# Patient Record
Sex: MALE | Race: WHITE | Employment: FULL TIME | URBAN - METROPOLITAN AREA
[De-identification: names, ages, dates, MRNs, and addresses within clinical notes are randomized per-mention and may not be internally consistent; named-entity substitution may affect disease eponyms.]

---

## 2019-10-26 ENCOUNTER — HOSPITAL ENCOUNTER (EMERGENCY)
Age: 39
Discharge: HOME | End: 2019-10-26

## 2019-10-26 ENCOUNTER — APPOINTMENT (OUTPATIENT)
Dept: GENERAL RADIOLOGY | Age: 39
End: 2019-10-26

## 2019-10-26 VITALS
SYSTOLIC BLOOD PRESSURE: 162 MMHG | WEIGHT: 200 LBS | DIASTOLIC BLOOD PRESSURE: 92 MMHG | OXYGEN SATURATION: 99 % | RESPIRATION RATE: 18 BRPM | HEART RATE: 99 BPM | TEMPERATURE: 98 F

## 2019-10-26 DIAGNOSIS — M54.32 SCIATICA OF LEFT SIDE: Primary | ICD-10-CM

## 2019-10-26 PROCEDURE — 96374 THER/PROPH/DIAG INJ IV PUSH: CPT

## 2019-10-26 PROCEDURE — 72100 X-RAY EXAM L-S SPINE 2/3 VWS: CPT

## 2019-10-26 PROCEDURE — 6370000000 HC RX 637 (ALT 250 FOR IP): Performed by: PHYSICIAN ASSISTANT

## 2019-10-26 PROCEDURE — 99283 EMERGENCY DEPT VISIT LOW MDM: CPT

## 2019-10-26 PROCEDURE — 96372 THER/PROPH/DIAG INJ SC/IM: CPT

## 2019-10-26 PROCEDURE — 6360000002 HC RX W HCPCS: Performed by: PHYSICIAN ASSISTANT

## 2019-10-26 RX ORDER — IBUPROFEN 800 MG/1
800 TABLET ORAL EVERY 8 HOURS PRN
Qty: 21 TABLET | Refills: 0 | Status: ON HOLD | OUTPATIENT
Start: 2019-10-26 | End: 2020-04-27 | Stop reason: HOSPADM

## 2019-10-26 RX ORDER — ORPHENADRINE CITRATE 100 MG/1
100 TABLET, EXTENDED RELEASE ORAL 2 TIMES DAILY
Qty: 20 TABLET | Refills: 0 | Status: SHIPPED | OUTPATIENT
Start: 2019-10-26 | End: 2019-11-05

## 2019-10-26 RX ORDER — OXYCODONE HYDROCHLORIDE AND ACETAMINOPHEN 5; 325 MG/1; MG/1
1 TABLET ORAL ONCE
Status: COMPLETED | OUTPATIENT
Start: 2019-10-26 | End: 2019-10-26

## 2019-10-26 RX ORDER — DEXAMETHASONE SODIUM PHOSPHATE 10 MG/ML
10 INJECTION INTRAMUSCULAR; INTRAVENOUS ONCE
Status: COMPLETED | OUTPATIENT
Start: 2019-10-26 | End: 2019-10-26

## 2019-10-26 RX ORDER — HYDROCODONE BITARTRATE AND ACETAMINOPHEN 5; 325 MG/1; MG/1
1 TABLET ORAL EVERY 6 HOURS PRN
Qty: 12 TABLET | Refills: 0 | Status: SHIPPED | OUTPATIENT
Start: 2019-10-26 | End: 2019-10-29

## 2019-10-26 RX ORDER — PREDNISONE 10 MG/1
40 TABLET ORAL DAILY
Qty: 20 TABLET | Refills: 0 | Status: SHIPPED | OUTPATIENT
Start: 2019-10-26 | End: 2019-10-31

## 2019-10-26 RX ORDER — KETOROLAC TROMETHAMINE 30 MG/ML
30 INJECTION, SOLUTION INTRAMUSCULAR; INTRAVENOUS ONCE
Status: COMPLETED | OUTPATIENT
Start: 2019-10-26 | End: 2019-10-26

## 2019-10-26 RX ADMIN — DEXAMETHASONE SODIUM PHOSPHATE 10 MG: 10 INJECTION INTRAMUSCULAR; INTRAVENOUS at 18:16

## 2019-10-26 RX ADMIN — OXYCODONE HYDROCHLORIDE AND ACETAMINOPHEN 1 TABLET: 5; 325 TABLET ORAL at 18:16

## 2019-10-26 RX ADMIN — KETOROLAC TROMETHAMINE 30 MG: 30 INJECTION, SOLUTION INTRAMUSCULAR at 18:16

## 2019-10-26 ASSESSMENT — PAIN DESCRIPTION - PROGRESSION: CLINICAL_PROGRESSION: GRADUALLY WORSENING

## 2019-10-26 ASSESSMENT — PAIN SCALES - GENERAL
PAINLEVEL_OUTOF10: 9
PAINLEVEL_OUTOF10: 9

## 2019-10-26 ASSESSMENT — PAIN DESCRIPTION - FREQUENCY: FREQUENCY: CONTINUOUS

## 2020-04-21 ENCOUNTER — HOSPITAL ENCOUNTER (INPATIENT)
Age: 40
LOS: 6 days | Discharge: HOME HEALTH | DRG: 854 | End: 2020-04-27
Attending: EMERGENCY MEDICINE | Admitting: INTERNAL MEDICINE
Payer: COMMERCIAL

## 2020-04-21 ENCOUNTER — APPOINTMENT (OUTPATIENT)
Dept: GENERAL RADIOLOGY | Age: 40
DRG: 854 | End: 2020-04-21
Payer: COMMERCIAL

## 2020-04-21 ENCOUNTER — APPOINTMENT (OUTPATIENT)
Dept: ULTRASOUND IMAGING | Age: 40
DRG: 854 | End: 2020-04-21
Payer: COMMERCIAL

## 2020-04-21 ENCOUNTER — APPOINTMENT (OUTPATIENT)
Dept: CT IMAGING | Age: 40
DRG: 854 | End: 2020-04-21
Payer: COMMERCIAL

## 2020-04-21 PROBLEM — L03.113 RIGHT ARM CELLULITIS: Status: ACTIVE | Noted: 2020-04-21

## 2020-04-21 LAB
ALBUMIN SERPL-MCNC: 3.6 G/DL (ref 3.5–5.2)
ALP BLD-CCNC: 112 U/L (ref 40–129)
ALT SERPL-CCNC: 72 U/L (ref 0–40)
ANION GAP SERPL CALCULATED.3IONS-SCNC: 14 MMOL/L (ref 7–16)
APTT: 30.3 SEC (ref 24.5–35.1)
AST SERPL-CCNC: 43 U/L (ref 0–39)
BASOPHILS ABSOLUTE: 0.06 E9/L (ref 0–0.2)
BASOPHILS RELATIVE PERCENT: 0.4 % (ref 0–2)
BILIRUB SERPL-MCNC: 0.6 MG/DL (ref 0–1.2)
BUN BLDV-MCNC: 12 MG/DL (ref 6–20)
CALCIUM SERPL-MCNC: 10 MG/DL (ref 8.6–10.2)
CHLORIDE BLD-SCNC: 96 MMOL/L (ref 98–107)
CO2: 22 MMOL/L (ref 22–29)
CREAT SERPL-MCNC: 0.6 MG/DL (ref 0.7–1.2)
EKG ATRIAL RATE: 105 BPM
EKG P AXIS: 56 DEGREES
EKG P-R INTERVAL: 116 MS
EKG Q-T INTERVAL: 334 MS
EKG QRS DURATION: 96 MS
EKG QTC CALCULATION (BAZETT): 441 MS
EKG R AXIS: 75 DEGREES
EKG T AXIS: 47 DEGREES
EKG VENTRICULAR RATE: 105 BPM
EOSINOPHILS ABSOLUTE: 0.04 E9/L (ref 0.05–0.5)
EOSINOPHILS RELATIVE PERCENT: 0.2 % (ref 0–6)
GFR AFRICAN AMERICAN: >60
GFR NON-AFRICAN AMERICAN: >60 ML/MIN/1.73
GLUCOSE BLD-MCNC: 108 MG/DL (ref 74–99)
HCT VFR BLD CALC: 45 % (ref 37–54)
HEMOGLOBIN: 14.6 G/DL (ref 12.5–16.5)
IMMATURE GRANULOCYTES #: 0.09 E9/L
IMMATURE GRANULOCYTES %: 0.5 % (ref 0–5)
INR BLD: 1.2
LACTIC ACID, SEPSIS: 1.7 MMOL/L (ref 0.5–1.9)
LIPASE: 6 U/L (ref 13–60)
LYMPHOCYTES ABSOLUTE: 1.17 E9/L (ref 1.5–4)
LYMPHOCYTES RELATIVE PERCENT: 7.1 % (ref 20–42)
MCH RBC QN AUTO: 25.9 PG (ref 26–35)
MCHC RBC AUTO-ENTMCNC: 32.4 % (ref 32–34.5)
MCV RBC AUTO: 79.9 FL (ref 80–99.9)
MONOCYTES ABSOLUTE: 0.87 E9/L (ref 0.1–0.95)
MONOCYTES RELATIVE PERCENT: 5.3 % (ref 2–12)
NEUTROPHILS ABSOLUTE: 14.16 E9/L (ref 1.8–7.3)
NEUTROPHILS RELATIVE PERCENT: 86.5 % (ref 43–80)
PDW BLD-RTO: 14.4 FL (ref 11.5–15)
PLATELET # BLD: 347 E9/L (ref 130–450)
PMV BLD AUTO: 9 FL (ref 7–12)
POTASSIUM REFLEX MAGNESIUM: 4.6 MMOL/L (ref 3.5–5)
PRO-BNP: 103 PG/ML (ref 0–125)
PROTHROMBIN TIME: 13.8 SEC (ref 9.3–12.4)
RBC # BLD: 5.63 E12/L (ref 3.8–5.8)
SEDIMENTATION RATE, ERYTHROCYTE: 35 MM/HR (ref 0–15)
SODIUM BLD-SCNC: 132 MMOL/L (ref 132–146)
TOTAL PROTEIN: 7.7 G/DL (ref 6.4–8.3)
TROPONIN: <0.01 NG/ML (ref 0–0.03)
WBC # BLD: 16.4 E9/L (ref 4.5–11.5)

## 2020-04-21 PROCEDURE — 85610 PROTHROMBIN TIME: CPT

## 2020-04-21 PROCEDURE — 85730 THROMBOPLASTIN TIME PARTIAL: CPT

## 2020-04-21 PROCEDURE — 84484 ASSAY OF TROPONIN QUANT: CPT

## 2020-04-21 PROCEDURE — 2580000003 HC RX 258: Performed by: EMERGENCY MEDICINE

## 2020-04-21 PROCEDURE — 2580000003 HC RX 258: Performed by: INTERNAL MEDICINE

## 2020-04-21 PROCEDURE — 6360000002 HC RX W HCPCS: Performed by: EMERGENCY MEDICINE

## 2020-04-21 PROCEDURE — 83690 ASSAY OF LIPASE: CPT

## 2020-04-21 PROCEDURE — 87186 SC STD MICRODIL/AGAR DIL: CPT

## 2020-04-21 PROCEDURE — 1200000000 HC SEMI PRIVATE

## 2020-04-21 PROCEDURE — 99285 EMERGENCY DEPT VISIT HI MDM: CPT

## 2020-04-21 PROCEDURE — 36415 COLL VENOUS BLD VENIPUNCTURE: CPT

## 2020-04-21 PROCEDURE — 96366 THER/PROPH/DIAG IV INF ADDON: CPT

## 2020-04-21 PROCEDURE — 99223 1ST HOSP IP/OBS HIGH 75: CPT | Performed by: INTERNAL MEDICINE

## 2020-04-21 PROCEDURE — 85651 RBC SED RATE NONAUTOMATED: CPT

## 2020-04-21 PROCEDURE — 93971 EXTREMITY STUDY: CPT

## 2020-04-21 PROCEDURE — 73070 X-RAY EXAM OF ELBOW: CPT

## 2020-04-21 PROCEDURE — 80053 COMPREHEN METABOLIC PANEL: CPT

## 2020-04-21 PROCEDURE — 93005 ELECTROCARDIOGRAM TRACING: CPT | Performed by: EMERGENCY MEDICINE

## 2020-04-21 PROCEDURE — 96375 TX/PRO/DX INJ NEW DRUG ADDON: CPT

## 2020-04-21 PROCEDURE — 96365 THER/PROPH/DIAG IV INF INIT: CPT

## 2020-04-21 PROCEDURE — 90471 IMMUNIZATION ADMIN: CPT | Performed by: EMERGENCY MEDICINE

## 2020-04-21 PROCEDURE — 87040 BLOOD CULTURE FOR BACTERIA: CPT

## 2020-04-21 PROCEDURE — 83605 ASSAY OF LACTIC ACID: CPT

## 2020-04-21 PROCEDURE — 85025 COMPLETE CBC W/AUTO DIFF WBC: CPT

## 2020-04-21 PROCEDURE — 6360000004 HC RX CONTRAST MEDICATION: Performed by: RADIOLOGY

## 2020-04-21 PROCEDURE — 90715 TDAP VACCINE 7 YRS/> IM: CPT | Performed by: EMERGENCY MEDICINE

## 2020-04-21 PROCEDURE — 87150 DNA/RNA AMPLIFIED PROBE: CPT

## 2020-04-21 PROCEDURE — 73206 CT ANGIO UPR EXTRM W/O&W/DYE: CPT

## 2020-04-21 PROCEDURE — 83880 ASSAY OF NATRIURETIC PEPTIDE: CPT

## 2020-04-21 RX ORDER — SODIUM CHLORIDE 0.9 % (FLUSH) 0.9 %
10 SYRINGE (ML) INJECTION EVERY 12 HOURS SCHEDULED
Status: DISCONTINUED | OUTPATIENT
Start: 2020-04-21 | End: 2020-04-27 | Stop reason: HOSPADM

## 2020-04-21 RX ORDER — ACETAMINOPHEN 650 MG/1
650 SUPPOSITORY RECTAL EVERY 6 HOURS PRN
Status: DISCONTINUED | OUTPATIENT
Start: 2020-04-21 | End: 2020-04-27 | Stop reason: HOSPADM

## 2020-04-21 RX ORDER — SODIUM CHLORIDE 0.9 % (FLUSH) 0.9 %
10 SYRINGE (ML) INJECTION EVERY 12 HOURS SCHEDULED
Status: CANCELLED | OUTPATIENT
Start: 2020-04-21

## 2020-04-21 RX ORDER — SODIUM CHLORIDE 9 MG/ML
INJECTION, SOLUTION INTRAVENOUS CONTINUOUS
Status: DISCONTINUED | OUTPATIENT
Start: 2020-04-21 | End: 2020-04-27 | Stop reason: HOSPADM

## 2020-04-21 RX ORDER — SODIUM CHLORIDE 0.9 % (FLUSH) 0.9 %
10 SYRINGE (ML) INJECTION PRN
Status: CANCELLED | OUTPATIENT
Start: 2020-04-21

## 2020-04-21 RX ORDER — ONDANSETRON 2 MG/ML
4 INJECTION INTRAMUSCULAR; INTRAVENOUS EVERY 6 HOURS PRN
Status: DISCONTINUED | OUTPATIENT
Start: 2020-04-21 | End: 2020-04-27 | Stop reason: HOSPADM

## 2020-04-21 RX ORDER — SODIUM CHLORIDE 0.9 % (FLUSH) 0.9 %
10 SYRINGE (ML) INJECTION PRN
Status: DISCONTINUED | OUTPATIENT
Start: 2020-04-21 | End: 2020-04-27 | Stop reason: HOSPADM

## 2020-04-21 RX ORDER — 0.9 % SODIUM CHLORIDE 0.9 %
1000 INTRAVENOUS SOLUTION INTRAVENOUS ONCE
Status: COMPLETED | OUTPATIENT
Start: 2020-04-21 | End: 2020-04-21

## 2020-04-21 RX ORDER — POLYETHYLENE GLYCOL 3350 17 G/17G
17 POWDER, FOR SOLUTION ORAL DAILY PRN
Status: DISCONTINUED | OUTPATIENT
Start: 2020-04-21 | End: 2020-04-27 | Stop reason: HOSPADM

## 2020-04-21 RX ORDER — SODIUM CHLORIDE 9 MG/ML
INJECTION, SOLUTION INTRAVENOUS EVERY 12 HOURS
Status: DISCONTINUED | OUTPATIENT
Start: 2020-04-22 | End: 2020-04-22 | Stop reason: ALTCHOICE

## 2020-04-21 RX ORDER — 0.9 % SODIUM CHLORIDE 0.9 %
20 INTRAVENOUS SOLUTION INTRAVENOUS ONCE
Status: COMPLETED | OUTPATIENT
Start: 2020-04-21 | End: 2020-04-22

## 2020-04-21 RX ORDER — ACETAMINOPHEN 325 MG/1
650 TABLET ORAL EVERY 6 HOURS PRN
Status: DISCONTINUED | OUTPATIENT
Start: 2020-04-21 | End: 2020-04-27 | Stop reason: HOSPADM

## 2020-04-21 RX ADMIN — IOPAMIDOL 75 ML: 755 INJECTION, SOLUTION INTRAVENOUS at 19:54

## 2020-04-21 RX ADMIN — WATER 1 G: 1 INJECTION INTRAMUSCULAR; INTRAVENOUS; SUBCUTANEOUS at 18:10

## 2020-04-21 RX ADMIN — SODIUM CHLORIDE 1814 ML: 9 INJECTION, SOLUTION INTRAVENOUS at 22:43

## 2020-04-21 RX ADMIN — SODIUM CHLORIDE 1000 ML: 9 INJECTION, SOLUTION INTRAVENOUS at 18:10

## 2020-04-21 RX ADMIN — VANCOMYCIN HYDROCHLORIDE 2000 MG: 10 INJECTION, POWDER, LYOPHILIZED, FOR SOLUTION INTRAVENOUS at 18:11

## 2020-04-21 RX ADMIN — TETANUS TOXOID, REDUCED DIPHTHERIA TOXOID AND ACELLULAR PERTUSSIS VACCINE, ADSORBED 0.5 ML: 5; 2.5; 8; 8; 2.5 SUSPENSION INTRAMUSCULAR at 18:10

## 2020-04-21 ASSESSMENT — PAIN DESCRIPTION - LOCATION
LOCATION: ARM
LOCATION: ARM

## 2020-04-21 ASSESSMENT — PAIN DESCRIPTION - ORIENTATION: ORIENTATION: RIGHT

## 2020-04-21 ASSESSMENT — PAIN DESCRIPTION - DESCRIPTORS
DESCRIPTORS: ACHING;CONSTANT;DISCOMFORT
DESCRIPTORS: CONSTANT

## 2020-04-21 ASSESSMENT — PAIN DESCRIPTION - FREQUENCY
FREQUENCY: CONTINUOUS
FREQUENCY: CONTINUOUS

## 2020-04-21 ASSESSMENT — PAIN DESCRIPTION - PAIN TYPE
TYPE: ACUTE PAIN
TYPE: ACUTE PAIN

## 2020-04-21 ASSESSMENT — PAIN SCALES - GENERAL
PAINLEVEL_OUTOF10: 9
PAINLEVEL_OUTOF10: 7
PAINLEVEL_OUTOF10: 7

## 2020-04-22 LAB
ACINETOBACTER BAUMANNII BY PCR: NOT DETECTED
ALBUMIN SERPL-MCNC: 3.2 G/DL (ref 3.5–5.2)
ALP BLD-CCNC: 106 U/L (ref 40–129)
ALT SERPL-CCNC: 40 U/L (ref 0–40)
ANION GAP SERPL CALCULATED.3IONS-SCNC: 10 MMOL/L (ref 7–16)
AST SERPL-CCNC: 19 U/L (ref 0–39)
BASOPHILS ABSOLUTE: 0.05 E9/L (ref 0–0.2)
BASOPHILS RELATIVE PERCENT: 0.3 % (ref 0–2)
BILIRUB SERPL-MCNC: 0.3 MG/DL (ref 0–1.2)
BOTTLE TYPE: ABNORMAL
BUN BLDV-MCNC: 9 MG/DL (ref 6–20)
CALCIUM SERPL-MCNC: 9 MG/DL (ref 8.6–10.2)
CANDIDA ALBICANS BY PCR: NOT DETECTED
CANDIDA GLABRATA BY PCR: NOT DETECTED
CANDIDA KRUSEI BY PCR: NOT DETECTED
CANDIDA PARAPSILOSIS BY PCR: NOT DETECTED
CANDIDA TROPICALIS BY PCR: NOT DETECTED
CHLORIDE BLD-SCNC: 99 MMOL/L (ref 98–107)
CO2: 26 MMOL/L (ref 22–29)
CREAT SERPL-MCNC: 0.6 MG/DL (ref 0.7–1.2)
ENTEROBACTER CLOACAE COMPLEX BY PCR: NOT DETECTED
ENTEROBACTERALES BY PCR: NOT DETECTED
ENTEROCOCCUS BY PCR: NOT DETECTED
EOSINOPHILS ABSOLUTE: 0.17 E9/L (ref 0.05–0.5)
EOSINOPHILS RELATIVE PERCENT: 1.1 % (ref 0–6)
ESCHERICHIA COLI BY PCR: NOT DETECTED
GFR AFRICAN AMERICAN: >60
GFR NON-AFRICAN AMERICAN: >60 ML/MIN/1.73
GLUCOSE BLD-MCNC: 105 MG/DL (ref 74–99)
HAEMOPHILUS INFLUENZAE BY PCR: NOT DETECTED
HCT VFR BLD CALC: 36.2 % (ref 37–54)
HEMOGLOBIN: 11.9 G/DL (ref 12.5–16.5)
IMMATURE GRANULOCYTES #: 0.08 E9/L
IMMATURE GRANULOCYTES %: 0.5 % (ref 0–5)
KLEBSIELLA OXYTOCA BY PCR: NOT DETECTED
KLEBSIELLA PNEUMONIAE GROUP BY PCR: NOT DETECTED
LACTIC ACID, SEPSIS: 1.2 MMOL/L (ref 0.5–1.9)
LISTERIA MONOCYTOGENES BY PCR: NOT DETECTED
LYMPHOCYTES ABSOLUTE: 1.78 E9/L (ref 1.5–4)
LYMPHOCYTES RELATIVE PERCENT: 12 % (ref 20–42)
MCH RBC QN AUTO: 26.2 PG (ref 26–35)
MCHC RBC AUTO-ENTMCNC: 32.9 % (ref 32–34.5)
MCV RBC AUTO: 79.6 FL (ref 80–99.9)
METHICILLIN RESISTANCE MECA/C  BY PCR: NOT DETECTED
MONOCYTES ABSOLUTE: 1.41 E9/L (ref 0.1–0.95)
MONOCYTES RELATIVE PERCENT: 9.5 % (ref 2–12)
NEISSERIA MENINGITIDIS BY PCR: NOT DETECTED
NEUTROPHILS ABSOLUTE: 11.31 E9/L (ref 1.8–7.3)
NEUTROPHILS RELATIVE PERCENT: 76.6 % (ref 43–80)
ORDER NUMBER: ABNORMAL
PDW BLD-RTO: 14.7 FL (ref 11.5–15)
PLATELET # BLD: 311 E9/L (ref 130–450)
PMV BLD AUTO: 8.8 FL (ref 7–12)
POTASSIUM SERPL-SCNC: 4.5 MMOL/L (ref 3.5–5)
PROTEUS BY PCR: NOT DETECTED
PSEUDOMONAS AERUGINOSA BY PCR: NOT DETECTED
RBC # BLD: 4.55 E12/L (ref 3.8–5.8)
SERRATIA MARCESCENS BY PCR: NOT DETECTED
SODIUM BLD-SCNC: 135 MMOL/L (ref 132–146)
SOURCE OF BLOOD CULTURE: ABNORMAL
STAPHYLOCOCCUS AUREUS BY PCR: DETECTED
STAPHYLOCOCCUS SPECIES BY PCR: DETECTED
STREPTOCOCCUS AGALACTIAE BY PCR: NOT DETECTED
STREPTOCOCCUS PNEUMONIAE BY PCR: NOT DETECTED
STREPTOCOCCUS PYOGENES  BY PCR: NOT DETECTED
STREPTOCOCCUS SPECIES BY PCR: NOT DETECTED
TOTAL PROTEIN: 6.7 G/DL (ref 6.4–8.3)
VANCOMYCIN RANDOM: 6.2 MCG/ML (ref 5–40)
WBC # BLD: 14.8 E9/L (ref 4.5–11.5)

## 2020-04-22 PROCEDURE — 2580000003 HC RX 258: Performed by: INTERNAL MEDICINE

## 2020-04-22 PROCEDURE — 99253 IP/OBS CNSLTJ NEW/EST LOW 45: CPT | Performed by: INTERNAL MEDICINE

## 2020-04-22 PROCEDURE — 87040 BLOOD CULTURE FOR BACTERIA: CPT

## 2020-04-22 PROCEDURE — 6360000002 HC RX W HCPCS: Performed by: INTERNAL MEDICINE

## 2020-04-22 PROCEDURE — 86703 HIV-1/HIV-2 1 RESULT ANTBDY: CPT

## 2020-04-22 PROCEDURE — 80074 ACUTE HEPATITIS PANEL: CPT

## 2020-04-22 PROCEDURE — 80202 ASSAY OF VANCOMYCIN: CPT

## 2020-04-22 PROCEDURE — 99233 SBSQ HOSP IP/OBS HIGH 50: CPT | Performed by: INTERNAL MEDICINE

## 2020-04-22 PROCEDURE — 87081 CULTURE SCREEN ONLY: CPT

## 2020-04-22 PROCEDURE — 36415 COLL VENOUS BLD VENIPUNCTURE: CPT

## 2020-04-22 PROCEDURE — 85025 COMPLETE CBC W/AUTO DIFF WBC: CPT

## 2020-04-22 PROCEDURE — 1200000000 HC SEMI PRIVATE

## 2020-04-22 PROCEDURE — 6370000000 HC RX 637 (ALT 250 FOR IP): Performed by: INTERNAL MEDICINE

## 2020-04-22 PROCEDURE — 99254 IP/OBS CNSLTJ NEW/EST MOD 60: CPT | Performed by: SURGERY

## 2020-04-22 PROCEDURE — 80053 COMPREHEN METABOLIC PANEL: CPT

## 2020-04-22 PROCEDURE — 83605 ASSAY OF LACTIC ACID: CPT

## 2020-04-22 PROCEDURE — 6370000000 HC RX 637 (ALT 250 FOR IP): Performed by: SPECIALIST

## 2020-04-22 PROCEDURE — 6360000002 HC RX W HCPCS: Performed by: SPECIALIST

## 2020-04-22 RX ORDER — LINEZOLID 600 MG/1
600 TABLET, FILM COATED ORAL EVERY 12 HOURS SCHEDULED
Status: DISCONTINUED | OUTPATIENT
Start: 2020-04-22 | End: 2020-04-23

## 2020-04-22 RX ORDER — CEFAZOLIN SODIUM 2 G/50ML
2 SOLUTION INTRAVENOUS EVERY 8 HOURS
Status: DISCONTINUED | OUTPATIENT
Start: 2020-04-22 | End: 2020-04-27 | Stop reason: HOSPADM

## 2020-04-22 RX ADMIN — VANCOMYCIN HYDROCHLORIDE 1250 MG: 10 INJECTION, POWDER, LYOPHILIZED, FOR SOLUTION INTRAVENOUS at 06:48

## 2020-04-22 RX ADMIN — SODIUM CHLORIDE: 9 INJECTION, SOLUTION INTRAVENOUS at 04:39

## 2020-04-22 RX ADMIN — Medication 10 ML: at 22:03

## 2020-04-22 RX ADMIN — CEFEPIME HYDROCHLORIDE 2 G: 2 INJECTION, POWDER, FOR SOLUTION INTRAVENOUS at 13:35

## 2020-04-22 RX ADMIN — ENOXAPARIN SODIUM 40 MG: 40 INJECTION SUBCUTANEOUS at 09:11

## 2020-04-22 RX ADMIN — SODIUM CHLORIDE: 9 INJECTION, SOLUTION INTRAVENOUS at 01:00

## 2020-04-22 RX ADMIN — CEFEPIME HYDROCHLORIDE 2 G: 2 INJECTION, POWDER, FOR SOLUTION INTRAVENOUS at 01:55

## 2020-04-22 RX ADMIN — CEFAZOLIN SODIUM 2 G: 2 SOLUTION INTRAVENOUS at 23:26

## 2020-04-22 RX ADMIN — LINEZOLID 600 MG: 600 TABLET, FILM COATED ORAL at 20:36

## 2020-04-22 RX ADMIN — SODIUM CHLORIDE: 9 INJECTION, SOLUTION INTRAVENOUS at 13:05

## 2020-04-22 RX ADMIN — ACETAMINOPHEN 650 MG: 325 TABLET, FILM COATED ORAL at 21:45

## 2020-04-22 RX ADMIN — CEFAZOLIN SODIUM 2 G: 2 SOLUTION INTRAVENOUS at 17:29

## 2020-04-22 RX ADMIN — ACETAMINOPHEN 650 MG: 325 TABLET, FILM COATED ORAL at 09:11

## 2020-04-22 RX ADMIN — SODIUM CHLORIDE: 9 INJECTION, SOLUTION INTRAVENOUS at 23:09

## 2020-04-22 RX ADMIN — ACETAMINOPHEN 650 MG: 325 TABLET, FILM COATED ORAL at 15:16

## 2020-04-22 RX ADMIN — LINEZOLID 600 MG: 600 TABLET, FILM COATED ORAL at 09:11

## 2020-04-22 ASSESSMENT — PAIN DESCRIPTION - LOCATION
LOCATION: ARM
LOCATION: ARM

## 2020-04-22 ASSESSMENT — PAIN SCALES - GENERAL
PAINLEVEL_OUTOF10: 9
PAINLEVEL_OUTOF10: 7
PAINLEVEL_OUTOF10: 7
PAINLEVEL_OUTOF10: 2
PAINLEVEL_OUTOF10: 0
PAINLEVEL_OUTOF10: 7
PAINLEVEL_OUTOF10: 7

## 2020-04-22 ASSESSMENT — PAIN DESCRIPTION - PAIN TYPE
TYPE: ACUTE PAIN
TYPE: ACUTE PAIN

## 2020-04-22 ASSESSMENT — PAIN DESCRIPTION - ORIENTATION
ORIENTATION: RIGHT
ORIENTATION: RIGHT

## 2020-04-22 ASSESSMENT — PAIN DESCRIPTION - ONSET: ONSET: ON-GOING

## 2020-04-22 ASSESSMENT — PAIN DESCRIPTION - PROGRESSION: CLINICAL_PROGRESSION: GRADUALLY WORSENING

## 2020-04-22 ASSESSMENT — PAIN - FUNCTIONAL ASSESSMENT: PAIN_FUNCTIONAL_ASSESSMENT: PREVENTS OR INTERFERES SOME ACTIVE ACTIVITIES AND ADLS

## 2020-04-22 ASSESSMENT — PAIN DESCRIPTION - FREQUENCY: FREQUENCY: CONTINUOUS

## 2020-04-22 ASSESSMENT — PAIN DESCRIPTION - DESCRIPTORS: DESCRIPTORS: ACHING;DISCOMFORT

## 2020-04-22 NOTE — PLAN OF CARE
Problem: Pain:  Goal: Control of acute pain  Description: Control of acute pain  4/22/2020 1310 by Pito Guidry RN  Outcome: Met This Shift     Problem: Safety:  Goal: Free from accidental physical injury  Description: Free from accidental physical injury  4/22/2020 1310 by Pito Guidry RN  Outcome: Met This Shift     Problem: Skin Integrity:  Goal: Skin integrity will stabilize  Description: Skin integrity will stabilize  4/22/2020 1310 by Pito Guidry RN  Outcome: Met This Shift     Problem: Falls - Risk of:  Goal: Will remain free from falls  Description: Will remain free from falls  Outcome: Met This Shift

## 2020-04-22 NOTE — ED NOTES
RN attempted several times along with additional RN's to get vascular access for patient with no success. RN notified provider.       Immanuel Kumar RN  04/21/20 2053

## 2020-04-22 NOTE — CONSULTS
infusion, , Intravenous, Continuous, Georgiana Fitzgerald MD, Last Rate: 100 mL/hr at 04/22/20 1305    Allergies as of 04/21/2020    (No Known Allergies)       Social History     Socioeconomic History    Marital status: Single     Spouse name: Not on file    Number of children: Not on file    Years of education: Not on file    Highest education level: Not on file   Occupational History    Not on file   Social Needs    Financial resource strain: Not on file    Food insecurity     Worry: Not on file     Inability: Not on file    Transportation needs     Medical: Not on file     Non-medical: Not on file   Tobacco Use    Smoking status: Current Every Day Smoker     Packs/day: 1.00     Types: Cigarettes    Smokeless tobacco: Never Used   Substance and Sexual Activity    Alcohol use: Not Currently    Drug use: Never    Sexual activity: Not on file   Lifestyle    Physical activity     Days per week: Not on file     Minutes per session: Not on file    Stress: Not on file   Relationships    Social connections     Talks on phone: Not on file     Gets together: Not on file     Attends Buddhist service: Not on file     Active member of club or organization: Not on file     Attends meetings of clubs or organizations: Not on file     Relationship status: Not on file    Intimate partner violence     Fear of current or ex partner: Not on file     Emotionally abused: Not on file     Physically abused: Not on file     Forced sexual activity: Not on file   Other Topics Concern    Not on file   Social History Narrative    Not on file       History reviewed. No pertinent family history.     REVIEW OF SYSTEMS:     CONSTITUTIONAL:  negative for  fevers, chills, sweats and fatigue  EYES:  negative for  double vision, blurred vision and blind spots  HEENT:  negative for  tinnitus, earaches, nasal congestion and epistaxis  RESPIRATORY:  negative for  dry cough, cough with sputum, dyspnea, wheezing and swelling      BP (!) 163/93   Pulse 99   Temp 99.1 °F (37.3 °C) (Oral)   Resp 18   Ht 6' 4\" (1.93 m)   Wt 200 lb (90.7 kg)   SpO2 97%   BMI 24.34 kg/m²     DATA:   I personally reviewed the admission EKG with the following interpretation: Sinus tachycardia  ECHO: Not performed to date  Stress Test: Not performed to date  Angiography: Not performed to date  Cardiology Labs:   BMP:    Lab Results   Component Value Date     04/22/2020    K 4.5 04/22/2020    K 4.6 04/21/2020    CL 99 04/22/2020    CO2 26 04/22/2020    BUN 9 04/22/2020     CMP:    Lab Results   Component Value Date     04/22/2020    K 4.5 04/22/2020    K 4.6 04/21/2020    CL 99 04/22/2020    CO2 26 04/22/2020    BUN 9 04/22/2020    PROT 6.7 04/22/2020     CBC:    Lab Results   Component Value Date    WBC 14.8 04/22/2020    RBC 4.55 04/22/2020    HGB 11.9 04/22/2020    HCT 36.2 04/22/2020    MCV 79.6 04/22/2020    RDW 14.7 04/22/2020     04/22/2020     PT/INR:  No results found for: PTINR  PT/INR Warfarin:  No components found for: PTPATWAR, PTINRWAR  PTT:    Lab Results   Component Value Date    APTT 30.3 04/21/2020     PTT Heparin:  No components found for: APTTHEP  Magnesium:  No results found for: MG  TSH:  No results found for: TSH  TROPONIN:  No components found for: TROP  BNP:  No results found for: BNP  FASTING LIPID PANEL:  No results found for: CHOL, HDL, TRIG  CTA UPPER EXTREMITY RIGHT W CONTRAST   Final Result   Narrowing of the right brachial artery at the level of the distal humerus in   the area of soft tissue swelling. Radial and ulnar arteries appear   reconstituted but have diminished flow. Soft tissue swelling along the antecubital fossa. No focal drainable fluid   collection. Retained radiopaque foreign body. US DUP UPPER EXTREMITY RIGHT VENOUS   Final Result   No evidence of DVT in the right upper extremity.          XR ELBOW RIGHT (2 VIEWS)   Final Result   Retained radiopaque foreign body likely a needle. I have personally reviewed the laboratory, cardiac diagnostic and radiographic testing as outlined above:      IMPRESSION:  1. Staph aureus bacteremia: Needs JAVED to rule out endocarditis, procedure, alternatives, risks, benefits, discussed with him, he would like to think about it before he proceeds, will follow closely. 2.  Right arm cellulitis  3. IV drug use    RECOMMENDATIONS:   1. procedure, alternatives, benefits, risks including but not limited to sore throat, dental injury, pharyngeal injury, esophageal tear/rupture, side effects or allergy to medications, patient would like to think about it   2. Will continue current treatment  3. Further cardiac recommendations will be forthcoming pending his clinical course and diagnostic test findings    I have reviewed my findings and recommendations with patient    Thank you for the consult  Electronically signed by Theodore Ghosh MD on 4/22/2020 at 5:34 PM  NOTE: This report was transcribed using voice recognition software.  Every effort was made to ensure accuracy; however, inadvertent computerized transcription errors may be present

## 2020-04-23 ENCOUNTER — APPOINTMENT (OUTPATIENT)
Dept: GENERAL RADIOLOGY | Age: 40
DRG: 854 | End: 2020-04-23
Payer: COMMERCIAL

## 2020-04-23 ENCOUNTER — ANESTHESIA (OUTPATIENT)
Dept: OPERATING ROOM | Age: 40
DRG: 854 | End: 2020-04-23
Payer: COMMERCIAL

## 2020-04-23 ENCOUNTER — APPOINTMENT (OUTPATIENT)
Dept: CT IMAGING | Age: 40
DRG: 854 | End: 2020-04-23
Payer: COMMERCIAL

## 2020-04-23 ENCOUNTER — ANESTHESIA EVENT (OUTPATIENT)
Dept: OPERATING ROOM | Age: 40
DRG: 854 | End: 2020-04-23
Payer: COMMERCIAL

## 2020-04-23 VITALS
RESPIRATION RATE: 19 BRPM | OXYGEN SATURATION: 100 % | SYSTOLIC BLOOD PRESSURE: 139 MMHG | DIASTOLIC BLOOD PRESSURE: 81 MMHG

## 2020-04-23 LAB
ALBUMIN SERPL-MCNC: 2.8 G/DL (ref 3.5–5.2)
ALP BLD-CCNC: 98 U/L (ref 40–129)
ALT SERPL-CCNC: 33 U/L (ref 0–40)
ANION GAP SERPL CALCULATED.3IONS-SCNC: 16 MMOL/L (ref 7–16)
ANTISTREPTOLYSIN-O: <20 IU/ML (ref 0–200)
AST SERPL-CCNC: 19 U/L (ref 0–39)
BASOPHILS ABSOLUTE: 0.04 E9/L (ref 0–0.2)
BASOPHILS RELATIVE PERCENT: 0.3 % (ref 0–2)
BILIRUB SERPL-MCNC: 0.3 MG/DL (ref 0–1.2)
BUN BLDV-MCNC: 6 MG/DL (ref 6–20)
CALCIUM SERPL-MCNC: 9.1 MG/DL (ref 8.6–10.2)
CHLORIDE BLD-SCNC: 98 MMOL/L (ref 98–107)
CO2: 23 MMOL/L (ref 22–29)
CREAT SERPL-MCNC: 0.6 MG/DL (ref 0.7–1.2)
EOSINOPHILS ABSOLUTE: 0.16 E9/L (ref 0.05–0.5)
EOSINOPHILS RELATIVE PERCENT: 1.3 % (ref 0–6)
GFR AFRICAN AMERICAN: >60
GFR NON-AFRICAN AMERICAN: >60 ML/MIN/1.73
GLUCOSE BLD-MCNC: 107 MG/DL (ref 74–99)
HAV IGM SER IA-ACNC: ABNORMAL
HCT VFR BLD CALC: 36 % (ref 37–54)
HEMOGLOBIN: 11.9 G/DL (ref 12.5–16.5)
HEPATITIS B CORE IGM ANTIBODY: ABNORMAL
HEPATITIS B SURFACE ANTIGEN INTERPRETATION: ABNORMAL
HEPATITIS C ANTIBODY INTERPRETATION: REACTIVE
HIV-1 AND HIV-2 ANTIBODIES: NORMAL
IMMATURE GRANULOCYTES #: 0.05 E9/L
IMMATURE GRANULOCYTES %: 0.4 % (ref 0–5)
LYMPHOCYTES ABSOLUTE: 1.55 E9/L (ref 1.5–4)
LYMPHOCYTES RELATIVE PERCENT: 12.5 % (ref 20–42)
MCH RBC QN AUTO: 26.2 PG (ref 26–35)
MCHC RBC AUTO-ENTMCNC: 33.1 % (ref 32–34.5)
MCV RBC AUTO: 79.3 FL (ref 80–99.9)
MONOCYTES ABSOLUTE: 0.88 E9/L (ref 0.1–0.95)
MONOCYTES RELATIVE PERCENT: 7.1 % (ref 2–12)
MRSA CULTURE ONLY: NORMAL
NEUTROPHILS ABSOLUTE: 9.69 E9/L (ref 1.8–7.3)
NEUTROPHILS RELATIVE PERCENT: 78.4 % (ref 43–80)
PDW BLD-RTO: 14.4 FL (ref 11.5–15)
PLATELET # BLD: 302 E9/L (ref 130–450)
PMV BLD AUTO: 8.9 FL (ref 7–12)
POTASSIUM SERPL-SCNC: 4.1 MMOL/L (ref 3.5–5)
RBC # BLD: 4.54 E12/L (ref 3.8–5.8)
SODIUM BLD-SCNC: 137 MMOL/L (ref 132–146)
TOTAL PROTEIN: 6.7 G/DL (ref 6.4–8.3)
WBC # BLD: 12.4 E9/L (ref 4.5–11.5)

## 2020-04-23 PROCEDURE — 0K990ZZ DRAINAGE OF RIGHT LOWER ARM AND WRIST MUSCLE, OPEN APPROACH: ICD-10-PCS | Performed by: SURGERY

## 2020-04-23 PROCEDURE — 87070 CULTURE OTHR SPECIMN AEROBIC: CPT

## 2020-04-23 PROCEDURE — 99232 SBSQ HOSP IP/OBS MODERATE 35: CPT | Performed by: SURGERY

## 2020-04-23 PROCEDURE — 76937 US GUIDE VASCULAR ACCESS: CPT

## 2020-04-23 PROCEDURE — 86060 ANTISTREPTOLYSIN O TITER: CPT

## 2020-04-23 PROCEDURE — 2580000003 HC RX 258: Performed by: NURSE ANESTHETIST, CERTIFIED REGISTERED

## 2020-04-23 PROCEDURE — 6360000002 HC RX W HCPCS: Performed by: NURSE ANESTHETIST, CERTIFIED REGISTERED

## 2020-04-23 PROCEDURE — 87077 CULTURE AEROBIC IDENTIFY: CPT

## 2020-04-23 PROCEDURE — 2580000003 HC RX 258: Performed by: INTERNAL MEDICINE

## 2020-04-23 PROCEDURE — 7100000000 HC PACU RECOVERY - FIRST 15 MIN: Performed by: SURGERY

## 2020-04-23 PROCEDURE — 85025 COMPLETE CBC W/AUTO DIFF WBC: CPT

## 2020-04-23 PROCEDURE — 80074 ACUTE HEPATITIS PANEL: CPT

## 2020-04-23 PROCEDURE — 80053 COMPREHEN METABOLIC PANEL: CPT

## 2020-04-23 PROCEDURE — 3600000003 HC SURGERY LEVEL 3 BASE: Performed by: SURGERY

## 2020-04-23 PROCEDURE — 76000 FLUOROSCOPY <1 HR PHYS/QHP: CPT | Performed by: SURGERY

## 2020-04-23 PROCEDURE — 1200000000 HC SEMI PRIVATE

## 2020-04-23 PROCEDURE — 05QY0ZZ REPAIR UPPER VEIN, OPEN APPROACH: ICD-10-PCS | Performed by: SURGERY

## 2020-04-23 PROCEDURE — 99233 SBSQ HOSP IP/OBS HIGH 50: CPT | Performed by: INTERNAL MEDICINE

## 2020-04-23 PROCEDURE — 36415 COLL VENOUS BLD VENIPUNCTURE: CPT

## 2020-04-23 PROCEDURE — 2580000003 HC RX 258: Performed by: SURGERY

## 2020-04-23 PROCEDURE — 6360000002 HC RX W HCPCS: Performed by: SURGERY

## 2020-04-23 PROCEDURE — 2709999900 HC NON-CHARGEABLE SUPPLY: Performed by: SURGERY

## 2020-04-23 PROCEDURE — 70490 CT SOFT TISSUE NECK W/O DYE: CPT

## 2020-04-23 PROCEDURE — 6370000000 HC RX 637 (ALT 250 FOR IP): Performed by: SPECIALIST

## 2020-04-23 PROCEDURE — 25028 I&D F/ARM&/WRST DP ABSC/HMTM: CPT | Performed by: SURGERY

## 2020-04-23 PROCEDURE — 3209999900 FLUORO FOR SURGICAL PROCEDURES

## 2020-04-23 PROCEDURE — 87205 SMEAR GRAM STAIN: CPT

## 2020-04-23 PROCEDURE — 7100000001 HC PACU RECOVERY - ADDTL 15 MIN: Performed by: SURGERY

## 2020-04-23 PROCEDURE — 87075 CULTR BACTERIA EXCEPT BLOOD: CPT

## 2020-04-23 PROCEDURE — 3600000013 HC SURGERY LEVEL 3 ADDTL 15MIN: Performed by: SURGERY

## 2020-04-23 PROCEDURE — 87186 SC STD MICRODIL/AGAR DIL: CPT

## 2020-04-23 PROCEDURE — 6370000000 HC RX 637 (ALT 250 FOR IP): Performed by: SURGERY

## 2020-04-23 PROCEDURE — 2500000003 HC RX 250 WO HCPCS: Performed by: NURSE ANESTHETIST, CERTIFIED REGISTERED

## 2020-04-23 PROCEDURE — 3700000000 HC ANESTHESIA ATTENDED CARE: Performed by: SURGERY

## 2020-04-23 PROCEDURE — 6360000002 HC RX W HCPCS: Performed by: SPECIALIST

## 2020-04-23 PROCEDURE — 6370000000 HC RX 637 (ALT 250 FOR IP): Performed by: INTERNAL MEDICINE

## 2020-04-23 PROCEDURE — 3700000001 HC ADD 15 MINUTES (ANESTHESIA): Performed by: SURGERY

## 2020-04-23 RX ORDER — SODIUM CHLORIDE 0.9 % (FLUSH) 0.9 %
10 SYRINGE (ML) INJECTION PRN
Status: DISCONTINUED | OUTPATIENT
Start: 2020-04-23 | End: 2020-04-27 | Stop reason: HOSPADM

## 2020-04-23 RX ORDER — SODIUM CHLORIDE, SODIUM LACTATE, POTASSIUM CHLORIDE, CALCIUM CHLORIDE 600; 310; 30; 20 MG/100ML; MG/100ML; MG/100ML; MG/100ML
INJECTION, SOLUTION INTRAVENOUS CONTINUOUS PRN
Status: DISCONTINUED | OUTPATIENT
Start: 2020-04-23 | End: 2020-04-23 | Stop reason: SDUPTHER

## 2020-04-23 RX ORDER — SODIUM CHLORIDE 0.9 % (FLUSH) 0.9 %
10 SYRINGE (ML) INJECTION EVERY 12 HOURS SCHEDULED
Status: DISCONTINUED | OUTPATIENT
Start: 2020-04-23 | End: 2020-04-27 | Stop reason: HOSPADM

## 2020-04-23 RX ORDER — PROPOFOL 10 MG/ML
INJECTION, EMULSION INTRAVENOUS CONTINUOUS PRN
Status: DISCONTINUED | OUTPATIENT
Start: 2020-04-23 | End: 2020-04-23 | Stop reason: SDUPTHER

## 2020-04-23 RX ORDER — MIDAZOLAM HYDROCHLORIDE 1 MG/ML
INJECTION INTRAMUSCULAR; INTRAVENOUS PRN
Status: DISCONTINUED | OUTPATIENT
Start: 2020-04-23 | End: 2020-04-23 | Stop reason: SDUPTHER

## 2020-04-23 RX ORDER — SODIUM CHLORIDE 9 MG/ML
INJECTION, SOLUTION INTRAVENOUS CONTINUOUS
Status: DISCONTINUED | OUTPATIENT
Start: 2020-04-23 | End: 2020-04-27 | Stop reason: HOSPADM

## 2020-04-23 RX ORDER — FENTANYL CITRATE 50 UG/ML
INJECTION, SOLUTION INTRAMUSCULAR; INTRAVENOUS PRN
Status: DISCONTINUED | OUTPATIENT
Start: 2020-04-23 | End: 2020-04-23 | Stop reason: SDUPTHER

## 2020-04-23 RX ORDER — KETAMINE HYDROCHLORIDE 10 MG/ML
INJECTION, SOLUTION INTRAMUSCULAR; INTRAVENOUS PRN
Status: DISCONTINUED | OUTPATIENT
Start: 2020-04-23 | End: 2020-04-23 | Stop reason: SDUPTHER

## 2020-04-23 RX ADMIN — CEFAZOLIN SODIUM 2 G: 2 SOLUTION INTRAVENOUS at 23:46

## 2020-04-23 RX ADMIN — ACETAMINOPHEN 650 MG: 325 TABLET, FILM COATED ORAL at 09:49

## 2020-04-23 RX ADMIN — SODIUM CHLORIDE, POTASSIUM CHLORIDE, SODIUM LACTATE AND CALCIUM CHLORIDE: 600; 310; 30; 20 INJECTION, SOLUTION INTRAVENOUS at 13:50

## 2020-04-23 RX ADMIN — ACETAMINOPHEN 650 MG: 325 TABLET, FILM COATED ORAL at 22:57

## 2020-04-23 RX ADMIN — Medication 10 ML: at 20:28

## 2020-04-23 RX ADMIN — ACETAMINOPHEN 650 MG: 325 TABLET, FILM COATED ORAL at 03:49

## 2020-04-23 RX ADMIN — MIDAZOLAM 2 MG: 1 INJECTION INTRAMUSCULAR; INTRAVENOUS at 13:54

## 2020-04-23 RX ADMIN — ACETAMINOPHEN 650 MG: 325 TABLET, FILM COATED ORAL at 16:50

## 2020-04-23 RX ADMIN — FENTANYL CITRATE 100 MCG: 50 INJECTION, SOLUTION INTRAMUSCULAR; INTRAVENOUS at 13:51

## 2020-04-23 RX ADMIN — CEFAZOLIN SODIUM 2 G: 2 SOLUTION INTRAVENOUS at 16:31

## 2020-04-23 RX ADMIN — FENTANYL CITRATE 50 MCG: 50 INJECTION, SOLUTION INTRAMUSCULAR; INTRAVENOUS at 14:41

## 2020-04-23 RX ADMIN — SODIUM CHLORIDE: 9 INJECTION, SOLUTION INTRAVENOUS at 09:17

## 2020-04-23 RX ADMIN — SODIUM CHLORIDE: 9 INJECTION, SOLUTION INTRAVENOUS at 23:47

## 2020-04-23 RX ADMIN — LINEZOLID 600 MG: 600 TABLET, FILM COATED ORAL at 08:35

## 2020-04-23 RX ADMIN — SODIUM CHLORIDE, POTASSIUM CHLORIDE, SODIUM LACTATE AND CALCIUM CHLORIDE: 600; 310; 30; 20 INJECTION, SOLUTION INTRAVENOUS at 14:55

## 2020-04-23 RX ADMIN — FENTANYL CITRATE 50 MCG: 50 INJECTION, SOLUTION INTRAMUSCULAR; INTRAVENOUS at 14:25

## 2020-04-23 RX ADMIN — MIDAZOLAM 2 MG: 1 INJECTION INTRAMUSCULAR; INTRAVENOUS at 13:55

## 2020-04-23 RX ADMIN — CEFAZOLIN SODIUM 2 G: 2 SOLUTION INTRAVENOUS at 07:48

## 2020-04-23 RX ADMIN — SODIUM CHLORIDE: 9 INJECTION, SOLUTION INTRAVENOUS at 16:31

## 2020-04-23 RX ADMIN — KETAMINE HYDROCHLORIDE 30 MG: 10 INJECTION, SOLUTION INTRAMUSCULAR; INTRAVENOUS at 13:51

## 2020-04-23 RX ADMIN — PROPOFOL 200 MCG/KG/MIN: 10 INJECTION, EMULSION INTRAVENOUS at 13:51

## 2020-04-23 RX ADMIN — MIDAZOLAM 2 MG: 1 INJECTION INTRAMUSCULAR; INTRAVENOUS at 13:51

## 2020-04-23 ASSESSMENT — PULMONARY FUNCTION TESTS
PIF_VALUE: 0
PIF_VALUE: 1
PIF_VALUE: 0
PIF_VALUE: 1
PIF_VALUE: 0
PIF_VALUE: 1
PIF_VALUE: 0
PIF_VALUE: 1
PIF_VALUE: 0

## 2020-04-23 ASSESSMENT — PAIN SCALES - GENERAL
PAINLEVEL_OUTOF10: 2
PAINLEVEL_OUTOF10: 7
PAINLEVEL_OUTOF10: 5
PAINLEVEL_OUTOF10: 0
PAINLEVEL_OUTOF10: 8
PAINLEVEL_OUTOF10: 0
PAINLEVEL_OUTOF10: 3
PAINLEVEL_OUTOF10: 0
PAINLEVEL_OUTOF10: 0

## 2020-04-23 ASSESSMENT — PAIN DESCRIPTION - LOCATION: LOCATION: ARM

## 2020-04-23 ASSESSMENT — LIFESTYLE VARIABLES: SMOKING_STATUS: 1

## 2020-04-23 ASSESSMENT — PAIN DESCRIPTION - PAIN TYPE: TYPE: SURGICAL PAIN

## 2020-04-23 ASSESSMENT — PAIN DESCRIPTION - PROGRESSION: CLINICAL_PROGRESSION: GRADUALLY WORSENING

## 2020-04-23 ASSESSMENT — PAIN DESCRIPTION - ORIENTATION: ORIENTATION: RIGHT

## 2020-04-23 ASSESSMENT — PAIN DESCRIPTION - DESCRIPTORS: DESCRIPTORS: ACHING

## 2020-04-23 NOTE — BRIEF OP NOTE
Brief Postoperative Note      Patient: Sherif Scott  YOB: 1980  MRN: [id number]    Date of Procedure: 4/23/2020    Pre-Op Diagnosis: foreign body needle, abscess RUE    Post-Op Diagnosis: Abscess RUE       Procedure(s):  RIGHT UPPER EXTREMITY INCISION AND DRAINAGE FAILURE TO LOCATE and REMOVE FOREIGN BODY WITH C-ARM    Surgeon(s):  Glenny Estrada MD    Assistant:  Surgical Assistant: Guera Tejada Assistant: Britt Keenan    Anesthesia: Monitor Anesthesia Care    Estimated Blood Loss (mL): 918VO    Complications: None    Specimens:   ID Type Source Tests Collected by Time Destination   1 : CULTURE RIGHT ARM Swab Arm CULTURE, ANAEROBIC, GRAM STAIN, CULTURE, SURGICAL Glenny Estrada MD 4/23/2020 1419        Implants:  * No implants in log *      Drains: * No LDAs found *    Findings: see op note  [id number]  Electronically signed by Glenny Estrada MD on 4/23/2020 at 2:59 PM

## 2020-04-23 NOTE — PROGRESS NOTES
General Surgery Progress Note  Mio Acosta MD, MS    Patient's Name/Date of Birth: Danyelle Seth / 1980    Date: April 23, 2020     Surgeon: Merle Mullen MD    Chief Complaint: RUE abscess    Patient Active Problem List   Diagnosis    Right arm cellulitis       Subjective: swelling seems worse, pain unchanged    Objective:  BP (!) 140/92   Pulse 88   Temp 98.6 °F (37 °C) (Oral)   Resp 16   Ht 6' 4\" (1.93 m)   Wt 200 lb (90.7 kg)   SpO2 99%   BMI 24.34 kg/m²   Labs:  Recent Labs     04/21/20  1709 04/22/20  1426 04/23/20  0631   WBC 16.4* 14.8* 12.4*   HGB 14.6 11.9* 11.9*   HCT 45.0 36.2* 36.0*     Lab Results   Component Value Date    CREATININE 0.6 (L) 04/23/2020    BUN 6 04/23/2020     04/23/2020    K 4.1 04/23/2020    CL 98 04/23/2020    CO2 23 04/23/2020     Recent Labs     04/21/20  1709   LIPASE 6*         General appearance:  NAD  Head: NCAT, PERRLA, EOMI, red conjunctiva  Neck: supple, no masses  Lungs: Equal chest rise bilateral  Heart: Reg rate  Abdomen: soft, nondistended, nontender  Skin; no lesions  Gu: no cva tenderness  Extremities: RUE swellign worse, some induration medial and AC      Assessment/Plan:  Danyelle Seth is a 45 y.o. male with RUE abscess, foreign body from needle fracture secondary to IVDU    OR for I&D today, remvoal foreign body    Physician Signature: Electronically signed by Dr. Merle Mullen  [phone] (p)  4/23/2020  10:44 AM

## 2020-04-23 NOTE — ANESTHESIA PRE PROCEDURE
Department of Anesthesiology  Preprocedure Note       Name:  Fredi Yepez   Age:  45 y.o.  :  1980                                          MRN:  [id number]         Date:  2020      Surgeon: Annie Ornelas):  Ty Neumann MD    Procedure: RIGHT UPPER EXTREMITY INCISION AND DRAINAGE REMOVAL FOREIGN BODY WITH C-ARM (Right )    Medications prior to admission:   Prior to Admission medications    Medication Sig Start Date End Date Taking?  Authorizing Provider   ibuprofen (IBU) 800 MG tablet Take 1 tablet by mouth every 8 hours as needed for Pain 10/26/19 11/2/19  [place]       Current medications:    Current Facility-Administered Medications   Medication Dose Route Frequency Provider Last Rate Last Dose    linezolid (ZYVOX) tablet 600 mg  600 mg Oral 2 times per day Letty Ozuna MD   600 mg at 20 0835    ceFAZolin (ANCEF) 2 g in dextrose 3 % 50 mL IVPB (duplex)  2 g Intravenous Q8H Letty Ozuna MD   Stopped at 20 2848    sodium chloride flush 0.9 % injection 10 mL  10 mL Intravenous 2 times per day Danilo Erickson MD   10 mL at 20 2203    sodium chloride flush 0.9 % injection 10 mL  10 mL Intravenous PRN Danilo Ericksno MD        acetaminophen (TYLENOL) tablet 650 mg  650 mg Oral Q6H PRN Danilo Erickson MD   650 mg at 20 9723    Or    acetaminophen (TYLENOL) suppository 650 mg  650 mg Rectal Q6H PRN Danilo Erickson MD        polyethylene glycol (GLYCOLAX) packet 17 g  17 g Oral Daily PRN Danilo Erickson MD        enoxaparin (LOVENOX) injection 40 mg  40 mg Subcutaneous Daily Danilo Erickson MD   40 mg at 20 0911    ondansetron (ZOFRAN) injection 4 mg  4 mg Intravenous Q6H PRN Danilo Erickson MD        0.9 % sodium chloride infusion   Intravenous Continuous Danilo Erickson  mL/hr at 20 9434         Allergies:  No Known Allergies    Problem List:    Patient Active Problem List   Diagnosis Code    Right arm cellulitis L03.113       Past Medical History:  History reviewed. No pertinent past medical history. Past Surgical History:  History reviewed. No pertinent surgical history. Social History:    Social History     Tobacco Use    Smoking status: Current Every Day Smoker     Packs/day: 1.00     Types: Cigarettes    Smokeless tobacco: Never Used   Substance Use Topics    Alcohol use: Not Currently                                Ready to quit: Not Answered  Counseling given: Not Answered      Vital Signs (Current):   Vitals:    04/22/20 0745 04/22/20 1730 04/22/20 2315 04/23/20 0745   BP: (!) 163/93 (!) 170/101 (!) 160/91 (!) 140/92   Pulse: 99 92 90 88   Resp: 18 18 18 16   Temp: 37.3 °C (99.1 °F) 36.9 °C (98.4 °F)  37 °C (98.6 °F)   TempSrc: Oral   Oral   SpO2: 97% 100% 99% 99%   Weight:       Height:                                                  BP Readings from Last 3 Encounters:   04/23/20 (!) 140/92   10/26/19 (!) 162/92       NPO Status:                                                                                 BMI:   Wt Readings from Last 3 Encounters:   04/21/20 200 lb (90.7 kg)   10/26/19 200 lb (90.7 kg)     Body mass index is 24.34 kg/m². CBC:   Lab Results   Component Value Date    WBC 12.4 04/23/2020    RBC 4.54 04/23/2020    HGB 11.9 04/23/2020    HCT 36.0 04/23/2020    MCV 79.3 04/23/2020    RDW 14.4 04/23/2020     04/23/2020       CMP:   Lab Results   Component Value Date     04/23/2020    K 4.1 04/23/2020    K 4.6 04/21/2020    CL 98 04/23/2020    CO2 23 04/23/2020    BUN 6 04/23/2020    CREATININE 0.6 04/23/2020    GFRAA >60 04/23/2020    LABGLOM >60 04/23/2020    GLUCOSE 107 04/23/2020    PROT 6.7 04/23/2020    CALCIUM 9.1 04/23/2020    BILITOT 0.3 04/23/2020    ALKPHOS 98 04/23/2020    AST 19 04/23/2020    ALT 33 04/23/2020       POC Tests: No results for input(s): POCGLU, POCNA, POCK, POCCL, POCBUN, POCHEMO, POCHCT in the last 72 hours.     Coags:   Lab Results   Component Value Date    PROTIME 13.8

## 2020-04-24 ENCOUNTER — ANESTHESIA (OUTPATIENT)
Dept: ENDOSCOPY | Age: 40
DRG: 854 | End: 2020-04-24
Payer: COMMERCIAL

## 2020-04-24 ENCOUNTER — ANESTHESIA EVENT (OUTPATIENT)
Dept: ENDOSCOPY | Age: 40
DRG: 854 | End: 2020-04-24
Payer: COMMERCIAL

## 2020-04-24 ENCOUNTER — APPOINTMENT (OUTPATIENT)
Dept: INTERVENTIONAL RADIOLOGY/VASCULAR | Age: 40
DRG: 854 | End: 2020-04-24
Payer: COMMERCIAL

## 2020-04-24 VITALS
RESPIRATION RATE: 48 BRPM | DIASTOLIC BLOOD PRESSURE: 54 MMHG | SYSTOLIC BLOOD PRESSURE: 92 MMHG | OXYGEN SATURATION: 99 %

## 2020-04-24 LAB
ALBUMIN SERPL-MCNC: 2.7 G/DL (ref 3.5–5.2)
ALP BLD-CCNC: 90 U/L (ref 40–129)
ALT SERPL-CCNC: 34 U/L (ref 0–40)
ANION GAP SERPL CALCULATED.3IONS-SCNC: 18 MMOL/L (ref 7–16)
AST SERPL-CCNC: 25 U/L (ref 0–39)
BASOPHILS ABSOLUTE: 0.04 E9/L (ref 0–0.2)
BASOPHILS RELATIVE PERCENT: 0.4 % (ref 0–2)
BILIRUB SERPL-MCNC: 0.2 MG/DL (ref 0–1.2)
BUN BLDV-MCNC: 8 MG/DL (ref 6–20)
CALCIUM SERPL-MCNC: 8.7 MG/DL (ref 8.6–10.2)
CHLORIDE BLD-SCNC: 102 MMOL/L (ref 98–107)
CO2: 19 MMOL/L (ref 22–29)
CREAT SERPL-MCNC: 0.6 MG/DL (ref 0.7–1.2)
EOSINOPHILS ABSOLUTE: 0.25 E9/L (ref 0.05–0.5)
EOSINOPHILS RELATIVE PERCENT: 2.4 % (ref 0–6)
GFR AFRICAN AMERICAN: >60
GFR NON-AFRICAN AMERICAN: >60 ML/MIN/1.73
GLUCOSE BLD-MCNC: 99 MG/DL (ref 74–99)
GRAM STAIN ORDERABLE: NORMAL
HAV IGM SER IA-ACNC: ABNORMAL
HCT VFR BLD CALC: 33.1 % (ref 37–54)
HEMOGLOBIN: 11.2 G/DL (ref 12.5–16.5)
HEPATITIS B CORE IGM ANTIBODY: ABNORMAL
HEPATITIS B SURFACE ANTIGEN INTERPRETATION: ABNORMAL
HEPATITIS C ANTIBODY INTERPRETATION: REACTIVE
IMMATURE GRANULOCYTES #: 0.17 E9/L
IMMATURE GRANULOCYTES %: 1.7 % (ref 0–5)
LYMPHOCYTES ABSOLUTE: 2.37 E9/L (ref 1.5–4)
LYMPHOCYTES RELATIVE PERCENT: 23.2 % (ref 20–42)
MCH RBC QN AUTO: 26.4 PG (ref 26–35)
MCHC RBC AUTO-ENTMCNC: 33.8 % (ref 32–34.5)
MCV RBC AUTO: 77.9 FL (ref 80–99.9)
MONOCYTES ABSOLUTE: 1.03 E9/L (ref 0.1–0.95)
MONOCYTES RELATIVE PERCENT: 10.1 % (ref 2–12)
NEUTROPHILS ABSOLUTE: 6.35 E9/L (ref 1.8–7.3)
NEUTROPHILS RELATIVE PERCENT: 62.2 % (ref 43–80)
ORGANISM: ABNORMAL
PDW BLD-RTO: 14.2 FL (ref 11.5–15)
PLATELET # BLD: 286 E9/L (ref 130–450)
PMV BLD AUTO: 9.2 FL (ref 7–12)
POTASSIUM SERPL-SCNC: 4.9 MMOL/L (ref 3.5–5)
RBC # BLD: 4.25 E12/L (ref 3.8–5.8)
SODIUM BLD-SCNC: 139 MMOL/L (ref 132–146)
TOTAL PROTEIN: 5.8 G/DL (ref 6.4–8.3)
WBC # BLD: 10.2 E9/L (ref 4.5–11.5)

## 2020-04-24 PROCEDURE — 93312 ECHO TRANSESOPHAGEAL: CPT | Performed by: INTERNAL MEDICINE

## 2020-04-24 PROCEDURE — 6370000000 HC RX 637 (ALT 250 FOR IP): Performed by: SURGERY

## 2020-04-24 PROCEDURE — 93312 ECHO TRANSESOPHAGEAL: CPT

## 2020-04-24 PROCEDURE — 99233 SBSQ HOSP IP/OBS HIGH 50: CPT | Performed by: INTERNAL MEDICINE

## 2020-04-24 PROCEDURE — 2580000003 HC RX 258: Performed by: SURGERY

## 2020-04-24 PROCEDURE — 93325 DOPPLER ECHO COLOR FLOW MAPG: CPT

## 2020-04-24 PROCEDURE — 2500000003 HC RX 250 WO HCPCS: Performed by: RADIOLOGY

## 2020-04-24 PROCEDURE — 80053 COMPREHEN METABOLIC PANEL: CPT

## 2020-04-24 PROCEDURE — 7100000011 HC PHASE II RECOVERY - ADDTL 15 MIN: Performed by: INTERNAL MEDICINE

## 2020-04-24 PROCEDURE — 3700000000 HC ANESTHESIA ATTENDED CARE: Performed by: INTERNAL MEDICINE

## 2020-04-24 PROCEDURE — 36415 COLL VENOUS BLD VENIPUNCTURE: CPT

## 2020-04-24 PROCEDURE — B5181ZA FLUOROSCOPY OF SUPERIOR VENA CAVA USING LOW OSMOLAR CONTRAST, GUIDANCE: ICD-10-PCS | Performed by: RADIOLOGY

## 2020-04-24 PROCEDURE — 6360000002 HC RX W HCPCS: Performed by: NURSE ANESTHETIST, CERTIFIED REGISTERED

## 2020-04-24 PROCEDURE — 2580000003 HC RX 258: Performed by: NURSE ANESTHETIST, CERTIFIED REGISTERED

## 2020-04-24 PROCEDURE — 99024 POSTOP FOLLOW-UP VISIT: CPT | Performed by: SURGERY

## 2020-04-24 PROCEDURE — 6360000002 HC RX W HCPCS: Performed by: SURGERY

## 2020-04-24 PROCEDURE — 02HV33Z INSERTION OF INFUSION DEVICE INTO SUPERIOR VENA CAVA, PERCUTANEOUS APPROACH: ICD-10-PCS | Performed by: RADIOLOGY

## 2020-04-24 PROCEDURE — B24BZZ4 ULTRASONOGRAPHY OF HEART WITH AORTA, TRANSESOPHAGEAL: ICD-10-PCS | Performed by: INTERNAL MEDICINE

## 2020-04-24 PROCEDURE — 7100000010 HC PHASE II RECOVERY - FIRST 15 MIN: Performed by: INTERNAL MEDICINE

## 2020-04-24 PROCEDURE — 1200000000 HC SEMI PRIVATE

## 2020-04-24 PROCEDURE — 6370000000 HC RX 637 (ALT 250 FOR IP): Performed by: SPECIALIST

## 2020-04-24 PROCEDURE — 2709999900 HC NON-CHARGEABLE SUPPLY: Performed by: INTERNAL MEDICINE

## 2020-04-24 PROCEDURE — 36573 INSJ PICC RS&I 5 YR+: CPT

## 2020-04-24 PROCEDURE — C1751 CATH, INF, PER/CENT/MIDLINE: HCPCS

## 2020-04-24 PROCEDURE — 3700000001 HC ADD 15 MINUTES (ANESTHESIA): Performed by: INTERNAL MEDICINE

## 2020-04-24 PROCEDURE — 85025 COMPLETE CBC W/AUTO DIFF WBC: CPT

## 2020-04-24 RX ORDER — LIDOCAINE HYDROCHLORIDE 10 MG/ML
10 INJECTION, SOLUTION INFILTRATION; PERINEURAL ONCE
Status: COMPLETED | OUTPATIENT
Start: 2020-04-24 | End: 2020-04-24

## 2020-04-24 RX ORDER — SODIUM CHLORIDE 9 MG/ML
INJECTION, SOLUTION INTRAVENOUS CONTINUOUS PRN
Status: DISCONTINUED | OUTPATIENT
Start: 2020-04-24 | End: 2020-04-24 | Stop reason: SDUPTHER

## 2020-04-24 RX ORDER — LINEZOLID 600 MG/1
600 TABLET, FILM COATED ORAL EVERY 12 HOURS SCHEDULED
Status: DISCONTINUED | OUTPATIENT
Start: 2020-04-24 | End: 2020-04-25

## 2020-04-24 RX ORDER — CIPROFLOXACIN 500 MG/1
500 TABLET, FILM COATED ORAL EVERY 12 HOURS SCHEDULED
Status: DISCONTINUED | OUTPATIENT
Start: 2020-04-24 | End: 2020-04-27 | Stop reason: HOSPADM

## 2020-04-24 RX ORDER — PROPOFOL 10 MG/ML
INJECTION, EMULSION INTRAVENOUS CONTINUOUS PRN
Status: DISCONTINUED | OUTPATIENT
Start: 2020-04-24 | End: 2020-04-24 | Stop reason: SDUPTHER

## 2020-04-24 RX ADMIN — CIPROFLOXACIN HYDROCHLORIDE 500 MG: 500 TABLET, FILM COATED ORAL at 20:04

## 2020-04-24 RX ADMIN — LINEZOLID 600 MG: 600 TABLET, FILM COATED ORAL at 20:04

## 2020-04-24 RX ADMIN — CEFAZOLIN SODIUM 2 G: 2 SOLUTION INTRAVENOUS at 16:54

## 2020-04-24 RX ADMIN — LIDOCAINE HYDROCHLORIDE 10 ML: 10 INJECTION, SOLUTION INFILTRATION; PERINEURAL at 12:43

## 2020-04-24 RX ADMIN — SODIUM CHLORIDE: 9 INJECTION, SOLUTION INTRAVENOUS at 14:10

## 2020-04-24 RX ADMIN — PROPOFOL 180 MCG/KG/MIN: 10 INJECTION, EMULSION INTRAVENOUS at 14:20

## 2020-04-24 RX ADMIN — SODIUM CHLORIDE: 9 INJECTION, SOLUTION INTRAVENOUS at 05:52

## 2020-04-24 RX ADMIN — CEFAZOLIN SODIUM 2 G: 2 SOLUTION INTRAVENOUS at 23:43

## 2020-04-24 RX ADMIN — ACETAMINOPHEN 650 MG: 325 TABLET, FILM COATED ORAL at 20:04

## 2020-04-24 RX ADMIN — SODIUM CHLORIDE: 9 INJECTION, SOLUTION INTRAVENOUS at 16:45

## 2020-04-24 RX ADMIN — ACETAMINOPHEN 650 MG: 325 TABLET, FILM COATED ORAL at 05:49

## 2020-04-24 ASSESSMENT — PAIN SCALES - GENERAL
PAINLEVEL_OUTOF10: 0
PAINLEVEL_OUTOF10: 0
PAINLEVEL_OUTOF10: 6
PAINLEVEL_OUTOF10: 5
PAINLEVEL_OUTOF10: 6
PAINLEVEL_OUTOF10: 5
PAINLEVEL_OUTOF10: 5

## 2020-04-24 ASSESSMENT — PAIN DESCRIPTION - PAIN TYPE: TYPE: SURGICAL PAIN

## 2020-04-24 ASSESSMENT — LIFESTYLE VARIABLES: SMOKING_STATUS: 1

## 2020-04-24 NOTE — OP NOTE
evidence of abscess. Over the 24 hour  period, he had worsening signs of swelling and infection in the right  upper extremity. Secondary to this, he was consented and taken the  operating room for incision and drainage and exploration and attempted  removal of foreign body. He was taken to the operating room, placed  spine, administered monitored anesthesia care. Once he was adequately  sedated, he was prepped and draped in normal sterile fashion. Time-out  was performed to confirm surgical site and the patient's name. Initially anesthetized the skin with 0.25% Marcaine and 1% lidocaine  with epinephrine. Found an area distal to the antecubital fossa on the  forearm that was fluctuant. Made an incision here and dissected down  through until I identified purulent fluid. I also expressed a large  amount of hematoma clot. I extended the incision proximally up to the  antecubital fossa. Fluoroscopic guidance was used to identify the  foreign body in the subcutaneous tissue. I extended my incision up to  this point and no obvious identifiable foreign body was appreciated. A  blunt finger was inserted into the abscess pocket and loculations were  broken up medially upon, breaking up the loculations I expressed a  moderate amount of blood and I could appreciate venous bleeding coming  from the wound. I extended the incision up to the antecubital fossa  dissecting down through subcutaneous tissue until I could expose the  deep space below the fascia of the brachioradialis and biceps. I identified the brachial artery across the [facility] fossa. It  was patent and had good pulse, distally it coursed deep to the area that  was oozing. I was able to put a finger on the area that was bleeding  until I was able to identify vascular clamps. It appeared that there  was a necrotic vein that had been lacerated and it may have just  unroofed the clot that was overlying from the infection.   However, I  found the distal end of the vein. Secondary to ongoing blood loss, I  elected to put a tourniquet up. Tourniquet was placed in a sterile  field and it was insufflated completely occluding blood flow distally to  it. A 7-minute time of tourniquet was undertaken until I could identify  the area what was the distal end of the vein. I used a 4-0 Prolene  suture in order to tie off to separate areas of this vein. I then let  the cuff down after 7 minutes and no other evidence of bleeding was  appreciated. At this point, I performed Doppler of the distal  extremity. I could not appreciate a radial pulse but there was  extensive swelling around this area. I did have a good strong ulnar  pulse and a good strong palmar arch pulse that was 2+. Multiple  attempts to obtain a radial pulse were attempted. I then reinspected  the surgical field. I did not appreciate any evidence of laceration  artery. I could trace the brachial artery to its bifurcation and distal  to the area that I had tied off. I did not appreciate any of the  sutures involving the radial artery. I irrigated out the wound until  the fluid returned clear. I then packed it with 1/2-inch iodoform  gauze. Some superficial skin bleeders were cauterized prior to packing. I did inspect again with fluoroscopy trying to identify the small area  of retained foreign needle. I could not appreciate it secondary to the  inflamed nature of the field. I elected to terminate the procedure. It  did not appear that this area of the field was actively infected. I do  not believe that this foreign body was a source of infection. I believe  I found it unroofed the area of infection and drained it adequately. Therefore, I terminated the procedure at this time. The wound was  packed and sterile dressing was applied. The patient was then awoken  and transferred to postoperative care unit in stable condition.   All  instrument counts, lap counts, and needle counts were correct

## 2020-04-24 NOTE — ANESTHESIA PRE PROCEDURE
ondansetron (ZOFRAN) injection 4 mg  4 mg Intravenous Q6H PRN Holden Hogan MD        0.9 % sodium chloride infusion   Intravenous Continuous Holden Hogan  mL/hr at 04/24/20 6595         Allergies:  No Known Allergies    Problem List:    Patient Active Problem List   Diagnosis Code    Right arm cellulitis L03. 80    IV drug abuse ([facility] [address] F19.10    Superficial foreign body of right upper arm S40.851A       Past Medical History:  History reviewed. No pertinent past medical history. Past Surgical History:        Procedure Laterality Date    INCISION AND DRAINAGE Right 4/23/2020    RIGHT UPPER EXTREMITY INCISION AND DRAINAGE REMOVAL FOREIGN BODY WITH C-ARM performed by Holden Hogan MD at [address] History:    Social History     Tobacco Use    Smoking status: Current Every Day Smoker     Packs/day: 1.00     Types: Cigarettes    Smokeless tobacco: Never Used   Substance Use Topics    Alcohol use: Not Currently                                Ready to quit: Not Answered  Counseling given: Not Answered      Vital Signs (Current):   Vitals:    04/23/20 2115 04/23/20 2245 04/24/20 0545 04/24/20 0810   BP: 133/72   133/87   Pulse: 80   79   Resp: 16   18   Temp: 100.1 °F (37.8 °C) 100.5 °F (38.1 °C) 98.6 °F (37 °C) 98.5 °F (36.9 °C)   TempSrc: Oral Oral Oral Oral   SpO2: 99%   99%   Weight:       Height:                                                  BP Readings from Last 3 Encounters:   04/24/20 133/87   04/23/20 139/81   10/26/19 (!) 162/92       NPO Status:                                                                                 BMI:   Wt Readings from Last 3 Encounters:   04/21/20 200 lb (90.7 kg)   10/26/19 200 lb (90.7 kg)     Body mass index is 24.34 kg/m².     CBC:   Lab Results   Component Value Date    WBC 10.2 04/24/2020    RBC 4.25 04/24/2020    HGB 11.2 04/24/2020    HCT 33.1 04/24/2020    MCV 77.9 04/24/2020    RDW 14.2 04/24/2020     04/24/2020       CMP:

## 2020-04-24 NOTE — PROGRESS NOTES
14.8* 12.4* 10.2   RBC 4.55 4.54 4.25   HGB 11.9* 11.9* 11.2*   HCT 36.2* 36.0* 33.1*   MCV 79.6* 79.3* 77.9*   MCH 26.2 26.2 26.4   MCHC 32.9 33.1 33.8   RDW 14.7 14.4 14.2    302 286   MPV 8.8 8.9 9.2     Radiology:   CT SOFT TISSUE NECK WO CONTRAST   Final Result   Elongation of the stylohyoid ligaments bilaterally, right greater than left   and right larger than left. This is a potentially symptomatic variant      No acute abnormality otherwise         Fluoro For Surgical Procedures   Final Result   Intraprocedural fluoroscopic spot images as above. See separate procedure   report for more information. CTA UPPER EXTREMITY RIGHT W CONTRAST   Final Result   Narrowing of the right brachial artery at the level of the distal humerus in   the area of soft tissue swelling. Radial and ulnar arteries appear   reconstituted but have diminished flow. Soft tissue swelling along the antecubital fossa. No focal drainable fluid   collection. Retained radiopaque foreign body. US DUP UPPER EXTREMITY RIGHT VENOUS   Final Result   No evidence of DVT in the right upper extremity. XR ELBOW RIGHT (2 VIEWS)   Final Result   Retained radiopaque foreign body likely a needle. Assessment:  Active Problems:    Right arm cellulitis    IV drug abuse (HCC)    Superficial foreign body of right upper arm  Resolved Problems:    * No resolved hospital problems.  *    Plan:    Right arm cellulitis 2/2 IVDA complicated with staph bacteremia              C/w IV hydration              Infect disease consulted; abx per ID              Monitor for signs of compartment syndrome              s/p extraction of needle tip   Surgery consulted- for foreign body extraction and monitor for risk of compartment syndrome   JAVED today    Mild elevated LFTs 2/2 hep c              Mildly elevated LFTs              History of alcohol abuse- has been sober for years              No history of liver disease     O/p

## 2020-04-24 NOTE — PROGRESS NOTES
04/24/2020     No results found for: CRPHS  Lab Results   Component Value Date    ALT 34 04/24/2020    AST 25 04/24/2020    ALKPHOS 90 04/24/2020    BILITOT 0.2 04/24/2020     Lab Results   Component Value Date     04/24/2020    K 4.9 04/24/2020    K 4.6 04/21/2020     04/24/2020    CO2 19 04/24/2020    BUN 8 04/24/2020    CREATININE 0.6 04/24/2020    CREATININE 0.6 04/23/2020    CREATININE 0.6 04/22/2020    GFRAA >60 04/24/2020    LABGLOM >60 04/24/2020    GLUCOSE 99 04/24/2020    PROT 5.8 04/24/2020    LABALBU 2.7 04/24/2020    CALCIUM 8.7 04/24/2020    BILITOT 0.2 04/24/2020    ALKPHOS 90 04/24/2020    AST 25 04/24/2020    ALT 34 04/24/2020     No results found for: CRP  Lab Results   Component Value Date    SEDRATE 35 (H) 04/21/2020       Microbiology:        Lab Results   Component Value Date    BLOODCULT2 24 Hours- no growth 04/22/2020    ORG Staphylococcus aureus 04/21/2020     Recent Labs     04/21/20  1709   ORG Staphylococcus aureus*        ASSESSMENT/PLAN:    MSSA SEPSIS SECONDARY TO IVDU RUE INJECTION WITH CELLULITIS POS ABSCESS AND AD H/O RETAINED FOREIGN BODY   -4/23   PROCEDURES PERFORMED:  Incision and drainage of deep space right upper  extremity abscess with attempted removal and failure to remove  superficial foreign body of the right upper extremity under fluoroscopic  Guidance. cx Rare Gram positive cocci in clusters   Rare Gram negative rods   R/O IE  HAS HEP C AB +      restart LINEZOLID  Gn coverage with cipro   JAVED   Appreciate ENT   EVAL   F/u blood cx NGTD    linezolid (ZYVOX) tablet 600 mg, 2 times per day  ciprofloxacin (CIPRO) tablet 500 mg, 2 times per day  ceFAZolin (ANCEF) 2 g in dextrose 3 % 50 mL IVPB (duplex), Q8H     rx plan depends on JAVED  Discussed risks and benefits of rx  · Monitor labs    Imaging and labs were reviewed per medical records. The patient was educated about the diagnosis, prognosis, indications, risks and benefits of treatment.      An opportunity to ask questions was given to the patient/FAMILY.       Electronically signed by Margot Wesley MD on 4/24/2020 at 9:35 AM

## 2020-04-25 LAB
ALBUMIN SERPL-MCNC: 2.9 G/DL (ref 3.5–5.2)
ALP BLD-CCNC: 83 U/L (ref 40–129)
ALT SERPL-CCNC: 44 U/L (ref 0–40)
ANAEROBIC CULTURE: NORMAL
ANION GAP SERPL CALCULATED.3IONS-SCNC: 10 MMOL/L (ref 7–16)
AST SERPL-CCNC: 36 U/L (ref 0–39)
BASOPHILS ABSOLUTE: 0.03 E9/L (ref 0–0.2)
BASOPHILS RELATIVE PERCENT: 0.5 % (ref 0–2)
BILIRUB SERPL-MCNC: <0.2 MG/DL (ref 0–1.2)
BUN BLDV-MCNC: 7 MG/DL (ref 6–20)
CALCIUM SERPL-MCNC: 8.9 MG/DL (ref 8.6–10.2)
CHLORIDE BLD-SCNC: 107 MMOL/L (ref 98–107)
CO2: 26 MMOL/L (ref 22–29)
CREAT SERPL-MCNC: 0.6 MG/DL (ref 0.7–1.2)
CULTURE SURGICAL: ABNORMAL
CULTURE SURGICAL: ABNORMAL
EOSINOPHILS ABSOLUTE: 0.28 E9/L (ref 0.05–0.5)
EOSINOPHILS RELATIVE PERCENT: 4.4 % (ref 0–6)
GFR AFRICAN AMERICAN: >60
GFR NON-AFRICAN AMERICAN: >60 ML/MIN/1.73
GLUCOSE BLD-MCNC: 107 MG/DL (ref 74–99)
HCT VFR BLD CALC: 29.8 % (ref 37–54)
HEMOGLOBIN: 9.5 G/DL (ref 12.5–16.5)
IMMATURE GRANULOCYTES #: 0.03 E9/L
IMMATURE GRANULOCYTES %: 0.5 % (ref 0–5)
LYMPHOCYTES ABSOLUTE: 1.92 E9/L (ref 1.5–4)
LYMPHOCYTES RELATIVE PERCENT: 30.2 % (ref 20–42)
MCH RBC QN AUTO: 25.6 PG (ref 26–35)
MCHC RBC AUTO-ENTMCNC: 31.9 % (ref 32–34.5)
MCV RBC AUTO: 80.3 FL (ref 80–99.9)
MONOCYTES ABSOLUTE: 0.54 E9/L (ref 0.1–0.95)
MONOCYTES RELATIVE PERCENT: 8.5 % (ref 2–12)
NEUTROPHILS ABSOLUTE: 3.56 E9/L (ref 1.8–7.3)
NEUTROPHILS RELATIVE PERCENT: 55.9 % (ref 43–80)
ORGANISM: ABNORMAL
ORGANISM: ABNORMAL
PDW BLD-RTO: 14.4 FL (ref 11.5–15)
PLATELET # BLD: 332 E9/L (ref 130–450)
PMV BLD AUTO: 8.6 FL (ref 7–12)
POTASSIUM SERPL-SCNC: 4.1 MMOL/L (ref 3.5–5)
RBC # BLD: 3.71 E12/L (ref 3.8–5.8)
SODIUM BLD-SCNC: 143 MMOL/L (ref 132–146)
TOTAL PROTEIN: 6 G/DL (ref 6.4–8.3)
WBC # BLD: 6.4 E9/L (ref 4.5–11.5)

## 2020-04-25 PROCEDURE — 6360000002 HC RX W HCPCS: Performed by: SURGERY

## 2020-04-25 PROCEDURE — 85025 COMPLETE CBC W/AUTO DIFF WBC: CPT

## 2020-04-25 PROCEDURE — 6370000000 HC RX 637 (ALT 250 FOR IP): Performed by: SPECIALIST

## 2020-04-25 PROCEDURE — 2580000003 HC RX 258: Performed by: INTERNAL MEDICINE

## 2020-04-25 PROCEDURE — 2580000003 HC RX 258: Performed by: SURGERY

## 2020-04-25 PROCEDURE — 36415 COLL VENOUS BLD VENIPUNCTURE: CPT

## 2020-04-25 PROCEDURE — 36592 COLLECT BLOOD FROM PICC: CPT

## 2020-04-25 PROCEDURE — 99024 POSTOP FOLLOW-UP VISIT: CPT | Performed by: SURGERY

## 2020-04-25 PROCEDURE — 6370000000 HC RX 637 (ALT 250 FOR IP): Performed by: SURGERY

## 2020-04-25 PROCEDURE — 80053 COMPREHEN METABOLIC PANEL: CPT

## 2020-04-25 PROCEDURE — 99233 SBSQ HOSP IP/OBS HIGH 50: CPT | Performed by: INTERNAL MEDICINE

## 2020-04-25 PROCEDURE — 1200000000 HC SEMI PRIVATE

## 2020-04-25 PROCEDURE — 6370000000 HC RX 637 (ALT 250 FOR IP): Performed by: INTERNAL MEDICINE

## 2020-04-25 RX ORDER — NICOTINE 21 MG/24HR
1 PATCH, TRANSDERMAL 24 HOURS TRANSDERMAL DAILY
Status: DISCONTINUED | OUTPATIENT
Start: 2020-04-25 | End: 2020-04-27 | Stop reason: HOSPADM

## 2020-04-25 RX ORDER — CIPROFLOXACIN 500 MG/1
500 TABLET, FILM COATED ORAL EVERY 12 HOURS SCHEDULED
Qty: 42 TABLET | Refills: 0 | Status: SHIPPED | OUTPATIENT
Start: 2020-04-25 | End: 2020-05-16

## 2020-04-25 RX ORDER — LINEZOLID 600 MG/1
600 TABLET, FILM COATED ORAL EVERY 12 HOURS SCHEDULED
Qty: 56 TABLET | Refills: 0 | Status: SHIPPED | OUTPATIENT
Start: 2020-04-25 | End: 2020-04-25 | Stop reason: HOSPADM

## 2020-04-25 RX ADMIN — SODIUM CHLORIDE: 9 INJECTION, SOLUTION INTRAVENOUS at 04:29

## 2020-04-25 RX ADMIN — ACETAMINOPHEN 650 MG: 325 TABLET, FILM COATED ORAL at 14:15

## 2020-04-25 RX ADMIN — LINEZOLID 600 MG: 600 TABLET, FILM COATED ORAL at 08:06

## 2020-04-25 RX ADMIN — SODIUM CHLORIDE, PRESERVATIVE FREE 10 ML: 5 INJECTION INTRAVENOUS at 20:23

## 2020-04-25 RX ADMIN — CEFAZOLIN SODIUM 2 G: 2 SOLUTION INTRAVENOUS at 15:47

## 2020-04-25 RX ADMIN — Medication 10 ML: at 20:25

## 2020-04-25 RX ADMIN — ENOXAPARIN SODIUM 40 MG: 40 INJECTION SUBCUTANEOUS at 08:06

## 2020-04-25 RX ADMIN — CIPROFLOXACIN HYDROCHLORIDE 500 MG: 500 TABLET, FILM COATED ORAL at 08:06

## 2020-04-25 RX ADMIN — ACETAMINOPHEN 650 MG: 325 TABLET, FILM COATED ORAL at 20:22

## 2020-04-25 RX ADMIN — SODIUM CHLORIDE: 9 INJECTION, SOLUTION INTRAVENOUS at 17:45

## 2020-04-25 RX ADMIN — ACETAMINOPHEN 650 MG: 325 TABLET, FILM COATED ORAL at 08:13

## 2020-04-25 RX ADMIN — CIPROFLOXACIN HYDROCHLORIDE 500 MG: 500 TABLET, FILM COATED ORAL at 20:22

## 2020-04-25 RX ADMIN — CEFAZOLIN SODIUM 2 G: 2 SOLUTION INTRAVENOUS at 08:06

## 2020-04-25 RX ADMIN — ACETAMINOPHEN 650 MG: 325 TABLET, FILM COATED ORAL at 02:11

## 2020-04-25 ASSESSMENT — PAIN DESCRIPTION - LOCATION
LOCATION: ARM
LOCATION: ARM

## 2020-04-25 ASSESSMENT — PAIN SCALES - GENERAL
PAINLEVEL_OUTOF10: 9
PAINLEVEL_OUTOF10: 4
PAINLEVEL_OUTOF10: 3
PAINLEVEL_OUTOF10: 4
PAINLEVEL_OUTOF10: 8
PAINLEVEL_OUTOF10: 4
PAINLEVEL_OUTOF10: 7
PAINLEVEL_OUTOF10: 5
PAINLEVEL_OUTOF10: 6

## 2020-04-25 ASSESSMENT — PAIN DESCRIPTION - ORIENTATION: ORIENTATION: RIGHT

## 2020-04-25 ASSESSMENT — PAIN DESCRIPTION - PAIN TYPE
TYPE: SURGICAL PAIN
TYPE: SURGICAL PAIN

## 2020-04-25 NOTE — PROGRESS NOTES
General Surgery Progress Note  Mp Sanchez MD, MS    Patient's Name/Date of Birth: Wendy Perez / 1980    Date: April 25, 2020     Surgeon: Jhon Castro MD    Chief Complaint: RUE absces    Patient Active Problem List   Diagnosis    Right arm cellulitis    IV drug abuse ([facility] [address]    Superficial foreign body of right upper arm    Cellulitis of right arm    Bacteremia       Subjective: improved, anxious for discharge    Objective:  BP (!) 153/91   Pulse 74   Temp 98.2 °F (36.8 °C) (Oral)   Resp 18   Ht 6' 4\" (1.93 m)   Wt 200 lb (90.7 kg)   SpO2 98%   BMI 24.34 kg/m²   Labs:  Recent Labs     04/23/20  0631 04/24/20  0617 04/25/20  0530   WBC 12.4* 10.2 6.4   HGB 11.9* 11.2* 9.5*   HCT 36.0* 33.1* 29.8*     Lab Results   Component Value Date    CREATININE 0.6 (L) 04/25/2020    BUN 7 04/25/2020     04/25/2020    K 4.1 04/25/2020     04/25/2020    CO2 26 04/25/2020     No results for input(s): LIPASE, AMYLASE in the last 72 hours.       General appearance:  NAD  Head: NCAT, PERRLA, EOMI, red conjunctiva  Neck: supple, no masses  Lungs: Equal chest rise bilateral  Heart: Reg rate  Abdomen: soft, nondistended, nontender  Skin; RUE swelling, wound dressed, swelling decreasing  Gu: no cva tenderness  Extremities: extremities normal, atraumatic, no cyanosis or edema      Assessment/Plan:  eWndy Perez is a 45 y.o. male POD 2 I&D deep space abscess right upper arm    Daily moist to dry packing  Home care needs established  Follow up in wound care  DC when ok with others    Physician Signature: Electronically signed by Dr. Jhon Castro  [phone] (p)  4/25/2020  11:57 AM

## 2020-04-25 NOTE — PROGRESS NOTES
[address] [place] [address] [facility]  Progress Note    NAME:Wagner Ryan  1980  DATE:20    FACE TO FACE ENCOUNTER FOR : rue abscess  Bacteremia     SUBJECTIVE:  Chief Complaint   Patient presents with    Arm Pain     rt arm swollen for 1 wk after injecting suboxin   afebrile  S/p surgery   Doing ok   rue picc  No FEVERS, CHILLS, nausea, vomiting, diarrhea. Patient is tolerating medications. No reported adverse drug reactions. Review of systems:  As stated above in the chief complaint, otherwise negative. Medications:  Scheduled Meds:   linezolid  600 mg Oral 2 times per day    ciprofloxacin  500 mg Oral 2 times per day    sodium chloride flush  10 mL Intravenous 2 times per day    ceFAZolin  2 g Intravenous Q8H    sodium chloride flush  10 mL Intravenous 2 times per day    enoxaparin  40 mg Subcutaneous Daily     Continuous Infusions:   sodium chloride Stopped (20 5252)    sodium chloride 100 mL/hr at 20 3709     PRN Meds:sodium chloride flush, sodium chloride flush, acetaminophen **OR** acetaminophen, polyethylene glycol, ondansetron    OBJECTIVE:  BP (!) 153/91   Pulse 74   Temp 98.2 °F (36.8 °C) (Oral)   Resp 18   Ht 6' 4\" (1.93 m)   Wt 200 lb (90.7 kg)   SpO2 98%   BMI 24.34 kg/m²   Temp  Av.4 °F (36.9 °C)  Min: 98.2 °F (36.8 °C)  Max: 98.6 °F (37 °C)  Constitutional:  The patient is awake, alert, and oriented. Skin:    Warm and dry. HEENT:     AT/NC  Neck:    Supple to movements. Chest:   No use of accessory muscles to breathe. Symmetrical expansion. Clear   Cardiovascular:  S1 and S2 are rhythmic and regular. Abdomen:   Positive bowel sounds to auscultation. Benign to palpation. Extremities:   No clubbing, no cyanosis,  RUE edema.  aced  CNS    AAxO   Lines: picc rue     Radiology:  Laboratory and Tests Review:  Lab Results   Component Value Date    WBC 6.4 2020    WBC 10.2 2020    WBC 12.4 (H) 2020    HGB 9.5 (L) 04/25/2020    HCT 29.8 (L) 04/25/2020    MCV 80.3 04/25/2020     04/25/2020     No results found for: CRPHS  Lab Results   Component Value Date    ALT 44 (H) 04/25/2020    AST 36 04/25/2020    ALKPHOS 83 04/25/2020    BILITOT <0.2 04/25/2020     Lab Results   Component Value Date     04/25/2020    K 4.1 04/25/2020    K 4.6 04/21/2020     04/25/2020    CO2 26 04/25/2020    BUN 7 04/25/2020    CREATININE 0.6 04/25/2020    CREATININE 0.6 04/24/2020    CREATININE 0.6 04/23/2020    GFRAA >60 04/25/2020    LABGLOM >60 04/25/2020    GLUCOSE 107 04/25/2020    PROT 6.0 04/25/2020    LABALBU 2.9 04/25/2020    CALCIUM 8.9 04/25/2020    BILITOT <0.2 04/25/2020    ALKPHOS 83 04/25/2020    AST 36 04/25/2020    ALT 44 04/25/2020     No results found for: CRP  Lab Results   Component Value Date    SEDRATE 35 (H) 04/21/2020       Microbiology:        Lab Results   Component Value Date    BLOODCULT2 24 Hours- no growth 04/22/2020    ORG Staphylococcus aureus 04/23/2020    ORG Klebsiella pneumoniae ssp pneumoniae 04/23/2020    ORG Staphylococcus aureus 04/21/2020     Recent Labs     04/23/20  1419   ORG Staphylococcus aureus*  Klebsiella pneumoniae ssp pneumoniae*        ASSESSMENT/PLAN:    MSSA SEPSIS SECONDARY TO IVDU RUE INJECTION WITH CELLULITIS POS ABSCESS AND AD H/O RETAINED FOREIGN BODY   -4/23   PROCEDURES PERFORMED:  Incision and drainage of deep space right upper  extremity abscess with attempted removal and failure to remove  superficial foreign body of the right upper extremity under fluoroscopic  Guidance. -mssa/klebsiella     JAVED NEG   HAS HEP C AB +       ancef/cipro for 4 weeks     JAVED NEG         Pt med rec  F/u 2 weeks       Discussed risks and benefits of rx with pt   He will not use picc    · Monitor labs    Imaging and labs were reviewed per medical records. The patient was educated about the diagnosis, prognosis, indications, risks and benefits of treatment.      An opportunity to

## 2020-04-25 NOTE — PROGRESS NOTES
*    Plan:    Right arm cellulitis abscess  2/2 IVDA complicated with                          S/p extraction of needle tip, I&D, per surgeon evaluation during the OR, his wound was very extensive and involved necrotic veins at the base with deep fascial involvement and adjacent to the brachial artery, will need close monitor              Surgical tissue cx grew MSSA and Klebsiella ESRBL (-)              On IV Ancef, p.o. Cipro and Zyvox    MSSA bacteremia, due to IV drug abuse             JAVED was done yesterday, showing no vegetations, no signs of infectious endocarditis              Antibiotics per ID    Mild elevated LFTs 2/2 hep c              Mildly elevated LFTs              History of alcohol abuse- has been sober for years              No history of liver disease     O/p f/u                   IV drug abuse              Patient injects Suboxone that he gets off the street              He has been doing this for years              HIV screen was negative     Code Status: Full  DVT prophylaxis: Lovenox    Position, plan is to go home with home health care for wound care. Will follow ID for antibiotics plan, ideally patient cannot be discharged with any type of IV line due to IV drug abuse. NOTE: This report was transcribed using voice recognition software. Every effort was made to ensure accuracy; however, inadvertent computerized transcription errors may be present.       Electronically signed by Mariana Bass MD on 4/25/2020 at 7:53 AM

## 2020-04-26 LAB
ALBUMIN SERPL-MCNC: 3.1 G/DL (ref 3.5–5.2)
ALP BLD-CCNC: 79 U/L (ref 40–129)
ALT SERPL-CCNC: 43 U/L (ref 0–40)
ANION GAP SERPL CALCULATED.3IONS-SCNC: 12 MMOL/L (ref 7–16)
AST SERPL-CCNC: 28 U/L (ref 0–39)
BASOPHILS ABSOLUTE: 0.07 E9/L (ref 0–0.2)
BASOPHILS RELATIVE PERCENT: 1 % (ref 0–2)
BILIRUB SERPL-MCNC: <0.2 MG/DL (ref 0–1.2)
BUN BLDV-MCNC: 7 MG/DL (ref 6–20)
CALCIUM SERPL-MCNC: 8.5 MG/DL (ref 8.6–10.2)
CHLORIDE BLD-SCNC: 105 MMOL/L (ref 98–107)
CO2: 25 MMOL/L (ref 22–29)
CREAT SERPL-MCNC: 0.6 MG/DL (ref 0.7–1.2)
EOSINOPHILS ABSOLUTE: 0.39 E9/L (ref 0.05–0.5)
EOSINOPHILS RELATIVE PERCENT: 5.8 % (ref 0–6)
GFR AFRICAN AMERICAN: >60
GFR NON-AFRICAN AMERICAN: >60 ML/MIN/1.73
GLUCOSE BLD-MCNC: 105 MG/DL (ref 74–99)
HCT VFR BLD CALC: 28.4 % (ref 37–54)
HEMOGLOBIN: 9 G/DL (ref 12.5–16.5)
IMMATURE GRANULOCYTES #: 0.03 E9/L
IMMATURE GRANULOCYTES %: 0.4 % (ref 0–5)
LYMPHOCYTES ABSOLUTE: 2.51 E9/L (ref 1.5–4)
LYMPHOCYTES RELATIVE PERCENT: 37.2 % (ref 20–42)
MCH RBC QN AUTO: 25.6 PG (ref 26–35)
MCHC RBC AUTO-ENTMCNC: 31.7 % (ref 32–34.5)
MCV RBC AUTO: 80.7 FL (ref 80–99.9)
MONOCYTES ABSOLUTE: 0.46 E9/L (ref 0.1–0.95)
MONOCYTES RELATIVE PERCENT: 6.8 % (ref 2–12)
NEUTROPHILS ABSOLUTE: 3.28 E9/L (ref 1.8–7.3)
NEUTROPHILS RELATIVE PERCENT: 48.8 % (ref 43–80)
PDW BLD-RTO: 14.6 FL (ref 11.5–15)
PLATELET # BLD: 309 E9/L (ref 130–450)
PMV BLD AUTO: 8.6 FL (ref 7–12)
POTASSIUM SERPL-SCNC: 3.5 MMOL/L (ref 3.5–5)
RBC # BLD: 3.52 E12/L (ref 3.8–5.8)
SODIUM BLD-SCNC: 142 MMOL/L (ref 132–146)
TOTAL PROTEIN: 6 G/DL (ref 6.4–8.3)
WBC # BLD: 6.7 E9/L (ref 4.5–11.5)

## 2020-04-26 PROCEDURE — 80053 COMPREHEN METABOLIC PANEL: CPT

## 2020-04-26 PROCEDURE — 99232 SBSQ HOSP IP/OBS MODERATE 35: CPT | Performed by: INTERNAL MEDICINE

## 2020-04-26 PROCEDURE — 1200000000 HC SEMI PRIVATE

## 2020-04-26 PROCEDURE — 2580000003 HC RX 258: Performed by: SURGERY

## 2020-04-26 PROCEDURE — 6370000000 HC RX 637 (ALT 250 FOR IP): Performed by: SPECIALIST

## 2020-04-26 PROCEDURE — 6370000000 HC RX 637 (ALT 250 FOR IP): Performed by: SURGERY

## 2020-04-26 PROCEDURE — 6360000002 HC RX W HCPCS: Performed by: SURGERY

## 2020-04-26 PROCEDURE — 36415 COLL VENOUS BLD VENIPUNCTURE: CPT

## 2020-04-26 PROCEDURE — 85025 COMPLETE CBC W/AUTO DIFF WBC: CPT

## 2020-04-26 PROCEDURE — 36592 COLLECT BLOOD FROM PICC: CPT

## 2020-04-26 PROCEDURE — 6370000000 HC RX 637 (ALT 250 FOR IP): Performed by: INTERNAL MEDICINE

## 2020-04-26 RX ADMIN — SODIUM CHLORIDE: 9 INJECTION, SOLUTION INTRAVENOUS at 14:33

## 2020-04-26 RX ADMIN — ACETAMINOPHEN 650 MG: 325 TABLET, FILM COATED ORAL at 08:33

## 2020-04-26 RX ADMIN — CIPROFLOXACIN HYDROCHLORIDE 500 MG: 500 TABLET, FILM COATED ORAL at 07:51

## 2020-04-26 RX ADMIN — ACETAMINOPHEN 650 MG: 325 TABLET, FILM COATED ORAL at 02:30

## 2020-04-26 RX ADMIN — ENOXAPARIN SODIUM 40 MG: 40 INJECTION SUBCUTANEOUS at 07:51

## 2020-04-26 RX ADMIN — CIPROFLOXACIN HYDROCHLORIDE 500 MG: 500 TABLET, FILM COATED ORAL at 21:01

## 2020-04-26 RX ADMIN — ACETAMINOPHEN 650 MG: 325 TABLET, FILM COATED ORAL at 14:33

## 2020-04-26 RX ADMIN — CEFAZOLIN SODIUM 2 G: 2 SOLUTION INTRAVENOUS at 00:26

## 2020-04-26 RX ADMIN — CEFAZOLIN SODIUM 2 G: 2 SOLUTION INTRAVENOUS at 16:20

## 2020-04-26 RX ADMIN — ACETAMINOPHEN 650 MG: 325 TABLET, FILM COATED ORAL at 21:00

## 2020-04-26 RX ADMIN — CEFAZOLIN SODIUM 2 G: 2 SOLUTION INTRAVENOUS at 07:50

## 2020-04-26 ASSESSMENT — PAIN DESCRIPTION - DESCRIPTORS
DESCRIPTORS: ACHING;DISCOMFORT;CONSTANT
DESCRIPTORS: ACHING;CONSTANT;DISCOMFORT

## 2020-04-26 ASSESSMENT — PAIN SCALES - GENERAL
PAINLEVEL_OUTOF10: 4
PAINLEVEL_OUTOF10: 0
PAINLEVEL_OUTOF10: 7
PAINLEVEL_OUTOF10: 7
PAINLEVEL_OUTOF10: 6
PAINLEVEL_OUTOF10: 8
PAINLEVEL_OUTOF10: 7
PAINLEVEL_OUTOF10: 9
PAINLEVEL_OUTOF10: 0
PAINLEVEL_OUTOF10: 6
PAINLEVEL_OUTOF10: 7

## 2020-04-26 ASSESSMENT — PAIN DESCRIPTION - ORIENTATION
ORIENTATION: RIGHT;UPPER
ORIENTATION: RIGHT;UPPER

## 2020-04-26 ASSESSMENT — PAIN DESCRIPTION - LOCATION
LOCATION: ARM
LOCATION: ARM

## 2020-04-26 ASSESSMENT — PAIN DESCRIPTION - FREQUENCY
FREQUENCY: CONTINUOUS
FREQUENCY: CONTINUOUS

## 2020-04-26 ASSESSMENT — PAIN DESCRIPTION - ONSET: ONSET: ON-GOING

## 2020-04-26 ASSESSMENT — PAIN DESCRIPTION - PAIN TYPE
TYPE: SURGICAL PAIN;ACUTE PAIN
TYPE: ACUTE PAIN;SURGICAL PAIN

## 2020-04-26 NOTE — PROGRESS NOTES
[address] [place], [place] [address]ist   Progress Note    Admitting Date and Time: 4/21/2020  4:38 PM  Admit Dx: Right arm cellulitis [I03.838]    Subjective:    Patient seen and examined at the bedside. He has no complains. He reported no pain over right arm, no tingling or numbness. No fever chills, no cough chest pain, no leg edema, no abdominal pain     nicotine  1 patch Transdermal Daily    ciprofloxacin  500 mg Oral 2 times per day    sodium chloride flush  10 mL Intravenous 2 times per day    ceFAZolin  2 g Intravenous Q8H    sodium chloride flush  10 mL Intravenous 2 times per day    enoxaparin  40 mg Subcutaneous Daily     sodium chloride flush, 10 mL, PRN  sodium chloride flush, 10 mL, PRN  acetaminophen, 650 mg, Q6H PRN    Or  acetaminophen, 650 mg, Q6H PRN  polyethylene glycol, 17 g, Daily PRN  ondansetron, 4 mg, Q6H PRN         Objective:    BP (!) 150/93   Pulse 74   Temp 97.7 °F (36.5 °C) (Oral)   Resp 18   Ht 6' 4\" (1.93 m)   Wt 200 lb (90.7 kg)   SpO2 98%   BMI 24.34 kg/m²   General Appearance: alert and oriented to person, place and time, well developed and well- nourished, in no acute distress  Skin: warm and dry, no rash or erythema  Head: normocephalic and atraumatic  Eyes: pupils equal, round, and reactive to light, extraocular eye movements intact, conjunctivae normal  Neck: supple and non-tender without mass  Pulmonary/Chest: clear to auscultation bilaterally- no wheezes   Cardiovascular: normal rate, regular rhythm, normal S1 and S2, no murmurs   Abdomen: soft, non-tender, non-distended, normal bowel sounds, no masses or organomegaly  MSK/Extremities: dressing in place on right arm. Mildly swollen diffusely compared to left side, decreased radial pulse over right side, sensation intact.    Neurologic: Speech normal; moves limbs spontaneously and follows commands      Recent Labs     04/24/20  0617 04/25/20  0530 04/26/20  0540    143 142   K 4.9 4.1 3.5    107 105   CO2 19* 26 25   BUN 8 7 7   CREATININE 0.6* 0.6* 0.6*   GLUCOSE 99 107* 105*   CALCIUM 8.7 8.9 8.5*       Recent Labs     04/24/20  0617 04/25/20  0530 04/26/20  0540   WBC 10.2 6.4 6.7   RBC 4.25 3.71* 3.52*   HGB 11.2* 9.5* 9.0*   HCT 33.1* 29.8* 28.4*   MCV 77.9* 80.3 80.7   MCH 26.4 25.6* 25.6*   MCHC 33.8 31.9* 31.7*   RDW 14.2 14.4 14.6    332 309   MPV 9.2 8.6 8.6     Radiology:   IR FLUORO GUIDED CVA DEVICE PLMT/REPLACE/REMOVAL   Final Result   Successful ultrasound and fluoroscopy guided PICC placement         IR ULTRASOUND GUIDANCE VASCULAR ACCESS   Final Result   Successful ultrasound and fluoroscopy guided PICC placement         CT SOFT TISSUE NECK WO CONTRAST   Final Result   Elongation of the stylohyoid ligaments bilaterally, right greater than left   and right larger than left. This is a potentially symptomatic variant      No acute abnormality otherwise         Fluoro For Surgical Procedures   Final Result   Intraprocedural fluoroscopic spot images as above. See separate procedure   report for more information. CTA UPPER EXTREMITY RIGHT W CONTRAST   Final Result   Narrowing of the right brachial artery at the level of the distal humerus in   the area of soft tissue swelling. Radial and ulnar arteries appear   reconstituted but have diminished flow. Soft tissue swelling along the antecubital fossa. No focal drainable fluid   collection. Retained radiopaque foreign body. US DUP UPPER EXTREMITY RIGHT VENOUS   Final Result   No evidence of DVT in the right upper extremity. XR ELBOW RIGHT (2 VIEWS)   Final Result   Retained radiopaque foreign body likely a needle. Assessment:  Active Problems:    Right arm cellulitis    IV drug abuse (HCC)    Superficial foreign body of right upper arm    Cellulitis of right arm    Bacteremia  Resolved Problems:    * No resolved hospital problems.  *    Plan:    Right arm cellulitis abscess  2/2 IVDA complicated

## 2020-04-26 NOTE — PLAN OF CARE
Problem: Safety:  Goal: Free from accidental physical injury  Description: Free from accidental physical injury  Outcome: Met This Shift     Problem: Falls - Risk of:  Goal: Will remain free from falls  Description: Will remain free from falls  Outcome: Met This Shift     Problem: Daily Care:  Goal: Daily care needs are met  Description: Daily care needs are met  Outcome: Met This Shift

## 2020-04-27 VITALS
OXYGEN SATURATION: 100 % | DIASTOLIC BLOOD PRESSURE: 90 MMHG | HEIGHT: 76 IN | TEMPERATURE: 98.2 F | HEART RATE: 71 BPM | BODY MASS INDEX: 24.36 KG/M2 | WEIGHT: 200 LBS | SYSTOLIC BLOOD PRESSURE: 152 MMHG | RESPIRATION RATE: 18 BRPM

## 2020-04-27 LAB
ALBUMIN SERPL-MCNC: 3.3 G/DL (ref 3.5–5.2)
ALP BLD-CCNC: 80 U/L (ref 40–129)
ALT SERPL-CCNC: 61 U/L (ref 0–40)
ANION GAP SERPL CALCULATED.3IONS-SCNC: 11 MMOL/L (ref 7–16)
AST SERPL-CCNC: 43 U/L (ref 0–39)
BASOPHILS ABSOLUTE: 0.07 E9/L (ref 0–0.2)
BASOPHILS RELATIVE PERCENT: 0.9 % (ref 0–2)
BILIRUB SERPL-MCNC: <0.2 MG/DL (ref 0–1.2)
BUN BLDV-MCNC: 8 MG/DL (ref 6–20)
CALCIUM SERPL-MCNC: 8.9 MG/DL (ref 8.6–10.2)
CHLORIDE BLD-SCNC: 110 MMOL/L (ref 98–107)
CO2: 25 MMOL/L (ref 22–29)
CREAT SERPL-MCNC: 0.6 MG/DL (ref 0.7–1.2)
CULTURE, BLOOD 2: NORMAL
EOSINOPHILS ABSOLUTE: 0.41 E9/L (ref 0.05–0.5)
EOSINOPHILS RELATIVE PERCENT: 5.2 % (ref 0–6)
GFR AFRICAN AMERICAN: >60
GFR NON-AFRICAN AMERICAN: >60 ML/MIN/1.73
GLUCOSE BLD-MCNC: 100 MG/DL (ref 74–99)
HCT VFR BLD CALC: 29.8 % (ref 37–54)
HEMOGLOBIN: 9.5 G/DL (ref 12.5–16.5)
IMMATURE GRANULOCYTES #: 0.05 E9/L
IMMATURE GRANULOCYTES %: 0.6 % (ref 0–5)
LYMPHOCYTES ABSOLUTE: 2.42 E9/L (ref 1.5–4)
LYMPHOCYTES RELATIVE PERCENT: 30.6 % (ref 20–42)
MCH RBC QN AUTO: 25.6 PG (ref 26–35)
MCHC RBC AUTO-ENTMCNC: 31.9 % (ref 32–34.5)
MCV RBC AUTO: 80.3 FL (ref 80–99.9)
MONOCYTES ABSOLUTE: 0.57 E9/L (ref 0.1–0.95)
MONOCYTES RELATIVE PERCENT: 7.2 % (ref 2–12)
NEUTROPHILS ABSOLUTE: 4.39 E9/L (ref 1.8–7.3)
NEUTROPHILS RELATIVE PERCENT: 55.5 % (ref 43–80)
PDW BLD-RTO: 14.5 FL (ref 11.5–15)
PLATELET # BLD: 342 E9/L (ref 130–450)
PMV BLD AUTO: 8.4 FL (ref 7–12)
POTASSIUM SERPL-SCNC: 4 MMOL/L (ref 3.5–5)
RBC # BLD: 3.71 E12/L (ref 3.8–5.8)
SODIUM BLD-SCNC: 146 MMOL/L (ref 132–146)
TOTAL PROTEIN: 6.3 G/DL (ref 6.4–8.3)
WBC # BLD: 7.9 E9/L (ref 4.5–11.5)

## 2020-04-27 PROCEDURE — 36592 COLLECT BLOOD FROM PICC: CPT

## 2020-04-27 PROCEDURE — 87070 CULTURE OTHR SPECIMN AEROBIC: CPT

## 2020-04-27 PROCEDURE — 80053 COMPREHEN METABOLIC PANEL: CPT

## 2020-04-27 PROCEDURE — 6360000002 HC RX W HCPCS: Performed by: SURGERY

## 2020-04-27 PROCEDURE — 85025 COMPLETE CBC W/AUTO DIFF WBC: CPT

## 2020-04-27 PROCEDURE — 99239 HOSP IP/OBS DSCHRG MGMT >30: CPT | Performed by: INTERNAL MEDICINE

## 2020-04-27 PROCEDURE — 87186 SC STD MICRODIL/AGAR DIL: CPT

## 2020-04-27 PROCEDURE — 2580000003 HC RX 258: Performed by: SURGERY

## 2020-04-27 PROCEDURE — 6370000000 HC RX 637 (ALT 250 FOR IP): Performed by: SPECIALIST

## 2020-04-27 PROCEDURE — 6370000000 HC RX 637 (ALT 250 FOR IP): Performed by: SURGERY

## 2020-04-27 PROCEDURE — 36415 COLL VENOUS BLD VENIPUNCTURE: CPT

## 2020-04-27 PROCEDURE — 6370000000 HC RX 637 (ALT 250 FOR IP): Performed by: INTERNAL MEDICINE

## 2020-04-27 RX ADMIN — CEFAZOLIN SODIUM 2 G: 2 SOLUTION INTRAVENOUS at 08:06

## 2020-04-27 RX ADMIN — ACETAMINOPHEN 650 MG: 325 TABLET, FILM COATED ORAL at 03:09

## 2020-04-27 RX ADMIN — ENOXAPARIN SODIUM 40 MG: 40 INJECTION SUBCUTANEOUS at 08:06

## 2020-04-27 RX ADMIN — CEFAZOLIN SODIUM 2 G: 2 SOLUTION INTRAVENOUS at 00:18

## 2020-04-27 RX ADMIN — ACETAMINOPHEN 650 MG: 325 TABLET, FILM COATED ORAL at 09:54

## 2020-04-27 RX ADMIN — CIPROFLOXACIN HYDROCHLORIDE 500 MG: 500 TABLET, FILM COATED ORAL at 08:06

## 2020-04-27 RX ADMIN — SODIUM CHLORIDE: 9 INJECTION, SOLUTION INTRAVENOUS at 02:12

## 2020-04-27 RX ADMIN — CEFAZOLIN SODIUM 2 G: 2 SOLUTION INTRAVENOUS at 15:46

## 2020-04-27 ASSESSMENT — PAIN SCALES - GENERAL
PAINLEVEL_OUTOF10: 6
PAINLEVEL_OUTOF10: 7
PAINLEVEL_OUTOF10: 7
PAINLEVEL_OUTOF10: 5
PAINLEVEL_OUTOF10: 5

## 2020-04-27 ASSESSMENT — PAIN DESCRIPTION - PAIN TYPE: TYPE: SURGICAL PAIN

## 2020-04-27 ASSESSMENT — PAIN DESCRIPTION - LOCATION: LOCATION: ARM

## 2020-04-27 NOTE — PROGRESS NOTES
Results   Component Value Date    WBC 7.9 04/27/2020    WBC 6.7 04/26/2020    WBC 6.4 04/25/2020    HGB 9.5 (L) 04/27/2020    HCT 29.8 (L) 04/27/2020    MCV 80.3 04/27/2020     04/27/2020     No results found for: CRPHS  Lab Results   Component Value Date    ALT 61 (H) 04/27/2020    AST 43 (H) 04/27/2020    ALKPHOS 80 04/27/2020    BILITOT <0.2 04/27/2020     Lab Results   Component Value Date     04/27/2020    K 4.0 04/27/2020    K 4.6 04/21/2020     04/27/2020    CO2 25 04/27/2020    BUN 8 04/27/2020    CREATININE 0.6 04/27/2020    CREATININE 0.6 04/26/2020    CREATININE 0.6 04/25/2020    GFRAA >60 04/27/2020    LABGLOM >60 04/27/2020    GLUCOSE 100 04/27/2020    PROT 6.3 04/27/2020    LABALBU 3.3 04/27/2020    CALCIUM 8.9 04/27/2020    BILITOT <0.2 04/27/2020    ALKPHOS 80 04/27/2020    AST 43 04/27/2020    ALT 61 04/27/2020     No results found for: CRP  Lab Results   Component Value Date    SEDRATE 35 (H) 04/21/2020       Microbiology:     Lab Results   Component Value Date    BLOODCULT2 24 Hours- no growth 04/22/2020    ORG Staphylococcus aureus 04/23/2020    ORG Klebsiella pneumoniae ssp pneumoniae 04/23/2020    ORG Staphylococcus aureus 04/21/2020       ASSESSMENT:  MSSA SEPSIS SECONDARY TO IVDU RUE INJECTION WITH CELLULITIS POS ABSCESS AND AD H/O RETAINED FOREIGN BODY   -4/23   PROCEDURES PERFORMED:  Incision and drainage of deep space right upper  extremity abscess with attempted removal and failure to remove superficial foreign body of the right upper extremity under fluoroscopic Guidance.    -mssa/klebsiella   JAVED NEG   HAS HEP C AB +      PLAN:  · Continue ancef/cipro for 4 weeks  · JAVED NEG   · Wound culture done of right arm  · Pt med rec  · F/u 2 weeks  · Discussed risks of PICC- patient signed paper- stating he assumes responsibility and will not use line for anything else but treatment of infection with antibiotics   · Can D/C  · Discussed risks and benefits of rx with pt · Monitor labs  · Continue wound care- for home care    Imaging and labs were reviewed per medical records. The patient was educated about the diagnosis, prognosis, indications, risks and benefits of treatment. An opportunity to ask questions was given to the patient/FAMILY. Electronically signed by CESAR Astorga on 4/27/2020 at 10:28 AM   As above    I have performed and participated in the history, exam, assessment and plan on [address] today  with NP.    RUE DRESSING WAS CHANGE HAS PURULENCE ON DRESSING  CHECK CX  CONT WOUND CARE  HAS AREA OF INDURATION NO ERYTHEM   PINK TISSUE  CAN D/C     Pertinent notes, labs and imaging were reviewed. POC was discussed with patient including medications and side effects    Pt had the opportunity to ask questions. All questions were answered.     Electronically signed by Nilda Bruce MD on 4/27/2020 at 12:19 PM    Phone [phone]  Fax [phone]

## 2020-04-27 NOTE — DISCHARGE SUMMARY
[facility] Physician Discharge Summary     Patient given address and phone number to follow-up with the following physicians/clinics:  #1 Dr. Pricila Durbin  #2 wound care clinic  #3 Dr. Mio Ramos      Activity level: Activities of daily living    Diet: DIET GENERAL;    Labs: None    Condition at discharge: Stable and improved    Dispo: Home        Patient ID:  Wendy Perez  [id number]  81 y.o.  1980    Admit date: 4/21/2020    Discharge date and time:  4/27/2020  12:45 PM    Admission Diagnoses: Active Problems:    Right arm cellulitis    IV drug abuse ([facility] [address]    Superficial foreign body of right upper arm    Cellulitis of right arm    Bacteremia  Resolved Problems:    * No resolved hospital problems. *      Discharge Diagnoses: Active Problems:    Right arm cellulitis    IV drug abuse ([facility] [address]    Superficial foreign body of right upper arm    Cellulitis of right arm    Bacteremia  Resolved Problems:    * No resolved hospital problems. *      Consults:  IP CONSULT TO GENERAL SURGERY  IP CONSULT TO INFECTIOUS DISEASES  IP CONSULT TO PHARMACY  IP CONSULT TO CARDIOLOGY  IP CONSULT TO OTOLARYNGOLOGY  IP CONSULT TO SOCIAL WORK  IP CONSULT TO SOCIAL WORK    Procedures: Flexible byrc-qhskgzvy-hygppzmofals which showed a bony protuberance in the lateral hypopharyngeal wall by Dr. Malissa Lambert  Right upper extremity incision and drainage with possible foreign body medial.  There is failure to locate and remove the foreign body. Hospital Course:   45 y.o. male with a history of IV drug abuse presents with right arm swelling. Patient started noticing 5 days prior to admission  swelling and pain in his right arm around the antecubital area. He denied fever or chills. However, he mentioned vomiting today. He denies numbness or tingling sensation in the fingers on the right side. However, he is unable to extend his arm because of pain and swelling.   Patient injects Suboxone that ertapenem Sensitive <=^0.12 mcg/mL    gentamicin Sensitive <=^1 mcg/mL    levofloxacin Sensitive <=^0.12 mcg/mL    piperacillin-tazobactam Sensitive <=^4 mcg/mL    trimethoprim-sulfamethoxazole Sensitive <=^20 mcg/mL     Condensed View       Imaging:  Xr Elbow Right (2 Views)    Result Date: 4/21/2020  EXAMINATION: TWO XRAY VIEWS OF THE RIGHT ELBOW 4/21/2020 7:02 pm COMPARISON: None. HISTORY: ORDERING SYSTEM PROVIDED HISTORY: Injected suboxone into right AC, now swollen and hot, concern for retained foreign body or osteo TECHNOLOGIST PROVIDED HISTORY: Reason for exam:->Injected suboxone into right AC, now swollen and hot, concern for retained foreign body or osteo FINDINGS: Linear radiopaque foreign body is seen along the antecubital fossa. Surrounding soft tissue swelling. No acute fracture. No elbow joint effusion. Retained radiopaque foreign body likely a needle. Us Dup Upper Extremity Right Venous    Result Date: 4/21/2020  EXAMINATION: DUPLEX VENOUS ULTRASOUND OF THE RIGHT UPPER EXTREMITY, 4/21/2020 7:02 pm TECHNIQUE: Duplex ultrasound and Doppler images were obtained of the right upper extremity. COMPARISON: None. HISTORY: ORDERING SYSTEM PROVIDED HISTORY: Pain and swelling of right lower arm and elbow, injected suboxone, concern for DVT TECHNOLOGIST PROVIDED HISTORY: Reason for exam:->Pain and swelling of right lower arm and elbow, injected suboxone, concern for DVT FINDINGS: The visualized veins of the right upper extremity are patent and free of echogenic thrombus. The veins are normally compressible and have normal phasic flow. Nonspecific soft tissue swelling subcutaneous edema at the level of the antecubital fossa. Lymph node measuring 15 mm along the right medial arm near the biceps is likely reactive. No cortical thickening. No evidence of DVT in the right upper extremity.      Cta Upper Extremity Right W Contrast    Result Date: 4/21/2020  EXAMINATION: CTA OF THE RIGHT UPPER EXTREMITY 4/21/2020 7:20 pm TECHNIQUE: CTA of the right upper extremity was performed after the administration of intravenous contrast. Multiplanar reformatted images are provided for review. MIP images are provided for review. Dose modulation, iterative reconstruction, and/or weight based adjustment of the mA/kV was utilized to reduce the radiation dose to as low as reasonably achievable. COMPARISON: Radiographs 04/21/2020 HISTORY: ORDERING SYSTEM PROVIDED HISTORY: Infection in the right AC, diminished radial pulse, concern for arterial occlusion TECHNOLOGIST PROVIDED HISTORY: Reason for exam:->Infection in the right AC, diminished radial pulse, concern for arterial occlusion FINDINGS: No acute fracture. Linear radiopaque foreign body along the distal aspect of the arm. Diffuse subcutaneous edema and soft tissue swelling involving the right upper extremity. No focal drainable fluid collection. Prominent right axillary lymph nodes are nonspecific but suspected to be reactive. Right axillary artery is patent without stenosis. Evaluation of the arteries at the level of the distal humerus and beyond is limited due to bolus timing and motion. Right proximal-mid brachial artery is patent. At the level of the distal humerus the brachial artery becomes indistinct, seen in the area of soft tissue swelling. No focal intraluminal filling defect. No focal external lesion. Radial and ulnar arteries appear reconstituted at the level of the proximal forearm with decreased flow into the wrist.     Narrowing of the right brachial artery at the level of the distal humerus in the area of soft tissue swelling. Radial and ulnar arteries appear reconstituted but have diminished flow. Soft tissue swelling along the antecubital fossa. No focal drainable fluid collection. Retained radiopaque foreign body.          Patient Instructions:   Current Discharge Medication List      START taking these medications    Details   ciprofloxacin (CIPRO) 500 MG tablet Take 1 tablet by mouth every 12 hours for 21 days  Qty: 42 tablet, Refills: 0      ceFAZolin (ANCEF) infusion Infuse 2,000 mg intravenously every 8 hours for 28 days Compound per protocol  Qty: 168 g, Refills: 0         STOP taking these medications       ibuprofen (IBU) 800 MG tablet Comments:   Reason for Stopping:                 Note that more than 30 minutes was spent in preparing discharge papers, discussing discharge with patient, medication review, etc.    NOTE: This report was transcribed using voice recognition software.  Every effort was made to ensure accuracy; however, inadvertent computerized transcription errors may be present.     Signed:  Electronically signed by Willis Dang DO on 4/27/2020 at 12:45 PM

## 2020-04-27 NOTE — CARE COORDINATION
Ss note:4/27/2020.11:38 AM Received call from uche Conteh with [facility], she has obtained cost of home iv and discussed this with the pt. Pt is agreeable to cost. Sw has provided a financial assistance form for pt to fill out and return to [facility] after discharge. Pt to receive 4 pm dose. Dr. Creola Dancer notified of above.  ADRIEN Dior

## 2020-04-27 NOTE — DISCHARGE INSTR - COC
kg/m²     Last documented pain score (0-10 scale): Pain Level: 7  Last Weight:   Wt Readings from Last 1 Encounters:   20 200 lb (90.7 kg)     Mental Status:  {IP PT MENTAL STATUS:}    IV Access:  { PENELOPE IV ACCESS:176446435}    Nursing Mobility/ADLs:  Walking   {CHP DME GAKK:538633147}  Transfer  {CHP DME EEB}  Bathing  {CHP DME HYQT:968684634}  Dressing  {CHP DME TWTC:258376469}  Toileting  {CHP DME ZRTO:904654344}  Feeding  {P DME WPMO:834480182}  Med Admin  {P DME XBCV:891944403}  Med Delivery   { PENELOPE MED Delivery:915113316}    Wound Care Documentation and Therapy:        Elimination:  Continence:   · Bowel: {YES / HI:37748}  · Bladder: {YES / XS:24728}  Urinary Catheter: {Urinary Catheter:642478072}   Colostomy/Ileostomy/Ileal Conduit: {YES / HC:71600}       Date of Last BM: ***    Intake/Output Summary (Last 24 hours) at 2020 1027  Last data filed at 2020 0434  Gross per 24 hour   Intake 3170 ml   Output 2030 ml   Net 1140 ml     I/O last 3 completed shifts: In: 3222 [P.O.:1260;  I.V.:2220; IV Piggyback:50]  Out: 2030 [Urine:2030]    Safety Concerns:     [address] Safety Concerns:619268588}    Impairments/Disabilities:      [address] Impairments/Disabilities:291899658}    Nutrition Therapy:  Current Nutrition Therapy:   [address] Diet List:514545464}    Routes of Feeding: {[facility] DME Other Feedings:403741073}  Liquids: {Slp liquid thickness:79738}  Daily Fluid Restriction: {CHP DME Yes amt example:579933255}  Last Modified Barium Swallow with Video (Video Swallowing Test): {Done Not Done HQVR:468904409}    Treatments at the Time of Hospital Discharge:   Respiratory Treatments: ***  Oxygen Therapy:  {Therapy; copd oxygen:93475}  Ventilator:    { CC Vent SZLH:832715213}    Rehab Therapies: {THERAPEUTIC INTERVENTION:4388617077}  Weight Bearing Status/Restrictions: [address] G[address] Weight Bearin}  Other Medical Equipment (for information only, NOT a DME order): {EQUIPMENT:194065194}  Other Treatments: ***    Patient's personal belongings (please select all that are sent with patient):  {CHP DME Belongings:264771885}    RN SIGNATURE:  {Esignature:673207351}    CASE MANAGEMENT/SOCIAL WORK SECTION    Inpatient Status Date: ***    Readmission Risk Assessment Score:  Readmission Risk              Risk of Unplanned Readmission:        13           Discharging to Facility/ Agency   · Name:   · Address:  · Phone:  · Fax:    Dialysis Facility (if applicable)   · Name:  · Address:  · Dialysis Schedule:  · Phone:  · Fax:    / signature: {Esignature:126492811}    PHYSICIAN SECTION    Prognosis: {Prognosis:7473537727}    Condition at Discharge: [address] [place] [address] Patient Condition:009543535}    Rehab Potential (if transferring to Rehab): {Prognosis:1768856697}    Recommended Labs or Other Treatments After Discharge: ***    Physician Certification: I certify the above information and transfer of Paul Sanon  is necessary for the continuing treatment of the diagnosis listed and that he requires {Admit to Appropriate Level of Care:66409} for {GREATER/LESS:996379362} 30 days.      Update Admission H&P: {CHP DME Changes in YJCME:933039470}    PHYSICIAN SIGNATURE:  {Esignature:598733054}

## 2020-04-27 NOTE — HOME CARE
SANGEETA SORTO  ORDERED THERAPY AT TIME OF QUOTE: CEFAZOLIN 2GM IV Q8H  COVERAGE: 100% AFTER $5000 DEDUCTIBLE  TOTAL PT RESPONSIBILITY: $176.04 PER DAY UNTIL DEDUCTIBLE MET, THEN $0.00      SPOKE WITH PHARMACY AND PATIENT HAS ONLY PAID $133.52  BUT HOSPITAL CHARGES HAVEN'T BEEN CALCULATED IN THAT YET.  SPOKE WITH PATIENT AND UPDATED WITH THE SITUATION, HE IS IN AGREEMENT WITH PRICING AT THIS TIME AND HE WILL FILL OUT FINANCIAL ASSISTANCE FORM AND [facility] UPDATED  [address] [facility] [address] AND UPDATED THAT PATIENT IS IN AGREEMENT     Mahnaz Cohen, [address] [facility]

## 2020-04-29 LAB
ORGANISM: ABNORMAL
WOUND/ABSCESS: ABNORMAL

## 2020-05-01 ENCOUNTER — HOSPITAL ENCOUNTER (OUTPATIENT)
Age: 40
Discharge: HOME | End: 2020-05-03
Payer: COMMERCIAL

## 2020-05-01 LAB
ALBUMIN SERPL-MCNC: 3.7 G/DL (ref 3.5–5.2)
ALP BLD-CCNC: 98 U/L (ref 40–129)
ALT SERPL-CCNC: 44 U/L (ref 0–40)
ANION GAP SERPL CALCULATED.3IONS-SCNC: 13 MMOL/L (ref 7–16)
AST SERPL-CCNC: 22 U/L (ref 0–39)
BASOPHILS ABSOLUTE: 0.1 E9/L (ref 0–0.2)
BASOPHILS RELATIVE PERCENT: 1 % (ref 0–2)
BILIRUB SERPL-MCNC: <0.2 MG/DL (ref 0–1.2)
BUN BLDV-MCNC: 13 MG/DL (ref 6–20)
C-REACTIVE PROTEIN: 2.1 MG/DL (ref 0–0.4)
CALCIUM SERPL-MCNC: 9.8 MG/DL (ref 8.6–10.2)
CHLORIDE BLD-SCNC: 101 MMOL/L (ref 98–107)
CO2: 29 MMOL/L (ref 22–29)
CREAT SERPL-MCNC: 0.7 MG/DL (ref 0.7–1.2)
EOSINOPHILS ABSOLUTE: 0.41 E9/L (ref 0.05–0.5)
EOSINOPHILS RELATIVE PERCENT: 4.2 % (ref 0–6)
GFR AFRICAN AMERICAN: >60
GFR NON-AFRICAN AMERICAN: >60 ML/MIN/1.73
GLUCOSE BLD-MCNC: 102 MG/DL (ref 74–99)
HCT VFR BLD CALC: 35.4 % (ref 37–54)
HEMOGLOBIN: 11.1 G/DL (ref 12.5–16.5)
IMMATURE GRANULOCYTES #: 0.06 E9/L
IMMATURE GRANULOCYTES %: 0.6 % (ref 0–5)
LYMPHOCYTES ABSOLUTE: 3.03 E9/L (ref 1.5–4)
LYMPHOCYTES RELATIVE PERCENT: 31.3 % (ref 20–42)
MCH RBC QN AUTO: 25.8 PG (ref 26–35)
MCHC RBC AUTO-ENTMCNC: 31.4 % (ref 32–34.5)
MCV RBC AUTO: 82.3 FL (ref 80–99.9)
MONOCYTES ABSOLUTE: 0.69 E9/L (ref 0.1–0.95)
MONOCYTES RELATIVE PERCENT: 7.1 % (ref 2–12)
NEUTROPHILS ABSOLUTE: 5.39 E9/L (ref 1.8–7.3)
NEUTROPHILS RELATIVE PERCENT: 55.8 % (ref 43–80)
PDW BLD-RTO: 15.8 FL (ref 11.5–15)
PLATELET # BLD: 553 E9/L (ref 130–450)
PMV BLD AUTO: 8.3 FL (ref 7–12)
POTASSIUM SERPL-SCNC: 4.9 MMOL/L (ref 3.5–5)
RBC # BLD: 4.3 E12/L (ref 3.8–5.8)
SEDIMENTATION RATE, ERYTHROCYTE: 50 MM/HR (ref 0–15)
SODIUM BLD-SCNC: 143 MMOL/L (ref 132–146)
TOTAL PROTEIN: 7.2 G/DL (ref 6.4–8.3)
WBC # BLD: 9.7 E9/L (ref 4.5–11.5)

## 2020-05-01 PROCEDURE — 86140 C-REACTIVE PROTEIN: CPT

## 2020-05-01 PROCEDURE — 85025 COMPLETE CBC W/AUTO DIFF WBC: CPT

## 2020-05-01 PROCEDURE — 85651 RBC SED RATE NONAUTOMATED: CPT

## 2020-05-01 PROCEDURE — 36415 COLL VENOUS BLD VENIPUNCTURE: CPT

## 2020-05-01 PROCEDURE — 80053 COMPREHEN METABOLIC PANEL: CPT

## 2020-05-05 ENCOUNTER — HOSPITAL ENCOUNTER (OUTPATIENT)
Age: 40
Discharge: HOME | End: 2020-05-07
Payer: COMMERCIAL

## 2020-05-05 LAB
ALBUMIN SERPL-MCNC: 3.8 G/DL (ref 3.5–5.2)
ALP BLD-CCNC: 97 U/L (ref 40–129)
ALT SERPL-CCNC: 23 U/L (ref 0–40)
ANION GAP SERPL CALCULATED.3IONS-SCNC: 15 MMOL/L (ref 7–16)
ANISOCYTOSIS: ABNORMAL
AST SERPL-CCNC: 16 U/L (ref 0–39)
BASOPHILS ABSOLUTE: 0 E9/L (ref 0–0.2)
BASOPHILS RELATIVE PERCENT: 1.4 % (ref 0–2)
BILIRUB SERPL-MCNC: <0.2 MG/DL (ref 0–1.2)
BUN BLDV-MCNC: 14 MG/DL (ref 6–20)
BURR CELLS: ABNORMAL
C-REACTIVE PROTEIN: 1.7 MG/DL (ref 0–0.4)
CALCIUM SERPL-MCNC: 9.7 MG/DL (ref 8.6–10.2)
CHLORIDE BLD-SCNC: 104 MMOL/L (ref 98–107)
CO2: 26 MMOL/L (ref 22–29)
CREAT SERPL-MCNC: 0.7 MG/DL (ref 0.7–1.2)
EOSINOPHILS ABSOLUTE: 0.44 E9/L (ref 0.05–0.5)
EOSINOPHILS RELATIVE PERCENT: 6.1 % (ref 0–6)
GFR AFRICAN AMERICAN: >60
GFR NON-AFRICAN AMERICAN: >60 ML/MIN/1.73
GLUCOSE BLD-MCNC: 97 MG/DL (ref 74–99)
HCT VFR BLD CALC: 34.6 % (ref 37–54)
HEMOGLOBIN: 10.8 G/DL (ref 12.5–16.5)
LYMPHOCYTES ABSOLUTE: 2.16 E9/L (ref 1.5–4)
LYMPHOCYTES RELATIVE PERCENT: 29.6 % (ref 20–42)
MCH RBC QN AUTO: 25.8 PG (ref 26–35)
MCHC RBC AUTO-ENTMCNC: 31.2 % (ref 32–34.5)
MCV RBC AUTO: 82.8 FL (ref 80–99.9)
MONOCYTES ABSOLUTE: 0.43 E9/L (ref 0.1–0.95)
MONOCYTES RELATIVE PERCENT: 6.1 % (ref 2–12)
MYELOCYTE PERCENT: 0.9 % (ref 0–0)
NEUTROPHILS ABSOLUTE: 4.18 E9/L (ref 1.8–7.3)
NEUTROPHILS RELATIVE PERCENT: 57.4 % (ref 43–80)
NUCLEATED RED BLOOD CELLS: 0.9 /100 WBC
OVALOCYTES: ABNORMAL
PDW BLD-RTO: 15.9 FL (ref 11.5–15)
PLATELET # BLD: 461 E9/L (ref 130–450)
PMV BLD AUTO: 8.6 FL (ref 7–12)
POIKILOCYTES: ABNORMAL
POLYCHROMASIA: ABNORMAL
POTASSIUM SERPL-SCNC: 4.7 MMOL/L (ref 3.5–5)
RBC # BLD: 4.18 E12/L (ref 3.8–5.8)
SEDIMENTATION RATE, ERYTHROCYTE: 49 MM/HR (ref 0–15)
SODIUM BLD-SCNC: 145 MMOL/L (ref 132–146)
TOTAL PROTEIN: 7.2 G/DL (ref 6.4–8.3)
WBC # BLD: 7.2 E9/L (ref 4.5–11.5)

## 2020-05-05 PROCEDURE — 85651 RBC SED RATE NONAUTOMATED: CPT

## 2020-05-05 PROCEDURE — 86140 C-REACTIVE PROTEIN: CPT

## 2020-05-05 PROCEDURE — 85025 COMPLETE CBC W/AUTO DIFF WBC: CPT

## 2020-05-05 PROCEDURE — 80053 COMPREHEN METABOLIC PANEL: CPT

## 2020-05-05 PROCEDURE — 36415 COLL VENOUS BLD VENIPUNCTURE: CPT

## 2020-05-08 ENCOUNTER — HOSPITAL ENCOUNTER (OUTPATIENT)
Age: 40
Discharge: HOME | End: 2020-05-10
Payer: COMMERCIAL

## 2020-05-08 ENCOUNTER — HOSPITAL ENCOUNTER (OUTPATIENT)
Dept: WOUND CARE | Age: 40
Discharge: HOME | End: 2020-05-08
Payer: COMMERCIAL

## 2020-05-08 VITALS
WEIGHT: 200 LBS | SYSTOLIC BLOOD PRESSURE: 144 MMHG | TEMPERATURE: 97 F | RESPIRATION RATE: 18 BRPM | HEIGHT: 76 IN | BODY MASS INDEX: 24.36 KG/M2 | DIASTOLIC BLOOD PRESSURE: 86 MMHG | HEART RATE: 64 BPM

## 2020-05-08 PROBLEM — L98.492 SKIN ULCER OF UPPER ARM WITH FAT LAYER EXPOSED (HCC): Status: ACTIVE | Noted: 2020-05-08

## 2020-05-08 LAB
ALBUMIN SERPL-MCNC: 4 G/DL (ref 3.5–5.2)
ALP BLD-CCNC: 99 U/L (ref 40–129)
ALT SERPL-CCNC: 15 U/L (ref 0–40)
ANION GAP SERPL CALCULATED.3IONS-SCNC: 17 MMOL/L (ref 7–16)
AST SERPL-CCNC: 15 U/L (ref 0–39)
BASOPHILS ABSOLUTE: 0.07 E9/L (ref 0–0.2)
BASOPHILS RELATIVE PERCENT: 1.2 % (ref 0–2)
BILIRUB SERPL-MCNC: <0.2 MG/DL (ref 0–1.2)
BUN BLDV-MCNC: 10 MG/DL (ref 6–20)
CALCIUM SERPL-MCNC: 9.3 MG/DL (ref 8.6–10.2)
CHLORIDE BLD-SCNC: 95 MMOL/L (ref 98–107)
CO2: 24 MMOL/L (ref 22–29)
CREAT SERPL-MCNC: 0.8 MG/DL (ref 0.7–1.2)
EOSINOPHILS ABSOLUTE: 0.2 E9/L (ref 0.05–0.5)
EOSINOPHILS RELATIVE PERCENT: 3.3 % (ref 0–6)
GFR AFRICAN AMERICAN: >60
GFR NON-AFRICAN AMERICAN: >60 ML/MIN/1.73
GLUCOSE BLD-MCNC: 127 MG/DL (ref 74–99)
HCT VFR BLD CALC: 34.8 % (ref 37–54)
HEMOGLOBIN: 11.1 G/DL (ref 12.5–16.5)
IMMATURE GRANULOCYTES #: 0.02 E9/L
IMMATURE GRANULOCYTES %: 0.3 % (ref 0–5)
LYMPHOCYTES ABSOLUTE: 1.75 E9/L (ref 1.5–4)
LYMPHOCYTES RELATIVE PERCENT: 29 % (ref 20–42)
MCH RBC QN AUTO: 25.8 PG (ref 26–35)
MCHC RBC AUTO-ENTMCNC: 31.9 % (ref 32–34.5)
MCV RBC AUTO: 80.9 FL (ref 80–99.9)
MONOCYTES ABSOLUTE: 0.42 E9/L (ref 0.1–0.95)
MONOCYTES RELATIVE PERCENT: 7 % (ref 2–12)
NEUTROPHILS ABSOLUTE: 3.57 E9/L (ref 1.8–7.3)
NEUTROPHILS RELATIVE PERCENT: 59.2 % (ref 43–80)
PDW BLD-RTO: 15.4 FL (ref 11.5–15)
PLATELET # BLD: 335 E9/L (ref 130–450)
PMV BLD AUTO: 8.8 FL (ref 7–12)
POTASSIUM SERPL-SCNC: 4.3 MMOL/L (ref 3.5–5)
RBC # BLD: 4.3 E12/L (ref 3.8–5.8)
SODIUM BLD-SCNC: 136 MMOL/L (ref 132–146)
TOTAL PROTEIN: 7.5 G/DL (ref 6.4–8.3)
WBC # BLD: 6 E9/L (ref 4.5–11.5)

## 2020-05-08 PROCEDURE — 99213 OFFICE O/P EST LOW 20 MIN: CPT

## 2020-05-08 PROCEDURE — 11042 DBRDMT SUBQ TIS 1ST 20SQCM/<: CPT

## 2020-05-08 PROCEDURE — 99203 OFFICE O/P NEW LOW 30 MIN: CPT | Performed by: FAMILY MEDICINE

## 2020-05-08 PROCEDURE — 11042 DBRDMT SUBQ TIS 1ST 20SQCM/<: CPT | Performed by: FAMILY MEDICINE

## 2020-05-08 PROCEDURE — 85025 COMPLETE CBC W/AUTO DIFF WBC: CPT

## 2020-05-08 PROCEDURE — 36415 COLL VENOUS BLD VENIPUNCTURE: CPT

## 2020-05-08 PROCEDURE — 80053 COMPREHEN METABOLIC PANEL: CPT

## 2020-05-08 PROCEDURE — 11045 DBRDMT SUBQ TISS EACH ADDL: CPT | Performed by: FAMILY MEDICINE

## 2020-05-08 RX ORDER — LIDOCAINE HYDROCHLORIDE 40 MG/ML
SOLUTION TOPICAL ONCE
Status: DISCONTINUED | OUTPATIENT
Start: 2020-05-08 | End: 2020-05-09 | Stop reason: HOSPADM

## 2020-05-08 NOTE — PROGRESS NOTES
Incision 04/23/20 Brachial Anterior;Distal;Right (Active)   Wound Assessment Other (Comment) 4/27/2020  8:10 AM   Virgen-wound Assessment Pink 4/27/2020 10:41 AM   Closure Other (Comment) 4/27/2020  8:10 AM   Drainage Amount Small 4/27/2020 10:41 AM   Drainage Description Serosanguinous 4/27/2020 10:41 AM   Odor None 4/27/2020 10:41 AM   Dressing/Treatment Packing;Moist to dry;ABD; Ace wrap 4/27/2020 10:41 AM   Dressing Changed Changed/New 4/27/2020 10:41 AM   Dressing Status Clean;Dry; Intact 4/27/2020 10:41 AM   Dressing Change Due 04/28/20 4/27/2020 10:41 AM   Number of days: 14       Procedure: Excisional Debridement: Wound # 1. At 20.4 cm sq. The patient was placed in the sitting position. Lidocaine  gauze was applied  at beginning of wound evaluation. An  Excisional Debridement was performed. Using a curette ,  the wound was debrided sharply of all fibrotic, necrotic, and hyperkeratotic tissue, including a layer of surrounding healthy tissue to stimulate epithelization. The wound was excised through the level of the subcutaneous Wound Percentage debrided is 75%   Wound was irrigated with normal saline solution. Bleeding was with a small amount of bleeding, and controlled with pressure . Patient tolerated procedure well and was given proper instruction. Active Problems:    IV drug abuse ([address]    Superficial foreign body of right upper arm  Resolved Problems:    * No resolved hospital problems. *       Diagnosis:           Ulcer Right antecubital fossa                   Plan:  1. H&P, General medical evaluation for the purpose of Comprehensive wound management for maximum healing and minimal morbidity. 2. Excisional Debridement. Saline, FU one week  3. We had a lengthy discussion about the diagnosis and aspects  to consider.          Alma Pereira, DO       5/8/2020    10:05 AM

## 2020-05-12 ENCOUNTER — HOSPITAL ENCOUNTER (OUTPATIENT)
Age: 40
Discharge: HOME | End: 2020-05-14
Payer: COMMERCIAL

## 2020-05-12 LAB
ALBUMIN SERPL-MCNC: 4.1 G/DL (ref 3.5–5.2)
ALP BLD-CCNC: 105 U/L (ref 40–129)
ALT SERPL-CCNC: 16 U/L (ref 0–40)
ANION GAP SERPL CALCULATED.3IONS-SCNC: 17 MMOL/L (ref 7–16)
ANISOCYTOSIS: ABNORMAL
AST SERPL-CCNC: 15 U/L (ref 0–39)
BASOPHILS ABSOLUTE: 0.05 E9/L (ref 0–0.2)
BASOPHILS RELATIVE PERCENT: 0.9 % (ref 0–2)
BILIRUB SERPL-MCNC: <0.2 MG/DL (ref 0–1.2)
BUN BLDV-MCNC: 13 MG/DL (ref 6–20)
C-REACTIVE PROTEIN: 5.1 MG/DL (ref 0–0.4)
CALCIUM SERPL-MCNC: 9.5 MG/DL (ref 8.6–10.2)
CHLORIDE BLD-SCNC: 95 MMOL/L (ref 98–107)
CO2: 25 MMOL/L (ref 22–29)
CREAT SERPL-MCNC: 0.9 MG/DL (ref 0.7–1.2)
EOSINOPHILS ABSOLUTE: 0.2 E9/L (ref 0.05–0.5)
EOSINOPHILS RELATIVE PERCENT: 3.5 % (ref 0–6)
GFR AFRICAN AMERICAN: >60
GFR NON-AFRICAN AMERICAN: >60 ML/MIN/1.73
GLUCOSE BLD-MCNC: 129 MG/DL (ref 74–99)
HCT VFR BLD CALC: 36.5 % (ref 37–54)
HEMOGLOBIN: 11.5 G/DL (ref 12.5–16.5)
LYMPHOCYTES ABSOLUTE: 1.71 E9/L (ref 1.5–4)
LYMPHOCYTES RELATIVE PERCENT: 30.4 % (ref 20–42)
MCH RBC QN AUTO: 25.8 PG (ref 26–35)
MCHC RBC AUTO-ENTMCNC: 31.5 % (ref 32–34.5)
MCV RBC AUTO: 81.8 FL (ref 80–99.9)
MONOCYTES ABSOLUTE: 0.28 E9/L (ref 0.1–0.95)
MONOCYTES RELATIVE PERCENT: 5.2 % (ref 2–12)
NEUTROPHILS ABSOLUTE: 3.42 E9/L (ref 1.8–7.3)
NEUTROPHILS RELATIVE PERCENT: 60 % (ref 43–80)
OVALOCYTES: ABNORMAL
PDW BLD-RTO: 14.9 FL (ref 11.5–15)
PLATELET # BLD: 245 E9/L (ref 130–450)
PMV BLD AUTO: 9.6 FL (ref 7–12)
POIKILOCYTES: ABNORMAL
POLYCHROMASIA: ABNORMAL
POTASSIUM SERPL-SCNC: 4.5 MMOL/L (ref 3.5–5)
RBC # BLD: 4.46 E12/L (ref 3.8–5.8)
SEDIMENTATION RATE, ERYTHROCYTE: 54 MM/HR (ref 0–15)
SODIUM BLD-SCNC: 137 MMOL/L (ref 132–146)
TOTAL PROTEIN: 7.7 G/DL (ref 6.4–8.3)
WBC # BLD: 5.7 E9/L (ref 4.5–11.5)

## 2020-05-12 PROCEDURE — 36415 COLL VENOUS BLD VENIPUNCTURE: CPT

## 2020-05-12 PROCEDURE — 86140 C-REACTIVE PROTEIN: CPT

## 2020-05-12 PROCEDURE — 85025 COMPLETE CBC W/AUTO DIFF WBC: CPT

## 2020-05-12 PROCEDURE — 80053 COMPREHEN METABOLIC PANEL: CPT

## 2020-05-12 PROCEDURE — 85651 RBC SED RATE NONAUTOMATED: CPT

## 2020-05-15 ENCOUNTER — HOSPITAL ENCOUNTER (OUTPATIENT)
Age: 40
Discharge: HOME | End: 2020-05-17
Payer: COMMERCIAL

## 2020-05-15 ENCOUNTER — HOSPITAL ENCOUNTER (OUTPATIENT)
Dept: WOUND CARE | Age: 40
Discharge: HOME | End: 2020-05-15
Payer: COMMERCIAL

## 2020-05-15 VITALS
DIASTOLIC BLOOD PRESSURE: 72 MMHG | TEMPERATURE: 98.8 F | HEART RATE: 72 BPM | RESPIRATION RATE: 18 BRPM | SYSTOLIC BLOOD PRESSURE: 122 MMHG | WEIGHT: 200 LBS | BODY MASS INDEX: 24.36 KG/M2 | HEIGHT: 76 IN

## 2020-05-15 LAB
ALBUMIN SERPL-MCNC: 3.9 G/DL (ref 3.5–5.2)
ALP BLD-CCNC: 105 U/L (ref 40–129)
ALT SERPL-CCNC: 17 U/L (ref 0–40)
ANION GAP SERPL CALCULATED.3IONS-SCNC: 14 MMOL/L (ref 7–16)
AST SERPL-CCNC: 18 U/L (ref 0–39)
BASOPHILS ABSOLUTE: 0.14 E9/L (ref 0–0.2)
BASOPHILS RELATIVE PERCENT: 2.6 % (ref 0–2)
BILIRUB SERPL-MCNC: <0.2 MG/DL (ref 0–1.2)
BUN BLDV-MCNC: 10 MG/DL (ref 6–20)
CALCIUM SERPL-MCNC: 9.5 MG/DL (ref 8.6–10.2)
CHLORIDE BLD-SCNC: 101 MMOL/L (ref 98–107)
CO2: 26 MMOL/L (ref 22–29)
CREAT SERPL-MCNC: 0.8 MG/DL (ref 0.7–1.2)
EOSINOPHILS ABSOLUTE: 0.09 E9/L (ref 0.05–0.5)
EOSINOPHILS RELATIVE PERCENT: 1.7 % (ref 0–6)
GFR AFRICAN AMERICAN: >60
GFR NON-AFRICAN AMERICAN: >60 ML/MIN/1.73
GLUCOSE BLD-MCNC: 122 MG/DL (ref 74–99)
HCT VFR BLD CALC: 35 % (ref 37–54)
HEMOGLOBIN: 11.2 G/DL (ref 12.5–16.5)
HYPOCHROMIA: ABNORMAL
LYMPHOCYTES ABSOLUTE: 1.73 E9/L (ref 1.5–4)
LYMPHOCYTES RELATIVE PERCENT: 32.2 % (ref 20–42)
MCH RBC QN AUTO: 25.6 PG (ref 26–35)
MCHC RBC AUTO-ENTMCNC: 32 % (ref 32–34.5)
MCV RBC AUTO: 79.9 FL (ref 80–99.9)
MONOCYTES ABSOLUTE: 0.54 E9/L (ref 0.1–0.95)
MONOCYTES RELATIVE PERCENT: 9.6 % (ref 2–12)
NEUTROPHILS ABSOLUTE: 2.92 E9/L (ref 1.8–7.3)
NEUTROPHILS RELATIVE PERCENT: 53.9 % (ref 43–80)
OVALOCYTES: ABNORMAL
PDW BLD-RTO: 14.7 FL (ref 11.5–15)
PLATELET # BLD: 256 E9/L (ref 130–450)
PMV BLD AUTO: 9.1 FL (ref 7–12)
POIKILOCYTES: ABNORMAL
POLYCHROMASIA: ABNORMAL
POTASSIUM SERPL-SCNC: 4.6 MMOL/L (ref 3.5–5)
RBC # BLD: 4.38 E12/L (ref 3.8–5.8)
SODIUM BLD-SCNC: 141 MMOL/L (ref 132–146)
TOTAL PROTEIN: 7.3 G/DL (ref 6.4–8.3)
WBC # BLD: 5.4 E9/L (ref 4.5–11.5)

## 2020-05-15 PROCEDURE — 85025 COMPLETE CBC W/AUTO DIFF WBC: CPT

## 2020-05-15 PROCEDURE — 11042 DBRDMT SUBQ TIS 1ST 20SQCM/<: CPT

## 2020-05-15 PROCEDURE — 11042 DBRDMT SUBQ TIS 1ST 20SQCM/<: CPT | Performed by: FAMILY MEDICINE

## 2020-05-15 PROCEDURE — 36415 COLL VENOUS BLD VENIPUNCTURE: CPT

## 2020-05-15 PROCEDURE — 80053 COMPREHEN METABOLIC PANEL: CPT

## 2020-05-15 RX ORDER — LIDOCAINE HYDROCHLORIDE 40 MG/ML
SOLUTION TOPICAL ONCE
Status: DISCONTINUED | OUTPATIENT
Start: 2020-05-15 | End: 2020-05-16 | Stop reason: HOSPADM

## 2020-05-15 NOTE — PROGRESS NOTES
thickness 5/8/2020  9:24 AM   Number of days: 7            Assessment:     Please refer to nursing measurements and assessment regarding wound size pre and post debridement. Wound check - Care provided includes removal of existing dressing and visual inspection    Procedure: Debridement Note: Wound # 1. At 11.21 cm sq. The patient was placed in the supine position. Lidocaine  gauze was applied  at beginning of wound evaluation. An  Excisional Debridement was performed. Using a curette ,  the wound was debrided sharply of all fibrotic, necrotic, and hyperkeratotic tissue, including a layer of surrounding healthy tissue to stimulate epithelization. The wound was excised through the level of the subcutaneous Wound Percentage debrided is 100%. Please refer to Nurses notes for pre and post debridement dimensions. Wound was irrigated with normal saline solution. Bleeding was with a moderate amount of bleeding, and controlled with pressure. Patient tolerated procedure well and was given proper instruction. Diagnosis: dehisced surgical wound                    Active Problems:    Skin ulcer of upper arm with fat layer exposed ([address]  Resolved Problems:    * No resolved hospital problems. *          Plan:      Treatment & Plan: 1.Excisional Debridement                              2. Alginate                              3. Discussed appropriate home care of this wound. 4. Patient instructions were given. 5. Follow Up in 1 week(s).                                    Myla Norton DO   5/15/20     10:40 AM

## 2020-05-22 ENCOUNTER — HOSPITAL ENCOUNTER (OUTPATIENT)
Dept: WOUND CARE | Age: 40
Discharge: HOME | End: 2020-05-22
Payer: COMMERCIAL

## 2020-10-07 ENCOUNTER — ANESTHESIA (OUTPATIENT)
Dept: OPERATING ROOM | Age: 40
End: 2020-10-07
Payer: COMMERCIAL

## 2020-10-07 ENCOUNTER — HOSPITAL ENCOUNTER (INPATIENT)
Age: 40
LOS: 9 days | Discharge: HOME HEALTH | DRG: 854 | End: 2020-10-16
Attending: SURGERY | Admitting: SURGERY
Payer: COMMERCIAL

## 2020-10-07 ENCOUNTER — APPOINTMENT (OUTPATIENT)
Dept: GENERAL RADIOLOGY | Age: 40
End: 2020-10-07
Payer: COMMERCIAL

## 2020-10-07 ENCOUNTER — ANESTHESIA EVENT (OUTPATIENT)
Dept: OPERATING ROOM | Age: 40
DRG: 854 | End: 2020-10-07
Payer: COMMERCIAL

## 2020-10-07 ENCOUNTER — HOSPITAL ENCOUNTER (OUTPATIENT)
Age: 40
Setting detail: OBSERVATION
Discharge: ANOTHER HOSPITAL | End: 2020-10-07
Attending: SURGERY | Admitting: SURGERY
Payer: COMMERCIAL

## 2020-10-07 ENCOUNTER — ANESTHESIA EVENT (OUTPATIENT)
Dept: OPERATING ROOM | Age: 40
End: 2020-10-07
Payer: COMMERCIAL

## 2020-10-07 ENCOUNTER — ANESTHESIA (OUTPATIENT)
Dept: OPERATING ROOM | Age: 40
DRG: 854 | End: 2020-10-07
Payer: COMMERCIAL

## 2020-10-07 ENCOUNTER — APPOINTMENT (OUTPATIENT)
Dept: GENERAL RADIOLOGY | Age: 40
DRG: 854 | End: 2020-10-07
Attending: SURGERY
Payer: COMMERCIAL

## 2020-10-07 VITALS
OXYGEN SATURATION: 100 % | WEIGHT: 230 LBS | SYSTOLIC BLOOD PRESSURE: 134 MMHG | DIASTOLIC BLOOD PRESSURE: 55 MMHG | BODY MASS INDEX: 28.01 KG/M2 | HEART RATE: 120 BPM | HEIGHT: 76 IN | TEMPERATURE: 102.6 F | RESPIRATION RATE: 15 BRPM

## 2020-10-07 VITALS
RESPIRATION RATE: 12 BRPM | OXYGEN SATURATION: 100 % | DIASTOLIC BLOOD PRESSURE: 44 MMHG | SYSTOLIC BLOOD PRESSURE: 102 MMHG

## 2020-10-07 VITALS
SYSTOLIC BLOOD PRESSURE: 82 MMHG | RESPIRATION RATE: 17 BRPM | TEMPERATURE: 98.4 F | DIASTOLIC BLOOD PRESSURE: 60 MMHG | OXYGEN SATURATION: 97 %

## 2020-10-07 PROBLEM — L02.91 ABSCESS: Status: ACTIVE | Noted: 2020-10-07

## 2020-10-07 PROBLEM — I72.9 PSEUDOANEURYSM (HCC): Status: ACTIVE | Noted: 2020-10-07

## 2020-10-07 PROBLEM — I72.1 PSEUDOANEURYSM OF BRACHIAL ARTERY (HCC): Status: ACTIVE | Noted: 2020-10-07

## 2020-10-07 LAB
ABO/RH: NORMAL
ALBUMIN SERPL-MCNC: 3.4 G/DL (ref 3.5–5.2)
ALBUMIN SERPL-MCNC: 3.7 G/DL (ref 3.5–5.2)
ALP BLD-CCNC: 61 U/L (ref 40–129)
ALP BLD-CCNC: 78 U/L (ref 40–129)
ALT SERPL-CCNC: 12 U/L (ref 0–40)
ALT SERPL-CCNC: 9 U/L (ref 0–40)
ANGLE (CLOT STRENGTH): 73.7 DEGREE (ref 59–74)
ANION GAP SERPL CALCULATED.3IONS-SCNC: 11 MMOL/L (ref 7–16)
ANION GAP SERPL CALCULATED.3IONS-SCNC: 15 MMOL/L (ref 7–16)
ANTIBODY SCREEN: NORMAL
AST SERPL-CCNC: 10 U/L (ref 0–39)
AST SERPL-CCNC: 8 U/L (ref 0–39)
BASOPHILS ABSOLUTE: 0.02 E9/L (ref 0–0.2)
BASOPHILS ABSOLUTE: 0.04 E9/L (ref 0–0.2)
BASOPHILS ABSOLUTE: 0.04 E9/L (ref 0–0.2)
BASOPHILS RELATIVE PERCENT: 0.2 % (ref 0–2)
BASOPHILS RELATIVE PERCENT: 0.3 % (ref 0–2)
BASOPHILS RELATIVE PERCENT: 0.4 % (ref 0–2)
BILIRUB SERPL-MCNC: 0.3 MG/DL (ref 0–1.2)
BILIRUB SERPL-MCNC: 0.4 MG/DL (ref 0–1.2)
BLOOD BANK DISPENSE STATUS: NORMAL
BLOOD BANK PRODUCT CODE: NORMAL
BPU ID: NORMAL
BUN BLDV-MCNC: 8 MG/DL (ref 6–20)
BUN BLDV-MCNC: 9 MG/DL (ref 6–20)
C-REACTIVE PROTEIN: 17.6 MG/DL (ref 0–0.4)
CALCIUM IONIZED: 1.21 MMOL/L (ref 1.15–1.33)
CALCIUM SERPL-MCNC: 8.7 MG/DL (ref 8.6–10.2)
CALCIUM SERPL-MCNC: 9.7 MG/DL (ref 8.6–10.2)
CHLORIDE BLD-SCNC: 97 MMOL/L (ref 98–107)
CHLORIDE BLD-SCNC: 98 MMOL/L (ref 98–107)
CO2: 24 MMOL/L (ref 22–29)
CO2: 25 MMOL/L (ref 22–29)
CREAT SERPL-MCNC: 0.8 MG/DL (ref 0.7–1.2)
CREAT SERPL-MCNC: 0.8 MG/DL (ref 0.7–1.2)
DESCRIPTION BLOOD BANK: NORMAL
EOSINOPHILS ABSOLUTE: 0 E9/L (ref 0.05–0.5)
EOSINOPHILS ABSOLUTE: 0.01 E9/L (ref 0.05–0.5)
EOSINOPHILS ABSOLUTE: 0.05 E9/L (ref 0.05–0.5)
EOSINOPHILS RELATIVE PERCENT: 0 % (ref 0–6)
EOSINOPHILS RELATIVE PERCENT: 0.1 % (ref 0–6)
EOSINOPHILS RELATIVE PERCENT: 0.5 % (ref 0–6)
EPL-TEG: 1.4 % (ref 0–15)
G-TEG: 14.5 K D/SC (ref 4.5–11)
GFR AFRICAN AMERICAN: >60
GFR AFRICAN AMERICAN: >60
GFR NON-AFRICAN AMERICAN: >60 ML/MIN/1.73
GFR NON-AFRICAN AMERICAN: >60 ML/MIN/1.73
GLUCOSE BLD-MCNC: 106 MG/DL (ref 74–99)
GLUCOSE BLD-MCNC: 99 MG/DL (ref 74–99)
HCT VFR BLD CALC: 32.9 % (ref 37–54)
HCT VFR BLD CALC: 34.1 % (ref 37–54)
HCT VFR BLD CALC: 40.6 % (ref 37–54)
HEMOGLOBIN: 10.4 G/DL (ref 12.5–16.5)
HEMOGLOBIN: 11.2 G/DL (ref 12.5–16.5)
HEMOGLOBIN: 13.5 G/DL (ref 12.5–16.5)
IMMATURE GRANULOCYTES #: 0.04 E9/L
IMMATURE GRANULOCYTES #: 0.07 E9/L
IMMATURE GRANULOCYTES #: 0.07 E9/L
IMMATURE GRANULOCYTES %: 0.4 % (ref 0–5)
IMMATURE GRANULOCYTES %: 0.5 % (ref 0–5)
IMMATURE GRANULOCYTES %: 0.6 % (ref 0–5)
K (CLOTTING TIME): 1.2 MIN (ref 1–3)
LACTIC ACID: 0.7 MMOL/L (ref 0.5–2.2)
LACTIC ACID: 0.8 MMOL/L (ref 0.5–2.2)
LY30 (FIBRINOLYSIS): 1.4 % (ref 0–8)
LYMPHOCYTES ABSOLUTE: 0.79 E9/L (ref 1.5–4)
LYMPHOCYTES ABSOLUTE: 1.1 E9/L (ref 1.5–4)
LYMPHOCYTES ABSOLUTE: 1.23 E9/L (ref 1.5–4)
LYMPHOCYTES RELATIVE PERCENT: 11.2 % (ref 20–42)
LYMPHOCYTES RELATIVE PERCENT: 7 % (ref 20–42)
LYMPHOCYTES RELATIVE PERCENT: 8.1 % (ref 20–42)
MA (MAX AMPLITUDE): 74.3 MM (ref 50–70)
MAGNESIUM: 1.7 MG/DL (ref 1.6–2.6)
MCH RBC QN AUTO: 25.2 PG (ref 26–35)
MCH RBC QN AUTO: 25.4 PG (ref 26–35)
MCH RBC QN AUTO: 25.6 PG (ref 26–35)
MCHC RBC AUTO-ENTMCNC: 31.6 % (ref 32–34.5)
MCHC RBC AUTO-ENTMCNC: 32.8 % (ref 32–34.5)
MCHC RBC AUTO-ENTMCNC: 33.3 % (ref 32–34.5)
MCV RBC AUTO: 76.9 FL (ref 80–99.9)
MCV RBC AUTO: 77.3 FL (ref 80–99.9)
MCV RBC AUTO: 79.9 FL (ref 80–99.9)
MONOCYTES ABSOLUTE: 0.46 E9/L (ref 0.1–0.95)
MONOCYTES ABSOLUTE: 0.78 E9/L (ref 0.1–0.95)
MONOCYTES ABSOLUTE: 0.91 E9/L (ref 0.1–0.95)
MONOCYTES RELATIVE PERCENT: 4.1 % (ref 2–12)
MONOCYTES RELATIVE PERCENT: 6.7 % (ref 2–12)
MONOCYTES RELATIVE PERCENT: 7.1 % (ref 2–12)
NEUTROPHILS ABSOLUTE: 11.41 E9/L (ref 1.8–7.3)
NEUTROPHILS ABSOLUTE: 8.87 E9/L (ref 1.8–7.3)
NEUTROPHILS ABSOLUTE: 9.98 E9/L (ref 1.8–7.3)
NEUTROPHILS RELATIVE PERCENT: 80.4 % (ref 43–80)
NEUTROPHILS RELATIVE PERCENT: 84.3 % (ref 43–80)
NEUTROPHILS RELATIVE PERCENT: 88.1 % (ref 43–80)
PDW BLD-RTO: 14.9 FL (ref 11.5–15)
PDW BLD-RTO: 15 FL (ref 11.5–15)
PDW BLD-RTO: 15 FL (ref 11.5–15)
PHOSPHORUS: 3.7 MG/DL (ref 2.5–4.5)
PLATELET # BLD: 260 E9/L (ref 130–450)
PLATELET # BLD: 296 E9/L (ref 130–450)
PLATELET # BLD: 307 E9/L (ref 130–450)
PMV BLD AUTO: 8.3 FL (ref 7–12)
PMV BLD AUTO: 8.6 FL (ref 7–12)
PMV BLD AUTO: 8.8 FL (ref 7–12)
POTASSIUM SERPL-SCNC: 4.1 MMOL/L (ref 3.5–5)
POTASSIUM SERPL-SCNC: 4.8 MMOL/L (ref 3.5–5)
R (REACTION TIME): 7.5 MIN (ref 5–10)
RBC # BLD: 4.12 E12/L (ref 3.8–5.8)
RBC # BLD: 4.41 E12/L (ref 3.8–5.8)
RBC # BLD: 5.28 E12/L (ref 3.8–5.8)
SEDIMENTATION RATE, ERYTHROCYTE: 48 MM/HR (ref 0–15)
SODIUM BLD-SCNC: 133 MMOL/L (ref 132–146)
SODIUM BLD-SCNC: 137 MMOL/L (ref 132–146)
TOTAL PROTEIN: 6.4 G/DL (ref 6.4–8.3)
TOTAL PROTEIN: 7.9 G/DL (ref 6.4–8.3)
WBC # BLD: 11 E9/L (ref 4.5–11.5)
WBC # BLD: 11.3 E9/L (ref 4.5–11.5)
WBC # BLD: 13.5 E9/L (ref 4.5–11.5)

## 2020-10-07 PROCEDURE — 87205 SMEAR GRAM STAIN: CPT

## 2020-10-07 PROCEDURE — G0378 HOSPITAL OBSERVATION PER HR: HCPCS

## 2020-10-07 PROCEDURE — 94002 VENT MGMT INPAT INIT DAY: CPT

## 2020-10-07 PROCEDURE — 2580000003 HC RX 258

## 2020-10-07 PROCEDURE — 87147 CULTURE TYPE IMMUNOLOGIC: CPT

## 2020-10-07 PROCEDURE — 2580000003 HC RX 258: Performed by: STUDENT IN AN ORGANIZED HEALTH CARE EDUCATION/TRAINING PROGRAM

## 2020-10-07 PROCEDURE — 87102 FUNGUS ISOLATION CULTURE: CPT

## 2020-10-07 PROCEDURE — 3700000000 HC ANESTHESIA ATTENDED CARE: Performed by: SURGERY

## 2020-10-07 PROCEDURE — 83605 ASSAY OF LACTIC ACID: CPT

## 2020-10-07 PROCEDURE — 87077 CULTURE AEROBIC IDENTIFY: CPT

## 2020-10-07 PROCEDURE — 87206 SMEAR FLUORESCENT/ACID STAI: CPT

## 2020-10-07 PROCEDURE — 87186 SC STD MICRODIL/AGAR DIL: CPT

## 2020-10-07 PROCEDURE — 36556 INSERT NON-TUNNEL CV CATH: CPT | Performed by: SURGERY

## 2020-10-07 PROCEDURE — 37618 LIGATION MAJOR ARTERY XTR: CPT | Performed by: SURGERY

## 2020-10-07 PROCEDURE — 2580000003 HC RX 258: Performed by: NURSE ANESTHETIST, CERTIFIED REGISTERED

## 2020-10-07 PROCEDURE — 85384 FIBRINOGEN ACTIVITY: CPT

## 2020-10-07 PROCEDURE — 2000000000 HC ICU R&B

## 2020-10-07 PROCEDURE — 36415 COLL VENOUS BLD VENIPUNCTURE: CPT

## 2020-10-07 PROCEDURE — 86900 BLOOD TYPING SEROLOGIC ABO: CPT

## 2020-10-07 PROCEDURE — 86923 COMPATIBILITY TEST ELECTRIC: CPT

## 2020-10-07 PROCEDURE — 85576 BLOOD PLATELET AGGREGATION: CPT

## 2020-10-07 PROCEDURE — 6370000000 HC RX 637 (ALT 250 FOR IP): Performed by: SURGERY

## 2020-10-07 PROCEDURE — 3700000001 HC ADD 15 MINUTES (ANESTHESIA): Performed by: SURGERY

## 2020-10-07 PROCEDURE — 3600000003 HC SURGERY LEVEL 3 BASE: Performed by: SURGERY

## 2020-10-07 PROCEDURE — 86140 C-REACTIVE PROTEIN: CPT

## 2020-10-07 PROCEDURE — 6360000002 HC RX W HCPCS: Performed by: ANESTHESIOLOGY

## 2020-10-07 PROCEDURE — 2500000003 HC RX 250 WO HCPCS: Performed by: NURSE ANESTHETIST, CERTIFIED REGISTERED

## 2020-10-07 PROCEDURE — 6360000002 HC RX W HCPCS: Performed by: NURSE ANESTHETIST, CERTIFIED REGISTERED

## 2020-10-07 PROCEDURE — 3600000012 HC SURGERY LEVEL 2 ADDTL 15MIN: Performed by: SURGERY

## 2020-10-07 PROCEDURE — 3600000013 HC SURGERY LEVEL 3 ADDTL 15MIN: Performed by: SURGERY

## 2020-10-07 PROCEDURE — 85651 RBC SED RATE NONAUTOMATED: CPT

## 2020-10-07 PROCEDURE — 3600000002 HC SURGERY LEVEL 2 BASE: Performed by: SURGERY

## 2020-10-07 PROCEDURE — 99283 EMERGENCY DEPT VISIT LOW MDM: CPT

## 2020-10-07 PROCEDURE — 87075 CULTR BACTERIA EXCEPT BLOOD: CPT

## 2020-10-07 PROCEDURE — 86901 BLOOD TYPING SEROLOGIC RH(D): CPT

## 2020-10-07 PROCEDURE — 99291 CRITICAL CARE FIRST HOUR: CPT | Performed by: SURGERY

## 2020-10-07 PROCEDURE — 2709999900 HC NON-CHARGEABLE SUPPLY: Performed by: SURGERY

## 2020-10-07 PROCEDURE — 86850 RBC ANTIBODY SCREEN: CPT

## 2020-10-07 PROCEDURE — 87116 MYCOBACTERIA CULTURE: CPT

## 2020-10-07 PROCEDURE — 2500000003 HC RX 250 WO HCPCS: Performed by: SURGERY

## 2020-10-07 PROCEDURE — 96365 THER/PROPH/DIAG IV INF INIT: CPT

## 2020-10-07 PROCEDURE — 35013 REPAIR ARTERY RUPTURE ARM: CPT | Performed by: SURGERY

## 2020-10-07 PROCEDURE — 7100000001 HC PACU RECOVERY - ADDTL 15 MIN: Performed by: SURGERY

## 2020-10-07 PROCEDURE — 80053 COMPREHEN METABOLIC PANEL: CPT

## 2020-10-07 PROCEDURE — 87070 CULTURE OTHR SPECIMN AEROBIC: CPT

## 2020-10-07 PROCEDURE — 0KBB0ZZ EXCISION OF LEFT LOWER ARM AND WRIST MUSCLE, OPEN APPROACH: ICD-10-PCS | Performed by: STUDENT IN AN ORGANIZED HEALTH CARE EDUCATION/TRAINING PROGRAM

## 2020-10-07 PROCEDURE — 2580000003 HC RX 258: Performed by: PHYSICIAN ASSISTANT

## 2020-10-07 PROCEDURE — 10061 I&D ABSCESS COMP/MULTIPLE: CPT | Performed by: SURGERY

## 2020-10-07 PROCEDURE — 6360000002 HC RX W HCPCS: Performed by: SURGERY

## 2020-10-07 PROCEDURE — 99254 IP/OBS CNSLTJ NEW/EST MOD 60: CPT | Performed by: SURGERY

## 2020-10-07 PROCEDURE — 2580000003 HC RX 258: Performed by: SURGERY

## 2020-10-07 PROCEDURE — 87176 TISSUE HOMOGENIZATION CULTR: CPT

## 2020-10-07 PROCEDURE — 83735 ASSAY OF MAGNESIUM: CPT

## 2020-10-07 PROCEDURE — 73090 X-RAY EXAM OF FOREARM: CPT

## 2020-10-07 PROCEDURE — 7100000000 HC PACU RECOVERY - FIRST 15 MIN: Performed by: SURGERY

## 2020-10-07 PROCEDURE — 73070 X-RAY EXAM OF ELBOW: CPT

## 2020-10-07 PROCEDURE — 6360000002 HC RX W HCPCS: Performed by: PHYSICIAN ASSISTANT

## 2020-10-07 PROCEDURE — 87040 BLOOD CULTURE FOR BACTERIA: CPT

## 2020-10-07 PROCEDURE — 88304 TISSUE EXAM BY PATHOLOGIST: CPT

## 2020-10-07 PROCEDURE — 87015 SPECIMEN INFECT AGNT CONCNTJ: CPT

## 2020-10-07 PROCEDURE — 82330 ASSAY OF CALCIUM: CPT

## 2020-10-07 PROCEDURE — 6360000002 HC RX W HCPCS

## 2020-10-07 PROCEDURE — 87081 CULTURE SCREEN ONLY: CPT

## 2020-10-07 PROCEDURE — 85025 COMPLETE CBC W/AUTO DIFF WBC: CPT

## 2020-10-07 PROCEDURE — 85347 COAGULATION TIME ACTIVATED: CPT

## 2020-10-07 PROCEDURE — 84100 ASSAY OF PHOSPHORUS: CPT

## 2020-10-07 PROCEDURE — 96375 TX/PRO/DX INJ NEW DRUG ADDON: CPT

## 2020-10-07 RX ORDER — ONDANSETRON 4 MG/1
4 TABLET, ORALLY DISINTEGRATING ORAL EVERY 8 HOURS PRN
Status: DISCONTINUED | OUTPATIENT
Start: 2020-10-07 | End: 2020-10-16 | Stop reason: HOSPADM

## 2020-10-07 RX ORDER — SODIUM CHLORIDE 0.9 % (FLUSH) 0.9 %
10 SYRINGE (ML) INJECTION EVERY 12 HOURS SCHEDULED
Status: DISCONTINUED | OUTPATIENT
Start: 2020-10-07 | End: 2020-10-16 | Stop reason: HOSPADM

## 2020-10-07 RX ORDER — ONDANSETRON 2 MG/ML
4 INJECTION INTRAMUSCULAR; INTRAVENOUS EVERY 6 HOURS PRN
Status: DISCONTINUED | OUTPATIENT
Start: 2020-10-07 | End: 2020-10-16 | Stop reason: HOSPADM

## 2020-10-07 RX ORDER — FENTANYL CITRATE 50 UG/ML
100 INJECTION, SOLUTION INTRAMUSCULAR; INTRAVENOUS ONCE
Status: COMPLETED | OUTPATIENT
Start: 2020-10-07 | End: 2020-10-07

## 2020-10-07 RX ORDER — PROPOFOL 10 MG/ML
75 INJECTION, EMULSION INTRAVENOUS
Status: DISCONTINUED | OUTPATIENT
Start: 2020-10-07 | End: 2020-10-07 | Stop reason: HOSPADM

## 2020-10-07 RX ORDER — ROCURONIUM BROMIDE 10 MG/ML
INJECTION, SOLUTION INTRAVENOUS PRN
Status: DISCONTINUED | OUTPATIENT
Start: 2020-10-07 | End: 2020-10-07 | Stop reason: SDUPTHER

## 2020-10-07 RX ORDER — FENTANYL CITRATE 50 UG/ML
INJECTION, SOLUTION INTRAMUSCULAR; INTRAVENOUS
Status: COMPLETED
Start: 2020-10-07 | End: 2020-10-07

## 2020-10-07 RX ORDER — ONDANSETRON 2 MG/ML
INJECTION INTRAMUSCULAR; INTRAVENOUS PRN
Status: DISCONTINUED | OUTPATIENT
Start: 2020-10-07 | End: 2020-10-07 | Stop reason: SDUPTHER

## 2020-10-07 RX ORDER — SODIUM CHLORIDE 0.9 % (FLUSH) 0.9 %
10 SYRINGE (ML) INJECTION PRN
Status: DISCONTINUED | OUTPATIENT
Start: 2020-10-07 | End: 2020-10-07 | Stop reason: HOSPADM

## 2020-10-07 RX ORDER — PROTAMINE SULFATE 10 MG/ML
INJECTION, SOLUTION INTRAVENOUS PRN
Status: DISCONTINUED | OUTPATIENT
Start: 2020-10-07 | End: 2020-10-07 | Stop reason: SDUPTHER

## 2020-10-07 RX ORDER — MORPHINE SULFATE 4 MG/ML
4 INJECTION, SOLUTION INTRAMUSCULAR; INTRAVENOUS
Status: DISCONTINUED | OUTPATIENT
Start: 2020-10-07 | End: 2020-10-07 | Stop reason: HOSPADM

## 2020-10-07 RX ORDER — DEXAMETHASONE SODIUM PHOSPHATE 10 MG/ML
INJECTION, SOLUTION INTRAMUSCULAR; INTRAVENOUS PRN
Status: DISCONTINUED | OUTPATIENT
Start: 2020-10-07 | End: 2020-10-07 | Stop reason: SDUPTHER

## 2020-10-07 RX ORDER — SODIUM CHLORIDE 0.9 % (FLUSH) 0.9 %
10 SYRINGE (ML) INJECTION PRN
Status: DISCONTINUED | OUTPATIENT
Start: 2020-10-07 | End: 2020-10-16 | Stop reason: HOSPADM

## 2020-10-07 RX ORDER — ACETAMINOPHEN 325 MG/1
650 TABLET ORAL EVERY 6 HOURS
Status: DISCONTINUED | OUTPATIENT
Start: 2020-10-07 | End: 2020-10-07 | Stop reason: HOSPADM

## 2020-10-07 RX ORDER — ACETAMINOPHEN 650 MG/1
650 SUPPOSITORY RECTAL EVERY 4 HOURS PRN
Status: DISCONTINUED | OUTPATIENT
Start: 2020-10-07 | End: 2020-10-07 | Stop reason: HOSPADM

## 2020-10-07 RX ORDER — PHENYLEPHRINE HYDROCHLORIDE 10 MG/ML
INJECTION INTRAVENOUS PRN
Status: DISCONTINUED | OUTPATIENT
Start: 2020-10-07 | End: 2020-10-07 | Stop reason: SDUPTHER

## 2020-10-07 RX ORDER — SODIUM CHLORIDE, SODIUM LACTATE, POTASSIUM CHLORIDE, CALCIUM CHLORIDE 600; 310; 30; 20 MG/100ML; MG/100ML; MG/100ML; MG/100ML
INJECTION, SOLUTION INTRAVENOUS CONTINUOUS
Status: DISCONTINUED | OUTPATIENT
Start: 2020-10-07 | End: 2020-10-08

## 2020-10-07 RX ORDER — PROPOFOL 10 MG/ML
INJECTION, EMULSION INTRAVENOUS
Status: COMPLETED
Start: 2020-10-07 | End: 2020-10-07

## 2020-10-07 RX ORDER — FENTANYL CITRATE 50 UG/ML
INJECTION, SOLUTION INTRAMUSCULAR; INTRAVENOUS PRN
Status: DISCONTINUED | OUTPATIENT
Start: 2020-10-07 | End: 2020-10-07 | Stop reason: SDUPTHER

## 2020-10-07 RX ORDER — SODIUM CHLORIDE 0.9 % (FLUSH) 0.9 %
10 SYRINGE (ML) INJECTION EVERY 12 HOURS SCHEDULED
Status: DISCONTINUED | OUTPATIENT
Start: 2020-10-07 | End: 2020-10-07 | Stop reason: HOSPADM

## 2020-10-07 RX ORDER — MIDAZOLAM HYDROCHLORIDE 1 MG/ML
INJECTION INTRAMUSCULAR; INTRAVENOUS PRN
Status: DISCONTINUED | OUTPATIENT
Start: 2020-10-07 | End: 2020-10-07 | Stop reason: SDUPTHER

## 2020-10-07 RX ORDER — HEPARIN SODIUM 1000 [USP'U]/ML
INJECTION, SOLUTION INTRAVENOUS; SUBCUTANEOUS PRN
Status: DISCONTINUED | OUTPATIENT
Start: 2020-10-07 | End: 2020-10-07 | Stop reason: SDUPTHER

## 2020-10-07 RX ORDER — PROPOFOL 10 MG/ML
INJECTION, EMULSION INTRAVENOUS PRN
Status: DISCONTINUED | OUTPATIENT
Start: 2020-10-07 | End: 2020-10-07 | Stop reason: SDUPTHER

## 2020-10-07 RX ORDER — ONDANSETRON 4 MG/1
4 TABLET, ORALLY DISINTEGRATING ORAL EVERY 8 HOURS PRN
Status: DISCONTINUED | OUTPATIENT
Start: 2020-10-07 | End: 2020-10-07 | Stop reason: HOSPADM

## 2020-10-07 RX ORDER — OXYCODONE HYDROCHLORIDE 5 MG/1
10 TABLET ORAL EVERY 4 HOURS PRN
Status: DISCONTINUED | OUTPATIENT
Start: 2020-10-07 | End: 2020-10-07 | Stop reason: HOSPADM

## 2020-10-07 RX ORDER — VANCOMYCIN HYDROCHLORIDE 1 G/20ML
INJECTION, POWDER, LYOPHILIZED, FOR SOLUTION INTRAVENOUS PRN
Status: DISCONTINUED | OUTPATIENT
Start: 2020-10-07 | End: 2020-10-07 | Stop reason: SDUPTHER

## 2020-10-07 RX ORDER — SODIUM CHLORIDE 0.9 % (FLUSH) 0.9 %
SYRINGE (ML) INJECTION
Status: DISCONTINUED
Start: 2020-10-07 | End: 2020-10-07 | Stop reason: HOSPADM

## 2020-10-07 RX ORDER — VECURONIUM BROMIDE 1 MG/ML
10 INJECTION, POWDER, LYOPHILIZED, FOR SOLUTION INTRAVENOUS ONCE
Status: COMPLETED | OUTPATIENT
Start: 2020-10-07 | End: 2020-10-07

## 2020-10-07 RX ORDER — SODIUM CHLORIDE 9 MG/ML
INJECTION, SOLUTION INTRAVENOUS EVERY 12 HOURS
Status: DISCONTINUED | OUTPATIENT
Start: 2020-10-07 | End: 2020-10-07 | Stop reason: HOSPADM

## 2020-10-07 RX ORDER — PROPOFOL 10 MG/ML
INJECTION, EMULSION INTRAVENOUS CONTINUOUS PRN
Status: DISCONTINUED | OUTPATIENT
Start: 2020-10-07 | End: 2020-10-07 | Stop reason: SDUPTHER

## 2020-10-07 RX ORDER — SODIUM CHLORIDE 9 MG/ML
INJECTION, SOLUTION INTRAVENOUS CONTINUOUS
Status: DISCONTINUED | OUTPATIENT
Start: 2020-10-07 | End: 2020-10-07 | Stop reason: HOSPADM

## 2020-10-07 RX ORDER — KETOROLAC TROMETHAMINE 30 MG/ML
30 INJECTION, SOLUTION INTRAMUSCULAR; INTRAVENOUS ONCE
Status: COMPLETED | OUTPATIENT
Start: 2020-10-07 | End: 2020-10-07

## 2020-10-07 RX ORDER — SODIUM CHLORIDE 9 MG/ML
INJECTION, SOLUTION INTRAVENOUS CONTINUOUS PRN
Status: DISCONTINUED | OUTPATIENT
Start: 2020-10-07 | End: 2020-10-07 | Stop reason: SDUPTHER

## 2020-10-07 RX ORDER — PROPOFOL 10 MG/ML
10 INJECTION, EMULSION INTRAVENOUS
Status: DISCONTINUED | OUTPATIENT
Start: 2020-10-07 | End: 2020-10-08

## 2020-10-07 RX ORDER — SUCCINYLCHOLINE/SOD CL,ISO/PF 100 MG/5ML
SYRINGE (ML) INTRAVENOUS PRN
Status: DISCONTINUED | OUTPATIENT
Start: 2020-10-07 | End: 2020-10-07 | Stop reason: SDUPTHER

## 2020-10-07 RX ORDER — OXYCODONE HYDROCHLORIDE 5 MG/1
5 TABLET ORAL EVERY 4 HOURS PRN
Status: DISCONTINUED | OUTPATIENT
Start: 2020-10-07 | End: 2020-10-07 | Stop reason: HOSPADM

## 2020-10-07 RX ORDER — ONDANSETRON 2 MG/ML
4 INJECTION INTRAMUSCULAR; INTRAVENOUS EVERY 6 HOURS PRN
Status: DISCONTINUED | OUTPATIENT
Start: 2020-10-07 | End: 2020-10-07 | Stop reason: HOSPADM

## 2020-10-07 RX ORDER — MORPHINE SULFATE 2 MG/ML
2 INJECTION, SOLUTION INTRAMUSCULAR; INTRAVENOUS
Status: DISCONTINUED | OUTPATIENT
Start: 2020-10-07 | End: 2020-10-07 | Stop reason: HOSPADM

## 2020-10-07 RX ORDER — HYDROMORPHONE HCL 110MG/55ML
PATIENT CONTROLLED ANALGESIA SYRINGE INTRAVENOUS PRN
Status: DISCONTINUED | OUTPATIENT
Start: 2020-10-07 | End: 2020-10-07 | Stop reason: SDUPTHER

## 2020-10-07 RX ORDER — PROPOFOL 10 MG/ML
INJECTION, EMULSION INTRAVENOUS
Status: DISCONTINUED
Start: 2020-10-07 | End: 2020-10-07 | Stop reason: HOSPADM

## 2020-10-07 RX ORDER — 0.9 % SODIUM CHLORIDE 0.9 %
1000 INTRAVENOUS SOLUTION INTRAVENOUS ONCE
Status: COMPLETED | OUTPATIENT
Start: 2020-10-07 | End: 2020-10-07

## 2020-10-07 RX ORDER — PROPOFOL 10 MG/ML
75 INJECTION, EMULSION INTRAVENOUS
Status: DISCONTINUED | OUTPATIENT
Start: 2020-10-07 | End: 2020-10-07

## 2020-10-07 RX ORDER — FAMOTIDINE 20 MG/1
20 TABLET, FILM COATED ORAL 2 TIMES DAILY
Status: DISCONTINUED | OUTPATIENT
Start: 2020-10-07 | End: 2020-10-16 | Stop reason: HOSPADM

## 2020-10-07 RX ORDER — FENTANYL CITRATE 50 UG/ML
100 INJECTION, SOLUTION INTRAMUSCULAR; INTRAVENOUS ONCE
Status: DISCONTINUED | OUTPATIENT
Start: 2020-10-07 | End: 2020-10-07 | Stop reason: HOSPADM

## 2020-10-07 RX ADMIN — FENTANYL CITRATE 100 MCG: 50 INJECTION, SOLUTION INTRAMUSCULAR; INTRAVENOUS at 17:20

## 2020-10-07 RX ADMIN — FENTANYL CITRATE 50 MCG: 50 INJECTION, SOLUTION INTRAMUSCULAR; INTRAVENOUS at 21:47

## 2020-10-07 RX ADMIN — DEXAMETHASONE SODIUM PHOSPHATE 10 MG: 10 INJECTION, SOLUTION INTRAMUSCULAR; INTRAVENOUS at 20:07

## 2020-10-07 RX ADMIN — VECURONIUM BROMIDE 10 MG: 1 INJECTION, POWDER, LYOPHILIZED, FOR SOLUTION INTRAVENOUS at 17:34

## 2020-10-07 RX ADMIN — PROPOFOL 100 MG: 10 INJECTION, EMULSION INTRAVENOUS at 19:12

## 2020-10-07 RX ADMIN — FENTANYL CITRATE 50 MCG: 50 INJECTION, SOLUTION INTRAMUSCULAR; INTRAVENOUS at 21:51

## 2020-10-07 RX ADMIN — PROPOFOL 50 MG: 10 INJECTION, EMULSION INTRAVENOUS at 20:45

## 2020-10-07 RX ADMIN — PHENYLEPHRINE HYDROCHLORIDE 100 MCG: 10 INJECTION INTRAVENOUS at 21:10

## 2020-10-07 RX ADMIN — PHENYLEPHRINE HYDROCHLORIDE 50 MCG: 10 INJECTION INTRAVENOUS at 21:06

## 2020-10-07 RX ADMIN — VANCOMYCIN HYDROCHLORIDE 2000 MG: 1 INJECTION, POWDER, LYOPHILIZED, FOR SOLUTION INTRAVENOUS at 19:44

## 2020-10-07 RX ADMIN — PHENYLEPHRINE HYDROCHLORIDE 50 MCG: 10 INJECTION INTRAVENOUS at 20:48

## 2020-10-07 RX ADMIN — ACETAMINOPHEN 650 MG: 650 SUPPOSITORY RECTAL at 18:07

## 2020-10-07 RX ADMIN — ROCURONIUM BROMIDE 10 MG: 10 INJECTION, SOLUTION INTRAVENOUS at 19:54

## 2020-10-07 RX ADMIN — PHENYLEPHRINE HYDROCHLORIDE 100 MCG: 10 INJECTION INTRAVENOUS at 19:28

## 2020-10-07 RX ADMIN — SODIUM CHLORIDE, POTASSIUM CHLORIDE, SODIUM LACTATE AND CALCIUM CHLORIDE: 600; 310; 30; 20 INJECTION, SOLUTION INTRAVENOUS at 23:30

## 2020-10-07 RX ADMIN — HEPARIN SODIUM 3000 UNITS: 1000 INJECTION INTRAVENOUS; SUBCUTANEOUS at 21:09

## 2020-10-07 RX ADMIN — FENTANYL CITRATE 100 MCG: 50 INJECTION, SOLUTION INTRAMUSCULAR; INTRAVENOUS at 17:49

## 2020-10-07 RX ADMIN — Medication 160 MG: at 16:06

## 2020-10-07 RX ADMIN — MIDAZOLAM 2 MG: 1 INJECTION INTRAMUSCULAR; INTRAVENOUS at 19:11

## 2020-10-07 RX ADMIN — PHENYLEPHRINE HYDROCHLORIDE 50 MCG: 10 INJECTION INTRAVENOUS at 20:38

## 2020-10-07 RX ADMIN — SODIUM CHLORIDE: 9 INJECTION, SOLUTION INTRAVENOUS at 19:11

## 2020-10-07 RX ADMIN — PHENYLEPHRINE HYDROCHLORIDE 100 MCG: 10 INJECTION INTRAVENOUS at 19:26

## 2020-10-07 RX ADMIN — PHENYLEPHRINE HYDROCHLORIDE 50 MCG: 10 INJECTION INTRAVENOUS at 20:55

## 2020-10-07 RX ADMIN — FENTANYL CITRATE 50 MCG: 50 INJECTION, SOLUTION INTRAMUSCULAR; INTRAVENOUS at 19:11

## 2020-10-07 RX ADMIN — PROPOFOL 50 MCG/KG/MIN: 10 INJECTION, EMULSION INTRAVENOUS at 23:29

## 2020-10-07 RX ADMIN — FENTANYL CITRATE 50 MCG: 50 INJECTION, SOLUTION INTRAMUSCULAR; INTRAVENOUS at 20:13

## 2020-10-07 RX ADMIN — SODIUM CHLORIDE: 9 INJECTION, SOLUTION INTRAVENOUS at 21:59

## 2020-10-07 RX ADMIN — PROPOFOL 150 MG: 10 INJECTION, EMULSION INTRAVENOUS at 15:44

## 2020-10-07 RX ADMIN — ROCURONIUM BROMIDE 40 MG: 10 INJECTION, SOLUTION INTRAVENOUS at 19:12

## 2020-10-07 RX ADMIN — ROCURONIUM BROMIDE 20 MG: 10 INJECTION, SOLUTION INTRAVENOUS at 20:45

## 2020-10-07 RX ADMIN — ONDANSETRON HYDROCHLORIDE 4 MG: 2 INJECTION, SOLUTION INTRAMUSCULAR; INTRAVENOUS at 21:56

## 2020-10-07 RX ADMIN — FENTANYL CITRATE 50 MCG: 50 INJECTION, SOLUTION INTRAMUSCULAR; INTRAVENOUS at 15:47

## 2020-10-07 RX ADMIN — FENTANYL CITRATE 50 MCG: 50 INJECTION, SOLUTION INTRAMUSCULAR; INTRAVENOUS at 15:44

## 2020-10-07 RX ADMIN — HYDROMORPHONE HYDROCHLORIDE 0.5 MG: 2 INJECTION, SOLUTION INTRAMUSCULAR; INTRAVENOUS; SUBCUTANEOUS at 22:02

## 2020-10-07 RX ADMIN — FENTANYL CITRATE 50 MCG: 50 INJECTION, SOLUTION INTRAMUSCULAR; INTRAVENOUS at 20:45

## 2020-10-07 RX ADMIN — PHENYLEPHRINE HYDROCHLORIDE 20 MCG/MIN: 10 INJECTION INTRAVENOUS at 21:38

## 2020-10-07 RX ADMIN — SODIUM CHLORIDE: 9 INJECTION, SOLUTION INTRAVENOUS at 21:14

## 2020-10-07 RX ADMIN — VANCOMYCIN HYDROCHLORIDE 2000 MG: 10 INJECTION, POWDER, LYOPHILIZED, FOR SOLUTION INTRAVENOUS at 12:38

## 2020-10-07 RX ADMIN — FAMOTIDINE 20 MG: 20 TABLET ORAL at 23:45

## 2020-10-07 RX ADMIN — PROPOFOL 100 MCG/KG/MIN: 10 INJECTION, EMULSION INTRAVENOUS at 15:45

## 2020-10-07 RX ADMIN — HEPARIN SODIUM 7000 UNITS: 1000 INJECTION INTRAVENOUS; SUBCUTANEOUS at 20:38

## 2020-10-07 RX ADMIN — PHENYLEPHRINE HYDROCHLORIDE 50 MCG: 10 INJECTION INTRAVENOUS at 21:00

## 2020-10-07 RX ADMIN — HYDROMORPHONE HYDROCHLORIDE 0.5 MG: 2 INJECTION, SOLUTION INTRAMUSCULAR; INTRAVENOUS; SUBCUTANEOUS at 21:54

## 2020-10-07 RX ADMIN — HEPARIN SODIUM 3000 UNITS: 1000 INJECTION INTRAVENOUS; SUBCUTANEOUS at 20:46

## 2020-10-07 RX ADMIN — MIDAZOLAM 2 MG: 1 INJECTION INTRAMUSCULAR; INTRAVENOUS at 15:48

## 2020-10-07 RX ADMIN — PHENYLEPHRINE HYDROCHLORIDE 100 MCG: 10 INJECTION INTRAVENOUS at 19:24

## 2020-10-07 RX ADMIN — SODIUM CHLORIDE: 9 INJECTION, SOLUTION INTRAVENOUS at 16:45

## 2020-10-07 RX ADMIN — Medication 10 ML: at 23:31

## 2020-10-07 RX ADMIN — ROCURONIUM BROMIDE 50 MG: 10 INJECTION, SOLUTION INTRAVENOUS at 16:06

## 2020-10-07 RX ADMIN — MIDAZOLAM 2 MG: 1 INJECTION INTRAMUSCULAR; INTRAVENOUS at 15:38

## 2020-10-07 RX ADMIN — PROPOFOL 75 MCG/KG/MIN: 10 INJECTION, EMULSION INTRAVENOUS at 18:15

## 2020-10-07 RX ADMIN — SODIUM CHLORIDE 1000 ML: 9 INJECTION, SOLUTION INTRAVENOUS at 11:40

## 2020-10-07 RX ADMIN — KETOROLAC TROMETHAMINE 30 MG: 30 INJECTION, SOLUTION INTRAMUSCULAR; INTRAVENOUS at 12:43

## 2020-10-07 RX ADMIN — FENTANYL CITRATE 50 MCG: 50 INJECTION, SOLUTION INTRAMUSCULAR; INTRAVENOUS at 19:44

## 2020-10-07 RX ADMIN — PROPOFOL INJECTABLE EMULSION 75 MCG/KG/MIN: 10 INJECTION, EMULSION INTRAVENOUS at 16:42

## 2020-10-07 RX ADMIN — FENTANYL CITRATE 50 MCG: 50 INJECTION, SOLUTION INTRAMUSCULAR; INTRAVENOUS at 20:15

## 2020-10-07 RX ADMIN — SODIUM CHLORIDE: 9 INJECTION, SOLUTION INTRAVENOUS at 15:38

## 2020-10-07 RX ADMIN — HYDROMORPHONE HYDROCHLORIDE 0.5 MG: 2 INJECTION, SOLUTION INTRAMUSCULAR; INTRAVENOUS; SUBCUTANEOUS at 22:07

## 2020-10-07 RX ADMIN — FENTANYL CITRATE 50 MCG: 50 INJECTION, SOLUTION INTRAMUSCULAR; INTRAVENOUS at 15:52

## 2020-10-07 RX ADMIN — PROTAMINE SULFATE 30 MG: 10 INJECTION, SOLUTION INTRAVENOUS at 21:52

## 2020-10-07 RX ADMIN — HYDROMORPHONE HYDROCHLORIDE 0.5 MG: 2 INJECTION, SOLUTION INTRAMUSCULAR; INTRAVENOUS; SUBCUTANEOUS at 21:57

## 2020-10-07 RX ADMIN — FENTANYL CITRATE 50 MCG: 50 INJECTION, SOLUTION INTRAMUSCULAR; INTRAVENOUS at 16:20

## 2020-10-07 RX ADMIN — FENTANYL CITRATE 50 MCG: 50 INJECTION, SOLUTION INTRAMUSCULAR; INTRAVENOUS at 16:09

## 2020-10-07 RX ADMIN — FENTANYL CITRATE 50 MCG: 50 INJECTION, SOLUTION INTRAMUSCULAR; INTRAVENOUS at 15:50

## 2020-10-07 RX ADMIN — PIPERACILLIN SODIUM AND TAZOBACTAM SODIUM 3.38 G: 3; .375 INJECTION, POWDER, LYOPHILIZED, FOR SOLUTION INTRAVENOUS at 12:00

## 2020-10-07 ASSESSMENT — PULMONARY FUNCTION TESTS
PIF_VALUE: 23
PIF_VALUE: 12
PIF_VALUE: 22
PIF_VALUE: 5
PIF_VALUE: 24
PIF_VALUE: 16
PIF_VALUE: 23
PIF_VALUE: 11
PIF_VALUE: 19
PIF_VALUE: 17
PIF_VALUE: 22
PIF_VALUE: 21
PIF_VALUE: 16
PIF_VALUE: 23
PIF_VALUE: 22
PIF_VALUE: 20
PIF_VALUE: 21
PIF_VALUE: 12
PIF_VALUE: 3
PIF_VALUE: 22
PIF_VALUE: 23
PIF_VALUE: 22
PIF_VALUE: 23
PIF_VALUE: 1
PIF_VALUE: 23
PIF_VALUE: 20
PIF_VALUE: 23
PIF_VALUE: 22
PIF_VALUE: 21
PIF_VALUE: 23
PIF_VALUE: 20
PIF_VALUE: 12
PIF_VALUE: 24
PIF_VALUE: 15
PIF_VALUE: 4
PIF_VALUE: 22
PIF_VALUE: 23
PIF_VALUE: 17
PIF_VALUE: 23
PIF_VALUE: 22
PIF_VALUE: 23
PIF_VALUE: 21
PIF_VALUE: 23
PIF_VALUE: 5
PIF_VALUE: 5
PIF_VALUE: 23
PIF_VALUE: 4
PIF_VALUE: 22
PIF_VALUE: 23
PIF_VALUE: 19
PIF_VALUE: 21
PIF_VALUE: 22
PIF_VALUE: 22
PIF_VALUE: 23
PIF_VALUE: 20
PIF_VALUE: 22
PIF_VALUE: 1
PIF_VALUE: 12
PIF_VALUE: 12
PIF_VALUE: 22
PIF_VALUE: 22
PIF_VALUE: 4
PIF_VALUE: 22
PIF_VALUE: 22
PIF_VALUE: 23
PIF_VALUE: 22
PIF_VALUE: 15
PIF_VALUE: 12
PIF_VALUE: 16
PIF_VALUE: 1
PIF_VALUE: 23
PIF_VALUE: 12
PIF_VALUE: 17
PIF_VALUE: 15
PIF_VALUE: 6
PIF_VALUE: 24
PIF_VALUE: 11
PIF_VALUE: 15
PIF_VALUE: 23
PIF_VALUE: 23
PIF_VALUE: 22
PIF_VALUE: 23
PIF_VALUE: 4
PIF_VALUE: 21
PIF_VALUE: 2
PIF_VALUE: 15
PIF_VALUE: 17
PIF_VALUE: 22
PIF_VALUE: 23
PIF_VALUE: 23
PIF_VALUE: 21
PIF_VALUE: 12
PIF_VALUE: 22
PIF_VALUE: 12
PIF_VALUE: 21
PIF_VALUE: 22
PIF_VALUE: 1
PIF_VALUE: 22
PIF_VALUE: 23
PIF_VALUE: 22
PIF_VALUE: 23
PIF_VALUE: 21
PIF_VALUE: 1
PIF_VALUE: 5
PIF_VALUE: 23
PIF_VALUE: 20
PIF_VALUE: 12
PIF_VALUE: 23
PIF_VALUE: 23
PIF_VALUE: 12
PIF_VALUE: 22
PIF_VALUE: 23
PIF_VALUE: 26
PIF_VALUE: 23
PIF_VALUE: 23
PIF_VALUE: 1
PIF_VALUE: 23
PIF_VALUE: 24
PIF_VALUE: 19
PIF_VALUE: 15
PIF_VALUE: 2
PIF_VALUE: 4
PIF_VALUE: 21
PIF_VALUE: 21
PIF_VALUE: 17
PIF_VALUE: 23
PIF_VALUE: 1
PIF_VALUE: 16
PIF_VALUE: 23
PIF_VALUE: 22
PIF_VALUE: 22
PIF_VALUE: 15
PIF_VALUE: 1
PIF_VALUE: 21
PIF_VALUE: 15
PIF_VALUE: 23
PIF_VALUE: 23
PIF_VALUE: 12
PIF_VALUE: 23
PIF_VALUE: 26
PIF_VALUE: 15
PIF_VALUE: 22
PIF_VALUE: 15
PIF_VALUE: 12
PIF_VALUE: 12
PIF_VALUE: 22
PIF_VALUE: 1
PIF_VALUE: 22
PIF_VALUE: 17
PIF_VALUE: 2
PIF_VALUE: 21
PIF_VALUE: 12
PIF_VALUE: 3
PIF_VALUE: 15
PIF_VALUE: 15
PIF_VALUE: 23
PIF_VALUE: 22
PIF_VALUE: 22
PIF_VALUE: 23
PIF_VALUE: 2
PIF_VALUE: 22
PIF_VALUE: 20
PIF_VALUE: 12
PIF_VALUE: 16
PIF_VALUE: 14
PIF_VALUE: 1
PIF_VALUE: 15
PIF_VALUE: 15
PIF_VALUE: 22
PIF_VALUE: 22
PIF_VALUE: 15
PIF_VALUE: 23
PIF_VALUE: 24
PIF_VALUE: 15
PIF_VALUE: 4
PIF_VALUE: 12
PIF_VALUE: 23
PIF_VALUE: 21
PIF_VALUE: 23
PIF_VALUE: 4
PIF_VALUE: 21
PIF_VALUE: 1
PIF_VALUE: 22
PIF_VALUE: 17
PIF_VALUE: 22
PIF_VALUE: 23
PIF_VALUE: 22
PIF_VALUE: 21
PIF_VALUE: 12
PIF_VALUE: 22
PIF_VALUE: 1
PIF_VALUE: 15
PIF_VALUE: 15
PIF_VALUE: 12
PIF_VALUE: 12
PIF_VALUE: 23
PIF_VALUE: 1
PIF_VALUE: 22
PIF_VALUE: 22
PIF_VALUE: 23
PIF_VALUE: 23
PIF_VALUE: 3
PIF_VALUE: 23
PIF_VALUE: 15
PIF_VALUE: 22
PIF_VALUE: 1
PIF_VALUE: 23
PIF_VALUE: 22
PIF_VALUE: 22
PIF_VALUE: 12
PIF_VALUE: 22
PIF_VALUE: 23
PIF_VALUE: 22
PIF_VALUE: 20
PIF_VALUE: 22
PIF_VALUE: 16
PIF_VALUE: 15
PIF_VALUE: 12
PIF_VALUE: 4
PIF_VALUE: 23
PIF_VALUE: 22
PIF_VALUE: 2
PIF_VALUE: 17
PIF_VALUE: 21
PIF_VALUE: 16
PIF_VALUE: 22
PIF_VALUE: 16
PIF_VALUE: 21
PIF_VALUE: 23
PIF_VALUE: 23
PIF_VALUE: 1
PIF_VALUE: 22
PIF_VALUE: 22
PIF_VALUE: 12

## 2020-10-07 ASSESSMENT — PAIN - FUNCTIONAL ASSESSMENT: PAIN_FUNCTIONAL_ASSESSMENT: ACTIVITIES ARE NOT PREVENTED

## 2020-10-07 ASSESSMENT — PAIN SCALES - GENERAL
PAINLEVEL_OUTOF10: 8
PAINLEVEL_OUTOF10: 0
PAINLEVEL_OUTOF10: 8
PAINLEVEL_OUTOF10: 8
PAINLEVEL_OUTOF10: 0
PAINLEVEL_OUTOF10: 0

## 2020-10-07 ASSESSMENT — LIFESTYLE VARIABLES
SMOKING_STATUS: 1
SMOKING_STATUS: 1

## 2020-10-07 ASSESSMENT — PAIN DESCRIPTION - ORIENTATION
ORIENTATION: LEFT
ORIENTATION: LEFT

## 2020-10-07 ASSESSMENT — PAIN DESCRIPTION - PROGRESSION: CLINICAL_PROGRESSION: NOT CHANGED

## 2020-10-07 ASSESSMENT — PAIN DESCRIPTION - LOCATION
LOCATION: ARM
LOCATION: ARM

## 2020-10-07 ASSESSMENT — PAIN DESCRIPTION - ONSET: ONSET: ON-GOING

## 2020-10-07 ASSESSMENT — PAIN DESCRIPTION - DESCRIPTORS: DESCRIPTORS: ACHING;DISCOMFORT;CONSTANT

## 2020-10-07 ASSESSMENT — PAIN DESCRIPTION - PAIN TYPE
TYPE: ACUTE PAIN
TYPE: ACUTE PAIN

## 2020-10-07 ASSESSMENT — PAIN DESCRIPTION - FREQUENCY: FREQUENCY: CONTINUOUS

## 2020-10-07 NOTE — PROGRESS NOTES
Intraoperative findings as discussed in operative note. I scrubbed out and attempted to call vascular surgery (Dr Laura Mendoza) which has privileges at [facility] and cell phone and was unable to get a hold of them. I did leave a voicemail with my cell phone. Due to the urgent nature of the of the patient's vascular injury I elected to call for transfer to a tertiary center. Discussed with Abebe Carballo vascular surgery over the phone discussed with transfer line. Patient has been accepted awaiting mobile intensive for transport. He will remain intubated and recovery unit until transfer.         Frida Diego MD, MS  Minimally Invasive and Bariatric Surgery  [phone] (p)  10/7/2020  4:39 PM

## 2020-10-07 NOTE — CONSULTS
[facility]  Consult Note    [address] [facility] [address]  [place], [address]  Phone [phone]   [phone]      Admit Date: 10/7/2020 10:33 AM  Pt Name: Amy Bishop  MRN: [id number]  : 1980  Reason for Consult:    Chief Complaint   Patient presents with    Abscess     LT ANTECUBITAL/ SWELLING LT ARM X 30 DAYS     Requesting Physician:  Judith Rawls MD  PCP: No primary care provider on file. History Obtained From:  patient  ID consulted for Abscess [L02.91]  on hospital day 0  CHIEF COMPLAINT       Chief Complaint   Patient presents with    Abscess     LT ANTECUBITAL/ SWELLING LT ARM X 30 DAYS     HISTORYOF PRESENT ILLNESS      Amy Bishop is a 45 y.o. male who presents with significant past medical history of  has no past medical history on file. who presents with   Chief Complaint   Patient presents with    Abscess     LT ANTECUBITAL/ SWELLING LT ARM X 30 DAYS     ED TRIAGEVITALS  BP: 103/80, Temp: 97.9 °F (36.6 °C), Pulse: 88, Resp: 16, SpO2: 98 %  HPI  Patient admitted for left forearm abscess- he is an IVDU- and is known to ID - last seen in May 2020- he was to follow-up in  with follow-up blood cultures and never did. Reports he used suboxone diluted in tap water. He is for I&D of left arm abscess. He has history of MSSA and klebsiella from wound- also MSSA bacteremia   Blood cultures pending -  He is on vancomycin   Sed rate- 48    Historically:  MSSA SEPSIS SECONDARY TO IVDU RUE INJECTION WITH CELLULITIS POS ABSCESS AND AD H/O RETAINED FOREIGN BODY   -   PROCEDURES PERFORMED:  Incision and drainage of deep space right upper  extremity abscess with attempted removal and failure to remove superficial foreign body of the right upper extremity under fluoroscopic Guidance.    -mssa/klebsiella   REVIEW OF SYSTEMS    (2-9 systems for level 4, 10 or more for level 5)     REVIEW OF SYSTEMS:    CONSTITUTIONAL:   No fever, chills, weight loss  ALLERGIES:    No urticaria, hay fever,    EYES:     No blurry vision, loss of vision,eye pain  ENT:      No hearing loss, sore throat  CARDIOVASCULAR:  No chest pain or palpitations  RESPIRATORY:   No cough, sob  ENDOCRINE:    No increase thirst, urination   HEME-LYMPH:   No easy bruising or bleeding  GI:     No nausea, vomiting or diarrhea  :     No urinary complaints  NEURO:    No seizures, stroke, HA  MUSCULOSKELETAL:  No muscle aches or pain, no jointpain  SKIN:     No rash or itch  PSYCH:    No depression or anxiety    No medications prior to admission.'  CURRENT MEDICATIONS     Current Facility-Administered Medications:     vancomycin 2 g in dextrose 5% 500 ml IVPB, 2,000 mg, Intravenous, Once, Laine Chester PA-C, Last Rate: 250 mL/hr at 10/07/20 1238, 2,000 mg at 10/07/20 1238    0.9 % sodium chloride infusion, , Intravenous, Continuous, Alexander Guadalupe DO    sodium chloride flush 0.9 % injection 10 mL, 10 mL, Intravenous, 2 times per day, Alexander Guadalupe, DO    sodium chloride flush 0.9 % injection 10 mL, 10 mL, Intravenous, PRN, Alexander Guadalupe, DO    ondansetron (ZOFRAN-ODT) disintegrating tablet 4 mg, 4 mg, Oral, Q8H PRN **OR** ondansetron (ZOFRAN) injection 4 mg, 4 mg, Intravenous, Q6H PRN, Alexander Guadalupe DO    acetaminophen (TYLENOL) tablet 650 mg, 650 mg, Oral, Q6H, Alexander Guadalupe DO    oxyCODONE (ROXICODONE) immediate release tablet 5 mg, 5 mg, Oral, Q4H PRN **OR** oxyCODONE (ROXICODONE) immediate release tablet 10 mg, 10 mg, Oral, Q4H PRN, Alexander Guadalupe DO    morphine (PF) injection 2 mg, 2 mg, Intravenous, Q2H PRN **OR** morphine injection 4 mg, 4 mg, Intravenous, Q2H PRN, Alexander Guadalupe, DO  ALLERGIES     Patient has no known allergies. Immunization History   Administered Date(s) Administered    Tdap (Boostrix, Adacel) 04/21/2020     PAST MEDICAL HISTORY   History reviewed. No pertinent past medical history.   SURGICAL HISTORY       Past Surgical History:   Procedure Laterality Date    INCISION AND DRAINAGE Right 4/23/2020    RIGHT UPPER EXTREMITY INCISION AND DRAINAGE REMOVAL FOREIGN BODY WITH C-ARM performed by Mark Camargo MD at [address] [facility] TRANSESOPHAGEAL ECHOCARDIOGRAM N/A 4/24/2020    TRANSESOPHAGEAL ECHOCARDIOGRAM performed by Belkis Locke MD at [address]     History reviewed. No pertinent family history.    No known family history     SOCIAL HISTORY       Social History     Socioeconomic History    Marital status: Single     Spouse name: None    Number of children: None    Years of education: None    Highest education level: None   Occupational History    None   Social Needs    Financial resource strain: None    Food insecurity     Worry: None     Inability: None    Transportation needs     Medical: None     Non-medical: None   Tobacco Use    Smoking status: Current Every Day Smoker     Packs/day: 1.00     Types: Cigarettes    Smokeless tobacco: Never Used   Substance and Sexual Activity    Alcohol use: Not Currently    Drug use: Yes     Types: IV     Comment: Suboxone/IV    Sexual activity: None   Lifestyle    Physical activity     Days per week: None     Minutes per session: None    Stress: None   Relationships    Social connections     Talks on phone: None     Gets together: None     Attends Orthodoxy service: None     Active member of club or organization: None     Attends meetings of clubs or organizations: None     Relationship status: None    Intimate partner violence     Fear of current or ex partner: None     Emotionally abused: None     Physically abused: None     Forced sexual activity: None   Other Topics Concern    None   Social History Narrative    None     · Lives with brother and mother  · Works in [employer]    (up to 7 for level 4, 8 or more forlevel 5)     ED Triage Vitals   BP Temp Temp src Pulse Resp SpO2 Height Weight   10/07/20 1031 10/07/20 GLUCOSE 99   PROT 7.9   LABALBU 3.7   CALCIUM 9.7   BILITOT 0.4   ALKPHOS 78   AST 10   ALT 12     Lab Results   Component Value Date    CRP 5.1 (H) 05/12/2020    CRP 1.7 (H) 05/05/2020    CRP 2.1 (H) 05/01/2020     Lab Results   Component Value Date    SEDRATE 54 (H) 05/12/2020    SEDRATE 49 (H) 05/05/2020    SEDRATE 50 (H) 05/01/2020     COVID-19/LUPE-COV2 LABS  Recent Labs     10/07/20  1129   AST 10   ALT 12     Lab Results   Component Value Date    HEPAIGM Non-Reactive 04/23/2020    HEPBIGM Non-Reactive 04/23/2020    HCVABI REACTIVE (A) 04/23/2020     Hep C Ab Interp   Date Value Ref Range Status   04/23/2020 REACTIVE (A) NON REACT Final     @RESUFASTHCVABI)@    MICROBIOLOGY:     Cultures :   Lab Results   Component Value Date    ORG Staphylococcus aureus 04/27/2020    ORG Staphylococcus aureus 04/23/2020    ORG Klebsiella pneumoniae ssp pneumoniae 04/23/2020     Lab Results   Component Value Date    BLOODCULT2 5 Days- no growth 04/22/2020    ORG Staphylococcus aureus 04/27/2020    ORG Staphylococcus aureus 04/23/2020    ORG Klebsiella pneumoniae ssp pneumoniae 04/23/2020       WOUND/ABSCESS   Date Value Ref Range Status   04/27/2020 Light growth  Final     Culture Surgical   Date Value Ref Range Status   04/23/2020 Moderate growth  Final   04/23/2020 Light growth  Final     No results found for: LABURIN  MRSA Culture Only   Date Value Ref Range Status   04/22/2020 Methicillin resistant Staph aureus not isolated  Final     Patient is a 45 y.o. male who presented with   Chief Complaint   Patient presents with    Abscess     LT ANTECUBITAL/ SWELLING LT ARM X 30 DAYS      FINAL IMPRESSION      1. Abscess of left elbow    2.  IV drug abuse ([address]    · left arm abscess/ cellulitis  · IVDU  · History of MSSA bacteremia  · Hep C AB reactive     Plan:     · Continue vancomycin   · Pharmacy dosing vancomycin  · Check blood cultures  · Sed rate- 48   · Surgery following- for I&D today- check cultures  · Monitor

## 2020-10-07 NOTE — PROCEDURES
Central Venous Catheter Insertion Procedure Note    Procedure: Insertion of Central Venous Catheter    Indications:  vascular access, hypovolemia, surgery with anticipated large fluid shifts    Procedure Details   Informed consent was obtained for the procedure, including sedation. Risks of lung perforation, hemorrhage, arrhythmia, and adverse drug reaction were discussed. Under sterile conditions the skin above the on the right femoral vein was prepped with betadine and covered with a sterile drape. Local anesthesia was applied to the skin and subcutaneous tissues. Ultrasound was used to identify the vein under direct visualization and an 18-gauge needle was then inserted into the vein. A guide wire was then passed easily through the catheter. There were no arrhythmias. The catheter was then withdrawn. A 7.5 [place] triple-lumen was then inserted into the vessel over the guide wire. The catheter was sutured into place. Findings: There were no changes to vital signs. Catheter was flushed with 20 cc NS. Patient did tolerate procedure well.     Recommendations:  Ok to use catheter    Kylee Boudreaux MD, MS  Minimally Invasive and Bariatric Surgery  [phone] (p)  10/7/2020  5:52 PM

## 2020-10-07 NOTE — PROGRESS NOTES
Pharmacy Consultation Note  (Antibiotic Dosing and Monitoring)    Initial consult date: 10/7/20  Consulting physician: Dr. Joe Dang  Drug(s): Vancomycin   Indication: Left elbow abscess secondary to IVDU    Ht Readings from Last 1 Encounters:   10/07/20 6' 4\" (1.93 m)     Wt Readings from Last 1 Encounters:   10/07/20 230 lb (104.3 kg)         Age/  Gender IBW DW  Allergy Information   45 y.o.     male 86.8 kg 104.3 kg  Patient has no known allergies. Date  WBC BUN/CR UOP Drug/Dose Time   Given Level(s)   (Time) Comments   10/7  (#1) 11.0 9/0.8 -- Vancomycin 2,000 mg IV x 1  1238       (#2)            (#3)            (#4)            (#5)            (#6)            (#7)            Estimated Creatinine Clearance: 164 mL/min (based on SCr of 0.8 mg/dL). UOP over the past 24 hours:       Intake/Output Summary (Last 24 hours) at 10/7/2020 1435  Last data filed at 10/7/2020 1323  Gross per 24 hour   Intake 1050 ml   Output --   Net 1050 ml       Temp max: Temp (24hrs), Av.6 °F (36.4 °C), Min:97.2 °F (36.2 °C), Max:97.9 °F (36.6 °C)      Antibiotic Regimen:  Antibiotic Dose Date Initiated   Pip/tazo 3.375 g x 1 10/7     Cultures:  available culture and sensitivity results were reviewed in EPIC  Cultures sent and are pending.   Culture Date Result             Assessment:  · Consulted by Dr. Qian Blunt to dose/monitor vancomycin  · Goal trough level:  15-20 mcg/mL  · Pt is a 45 yom admitted for left elbow abscess secondary to IVDU  · Serum creatinine today: 0.8; CrCl >120 mL/min; baseline Scr ~ 0.8  · Patient received Vancomycin 2,000 mg IV x 1 in ED    Plan:  · Start Vancomycin 2,000 mg IV Q12H tonight  · Trough at steady state  · Follow renal function  · Pharmacist will follow and monitor/adjust dosing as necessary      Thank you for the consult,    Kelsea Herbert, PharmD, BCPS 10/7/2020 2:37 PM   [phone]

## 2020-10-07 NOTE — ED PROVIDER NOTES
ED Attending  CC: No                                                                                                                                      Department of Emergency Medicine   ED  Provider Note  Admit Date/RoomTime: 10/7/2020 10:33 AM  ED Room: 16/16        HPI:  10/7/20,   Time: 10:57 AM EDT         Cornelio Kaminski is a 45 y.o. male presenting to the ED for abscess left elbow, beginning 2-3 days ago. The complaint has been persistent, moderate in severity, and worsened by movement of left elbow/UE. The patient is an IV drug user who presents to the emergency room with a painful abscess in his left antecubital space. He states that it just started over the past several days. The pain is significant and aggravated with any movement. The patient states that he feels fine otherwise. Denies any fever, chills or vomiting. Apparently last month he was worked up for possible endocarditis. He states that the testing was all negative. He has not had any antibiotics or treatment for this current infection. He states that it really has just gotten much more severe over the past 24 hours. ROS:     Constitutional: Negative for fever and chills  HENT: Negative for ear pain, sore throat and sinus pressure  Eyes: Negative for pain, discharge and redness  Respiratory:  Negative for shortness of breath, cough and wheezing  Cardiovascular: Negative for CP, edema or palpitations  Gastrointestinal: Negative for nausea, vomiting, diarrhea and abdominal distention  Genitourinary: Negative for dysuria and frequency  Musculoskeletal:  See HPI  Skin: See HPI  Neurological: Negative for weakness and headaches  Hematological: Negative for adenopathy    All other systems reviewed and are negative      -------------------------------- PAST HISTORY ----------------------------------  Past Medical History:  has no past medical history on file.     Past Surgical History:  has a past surgical history that includes incision and drainage (Right, 4/23/2020) and transesophageal echocardiogram (N/A, 4/24/2020). Social History:  reports that he has been smoking cigarettes. He has been smoking about 1.00 pack per day. He has never used smokeless tobacco. He reports previous alcohol use. He reports current drug use. Drug: IV. Family History: family history is not on file. The patients home medications have been reviewed. Allergies: Patient has no known allergies.     --------------------------------- RESULTS ------------------------------------------  All laboratory and radiology results have been personally reviewed by myself   LABS:  Results for orders placed or performed during the hospital encounter of 10/07/20   CBC Auto Differential   Result Value Ref Range    WBC 11.0 4.5 - 11.5 E9/L    RBC 5.28 3.80 - 5.80 E12/L    Hemoglobin 13.5 12.5 - 16.5 g/dL    Hematocrit 40.6 37.0 - 54.0 %    MCV 76.9 (L) 80.0 - 99.9 fL    MCH 25.6 (L) 26.0 - 35.0 pg    MCHC 33.3 32.0 - 34.5 %    RDW 15.0 11.5 - 15.0 fL    Platelets 396 625 - 631 E9/L    MPV 8.3 7.0 - 12.0 fL    Neutrophils % 80.4 (H) 43.0 - 80.0 %    Immature Granulocytes % 0.4 0.0 - 5.0 %    Lymphocytes % 11.2 (L) 20.0 - 42.0 %    Monocytes % 7.1 2.0 - 12.0 %    Eosinophils % 0.5 0.0 - 6.0 %    Basophils % 0.4 0.0 - 2.0 %    Neutrophils Absolute 8.87 (H) 1.80 - 7.30 E9/L    Immature Granulocytes # 0.04 E9/L    Lymphocytes Absolute 1.23 (L) 1.50 - 4.00 E9/L    Monocytes Absolute 0.78 0.10 - 0.95 E9/L    Eosinophils Absolute 0.05 0.05 - 0.50 E9/L    Basophils Absolute 0.04 0.00 - 0.20 E9/L   Comprehensive Metabolic Panel   Result Value Ref Range    Sodium 137 132 - 146 mmol/L    Potassium 4.8 3.5 - 5.0 mmol/L    Chloride 98 98 - 107 mmol/L    CO2 24 22 - 29 mmol/L    Anion Gap 15 7 - 16 mmol/L    Glucose 99 74 - 99 mg/dL    BUN 9 6 - 20 mg/dL    CREATININE 0.8 0.7 - 1.2 mg/dL    GFR Non-African American >60 >=60 mL/min/1.73    GFR African American >60     Calcium 9.7 8.6 - ml IVPB (2,000 mg Intravenous New Bag 10/7/20 1238)   0.9 % sodium chloride bolus (1,000 mLs Intravenous New Bag 10/7/20 1140)   piperacillin-tazobactam (ZOSYN) 3.375 g in dextrose 5 % 50 mL IVPB extended infusion (mini-bag) (0 g Intravenous Stopped 10/7/20 1237)   ketorolac (TORADOL) injection 30 mg (30 mg Intravenous Given 10/7/20 1243)         Medical Decision Making:    Pt has large abscess left antecubital space. His labs are pretty normal here though the patient was tachycardic. Will discuss with Dr. Iveth Cunningham. IV Zosyn and vancomycin given here in the ED after cultures sent. Dr. Iveth Cunningham aware and agreeable to plan. Needs I & D. Counseling: The emergency provider has spoken with the patient and discussed todays results, in addition to providing specific details for the plan of care and counseling regarding the diagnosis and prognosis. Questions are answered at this time and they are agreeable with the plan.      ------------------------ IMPRESSION AND DISPOSITION -------------------------------    IMPRESSION  1. Abscess of left elbow    2.  IV drug abuse ([facility] [address]        DISPOSITION  Disposition: Admit to med/surg floor  Patient condition is stable                    Andrés Akers PA-C  10/07/20 1238       Andrés Akers PA-C  10/07/20 6010

## 2020-10-07 NOTE — CONSULTS
Surgical Intensive Care Unit   CONSULT NOTE    Patient's name:  Taty Hamilton  Age/Gender: 45 y.o. male  Date of Admission: 10/7/2020  7:06 PM  Length of Stay: 0    Reason for ICU: Infected brachial pseudoaneurysm    HPI: Palak Jaffe is a 76-year-old male who presented to [facility] with a left forearm abscess that been progressively worsening over the last few days. He has a history of IV drug use. He was taken to the OR for I&D over the antecubital fossa. Immediately upon entering the cavity hematoma was evacuated and shortly thereafter there was noted to be pulsatile arterial bleeding coming from the wound bed. There was noted to be greater than 50% blowout of the vessel. Proximal and distal control were obtained with Vesseloops and the patient was transferred to [facility] for vascular surgery consultation. Patient arrived to surgical intensive care unit around 7 PM.  And was soon thereafter taken to the OR with Dr. Coral Ozuna for exploration. Problem List:   Patient Active Problem List   Diagnosis    Right arm cellulitis    IV drug abuse ([address]    Superficial foreign body of right upper arm    Cellulitis of right arm    Bacteremia    Skin ulcer of upper arm with fat layer exposed ([address]    Abscess    Pseudoaneurysm of brachial artery (HCC)    Poor venous access    Pseudoaneurysm ([address]         Vent Settings: Additional Respiratory  Assessments  Pulse: 119  Resp: 21  SpO2: 100 %  ABG: No results for input(s): PH, PCO2, PO2, HCO3, BE, O2SAT in the last 72 hours. I/O:  No intake/output data recorded. No intake/output data recorded.              Lines:   Right femoral triple-lumen catheter        Physical Exam:   BP (!) 121/103   Pulse 119   Temp 102.1 °F (38.9 °C) (Axillary)   Resp 21   SpO2 100%     Average, Min, and Max for last 24 hours Vitals:  Temp:  Temp  Av.7 °F (37.6 °C)  Min: 97.2 °F (36.2 °C)  Max: 102.6 °F (39.2 °C)  RR: Resp  Av.3  Min: 3 Max: 47  HR: Pulse  Av.6  Min: 88  Max: 031  BP:  Systolic (98EPP), LSB:005 , Min:64 , GYH:179   ; Diastolic (25DRZ), VPO:01, Min:39, Max:112    SpO2: SpO2  Av.6 %  Min: 90 %  Max: 100 %            CONSTITUTIONAL: no acute distress, lying in hospital bed, intubated, sedated  NEUROLOGIC: PERRL, follows commands  CARDIOVASCULAR: Regular rate, rhythm  PULMONARY: Fairly clear to auscultation bilaterally  ABDOMEN: soft, nontender, nondistended. Renal: [place] catheter in place-yellow urine  MUSCULOSKELETAL: moves all extremities purposefully, biphasic radial pulses bilaterally, 2+ DP and PT bilaterally    SKIN/EXTREMITIES: Left upper extremity wrapped with Ioban dressing without strikethrough      ASSESSMENT / PLAN:   · Neuro: Intubated, sedated  · Will reevaluate for possible extubation pending operation  · History of IV drug abuse  · CV: Left brachial mycotic aneurysm  · Vascular surgery following  · In OR for revision  · Pulm:  Intubated, sedated  · Wean sedation possible extubation postoperatively  · GI: N.p.o.  ·   · Renal: Creatinine within normal limits  · No acute issues  · ID: Hep C antibody reactive,  · Will reconsult here  · Continue vancomycin  ·   · Endocrine: Leukos near normal range  · No acute issues  · MSK: Left brachial pseudoaneurysm  · OR with vascular PT OT pending what is found in OR  · Heme: Hemoglobin stable  No acute issues    Pain/Analgesia: Fentanyl/propofol  Bowel regimen: Senna/Colace  Diet: No diet orders on file  Seizure proph: Not indicated  Glucose protocol: Not indicated  Mouth/eye care: Pending extubation  L[place]ola: Continue for critical care monitoring  Ancillary consults: Infectious disease, critical care, vascular  Family Update: When able  CODE Status: Prior    Dispo: SICU    Discussed with Dr. Armin Tolentino    Electronically signed by Janeen Witt MD 10/7/2020  7:29 PM

## 2020-10-07 NOTE — ANESTHESIA PRE PROCEDURE
Department of Anesthesiology  Preprocedure Note       Name:  Chilo Brooks   Age:  45 y.o.  :  1980                                          MRN:  [id number]         Date:  10/7/2020      Surgeon: Giovanna Shepard):  Max Luz MD    Procedure: Procedure(s):  BRACHIAL ARTERY EXPLORATION REPAIR, POSSIBLE BYPASS    Medications prior to admission:   Prior to Admission medications    Not on File       Current medications:    No current facility-administered medications for this visit. No current outpatient medications on file.      Facility-Administered Medications Ordered in Other Visits   Medication Dose Route Frequency Provider Last Rate Last Dose    0.9 % sodium chloride infusion   Intravenous Continuous Marylou Daunt, DO        sodium chloride flush 0.9 % injection 10 mL  10 mL Intravenous 2 times per day Marylou Daunt, DO        sodium chloride flush 0.9 % injection 10 mL  10 mL Intravenous PRN Marylou Daunt, DO        ondansetron (ZOFRAN-ODT) disintegrating tablet 4 mg  4 mg Oral Q8H PRN Marylou Daunt, DO        Or    ondansetron (ZOFRAN) injection 4 mg  4 mg Intravenous Q6H PRN Marylou Daunt, DO        acetaminophen (TYLENOL) tablet 650 mg  650 mg Oral Q6H Marylou Daunt, DO        oxyCODONE (ROXICODONE) immediate release tablet 5 mg  5 mg Oral Q4H PRN Marylou Daunt, DO        Or    oxyCODONE (ROXICODONE) immediate release tablet 10 mg  10 mg Oral Q4H PRN Marylou Daunt, DO        morphine (PF) injection 2 mg  2 mg Intravenous Q2H PRN Marylou Daunt, DO        Or    morphine injection 4 mg  4 mg Intravenous Q2H PRN Marylou Daunt, DO        [START ON 10/8/2020] vancomycin 2 g in dextrose 5% 500 ml IVPB  2,000 mg Intravenous Q12H Marylou Daunt, DO        cefepime (MAXIPIME) 2 g IVPB extended (mini-bag)  2 g Intravenous Q12H Meeta Mcfadden MD        And    0.9 % sodium chloride infusion   Intravenous Q12H Meeta Mcfadden MD       [address] [place] [address] bupivacaine-EPINEPHrine (MARCAINE-w/EPINEPHRINE) 0.25% -1:200000 10 mL, lidocaine-EPINEPHrine 10 mL    PRN Owen Hoffman MD   3 mL at 10/07/20 1552    sodium chloride flush 0.9 % injection             propofol injection 8,000 mcg  75 mcg/kg Intravenous Titrated Les Estevez MD   1,000 mg at 10/07/20 1640    fentaNYL (SUBLIMAZE) injection 100 mcg  100 mcg Intravenous Once Carole Cruz, DO        acetaminophen (TYLENOL) suppository 650 mg  650 mg Rectal Q4H PRN Owen Hoffman MD   650 mg at 10/07/20 1807       Allergies:  No Known Allergies    Problem List:    Patient Active Problem List   Diagnosis Code    Right arm cellulitis L03. 113    IV drug abuse ([facility] F19.10    Superficial foreign body of right upper arm S40.851A    Cellulitis of right arm L03. 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 Past Surgical History:        Procedure Laterality Date    INCISION AND DRAINAGE Right 4/23/2020    RIGHT UPPER EXTREMITY INCISION AND DRAINAGE REMOVAL FOREIGN BODY WITH C-ARM performed by Owen Hoffman MD at [address] [place] [address] TRANSESOPHAGEAL ECHOCARDIOGRAM N/A 4/24/2020    TRANSESOPHAGEAL ECHOCARDIOGRAM performed by Whitney Mckeon MD at [address] History:    Social History     Tobacco Use    Smoking status: Current Every Day Smoker     Packs/day: 1.00     Types: Cigarettes    Smokeless tobacco: Never Used   Substance Use Topics    Alcohol use: Not Currently                                Ready to quit: Not Answered  Counseling given: Not Answered      Vital Signs (Current): There were no vitals filed for this visit.                                            BP Readings from Last 3 Encounters:   10/07/20 (!) 139/46   10/07/20 (!) 102/44   05/15/20 122/72       NPO Status: Negative Neuro/Psych ROS              GI/Hepatic/Renal: Neg GI/Hepatic/Renal ROS            Endo/Other:        (-) hypothyroidism                ROS comment: History of substance abuse  Maintenance dose of suboxone Abdominal:         (-) obese     Vascular:           ROS comment: Left Brachial Artery Injury. Anesthesia Plan      general     ASA 2 - emergent       Induction: intravenous. MIPS: Postoperative opioids intended and Prophylactic antiemetics administered. Anesthetic plan and risks discussed with patient. Use of blood products discussed with patient whom consented to blood products. Plan discussed with CRNA.                   Maurilio Rodrigez MD   10/7/2020

## 2020-10-07 NOTE — PROGRESS NOTES
[facility] intensive care here to  patient.  Report given patient transferred with same ventilator settings and propofol drip

## 2020-10-07 NOTE — DISCHARGE SUMMARY
Physician Discharge Summary     Patient ID:  Krystina Hernandez  [id number]  37 y.o.  1980    Admit date: 10/7/2020    Discharge date and time: 10/7/2020    Admitting Physician: Marita Henson MD     Admission Diagnoses:   Patient Active Problem List   Diagnosis    Right arm cellulitis    IV drug abuse ([address]    Superficial foreign body of right upper arm    Cellulitis of right arm    Bacteremia    Skin ulcer of upper arm with fat layer exposed ([address]    Abscess    Pseudoaneurysm of brachial artery ([address]    Poor venous access       Discharge Diagnoses:   Patient Active Problem List   Diagnosis    Right arm cellulitis    IV drug abuse ([address]    Superficial foreign body of right upper arm    Cellulitis of right arm    Bacteremia    Skin ulcer of upper arm with fat layer exposed ([address]    Abscess    Pseudoaneurysm of brachial artery ([address]    Poor venous access       Admission Condition: fair    Discharged Condition: stable    Indication for Admission: LUE abscess    Hospital Course: Krystina Hernandez is a 45 y.o. male admitted for LUE abscess. Intraoperative findings of necrotic brachial artery with pseudoaneurysm. Operative control was obtained, vascular surgery was not available for introperative consultation. The patient remained intubated and was transferred via mobile intensive to [facility] for definitive management. Central venous catheter was placed in the right femoral vein prior to discharge.      Consults: medicine, ID, Orthopedics, Vascular surgery    Significant Diagnostic Studies: Xray    Treatments: IV hydration, antibiotics: , analgesia:  and surgery: Incision and drainage left upper extremity, brachial artery exposure with control of brachial artery hemorrhage and pseudoaneurysm, application of left upper extremity tourniquet       In process/preliminary results:  Outstanding Order Results     Date and Time Order Name Status Description    10/7/2020 1436 Culture, Blood 2 In process     10/7/2020 1129 Culture, Blood 1 In process           Patient Instructions: There are no discharge medications for this patient.       Discharge Exam:  General appearance: Intubated, sedated  Head: NCAT, PERRLA, EOMI, red conjunctiva  Neck: supple, no masses  Lungs: Equal chest rise bilateral  Heart: Tachycardia  Abdomen: soft, nondistended, nontender  Extremities:LUE dressed, open incision, retained surgical instruments with torniquet applied    Disposition: [facility]        Signed:  Layne Rizvi  10/7/2020  5:57 PM

## 2020-10-07 NOTE — ANESTHESIA PROCEDURE NOTES
Arterial Line:    An arterial line was placed using ultrasound guidance, in the OR for the following indication(s): continuous blood pressure monitoring and blood sampling needed. A 20 gauge (size), 1 and 3/4 inch (length), Arrow (type) catheter was placed, Seldinger technique not used, into the right radial artery, secured by Tegaderm and tape. Anesthesia type: General    Events:  patient tolerated procedure well with no complications and EBL < 5mL. 10/7/2020 7:31 PM10/7/2020 7:35 PM  Anesthesiologist: Silva Torres MD  Resident/CRNA: Kanu Fernandez APRN - CRNA  Other anesthesia staff: Connie Johnson RN  Performed:  Other anesthesia staff

## 2020-10-07 NOTE — PROGRESS NOTES
Called and updated mother Forrest Rodríguez regarding patient's condition. Discussed grave nature of the arterial necrosis and pseudoaneurysm. They discussed patient's clinical condition currently intubated and plans for transfer to tertiary center for arterial salvage. She understands and will wait on to be contacted once arrives at [facility] completes surgical course.      Angelene Brunner, MD, MS  Minimally Invasive and Bariatric Surgery  [phone] (p)  10/7/2020  5:11 PM

## 2020-10-07 NOTE — ANESTHESIA PRE PROCEDURE
Department of Anesthesiology  Preprocedure Note       Name:  Nesha Schmidt   Age:  45 y.o.  :  1980                                          MRN:  [id number]         Date:  10/7/2020      Surgeon: Francis Swanson):  Gerard Mcarthur MD    Procedure: Procedure(s):  LEFT ELBOW INCISION AND DRAINAGE    Medications prior to admission:   Prior to Admission medications    Not on File       Current medications:    Current Facility-Administered Medications   Medication Dose Route Frequency Provider Last Rate Last Dose    vancomycin 2 g in dextrose 5% 500 ml IVPB  2,000 mg Intravenous Once Camila Lucia PA-C 250 mL/hr at 10/07/20 1238 2,000 mg at 10/07/20 1238    0.9 % sodium chloride infusion   Intravenous Continuous Jamie Yap, DO        sodium chloride flush 0.9 % injection 10 mL  10 mL Intravenous 2 times per day Jamie Yap, DO        sodium chloride flush 0.9 % injection 10 mL  10 mL Intravenous PRN Jamie Yap, DO        ondansetron (ZOFRAN-ODT) disintegrating tablet 4 mg  4 mg Oral Q8H PRN Jamie Yap, DO        Or    ondansetron (ZOFRAN) injection 4 mg  4 mg Intravenous Q6H PRN Jamie Yap, DO        acetaminophen (TYLENOL) tablet 650 mg  650 mg Oral Q6H Jamie Yap, DO        oxyCODONE (ROXICODONE) immediate release tablet 5 mg  5 mg Oral Q4H PRN Jamie Yap, DO        Or    oxyCODONE (ROXICODONE) immediate release tablet 10 mg  10 mg Oral Q4H PRN Jamie Yap, DO        morphine (PF) injection 2 mg  2 mg Intravenous Q2H PRN Jamie Yap, DO        Or    morphine injection 4 mg  4 mg Intravenous Q2H PRN Jamie Yap, DO           Allergies:  No Known Allergies    Problem List:    Patient Active Problem List   Diagnosis Code    Right arm cellulitis L03. 113    IV drug abuse ([facility] F19.10    Superficial foreign body of right upper arm S40.851A    Cellulitis of right arm L03. 113    Bacteremia R78.81    Skin ulcer of upper arm with fat layer exposed ([facility] L98.492    Abscess L02.91       Past Medical History:  History reviewed. No pertinent past medical history. Past Surgical History:        Procedure Laterality Date    INCISION AND DRAINAGE Right 4/23/2020    RIGHT UPPER EXTREMITY INCISION AND DRAINAGE REMOVAL FOREIGN BODY WITH C-ARM performed by Caden Fuentes MD at [address] TRANSESOPHAGEAL ECHOCARDIOGRAM N/A 4/24/2020    TRANSESOPHAGEAL ECHOCARDIOGRAM performed by Jomar Leong MD at [facility] History:    Social History     Tobacco Use    Smoking status: Current Every Day Smoker     Packs/day: 1.00     Types: Cigarettes    Smokeless tobacco: Never Used   Substance Use Topics    Alcohol use: Not Currently                                Ready to quit: Not Answered  Counseling given: Not Answered      Vital Signs (Current):   Vitals:    10/07/20 1014 10/07/20 1015 10/07/20 1031 10/07/20 1239   BP:   133/82 103/80   Pulse:   99 88   Resp:   18 16   Temp:  97.2 °F (36.2 °C)  97.9 °F (36.6 °C)   SpO2:   100% 98%   Weight: 230 lb (104.3 kg)      Height: 6' 4\" (1.93 m)                                                 BP Readings from Last 3 Encounters:   10/07/20 103/80   05/15/20 122/72   05/08/20 (!) 144/86       NPO Status:                                                                                 BMI:   Wt Readings from Last 3 Encounters:   10/07/20 230 lb (104.3 kg)   05/15/20 200 lb (90.7 kg)   05/15/20 200 lb (90.7 kg)     Body mass index is 28 kg/m².     CBC:   Lab Results   Component Value Date    WBC 11.0 10/07/2020    RBC 5.28 10/07/2020    HGB 13.5 10/07/2020    HCT 40.6 10/07/2020    MCV 76.9 10/07/2020    RDW 15.0 10/07/2020     10/07/2020       CMP:   Lab Results   Component Value Date     10/07/2020    K 4.8 10/07/2020    K 4.6 04/21/2020    CL 98 10/07/2020    CO2 24 10/07/2020    BUN 9 10/07/2020    CREATININE 0.8 10/07/2020    GFRAA >60 10/07/2020    LABGLOM >60 10/07/2020    GLUCOSE 99 10/07/2020    PROT 7.9 10/07/2020    CALCIUM 9.7 10/07/2020    BILITOT 0.4 10/07/2020    ALKPHOS 78 10/07/2020    AST 10 10/07/2020    ALT 12 10/07/2020       POC Tests: No results for input(s): POCGLU, POCNA, POCK, POCCL, POCBUN, POCHEMO, POCHCT in the last 72 hours. Coags:   Lab Results   Component Value Date    PROTIME 13.8 04/21/2020    INR 1.2 04/21/2020    APTT 30.3 04/21/2020       HCG (If Applicable): No results found for: PREGTESTUR, PREGSERUM, HCG, HCGQUANT     ABGs: No results found for: PHART, PO2ART, NVU4RKW, QXG8RJY, BEART, A5EXPWJR     Type & Screen (If Applicable):  No results found for: LABABO, LABRH    Drug/Infectious Status (If Applicable):  No results found for: HIV, HEPCAB    COVID-19 Screening (If Applicable): No results found for: COVID19      Anesthesia Evaluation  Patient summary reviewed no history of anesthetic complications:   Airway: Mallampati: II  TM distance: >3 FB   Neck ROM: full  Mouth opening: > = 3 FB Dental: normal exam         Pulmonary: breath sounds clear to auscultation  (+) current smoker                           Cardiovascular:Negative CV ROS          ECG reviewed  Rhythm: regular  Rate: normal  Echocardiogram reviewed                  Neuro/Psych:   Negative Neuro/Psych ROS              GI/Hepatic/Renal: Neg GI/Hepatic/Renal ROS            Endo/Other:        (-) hypothyroidism                ROS comment: History of substance abuse  Maintenance dose of suboxone Abdominal:         (-) obese     Vascular: negative vascular ROS. Anesthesia Plan      MAC     ASA 2       Induction: intravenous. MIPS: Prophylactic antiemetics administered. Anesthetic plan and risks discussed with patient.                       Deanna Cottrell MD   10/7/2020

## 2020-10-07 NOTE — H&P
GENERAL SURGERY  H&P NOTE  10/7/2020        HPI  Stephanie Correa is a 45 y.o. male who presents for evaluation of left forearm abscess. He states that it has been present and worsening over the last several days. He has a history of IVDU. He has never had this before that required surgical intervention on the left side. He had a similar process which required operative drainage in April of this year. History reviewed. No pertinent past medical history. Past Surgical History:   Procedure Laterality Date    INCISION AND DRAINAGE Right 4/23/2020    RIGHT UPPER EXTREMITY INCISION AND DRAINAGE REMOVAL FOREIGN BODY WITH C-ARM performed by Isidro Jennings MD at [address] [place] [address] TRANSESOPHAGEAL ECHOCARDIOGRAM N/A 4/24/2020    TRANSESOPHAGEAL ECHOCARDIOGRAM performed by Neto Silva MD at [place] [address]       Medications Prior to Admission:    Prior to Admission medications    Not on File       No Known Allergies    History reviewed. No pertinent family history. Social History     Tobacco Use    Smoking status: Current Every Day Smoker     Packs/day: 1.00     Types: Cigarettes    Smokeless tobacco: Never Used   Substance Use Topics    Alcohol use: Not Currently    Drug use: Yes     Types: IV     Comment: Suboxone/IV         Review of Systems   General ROS: negative  Hematological and Lymphatic ROS: negative  Respiratory ROS: no cough, shortness of breath, or wheezing  Cardiovascular ROS: no chest pain or dyspnea on exertion  Gastrointestinal ROS: no abdominal pain, change in bowel habits, or black or bloody stools  Genito-Urinary ROS: no dysuria, trouble voiding, or hematuria  Musculoskeletal ROS: left arm pain      PHYSICAL EXAM:    Vitals:    10/07/20 1239   BP: 103/80   Pulse: 88   Resp: 16   Temp: 97.9 °F (36.6 °C)   SpO2: 98%       General Appearance:  awake, alert, oriented, in no acute distress  Skin:  See extrem  Head/face:  NCAT  Eyes:  No gross abnormalities. Lungs:  Normal expansion.   Clear to auscultation. No rales, rhonchi, or wheezing. Heart:  Heart sounds are normal.  Regular rate and rhythm without murmur, gallop or rub. Abdomen:  Soft, non-tender, normal bowel sounds. No bruits, organomegaly or masses. Extremities:                   LABS:    CBC  Recent Labs     10/07/20  1129   WBC 11.0   HGB 13.5   HCT 40.6        BMP  Recent Labs     10/07/20  1129      K 4.8   CL 98   CO2 24   BUN 9   CREATININE 0.8   CALCIUM 9.7     Liver Function  Recent Labs     10/07/20  1129   BILITOT 0.4   AST 10   ALT 12   ALKPHOS 78   PROT 7.9   LABALBU 3.7       RADIOLOGY    Xr Elbow Left (2 Views)    Result Date: 10/7/2020  EXAMINATION: TWO XRAY VIEWS OF THE LEFT ELBOW 10/7/2020 11:49 am COMPARISON: None. HISTORY: ORDERING SYSTEM PROVIDED HISTORY: Pain TECHNOLOGIST PROVIDED HISTORY: Reason for exam:->Pain FINDINGS: There is marked volar and medial elbow soft tissue swelling with 4 thin linear radiopaque foreign bodies within the soft tissues measuring up to 8 mm in length. There is no elbow joint effusion. There is no acute fracture or acute osseous abnormality identified. Marked medial and volar elbow soft tissue swelling with 4 thin linear radiopaque foreign bodies.          ASSESSMENT:  45 y.o. male with left elbow abscess secondary to IVDU    PLAN:  Admit  NPO  IVF  IV abx  PRN pain medications  To OR for I&D  D/w Dr. Conor Beck    Electronically signed by Lucy Dunbar DO on 10/7/20 at 12:49 PM EDT    General Surgery Consult Note  Mesha Kaminski MD, MS    Patient's Name/Date of Birth: Stephanie Correa / 1980    Date: October 7, 2020     Surgeon: Conor Beck MD    Requesting Physician:     Chief Complaint: left arm swelling    Patient Active Problem List   Diagnosis    Right arm cellulitis    [facility] abuse ([facility]    Superficial foreign body of right upper arm    Cellulitis of right arm    Bacteremia    Skin ulcer of upper arm with fat layer exposed ([address]    Abscess       Subjective: As above. I saw and examined the patient and discussed the above HPI with the resident and agree with documented positive and negative findings. Objective:  /80   Pulse 88   Temp 97.9 °F (36.6 °C)   Resp 16   Ht 6' 4\" (1.93 m)   Wt 230 lb (104.3 kg)   SpO2 98%   BMI 28.00 kg/m²   Labs:  Reviewed by me and relevant abnormalities noted       A complete 10 system review was performed and are otherwise negative unless mentioned in the above HPI. Specific negatives are listed below but may not include all those reviewed.     General ROS: negative obtundation, AMS  ENT ROS: negative rhinorrhea, epistaxis  Allergy and Immunology ROS: negative itchy/watery eyes or nasal congestion  Hematological and Lymphatic ROS: negative spontaneous bleeding or bruising  Endocrine ROS: negative  lethargy, mood swings, palpitations or polydipsia/polyuria  Respiratory ROS: negative sputum changes, stridor, tachypnea or wheezing  Cardiovascular ROS: negative for - loss of consciousness, murmur or orthopnea  Gastrointestinal ROS: negative for - hematochezia or hematemesis  Genito-Urinary ROS: negative for -  genital discharge or hematuria  Musculoskeletal ROS: negative for - focal weakness, gangrene  Psych/Neuro ROS: negative for - visual or auditory hallucinations, suicidal ideation    Physical exam:   /80   Pulse 88   Temp 97.9 °F (36.6 °C)   Resp 16   Ht 6' 4\" (1.93 m)   Wt 230 lb (104.3 kg)   SpO2 98%   BMI 28.00 kg/m²   General appearance:  NAD, appears stated age  Head: NCAT, PERRLA, EOMI, red conjunctiva  Neck: supple, no masses, trachea midline  Lungs: Equal chest rise bilateral, no retractions, no wheezing  Heart: Reg rate  Abdomen: soft, nontender  Skin; warm and dry, no cyanosis  Gu: no cva tenderness  Extremities: Left forearm abscess with swelling above and below the elbow cellulitis consistent with possible joint involvement  Psych: No tremor, visual hallucinations        Radiology: I reviewed relevant abdominal imaging from this admission and that available in the EMR including x-ray: Findings consistent with foreign body in the left elbow which patient states are chronic and been there for many years    Assessment/Plan:  Avtar Navarrete is a 45 y.o. male left arm abscess cellulitis foreign body, IV drug abuse medical noncompliance  Patient Active Problem List   Diagnosis    Right arm cellulitis    IV drug abuse ([address]    Superficial foreign body of right upper arm    Cellulitis of right arm    Bacteremia    Skin ulcer of upper arm with fat layer exposed ([address]    Abscess       Admit IV antibiotics  Orthopedic consult  infectious disease consult   hospitalist consult  OR for incision and drainage    I have personally participated in a face-to-face history and physical exam on the date of service. Reviewed chart, vitals, labs and radiologic studies. I also participated in medical decision making with the resident/NP on the date of service and I agree with all of the pertinent clinical information, assessment and treatment plan. I have reviewed and edited the note above based on my findings during my history, exam, and decision making.        Physician Signature: Electronically signed by Dr. Fountain Beams  [phone] (p)  10/7/2020  2:30 PM

## 2020-10-07 NOTE — PROGRESS NOTES
Patient intubated. Tourniquet in place. Wound packed and dressing in place. Patient being transferred to [facility].

## 2020-10-08 ENCOUNTER — ANESTHESIA EVENT (OUTPATIENT)
Dept: OPERATING ROOM | Age: 40
DRG: 854 | End: 2020-10-08
Payer: COMMERCIAL

## 2020-10-08 LAB
AADO2: 140.1 MMHG
ALBUMIN SERPL-MCNC: 3 G/DL (ref 3.5–5.2)
ALP BLD-CCNC: 59 U/L (ref 40–129)
ALT SERPL-CCNC: 8 U/L (ref 0–40)
ANION GAP SERPL CALCULATED.3IONS-SCNC: 11 MMOL/L (ref 7–16)
ANION GAP SERPL CALCULATED.3IONS-SCNC: 9 MMOL/L (ref 7–16)
AST SERPL-CCNC: 8 U/L (ref 0–39)
B.E.: -5.1 MMOL/L (ref -3–3)
BASOPHILS ABSOLUTE: 0.01 E9/L (ref 0–0.2)
BASOPHILS RELATIVE PERCENT: 0.1 % (ref 0–2)
BILIRUB SERPL-MCNC: 0.5 MG/DL (ref 0–1.2)
BUN BLDV-MCNC: 8 MG/DL (ref 6–20)
BUN BLDV-MCNC: 9 MG/DL (ref 6–20)
CALCIUM IONIZED: 1.22 MMOL/L (ref 1.15–1.33)
CALCIUM IONIZED: 1.27 MMOL/L (ref 1.15–1.33)
CALCIUM SERPL-MCNC: 8 MG/DL (ref 8.6–10.2)
CALCIUM SERPL-MCNC: 8.6 MG/DL (ref 8.6–10.2)
CHLORIDE BLD-SCNC: 104 MMOL/L (ref 98–107)
CHLORIDE BLD-SCNC: 105 MMOL/L (ref 98–107)
CO2: 21 MMOL/L (ref 22–29)
CO2: 23 MMOL/L (ref 22–29)
COHB: 0.3 % (ref 0–1.5)
CREAT SERPL-MCNC: 0.6 MG/DL (ref 0.7–1.2)
CREAT SERPL-MCNC: 0.6 MG/DL (ref 0.7–1.2)
CRITICAL: ABNORMAL
DATE ANALYZED: ABNORMAL
DATE OF COLLECTION: ABNORMAL
EOSINOPHILS ABSOLUTE: 0 E9/L (ref 0.05–0.5)
EOSINOPHILS RELATIVE PERCENT: 0 % (ref 0–6)
FIO2: 50 %
GFR AFRICAN AMERICAN: >60
GFR AFRICAN AMERICAN: >60
GFR NON-AFRICAN AMERICAN: >60 ML/MIN/1.73
GFR NON-AFRICAN AMERICAN: >60 ML/MIN/1.73
GLUCOSE BLD-MCNC: 123 MG/DL (ref 74–99)
GLUCOSE BLD-MCNC: 126 MG/DL (ref 74–99)
GRAM STAIN ORDERABLE: NORMAL
HCO3: 19.6 MMOL/L (ref 22–26)
HCT VFR BLD CALC: 31.4 % (ref 37–54)
HEMOGLOBIN: 10.2 G/DL (ref 12.5–16.5)
HHB: 1.6 % (ref 0–5)
IMMATURE GRANULOCYTES #: 0.06 E9/L
IMMATURE GRANULOCYTES %: 0.6 % (ref 0–5)
LAB: ABNORMAL
LACTIC ACID: 0.6 MMOL/L (ref 0.5–2.2)
LYMPHOCYTES ABSOLUTE: 0.86 E9/L (ref 1.5–4)
LYMPHOCYTES RELATIVE PERCENT: 8.2 % (ref 20–42)
Lab: ABNORMAL
MAGNESIUM: 1.6 MG/DL (ref 1.6–2.6)
MAGNESIUM: 1.9 MG/DL (ref 1.6–2.6)
MCH RBC QN AUTO: 25.4 PG (ref 26–35)
MCHC RBC AUTO-ENTMCNC: 32.5 % (ref 32–34.5)
MCV RBC AUTO: 78.1 FL (ref 80–99.9)
METHB: 0.3 % (ref 0–1.5)
MODE: AC
MONOCYTES ABSOLUTE: 0.55 E9/L (ref 0.1–0.95)
MONOCYTES RELATIVE PERCENT: 5.3 % (ref 2–12)
NEUTROPHILS ABSOLUTE: 8.95 E9/L (ref 1.8–7.3)
NEUTROPHILS RELATIVE PERCENT: 85.8 % (ref 43–80)
O2 SATURATION: 99.1 % (ref 92–98.5)
O2HB: 97.8 % (ref 94–97)
OPERATOR ID: 1893
PATIENT TEMP: 37 C
PCO2: 35.2 MMHG (ref 35–45)
PDW BLD-RTO: 15 FL (ref 11.5–15)
PEEP/CPAP: 8 CMH2O
PFO2: 3.29 MMHG/%
PH BLOOD GAS: 7.36 (ref 7.35–7.45)
PHOSPHORUS: 2.5 MG/DL (ref 2.5–4.5)
PHOSPHORUS: 3.1 MG/DL (ref 2.5–4.5)
PLATELET # BLD: 259 E9/L (ref 130–450)
PMV BLD AUTO: 8.8 FL (ref 7–12)
PO2: 164.3 MMHG (ref 75–100)
POC ACT LR: 138 SECONDS
POC ACT LR: 148 SECONDS
POC ACT LR: 193 SECONDS
POC ACT LR: 243 SECONDS
POC ACT LR: 256 SECONDS
POTASSIUM SERPL-SCNC: 4.1 MMOL/L (ref 3.5–5)
POTASSIUM SERPL-SCNC: 4.2 MMOL/L (ref 3.5–5)
RBC # BLD: 4.02 E12/L (ref 3.8–5.8)
RI(T): 0.85
RR MECHANICAL: 16 B/MIN
SODIUM BLD-SCNC: 136 MMOL/L (ref 132–146)
SODIUM BLD-SCNC: 137 MMOL/L (ref 132–146)
SOURCE, BLOOD GAS: ABNORMAL
THB: 11.1 G/DL (ref 11.5–16.5)
TIME ANALYZED: 22
TOTAL PROTEIN: 5.9 G/DL (ref 6.4–8.3)
VANCOMYCIN TROUGH: 38.5 MCG/ML (ref 5–16)
VT MECHANICAL: 500 ML
WBC # BLD: 10.4 E9/L (ref 4.5–11.5)

## 2020-10-08 PROCEDURE — 6370000000 HC RX 637 (ALT 250 FOR IP): Performed by: STUDENT IN AN ORGANIZED HEALTH CARE EDUCATION/TRAINING PROGRAM

## 2020-10-08 PROCEDURE — 2000000000 HC ICU R&B

## 2020-10-08 PROCEDURE — 0BP1XDZ REMOVAL OF INTRALUMINAL DEVICE FROM TRACHEA, EXTERNAL APPROACH: ICD-10-PCS | Performed by: STUDENT IN AN ORGANIZED HEALTH CARE EDUCATION/TRAINING PROGRAM

## 2020-10-08 PROCEDURE — 6360000002 HC RX W HCPCS: Performed by: EMERGENCY MEDICINE

## 2020-10-08 PROCEDURE — 0BH18EZ INSERTION OF ENDOTRACHEAL AIRWAY INTO TRACHEA, VIA NATURAL OR ARTIFICIAL OPENING ENDOSCOPIC: ICD-10-PCS | Performed by: SURGERY

## 2020-10-08 PROCEDURE — 2700000000 HC OXYGEN THERAPY PER DAY

## 2020-10-08 PROCEDURE — 80202 ASSAY OF VANCOMYCIN: CPT

## 2020-10-08 PROCEDURE — 94003 VENT MGMT INPAT SUBQ DAY: CPT

## 2020-10-08 PROCEDURE — 82330 ASSAY OF CALCIUM: CPT

## 2020-10-08 PROCEDURE — 2500000003 HC RX 250 WO HCPCS: Performed by: SURGERY

## 2020-10-08 PROCEDURE — 2500000003 HC RX 250 WO HCPCS

## 2020-10-08 PROCEDURE — 2580000003 HC RX 258: Performed by: SURGERY

## 2020-10-08 PROCEDURE — 2580000003 HC RX 258: Performed by: EMERGENCY MEDICINE

## 2020-10-08 PROCEDURE — 87522 HEPATITIS C REVRS TRNSCRPJ: CPT

## 2020-10-08 PROCEDURE — 82805 BLOOD GASES W/O2 SATURATION: CPT

## 2020-10-08 PROCEDURE — 5A1935Z RESPIRATORY VENTILATION, LESS THAN 24 CONSECUTIVE HOURS: ICD-10-PCS | Performed by: SURGERY

## 2020-10-08 PROCEDURE — 84100 ASSAY OF PHOSPHORUS: CPT

## 2020-10-08 PROCEDURE — 86703 HIV-1/HIV-2 1 RESULT ANTBDY: CPT

## 2020-10-08 PROCEDURE — 6370000000 HC RX 637 (ALT 250 FOR IP): Performed by: SURGERY

## 2020-10-08 PROCEDURE — 6360000002 HC RX W HCPCS: Performed by: SURGERY

## 2020-10-08 PROCEDURE — 80048 BASIC METABOLIC PNL TOTAL CA: CPT

## 2020-10-08 PROCEDURE — 83735 ASSAY OF MAGNESIUM: CPT

## 2020-10-08 PROCEDURE — 99291 CRITICAL CARE FIRST HOUR: CPT | Performed by: SURGERY

## 2020-10-08 PROCEDURE — 7100000001 HC PACU RECOVERY - ADDTL 15 MIN

## 2020-10-08 PROCEDURE — 85025 COMPLETE CBC W/AUTO DIFF WBC: CPT

## 2020-10-08 PROCEDURE — 7100000000 HC PACU RECOVERY - FIRST 15 MIN

## 2020-10-08 PROCEDURE — 36415 COLL VENOUS BLD VENIPUNCTURE: CPT

## 2020-10-08 RX ORDER — ACETAMINOPHEN 325 MG/1
650 TABLET ORAL EVERY 8 HOURS SCHEDULED
Status: DISCONTINUED | OUTPATIENT
Start: 2020-10-08 | End: 2020-10-16 | Stop reason: HOSPADM

## 2020-10-08 RX ORDER — CHLORHEXIDINE GLUCONATE 0.12 MG/ML
15 RINSE ORAL 2 TIMES DAILY
Status: DISCONTINUED | OUTPATIENT
Start: 2020-10-08 | End: 2020-10-08

## 2020-10-08 RX ORDER — OXYCODONE HYDROCHLORIDE 10 MG/1
10 TABLET ORAL EVERY 4 HOURS PRN
Status: DISCONTINUED | OUTPATIENT
Start: 2020-10-08 | End: 2020-10-16 | Stop reason: HOSPADM

## 2020-10-08 RX ORDER — OXYCODONE HYDROCHLORIDE 5 MG/1
5 TABLET ORAL EVERY 4 HOURS PRN
Status: DISCONTINUED | OUTPATIENT
Start: 2020-10-08 | End: 2020-10-16 | Stop reason: HOSPADM

## 2020-10-08 RX ORDER — POLYETHYLENE GLYCOL 3350 17 G/17G
17 POWDER, FOR SOLUTION ORAL DAILY
Status: DISCONTINUED | OUTPATIENT
Start: 2020-10-08 | End: 2020-10-10

## 2020-10-08 RX ORDER — SENNA AND DOCUSATE SODIUM 50; 8.6 MG/1; MG/1
2 TABLET, FILM COATED ORAL DAILY
Status: DISCONTINUED | OUTPATIENT
Start: 2020-10-08 | End: 2020-10-10

## 2020-10-08 RX ADMIN — ACETAMINOPHEN 650 MG: 325 TABLET, FILM COATED ORAL at 21:05

## 2020-10-08 RX ADMIN — ACETAMINOPHEN 650 MG: 325 TABLET, FILM COATED ORAL at 06:22

## 2020-10-08 RX ADMIN — PIPERACILLIN AND TAZOBACTAM 3.38 G: 3; .375 INJECTION, POWDER, FOR SOLUTION INTRAVENOUS at 23:43

## 2020-10-08 RX ADMIN — Medication 175 MCG/HR: at 00:27

## 2020-10-08 RX ADMIN — VANCOMYCIN HYDROCHLORIDE 2000 MG: 10 INJECTION, POWDER, LYOPHILIZED, FOR SOLUTION INTRAVENOUS at 09:11

## 2020-10-08 RX ADMIN — OXYCODONE HYDROCHLORIDE 10 MG: 10 TABLET ORAL at 23:44

## 2020-10-08 RX ADMIN — OXYCODONE HYDROCHLORIDE 10 MG: 10 TABLET ORAL at 19:02

## 2020-10-08 RX ADMIN — Medication 10 ML: at 21:05

## 2020-10-08 RX ADMIN — FAMOTIDINE 20 MG: 20 TABLET ORAL at 09:03

## 2020-10-08 RX ADMIN — Medication 10 ML: at 09:08

## 2020-10-08 RX ADMIN — SODIUM CHLORIDE, POTASSIUM CHLORIDE, SODIUM LACTATE AND CALCIUM CHLORIDE: 600; 310; 30; 20 INJECTION, SOLUTION INTRAVENOUS at 06:42

## 2020-10-08 RX ADMIN — Medication 175 MCG/HR: at 05:28

## 2020-10-08 RX ADMIN — PIPERACILLIN AND TAZOBACTAM 3.38 G: 3; .375 INJECTION, POWDER, FOR SOLUTION INTRAVENOUS at 00:20

## 2020-10-08 RX ADMIN — PIPERACILLIN AND TAZOBACTAM 3.38 G: 3; .375 INJECTION, POWDER, FOR SOLUTION INTRAVENOUS at 16:52

## 2020-10-08 RX ADMIN — ACETAMINOPHEN 650 MG: 325 TABLET, FILM COATED ORAL at 14:22

## 2020-10-08 RX ADMIN — FAMOTIDINE 20 MG: 20 TABLET ORAL at 20:59

## 2020-10-08 RX ADMIN — OXYCODONE HYDROCHLORIDE 10 MG: 10 TABLET ORAL at 09:23

## 2020-10-08 RX ADMIN — DOCUSATE SODIUM 50 MG AND SENNOSIDES 8.6 MG 2 TABLET: 8.6; 5 TABLET, FILM COATED ORAL at 09:03

## 2020-10-08 RX ADMIN — SODIUM CHLORIDE: 9 INJECTION, SOLUTION INTRAVENOUS at 04:13

## 2020-10-08 RX ADMIN — SODIUM CHLORIDE: 9 INJECTION, SOLUTION INTRAVENOUS at 13:14

## 2020-10-08 RX ADMIN — SODIUM CHLORIDE: 9 INJECTION, SOLUTION INTRAVENOUS at 21:06

## 2020-10-08 RX ADMIN — OXYCODONE HYDROCHLORIDE 10 MG: 10 TABLET ORAL at 13:44

## 2020-10-08 RX ADMIN — POLYETHYLENE GLYCOL 3350 17 G: 17 POWDER, FOR SOLUTION ORAL at 09:21

## 2020-10-08 RX ADMIN — PROPOFOL 45 MCG/KG/MIN: 10 INJECTION, EMULSION INTRAVENOUS at 04:05

## 2020-10-08 RX ADMIN — PIPERACILLIN AND TAZOBACTAM 3.38 G: 3; .375 INJECTION, POWDER, FOR SOLUTION INTRAVENOUS at 08:23

## 2020-10-08 RX ADMIN — ENOXAPARIN SODIUM 40 MG: 40 INJECTION SUBCUTANEOUS at 09:03

## 2020-10-08 ASSESSMENT — PAIN DESCRIPTION - ORIENTATION
ORIENTATION: LEFT
ORIENTATION: LEFT

## 2020-10-08 ASSESSMENT — PAIN DESCRIPTION - PROGRESSION
CLINICAL_PROGRESSION: NOT CHANGED

## 2020-10-08 ASSESSMENT — PAIN DESCRIPTION - PAIN TYPE
TYPE: ACUTE PAIN
TYPE: ACUTE PAIN;SURGICAL PAIN

## 2020-10-08 ASSESSMENT — PULMONARY FUNCTION TESTS
PIF_VALUE: 18
PIF_VALUE: 16
PIF_VALUE: 21
PIF_VALUE: 22
PIF_VALUE: 21
PIF_VALUE: 23
PIF_VALUE: 19
PIF_VALUE: 22

## 2020-10-08 ASSESSMENT — PAIN DESCRIPTION - LOCATION
LOCATION: ARM
LOCATION: ARM

## 2020-10-08 ASSESSMENT — PAIN SCALES - GENERAL
PAINLEVEL_OUTOF10: 7
PAINLEVEL_OUTOF10: 0
PAINLEVEL_OUTOF10: 7
PAINLEVEL_OUTOF10: 6
PAINLEVEL_OUTOF10: 8

## 2020-10-08 NOTE — OP NOTE
[address]                  [address] [place],  [facility]                                OPERATIVE REPORT    PATIENT NAME: Delio Apple                  :        1980  MED REC NO:   [id number]                            ROOM:       [facility]  ACCOUNT NO:   [de-identified]                           ADMIT DATE: 10/07/2020  PROVIDER:     Immanuel Ortiz DO    DATE OF PROCEDURE:  10/07/2020    PREOPERATIVE DIAGNOSIS:  Infected ruptured mycotic brachial artery  aneurysm. POSTOPERATIVE DIAGNOSIS:  Infected ruptured mycotic brachial artery  aneurysm. PROCEDURE:  Left arm brachial artery exploration, excision of mycotic  aneurysm with reverse basilic vein jump graft. SURGEON:  Peggy Lai MD    ASSISTANT:  Immanuel Ortiz DO, PGY-4    ANESTHESIA:  General.    ESTIMATED BLOOD LOSS:  Less than 100 mL. COMPLICATIONS:  None. HISTORY:  The patient is a 25-year-old male [facility] erythema and fluctuance over his  left antecubital fossa. He was taken to the operating room by General  Surgery over there and had incision and drainage. There was some  concern that it involved the joint. Orthopedics was consulted  intraoperatively and they came to examine at that point. They dislodged  the arterial plug, which caused significant amount of bleeding. Bleeding was controlled with vessel loops up at [facility]. Vascular  was consulted at that time and transferred down to [facility]. The  patient arrived in SICU, intubated and sedated. He would wake up and  follow commands, but he emergently was taken to the operating room with  Vascular Surgery for control of the bleeding. DESCRIPTION OF PROCEDURE:  The patient was brought to the operating room  and placed supine on the operating table. SCDs were placed on the  patient's lower extremities and were functioning. Preoperative  antibiotics, 2 g of vancomycin was given prior to incision. General  anesthesia was obtained as per the anesthesia record. The left upper  extremity and the right thigh were prepped with Betadine, draped in the  usual sterile fashion prior to incision. Time-out was called and the  surgical checklist was reviewed and agreed upon by all present. Initially, the previous dressing was taken down prior to prepping the  patient. There were hemostats and vessel loops around the brachial  artery, which was controlling the bleeding and the dressing was taken  down. There was some oozing. Pressure was held with sterile gloves and  the arm was prepped and draped in sterile fashion. Initially, a  hemostat was placed over the proximal area of the brachial artery to  control the bleeding. Next, an incision was made more proximally along  the upper arm and the medial aspect, through the skin down, carried down  through the subcutaneous tissue to identify healthy brachial artery. Upon dissection, the basilic vein was encountered. It was noticed to be  ligated from the previous incision that was performed at [address] [place] [address]. Branches of the basilic vein were isolated and ligated with silk ties. The vein was healthy and looked viable for grafting. Dissection  continued medially until the brachial artery was encountered. Brachial nerve was isolated and had to be retracted for good exposure of  the brachial artery. The brachial artery was controlled with vessel  loops proximally. Dissection was continued along the brachial artery  distally until the area of necrosis and rupture were encountered. Once  the brachial artery was completely exposed and controlled, distal  exposure was then obtained. Again, the incision had to be elongated  distally down the forearm.   This was done through the skin and carried  down through the subcutaneous tissue until the bifurcation of the radial  and ulnar artery was encountered. The radial and the ulnar arteries  were isolated and controlled with vessel loops. Once proximal and  distal control was obtained, The patient was heparinized, and decision was made to resect the infected  mycotic aneurysm and performed a jump graft with reverse basilic vein. The infected aneurysm was resected proximally on healthy tissue and  distally on healthy tissue, which was just proximal to the bifurcation  of the ulnar and radial artery. This was removed in its entirety, sent  off for culture and to pathology. Next, the necrotic tissue in the  antecubital fossa and the subcutaneous tissue around the infected  aneurysm was debrided with cautery and curettes. Some the muscle and  fascia had to be debrided as well. Debridement was performed until 100%  of the necrotic tissue was removed. Next, the basilic vein was isolated  and dissected more proximal to obtain adequate length for jump grafting. Branches of the basilic vein were ligated with silk sutures with silk  ties. Once adequate length was obtained, the vein was transected and  tied off with a silk tie. The basilic vein was reversed to ensure the  flow was not against the valve. Initially, the proximal anastomosis was  performed and end-to-end anastomosis was performed with a 6-0 Prolene  running suture. Once the anastomosis was complete, flow was released  with good flow through the anastomosis down through the graft itself. Next, the distal anastomosis was performed in the same fashion with a  6-0 Prolene running suture. Once this was completed, flow was released  proximally and distally. Doppler was used. There was great flow  through the graft. The radial pulse was palpable as well at this time. After this was performed, protamine was given and hemostasis was  achieved with cautery due to how edematous the arm was. A muscle flap of biceps  brachia was used to cover the graft itself.   This was sutured with 2-0  Vicryl sutures. Most of the graft was able to be covered with this, but  no further closure was able to be performed. Decision was made to place  a wound vac. The top white sponge was placed in the wound bed and then  black sponge over the top. Skin was prepped with all care. The wound  vac was applied with good seal.  Needle, sponge and instrument count was  correct x2. An x-ray had to be obtained of the left forearm due to  loops and [facility] not in the count, there was no  leftover instruments in the arm. Dr. Fadi Kyle was present and scrubbed  for the entire procedure. DISPOSITION:  The patient was left intubated and taken back to the  surgical intensive care for further monitoring. Plan is for a wound vac  change on Friday. The patient does not need to be on a heparin drip.         Vince Sidhu DO    D: 10/07/2020 22:54:53       T: 10/07/2020 23:01:33     [email or web address]  Job#: [id number]     Doc#: [id number]    CC:

## 2020-10-08 NOTE — CARE COORDINATION
Pt transferred [facility] for vascular surgery consult. S/p L brachial artery exploration, repair of mycotic aneurysm with bypass. Extubated this am, currently on 3L o2. Wound vac to L arm, Iv abx are Zosyn q8 and vanc q12. Met with Latrice Duarte and his mom in room. Pt lives with mom and brother in a [facility] style home. He is independent in adl's. He does not have a PCP. He would be agreeable to establishing with a [address] in [facility] [facility]. Will need to follow plan of care to determine estimated discharge date to arrange for an appt. He has hx of [facility] and will use them again if needed. Spoke with Lu Moss with [facility] to follow re: wound vac. Plan is to return home at discharge. Mom or brother will transport him home. Will await plans re: wound care and atb's at discharge.

## 2020-10-08 NOTE — HOME CARE
Referral received for home care with IV infusion. IV coverage is 100%. Attempted to reach patient and his mother on cell with no answer. Will try to reach patient and/or mother again to verify demos and cost. Will await final plan/orders. Rodo Burton LPN [address] [facility].

## 2020-10-08 NOTE — PLAN OF CARE
Problem: Restraint Use - Nonviolent/Non-Self-Destructive Behavior:  Goal: Absence of restraint-related injury  Description: Absence of restraint-related injury  10/8/2020 0223 by Waqas Rhodes  Outcome: Met This Shift     Problem: Restraint Use - Nonviolent/Non-Self-Destructive Behavior:  Goal: Absence of restraint indications  Description: Absence of restraint indications  10/8/2020 0223 by Waqas Rhodes  Outcome: Not Met This Shift

## 2020-10-08 NOTE — FLOWSHEET NOTE
Patient will attempt to pull at ETT, wound vac dressing, OG tube, and wound vac dressing despite verbal redirection, reorientation, and education. Bilateral soft wrist restraints applied. Will continue to assess for earliest possible opportunity to discontinue.

## 2020-10-08 NOTE — PROGRESS NOTES
Pharmacy Consultation Note  (Antibiotic Dosing and Monitoring)    Initial consult date: 10/7/20  Consulting physician: Dr. Lucy Suresh  Drug(s): Vancomycin   Indication: Infected pseudoaneurysm s/p exploration with bypass    Ht Readings from Last 1 Encounters:   10/07/20 6' 4\" (1.93 m)     Wt Readings from Last 1 Encounters:   10/07/20 194 lb 8 oz (88.2 kg)         Age/  Gender IBW DW  Allergy Information   [address] [facility] y.o. male 86.8 kg 88.2 kg  Patient has no known allergies. Date  WBC BUN/CR Drug/Dose Time   Given Level(s)   (Time) Comments   10/7  (#1) 11.0 9/0.8 Vancomycin 2,000 mg IV x 1 dose    Vancomycin 2,000 mg IV x1 dose 1238    1944     10/8  (#2) 10.4 8/0.6 Vancomycin 2000 mg IV q12h <0900> Vanco trough @      (#3)           (#4)           (#5)           (#6)           (#7)           Estimated Creatinine Clearance: 203 mL/min (A) (based on SCr of 0.6 mg/dL (L)). Intake/Output Summary (Last 24 hours) at 10/8/2020 0752  Last data filed at 10/8/2020 0700  Gross per 24 hour   Intake 2953 ml   Output 2575 ml   Net 378 ml     Urine output over the last 24 hours: incomplete documentation (2375 mL documented yesterday)    Diuretics ordered in the last 24 hours: N/A      Temp max: Temp (24hrs), Av °F (37.2 °C), Min:97.2 °F (36.2 °C), Max:102.6 °F (39.2 °C)      Antibiotic Regimen:  Antibiotic Dose Date Initiated   Pip/tazo 3.375 gm IV q8h 10/7     Cultures:  available culture and sensitivity results were reviewed in [facility]  10/7 Blood cx's - Pending  10/7 Surgical cx (abscess) - Pending  10/7 Surgical cx (L arm tissue abscess) - Pending  10/7 Surgical cx (Mycotic Brachial Artery Aneurysm) - Pending  10/7 MRSA Screen - Pending    Assessment:  · [address] [facility] yo M with a history of IV drug use admitted to [address] [place] [address] for presumed L arm abscess s/p incision and drainage of LUE with brachial artery exposure with control of hemorrhage and pseudoaneurysm and application of a LUE tourniquet with general surgery.   Pt transferred to [facility] for infected ruptured mycotic brachial artery s/p exploration and repair of mycotic aneurysm with bypass by Vascular Surgery on 10/7  · Empiric antibiotics initiated - Piperacillin/tazobactam and Vancomycin day #2  · ID consulted  · Consulted by Dr. Lockhart Begun to dose/monitor Vancomycin  · Goal trough level:  15-20 mcg/mL  · SCr 0.6 today; CrCl >100 mL/min    Plan:  · Ordered Vancomycin 2000 mg IV q12h  · Will order a Vancomycin trough level with the 2100 dose tonight and will adjust dose as needed  · Will monitor renal function closely      Will continue to follow.      Lexus JohnsonD, [facility], [facility]  10/8/2020  10:56 AM  Pager: [phone]

## 2020-10-08 NOTE — PROGRESS NOTES
[facility]  SURGICAL INTENSIVE CARE UNIT    CRITICAL CARE ATTENDING PROGRESS NOTE    I have examined the patient, reviewed the record, and discussed the case with the APN/  Resident. I have reviewed all relevant labs and imaging data. Please refer to the  APN/ resident's note. I agree with the  assessment and plan with the following corrections/ additions. The following summarizes my clinical findings and independent assessment. CC: Critical care for infected brachial pseudoaneurysm    S. Patient was extubated this a.m. HOSPITAL COURSE:  10/7 transferred from [facility] left arm brachial artery exploration, repair of mycotic aneurysm with bypass by Dr. Ruiz Owen:  Vitals:    10/08/20 0800   BP: 137/86   Pulse: 94   Resp: 17   Temp: 98.3 °F (36.8 °C)   SpO2: 100%     GCS 15  Flat affect  Left arm in splint, arm dressed, 2+ radial pulse  Breathing comfortably  Abdomen soft nontender nondistended      ASSESSMENT:  Active Problems:    Pseudoaneurysm of brachial artery (HCC)    Pseudoaneurysm (HCC)  Resolved Problems:    * No resolved hospital problems. *       PLAN:  Acute respiratory insufficiency-status post extubation 10/8  Monitor respiratory status    Infected pseudoaneurysm status post exploration, with bypass on 10/7  Wound VAC change on Friday with Dr. Antonio Barclay    Oral analgesia and PRN IV Dilaudid for pain    GI: Start diet    ID- on Zosyn and vancomycin. Cultures pending. ID has been consulted for management    FEN; stop IV fluids. Excellent urine output  remove [place] catheter    Endo: Monitor Blood Sugars. Target blood glucose less than 180 in the ICU.       DVT prophylaxis--SCDS, Lovenox  GI Prophylaxis--on diet  Lines--right femoral triple-lumen catheter  CODE: FULL     DISPOSITION-Continue ICU Care    Critical care time exclusive of teaching and procedures = 35 min     Pt needs continuous ICU monitoring because the patient is at risk for deterioration from a sepsis standpoint.     Isadora Pozo MD, FACS  10/8/2020  8:41 AM

## 2020-10-08 NOTE — PROGRESS NOTES
Wound care: Wound vac right arm per surgery. Orders placed for wound vac management. Care plan and education updated. Surgery for wound vac management. Dietary consult.  Gerri Noyola

## 2020-10-08 NOTE — H&P
INTERNAL MEDICINE HISTORY AND PHYSICAL EXAM     CHIEF COMPLAINT:   No chief complaint on file. HISTORY OF PRESENTING ILLNESS:   81-year-old male patient who presented to [facility] with a left forearm abscess, that was progressively worsening over the last few days, patient has history of IV drug abuse. He was taken to the OR for I&D, apparently there was infected brachial pseudoaneurysm, therefore after surgical intervention pulsatile arterial bleeding was noted from the wound bed, proximal distal control obtained with Vesseloops, he was transferred to [facility], for further management. After coming to the [facility] patient was taken by vascular surgery to the operation theater. Left arm brachial artery exploration was done and repair of mycotic aneurysm with bypass was done by vascular surgery, postoperatively patient is still intubated and is being admitted to surgical ICU. Surgery evaluated the patient for ongoing critical care management. PAST MEDICAL HISTORY  No past medical history on file. PAST SURGICAL HISTORY  Past Surgical History:   Procedure Laterality Date    INCISION AND DRAINAGE Right 4/23/2020    RIGHT UPPER EXTREMITY INCISION AND DRAINAGE REMOVAL FOREIGN BODY WITH C-ARM performed by Shanelle Bailey MD at [address] [place] [address] TRANSESOPHAGEAL ECHOCARDIOGRAM N/A 4/24/2020    TRANSESOPHAGEAL ECHOCARDIOGRAM performed by Jose Luis Lackey MD at [place] [address]  No family history on file. SOCIAL HISTORY   reports that he has been smoking cigarettes. He has been smoking about 1.00 pack per day. He has never used smokeless tobacco. He reports previous alcohol use. He reports current drug use. Drug: IV.      MEDICATIONS   No medications prior to admission.   Current Facility-Administered Medications   Medication Dose Route Frequency Provider Last Rate Last Dose    fentaNYL 5 mcg/ml in 0.9% NS 250 ml infusion  25 mcg/hr Intravenous Continuous Clerance MD Flavia 35 mL/hr at 10/08/20 0027 175 mcg/hr at 10/08/20 0027    chlorhexidine (PERIDEX) 0.12 % solution 15 mL  15 mL Mouth/Throat BID Cici Alejandro MD        sodium chloride flush 0.9 % injection 10 mL  10 mL Intravenous 2 times per day Cici Alejandro MD   10 mL at 10/07/20 2331    sodium chloride flush 0.9 % injection 10 mL  10 mL Intravenous PRN Abdulazizrance MD Flavia        ondansetron (ZOFRAN-ODT) disintegrating tablet 4 mg  4 mg Oral Q8H PRN Abdulazizrance MD Flavia        Or    ondansetron [facility]) injection 4 mg  4 mg Intravenous Q6H PRN Clerance Pour, MD        lactated ringers infusion   Intravenous Continuous Clerance MD Flavia 125 mL/hr at 10/07/20 2330      famotidine (PEPCID) tablet 20 mg  20 mg Oral BID Abdulazizrance MD Flavia   20 mg at 10/07/20 2345    enoxaparin (LOVENOX) injection 40 mg  40 mg Subcutaneous Daily Cici Alejandro MD        propofol injection  10 mcg/kg/min Intravenous Titrated Cici Alejandro MD 23.8 mL/hr at 10/08/20 0405 45 mcg/kg/min at 10/08/20 0405    piperacillin-tazobactam (ZOSYN) 3.375 g in dextrose 5 % 100 mL IVPB extended infusion (mini-bag)  3.375 g Intravenous Q8H Cici Alejandro MD   Stopped at 10/08/20 0413    0.9 % sodium chloride (ZOSYN FLUSH)   Intravenous Q8H Cici Alejandro MD 12.5 mL/hr at 10/08/20 0413       Prior to Admission medications    Not on File       ALLERGIES   Patient has no known allergies. REVIEW OF SYSTEMS:  12 point review of system was done in detail with the patient and is negative except as above in HPI. PHYSICAL EXAM:  VS: BP (!) 97/58   Pulse 75   Temp 98.2 °F (36.8 °C) (Axillary)   Resp 16   Ht 6' 4\" (1.93 m)   Wt 194 lb 8 oz (88.2 kg)   SpO2 99%   BMI 23.68 kg/m²   General Appearance: Patient intubated and sedated.   HEENT: normocephalic and atraumatic, pupils equal, round, and reactive to light, Ssupple and non-tender without mass, no thyromegaly   Cardiovascular: normal rate, regular rhythm, normal S1 and S2, no murmurs, rubs, clicks, or gallops, distal pulses intact, no carotid bruits, no JVD  Pulmonary/Chest: clear to auscultation bilaterally- no wheezes, rales or rhonchi, normal air movement, no respiratory distress  Abdomen: soft, non-tender, non-distended, normal bowel sounds, no masses   Extremities: Left upper extremity wrapped in surgical dressing, drain in place. Musculoskeletal: normal range of motion, no joint swelling, deformity or tenderness  Neurological: Patient intubated, full neurological examination cannot be done due to clinical condition.     LABS:  Recent Results (from the past 24 hour(s))   CBC Auto Differential    Collection Time: 10/07/20 11:29 AM   Result Value Ref Range    WBC 11.0 4.5 - 11.5 E9/L    RBC 5.28 3.80 - 5.80 E12/L    Hemoglobin 13.5 12.5 - 16.5 g/dL    Hematocrit 40.6 37.0 - 54.0 %    MCV 76.9 (L) 80.0 - 99.9 fL    MCH 25.6 (L) 26.0 - 35.0 pg    MCHC 33.3 32.0 - 34.5 %    RDW 15.0 11.5 - 15.0 fL    Platelets 969 734 - 483 E9/L    MPV 8.3 7.0 - 12.0 fL    Neutrophils % 80.4 (H) 43.0 - 80.0 %    Immature Granulocytes % 0.4 0.0 - 5.0 %    Lymphocytes % 11.2 (L) 20.0 - 42.0 %    Monocytes % 7.1 2.0 - 12.0 %    Eosinophils % 0.5 0.0 - 6.0 %    Basophils % 0.4 0.0 - 2.0 %    Neutrophils Absolute 8.87 (H) 1.80 - 7.30 E9/L    Immature Granulocytes # 0.04 E9/L    Lymphocytes Absolute 1.23 (L) 1.50 - 4.00 E9/L    Monocytes Absolute 0.78 0.10 - 0.95 E9/L    Eosinophils Absolute 0.05 0.05 - 0.50 E9/L    Basophils Absolute 0.04 0.00 - 0.20 E9/L   Comprehensive Metabolic Panel    Collection Time: 10/07/20 11:29 AM   Result Value Ref Range    Sodium 137 132 - 146 mmol/L    Potassium 4.8 3.5 - 5.0 mmol/L    Chloride 98 98 - 107 mmol/L    CO2 24 22 - 29 mmol/L    Anion Gap 15 7 - 16 mmol/L    Glucose 99 74 - 99 mg/dL    BUN 9 6 - 20 mg/dL    CREATININE 0.8 0.7 - 1.2 mg/dL    GFR Non-African American >60 >=60 mL/min/1.73    GFR African American >60     Calcium 9.7 8.6 - 10.2 mg/dL    Total Protein 7.9 6.4 - 8.3 g/dL    Alb 3.7 3.5 - 5.2 g/dL    Total Bilirubin 0.4 0.0 - 1.2 mg/dL    Alkaline Phosphatase 78 40 - 129 U/L    ALT 12 0 - 40 U/L    AST 10 0 - 39 U/L   Sedimentation Rate    Collection Time: 10/07/20 11:29 AM   Result Value Ref Range    Sed Rate 48 (H) 0 - 15 mm/Hr   C-Reactive Protein    Collection Time: 10/07/20 11:29 AM   Result Value Ref Range    CRP 17.6 (H) 0.0 - 0.4 mg/dL   Lactic Acid, Plasma    Collection Time: 10/07/20 11:29 AM   Result Value Ref Range    Lactic Acid 0.8 0.5 - 2.2 mmol/L   TYPE AND SCREEN    Collection Time: 10/07/20  7:15 PM   Result Value Ref Range    ABO/Rh O POS     Antibody Screen NEG    Comprehensive Metabolic Panel    Collection Time: 10/07/20  7:15 PM   Result Value Ref Range    Sodium 133 132 - 146 mmol/L    Potassium 4.1 3.5 - 5.0 mmol/L    Chloride 97 (L) 98 - 107 mmol/L    CO2 25 22 - 29 mmol/L    Anion Gap 11 7 - 16 mmol/L    Glucose 106 (H) 74 - 99 mg/dL    BUN 8 6 - 20 mg/dL    CREATININE 0.8 0.7 - 1.2 mg/dL    GFR Non-African American >60 >=60 mL/min/1.73    GFR African American >60     Calcium 8.7 8.6 - 10.2 mg/dL    Total Protein 6.4 6.4 - 8.3 g/dL    Alb 3.4 (L) 3.5 - 5.2 g/dL    Total Bilirubin 0.3 0.0 - 1.2 mg/dL    Alkaline Phosphatase 61 40 - 129 U/L    ALT 9 0 - 40 U/L    AST 8 0 - 39 U/L   Lactic Acid, Plasma    Collection Time: 10/07/20  7:15 PM   Result Value Ref Range    Lactic Acid 0.7 0.5 - 2.2 mmol/L   Magnesium    Collection Time: 10/07/20  7:15 PM   Result Value Ref Range    Magnesium 1.7 1.6 - 2.6 mg/dL   Phosphorus    Collection Time: 10/07/20  7:15 PM   Result Value Ref Range    Phosphorus 3.7 2.5 - 4.5 mg/dL   Calcium, Ionized    Collection Time: 10/07/20  7:15 PM   Result Value Ref Range    Calcium, Ion 1.21 1.15 - 1.33 mmol/L   TEG lab test    Collection Time: 10/07/20  7:15 PM   Result Value Ref Range    R (Reaction Time) 7.5 5.0 - 10.0 min    K (Clotting Time) 1.2 1.0 - 3.0 min    Angle (Clot Strength) 73.7 59.0 - 74.0 degree    MA (Max Amplitude) 74.3 (H) 50.0 - 70.0 mm    G-TEG 14.5 (H) 4.5 - 11.0 K d/sc    EPL-TEG 1.4 0.0 - 15.0 %    LY30 (Fibrinolysis) 1.4 0.0 - 8.0 %   CBC Auto Differential    Collection Time: 10/07/20  7:15 PM   Result Value Ref Range    WBC 13.5 (H) 4.5 - 11.5 E9/L    RBC 4.41 3.80 - 5.80 E12/L    Hemoglobin 11.2 (L) 12.5 - 16.5 g/dL    Hematocrit 34.1 (L) 37.0 - 54.0 %    MCV 77.3 (L) 80.0 - 99.9 fL    MCH 25.4 (L) 26.0 - 35.0 pg    MCHC 32.8 32.0 - 34.5 %    RDW 15.0 11.5 - 15.0 fL    Platelets 831 050 - 794 E9/L    MPV 8.6 7.0 - 12.0 fL    Neutrophils % 84.3 (H) 43.0 - 80.0 %    Immature Granulocytes % 0.5 0.0 - 5.0 %    Lymphocytes % 8.1 (L) 20.0 - 42.0 %    Monocytes % 6.7 2.0 - 12.0 %    Eosinophils % 0.1 0.0 - 6.0 %    Basophils % 0.3 0.0 - 2.0 %    Neutrophils Absolute 11.41 (H) 1.80 - 7.30 E9/L    Immature Granulocytes # 0.07 E9/L    Lymphocytes Absolute 1.10 (L) 1.50 - 4.00 E9/L    Monocytes Absolute 0.91 0.10 - 0.95 E9/L    Eosinophils Absolute 0.01 (L) 0.05 - 0.50 E9/L    Basophils Absolute 0.04 0.00 - 0.20 E9/L   PREPARE RBC (CROSSMATCH)    Collection Time: 10/07/20  7:15 PM   Result Value Ref Range    Product Code Blood Bank [id number]     Description Blood Bank Red Blood Cells, Apheresis, Leuko-reduced     Unit Number [id number]     Dispense Status Blood Bank released    CBC Auto Differential    Collection Time: 10/07/20 11:15 PM   Result Value Ref Range    WBC 11.3 4.5 - 11.5 E9/L    RBC 4.12 3.80 - 5.80 E12/L    Hemoglobin 10.4 (L) 12.5 - 16.5 g/dL    Hematocrit 32.9 (L) 37.0 - 54.0 %    MCV 79.9 (L) 80.0 - 99.9 fL    MCH 25.2 (L) 26.0 - 35.0 pg    MCHC 31.6 (L) 32.0 - 34.5 %    RDW 14.9 11.5 - 15.0 fL    Platelets 387 009 - 141 E9/L    MPV 8.8 7.0 - 12.0 fL    Neutrophils % 88.1 (H) 43.0 - 80.0 %    Immature Granulocytes % 0.6 0.0 - 5.0 %    Lymphocytes % 7.0 (L) 20.0 - 42.0 %    Monocytes % 4.1 2.0 - 12.0 %    Eosinophils % 0.0 0.0 - 6.0 %    Basophils % 0.2 0.0 - 2.0 %    Neutrophils Absolute 9.98 (H) 1.80 - 7.30 E9/L    Immature Granulocytes # 0.07 E9/L    Lymphocytes Absolute 0.79 (L) 1.50 - 4.00 E9/L    Monocytes Absolute 0.46 0.10 - 0.95 E9/L    Eosinophils Absolute 0.00 (L) 0.05 - 0.50 E9/L    Basophils Absolute 0.02 0.00 - 0.20 E9/L   Phosphorus    Collection Time: 10/07/20 11:15 PM   Result Value Ref Range    Phosphorus 2.5 2.5 - 4.5 mg/dL   Magnesium    Collection Time: 10/07/20 11:15 PM   Result Value Ref Range    Magnesium 1.6 1.6 - 2.6 mg/dL   Comprehensive Metabolic Panel    Collection Time: 10/07/20 11:15 PM   Result Value Ref Range    Sodium 136 132 - 146 mmol/L    Potassium 4.1 3.5 - 5.0 mmol/L    Chloride 104 98 - 107 mmol/L    CO2 21 (L) 22 - 29 mmol/L    Anion Gap 11 7 - 16 mmol/L    Glucose 123 (H) 74 - 99 mg/dL    BUN 9 6 - 20 mg/dL    CREATININE 0.6 (L) 0.7 - 1.2 mg/dL    GFR Non-African American >60 >=60 mL/min/1.73    GFR African American >60     Calcium 8.0 (L) 8.6 - 10.2 mg/dL    Total Protein 5.9 (L) 6.4 - 8.3 g/dL    Alb 3.0 (L) 3.5 - 5.2 g/dL    Total Bilirubin 0.5 0.0 - 1.2 mg/dL    Alkaline Phosphatase 59 40 - 129 U/L    ALT 8 0 - 40 U/L    AST 8 0 - 39 U/L   Lactic Acid, Plasma    Collection Time: 10/07/20 11:15 PM   Result Value Ref Range    Lactic Acid 0.6 0.5 - 2.2 mmol/L   Blood Gas, Arterial    Collection Time: 10/08/20 12:22 AM   Result Value Ref Range    Date Analyzed [id number]     Time Analyzed 0022     Source: Blood Arterial     pH, Blood Gas 7.364 7.350 - 7.450    PCO2 35.2 35.0 - 45.0 mmHg    PO2 164.3 (H) 75.0 - 100.0 mmHg    HCO3 19.6 (L) 22.0 - 26.0 mmol/L    B.E. -5.1 (L) -3.0 - 3.0 mmol/L    O2 Sat 99.1 (H) 92.0 - 98.5 %    PO2/FIO2 3.29 mmHg/%    AaDO2 140.1 mmHg    RI(T) 0.85     O2Hb 97.8 (H) 94.0 - 97.0 %    COHb 0.3 0.0 - 1.5 %    MetHb 0.3 0.0 - 1.5 %    HHb 1.6 0.0 - 5.0 %    tHb (est) 11.1 (L) 11.5 - 16.5 g/dL    Mode AC     FIO2 50.0 %    Rr Mechanical 16.0 b/min    Vt Mechanical 500.0 mL    Peep/Cpap 8.0 cmH2O Date Of Collection      Time Collected      Pt Temp 37.0 C     ID [id number]     Lab [id number]     Critical(s) Notified . No Critical Values    Calcium, Ionized    Collection Time: 10/08/20 12:35 AM   Result Value Ref Range    Calcium, Ion 1.22 1.15 - 1.33 mmol/L       RADIOLOGY:  Xr Elbow Left (2 Views)    Result Date: 10/7/2020  EXAMINATION: TWO XRAY VIEWS OF THE LEFT ELBOW 10/7/2020 11:49 am COMPARISON: None. HISTORY: ORDERING SYSTEM PROVIDED HISTORY: Pain TECHNOLOGIST PROVIDED HISTORY: Reason for exam:->Pain FINDINGS: There is marked volar and medial elbow soft tissue swelling with 4 thin linear radiopaque foreign bodies within the soft tissues measuring up to 8 mm in length. There is no elbow joint effusion. There is no acute fracture or acute osseous abnormality identified. Marked medial and volar elbow soft tissue swelling with 4 thin linear radiopaque foreign bodies. Xr Radius Ulna Left (2 Views)    Result Date: 10/7/2020  EXAMINATION: TWO XRAY VIEWS OF THE LEFT FOREARM 10/7/2020 9:17 pm COMPARISON: None. HISTORY: ORDERING SYSTEM PROVIDED HISTORY: foreign body, AP view only per DR TECHNOLOGIST PROVIDED HISTORY: Reason for exam:->foreign body, AP view only per DR What reading provider will be dictating this exam?->CRC FINDINGS: Single view of the left forearm shows no evidence of fracture. Postsurgical changes are seen at level of the elbow. No evidence of fracture involving single view of the left forearm. ASSESSMENT AND PLAN  Active Problems:    Pseudoaneurysm of brachial artery (HCC)    Pseudoaneurysm (HCC)  Resolved Problems:    * No resolved hospital problems. *    Impression   1. 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  Ventilator dependent, patient was not extubated postoperatively  4. History of IV drug abuse    Plan  · Continue postoperative critical care as per surgery recommendation  · Continue Zosyn, will add vancomycin, will consult infectious disease  · Continue supportive care  · Incoming hospitalist to follow-up    DVT prophylaxis: Subcutaneous heparin  CODE STATUS: Patient is a full code  Discharge plan: Patient can be discharged next 3 to 4 days to home with and without home health.,  Pending clinical improvement, pending work-up and clearance by consulting services. Please note that over 35 minutes was spent in evaluating the patient, review of records and results, discussion with staff/family, etc.    Esthela Lieberman MD  [facility]   [phone]    NOTE: This report was transcribed using voice recognition software. Every effort was made to ensure accuracy; however, inadvertent computerized transcription errors may be present.

## 2020-10-08 NOTE — BRIEF OP NOTE
Brief Postoperative Note      Patient: Wanda Tyler  YOB: 1980  MRN: [id number]    Date of Procedure: 10/7/2020    Pre-Op Diagnosis: infected ruptured mycotic brachial artery aneurysm     Post-Op Diagnosis: Same       Procedure(s):  LEFT ARM BRACHIAL ARTERY EXPLORATION, REPAIR OF MYCOTIC ANUERYSM WITH BYPASS    Surgeon(s):  Ricky Kay MD    Assistant:  Resident: Carrol Henry DO    Anesthesia: General    Estimated Blood Loss (mL): less than 078     Complications: None    Specimens:   ID Type Source Tests Collected by Time Destination   1 : Mycotic Brachial Artery Aneurysm  Specimen Arm CULTURE, ANAEROBIC, CULTURE, FUNGUS, GRAM STAIN, CULTURE, SURGICAL, CULTURE WITH SMEAR, ACID FAST Zelda Glass MD 10/7/2020 2025    A : Mycotic Brachial Artery Aneurysm  Specimen Arm SURGICAL PATHOLOGY Ricky Kay MD 10/7/2020 2024        Implants:  * No implants in log *      Drains:   Negative Pressure Wound Therapy Arm Anterior; Left (Active)       Urethral Catheter 16 fr (Active)   Catheter Indications Perioperative use in selected surgeries including but not limited to urologic, pelvic or need for intraoperative monitoring of urinary output due to prolonged surgery, large volume infusion or need for diuretic therapy in surgery; Need for fluid management in critically ill patients in a critical care setting not able to be managed by other means such as BSC with hat, bedpan, urinal, condom catheter, or short term intermittent urethral catherization 10/07/20 1741   Urine Color Yellow 10/07/20 1907   Urine Appearance Clear 10/07/20 1907   Output (mL) 350 mL 10/07/20 1907       Findings: infected mycotic aneurysm, necrotic tissue, no foreign body found  Dictated [id number]    Electronically signed by Carrol Henry DO on 10/7/2020 at 10:38 PM

## 2020-10-08 NOTE — ANESTHESIA POSTPROCEDURE EVALUATION
Department of Anesthesiology  Postprocedure Note    Patient: Jim Davenport  MRN: [id number]  YOB: 1980  Date of evaluation: 10/8/2020  Time:  7:39 AM     Procedure Summary     Date:  10/07/20 Room / Location:  [facility] OR  / [facility]    Anesthesia Start:  1911 Anesthesia Stop:  2230    Procedure:  LEFT ARM BRACHIAL ARTERY EXPLORATION, REPAIR OF MYCOTIC ANUERYSM WITH BYPASS (Left ) Diagnosis:  (BRACHIAL ARTERY EXPLORATION)    Surgeon:  Stefano Dent MD Responsible Provider:  Ori Davis MD    Anesthesia Type:  general ASA Status:  2 - Emergent          Anesthesia Type: general    Lynne Phase I: Lynne Score: 7    Lynne Phase II:      Last vitals: Reviewed and per EMR flowsheets.        Anesthesia Post Evaluation    Patient location during evaluation: ICU  Patient participation: complete - patient cannot participate  Level of consciousness: sedated and ventilated  Airway patency: patent  Nausea & Vomiting: no nausea  Complications: no  Cardiovascular status: hemodynamically stable  Respiratory status: ventilator and intubated

## 2020-10-08 NOTE — PLAN OF CARE
Problem: Skin Integrity:  Goal: Will show no infection signs and symptoms  Description: Will show no infection signs and symptoms  Outcome: Met This Shift  Goal: Absence of new skin breakdown  Description: Absence of new skin breakdown  Outcome: Met This Shift     Problem: Falls - Risk of:  Goal: Will remain free from falls  Description: Will remain free from falls  Outcome: Met This Shift  Goal: Absence of physical injury  Description: Absence of physical injury  Outcome: Met This Shift     Problem: Restraint Use - Nonviolent/Non-Self-Destructive Behavior:  Goal: Absence of restraint-related injury  Description: Absence of restraint-related injury  Outcome: Met This Shift     Problem: Infection - Surgical Site:  Goal: Will show no infection signs and symptoms  Description: Will show no infection signs and symptoms  Outcome: Met This Shift     Problem: Pain:  Goal: Pain level will decrease  Description: Pain level will decrease  Outcome: Met This Shift  Goal: Control of acute pain  Description: Control of acute pain  Outcome: Met This Shift  Goal: Control of chronic pain  Description: Control of chronic pain  Outcome: Met This Shift

## 2020-10-08 NOTE — PROGRESS NOTES
GENERAL SURGERY  DAILY PROGRESS NOTE  10/8/2020    Subjective:  Patient lying in bed sedated on propofol and fentanyl. Objective:  BP (!) 97/58   Pulse 97   Temp 99.7 °F (37.6 °C) (Axillary)   Resp 16   Ht 6' 4\" (1.93 m)   Wt 194 lb 8 oz (88.2 kg)   SpO2 100%   BMI 23.68 kg/m²     CONSTITUTIONAL: no acute distress, lying in hospital bed, intubated, sedated  CARDIOVASCULAR: Regular rate, rhythm  PULMONARY: Clear to auscultation bilaterally  ABDOMEN: soft, nontender, nondistended. Renal: [place] catheter in place  MUSCULOSKELETAL: moves all extremities purposefully, biphasic radial pulses bilaterally, 2+ DP and PT bilaterally  SKIN/EXTREMITIES: Left upper extremity with wound vac in place. Drainage is sanguinous. Left arm radial nonpalpable but monophasic on doppler. Monophasic. Left and right DP mulitphasic    Assessment/Plan:  45 y.o. male left forearm mycotic aneurysm s/p exploration and resection with ipsilateral basilic vein jump graft 91/4    -OR for wound vac change and possible closure  -Overall care per ICU  -ABX per ID    Note signed electronically by Radha BARKER at 6:52 AM on 10/8/2020

## 2020-10-08 NOTE — CONSULTS
Vascular surgery  Consult Note     Patient's name:  Carmen Jeffers  Age/Gender: 45 y.o. male  Date of Admission: 10/7/2020  7:06 PM  Length of Stay: 0    Reason for ICU: Infected brachial pseudoaneurysm    HPI: Martínez Pettit is a 36-year-old male who presented to [address] [place] [address] with a left forearm abscess that been progressively worsening over the last few days. He has a history of IV drug use. He was taken to the OR for I&D over the antecubital fossa. Immediately upon entering the cavity hematoma was evacuated and shortly thereafter there was noted to be pulsatile arterial bleeding coming from the wound bed. There was noted to be greater than 50% blowout of the vessel. Proximal and distal control were obtained with Vesseloops and the patient was transferred to [facility] for vascular surgery consultation. Patient arrived to surgical intensive care unit around 7 PM.  With plans for OR with Dr. Brandee Peralta for exploration. Problem List:   Patient Active Problem List   Diagnosis    Right arm cellulitis    IV drug abuse ([address]    Superficial foreign body of right upper arm    Cellulitis of right arm    Bacteremia    Skin ulcer of upper arm with fat layer exposed [address]    Abscess    Pseudoaneurysm of brachial artery (HCC)    Poor venous access    Pseudoaneurysm ([address]         Vent Settings: Additional Respiratory  Assessments  Pulse: 119  Resp: 21  SpO2: 100 %  ABG: No results for input(s): PH, PCO2, PO2, HCO3, BE, O2SAT in the last 72 hours. I/O:  No intake/output data recorded. No intake/output data recorded.              Lines:   Right femoral triple-lumen catheter        Physical Exam:   BP (!) 121/103   Pulse 119   Temp 102.1 °F (38.9 °C) (Axillary)   Resp 21   SpO2 100%     Average, Min, and Max for last 24 hours Vitals:  Temp:  Temp  Av.7 °F (37.6 °C)  Min: 97.2 °F (36.2 °C)  Max: 102.6 °F (39.2 °C)  RR: Resp  Av.3  Min: 3  Max: 47  HR: Pulse  Av.6 Min: 80  Max: 603  BP:  Systolic (32IJJ), XFJ:248 , Min:64 , JKS:186   ; Diastolic (15VTT), EFC:74, Min:39, Max:112    SpO2: SpO2  Av.6 %  Min: 90 %  Max: 100 %            CONSTITUTIONAL: no acute distress, lying in hospital bed, intubated, sedated  NEUROLOGIC: PERRL, follows commands  CARDIOVASCULAR: Regular rate, rhythm  PULMONARY: Fairly clear to auscultation bilaterally  ABDOMEN: soft, nontender, nondistended. Renal: Loyola catheter in place-yellow urine  MUSCULOSKELETAL: moves all extremities purposefully, biphasic radial pulses bilaterally, 2+ DP and PT bilaterally  SKIN/EXTREMITIES: Left upper extremity wrapped with [place] dressing without strikethrough      ASSESSMENT / PLAN:   Left brachial mycotic aneurysm    Plan for OR wound exploration possible bypass.   Discussed with Dr. Chauncey Houston: SICU      Electronically signed by Александр Espino MD 10/7/2020  7:29 PM

## 2020-10-08 NOTE — PROGRESS NOTES
Surgical Intensive Care Unit   Daily Progress Note     Patient's name:  Melani Rucker  Age/Gender: 45 y.o. male  Date of Admission: 10/7/2020  7:06 PM  Length of Stay: 1    Reason for ICU: Infected brachial pseudoaneurysm    HPI: Ravi Quintero is a 52-year-old male who presented to [address] [place] [address] with a left forearm abscess that been progressively worsening over the last few days. He has a history of IV drug use. He was taken to the OR for I&D over the antecubital fossa. Immediately upon entering the cavity hematoma was evacuated and shortly thereafter there was noted to be pulsatile arterial bleeding coming from the wound bed. There was noted to be greater than 50% blowout of the vessel. Proximal and distal control were obtained with Vesseloops and the patient was transferred to [facility] for vascular surgery consultation. Patient arrived to surgical intensive care unit around 7 PM.  And was soon thereafter taken to the OR with Dr. Nohemi Ferreira for exploration. Overnight Events: no acute overnight events. Extubated early morning      Hospital Course:   10/7: Patient originally presented to [address] [place] [address],[address] for a left forearm abscess I&D. 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 He was transferred to [facility] and underwent a repair of the pseudoaneurysm with ipsilateral reversed basilic vein jump graft  10/8: no acute events overnight. Pt remained intubated post operatively.  Extubated early morning      Problem List:   Patient Active Problem List   Diagnosis    Right arm cellulitis    IV drug abuse ([address]    Superficial foreign body of right upper arm    Cellulitis of right arm    Bacteremia    Skin ulcer of upper arm with fat layer exposed ([address]    Abscess    Pseudoaneurysm of brachial artery ([address]    Poor venous access    Pseudoaneurysm ([address]    Mycotic aneurysm ([address]       Surgical/Interventional Procedures:   LEFT ARM BRACHIAL ARTERY EXPLORATION, REPAIR OF MYCOTIC ANUERYSM WITH BYPASS    Vent Settings: Additional Respiratory  Assessments  Pulse: 89  Resp: 21  SpO2: 100 %  Humidification Source: Heated wire  Humidification Temp: 37  Circuit Condensation: Drained  Oral Care: Teeth brushed, Mouthwash  ABG:   Recent Labs     10/08/20  0022   PH 7.364   PCO2 35.2   PO2 164.3*   HCO3 19.6*   BE -5.1*   O2SAT 99.1*       I/O:  I/O last 3 completed shifts: In:  [I.V.:2853; IV Piggyback:100]  Out:  [Urine:2375; Drains:200]  I/O this shift:  In: 210 [P.O.:210]  Out: 455 [Urine:455]  [REMOVED] Urethral Catheter 16 fr-Output (mL): 205 mL          Lines:   Right femoral triple lumen (placed 10/7); right PICC; radial arterial line (placed 10/7)    Tubes:   tripp    Drains:   Left forearm negative pressure wound therapy device    Drips:      Physical Exam:   /86   Pulse 89   Temp 98.8 °F (37.1 °C) (Oral)   Resp 21   Ht 6' 4\" (1.93 m)   Wt 194 lb 8 oz (88.2 kg)   SpO2 100%   BMI 23.68 kg/m²     Average, Min, and Max for last 24 hours Vitals:  Temp:  Temp  Av.1 °F (37.3 °C)  Min: 97.3 °F (36.3 °C)  Max: 102.6 °F (39.2 °C)  RR: Resp  Av.5  Min: 3  Max: 47  HR: Pulse  Av.4  Min: 75  Max: 791  BP:  Systolic (41CVD), DSW:365 , Min:64 , YNH:835   ; Diastolic (54KCM), RIKA:32, Min:39, Max:112    SpO2: SpO2  Av.9 %  Min: 90 %  Max: 100 %          Pupil size:  Left 3 mm    Right 3 mm    Pupil reaction: Yes    Wiggles fingers: Left Yes Right Yes    Hand grasp:   Left normal       Right normal    Wiggles toes: Left Yes    Right Yes    Plantar flexion: Left normal     Right normal      CONSTITUTIONAL: no acute distress, alert and oriented   NEUROLOGIC: PERRL, oriented x4  CARDIOVASCULAR: S1 S2, regular rate, regular rhythm.  Peripheral pulses intact in all extremities   PULMONARY:fairly clear lungs, no increased work of breathing  RENAL: tripp to gravity, clear yellow urine  ABDOMEN: soft, nontender, nondistended, nontympanic  MUSCULOSKELETAL: moves all extremities purposefully, 5/5 strength   SKIN/EXTREMITIES: left arm with wound vac in place. Splint to left elbow.  Pulses intact      ASSESSMENT / PLAN:   · Neuro: No acute issues  · Alert and oriented x4  · Pain control with oxycodone, dilaudid, tylenol     · CV: Hemodynamically stable  · Monitor vitals    · Pulm: Doing well post extubation   · Will monitor respiratory status    · GI: No acute issues  · Bowel regimen of glycolax and senokot  · GI prophylaxis with pepcid  · Begin diet, NPO at midnight    · Renal: Loyola in place, can remove  · Making average of 94cc/hr clear yellow urine  · Monitor urine output     · ID: S/p evacuation of mycotic pseudoaneurysm  · F/u surgical cultures  · Monitor vitals - febrile to 102.1 on arrival   · Continue Zosyn and vancomycin  · Last WBC count 11.3     · Endocrine: No acute issues  · Monitor blood glucose    · MSK: S/p left forearm exploration and repair of pseudoaneurysm  · Maintain left arm in straight neurtral position  · Continue splint  · Monitor wound vac output   · Likely return to OR 10/9 for wound vac change and re-exploration      · Heme: Hemoglobin 10.4 from 11.3  · Monitor labs  · Transfuse as needed          Pain/Analgesia: dilaudid, oxycodone, tylenol  Bowel regimen: glycolax, senokot  Diet: DIET GENERAL;  DVT proph: lovenox  GI proph: pepcid  Seizure proph: none  Glucose protocol: none  Mouth/eye care: none  Loyola: continue  CVC sites: right femoral triple lumen (placed 10/7), right brachial PICC  Ancillary consults: Vascular surgery  Family Update: as able  CODE Status: Full Code    Dispo: Continue SICU care      Electronically signed by Devan Shore MD on 10/8/2020 at 12:22 PM

## 2020-10-08 NOTE — PROGRESS NOTES
Patient arrived to OR with open left arm surgical incision site wrapped in gauze and ioban and turniquet on upper left arm with two vessel loops and two mosquito clamps on vessels.

## 2020-10-08 NOTE — CONSULTS
sodium (SENOKOT-S) 8.6-50 MG tablet 2 tablet  2 tablet Oral Daily Margarito Szymanski MD   2 tablet at 10/08/20 7608    vancomycin (VANCOCIN) 2,000 mg in dextrose 5 % 500 mL IVPB  2,000 mg Intravenous Q12H Kelly Rios MD   Stopped at 10/08/20 1230    sodium chloride flush 0.9 % injection 10 mL  10 mL Intravenous 2 times per day Robin Eddy MD   10 mL at 10/08/20 0908    sodium chloride flush 0.9 % injection 10 mL  10 mL Intravenous PRN Robin Eddy MD        ondansetron (ZOFRAN-ODT) disintegrating tablet 4 mg  4 mg Oral Q8H PRN Robin Eddy MD        Or    ondansetron [facility]) injection 4 mg  4 mg Intravenous Q6H PRN Robin Eddy MD        famotidine (PEPCID) tablet 20 mg  20 mg Oral BID Robin Eddy MD   20 mg at 10/08/20 8882    enoxaparin (LOVENOX) injection 40 mg  40 mg Subcutaneous Daily Robin Eddy MD   40 mg at 10/08/20 0903    piperacillin-tazobactam (ZOSYN) 3.375 g in dextrose 5 % 100 mL IVPB extended infusion (mini-bag)  3.375 g Intravenous Q8H Robin Eddy MD   Stopped at 10/08/20 1230    0.9 % sodium chloride (ZOSYN FLUSH)   Intravenous Q8H Robin Eddy MD   Stopped at 10/08/20 0534       No Known Allergies    Surgical History  Past Surgical History:   Procedure Laterality Date    INCISION AND DRAINAGE Right 4/23/2020    RIGHT UPPER EXTREMITY INCISION AND DRAINAGE REMOVAL FOREIGN BODY WITH C-ARM performed by Rose Mary Valle MD at [facility] ARTERY/VEIN Left 10/7/2020    LEFT ARM BRACHIAL ARTERY EXPLORATION, REPAIR OF MYCOTIC ANUERYSM WITH BYPASS performed by Earl Lawler MD at [facility] TRANSESOPHAGEAL ECHOCARDIOGRAM N/A 4/24/2020    TRANSESOPHAGEAL ECHOCARDIOGRAM performed by Andressa Campbell MD at [address] [place] [address] History     Socioeconomic History    Marital status: Single   Tobacco Use    Smoking status: Current Every Day Smoker     Packs/day: 1.00     Types: Cigarettes    Smokeless tobacco: Never Used   Substance and Sexual Activity    Alcohol use: Not Currently    Drug use: Yes     Types: IV     Comment: Suboxone/IV       Family Medical History  No family history on file. Review of Systems:  Constitutional: Had fever, no chills  Eyes: No vision changes, no retroorbital pain  ENT: No hearing changes, no ear pain  Respiratory: No cough, no dyspnea  Cardiovascular: No chest pain, no palpitations  Gastrointestinal: No abdominal pain, no diarrhea  Genitourinary: No dysuria, no hematuria  Integumentary: No rash, no itching  Musculoskeletal: Left arm with dressing and Ace wrap in place  Neurologic: No headache, no numbness in extremities    Physical Examination:  Vitals:    10/08/20 1100 10/08/20 1200 10/08/20 1300 10/08/20 1400   BP:       Pulse: 98 89 92 106   Resp: 14 21 18 14   Temp:  98.8 °F (37.1 °C)     TempSrc:  Oral     SpO2:   100% 100%   Weight:       Height:         Constitutional: Alert, not in distress  Eyes: Sclerae anicteric, no conjunctival erythema  ENT: No buccal lesion, no pharyngeal exudates  Neck: No nuchal rigidity, no cervical adenopathy  Lungs: Clear breath sounds, no crackles, no wheezes  Heart: Regular rate and rhythm, no murmurs  Abdomen: Bowel sounds present, soft, nontender  Skin: Warm and dry, no active dermatoses  Musculoskeletal: No joint erythema, no joint swelling    Labs, imaging, and medical records/notes were personally reviewed. 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 Follow-up blood cultures from 10/07. Follow-up tissue cultures. Continue local wound care. Check HIV screen. Check hep C viral load. Thank you for involving me in the care of [facility]. I will continue to follow.  Please do not hesitate to call for any questions or concerns.     Electronically signed by Isaiah Go MD on 10/8/2020 at 3:26 PM

## 2020-10-09 ENCOUNTER — ANESTHESIA (OUTPATIENT)
Dept: OPERATING ROOM | Age: 40
DRG: 854 | End: 2020-10-09
Payer: COMMERCIAL

## 2020-10-09 VITALS
TEMPERATURE: 97.5 F | RESPIRATION RATE: 1 BRPM | SYSTOLIC BLOOD PRESSURE: 107 MMHG | DIASTOLIC BLOOD PRESSURE: 50 MMHG | OXYGEN SATURATION: 100 %

## 2020-10-09 LAB
ANION GAP SERPL CALCULATED.3IONS-SCNC: 9 MMOL/L (ref 7–16)
BASOPHILS ABSOLUTE: 0.02 E9/L (ref 0–0.2)
BASOPHILS RELATIVE PERCENT: 0.2 % (ref 0–2)
BUN BLDV-MCNC: 12 MG/DL (ref 6–20)
CALCIUM IONIZED: 1.24 MMOL/L (ref 1.15–1.33)
CALCIUM SERPL-MCNC: 8.3 MG/DL (ref 8.6–10.2)
CHLORIDE BLD-SCNC: 101 MMOL/L (ref 98–107)
CO2: 26 MMOL/L (ref 22–29)
CREAT SERPL-MCNC: 0.7 MG/DL (ref 0.7–1.2)
EOSINOPHILS ABSOLUTE: 0.08 E9/L (ref 0.05–0.5)
EOSINOPHILS RELATIVE PERCENT: 1 % (ref 0–6)
GFR AFRICAN AMERICAN: >60
GFR NON-AFRICAN AMERICAN: >60 ML/MIN/1.73
GLUCOSE BLD-MCNC: 125 MG/DL (ref 74–99)
HCT VFR BLD CALC: 27.9 % (ref 37–54)
HEMOGLOBIN: 8.9 G/DL (ref 12.5–16.5)
HIV-1 AND HIV-2 ANTIBODIES: NORMAL
IMMATURE GRANULOCYTES #: 0.03 E9/L
IMMATURE GRANULOCYTES %: 0.4 % (ref 0–5)
LYMPHOCYTES ABSOLUTE: 1.68 E9/L (ref 1.5–4)
LYMPHOCYTES RELATIVE PERCENT: 20.2 % (ref 20–42)
MAGNESIUM: 2 MG/DL (ref 1.6–2.6)
MCH RBC QN AUTO: 25.1 PG (ref 26–35)
MCHC RBC AUTO-ENTMCNC: 31.9 % (ref 32–34.5)
MCV RBC AUTO: 78.8 FL (ref 80–99.9)
MONOCYTES ABSOLUTE: 0.65 E9/L (ref 0.1–0.95)
MONOCYTES RELATIVE PERCENT: 7.8 % (ref 2–12)
MRSA CULTURE ONLY: NORMAL
NEUTROPHILS ABSOLUTE: 5.85 E9/L (ref 1.8–7.3)
NEUTROPHILS RELATIVE PERCENT: 70.4 % (ref 43–80)
PDW BLD-RTO: 14.8 FL (ref 11.5–15)
PHOSPHORUS: 2.1 MG/DL (ref 2.5–4.5)
PLATELET # BLD: 291 E9/L (ref 130–450)
PMV BLD AUTO: 8.8 FL (ref 7–12)
POTASSIUM SERPL-SCNC: 3.8 MMOL/L (ref 3.5–5)
RBC # BLD: 3.54 E12/L (ref 3.8–5.8)
SODIUM BLD-SCNC: 136 MMOL/L (ref 132–146)
VANCOMYCIN RANDOM: 5.2 MCG/ML (ref 5–40)
WBC # BLD: 8.3 E9/L (ref 4.5–11.5)

## 2020-10-09 PROCEDURE — 3600000012 HC SURGERY LEVEL 2 ADDTL 15MIN: Performed by: SURGERY

## 2020-10-09 PROCEDURE — 3600000002 HC SURGERY LEVEL 2 BASE: Performed by: SURGERY

## 2020-10-09 PROCEDURE — 6360000002 HC RX W HCPCS

## 2020-10-09 PROCEDURE — 2580000003 HC RX 258: Performed by: SURGERY

## 2020-10-09 PROCEDURE — 6370000000 HC RX 637 (ALT 250 FOR IP): Performed by: STUDENT IN AN ORGANIZED HEALTH CARE EDUCATION/TRAINING PROGRAM

## 2020-10-09 PROCEDURE — 85025 COMPLETE CBC W/AUTO DIFF WBC: CPT

## 2020-10-09 PROCEDURE — 2700000000 HC OXYGEN THERAPY PER DAY

## 2020-10-09 PROCEDURE — 3700000000 HC ANESTHESIA ATTENDED CARE: Performed by: SURGERY

## 2020-10-09 PROCEDURE — 83735 ASSAY OF MAGNESIUM: CPT

## 2020-10-09 PROCEDURE — 2709999900 HC NON-CHARGEABLE SUPPLY: Performed by: SURGERY

## 2020-10-09 PROCEDURE — 6360000002 HC RX W HCPCS: Performed by: SURGERY

## 2020-10-09 PROCEDURE — 2580000003 HC RX 258: Performed by: EMERGENCY MEDICINE

## 2020-10-09 PROCEDURE — 3700000001 HC ADD 15 MINUTES (ANESTHESIA): Performed by: SURGERY

## 2020-10-09 PROCEDURE — 7100000000 HC PACU RECOVERY - FIRST 15 MIN: Performed by: SURGERY

## 2020-10-09 PROCEDURE — 84100 ASSAY OF PHOSPHORUS: CPT

## 2020-10-09 PROCEDURE — 6370000000 HC RX 637 (ALT 250 FOR IP): Performed by: SURGERY

## 2020-10-09 PROCEDURE — 80202 ASSAY OF VANCOMYCIN: CPT

## 2020-10-09 PROCEDURE — 2580000003 HC RX 258

## 2020-10-09 PROCEDURE — 2500000003 HC RX 250 WO HCPCS

## 2020-10-09 PROCEDURE — 7100000001 HC PACU RECOVERY - ADDTL 15 MIN: Performed by: SURGERY

## 2020-10-09 PROCEDURE — 0JBF0ZZ EXCISION OF LEFT UPPER ARM SUBCUTANEOUS TISSUE AND FASCIA, OPEN APPROACH: ICD-10-PCS | Performed by: SURGERY

## 2020-10-09 PROCEDURE — 03180K1 BYPASS LEFT BRACHIAL ARTERY TO LEFT UPPER ARM ARTERY WITH NONAUTOLOGOUS TISSUE SUBSTITUTE, OPEN APPROACH: ICD-10-PCS | Performed by: SURGERY

## 2020-10-09 PROCEDURE — 6360000002 HC RX W HCPCS: Performed by: EMERGENCY MEDICINE

## 2020-10-09 PROCEDURE — 6370000000 HC RX 637 (ALT 250 FOR IP): Performed by: INTERNAL MEDICINE

## 2020-10-09 PROCEDURE — 2580000003 HC RX 258: Performed by: STUDENT IN AN ORGANIZED HEALTH CARE EDUCATION/TRAINING PROGRAM

## 2020-10-09 PROCEDURE — 82330 ASSAY OF CALCIUM: CPT

## 2020-10-09 PROCEDURE — 1200000000 HC SEMI PRIVATE

## 2020-10-09 PROCEDURE — 2500000003 HC RX 250 WO HCPCS: Performed by: SURGERY

## 2020-10-09 PROCEDURE — 99233 SBSQ HOSP IP/OBS HIGH 50: CPT | Performed by: SURGERY

## 2020-10-09 PROCEDURE — 36415 COLL VENOUS BLD VENIPUNCTURE: CPT

## 2020-10-09 PROCEDURE — 80048 BASIC METABOLIC PNL TOTAL CA: CPT

## 2020-10-09 DEVICE — INTEGRA® BILAYER MATRIX WOUND DRESSING 4 IN*5 IN (10 CM*12.5 CM)
Type: IMPLANTABLE DEVICE | Site: ARM | Status: FUNCTIONAL
Brand: INTEGRA®

## 2020-10-09 RX ORDER — HYDROMORPHONE HCL 110MG/55ML
PATIENT CONTROLLED ANALGESIA SYRINGE INTRAVENOUS PRN
Status: DISCONTINUED | OUTPATIENT
Start: 2020-10-09 | End: 2020-10-09 | Stop reason: SDUPTHER

## 2020-10-09 RX ORDER — MIDAZOLAM HYDROCHLORIDE 1 MG/ML
INJECTION INTRAMUSCULAR; INTRAVENOUS PRN
Status: DISCONTINUED | OUTPATIENT
Start: 2020-10-09 | End: 2020-10-09 | Stop reason: SDUPTHER

## 2020-10-09 RX ORDER — PROPOFOL 10 MG/ML
INJECTION, EMULSION INTRAVENOUS PRN
Status: DISCONTINUED | OUTPATIENT
Start: 2020-10-09 | End: 2020-10-09 | Stop reason: SDUPTHER

## 2020-10-09 RX ORDER — ROCURONIUM BROMIDE 10 MG/ML
INJECTION, SOLUTION INTRAVENOUS PRN
Status: DISCONTINUED | OUTPATIENT
Start: 2020-10-09 | End: 2020-10-09 | Stop reason: SDUPTHER

## 2020-10-09 RX ORDER — SODIUM HYPOCHLORITE 5 MG/ML
SOLUTION TOPICAL PRN
Status: DISCONTINUED | OUTPATIENT
Start: 2020-10-09 | End: 2020-10-09 | Stop reason: ALTCHOICE

## 2020-10-09 RX ORDER — FENTANYL CITRATE 50 UG/ML
INJECTION, SOLUTION INTRAMUSCULAR; INTRAVENOUS PRN
Status: DISCONTINUED | OUTPATIENT
Start: 2020-10-09 | End: 2020-10-09 | Stop reason: SDUPTHER

## 2020-10-09 RX ORDER — ONDANSETRON 2 MG/ML
INJECTION INTRAMUSCULAR; INTRAVENOUS PRN
Status: DISCONTINUED | OUTPATIENT
Start: 2020-10-09 | End: 2020-10-09 | Stop reason: SDUPTHER

## 2020-10-09 RX ORDER — SODIUM CHLORIDE 9 MG/ML
INJECTION, SOLUTION INTRAVENOUS CONTINUOUS PRN
Status: DISCONTINUED | OUTPATIENT
Start: 2020-10-09 | End: 2020-10-09 | Stop reason: SDUPTHER

## 2020-10-09 RX ORDER — PROPOFOL 10 MG/ML
INJECTION, EMULSION INTRAVENOUS CONTINUOUS PRN
Status: DISCONTINUED | OUTPATIENT
Start: 2020-10-09 | End: 2020-10-09 | Stop reason: SDUPTHER

## 2020-10-09 RX ADMIN — PIPERACILLIN AND TAZOBACTAM 3.38 G: 3; .375 INJECTION, POWDER, FOR SOLUTION INTRAVENOUS at 18:35

## 2020-10-09 RX ADMIN — Medication 10 ML: at 22:05

## 2020-10-09 RX ADMIN — BENZOCAINE AND MENTHOL 1 LOZENGE: 15; 3.6 LOZENGE ORAL at 14:30

## 2020-10-09 RX ADMIN — FENTANYL CITRATE 50 MCG: 50 INJECTION, SOLUTION INTRAMUSCULAR; INTRAVENOUS at 18:06

## 2020-10-09 RX ADMIN — Medication 10 ML: at 08:34

## 2020-10-09 RX ADMIN — ACETAMINOPHEN 650 MG: 325 TABLET, FILM COATED ORAL at 05:00

## 2020-10-09 RX ADMIN — FAMOTIDINE 20 MG: 20 TABLET ORAL at 08:33

## 2020-10-09 RX ADMIN — PROPOFOL 100 MG: 10 INJECTION, EMULSION INTRAVENOUS at 18:20

## 2020-10-09 RX ADMIN — OXYCODONE HYDROCHLORIDE 10 MG: 10 TABLET ORAL at 12:38

## 2020-10-09 RX ADMIN — ACETAMINOPHEN 650 MG: 325 TABLET, FILM COATED ORAL at 21:53

## 2020-10-09 RX ADMIN — ACETAMINOPHEN 650 MG: 325 TABLET, FILM COATED ORAL at 14:00

## 2020-10-09 RX ADMIN — VANCOMYCIN HYDROCHLORIDE 2000 MG: 10 INJECTION, POWDER, LYOPHILIZED, FOR SOLUTION INTRAVENOUS at 18:02

## 2020-10-09 RX ADMIN — OXYCODONE HYDROCHLORIDE 10 MG: 10 TABLET ORAL at 04:26

## 2020-10-09 RX ADMIN — FENTANYL CITRATE 100 MCG: 50 INJECTION, SOLUTION INTRAMUSCULAR; INTRAVENOUS at 18:13

## 2020-10-09 RX ADMIN — SODIUM CHLORIDE: 9 INJECTION, SOLUTION INTRAVENOUS at 18:01

## 2020-10-09 RX ADMIN — MIDAZOLAM 2 MG: 1 INJECTION INTRAMUSCULAR; INTRAVENOUS at 18:05

## 2020-10-09 RX ADMIN — SODIUM CHLORIDE: 9 INJECTION, SOLUTION INTRAVENOUS at 19:41

## 2020-10-09 RX ADMIN — VANCOMYCIN HYDROCHLORIDE 2000 MG: 10 INJECTION, POWDER, LYOPHILIZED, FOR SOLUTION INTRAVENOUS at 17:03

## 2020-10-09 RX ADMIN — PROPOFOL 75 MCG/KG/MIN: 10 INJECTION, EMULSION INTRAVENOUS at 18:26

## 2020-10-09 RX ADMIN — PHENYLEPHRINE HYDROCHLORIDE 100 MCG: 10 INJECTION INTRAVENOUS at 18:45

## 2020-10-09 RX ADMIN — POLYETHYLENE GLYCOL 3350 17 G: 17 POWDER, FOR SOLUTION ORAL at 08:33

## 2020-10-09 RX ADMIN — ROCURONIUM BROMIDE 20 MG: 10 INJECTION, SOLUTION INTRAVENOUS at 18:23

## 2020-10-09 RX ADMIN — HYDROMORPHONE HYDROCHLORIDE 0.5 MG: 2 INJECTION, SOLUTION INTRAMUSCULAR; INTRAVENOUS; SUBCUTANEOUS at 19:41

## 2020-10-09 RX ADMIN — FENTANYL CITRATE 100 MCG: 50 INJECTION, SOLUTION INTRAMUSCULAR; INTRAVENOUS at 18:20

## 2020-10-09 RX ADMIN — PROPOFOL 200 MG: 10 INJECTION, EMULSION INTRAVENOUS at 18:13

## 2020-10-09 RX ADMIN — FAMOTIDINE 20 MG: 20 TABLET ORAL at 21:53

## 2020-10-09 RX ADMIN — SODIUM CHLORIDE: 9 INJECTION, SOLUTION INTRAVENOUS at 12:35

## 2020-10-09 RX ADMIN — HYDROMORPHONE HYDROCHLORIDE 0.5 MG: 2 INJECTION, SOLUTION INTRAMUSCULAR; INTRAVENOUS; SUBCUTANEOUS at 19:08

## 2020-10-09 RX ADMIN — ONDANSETRON HYDROCHLORIDE 4 MG: 2 INJECTION, SOLUTION INTRAMUSCULAR; INTRAVENOUS at 19:41

## 2020-10-09 RX ADMIN — HYDROMORPHONE HYDROCHLORIDE 0.5 MG: 2 INJECTION, SOLUTION INTRAMUSCULAR; INTRAVENOUS; SUBCUTANEOUS at 19:28

## 2020-10-09 RX ADMIN — POTASSIUM PHOSPHATE, MONOBASIC AND POTASSIUM PHOSPHATE, DIBASIC 20 MMOL: 224; 236 INJECTION, SOLUTION, CONCENTRATE INTRAVENOUS at 04:35

## 2020-10-09 RX ADMIN — OXYCODONE HYDROCHLORIDE 10 MG: 10 TABLET ORAL at 08:34

## 2020-10-09 RX ADMIN — SODIUM CHLORIDE: 9 INJECTION, SOLUTION INTRAVENOUS at 04:25

## 2020-10-09 RX ADMIN — OXYCODONE HYDROCHLORIDE 10 MG: 10 TABLET ORAL at 21:53

## 2020-10-09 RX ADMIN — PIPERACILLIN AND TAZOBACTAM 3.38 G: 3; .375 INJECTION, POWDER, FOR SOLUTION INTRAVENOUS at 08:33

## 2020-10-09 RX ADMIN — HYDROMORPHONE HYDROCHLORIDE 0.5 MG: 2 INJECTION, SOLUTION INTRAMUSCULAR; INTRAVENOUS; SUBCUTANEOUS at 19:00

## 2020-10-09 RX ADMIN — DOCUSATE SODIUM 50 MG AND SENNOSIDES 8.6 MG 2 TABLET: 8.6; 5 TABLET, FILM COATED ORAL at 08:33

## 2020-10-09 RX ADMIN — ENOXAPARIN SODIUM 40 MG: 40 INJECTION SUBCUTANEOUS at 08:33

## 2020-10-09 ASSESSMENT — PULMONARY FUNCTION TESTS
PIF_VALUE: 12
PIF_VALUE: 11
PIF_VALUE: 11
PIF_VALUE: 1
PIF_VALUE: 10
PIF_VALUE: 13
PIF_VALUE: 11
PIF_VALUE: 10
PIF_VALUE: 11
PIF_VALUE: 10
PIF_VALUE: 9
PIF_VALUE: 4
PIF_VALUE: 9
PIF_VALUE: 3
PIF_VALUE: 10
PIF_VALUE: 0
PIF_VALUE: 11
PIF_VALUE: 0
PIF_VALUE: 12
PIF_VALUE: 11
PIF_VALUE: 2
PIF_VALUE: 7
PIF_VALUE: 11
PIF_VALUE: 11
PIF_VALUE: 2
PIF_VALUE: 11
PIF_VALUE: 0
PIF_VALUE: 11
PIF_VALUE: 11
PIF_VALUE: 9
PIF_VALUE: 11
PIF_VALUE: 1
PIF_VALUE: 9
PIF_VALUE: 11
PIF_VALUE: 3
PIF_VALUE: 1
PIF_VALUE: 10
PIF_VALUE: 1
PIF_VALUE: 10
PIF_VALUE: 0
PIF_VALUE: 9
PIF_VALUE: 11
PIF_VALUE: 0
PIF_VALUE: 11
PIF_VALUE: 0
PIF_VALUE: 10
PIF_VALUE: 9
PIF_VALUE: 7
PIF_VALUE: 0
PIF_VALUE: 11
PIF_VALUE: 12
PIF_VALUE: 0
PIF_VALUE: 11
PIF_VALUE: 0
PIF_VALUE: 9
PIF_VALUE: 10
PIF_VALUE: 11
PIF_VALUE: 9
PIF_VALUE: 11
PIF_VALUE: 10
PIF_VALUE: 11
PIF_VALUE: 17
PIF_VALUE: 9
PIF_VALUE: 10
PIF_VALUE: 11
PIF_VALUE: 10
PIF_VALUE: 9
PIF_VALUE: 9
PIF_VALUE: 10
PIF_VALUE: 10
PIF_VALUE: 11
PIF_VALUE: 9
PIF_VALUE: 3
PIF_VALUE: 10
PIF_VALUE: 10
PIF_VALUE: 1
PIF_VALUE: 2
PIF_VALUE: 11
PIF_VALUE: 1
PIF_VALUE: 10
PIF_VALUE: 1
PIF_VALUE: 9
PIF_VALUE: 10
PIF_VALUE: 3
PIF_VALUE: 2
PIF_VALUE: 11
PIF_VALUE: 3
PIF_VALUE: 10
PIF_VALUE: 9
PIF_VALUE: 9
PIF_VALUE: 10
PIF_VALUE: 11
PIF_VALUE: 9
PIF_VALUE: 3
PIF_VALUE: 1
PIF_VALUE: 3
PIF_VALUE: 0
PIF_VALUE: 3
PIF_VALUE: 1
PIF_VALUE: 10
PIF_VALUE: 11
PIF_VALUE: 11

## 2020-10-09 ASSESSMENT — PAIN DESCRIPTION - PROGRESSION
CLINICAL_PROGRESSION: NOT CHANGED

## 2020-10-09 ASSESSMENT — PAIN DESCRIPTION - PAIN TYPE
TYPE: ACUTE PAIN;SURGICAL PAIN
TYPE: ACUTE PAIN;SURGICAL PAIN

## 2020-10-09 ASSESSMENT — PAIN SCALES - GENERAL
PAINLEVEL_OUTOF10: 7
PAINLEVEL_OUTOF10: 0
PAINLEVEL_OUTOF10: 10
PAINLEVEL_OUTOF10: 7
PAINLEVEL_OUTOF10: 0
PAINLEVEL_OUTOF10: 7
PAINLEVEL_OUTOF10: 10
PAINLEVEL_OUTOF10: 3
PAINLEVEL_OUTOF10: 4
PAINLEVEL_OUTOF10: 0
PAINLEVEL_OUTOF10: 8
PAINLEVEL_OUTOF10: 8

## 2020-10-09 ASSESSMENT — PAIN DESCRIPTION - DESCRIPTORS
DESCRIPTORS: ACHING;SORE;DISCOMFORT
DESCRIPTORS: BURNING;DISCOMFORT;SHARP
DESCRIPTORS: ACHING;DISCOMFORT;SORE

## 2020-10-09 ASSESSMENT — PAIN DESCRIPTION - FREQUENCY: FREQUENCY: CONTINUOUS

## 2020-10-09 ASSESSMENT — PAIN DESCRIPTION - ORIENTATION
ORIENTATION: LEFT

## 2020-10-09 ASSESSMENT — PAIN DESCRIPTION - LOCATION
LOCATION: ARM

## 2020-10-09 ASSESSMENT — PAIN DESCRIPTION - ONSET: ONSET: ON-GOING

## 2020-10-09 ASSESSMENT — LIFESTYLE VARIABLES: SMOKING_STATUS: 1

## 2020-10-09 ASSESSMENT — PAIN - FUNCTIONAL ASSESSMENT: PAIN_FUNCTIONAL_ASSESSMENT: ACTIVITIES ARE NOT PREVENTED

## 2020-10-09 NOTE — PROGRESS NOTES
VASCULAR SURGERY  DAILY PROGRESS NOTE  10/9/2020    Subjective:  Patient lying in bed. States he feels alright. Objective:  /81   Pulse 83   Temp 99.4 °F (37.4 °C) (Axillary)   Resp 17   Ht 6' 4\" (1.93 m)   Wt 201 lb 4.8 oz (91.3 kg)   SpO2 97%   BMI 24.50 kg/m²     CONSTITUTIONAL: no acute distress, lying in hospital bed, intubated, sedated  CARDIOVASCULAR: Regular rate, rhythm  PULMONARY: Clear to auscultation bilaterally  ABDOMEN: soft, nontender, nondistended. Renal: Loyola catheter in place  MUSCULOSKELETAL: moves all extremities purposefully, biphasic radial pulses bilaterally, 2+ DP and PT bilaterally  SKIN/EXTREMITIES: Left upper extremity with wound vac in place. Drainage is sanguinous. Left arm radial is palpable 1+ today, biphasic on doppler. Left ulnar 1+ and biphasic. Left and right DP mulitphasic    Assessment/Plan:  45 y.o. male left forearm mycotic aneurysm s/p exploration and resection with ipsilateral basilic vein jump graft 82/8    -OR for wound vac change and possible closure today  -Overall care per ICU  -ABX per ID    Note signed electronically by Phillip BARKER at 6:37 AM on 10/9/2020

## 2020-10-09 NOTE — PROGRESS NOTES
Labs     10/07/20  2315 10/08/20  0515 10/09/20  0215   WBC 11.3 10.4 8.3   HGB 10.4* 10.2* 8.9*   HCT 32.9* 31.4* 27.9*    259 291     Recent Labs     10/07/20  2315 10/08/20  0515 10/09/20  0215    137 136   K 4.1 4.2 3.8    105 101   CO2 21* 23 26   BUN 9 8 12   CREATININE 0.6* 0.6* 0.7   CALCIUM 8.0* 8.6 8.3*   PHOS 2.5 3.1 2.1*     Recent Labs     10/07/20  1129 10/07/20  1915 10/07/20  2315   AST 10 8 8   ALT 12 9 8   BILITOT 0.4 0.3 0.5   ALKPHOS 78 61 59     No results for input(s): INR in the last 72 hours. No results for input(s): Vineet Seed in the last 72 hours.   Recent Labs     10/07/20  1129 10/07/20  1915 10/07/20  2315   AST 10 8 8   ALT 12 9 8   BILITOT 0.4 0.3 0.5   ALKPHOS 78 61 59     Recent Labs     10/07/20  1129 10/07/20  1915 10/07/20  2315   LACTA 0.8 0.7 0.6     No results found for: Geeta Capone  No results found for: AMMONIA    Assessment:    Active Hospital Problems    Diagnosis Date Noted    Mycotic aneurysm ([facility] [I72.9]     Pseudoaneurysm of brachial artery ([facility] [I72.1] 10/07/2020    Pseudoaneurysm ([facility] [I72.9] 10/07/2020   IVDA  Sore throat  Plan:  cepacol   cont zosyn   cont vanc      DVT Prophylaxis: *lovenox  Diet: Diet NPO, After Midnight Exceptions are: Sips with Meds  Code Status: Full Code    PT/OT Eval Status: *na    Dispo - *home      Electronically signed by Nat Griffin DO on 10/9/2020 at 1:58 PM [facility]

## 2020-10-09 NOTE — PROGRESS NOTES
Pharmacy Consultation Note  (Antibiotic Dosing and Monitoring)    Initial consult date: 10/7/20  Consulting physician: Dr. Cass Gould  Drug(s): Vancomycin   Indication: Infected pseudoaneurysm s/p exploration with bypass    Ht Readings from Last 1 Encounters:   10/07/20 6' 4\" (1.93 m)     Wt Readings from Last 1 Encounters:   10/09/20 201 lb 4.8 oz (91.3 kg)         Age/  Gender IBW DW  Allergy Information   45 y.o. male 86.8 kg 88.2 kg  Patient has no known allergies. Date  WBC BUN/CR Drug/Dose Time   Given Level(s)   (Time) Comments   10/7  (#1) 11.0 9/0.8 Vancomycin 2,000 mg IV x 1 dose    Vancomycin 2,000 mg IV x1 dose 1238    1944     10/8  (#2) 10.4 8/0.6 Vancomycin 2000 mg IV q12h 0911 Vanco trough 38.5 @2125 ?? Error ? ?    10/9  (#3) 8.3 12/0.7 Vancomycin 2000 mg IV q12h 0325 Vanco random 5.2 mcg/mL @0215      (#4)           (#5)           (#6)           (#7)           Estimated Creatinine Clearance: 174 mL/min (based on SCr of 0.7 mg/dL).       Intake/Output Summary (Last 24 hours) at 10/9/2020 0012  Last data filed at 10/9/2020 0535  Gross per 24 hour   Intake 9110 ml   Output 2755 ml   Net 6355 ml     Urine output over the last 24 hours: 1.2 mL/kg/hr (2605 mL documented yesterday)    Diuretics ordered in the last 24 hours: N/A      Temp max: Temp (24hrs), Av.9 °F (37.2 °C), Min:98.3 °F (36.8 °C), Max:99.5 °F (37.5 °C)      Antibiotic Regimen:  Antibiotic Dose Date Initiated   Pip/tazo 3.375 gm IV q8h 10/7     Cultures:  available culture and sensitivity results were reviewed in [facility]  10/7 Blood cx's - Prelim no growth  10/7 Surgical cx (abscess) - Growth present; evaluating for gram pos organisms  10/7 Surgical cx (L arm tissue abscess) - Growth present; evaluating for mixed gram pos organisms  10/7 Surgical cx (Mycotic Brachial Artery Aneurysm) - Pending  10/7 MRSA Screen - Pending; gram stain: rare PMNs, no epithelial cells,     moderate gram neg rods    rare gram positive cocci in

## 2020-10-09 NOTE — OP NOTE
Operative Note    Patient: Lonny Jeffries  YOB: 1980  MRN: [id number]    Date of Procedure: 10/9/2020    Pre-Op Diagnosis:  Mycotic Aneurysm, open wound of L arm    Post-Op Diagnosis: Same       Procedure :   Exploration of left upper arm, evacuation of pus, infected hematoma  Debridement of the left upper arm wounds  Coverage of the saphenous vein bypass with integra graft ~ 30 sq cm of the graft used    Measurements   Width (cm) Length (cm) Depth (cm)   L upper arm 4 17 1   L mid arm 9 20 2       Surgeon(s):  Beverley Alex MD    Assistant:   * No surgical staff found *    Anesthesia: General    Estimated Blood Loss (mL): 116     Complications: None    Specimens:   ID Type Source Tests Collected by Time Destination   A : necrotic muscle left arm Tissue Arm SURGICAL PATHOLOGY Beverley Alex MD 10/9/2020 1849    B : foreign body Specimen Arm SURGICAL PATHOLOGY Beverley Alex MD 10/9/2020 1901        Implants:  Implant Name Type Inv. Item Serial No.  Lot No. LRB No. Used Action   OTIS-DRESSING WOUND 4X5 BILAYER MATRIX  [id number] Bone/Graft/Tissue/Human/Synth OTIS-DRESSING WOUND 4X5 BILAYER MATRIX  [id number]  [employer] [id number] Left 1 Implanted     Findings: Pus from lateral upper arm                  Muscle coverage of bypass tenous and fell apart                  Necrotic subcutaneous tissue, muscle    DESCRIPTION OF PROCEDURE: The patient was identified and the procedure was confirmed. The wound and surrounding area was cleaned, and draped. With the patient in supine position, the wound was evaluated. There was some necrotic tissue laterally in arm which was debrided sharply with knife. While doing so there was noted to be a large amount of pus coming from lateral arm. There was noted to be a track extending all the way up the upper arm just short of the axilla. This was opened up using 15 blade and than cautery.   Pus was below the level of the fascia and there was necrosis of ~ 10% of the bicep muscle. The wound was  debrided sharply of fibrotic, necrotic, and hyperkeratotic tissue, including a layer of surrounding healthy tissue using a curette, cautery and knife for a total surface area of 80 cm2. The skin was excised through the level of the subcutaneous tissue, muscle. 30% of the wound was debrided. Further evaluation of the wound noted that previous muscle coverage of the bypass was not adequate. Decision was made to place a piece of integra as there was no adequate tissue coverage available. ~ 30 sq cm of the integra graft was used to obtain coverage. After the wound was irrigated with normal saline solution. There was minimal bleeding that was controlled with pressure. The wound was deemed appropriate for integra and it was prepped per 's instructions. The product was applied to the wound and than secure with vicryl sutures. There was no bleeding from saphenos vein bypass. Excellent pulsatile flow was pn    Upper arm wound was packed with dakins moistened kerlex. Mid arm wound was packed with kerlex moistened. Dressed with abdominal pads, than kerlex and than kobn from fingres to axilla. Prior to the palmar flow was noted to be biphasic. Large soft tissue deficit was present. Needle, sponge and instrument counts were reported as correct x2. The patient tolerated the procedure and was transferred to the recovery area in satisfactory condition.      Electronically signed by Beverley Alex MD on 10/9/2020 at 7:54 PM     Will do daily dressing changes with saline moistened kerlex to pack wound, abdominal pads, kerlex and ace wrap to [facility] [address]

## 2020-10-09 NOTE — PLAN OF CARE
Problem: Skin Integrity:  Goal: Will show no infection signs and symptoms  Description: Will show no infection signs and symptoms  Outcome: Met This Shift  Goal: Absence of new skin breakdown  Description: Absence of new skin breakdown  Outcome: Met This Shift     Problem: Falls - Risk of:  Goal: Will remain free from falls  Description: Will remain free from falls  Outcome: Met This Shift  Goal: Absence of physical injury  Description: Absence of physical injury  Outcome: Met This Shift     Problem: Infection - Surgical Site:  Goal: Will show no infection signs and symptoms  Description: Will show no infection signs and symptoms  Outcome: Met This Shift     Problem: Pain:  Goal: Pain level will decrease  Description: Pain level will decrease  Outcome: Met This Shift  Goal: Control of acute pain  Description: Control of acute pain  Outcome: Met This Shift

## 2020-10-09 NOTE — PROGRESS NOTES
piperacillin-tazobactam 3.375 g every 8 hours and vancomycin dosed according to trough per Pharmacy for now. Follow-up blood cultures from 10/07. Follow-up tissue cultures. Continue local wound care. Follow up HIV screen. Follow up hep C viral load. Plan for wound vac placement is noted.     Thank you for involving me in the care of [place] [facility]. I will continue to follow. Please do not hesitate to call for any questions or concerns.       Electronically signed by Verónica Jang MD on 10/9/2020 at 10:31 AM

## 2020-10-09 NOTE — CONSULTS
Comprehensive Nutrition Assessment    Type and Reason for Visit:  Initial, Consult    Nutrition Recommendations/Plan:  Continue NPO  Remains NPO, pending OR. Recommend to ADAT and order ONS w/ diet advancement. Nutrition Assessment:  Pt admit 2/2 infected brachial pseudoaneurysm s/p I&D, exploration. Wound VAC. H/o IVDA. Remains NPO, pending OR. Malnutrition Assessment:  Malnutrition Status:  Insufficient data    Context:  Chronic Illness     Findings of the 6 clinical characteristics of malnutrition:  Energy Intake:  Mild decrease in energy intake (Comment)  Weight Loss:  Unable to assess     Body Fat Loss:  Unable to assess     Muscle Mass Loss:  Unable to assess    Fluid Accumulation:  No significant fluid accumulation     Strength:  Not Performed    Estimated Daily Nutrient Needs:  Energy (kcal):  MSJ 1928 x1.2= 2313; ; Weight Used for Energy Requirements:  Current     Protein (g):  120-135(1.3-1.5gm/kg CBW); Weight Used for Protein Requirements:  Current          Nutrition Related Findings:  s/p extubation, wound VAC, soft abd, active BS, +3 edema, + I/Os      Wounds:  Multiple, Open Wounds, Surgical Wound       Current Nutrition Therapies:    Diet NPO, After Midnight Exceptions are: Sips with Meds    Anthropometric Measures:  · Height: 6' 4\" (193 cm)  · Current Body Weight: 201 lb (91.2 kg)(10/9 actual)   · Admission Body Weight: 194 lb (88 kg)(10/7 no method)    · Usual Body Weight: (per EMR x1 year, 200# stated/no method)     · Ideal Body Weight: 202 lbs; % Ideal Body Weight 99.5 %   · BMI: 24.5  · BMI Categories: Normal Weight (BMI 18.5-24. 9)       Nutrition Diagnosis:   · Increased nutrient needs related to increase demand for energy/nutrients as evidenced by wounds    Nutrition Interventions:   Nutrition Education/Counseling:  Education not indicated   Coordination of Nutrition Care:  Continued Inpatient Monitoring, Coordination of Community Care    Goals:  ADAT       Nutrition

## 2020-10-09 NOTE — PROGRESS NOTES
[facility]  SURGICAL INTENSIVE CARE UNIT    CRITICAL CARE ATTENDING PROGRESS NOTE    I have examined the patient, reviewed the record, and discussed the case with the APN/  Resident. I have reviewed all relevant labs and imaging data. Please refer to the  APN/ resident's note. I agree with the  assessment and plan with the following corrections/ additions. The following summarizes my clinical findings and independent assessment. CC: Critical care for infected brachial pseudoaneurysm    S. No issues after extubation yesterday. OR today with vascular surgery    HOSPITAL COURSE:  10/7 transferred from [facility] left arm brachial artery exploration, repair of mycotic aneurysm with bypass by Dr. Shama Hernandez:  Vitals:    10/09/20 0700   BP: 118/83   Pulse: 81   Resp: 18   Temp:    SpO2: 97%     GCS 15  Flat affect  Left arm in splint, arm dressed, 2+ radial pulse  Breathing comfortably  Abdomen soft nontender nondistended      ASSESSMENT:  Active Problems:    Pseudoaneurysm of brachial artery (HCC)    Pseudoaneurysm (HCC)    Mycotic aneurysm (HCC)  Resolved Problems:    * No resolved hospital problems. *       PLAN:  Acute respiratory insufficiency-status post extubation 10/8  Monitor respiratory status    Infected pseudoaneurysm status post exploration, with bypass on 10/7  Wound VAC change on Friday with Dr. Diane Farnsworth    Oral analgesia and PRN IV Dilaudid for pain    GI: NPO for surgery    ID- on Zosyn and vancomycin. Cultures pending. ID has been consulted for management    FEN; stop IV fluids. Excellent urine output  remove Loyola catheter    Endo: Monitor Blood Sugars. Target blood glucose less than 180 in the ICU.       DVT prophylaxis--SCDS, Lovenox  GI Prophylaxis--on diet  Lines--right femoral triple-lumen catheter--DC and place picc  CODE: FULL     DISPOSITION--Tx to general floor      Anastacia Velez MD, FACS  10/9/2020  8:06 AM

## 2020-10-09 NOTE — PROGRESS NOTES
Surgical Intensive Care Unit   Daily Progress Note     Patient's name:  Kayla Giron  Age/Gender: 45 y.o. male  Date of Admission: 10/7/2020  7:06 PM  Length of Stay: 2    Reason for ICU: Infected brachial pseudoaneurysm    HPI: Bradly Connolly is a 71-year-old male who presented to [address] [place] [address] with a left forearm abscess that been progressively worsening over the last few days. He has a history of IV drug use. He was taken to the OR for I&D over the antecubital fossa. Immediately upon entering the cavity hematoma was evacuated and shortly thereafter there was noted to be pulsatile arterial bleeding coming from the wound bed. There was noted to be greater than 50% blowout of the vessel. Proximal and distal control were obtained with Vesseloops and the patient was transferred to [facility] for vascular surgery consultation. Patient arrived to surgical intensive care unit around 7 PM.  And was soon thereafter taken to the OR with Dr. Erasto Shipman for exploration. Overnight Events: no acute overnight events. Hospital Course:   10/7: Patient originally presented to [address] [place] [address],[address] for a left forearm abscess I&D. 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 He was transferred to [facility] and underwent a repair of the pseudoaneurysm with ipsilateral reversed basilic vein jump graft  10/8: no acute events overnight. Pt remained intubated post operatively. Extubated early morning  10/9: no acute events.  Pt going to OR today for re-exploration of wound and wound vac change      Problem List:   Patient Active Problem List   Diagnosis    Right arm cellulitis    IV drug abuse ([address]    Superficial foreign body of right upper arm    Cellulitis of right arm    Bacteremia    Skin ulcer of upper arm with fat layer exposed ([address]    Abscess    Pseudoaneurysm of brachial artery ([address]    Poor venous access    Pseudoaneurysm ([address]    Mycotic aneurysm ([address] Surgical/Interventional Procedures:   LEFT ARM BRACHIAL ARTERY EXPLORATION, REPAIR OF MYCOTIC ANUERYSM WITH BYPASS    Vent Settings: Additional Respiratory  Assessments  Pulse: 90  Resp: 16  SpO2: 96 %  Humidification Source: Heated wire  Humidification Temp: 37  Circuit Condensation: Drained  Oral Care: Mouth swabbed, Mouth moisturizer, Mouth suctioned, Suction toothette  ABG:   Recent Labs     10/08/20  0022   PH 7.364   PCO2 35.2   PO2 164.3*   HCO3 19.6*   BE -5.1*   O2SAT 99.1*       I/O:  I/O last 3 completed shifts: In: 3460 [P.O.:1350; I.V.:1578; IV Piggyback:1400]  Out: 3080 [Urine:2780; Drains:300]  I/O this shift:  In: 5 [P.O.:120; I.V.:25; IV Piggyback:114]  Out: 850 [Urine:800; Drains:50]  [REMOVED] Urethral Catheter 16 fr-Output (mL): 205 mL          Lines:   Right femoral triple lumen (placed 10/7); radial arterial line (placed 10/7)    Tubes: none    Drains:   Left forearm negative pressure wound therapy device    Drips: none      Physical Exam:   /80   Pulse 90   Temp 99.1 °F (37.3 °C) (Axillary)   Resp 16   Ht 6' 4\" (1.93 m)   Wt 201 lb 4.8 oz (91.3 kg)   SpO2 96%   BMI 24.50 kg/m²     Average, Min, and Max for last 24 hours Vitals:  Temp:  Temp  Av.9 °F (37.2 °C)  Min: 98.3 °F (36.8 °C)  Max: 99.7 °F (37.6 °C)  RR: Resp  Av.3  Min: 14  Max: 21  HR: Pulse  Av.6  Min: 79  Max: 797  BP:  Systolic (18OGV), NKT:146 , Min:104 , UII:313   ; Diastolic (79EIM), YEA:38, Min:73, Max:86    SpO2: SpO2  Av.7 %  Min: 95 %  Max: 100 %          Pupil size:  Left 3 mm    Right 3 mm    Pupil reaction: Yes    Wiggles fingers: Left Yes Right Yes    Hand grasp:   Left normal       Right normal    Wiggles toes: Left Yes    Right Yes    Plantar flexion: Left normal     Right normal      CONSTITUTIONAL: no acute distress, alert and oriented   NEUROLOGIC: PERRL, oriented x4  CARDIOVASCULAR: S1 S2, regular rate, regular rhythm.  Peripheral pulses intact in all extremities PULMONARY:fairly clear lungs, no increased work of breathing  RENAL: making adequate urine  ABDOMEN: soft, nontender, nondistended, nontympanic  MUSCULOSKELETAL: moves all extremities purposefully, 5/5 strength   SKIN/EXTREMITIES: left arm with wound vac in place. Splint to left elbow.  Pulses intact      ASSESSMENT / PLAN:   · Neuro: No acute issues  · Alert and oriented x4  · Pain control with oxycodone, tylenol     · CV: Hemodynamically stable  · Monitor vitals    · Pulm: Doing well post extubation   · Will monitor respiratory status    · GI: No acute issues  · Bowel regimen of glycolax and senokot  · GI prophylaxis with pepcid  · NPO for OR today, can continue diet post operatively     · Renal: Loyola removed yesterday, making adequate urine   · Making average of 84cc/hr clear yellow urine  · Monitor output     · ID: S/p evacuation of mycotic pseudoaneurysm  · F/u surgical cultures  · Monitor vitals  · Afebrile   · Continue Zosyn and vancomycin  · Last WBC count 8.3   · Will place PICC for long term antibiotic coverage  · Remove femoral triple lumen    · Endocrine: No acute issues  · Monitor blood glucose    · MSK: S/p left forearm exploration and repair of pseudoaneurysm  · Maintain left arm in straight neurtral position  · Continue splint  · Monitor wound vac output   · OR today for wound vac change and re-exploration      · Heme: Hemoglobin 8.9 from 10.2  · Monitor labs  · Transfuse as needed          Pain/Analgesia: oxycodone, tylenol  Bowel regimen: glycolax, senokot  Diet: Diet NPO, After Midnight Exceptions are: Sips with Meds  DVT proph: lovenox  GI proph: pepcid  Seizure proph: none  Glucose protocol: none  Mouth/eye care: none  Loyola: none  CVC sites: right femoral triple lumen (placed 10/7)  Ancillary consults: Vascular surgery  Family Update: as able  CODE Status: Full Code    Dispo: Can transfer to general floor      Electronically signed by Philomena Sher MD on 10/9/2020 at 5:00 AM

## 2020-10-09 NOTE — ANESTHESIA PRE PROCEDURE
Department of Anesthesiology  Preprocedure Note       Name:  Rut Woodall   Age:  45 y.o.  :  1980                                          MRN:  [id number]         Date:  10/9/2020      Surgeon: Marcellus Borges):  Destinee Macias MD    Procedure: Procedure(s):  LEFT ARM WOUND VAC EXCHANGE, POSSIBLE CLOSURE    Medications prior to admission:   Prior to Admission medications    Not on File       Current medications:    No current facility-administered medications for this visit. No current outpatient medications on file.      Facility-Administered Medications Ordered in Other Visits   Medication Dose Route Frequency Provider Last Rate Last Dose    benzocaine-menthol (CEPACOL SORE THROAT) lozenge 1 lozenge  1 lozenge Oral Q2H PRN Berle Blend, DO   1 lozenge at 10/09/20 1430    acetaminophen (TYLENOL) tablet 650 mg  650 mg Oral 3 times per day Corbin Hampton MD   650 mg at 10/09/20 1400    oxyCODONE (ROXICODONE) immediate release tablet 5 mg  5 mg Oral Q4H PRN Corbin Hampton MD        Or   [facility] oxyCODONE HCl (OXY-IR) immediate release tablet 10 mg  10 mg Oral Q4H PRN Corbin Hampton MD   10 mg at 10/09/20 1238    polyethylene glycol (GLYCOLAX) packet 17 g  17 g Oral Daily Corbin Hampton MD   17 g at 10/09/20 1192    sennosides-docusate sodium (SENOKOT-S) 8.6-50 MG tablet 2 tablet  2 tablet Oral Daily Corbin Hampton MD   2 tablet at 10/09/20 0833    vancomycin (VANCOCIN) 2,000 mg in dextrose 5 % 500 mL IVPB  2,000 mg Intravenous Q12H Rosario Abad  mL/hr at 10/09/20 0325      sodium chloride flush 0.9 % injection 10 mL  10 mL Intravenous 2 times per day Angelita Ruggiero MD   10 mL at 10/09/20 0834    sodium chloride flush 0.9 % injection 10 mL  10 mL Intravenous PRN Angelita Ruggiero MD        ondansetron (ZOFRAN-ODT) disintegrating tablet 4 mg  4 mg Oral Q8H PRN Angelita Ruggiero MD        Or    ondansetron [facility]) injection 4 mg  4 mg Intravenous Q6H PRN Angelita Ruggiero MD Ready to quit: Not Answered  Counseling given: Not Answered      Vital Signs (Current): There were no vitals filed for this visit. BP Readings from Last 3 Encounters:   10/09/20 119/77   10/07/20 (!) 134/55   10/07/20 82/60       NPO Status:  NPO > 8 hours. BMI:   Wt Readings from Last 3 Encounters:   10/09/20 201 lb 4.8 oz (91.3 kg)   10/07/20 230 lb (104.3 kg)   05/15/20 200 lb (90.7 kg)     There is no height or weight on file to calculate BMI.    CBC:   Lab Results   Component Value Date    WBC 8.3 10/09/2020    RBC 3.54 10/09/2020    HGB 8.9 10/09/2020    HCT 27.9 10/09/2020    MCV 78.8 10/09/2020    RDW 14.8 10/09/2020     10/09/2020       CMP:   Lab Results   Component Value Date     10/09/2020    K 3.8 10/09/2020    K 4.6 04/21/2020     10/09/2020    CO2 26 10/09/2020    BUN 12 10/09/2020    CREATININE 0.7 10/09/2020    GFRAA >60 10/09/2020    LABGLOM >60 10/09/2020    GLUCOSE 125 10/09/2020    PROT 5.9 10/07/2020    CALCIUM 8.3 10/09/2020    BILITOT 0.5 10/07/2020    ALKPHOS 59 10/07/2020    AST 8 10/07/2020    ALT 8 10/07/2020       POC Tests: No results for input(s): POCGLU, POCNA, POCK, POCCL, POCBUN, POCHEMO, POCHCT in the last 72 hours.     Coags:   Lab Results   Component Value Date    PROTIME 13.8 04/21/2020    INR 1.2 04/21/2020    APTT 30.3 04/21/2020       HCG (If Applicable): No results found for: PREGTESTUR, PREGSERUM, HCG, HCGQUANT     ABGs: No results found for: PHART, PO2ART, KLN3HAA, PIM0AYF, BEART, D5WIFMFJ     Type & Screen (If Applicable):  No results found for: LABABO, LABRH    Drug/Infectious Status (If Applicable):  No results found for: HIV, HEPCAB    COVID-19 Screening (If Applicable): No results found for: COVID19      CT SOFT TISSUE NECK WO CONTRAST - 4/23/20  FINDINGS:    PHARYNX/LARYNX:  No focal abnormality of the nasopharynx is noted. Chelsea Foil is    normal in caliber.  Epiglottis is normal in caliber.  Vocal cords are midline.         SALIVARY GLANDS/THYROID:  Noncontrast limited evaluation of the thyroid    gland, submandibular glands and parotid glands demonstrate no focal    abnormality.         LYMPH NODES:  Small cervical nodes are present bilaterally         SOFT TISSUES:  No soft tissue mass or fluid collection is noted.     Prevertebral soft tissues are normal in thickness.         BRAIN/ORBITS/SINUSES:  Limited intracranial evaluation demonstrates no large    area of abnormal density.  There is no midline shift.         LUNG APICES/SUPERIOR MEDIASTINUM:  Limited evaluation of the upper chest    demonstrates a punctate nodular density in the posterior aspect of the right    lung on image 15.  Punctate nodular density in the posterior right lung is    noted on image 10.  Faint ground-glass density in the right upper lung zone    is noted.  This is indeterminate may be artifactual.  Subtle bronchiolitis or    pneumonitis would be difficult to exclude.  There is no consolidation to    suggest pneumonia         BONES:  No destructive lesions are noted.  There is elongation of the sella    hyoid ligament bilaterally, slightly larger on the right.         Canal detail is limited.  No disc herniation is noted              Impression    Elongation of the stylohyoid ligaments bilaterally, right greater than left    and right larger than left.  This is a potentially symptomatic variant         No acute abnormality otherwise            EKG 12 Lead - 4/21/20  Component  Value  Ref Range & Units  Status  Collected  Lab    Ventricular Rate  105  BPM  Final  04/21/2020  5:09 PM  [facility]    Atrial Rate  105  BPM  Final  04/21/2020  5:09 PM  [facility]    P-R Interval  116  ms  Final  04/21/2020  5:09 PM  [facility]    QRS Duration  96  ms  Final  04/21/2020  5:09 PM  [facility]    Q-T Interval  334  ms  Final  04/21/2020  5:09 PM  [facility]    QTc Calculation ([facility])  441  ms  Final  04/21/2020  5:09 PM  [facility]    P Axis  56  degrees  Final  04/21/2020  5:09 PM  [facility]    R Axis  75  degrees  Final  04/21/2020  5:09 PM  [facility]    T Axis  47  degrees  Final  04/21/2020  5:09 PM  [facility]      Narrative & Impression     Sinus tachycardia  Otherwise normal ECG  No previous ECGs available  Confirmed by Sadie Pradhan ([id number]) on 4/21/2020 8:03:45 PM         ECHO Transesophageal - 4/24/20   Findings      Left Ventricle   Left ventricular ejection fraction is grossly normal.      Right Ventricle   Normal right ventricular function. Left Atrium   No evidence of thrombus within left atrium or appendage. Mitral Valve   Structurally normal mitral valve. Physiologic and/or trace mitral regurgitation is present. No vegetation noted on mitral valve. Tricuspid Valve   The tricuspid valve appears structurally normal.   Physiologic and/or trace tricuspid regurgitation. No tricuspid valve vegetation or masses visualized. Aortic Valve   The aortic valve appears mildly sclerotic. The aortic valve is trileaflet. No evidence of aortic valve regurgitation. No evidence of mass or   vegetation noted on the aortic valve. Pulmonic Valve   The pulmonic valve was not well visualized. Pericardial Effusion   No pericardial effusion. Aorta   Aortic root within normal limits. Conclusions      Summary   No comparison study available. Physiologic and/or trace mitral regurgitation is present. No vegetation noted on mitral valve. No evidence of mass or vegetation noted on the aortic valve. Physiologic and/or trace tricuspid regurgitation.       Signature      ----------------------------------------------------------------   Electronically signed by Wendy Long MD(Interpreting   physician) on 04/24/2020 07:30 PM   ----------------------------------------------------------------      Anesthesia Evaluation  Patient summary reviewed and Nursing notes reviewed no history of anesthetic complications:   Airway: Mallampati: II  TM distance: >3 FB   Neck ROM: full  Mouth opening: > = 3 FB Dental: normal exam         Pulmonary: breath sounds clear to auscultation  (+) current smoker          Patient did not smoke on day of surgery. ROS comment: Patient on 2L nasal cannula at time of evaluation, denied any SOB, \"not sure why they put it on me. Probably just for comfort. \"   Cardiovascular:Negative CV ROS          ECG reviewed  Rhythm: regular  Rate: normal  Echocardiogram reviewed                  Neuro/Psych:   (+) psychiatric history (History of IVDA):            GI/Hepatic/Renal: Neg GI/Hepatic/Renal ROS            Endo/Other:    (+) blood dyscrasia: anemia:., electrolyte abnormalities, . Pt had no PAT visit        ROS comment: C/o sore throat, denies dysphagia, no lymphedema present (recent intubation). 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 Charlie Tidwell 10/7/20. Anesthesia Plan      general     ASA 2     (PIV R upper arm    General with LMA)  Induction: intravenous. MIPS: Postoperative opioids intended and Prophylactic antiemetics administered. Anesthetic plan and risks discussed with patient. Use of blood products discussed with patient whom consented to blood products. Plan discussed with CRNA and attending.                 Clare Frias RN   10/9/2020

## 2020-10-09 NOTE — PROGRESS NOTES
Spoke with surgery and PACU, pt to go to PACU after surgery and then to transfer to [facility] once recovered. Report called to 80 and pt belongings will be taken to new room.

## 2020-10-10 LAB
ANAEROBIC CULTURE: NORMAL
ANION GAP SERPL CALCULATED.3IONS-SCNC: 10 MMOL/L (ref 7–16)
BASOPHILS ABSOLUTE: 0.03 E9/L (ref 0–0.2)
BASOPHILS RELATIVE PERCENT: 0.4 % (ref 0–2)
BUN BLDV-MCNC: 8 MG/DL (ref 6–20)
CALCIUM IONIZED: 1.26 MMOL/L (ref 1.15–1.33)
CALCIUM SERPL-MCNC: 8.6 MG/DL (ref 8.6–10.2)
CHLORIDE BLD-SCNC: 100 MMOL/L (ref 98–107)
CO2: 29 MMOL/L (ref 22–29)
CREAT SERPL-MCNC: 0.7 MG/DL (ref 0.7–1.2)
EOSINOPHILS ABSOLUTE: 0.07 E9/L (ref 0.05–0.5)
EOSINOPHILS RELATIVE PERCENT: 1 % (ref 0–6)
GFR AFRICAN AMERICAN: >60
GFR NON-AFRICAN AMERICAN: >60 ML/MIN/1.73
GLUCOSE BLD-MCNC: 117 MG/DL (ref 74–99)
HCT VFR BLD CALC: 27.8 % (ref 37–54)
HEMOGLOBIN: 8.8 G/DL (ref 12.5–16.5)
IMMATURE GRANULOCYTES #: 0.03 E9/L
IMMATURE GRANULOCYTES %: 0.4 % (ref 0–5)
LYMPHOCYTES ABSOLUTE: 1.19 E9/L (ref 1.5–4)
LYMPHOCYTES RELATIVE PERCENT: 16.2 % (ref 20–42)
MAGNESIUM: 1.7 MG/DL (ref 1.6–2.6)
MCH RBC QN AUTO: 25.1 PG (ref 26–35)
MCHC RBC AUTO-ENTMCNC: 31.7 % (ref 32–34.5)
MCV RBC AUTO: 79.2 FL (ref 80–99.9)
MONOCYTES ABSOLUTE: 0.71 E9/L (ref 0.1–0.95)
MONOCYTES RELATIVE PERCENT: 9.7 % (ref 2–12)
NEUTROPHILS ABSOLUTE: 5.31 E9/L (ref 1.8–7.3)
NEUTROPHILS RELATIVE PERCENT: 72.3 % (ref 43–80)
PDW BLD-RTO: 14.8 FL (ref 11.5–15)
PHOSPHORUS: 3.4 MG/DL (ref 2.5–4.5)
PLATELET # BLD: 324 E9/L (ref 130–450)
PMV BLD AUTO: 8.5 FL (ref 7–12)
POTASSIUM SERPL-SCNC: 3.6 MMOL/L (ref 3.5–5)
RBC # BLD: 3.51 E12/L (ref 3.8–5.8)
SODIUM BLD-SCNC: 139 MMOL/L (ref 132–146)
VANCOMYCIN TROUGH: 8.3 MCG/ML (ref 5–16)
WBC # BLD: 7.3 E9/L (ref 4.5–11.5)

## 2020-10-10 PROCEDURE — 6360000002 HC RX W HCPCS: Performed by: STUDENT IN AN ORGANIZED HEALTH CARE EDUCATION/TRAINING PROGRAM

## 2020-10-10 PROCEDURE — 83735 ASSAY OF MAGNESIUM: CPT

## 2020-10-10 PROCEDURE — 80202 ASSAY OF VANCOMYCIN: CPT

## 2020-10-10 PROCEDURE — 2580000003 HC RX 258: Performed by: STUDENT IN AN ORGANIZED HEALTH CARE EDUCATION/TRAINING PROGRAM

## 2020-10-10 PROCEDURE — 80048 BASIC METABOLIC PNL TOTAL CA: CPT

## 2020-10-10 PROCEDURE — 6370000000 HC RX 637 (ALT 250 FOR IP): Performed by: STUDENT IN AN ORGANIZED HEALTH CARE EDUCATION/TRAINING PROGRAM

## 2020-10-10 PROCEDURE — 6360000002 HC RX W HCPCS: Performed by: INTERNAL MEDICINE

## 2020-10-10 PROCEDURE — 1200000000 HC SEMI PRIVATE

## 2020-10-10 PROCEDURE — 10140 I&D HMTMA SEROMA/FLUID COLLJ: CPT | Performed by: SURGERY

## 2020-10-10 PROCEDURE — 85025 COMPLETE CBC W/AUTO DIFF WBC: CPT

## 2020-10-10 PROCEDURE — 15272 SKIN SUB GRAFT T/A/L ADD-ON: CPT | Performed by: SURGERY

## 2020-10-10 PROCEDURE — 6370000000 HC RX 637 (ALT 250 FOR IP): Performed by: INTERNAL MEDICINE

## 2020-10-10 PROCEDURE — 11043 DBRDMT MUSC&/FSCA 1ST 20/<: CPT | Performed by: SURGERY

## 2020-10-10 PROCEDURE — 84100 ASSAY OF PHOSPHORUS: CPT

## 2020-10-10 PROCEDURE — 15271 SKIN SUB GRAFT TRNK/ARM/LEG: CPT | Performed by: SURGERY

## 2020-10-10 PROCEDURE — 11046 DBRDMT MUSC&/FSCA EA ADDL: CPT | Performed by: SURGERY

## 2020-10-10 PROCEDURE — 82330 ASSAY OF CALCIUM: CPT

## 2020-10-10 PROCEDURE — 36415 COLL VENOUS BLD VENIPUNCTURE: CPT

## 2020-10-10 RX ORDER — MAGNESIUM SULFATE IN WATER 40 MG/ML
2 INJECTION, SOLUTION INTRAVENOUS ONCE
Status: COMPLETED | OUTPATIENT
Start: 2020-10-10 | End: 2020-10-10

## 2020-10-10 RX ORDER — SENNA AND DOCUSATE SODIUM 50; 8.6 MG/1; MG/1
2 TABLET, FILM COATED ORAL 2 TIMES DAILY
Status: DISCONTINUED | OUTPATIENT
Start: 2020-10-10 | End: 2020-10-16 | Stop reason: HOSPADM

## 2020-10-10 RX ORDER — LACTULOSE 10 G/15ML
20 SOLUTION ORAL 2 TIMES DAILY
Status: DISCONTINUED | OUTPATIENT
Start: 2020-10-10 | End: 2020-10-13

## 2020-10-10 RX ORDER — POTASSIUM CHLORIDE 20 MEQ/1
40 TABLET, EXTENDED RELEASE ORAL ONCE
Status: COMPLETED | OUTPATIENT
Start: 2020-10-10 | End: 2020-10-10

## 2020-10-10 RX ORDER — POLYETHYLENE GLYCOL 3350 17 G/17G
34 POWDER, FOR SOLUTION ORAL DAILY
Status: DISCONTINUED | OUTPATIENT
Start: 2020-10-11 | End: 2020-10-13

## 2020-10-10 RX ADMIN — FAMOTIDINE 20 MG: 20 TABLET ORAL at 09:39

## 2020-10-10 RX ADMIN — Medication 10 ML: at 09:39

## 2020-10-10 RX ADMIN — VANCOMYCIN HYDROCHLORIDE 2000 MG: 10 INJECTION, POWDER, LYOPHILIZED, FOR SOLUTION INTRAVENOUS at 04:48

## 2020-10-10 RX ADMIN — BENZOCAINE AND MENTHOL 1 LOZENGE: 15; 3.6 LOZENGE ORAL at 23:57

## 2020-10-10 RX ADMIN — PIPERACILLIN AND TAZOBACTAM 3.38 G: 3; .375 INJECTION, POWDER, FOR SOLUTION INTRAVENOUS at 00:29

## 2020-10-10 RX ADMIN — OXYCODONE HYDROCHLORIDE 10 MG: 10 TABLET ORAL at 20:21

## 2020-10-10 RX ADMIN — LACTULOSE 20 G: 20 SOLUTION ORAL at 09:30

## 2020-10-10 RX ADMIN — ACETAMINOPHEN 650 MG: 325 TABLET, FILM COATED ORAL at 13:49

## 2020-10-10 RX ADMIN — ENOXAPARIN SODIUM 40 MG: 40 INJECTION SUBCUTANEOUS at 08:38

## 2020-10-10 RX ADMIN — SODIUM CHLORIDE: 9 INJECTION, SOLUTION INTRAVENOUS at 22:00

## 2020-10-10 RX ADMIN — BENZOCAINE AND MENTHOL 1 LOZENGE: 15; 3.6 LOZENGE ORAL at 15:43

## 2020-10-10 RX ADMIN — OXYCODONE HYDROCHLORIDE 10 MG: 10 TABLET ORAL at 15:43

## 2020-10-10 RX ADMIN — MAGNESIUM SULFATE HEPTAHYDRATE 2 G: 40 INJECTION, SOLUTION INTRAVENOUS at 09:47

## 2020-10-10 RX ADMIN — PIPERACILLIN AND TAZOBACTAM 3.38 G: 3; .375 INJECTION, POWDER, FOR SOLUTION INTRAVENOUS at 08:46

## 2020-10-10 RX ADMIN — FAMOTIDINE 20 MG: 20 TABLET ORAL at 20:20

## 2020-10-10 RX ADMIN — DOCUSATE SODIUM 50 MG AND SENNOSIDES 8.6 MG 2 TABLET: 8.6; 5 TABLET, FILM COATED ORAL at 20:21

## 2020-10-10 RX ADMIN — ACETAMINOPHEN 650 MG: 325 TABLET, FILM COATED ORAL at 22:45

## 2020-10-10 RX ADMIN — ACETAMINOPHEN 650 MG: 325 TABLET, FILM COATED ORAL at 06:54

## 2020-10-10 RX ADMIN — PIPERACILLIN AND TAZOBACTAM 3.38 G: 3; .375 INJECTION, POWDER, FOR SOLUTION INTRAVENOUS at 16:27

## 2020-10-10 RX ADMIN — PIPERACILLIN AND TAZOBACTAM 3.38 G: 3; .375 INJECTION, POWDER, FOR SOLUTION INTRAVENOUS at 23:53

## 2020-10-10 RX ADMIN — POTASSIUM CHLORIDE 40 MEQ: 1500 TABLET, EXTENDED RELEASE ORAL at 11:30

## 2020-10-10 RX ADMIN — BENZOCAINE AND MENTHOL 1 LOZENGE: 15; 3.6 LOZENGE ORAL at 12:59

## 2020-10-10 RX ADMIN — POLYETHYLENE GLYCOL 3350 17 G: 17 POWDER, FOR SOLUTION ORAL at 09:39

## 2020-10-10 RX ADMIN — OXYCODONE HYDROCHLORIDE 10 MG: 10 TABLET ORAL at 02:51

## 2020-10-10 RX ADMIN — OXYCODONE HYDROCHLORIDE 10 MG: 10 TABLET ORAL at 06:55

## 2020-10-10 RX ADMIN — DOCUSATE SODIUM 50 MG AND SENNOSIDES 8.6 MG 2 TABLET: 8.6; 5 TABLET, FILM COATED ORAL at 09:39

## 2020-10-10 RX ADMIN — BISACODYL 10 MG: 5 TABLET, COATED ORAL at 13:49

## 2020-10-10 ASSESSMENT — PAIN DESCRIPTION - ORIENTATION: ORIENTATION: LEFT

## 2020-10-10 ASSESSMENT — PAIN DESCRIPTION - LOCATION: LOCATION: ARM

## 2020-10-10 ASSESSMENT — PAIN SCALES - GENERAL
PAINLEVEL_OUTOF10: 8
PAINLEVEL_OUTOF10: 4
PAINLEVEL_OUTOF10: 8
PAINLEVEL_OUTOF10: 3
PAINLEVEL_OUTOF10: 8
PAINLEVEL_OUTOF10: 0
PAINLEVEL_OUTOF10: 8

## 2020-10-10 ASSESSMENT — PAIN - FUNCTIONAL ASSESSMENT: PAIN_FUNCTIONAL_ASSESSMENT: PREVENTS OR INTERFERES WITH MANY ACTIVE NOT PASSIVE ACTIVITIES

## 2020-10-10 ASSESSMENT — PAIN DESCRIPTION - ONSET: ONSET: ON-GOING

## 2020-10-10 ASSESSMENT — PAIN DESCRIPTION - DESCRIPTORS: DESCRIPTORS: ACHING;DULL;DISCOMFORT

## 2020-10-10 ASSESSMENT — PAIN DESCRIPTION - PROGRESSION: CLINICAL_PROGRESSION: NOT CHANGED

## 2020-10-10 ASSESSMENT — PAIN DESCRIPTION - PAIN TYPE: TYPE: ACUTE PAIN;SURGICAL PAIN

## 2020-10-10 ASSESSMENT — PAIN DESCRIPTION - FREQUENCY: FREQUENCY: CONTINUOUS

## 2020-10-10 NOTE — PROGRESS NOTES
Pharmacy Consultation Note  (Antibiotic Dosing and Monitoring)    Initial consult date: 10/7/20  Consulting physician: Dr. Markell Blount  Drug(s): Vancomycin     Vancomycin was discontinued today per ID. Therefore, will sign off at this time. Please re-consult if needed.     Contreras Velasco PharmD, BCPS, BCCCP  10/10/2020  10:24 AM  Pager: [phone]

## 2020-10-10 NOTE — PLAN OF CARE
Problem: Skin Integrity:  Goal: Will show no infection signs and symptoms  Description: Will show no infection signs and symptoms  Outcome: Met This Shift     Problem: Skin Integrity:  Goal: Absence of new skin breakdown  Description: Absence of new skin breakdown  Outcome: Met This Shift     Problem: Falls - Risk of:  Goal: Will remain free from falls  Description: Will remain free from falls  Outcome: Met This Shift     Problem: Falls - Risk of:  Goal: Absence of physical injury  Description: Absence of physical injury  Outcome: Met This Shift     Problem: Infection - Surgical Site:  Goal: Will show no infection signs and symptoms  Description: Will show no infection signs and symptoms  Outcome: Met This Shift     Problem: Pain:  Goal: Pain level will decrease  Description: Pain level will decrease  Outcome: Met This Shift     Problem: Pain:  Goal: Control of acute pain  Description: Control of acute pain  Outcome: Met This Shift     Problem: Pain:  Goal: Control of chronic pain  Description: Control of chronic pain  Outcome: Met This Shift

## 2020-10-10 NOTE — ANESTHESIA POSTPROCEDURE EVALUATION
Department of Anesthesiology  Postprocedure Note    Patient: Jody Faye  MRN: [id number]  YOB: 1980  Date of evaluation: 10/10/2020  Time:  6:15 AM     Procedure Summary     Date:  10/09/20 Room / Location:  [place] [address] / [facility]    Anesthesia Start:  8678 Anesthesia Stop:  2001    Procedure:  DEBRIDEMENT OF SKIN, SOFT TISSUE, MUSCLE, EXPLORATION OF UPPER ARM INTEGRA GRAFT FOR COVERAGE (Left ) Diagnosis:  (.)    Surgeon:  Rosendo Bran MD Responsible Provider:  Melania Fonseca MD    Anesthesia Type:  general ASA Status:  2          Anesthesia Type: general    Lynne Phase I: Lynne Score: 10    Lynne Phase II:      Last vitals: Reviewed and per EMR flowsheets.        Anesthesia Post Evaluation    Patient location during evaluation: PACU  Patient participation: complete - patient participated  Level of consciousness: awake and alert  Airway patency: patent  Nausea & Vomiting: no nausea and no vomiting  Complications: no  Cardiovascular status: hemodynamically stable and blood pressure returned to baseline  Respiratory status: acceptable  Hydration status: euvolemic

## 2020-10-10 NOTE — PLAN OF CARE
Problem: Skin Integrity:  Goal: Will show no infection signs and symptoms  Description: Will show no infection signs and symptoms  10/10/2020 1115 by Leticia Orosco RN  Outcome: Met This Shift  10/10/2020 0127 by Jovani Lee RN  Outcome: Met This Shift  Goal: Absence of new skin breakdown  Description: Absence of new skin breakdown  10/10/2020 1115 by Leticia Orosco RN  Outcome: Met This Shift  10/10/2020 0127 by Jovani Lee RN  Outcome: Met This Shift     Problem: Falls - Risk of:  Goal: Will remain free from falls  Description: Will remain free from falls  10/10/2020 1115 by Leticia Orosco RN  Outcome: Met This Shift  10/10/2020 0127 by Jovani Lee RN  Outcome: Met This Shift  Goal: Absence of physical injury  Description: Absence of physical injury  10/10/2020 1115 by Leticia Orosco RN  Outcome: Met This Shift  10/10/2020 0127 by Jovani Lee RN  Outcome: Met This Shift     Problem: Infection - Surgical Site:  Goal: Will show no infection signs and symptoms  Description: Will show no infection signs and symptoms  10/10/2020 1115 by Leticia Orosco RN  Outcome: Met This Shift  10/10/2020 0127 by Jovani Lee RN  Outcome: Met This Shift     Problem: Pain:  Goal: Pain level will decrease  Description: Pain level will decrease  10/10/2020 1115 by Leticia Orosco RN  Outcome: Met This Shift  10/10/2020 0127 by Jovani Lee RN  Outcome: Met This Shift  Goal: Control of acute pain  Description: Control of acute pain  10/10/2020 1115 by Leticia Orosco RN  Outcome: Met This Shift  10/10/2020 0127 by Jovani Lee RN  Outcome: Met This Shift  Goal: Control of chronic pain  Description: Control of chronic pain  10/10/2020 1115 by Leticia Orosco RN  Outcome: Met This Shift  10/10/2020 0127 by Jovani Lee RN  Outcome: Met This Shift

## 2020-10-10 NOTE — PROGRESS NOTES
Hospitalist Progress Note      PCP: No primary care provider on file. Date of Admission: 10/7/2020    Chief Complaint: pseudoaneurysm  Left arm      Hospital Course: **has a known history of IVDA. Presented with a pseudoaneurysm of the left brachial artery, had exploratory surgery and repair of mycotic aneurysm with a by pass by vascular. Refusing IVDA intervention at this time** has not had a bm will start lactulose   *        Subjective: ** sore throat, better with cepacol      Medications:  Reviewed    Infusion Medications   Scheduled Medications    lactulose  20 g Oral BID    [START ON 10/11/2020] polyethylene glycol  34 g Oral Daily    sennosides-docusate sodium  2 tablet Oral BID    bisacodyl  10 mg Oral Daily    acetaminophen  650 mg Oral 3 times per day    sodium chloride flush  10 mL Intravenous 2 times per day    famotidine  20 mg Oral BID    enoxaparin  40 mg Subcutaneous Daily    piperacillin-tazobactam  3.375 g Intravenous Q8H    sodium chloride   Intravenous Q8H     PRN Meds: benzocaine-menthol, oxyCODONE **OR** oxyCODONE, sodium chloride flush, ondansetron **OR** ondansetron      Intake/Output Summary (Last 24 hours) at 10/10/2020 1509  Last data filed at 10/10/2020 1451  Gross per 24 hour   Intake 1600 ml   Output 1950 ml   Net -350 ml       Exam:    /87   Pulse 96   Temp 99.8 °F (37.7 °C) (Temporal)   Resp 18   Ht 6' 4\" (1.93 m)   Wt 201 lb 4.8 oz (91.3 kg)   SpO2 97%   BMI 24.50 kg/m²       Gen: * well developed  HEENT: NC/AT, moist mucous membranes,   Neck: supple, trachea midline, no anterior cervical or SC LAD  Heart:  Normal s1/s2, RRR, no murmurs, gallops, or rubs. Lungs:  **cta  bilaterally, *  Abd: bowel sounds present, soft, nontender, nondistended, no masses  Extrem:  No clubbing, cyanosis,  Left arm** edema ace wrap in tact and dry   Skin: no rashes or lesions  Psych: A & O x3  Neuro: grossly intact, moves all four extremities.     Capillary Refill: Brisk,< 3 seconds   Peripheral Pulses: +2 palpable, equal bilaterally                Labs:   Recent Labs     10/08/20  0515 10/09/20  0215 10/10/20  0329   WBC 10.4 8.3 7.3   HGB 10.2* 8.9* 8.8*   HCT 31.4* 27.9* 27.8*    291 324     Recent Labs     10/08/20  0515 10/09/20  0215 10/10/20  0329    136 139   K 4.2 3.8 3.6    101 100   CO2 23 26 29   BUN 8 12 8   CREATININE 0.6* 0.7 0.7   CALCIUM 8.6 8.3* 8.6   PHOS 3.1 2.1* 3.4     Recent Labs     10/07/20  1915 10/07/20  2315   AST 8 8   ALT 9 8   BILITOT 0.3 0.5   ALKPHOS 61 59     No results for input(s): INR in the last 72 hours. No results for input(s): Towana Ball in the last 72 hours.   Recent Labs     10/07/20  1915 10/07/20  2315   AST 8 8   ALT 9 8   BILITOT 0.3 0.5   ALKPHOS 61 59     Recent Labs     10/07/20  1915 10/07/20  2315   LACTA 0.7 0.6     No results found for: Alo Brayantrinidadtiffani  No results found for: AMMONIA    Assessment:    Active Hospital Problems    Diagnosis Date Noted    Mycotic aneurysm ([facility] [I72.9]     Pseudoaneurysm of brachial artery ([facility] [I72.1] 10/07/2020    Pseudoaneurysm ([facility] [I72.9] 10/07/2020   hypokalemia  Constipation  IVDA  Sore throat  Plan:  cepacol   cont zosyn   cont vanc        DVT Prophylaxis: *lovenox  Diet: Diet NPO, After Midnight Exceptions are: Sips with Meds  Code Status: Full Code     PT/OT Eval Status: *na     Dispo - *home           Electronically signed by Daniel Pozo DO on 10/10/2020 at 3:09 PM [facility]

## 2020-10-10 NOTE — PROGRESS NOTES
VASCULAR SURGERY  DAILY PROGRESS NOTE  10/10/2020    Subjective:  Patient states he is doing well. Reports passing flatus, but no BMs. Denies any nausea or vomiting. Objective:  BP (!) 151/81   Pulse 91   Temp 98.4 °F (36.9 °C) (Temporal)   Resp 18   Ht 6' 4\" (1.93 m)   Wt 201 lb 4.8 oz (91.3 kg)   SpO2 97%   BMI 24.50 kg/m²     CONSTITUTIONAL: no acute distress, lying in hospital bed, intubated, sedated  CARDIOVASCULAR: RR and normotensive  PULMONARY: No increased work of breathing  ABDOMEN: soft, nontender, nondistended. MUSCULOSKELETAL: moves all extremities purposefully, 2+ radial and ulnar pulses bilaterally. 5/5 strength bilateral upper extremities. Left forearm wrapped with dressing clean and dry    Assessment/Plan:  45 y.o. 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 OR 10/10 for exploration of left upper arm, evacuation of pus, infected hematoma.     - Overall care per primary  - ABX per ID  - Start bowel regimen  - PICC line  - Change dressing with saline moistened kerlex to pack wound, abdominal pads, kerlex and ace wrap    Electronically signed by Betsy Aviles MD on 10/10/2020 at 8:38 AM     Pt seen and examined    Daily dressing changes  Coverage obtained with integra    Neurovascular intact    Nutrition  Camilo Goel

## 2020-10-10 NOTE — PROGRESS NOTES
with dressing and Ace wrap in place    Labs, imaging, and medical records/notes were personally reviewed. Assessment:  Infected ruptured left brachial artery aneurysm, s/p  left arm brachial artery exploration, excision of mycotic aneurysm with jump graft on 10/07  Injection drug use  Hep C Ab positive     Plan:  Continue piperacillin-tazobactam 3.375 g every 8 hours for now pending tissue cultures. Discontinue vancomycin. Follow-up blood cultures from 10/07. Follow-up tissue cultures. Continue local wound care. Follow up hep C viral load.     Thank you for involving me in the care of Mike Dixonhaway. I will continue to follow. Please do not hesitate to call for any questions or concerns.       Electronically signed by Edison Severin, MD on 10/10/2020 at 9:56 AM

## 2020-10-11 LAB
ANAEROBIC CULTURE: ABNORMAL
CULTURE SURGICAL: ABNORMAL
ORGANISM: ABNORMAL

## 2020-10-11 PROCEDURE — 6360000002 HC RX W HCPCS: Performed by: STUDENT IN AN ORGANIZED HEALTH CARE EDUCATION/TRAINING PROGRAM

## 2020-10-11 PROCEDURE — 1200000000 HC SEMI PRIVATE

## 2020-10-11 PROCEDURE — 88300 SURGICAL PATH GROSS: CPT

## 2020-10-11 PROCEDURE — 6370000000 HC RX 637 (ALT 250 FOR IP): Performed by: STUDENT IN AN ORGANIZED HEALTH CARE EDUCATION/TRAINING PROGRAM

## 2020-10-11 PROCEDURE — 88305 TISSUE EXAM BY PATHOLOGIST: CPT

## 2020-10-11 PROCEDURE — 2580000003 HC RX 258: Performed by: STUDENT IN AN ORGANIZED HEALTH CARE EDUCATION/TRAINING PROGRAM

## 2020-10-11 RX ORDER — POTASSIUM CHLORIDE 20 MEQ/1
40 TABLET, EXTENDED RELEASE ORAL ONCE
Status: DISCONTINUED | OUTPATIENT
Start: 2020-10-11 | End: 2020-10-11

## 2020-10-11 RX ADMIN — BENZOCAINE AND MENTHOL 1 LOZENGE: 15; 3.6 LOZENGE ORAL at 21:56

## 2020-10-11 RX ADMIN — OXYCODONE HYDROCHLORIDE 10 MG: 10 TABLET ORAL at 17:36

## 2020-10-11 RX ADMIN — Medication 10 ML: at 21:00

## 2020-10-11 RX ADMIN — OXYCODONE HYDROCHLORIDE 10 MG: 10 TABLET ORAL at 12:45

## 2020-10-11 RX ADMIN — Medication 10 ML: at 08:03

## 2020-10-11 RX ADMIN — SODIUM CHLORIDE: 9 INJECTION, SOLUTION INTRAVENOUS at 11:03

## 2020-10-11 RX ADMIN — ACETAMINOPHEN 650 MG: 325 TABLET, FILM COATED ORAL at 21:56

## 2020-10-11 RX ADMIN — POLYETHYLENE GLYCOL 3350 34 G: 17 POWDER, FOR SOLUTION ORAL at 08:03

## 2020-10-11 RX ADMIN — OXYCODONE HYDROCHLORIDE 10 MG: 10 TABLET ORAL at 02:30

## 2020-10-11 RX ADMIN — PIPERACILLIN AND TAZOBACTAM 3.38 G: 3; .375 INJECTION, POWDER, FOR SOLUTION INTRAVENOUS at 08:02

## 2020-10-11 RX ADMIN — OXYCODONE HYDROCHLORIDE 10 MG: 10 TABLET ORAL at 08:00

## 2020-10-11 RX ADMIN — FAMOTIDINE 20 MG: 20 TABLET ORAL at 08:00

## 2020-10-11 RX ADMIN — BISACODYL 10 MG: 5 TABLET, COATED ORAL at 08:00

## 2020-10-11 RX ADMIN — DOCUSATE SODIUM 50 MG AND SENNOSIDES 8.6 MG 2 TABLET: 8.6; 5 TABLET, FILM COATED ORAL at 08:01

## 2020-10-11 RX ADMIN — ENOXAPARIN SODIUM 40 MG: 40 INJECTION SUBCUTANEOUS at 08:01

## 2020-10-11 RX ADMIN — SODIUM CHLORIDE, PRESERVATIVE FREE 10 ML: 5 INJECTION INTRAVENOUS at 23:24

## 2020-10-11 RX ADMIN — ACETAMINOPHEN 650 MG: 325 TABLET, FILM COATED ORAL at 06:07

## 2020-10-11 RX ADMIN — PIPERACILLIN AND TAZOBACTAM 3.38 G: 3; .375 INJECTION, POWDER, FOR SOLUTION INTRAVENOUS at 16:02

## 2020-10-11 RX ADMIN — SODIUM CHLORIDE: 9 INJECTION, SOLUTION INTRAVENOUS at 04:06

## 2020-10-11 RX ADMIN — PIPERACILLIN AND TAZOBACTAM 3.38 G: 3; .375 INJECTION, POWDER, FOR SOLUTION INTRAVENOUS at 23:24

## 2020-10-11 RX ADMIN — OXYCODONE HYDROCHLORIDE 10 MG: 10 TABLET ORAL at 21:56

## 2020-10-11 ASSESSMENT — PAIN SCALES - GENERAL
PAINLEVEL_OUTOF10: 9
PAINLEVEL_OUTOF10: 8
PAINLEVEL_OUTOF10: 2
PAINLEVEL_OUTOF10: 0
PAINLEVEL_OUTOF10: 3
PAINLEVEL_OUTOF10: 8
PAINLEVEL_OUTOF10: 3
PAINLEVEL_OUTOF10: 8

## 2020-10-11 ASSESSMENT — PAIN DESCRIPTION - FREQUENCY
FREQUENCY: INTERMITTENT

## 2020-10-11 ASSESSMENT — PAIN DESCRIPTION - ORIENTATION
ORIENTATION: LEFT

## 2020-10-11 ASSESSMENT — PAIN DESCRIPTION - LOCATION
LOCATION: ARM

## 2020-10-11 ASSESSMENT — PAIN DESCRIPTION - DESCRIPTORS
DESCRIPTORS: ACHING;DISCOMFORT

## 2020-10-11 ASSESSMENT — PAIN DESCRIPTION - PAIN TYPE
TYPE: SURGICAL PAIN

## 2020-10-11 NOTE — PROGRESS NOTES
Hospitalist Progress Note      PCP: No primary care provider on file. Date of Admission: 10/7/2020    Chief Complaint: pseudoaneurysm  Left arm      Hospital Course: **has a known history of IVDA. Presented with a pseudoaneurysm of the left brachial artery, had exploratory surgery and repair of mycotic aneurysm with a by pass by vascular.  Refusing IVDA intervention at this time** has not had a bm will started  Lactulose** dressing change today   *      Subjective: * no complaints      Medications:  Reviewed    Infusion Medications   Scheduled Medications    lactulose  20 g Oral BID    polyethylene glycol  34 g Oral Daily    sennosides-docusate sodium  2 tablet Oral BID    bisacodyl  10 mg Oral Daily    acetaminophen  650 mg Oral 3 times per day    sodium chloride flush  10 mL Intravenous 2 times per day    famotidine  20 mg Oral BID    enoxaparin  40 mg Subcutaneous Daily    piperacillin-tazobactam  3.375 g Intravenous Q8H    sodium chloride   Intravenous Q8H     PRN Meds: benzocaine-menthol, oxyCODONE **OR** oxyCODONE, sodium chloride flush, ondansetron **OR** ondansetron      Intake/Output Summary (Last 24 hours) at 10/11/2020 1412  Last data filed at 10/11/2020 1354  Gross per 24 hour   Intake 1080 ml   Output 1250 ml   Net -170 ml       Exam:    /78   Pulse 87   Temp 97.5 °F (36.4 °C) (Temporal)   Resp 20   Ht 6' 4\" (1.93 m)   Wt 201 lb 4.8 oz (91.3 kg)   SpO2 95%   BMI 24.50 kg/m²       Gen: * well developed  HEENT: NC/AT, moist mucous membranes,   Neck: supple, trachea midline,   Heart:  Normal s1/s2, RRR, no murmurs, gallops, or rubs.    Lungs:  **cta  bilaterally, *  Abd: bowel sounds present, soft, nontender, nondistended, no masses  Extrem:  No clubbing, cyanosis,  Left arm** edema ace wrap in tact and dry   Skin: no rashes or lesions  Psych: A & O x3  Neuro: grossly intact, moves all four extremities.    Capillary Refill: Brisk,< 3 seconds   Peripheral Pulses: +2 palpable, equal bilaterally             Labs:   Recent Labs     10/09/20  0215 10/10/20  0329   WBC 8.3 7.3   HGB 8.9* 8.8*   HCT 27.9* 27.8*    324     Recent Labs     10/09/20  0215 10/10/20  0329    139   K 3.8 3.6    100   CO2 26 29   BUN 12 8   CREATININE 0.7 0.7   CALCIUM 8.3* 8.6   PHOS 2.1* 3.4     No results for input(s): AST, ALT, BILIDIR, BILITOT, ALKPHOS in the last 72 hours. No results for input(s): INR in the last 72 hours. No results for input(s): Annie Comment in the last 72 hours. No results for input(s): AST, ALT, ALB, BILIDIR, BILITOT, ALKPHOS in the last 72 hours. No results for input(s): LACTA in the last 72 hours.   No results found for: Farjaron Mckeon  No results found for: AMMONIA    Assessment:    Active Hospital Problems    Diagnosis Date Noted    Mycotic aneurysm ([facility] [address] [I72.9]     Pseudoaneurysm of brachial artery ([facility] [address] [I72.1] 10/07/2020    Pseudoaneurysm ([facility] [address] [I72.9] 10/07/2020   hypokalemia  Constipation  IVDA  Sore throat  Plan:  cepacol   cont zosyn   cont vanc        DVT Prophylaxis: *lovenox  Diet: Diet NPO, After Midnight Exceptions are: Sips with Meds  Code Status: Full Code     PT/OT Eval Status: *na     Dispo - *home          Electronically signed by Lupis Arias DO on 10/11/2020 at 2:12 PM [facility]

## 2020-10-11 NOTE — PLAN OF CARE
Problem: Skin Integrity:  Goal: Will show no infection signs and symptoms  Description: Will show no infection signs and symptoms  Outcome: Met This Shift  Goal: Absence of new skin breakdown  Description: Absence of new skin breakdown  10/11/2020 1509 by Castillo Rhodes RN  Outcome: Met This Shift  10/11/2020 0158 by Darling Nicholson  Outcome: Ongoing     Problem: Falls - Risk of:  Goal: Will remain free from falls  Description: Will remain free from falls  Outcome: Met This Shift  Goal: Absence of physical injury  Description: Absence of physical injury  10/11/2020 1509 by Castillo Rhodes RN  Outcome: Met This Shift  10/11/2020 0158 by Darling Nicholson  Outcome: Ongoing     Problem: Infection - Surgical Site:  Goal: Will show no infection signs and symptoms  Description: Will show no infection signs and symptoms  Outcome: Met This Shift     Problem: Pain:  Goal: Pain level will decrease  Description: Pain level will decrease  Outcome: Met This Shift  Goal: Control of acute pain  Description: Control of acute pain  Outcome: Met This Shift  Goal: Control of chronic pain  Description: Control of chronic pain  Outcome: Met This Shift

## 2020-10-11 NOTE — PROGRESS NOTES
VASCULAR SURGERY  DAILY PROGRESS NOTE  10/11/2020    Subjective:  No acute events overnight  Dressing was changed this AM  Pulses were felt in the R extremity, able to move fingers  Denies any nausea or vomiting. Objective:  /78   Pulse 87   Temp 97.5 °F (36.4 °C) (Temporal)   Resp 20   Ht 6' 4\" (1.93 m)   Wt 201 lb 4.8 oz (91.3 kg)   SpO2 95%   BMI 24.50 kg/m²     GENERAL:  Laying in bed, awake, alert, cooperative, no apparent distress  HEAD: Normocephalic  EYES: No sclera icterus, pupils equal  LUNGS:  No increased work of breathing  CARDIOVASCULAR:  RR  ABDOMEN:  Soft, non-tender, non-distended  EXTREMITIES: No edema or swelling. Except for the right Upper extremity. There is edema in the fingers. Appropriately tender over incision      Assessment/Plan:  45 y.o. male male left forearm mycotic aneurysm s/p exploration and resection with ipsilateral basilic vein jump graft 40/2. OR 10/10 for exploration of left upper arm, evacuation of pus, infected hematoma.     - Changed dressing this AM with saline moistened kerlex to pack wound, abdominal pads, kerlex and ace wrap  - Continue piperacillin-tazobactam   - Overall care per primary      Electronically signed by Jessica Reddy DO on 10/11/2020 at 8:32 AM     Pt seen and examined  No acute issues  Handgrip intact  Triphasic flow in palm  Pain controlled    Daily dressing changes    May benefit from [address] for local wound care, iv abx, pt for a couple weeks to get wound going in right direction    Patricia

## 2020-10-11 NOTE — PROGRESS NOTES
CHERYL PROGRESS NOTE      Chief complaint: Follow-up of infected aneurysm    The patient is a 45 y.o. male with history of injection drug use, reactive hep C Ab, MSSA bacteremia and right arm abscess in 04/2020 for which he underwent I&D and  Was seen by Dr. Kandace Rodrigues and given a 4 week course of cefazolin and ciprofloxacin, transferred to [facility] intervention from [facility] where he initially presented on 10/07 with left arm pain and swelling for 2-3 days for which he was seen by Dr. Kandace Rodrigues, started on piperacillin-tazobactam and vancomycin, underwent left elbow I&D on 10/07 during which infected brachial pseudoaneurysm was noted prompting transfer to [facility]. Tissue cultures showed heavy growth of anaerobic Gram-positive cocci and Gram-negative rods.     On transfer, he was septic with fever up to 102.1 °F and leukocytosis of 13,000. HIV screen is nonreactive. He underwent left arm brachial artery exploration, excision of mycotic aneurysm with jump graft. Tissue Gram stain and culture showed rare polymorphonuclear leukocytes, no epithelial cells, moderate gram-negative rods, rare gram-positive cocci in chains, light growth of Streptococcus anginosus (susceptible to penicillin and fluoroquinolones) and beta Streptococcus group F, . Piperacillin-tazobactam and vancomycin were resumed. He underwent exploration of left upper arm, evacuation of pus and infected hematoma, debridement of left upper arm wound, coverage of saphenous vein bypass with Integra on 10/09 during which pus from lateral upper arm, necrotic subcutaneous tissue and muscle were noted. Subjective: Patient was seen and examined. No chills, no abdominal pain, no diarrhea, no rash, no itching. He reports pain on left arm is controlled.       Objective:    Vitals:    10/11/20 0000   BP: 126/78   Pulse: 87   Resp: 20   Temp: 97.5 °F (36.4 °C)   SpO2: 95%     Constitutional: Alert, not in distress  Respiratory: Clear breath sounds, no crackles, no wheezes  Cardiovascular: Regular rate and rhythm, no murmurs  Gastrointestinal: Bowel sounds present, soft, nontender  Skin: Warm and dry, no active dermatoses  Musculoskeletal: Left arm with dressing and Ace wrap in place    Labs, imaging, and medical records/notes were personally reviewed. Assessment:  Infected ruptured left brachial artery aneurysm, s/p  left arm brachial artery exploration, excision of mycotic aneurysm with jump graft on 10/07  Injection drug use  Hep C Ab positive     Plan:  Continue piperacillin-tazobactam 3.375 g every 8 hours for now pending tissue cultures. Plan to switch to oral antibiotic regimen on discharge. Follow-up blood cultures from 10/07. Follow-up tissue cultures. Continue local wound care. Follow up hep C viral load.     Thank you for involving me in the care of Mike Sheikh. I will continue to follow. Please do not hesitate to call for any questions or concerns.       Electronically signed by Venus Frankel, MD on 10/11/2020 at 11:30 AM

## 2020-10-12 LAB
ANION GAP SERPL CALCULATED.3IONS-SCNC: 14 MMOL/L (ref 7–16)
ANISOCYTOSIS: ABNORMAL
BASOPHILS ABSOLUTE: 0.07 E9/L (ref 0–0.2)
BASOPHILS RELATIVE PERCENT: 0.9 % (ref 0–2)
BLOOD CULTURE, ROUTINE: NORMAL
BUN BLDV-MCNC: 11 MG/DL (ref 6–20)
CALCIUM IONIZED: 1.33 MMOL/L (ref 1.15–1.33)
CALCIUM SERPL-MCNC: 9.1 MG/DL (ref 8.6–10.2)
CHLORIDE BLD-SCNC: 100 MMOL/L (ref 98–107)
CO2: 27 MMOL/L (ref 22–29)
CREAT SERPL-MCNC: 0.7 MG/DL (ref 0.7–1.2)
CULTURE, BLOOD 2: NORMAL
EOSINOPHILS ABSOLUTE: 0.19 E9/L (ref 0.05–0.5)
EOSINOPHILS RELATIVE PERCENT: 2.6 % (ref 0–6)
GFR AFRICAN AMERICAN: >60
GFR NON-AFRICAN AMERICAN: >60 ML/MIN/1.73
GLUCOSE BLD-MCNC: 98 MG/DL (ref 74–99)
HCT VFR BLD CALC: 29.5 % (ref 37–54)
HEMOGLOBIN: 9.4 G/DL (ref 12.5–16.5)
HYPOCHROMIA: ABNORMAL
LYMPHOCYTES ABSOLUTE: 2.07 E9/L (ref 1.5–4)
LYMPHOCYTES RELATIVE PERCENT: 27.8 % (ref 20–42)
MAGNESIUM: 1.9 MG/DL (ref 1.6–2.6)
MCH RBC QN AUTO: 25.2 PG (ref 26–35)
MCHC RBC AUTO-ENTMCNC: 31.9 % (ref 32–34.5)
MCV RBC AUTO: 79.1 FL (ref 80–99.9)
MONOCYTES ABSOLUTE: 0.44 E9/L (ref 0.1–0.95)
MONOCYTES RELATIVE PERCENT: 6.1 % (ref 2–12)
NEUTROPHILS ABSOLUTE: 4.66 E9/L (ref 1.8–7.3)
NEUTROPHILS RELATIVE PERCENT: 62.6 % (ref 43–80)
OVALOCYTES: ABNORMAL
PDW BLD-RTO: 14.4 FL (ref 11.5–15)
PHOSPHORUS: 3.8 MG/DL (ref 2.5–4.5)
PLATELET # BLD: 405 E9/L (ref 130–450)
PMV BLD AUTO: 9.3 FL (ref 7–12)
POIKILOCYTES: ABNORMAL
POLYCHROMASIA: ABNORMAL
POTASSIUM SERPL-SCNC: 4.3 MMOL/L (ref 3.5–5)
RBC # BLD: 3.73 E12/L (ref 3.8–5.8)
SODIUM BLD-SCNC: 141 MMOL/L (ref 132–146)
WBC # BLD: 7.4 E9/L (ref 4.5–11.5)

## 2020-10-12 PROCEDURE — 36415 COLL VENOUS BLD VENIPUNCTURE: CPT

## 2020-10-12 PROCEDURE — 6370000000 HC RX 637 (ALT 250 FOR IP): Performed by: STUDENT IN AN ORGANIZED HEALTH CARE EDUCATION/TRAINING PROGRAM

## 2020-10-12 PROCEDURE — 83735 ASSAY OF MAGNESIUM: CPT

## 2020-10-12 PROCEDURE — 84100 ASSAY OF PHOSPHORUS: CPT

## 2020-10-12 PROCEDURE — 2580000003 HC RX 258: Performed by: STUDENT IN AN ORGANIZED HEALTH CARE EDUCATION/TRAINING PROGRAM

## 2020-10-12 PROCEDURE — 97161 PT EVAL LOW COMPLEX 20 MIN: CPT

## 2020-10-12 PROCEDURE — 85025 COMPLETE CBC W/AUTO DIFF WBC: CPT

## 2020-10-12 PROCEDURE — 80048 BASIC METABOLIC PNL TOTAL CA: CPT

## 2020-10-12 PROCEDURE — 6360000002 HC RX W HCPCS: Performed by: STUDENT IN AN ORGANIZED HEALTH CARE EDUCATION/TRAINING PROGRAM

## 2020-10-12 PROCEDURE — 97165 OT EVAL LOW COMPLEX 30 MIN: CPT

## 2020-10-12 PROCEDURE — 1200000000 HC SEMI PRIVATE

## 2020-10-12 PROCEDURE — 82330 ASSAY OF CALCIUM: CPT

## 2020-10-12 RX ORDER — SODIUM CHLORIDE 9 MG/ML
1000 INJECTION, SOLUTION INTRAVENOUS ONCE
Status: COMPLETED | OUTPATIENT
Start: 2020-10-14 | End: 2020-10-14

## 2020-10-12 RX ADMIN — BENZOCAINE AND MENTHOL 1 LOZENGE: 15; 3.6 LOZENGE ORAL at 20:27

## 2020-10-12 RX ADMIN — PIPERACILLIN AND TAZOBACTAM 3.38 G: 3; .375 INJECTION, POWDER, FOR SOLUTION INTRAVENOUS at 08:26

## 2020-10-12 RX ADMIN — OXYCODONE HYDROCHLORIDE 10 MG: 10 TABLET ORAL at 20:26

## 2020-10-12 RX ADMIN — PIPERACILLIN AND TAZOBACTAM 3.38 G: 3; .375 INJECTION, POWDER, FOR SOLUTION INTRAVENOUS at 23:35

## 2020-10-12 RX ADMIN — PIPERACILLIN AND TAZOBACTAM 3.38 G: 3; .375 INJECTION, POWDER, FOR SOLUTION INTRAVENOUS at 15:37

## 2020-10-12 RX ADMIN — OXYCODONE HYDROCHLORIDE 10 MG: 10 TABLET ORAL at 06:15

## 2020-10-12 RX ADMIN — ACETAMINOPHEN 650 MG: 325 TABLET, FILM COATED ORAL at 06:15

## 2020-10-12 RX ADMIN — OXYCODONE HYDROCHLORIDE 10 MG: 10 TABLET ORAL at 02:15

## 2020-10-12 RX ADMIN — SODIUM CHLORIDE, PRESERVATIVE FREE 10 ML: 5 INJECTION INTRAVENOUS at 23:35

## 2020-10-12 RX ADMIN — OXYCODONE HYDROCHLORIDE 10 MG: 10 TABLET ORAL at 14:41

## 2020-10-12 RX ADMIN — ACETAMINOPHEN 650 MG: 325 TABLET, FILM COATED ORAL at 14:41

## 2020-10-12 RX ADMIN — FAMOTIDINE 20 MG: 20 TABLET ORAL at 20:26

## 2020-10-12 RX ADMIN — BENZOCAINE AND MENTHOL 1 LOZENGE: 15; 3.6 LOZENGE ORAL at 08:26

## 2020-10-12 RX ADMIN — Medication 10 ML: at 08:26

## 2020-10-12 RX ADMIN — OXYCODONE HYDROCHLORIDE 10 MG: 10 TABLET ORAL at 10:17

## 2020-10-12 RX ADMIN — Medication 10 ML: at 20:28

## 2020-10-12 RX ADMIN — ACETAMINOPHEN 650 MG: 325 TABLET, FILM COATED ORAL at 20:26

## 2020-10-12 RX ADMIN — FAMOTIDINE 20 MG: 20 TABLET ORAL at 08:25

## 2020-10-12 ASSESSMENT — PAIN SCALES - GENERAL
PAINLEVEL_OUTOF10: 10
PAINLEVEL_OUTOF10: 8
PAINLEVEL_OUTOF10: 6
PAINLEVEL_OUTOF10: 0
PAINLEVEL_OUTOF10: 8
PAINLEVEL_OUTOF10: 8
PAINLEVEL_OUTOF10: 9

## 2020-10-12 ASSESSMENT — PAIN DESCRIPTION - PAIN TYPE
TYPE: ACUTE PAIN
TYPE: ACUTE PAIN;SURGICAL PAIN
TYPE: ACUTE PAIN;SURGICAL PAIN

## 2020-10-12 ASSESSMENT — PAIN DESCRIPTION - LOCATION
LOCATION: ARM

## 2020-10-12 ASSESSMENT — PAIN DESCRIPTION - DESCRIPTORS
DESCRIPTORS: CRAMPING;CONSTANT;DISCOMFORT
DESCRIPTORS: ACHING;DISCOMFORT;SORE
DESCRIPTORS: ACHING;DISCOMFORT;SORE

## 2020-10-12 ASSESSMENT — PAIN DESCRIPTION - ONSET
ONSET: GRADUAL
ONSET: GRADUAL
ONSET: AWAKENED FROM SLEEP

## 2020-10-12 ASSESSMENT — PAIN DESCRIPTION - ORIENTATION
ORIENTATION: LEFT

## 2020-10-12 ASSESSMENT — PAIN DESCRIPTION - PROGRESSION: CLINICAL_PROGRESSION: NOT CHANGED

## 2020-10-12 ASSESSMENT — PAIN - FUNCTIONAL ASSESSMENT: PAIN_FUNCTIONAL_ASSESSMENT: PREVENTS OR INTERFERES SOME ACTIVE ACTIVITIES AND ADLS

## 2020-10-12 ASSESSMENT — PAIN DESCRIPTION - FREQUENCY
FREQUENCY: INTERMITTENT

## 2020-10-12 NOTE — CARE COORDINATION
Referral made to Malissa Torres at [facility] by Dr. Mahalia Babinski, vascular surgery. Await acceptance.   Tommy Stout RN CM

## 2020-10-12 NOTE — PROGRESS NOTES
OCCUPATIONAL THERAPY INITIAL EVALUATION      Date:10/12/2020  Patient Name: Daisy Ashley  MRN: [id number]  : 1980  Room: [address] [place] [address]    Evaluating OT: NEELAM Freeman, OTR/L [id number]    AM-PAC Daily Activity Raw Score:   Recommended Adaptive Equipment: none     Diagnosis: pseudoaneurysm   Referring Practitioner: Reba Giron MD   Surgery: 107 I&D and brachial artery exploration repair LUE, 10/8 repair of mycotic aneurysm, 10/10 DEBRIDEMENT OF SKIN, SOFT TISSUE, MUSCLE, EXPLORATION OF UPPER ARM INTEGRA GRAFT FOR COVERAGE (Left )   Pertinent Medical History: none on file   Precautions:  Falls, NWB LUE (therapist perfectserved  from vascular and he read the perfect serve and did not answer)     Home Living: Pt lives with brother and mother in a 1 story home; bed/bath on main level   Bathroom setup: tub/shower unit   Equipment owned: none  Prior Level of Function: IND with ADLs /IADLs; using no AD for functional mobility   Driving: yes  Occupation: management    Pain Level: 3/10 LUE  Cognition: A&O: 4/4; Follows 1 step directions    Memory:  good    Sequencing:  good    Problem solving:  good    Judgement/safety:  good     Functional Assessment:   Initial Eval Status  Date: 10/12/20     Feeding Mod I (pt reporting irritation and throat feeling \"raw\" due to  s/p intubated. He reports no food getting stuck. Therapist educated pt to report to RN if any other issues arise in case a SLP eval is warranted)      Grooming IND (standing)      UB Dressing Mod I (educated to thread LUE in shirt first for ease with donning)     LB Dressing Mod I (pt able to doff/hawk B socks)     Bathing SUP (simulated)     Toileting Mod I     Bed Mobility  Log roll: IND  Supine to sit: IND   Sit to supine: IND      Functional Transfers Sit to stand: Mod I   Stand to sit:Mod I  Commode: Mod I     Functional Mobility Mod I (using no AD, to/from bathroom)     Balance Sitting: IND   Standing:  Mod I     Activity Tolerance good     Visual/  Perceptual Glasses: no              Hand dominance: L  UE ROM: RUE:  WFL  LUE:  WFL at shoulder, pt can oppose digits  Strength: RUE: grossly 4/5 LUE: grossly observed 3/5   Strength: R hand WFL  Fine Motor Coordination: fair with L hand     Hearing: WFL  Sensation:  No c/o numbness/tingling   Tone:  WFL  Edema: none noted                            Comments:Cleared by RN to see pt. Upon arrival, patient supine in bed and agreeable to OT session. Educated pt on shower chair at home for safety. At end of session, patient supine in bed with call light and phone within reach, all lines and tubes intact. Pt appeared to have tolerated session well. Pt completes ADLs/functional mobility/transfers Mod I, demo'ing good balance/safety awareness. Pt demonstrates no OT needs at this time. Eval Complexity: Low    Assessment of current deficits   Functional mobility [x]  ADLs [x] Strength [x]  Cognition []  Functional transfers  [x] IADLs [x] Safety Awareness [x]  Endurance [x]  Fine Motor Coordination [] Balance [x] Vision/perception [] Sensation []   Gross Motor Coordination [] ROM [] Delirium []                  Motor Control []    Patient instructed on diagnosis, prognosis/goals and plan of care. Demonstrated fair understanding. [] Malnutrition indicators have been identified and nursing has been notified to ensure a dietitian consult is ordered. Evaluation time includes thorough review of current medical information, gathering information on past medical & social history & PLOF, completion of standardized testing, informal observation of tasks, consultation with other medical professions/disciplines, assessment of data & development of POC/goals.      low Evaluation       Time In: 1:55         Time Out: 2:05           Treatment Charges: Mins Units   Ther Ex  24031       Manual Therapy [address]       ADL/Home Mgt [id number]     Neuro Re-ed [id number]       Group Therapy        Orthotic manage/training  51294       Non-Billable Time       Total Timed Treatment 0 0       Yina Mason, [address] [facility], OTR/L [id number]

## 2020-10-12 NOTE — PROGRESS NOTES
no wheezes  Cardiovascular: Regular rate and rhythm, no murmurs  Gastrointestinal: Bowel sounds present, soft, nontender  Skin: Warm and dry, no active dermatoses  Musculoskeletal: Left arm with dressing and Ace wrap in place    Labs, imaging, and medical records/notes were personally reviewed. Assessment:  Infected ruptured left brachial artery aneurysm, s/p  left arm brachial artery exploration, excision of mycotic aneurysm with jump graft on 10/07  Injection drug use  Hep C Ab positive     Plan:  Continue piperacillin-tazobactam 3.375 g every 8 hours. Check JAVED. Follow-up blood cultures from 10/07. Follow-up tissue cultures. Continue local wound care. Follow up hep C viral load.     Thank you for involving me in the care of [facility]. I will continue to follow. Please do not hesitate to call for any questions or concerns.       Electronically signed by Gara Lanes, MD on 10/12/2020 at 11:25 AM

## 2020-10-12 NOTE — PROGRESS NOTES
VASCULAR SURGERY  DAILY PROGRESS NOTE    Date:10/12/2020       DGPY:[id number]  Patient Name:Wagner Ryan     Date of Birth:[birth year]     Age:40 y.o. Chief Complaint:  L arm abscess    Subjective:  Denies paresthesias of the left hand. Pain controlled at rest. No other complaints    Objective:  /81   Pulse 91   Temp 97.6 °F (36.4 °C) (Temporal)   Resp 16   Ht 6' 4\" (1.93 m)   Wt 201 lb 4.8 oz (91.3 kg)   SpO2 93%   BMI 24.50 kg/m²   Temp (24hrs), Av.8 °F (36.6 °C), Min:97.6 °F (36.4 °C), Max:98 °F (36.7 °C)      I/O (24Hr):  I/O last 3 completed shifts: In: 840 [P.O.:840]  Out: 2200 [Urine:2200]     GENERAL:  No acute distress. Alert and interactive. LUNGS:  No cough. Nonlabored breathing on room air. CARDIOVASC:  Normal rate, no cyanosis. ABDOMEN:  Soft, non-distended. EXTREMITIES:  LUE edema, 2+ radial/ulnar pulses left wrist, good  strength, no L hand paresthesias, upper arm/elbow incisions appear well perfused and clean, very sensitive but no purulence or surrounding erythema. changed dressing at this time.               Assessment:  45 y.o. male with L brachial artery mycotic aneurysm s/p resection-reconstruction with ipsilateral basilic vein jump graft /2, takeback 10/10 for I&D infected hematoma    Plan:  Continue daily dressing changes  May benefit from [facility] for local wound care, iv abx, pt for a couple weeks to get wound going in right direction    Electronically signed by Ang Vo MD on 10/12/2020 at 6:26 AM

## 2020-10-12 NOTE — PROGRESS NOTES
Physical Therapy  Initial Assessment     Name: Lonny Jeffries  : 1980  MRN: [id number]    Date of Service: 10/12/2020    Referring Provider: Beverley Alex MD     Evaluating PT:  Gema Mcmanus PT, DPT, [id number]    Room #:  8852/4135-K  Diagnosis:  Pseudoaneurysm   Precautions:  Falls, LUE splint  Equipment Needs:  none    Pt lives with mother and brother in a 2 story home with 3 stair(s) to enter and no rail(s). Bed is on 1st floor and bath is on 1st floor. Pt ambulated with no AD PTA. Pt independent for ADL performance. OBJECTIVE:   Initial Evaluation  Date:    AM-PAC 6 Clicks    Was pt agreeable to Eval/treatment? Yes    Does pt have pain? Denies    Bed Mobility  Rolling: NT  Supine to sit: Independent   Sit to supine: Independent   Scooting: Independent      Transfers Sit to stand: Independent   Stand to sit: Independent  Stand pivot: NT   Ambulation    40 feet with no AD Independent       Stair negotiation: ascended and descended  NT   ROM BUE:  See OT eval   BLE:  WFL   Strength BUE:  See OT eval   BLE:  5/5   Balance Sitting EOB:  Independent   Dynamic Standing:  Independent      -Pt is A & O x 4  -Sensation:  Unremarkable   -Edema:  Unremarkable     Therapeutic Exercises:  Functional activity    Patient education  Pt educated on safety, sequencing of transfers, and role of PT    Patient response to education:   Pt verbalized understanding Pt demonstrated skill Pt requires further education in this area   Yes  Yes  No      ASSESSMENT:    Treatment:  Patient practiced and was instructed in the following treatment:  Patient education provided continuously throughout session for sequencing, safety maintenance, and improving any deficits found during the evaluation. Additional Comments:  Pt supine in bed upon entry to room. Pt independent with all functional transfers and mobility. Ambulated 40 feet with no AD. Normal gait pattern and speed noted.  Returned to room with all needs met. Patient left sitting in chair to end session. Pt at independent baseline with no skilled PT needs at this time. DC from PT services. Pt's/ family goals   1. Return home    Patient and or family understand(s) diagnosis, prognosis, and plan of care. Yes     PLAN:    Pt at independent baseline with no skilled PT needs at this time. DC from PT services. Time in  1110  Time out  1120    Total Treatment Time 0 minutes     Evaluation Time includes thorough review of current medical information, gathering information on past medical history/social history and prior level of function, completion of standardized testing/informal observation of tasks, assessment of data and education on plan of care and goals.     CPT codes:  [x] Low Complexity PT evaluation 05747  [] Moderate Complexity PT evaluation 28422  [] High Complexity PT evaluation 66544  [] PT Re-evaluation 50592  [] Gait training 53721 0 minutes  [] Manual therapy 14568 0 minutes  [] Therapeutic activities 49811 0 minutes  [] Therapeutic exercises 93212 0 minutes  [] Neuromuscular reeducation 48750 0 minutes     Misael Mann, PT, DPT  [id number]  José Miguel Espinosa, SPT

## 2020-10-12 NOTE — PLAN OF CARE
Problem: Skin Integrity:  Goal: Will show no infection signs and symptoms  Description: Will show no infection signs and symptoms  Outcome: Met This Shift     Problem: Skin Integrity:  Goal: Absence of new skin breakdown  Description: Absence of new skin breakdown  Outcome: Met This Shift     Problem: Falls - Risk of:  Goal: Will remain free from falls  Description: Will remain free from falls  Outcome: Met This Shift     Problem: Falls - Risk of:  Goal: Absence of physical injury  Description: Absence of physical injury  Outcome: Met This Shift     Problem: Infection - Surgical Site:  Goal: Will show no infection signs and symptoms  Description: Will show no infection signs and symptoms  Outcome: Met This Shift     Problem: Pain:  Goal: Pain level will decrease  Description: Pain level will decrease  Outcome: Met This Shift     Problem: Pain:  Goal: Control of acute pain  Description: Control of acute pain  Outcome: Met This Shift

## 2020-10-12 NOTE — PROGRESS NOTES
Hospitalist Progress Note      PCP: No primary care provider on file. Date of Admission: 10/7/2020    Chief Complaint: *pseudoaneurysm  Left arm      Hospital Course: **has a known history of IVDA. Presented with a pseudoaneurysm of the left brachial artery, had exploratory surgery and repair of mycotic aneurysm with a by pass by vascular.  Refusing IVDA intervention at this time**  Will have SS eval for LTAC    *     Subjective: * no complaints    Medications:  Reviewed    Infusion Medications    [START ON 10/14/2020] sodium chloride       Scheduled Medications    lactulose  20 g Oral BID    polyethylene glycol  34 g Oral Daily    sennosides-docusate sodium  2 tablet Oral BID    bisacodyl  10 mg Oral Daily    acetaminophen  650 mg Oral 3 times per day    sodium chloride flush  10 mL Intravenous 2 times per day    famotidine  20 mg Oral BID    enoxaparin  40 mg Subcutaneous Daily    piperacillin-tazobactam  3.375 g Intravenous Q8H    sodium chloride   Intravenous Q8H     PRN Meds: benzocaine-menthol, oxyCODONE **OR** oxyCODONE, sodium chloride flush, ondansetron **OR** ondansetron      Intake/Output Summary (Last 24 hours) at 10/12/2020 1534  Last data filed at 10/12/2020 1944  Gross per 24 hour   Intake --   Output 1950 ml   Net -1950 ml       Exam:    /79   Pulse 83   Temp 97.9 °F (36.6 °C) (Temporal)   Resp 16   Ht 6' 4\" (1.93 m)   Wt 201 lb 4.8 oz (91.3 kg)   SpO2 95%   BMI 24.50 kg/m²       Gen: * well developed  HEENT: NC/AT, moist mucous membranes,   Neck: supple, trachea midline,   Heart:  Normal s1/s2, RRR, no murmurs, gallops, or rubs.    Lungs:  **cta  bilaterally, *  Abd: bowel sounds present, soft, nontender, nondistended, no masses  Extrem:  No clubbing, cyanosis,  Left arm** edema ace wrap in tact and dry   Skin: no rashes or lesions  Psych: A & O x3  Neuro: grossly intact, moves all four extremities.    Capillary Refill: Brisk,< 3 seconds   Peripheral Pulses: +2 palpable, equal bilaterally                   Labs:   Recent Labs     10/10/20  0329 10/12/20  0715   WBC 7.3 7.4   HGB 8.8* 9.4*   HCT 27.8* 29.5*    405     Recent Labs     10/10/20  0329 10/12/20  0715    141   K 3.6 4.3    100   CO2 29 27   BUN 8 11   CREATININE 0.7 0.7   CALCIUM 8.6 9.1   PHOS 3.4 3.8     No results for input(s): AST, ALT, BILIDIR, BILITOT, ALKPHOS in the last 72 hours. No results for input(s): INR in the last 72 hours. No results for input(s): Daphne Lowers in the last 72 hours. No results for input(s): AST, ALT, ALB, BILIDIR, BILITOT, ALKPHOS in the last 72 hours. No results for input(s): LACTA in the last 72 hours.   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

## 2020-10-13 ENCOUNTER — ANESTHESIA EVENT (OUTPATIENT)
Dept: OPERATING ROOM | Age: 40
DRG: 854 | End: 2020-10-13
Payer: COMMERCIAL

## 2020-10-13 LAB
CULTURE SURGICAL: ABNORMAL
ORGANISM: ABNORMAL

## 2020-10-13 PROCEDURE — 2580000003 HC RX 258: Performed by: STUDENT IN AN ORGANIZED HEALTH CARE EDUCATION/TRAINING PROGRAM

## 2020-10-13 PROCEDURE — 6370000000 HC RX 637 (ALT 250 FOR IP): Performed by: STUDENT IN AN ORGANIZED HEALTH CARE EDUCATION/TRAINING PROGRAM

## 2020-10-13 PROCEDURE — 1200000000 HC SEMI PRIVATE

## 2020-10-13 PROCEDURE — 6360000002 HC RX W HCPCS: Performed by: STUDENT IN AN ORGANIZED HEALTH CARE EDUCATION/TRAINING PROGRAM

## 2020-10-13 PROCEDURE — 2580000003 HC RX 258: Performed by: INTERNAL MEDICINE

## 2020-10-13 PROCEDURE — 6360000002 HC RX W HCPCS: Performed by: INTERNAL MEDICINE

## 2020-10-13 RX ADMIN — Medication 10 ML: at 09:03

## 2020-10-13 RX ADMIN — ACETAMINOPHEN 650 MG: 325 TABLET, FILM COATED ORAL at 05:54

## 2020-10-13 RX ADMIN — DOCUSATE SODIUM 50 MG AND SENNOSIDES 8.6 MG 2 TABLET: 8.6; 5 TABLET, FILM COATED ORAL at 09:03

## 2020-10-13 RX ADMIN — ENOXAPARIN SODIUM 40 MG: 40 INJECTION SUBCUTANEOUS at 09:02

## 2020-10-13 RX ADMIN — DOCUSATE SODIUM 50 MG AND SENNOSIDES 8.6 MG 2 TABLET: 8.6; 5 TABLET, FILM COATED ORAL at 21:00

## 2020-10-13 RX ADMIN — FAMOTIDINE 20 MG: 20 TABLET ORAL at 09:03

## 2020-10-13 RX ADMIN — OXYCODONE HYDROCHLORIDE 10 MG: 10 TABLET ORAL at 10:25

## 2020-10-13 RX ADMIN — OXYCODONE HYDROCHLORIDE 10 MG: 10 TABLET ORAL at 15:10

## 2020-10-13 RX ADMIN — OXYCODONE HYDROCHLORIDE 10 MG: 10 TABLET ORAL at 05:54

## 2020-10-13 RX ADMIN — ACETAMINOPHEN 650 MG: 325 TABLET, FILM COATED ORAL at 21:01

## 2020-10-13 RX ADMIN — PIPERACILLIN AND TAZOBACTAM 3.38 G: 3; .375 INJECTION, POWDER, FOR SOLUTION INTRAVENOUS at 09:02

## 2020-10-13 RX ADMIN — OXYCODONE HYDROCHLORIDE 10 MG: 10 TABLET ORAL at 21:01

## 2020-10-13 RX ADMIN — OXYCODONE HYDROCHLORIDE 10 MG: 10 TABLET ORAL at 01:20

## 2020-10-13 RX ADMIN — ACETAMINOPHEN 650 MG: 325 TABLET, FILM COATED ORAL at 13:49

## 2020-10-13 RX ADMIN — SODIUM CHLORIDE 3 G: 900 INJECTION INTRAVENOUS at 18:19

## 2020-10-13 RX ADMIN — Medication 10 ML: at 21:01

## 2020-10-13 RX ADMIN — FAMOTIDINE 20 MG: 20 TABLET ORAL at 21:01

## 2020-10-13 RX ADMIN — SODIUM CHLORIDE 3 G: 900 INJECTION INTRAVENOUS at 13:49

## 2020-10-13 ASSESSMENT — PAIN DESCRIPTION - ORIENTATION
ORIENTATION: LEFT

## 2020-10-13 ASSESSMENT — PAIN DESCRIPTION - PAIN TYPE
TYPE: ACUTE PAIN
TYPE: ACUTE PAIN;SURGICAL PAIN
TYPE: ACUTE PAIN
TYPE: ACUTE PAIN
TYPE: ACUTE PAIN;SURGICAL PAIN

## 2020-10-13 ASSESSMENT — PAIN DESCRIPTION - LOCATION
LOCATION: ARM

## 2020-10-13 ASSESSMENT — PAIN DESCRIPTION - DESCRIPTORS
DESCRIPTORS: ACHING;DISCOMFORT;SORE
DESCRIPTORS: ACHING;DISCOMFORT;SHARP
DESCRIPTORS: ACHING;CONSTANT;DISCOMFORT

## 2020-10-13 ASSESSMENT — PAIN SCALES - GENERAL
PAINLEVEL_OUTOF10: 6
PAINLEVEL_OUTOF10: 0
PAINLEVEL_OUTOF10: 7
PAINLEVEL_OUTOF10: 6
PAINLEVEL_OUTOF10: 0
PAINLEVEL_OUTOF10: 10
PAINLEVEL_OUTOF10: 7
PAINLEVEL_OUTOF10: 5
PAINLEVEL_OUTOF10: 7
PAINLEVEL_OUTOF10: 8

## 2020-10-13 ASSESSMENT — PAIN DESCRIPTION - ONSET
ONSET: GRADUAL
ONSET: SUDDEN

## 2020-10-13 ASSESSMENT — PAIN DESCRIPTION - FREQUENCY
FREQUENCY: INTERMITTENT
FREQUENCY: INTERMITTENT

## 2020-10-13 ASSESSMENT — LIFESTYLE VARIABLES: SMOKING_STATUS: 1

## 2020-10-13 NOTE — CARE COORDINATION
Per Aldo De La Cruz from [facility], they can accept pateint and will start precert today.   Ming De Jesus RN CM

## 2020-10-13 NOTE — PLAN OF CARE
Problem: Skin Integrity:  Goal: Will show no infection signs and symptoms  Description: Will show no infection signs and symptoms  Outcome: Met This Shift  Goal: Absence of new skin breakdown  Description: Absence of new skin breakdown  Outcome: Met This Shift     Problem: Falls - Risk of:  Goal: Will remain free from falls  Description: Will remain free from falls  Outcome: Met This Shift  Goal: Absence of physical injury  Description: Absence of physical injury  Outcome: Met This Shift     Problem: Infection - Surgical Site:  Goal: Will show no infection signs and symptoms  Description: Will show no infection signs and symptoms  Outcome: Met This Shift     Problem: Pain:  Goal: Pain level will decrease  Description: Pain level will decrease  Outcome: Met This Shift  Goal: Control of acute pain  Description: Control of acute pain  Outcome: Met This Shift  Goal: Control of chronic pain  Description: Control of chronic pain  Outcome: Met This Shift

## 2020-10-13 NOTE — PROGRESS NOTES
Regular rate and rhythm, no murmurs  Gastrointestinal: Bowel sounds present, soft, nontender  Skin: Warm and dry, no active dermatoses  Musculoskeletal: Left arm with dressing and Ace wrap in place    Labs, imaging, and medical records/notes were personally reviewed. 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 Check JAVED. Follow-up blood cultures from 10/07. Continue local wound care. Follow up hep C viral load.     Thank you for involving me in the care of [facility]. I will continue to follow. Please do not hesitate to call for any questions or concerns.       Electronically signed by Reagan Montejo MD on 10/13/2020 at 11:47 AM

## 2020-10-13 NOTE — PROGRESS NOTES
Chest pain, irregular heartbeats, palpitations, paroxysmal nocturnal dyspnea. Respiratory:  Shortness of breath, coughing, sputum production, hemoptysis, wheezing, orthopnea. Gastrointestinal:  Nausea, vomiting, diarrhea, heartburn, constipation, abdominal pain, hematemesis, hematochezia, melena, acholic stools  Genito-Urinary:  Dysuria, urgency, frequency, hematuria  Musculoskeletal:  Joint pain, joint stiffness, joint swelling, muscle pain  Neurology:  Headache, focal neurological deficits, weakness, numbness, paresthesia  Derm:  Rashes, ulcers, excoriations, bruising  Extremities:  Decreased ROM, peripheral edema, mottling      OBJECTIVE:    /76   Pulse 89   Temp 98.1 °F (36.7 °C) (Temporal)   Resp 16   Ht 6' 4\" (1.93 m)   Wt 201 lb 4.8 oz (91.3 kg)   SpO2 98%   BMI 24.50 kg/m²     General appearance:  awake, alert, and oriented to person, place, time, and purpose; appears stated age and cooperative; no apparent distress no labored breathing  HEENT:  Conjunctivae/corneas clear. Neck: Supple. No jugular venous distention. Respiratory: symmetrical; clear to auscultation bilaterally; no wheezes; no rhonchi; no rales  Cardiovascular: rhythm regular; rate controlled; no murmurs  Abdomen: Soft, nontender, nondistended  Extremities:  peripheral pulses present; no peripheral edema; no ulcers left arm dressed  Musculoskeletal: No clubbing, cyanosis, no bilateral lower extremity edema. Brisk capillary refill. Skin:  No rashes  on visible skin  Neurologic: awake, alert and following commands     ASSESSMENT and PLAN:  · Infected ruptures left brachial artery aneurysm, s/p left brachial artery exploration and excision of mycotic aneurysm with jump graft on 10/7, takeback 10/10 for I&D infected hematoma- Infectious disease and vascular surgery are following. Currently on zosyn q 8 hours. JAVED scheduled for tomorrow. Follow cultures. · IV drug use  · Hepatitis C- Pending viral load.           DISPOSITION: Referral made to [facility] by Dr. Lindsay Guerin, awaiting acceptance. Medications:  REVIEWED DAILY    Infusion Medications    [START ON 10/14/2020] sodium chloride       Scheduled Medications    lactulose  20 g Oral BID    polyethylene glycol  34 g Oral Daily    sennosides-docusate sodium  2 tablet Oral BID    bisacodyl  10 mg Oral Daily    acetaminophen  650 mg Oral 3 times per day    sodium chloride flush  10 mL Intravenous 2 times per day    famotidine  20 mg Oral BID    enoxaparin  40 mg Subcutaneous Daily    piperacillin-tazobactam  3.375 g Intravenous Q8H    sodium chloride   Intravenous Q8H     PRN Meds: benzocaine-menthol, oxyCODONE **OR** oxyCODONE, sodium chloride flush, ondansetron **OR** ondansetron    Labs:     Recent Labs     10/12/20  0715   WBC 7.4   HGB 9.4*   HCT 29.5*          Recent Labs     10/12/20  0715      K 4.3      CO2 27   BUN 11   CREATININE 0.7   CALCIUM 9.1   PHOS 3.8       No results for input(s): PROT, ALB, ALKPHOS, ALT, AST, BILITOT, AMYLASE, LIPASE in the last 72 hours. No results for input(s): INR in the last 72 hours. No results for input(s): Sarah Belts in the last 72 hours. Chronic labs:    Lab Results   Component Value Date    INR 1.2 04/21/2020       Radiology: REVIEWED DAILY    +++++++++++++++++++++++++++++++++++++++++++++++++  [address], [place]  +++++++++++++++++++++++++++++++++++++++++++++++++  NOTE: This report was transcribed using voice recognition software. Every effort was made to ensure accuracy; however, inadvertent computerized transcription errors may be present.

## 2020-10-13 NOTE — CARE COORDINATION
Patient with L brachial artery mycotic aneurysm s/p resection-reconstruction with ipsilateral basilic vein jump graft 98/2, takeback 10/10 for I&D infected hematoma. Plan per vascular surgery: Continue daily dressing changes, Will place wound vac tomorrow 10/14/20, Abx per ID, currently on zosyn. patient is also scheduled for a JAVED tomorrow. Per Bakari Lind from [facility], they are still reviewing to see if they can accept and he will let me know ASAP. Albertina Mccabe RN, CM      Preliminary referral made to Mayra at [facility], await acceptance also.   Albertina Mccabe RN CM

## 2020-10-14 ENCOUNTER — ANESTHESIA (OUTPATIENT)
Dept: CARDIAC CATH/INVASIVE PROCEDURES | Age: 40
DRG: 854 | End: 2020-10-14
Payer: COMMERCIAL

## 2020-10-14 ENCOUNTER — ANESTHESIA (OUTPATIENT)
Dept: OPERATING ROOM | Age: 40
DRG: 854 | End: 2020-10-14
Payer: COMMERCIAL

## 2020-10-14 ENCOUNTER — ANESTHESIA EVENT (OUTPATIENT)
Dept: CARDIAC CATH/INVASIVE PROCEDURES | Age: 40
DRG: 854 | End: 2020-10-14
Payer: COMMERCIAL

## 2020-10-14 VITALS
OXYGEN SATURATION: 100 % | SYSTOLIC BLOOD PRESSURE: 142 MMHG | RESPIRATION RATE: 17 BRPM | DIASTOLIC BLOOD PRESSURE: 99 MMHG

## 2020-10-14 VITALS
SYSTOLIC BLOOD PRESSURE: 114 MMHG | OXYGEN SATURATION: 100 % | DIASTOLIC BLOOD PRESSURE: 67 MMHG | RESPIRATION RATE: 19 BRPM

## 2020-10-14 LAB
HCV QNT BY NAAT IU/ML: NOT DETECTED IU/ML
HCV QNT BY NAAT LOG IU/ML: NOT DETECTED LOG IU/ML
INTERPRETATION: NOT DETECTED

## 2020-10-14 PROCEDURE — 2580000003 HC RX 258

## 2020-10-14 PROCEDURE — 3600000012 HC SURGERY LEVEL 2 ADDTL 15MIN: Performed by: SURGERY

## 2020-10-14 PROCEDURE — 6360000002 HC RX W HCPCS: Performed by: SURGERY

## 2020-10-14 PROCEDURE — 7100000001 HC PACU RECOVERY - ADDTL 15 MIN: Performed by: SURGERY

## 2020-10-14 PROCEDURE — 7100000000 HC PACU RECOVERY - FIRST 15 MIN: Performed by: SURGERY

## 2020-10-14 PROCEDURE — 93325 DOPPLER ECHO COLOR FLOW MAPG: CPT

## 2020-10-14 PROCEDURE — 2709999900 HC NON-CHARGEABLE SUPPLY: Performed by: SURGERY

## 2020-10-14 PROCEDURE — B245ZZ4 ULTRASONOGRAPHY OF LEFT HEART, TRANSESOPHAGEAL: ICD-10-PCS | Performed by: INTERNAL MEDICINE

## 2020-10-14 PROCEDURE — 1200000000 HC SEMI PRIVATE

## 2020-10-14 PROCEDURE — 2580000003 HC RX 258: Performed by: SURGERY

## 2020-10-14 PROCEDURE — 6360000002 HC RX W HCPCS: Performed by: INTERNAL MEDICINE

## 2020-10-14 PROCEDURE — 97605 NEG PRS WND THER DME<=50SQCM: CPT | Performed by: SURGERY

## 2020-10-14 PROCEDURE — 6360000002 HC RX W HCPCS

## 2020-10-14 PROCEDURE — 2580000003 HC RX 258: Performed by: INTERNAL MEDICINE

## 2020-10-14 PROCEDURE — 11042 DBRDMT SUBQ TIS 1ST 20SQCM/<: CPT | Performed by: SURGERY

## 2020-10-14 PROCEDURE — 93312 ECHO TRANSESOPHAGEAL: CPT

## 2020-10-14 PROCEDURE — 6370000000 HC RX 637 (ALT 250 FOR IP): Performed by: SURGERY

## 2020-10-14 PROCEDURE — 6360000002 HC RX W HCPCS: Performed by: ANESTHESIOLOGY

## 2020-10-14 PROCEDURE — 3700000000 HC ANESTHESIA ATTENDED CARE: Performed by: SURGERY

## 2020-10-14 PROCEDURE — 93325 DOPPLER ECHO COLOR FLOW MAPG: CPT | Performed by: INTERNAL MEDICINE

## 2020-10-14 PROCEDURE — 93312 ECHO TRANSESOPHAGEAL: CPT | Performed by: INTERNAL MEDICINE

## 2020-10-14 PROCEDURE — 3700000001 HC ADD 15 MINUTES (ANESTHESIA): Performed by: SURGERY

## 2020-10-14 PROCEDURE — 3600000002 HC SURGERY LEVEL 2 BASE: Performed by: SURGERY

## 2020-10-14 PROCEDURE — 93321 DOPPLER ECHO F-UP/LMTD STD: CPT

## 2020-10-14 PROCEDURE — 2709999900 HC NON-CHARGEABLE SUPPLY

## 2020-10-14 PROCEDURE — 11045 DBRDMT SUBQ TISS EACH ADDL: CPT | Performed by: SURGERY

## 2020-10-14 PROCEDURE — 2500000003 HC RX 250 WO HCPCS

## 2020-10-14 RX ORDER — SODIUM CHLORIDE 9 MG/ML
INJECTION, SOLUTION INTRAVENOUS CONTINUOUS PRN
Status: DISCONTINUED | OUTPATIENT
Start: 2020-10-14 | End: 2020-10-14 | Stop reason: SDUPTHER

## 2020-10-14 RX ORDER — GLYCOPYRROLATE 1 MG/5 ML
SYRINGE (ML) INTRAVENOUS PRN
Status: DISCONTINUED | OUTPATIENT
Start: 2020-10-14 | End: 2020-10-14 | Stop reason: SDUPTHER

## 2020-10-14 RX ORDER — MIDAZOLAM HYDROCHLORIDE 1 MG/ML
INJECTION INTRAMUSCULAR; INTRAVENOUS PRN
Status: DISCONTINUED | OUTPATIENT
Start: 2020-10-14 | End: 2020-10-14 | Stop reason: SDUPTHER

## 2020-10-14 RX ORDER — FENTANYL CITRATE 50 UG/ML
INJECTION, SOLUTION INTRAMUSCULAR; INTRAVENOUS PRN
Status: DISCONTINUED | OUTPATIENT
Start: 2020-10-14 | End: 2020-10-14 | Stop reason: SDUPTHER

## 2020-10-14 RX ORDER — PROPOFOL 10 MG/ML
INJECTION, EMULSION INTRAVENOUS CONTINUOUS PRN
Status: DISCONTINUED | OUTPATIENT
Start: 2020-10-14 | End: 2020-10-14 | Stop reason: SDUPTHER

## 2020-10-14 RX ORDER — LIDOCAINE HYDROCHLORIDE 20 MG/ML
INJECTION, SOLUTION INTRAVENOUS PRN
Status: DISCONTINUED | OUTPATIENT
Start: 2020-10-14 | End: 2020-10-14 | Stop reason: SDUPTHER

## 2020-10-14 RX ORDER — MEPERIDINE HYDROCHLORIDE 25 MG/ML
12.5 INJECTION INTRAMUSCULAR; INTRAVENOUS; SUBCUTANEOUS EVERY 5 MIN PRN
Status: DISCONTINUED | OUTPATIENT
Start: 2020-10-14 | End: 2020-10-14 | Stop reason: HOSPADM

## 2020-10-14 RX ORDER — PROPOFOL 10 MG/ML
INJECTION, EMULSION INTRAVENOUS PRN
Status: DISCONTINUED | OUTPATIENT
Start: 2020-10-14 | End: 2020-10-14 | Stop reason: SDUPTHER

## 2020-10-14 RX ORDER — PROMETHAZINE HYDROCHLORIDE 25 MG/ML
25 INJECTION, SOLUTION INTRAMUSCULAR; INTRAVENOUS PRN
Status: DISCONTINUED | OUTPATIENT
Start: 2020-10-14 | End: 2020-10-14 | Stop reason: HOSPADM

## 2020-10-14 RX ORDER — LABETALOL HYDROCHLORIDE 5 MG/ML
5 INJECTION, SOLUTION INTRAVENOUS EVERY 10 MIN PRN
Status: DISCONTINUED | OUTPATIENT
Start: 2020-10-14 | End: 2020-10-14 | Stop reason: HOSPADM

## 2020-10-14 RX ORDER — KETAMINE HYDROCHLORIDE 10 MG/ML
INJECTION, SOLUTION INTRAMUSCULAR; INTRAVENOUS PRN
Status: DISCONTINUED | OUTPATIENT
Start: 2020-10-14 | End: 2020-10-14 | Stop reason: SDUPTHER

## 2020-10-14 RX ADMIN — OXYCODONE HYDROCHLORIDE 10 MG: 10 TABLET ORAL at 19:16

## 2020-10-14 RX ADMIN — HYDROMORPHONE HYDROCHLORIDE 0.5 MG: 1 INJECTION, SOLUTION INTRAMUSCULAR; INTRAVENOUS; SUBCUTANEOUS at 08:34

## 2020-10-14 RX ADMIN — MIDAZOLAM 2 MG: 1 INJECTION INTRAMUSCULAR; INTRAVENOUS at 07:22

## 2020-10-14 RX ADMIN — ENOXAPARIN SODIUM 40 MG: 40 INJECTION SUBCUTANEOUS at 10:29

## 2020-10-14 RX ADMIN — SODIUM CHLORIDE: 9 INJECTION, SOLUTION INTRAVENOUS at 13:14

## 2020-10-14 RX ADMIN — KETAMINE HYDROCHLORIDE 50 MG: 10 INJECTION, SOLUTION INTRAMUSCULAR; INTRAVENOUS at 07:27

## 2020-10-14 RX ADMIN — PROPOFOL 20 MG: 10 INJECTION, EMULSION INTRAVENOUS at 13:26

## 2020-10-14 RX ADMIN — PROPOFOL 20 MG: 10 INJECTION, EMULSION INTRAVENOUS at 13:24

## 2020-10-14 RX ADMIN — FENTANYL CITRATE 50 MCG: 50 INJECTION, SOLUTION INTRAMUSCULAR; INTRAVENOUS at 07:35

## 2020-10-14 RX ADMIN — Medication 10 ML: at 21:57

## 2020-10-14 RX ADMIN — ACETAMINOPHEN 650 MG: 325 TABLET, FILM COATED ORAL at 21:57

## 2020-10-14 RX ADMIN — PROPOFOL 20 MG: 10 INJECTION, EMULSION INTRAVENOUS at 13:25

## 2020-10-14 RX ADMIN — OXYCODONE HYDROCHLORIDE 10 MG: 10 TABLET ORAL at 15:11

## 2020-10-14 RX ADMIN — FAMOTIDINE 20 MG: 20 TABLET ORAL at 10:29

## 2020-10-14 RX ADMIN — HYDROMORPHONE HYDROCHLORIDE 0.5 MG: 1 INJECTION, SOLUTION INTRAMUSCULAR; INTRAVENOUS; SUBCUTANEOUS at 08:26

## 2020-10-14 RX ADMIN — PROPOFOL 120 MG: 10 INJECTION, EMULSION INTRAVENOUS at 13:19

## 2020-10-14 RX ADMIN — HYDROMORPHONE HYDROCHLORIDE 0.25 MG: 1 INJECTION, SOLUTION INTRAMUSCULAR; INTRAVENOUS; SUBCUTANEOUS at 09:11

## 2020-10-14 RX ADMIN — PROPOFOL 20 MG: 10 INJECTION, EMULSION INTRAVENOUS at 13:21

## 2020-10-14 RX ADMIN — FENTANYL CITRATE 50 MCG: 50 INJECTION, SOLUTION INTRAMUSCULAR; INTRAVENOUS at 07:27

## 2020-10-14 RX ADMIN — LIDOCAINE HYDROCHLORIDE 40 MG: 20 INJECTION, SOLUTION INTRAVENOUS at 07:27

## 2020-10-14 RX ADMIN — PROPOFOL 50 MG: 10 INJECTION, EMULSION INTRAVENOUS at 13:20

## 2020-10-14 RX ADMIN — Medication 0.2 MG: at 07:22

## 2020-10-14 RX ADMIN — OXYCODONE HYDROCHLORIDE 10 MG: 10 TABLET ORAL at 10:30

## 2020-10-14 RX ADMIN — PROPOFOL 20 MG: 10 INJECTION, EMULSION INTRAVENOUS at 13:23

## 2020-10-14 RX ADMIN — FAMOTIDINE 20 MG: 20 TABLET ORAL at 21:57

## 2020-10-14 RX ADMIN — SODIUM CHLORIDE 3 G: 900 INJECTION INTRAVENOUS at 19:16

## 2020-10-14 RX ADMIN — HYDROMORPHONE HYDROCHLORIDE 0.5 MG: 1 INJECTION, SOLUTION INTRAMUSCULAR; INTRAVENOUS; SUBCUTANEOUS at 08:39

## 2020-10-14 RX ADMIN — SODIUM CHLORIDE: 9 INJECTION, SOLUTION INTRAVENOUS at 07:22

## 2020-10-14 RX ADMIN — HYDROMORPHONE HYDROCHLORIDE 0.5 MG: 1 INJECTION, SOLUTION INTRAMUSCULAR; INTRAVENOUS; SUBCUTANEOUS at 08:44

## 2020-10-14 RX ADMIN — DOCUSATE SODIUM 50 MG AND SENNOSIDES 8.6 MG 2 TABLET: 8.6; 5 TABLET, FILM COATED ORAL at 10:30

## 2020-10-14 RX ADMIN — SODIUM CHLORIDE 3 G: 900 INJECTION INTRAVENOUS at 06:25

## 2020-10-14 RX ADMIN — SODIUM CHLORIDE 1000 ML: 9 INJECTION, SOLUTION INTRAVENOUS at 01:16

## 2020-10-14 RX ADMIN — PROPOFOL 100 MCG/KG/MIN: 10 INJECTION, EMULSION INTRAVENOUS at 07:27

## 2020-10-14 RX ADMIN — SODIUM CHLORIDE 3 G: 900 INJECTION INTRAVENOUS at 15:11

## 2020-10-14 RX ADMIN — KETAMINE HYDROCHLORIDE 20 MG: 10 INJECTION, SOLUTION INTRAMUSCULAR; INTRAVENOUS at 07:49

## 2020-10-14 RX ADMIN — HYDROMORPHONE HYDROCHLORIDE 0.25 MG: 1 INJECTION, SOLUTION INTRAMUSCULAR; INTRAVENOUS; SUBCUTANEOUS at 09:06

## 2020-10-14 RX ADMIN — SODIUM CHLORIDE 3 G: 900 INJECTION INTRAVENOUS at 00:45

## 2020-10-14 RX ADMIN — Medication 10 ML: at 10:30

## 2020-10-14 RX ADMIN — DOCUSATE SODIUM 50 MG AND SENNOSIDES 8.6 MG 2 TABLET: 8.6; 5 TABLET, FILM COATED ORAL at 21:57

## 2020-10-14 RX ADMIN — ACETAMINOPHEN 650 MG: 325 TABLET, FILM COATED ORAL at 15:11

## 2020-10-14 ASSESSMENT — PAIN SCALES - GENERAL
PAINLEVEL_OUTOF10: 6
PAINLEVEL_OUTOF10: 7
PAINLEVEL_OUTOF10: 8
PAINLEVEL_OUTOF10: 6
PAINLEVEL_OUTOF10: 7
PAINLEVEL_OUTOF10: 0
PAINLEVEL_OUTOF10: 7
PAINLEVEL_OUTOF10: 7
PAINLEVEL_OUTOF10: 6
PAINLEVEL_OUTOF10: 3
PAINLEVEL_OUTOF10: 8
PAINLEVEL_OUTOF10: 3
PAINLEVEL_OUTOF10: 8

## 2020-10-14 ASSESSMENT — PULMONARY FUNCTION TESTS
PIF_VALUE: 1
PIF_VALUE: 0
PIF_VALUE: 1
PIF_VALUE: 1
PIF_VALUE: 0
PIF_VALUE: 1
PIF_VALUE: 0
PIF_VALUE: 1
PIF_VALUE: 0
PIF_VALUE: 1

## 2020-10-14 ASSESSMENT — PAIN DESCRIPTION - LOCATION
LOCATION: ARM

## 2020-10-14 ASSESSMENT — PAIN DESCRIPTION - PAIN TYPE
TYPE: ACUTE PAIN

## 2020-10-14 ASSESSMENT — PAIN DESCRIPTION - ORIENTATION
ORIENTATION: LEFT

## 2020-10-14 ASSESSMENT — PAIN DESCRIPTION - FREQUENCY
FREQUENCY: CONTINUOUS

## 2020-10-14 ASSESSMENT — PAIN DESCRIPTION - DESCRIPTORS
DESCRIPTORS: ACHING
DESCRIPTORS: ACHING;CONSTANT;DISCOMFORT
DESCRIPTORS: ACHING
DESCRIPTORS: ACHING;CONSTANT
DESCRIPTORS: ACHING;DISCOMFORT;SORE

## 2020-10-14 ASSESSMENT — LIFESTYLE VARIABLES: SMOKING_STATUS: 1

## 2020-10-14 NOTE — PROGRESS NOTES
Received a call from a family member that patient is having suboxone snuck in by a visitor and that he is \"cheeking\" his oxy, [employer] called.

## 2020-10-14 NOTE — PROGRESS NOTES
wheezing, orthopnea. Gastrointestinal:  Nausea, vomiting, diarrhea, heartburn, constipation, abdominal pain, hematemesis, hematochezia, melena, acholic stools  Genito-Urinary:  Dysuria, urgency, frequency, hematuria  Musculoskeletal:  Joint pain, joint stiffness, joint swelling, muscle pain  Neurology:  Headache, focal neurological deficits, weakness, numbness, paresthesia  Derm:  Rashes, ulcers, excoriations, bruising  Extremities:  Decreased ROM, peripheral edema, mottling      OBJECTIVE:    /76   Pulse 100   Temp 98.1 °F (36.7 °C) (Temporal)   Resp 16   Ht 6' 4\" (1.93 m)   Wt 201 lb 4.8 oz (91.3 kg)   SpO2 99%   BMI 24.50 kg/m²     General appearance:  awake, alert, and oriented to person, place, time, and purpose; appears stated age and cooperative; no apparent distress no labored breathing  HEENT:  Conjunctivae/corneas clear. Neck: Supple. No jugular venous distention. Respiratory: symmetrical; clear to auscultation bilaterally; no wheezes; no rhonchi; no rales  Cardiovascular: rhythm regular; rate controlled; no murmurs  Abdomen: Soft, nontender, nondistended  Extremities:  peripheral pulses present; no peripheral edema; no ulcers left arm dressed  Musculoskeletal: No clubbing, cyanosis, no bilateral lower extremity edema. Brisk capillary refill. Skin:  No rashes  on visible skin  Neurologic: awake, alert and following commands     ASSESSMENT and PLAN:  · Infected ruptures left brachial artery aneurysm, s/p left brachial artery exploration and excision of mycotic aneurysm with jump graft on 10/7, takeback 10/10 for I&D infected hematoma, wound vac placement 10/14- Infectious disease and vascular surgery are following. Currently on zosyn q 8 hours. JAVED  today. Follow cultures. · IV drug use  · Hepatitis C         DISPOSITION: Referral made to [facility] by vascular, this was denied.  Awaiting P2P    Medications:  REVIEWED DAILY    Infusion Medications     Scheduled Medications    ampicillin-sulbactam  3 g Intravenous Q6H    sennosides-docusate sodium  2 tablet Oral BID    acetaminophen  650 mg Oral 3 times per day    sodium chloride flush  10 mL Intravenous 2 times per day    famotidine  20 mg Oral BID    enoxaparin  40 mg Subcutaneous Daily    sodium chloride   Intravenous Q8H     PRN Meds: benzocaine-menthol, oxyCODONE **OR** oxyCODONE, sodium chloride flush, ondansetron **OR** ondansetron    Labs:     Recent Labs     10/12/20  0715   WBC 7.4   HGB 9.4*   HCT 29.5*          Recent Labs     10/12/20  0715      K 4.3      CO2 27   BUN 11   CREATININE 0.7   CALCIUM 9.1   PHOS 3.8       No results for input(s): PROT, ALB, ALKPHOS, ALT, AST, BILITOT, AMYLASE, LIPASE in the last 72 hours. No results for input(s): INR in the last 72 hours. No results for input(s): Romero Hug in the last 72 hours. Chronic labs:    Lab Results   Component Value Date    INR 1.2 04/21/2020       Radiology: REVIEWED DAILY    +++++++++++++++++++++++++++++++++++++++++++++++++  [address], [place]  +++++++++++++++++++++++++++++++++++++++++++++++++  NOTE: This report was transcribed using voice recognition software. Every effort was made to ensure accuracy; however, inadvertent computerized transcription errors may be present. Length To Time In Minutes Device Was In Place: 10

## 2020-10-14 NOTE — ANESTHESIA PRE PROCEDURE
(LOVENOX) injection 40 mg  40 mg Subcutaneous Daily Mariel Blackwood MD   40 mg at 10/13/20 0902    0.9 % sodium chloride ([place])   Intravenous Q8H Mariel Blackwood MD   Stopped at 10/11/20 1320       Allergies:  No Known Allergies    Problem List:    Patient Active Problem List   Diagnosis Code    Right arm cellulitis L03. 113    IV drug abuse ([facility] [facility] F19.10    Superficial foreign body of right upper arm S40.851A    Cellulitis of right arm L03. 113    Bacteremia R78.81    Skin ulcer of upper arm with fat layer exposed ([facility] [address] L98.492    Abscess L02.91    Pseudoaneurysm of brachial artery (HCC) I72.1    Poor venous access I87.8    Pseudoaneurysm (HCC) I72.9    Mycotic aneurysm ([facility]) I72.9       Past Medical History:  No past medical history on file.     Past Surgical History:        Procedure Laterality Date    APPLY DRESSING Left 10/9/2020    DEBRIDEMENT OF SKIN, SOFT TISSUE, MUSCLE, EXPLORATION OF UPPER ARM INTEGRA GRAFT FOR COVERAGE performed by Jigar Kelly MD at [place] [address] Right 4/23/2020    RIGHT UPPER EXTREMITY INCISION AND DRAINAGE REMOVAL FOREIGN BODY WITH C-ARM performed by Belkis Jaimes MD at [place] [address] Left 10/7/2020    LEFT ELBOW INCISION AND DRAINAGE performed by Belkis Jaimes MD at [address] [place] [address] OF ARTERY/VEIN Left 10/7/2020    LEFT ARM BRACHIAL ARTERY EXPLORATION, REPAIR OF MYCOTIC ANUERYSM WITH BYPASS performed by Ricky Kay MD at [place] [address] TRANSESOPHAGEAL ECHOCARDIOGRAM N/A 4/24/2020    TRANSESOPHAGEAL ECHOCARDIOGRAM performed by Laz Brantley MD at [address] [place] [address] History:    Social History     Tobacco Use    Smoking status: Current Every Day Smoker     Packs/day: 1.00     Types: Cigarettes    Smokeless tobacco: Never Used   Substance Use Topics    Alcohol use: Not Currently                                Ready to quit: Not Answered  Counseling given: Not Answered      Vital Signs (Current):   Vitals:    10/12/20 1535 10/13/20 0800 10/13/20 1500 10/13/20 1945   BP: 128/79 138/76 (!) 153/83 138/77   Pulse: 96 89 101 85   Resp: 18 16 16 16   Temp: 37.1 °C (98.7 °F) 36.7 °C (98.1 °F) 36.1 °C (96.9 °F) 36.1 °C (97 °F)   TempSrc: Temporal Temporal Temporal Temporal   SpO2: 97% 98% 95% 96%   Weight:       Height:                                                  BP Readings from Last 3 Encounters:   10/13/20 138/77   10/09/20 (!) 107/50   10/07/20 (!) 134/55       NPO Status: Time of last liquid consumption: 0001NPO > 8 hours. Time of last solid consumption: 0001                        Date of last liquid consumption: 10/09/20                        Date of last solid food consumption: 10/09/20    BMI:   Wt Readings from Last 3 Encounters:   10/09/20 201 lb 4.8 oz (91.3 kg)   10/07/20 230 lb (104.3 kg)   05/15/20 200 lb (90.7 kg)     Body mass index is 24.5 kg/m². CBC:   Lab Results   Component Value Date    WBC 7.4 10/12/2020    RBC 3.73 10/12/2020    HGB 9.4 10/12/2020    HCT 29.5 10/12/2020    MCV 79.1 10/12/2020    RDW 14.4 10/12/2020     10/12/2020       CMP:   Lab Results   Component Value Date     10/12/2020    K 4.3 10/12/2020    K 4.6 04/21/2020     10/12/2020    CO2 27 10/12/2020    BUN 11 10/12/2020    CREATININE 0.7 10/12/2020    GFRAA >60 10/12/2020    LABGLOM >60 10/12/2020    GLUCOSE 98 10/12/2020    PROT 5.9 10/07/2020    CALCIUM 9.1 10/12/2020    BILITOT 0.5 10/07/2020    ALKPHOS 59 10/07/2020    AST 8 10/07/2020    ALT 8 10/07/2020       POC Tests: No results for input(s): POCGLU, POCNA, POCK, POCCL, POCBUN, POCHEMO, POCHCT in the last 72 hours.     Coags:   Lab Results   Component Value Date    PROTIME 13.8 04/21/2020    INR 1.2 04/21/2020    APTT 30.3 04/21/2020       HCG (If Applicable): No results found for: PREGTESTUR, PREGSERUM, HCG, HCGQUANT     ABGs: No results found for: PHART, PO2ART, WSJ8VOS, MQB9WEY, BEART, G1WNRGKV     Type & Screen (If Applicable):  No results found for: LABABO, LABRH    Drug/Infectious Status (If Applicable):  No results found for: HIV, HEPCAB    COVID-19 Screening (If Applicable): No results found for: COVID19          EKG 12 Lead - 4/21/20  Component  Value  Ref Range & Units  Status  Collected  Lab    Ventricular Rate  105  BPM  Final  04/21/2020  5:09 PM  [facility]    Atrial Rate  105  BPM  Final  04/21/2020  5:09 PM  [facility]    P-R Interval  116  ms  Final  04/21/2020  5:09 PM  [facility]    QRS Duration  96  ms  Final  04/21/2020  5:09 PM  [facility]    Q-T Interval  334  ms  Final  04/21/2020  5:09 PM  [facility]    QTc Calculation (Bazett)  441  ms  Final  04/21/2020  5:09 PM  [facility]    P Axis  56  degrees  Final  04/21/2020  5:09 PM  [facility]    R Axis  75  degrees  Final  04/21/2020  5:09 PM  [facility]    T Axis  47  degrees  Final  04/21/2020  5:09 PM  [facility]      Narrative & Impression     Sinus tachycardia  Otherwise normal ECG  No previous ECGs available  Confirmed by Phil Brynat ([id number]) on 4/21/2020 8:03:45 PM         ECHO Transesophageal - 4/24/20   Findings      Left Ventricle   Left ventricular ejection fraction is grossly normal.      Right Ventricle   Normal right ventricular function. Left Atrium   No evidence of thrombus within left atrium or appendage. Mitral Valve   Structurally normal mitral valve. Physiologic and/or trace mitral regurgitation is present. No vegetation noted on mitral valve. Tricuspid Valve   The tricuspid valve appears structurally normal.   Physiologic and/or trace tricuspid regurgitation. No tricuspid valve vegetation or masses visualized. Aortic Valve   The aortic valve appears mildly sclerotic. The aortic valve is trileaflet. No evidence of aortic valve regurgitation. No evidence of mass or   vegetation noted on the aortic valve. Pulmonic Valve   The pulmonic valve was not well visualized.       Pericardial Effusion   No pericardial effusion. Aorta   Aortic root within normal limits. Conclusions      Summary   No comparison study available. Physiologic and/or trace mitral regurgitation is present. No vegetation noted on mitral valve. No evidence of mass or vegetation noted on the aortic valve. Physiologic and/or trace tricuspid regurgitation. Signature      ----------------------------------------------------------------   Electronically signed by Neto Silva MD(Interpreting   physician) on 04/24/2020 07:30 PM   ----------------------------------------------------------------      Anesthesia Evaluation  Patient summary reviewed and Nursing notes reviewed no history of anesthetic complications:   Airway: Mallampati: III  TM distance: >3 FB   Neck ROM: full  Comment: Patient report sore throat from recent intubation  Mouth opening: > = 3 FB Dental:          Pulmonary: breath sounds clear to auscultation  (+) current smoker          Patient did not smoke on day of surgery. Cardiovascular:Negative CV ROS          ECG reviewed  Rhythm: regular  Rate: normal  Echocardiogram reviewed                  Neuro/Psych:   (+) psychiatric history (History of IVDA):            GI/Hepatic/Renal:   (+) hepatitis: C,           Endo/Other:    (+) blood dyscrasia (mssa bacteremia): anemia:., .          Pt had no PAT visit       Abdominal:           Vascular:           ROS comment:  L brachial artery mycotic aneurysm s/p resection-reconstruction with ipsilateral basilic vein jump graft 49/5, takeback 10/10 for I&D infected hematoma. Anesthesia Plan      MAC     ASA 3     (  )  Induction: intravenous. Anesthetic plan and risks discussed with patient. Use of blood products discussed with patient whom consented to blood products. Plan discussed with attending. Judith Paris RN   10/13/2020      Pt seen, examined, chart reviewed, plan discussed.   Uvaldo Raymond Ozzie  10/13/2020  9:23 PM      Pt seen, examined, chart reviewed, plan discussed.   [facility]  10/14/2020  6:38 AM

## 2020-10-14 NOTE — ANESTHESIA PRE PROCEDURE
Department of Anesthesiology  Preprocedure Note       Name:  Shama Bradley   Age:  45 y.o.  :  1980                                          MRN:  [id number]         Date:  10/14/2020      Surgeon: * Surgery not found *    Procedure:     Medications prior to admission:   Prior to Admission medications    Not on File       Current medications:    No current facility-administered medications for this visit. No current outpatient medications on file.      Facility-Administered Medications Ordered in Other Visits   Medication Dose Route Frequency Provider Last Rate Last Dose    ampicillin-sulbactam (UNASYN) 3 g ivpb minibag  3 g Intravenous Q6H Glo Bernal MD   Stopped at 10/14/20 1012    sennosides-docusate sodium (SENOKOT-S) 8.6-50 MG tablet 2 tablet  2 tablet Oral BID Glo Bernal MD   2 tablet at 10/14/20 1030    benzocaine-menthol (CEPACOL SORE THROAT) lozenge 1 lozenge  1 lozenge Oral Q2H PRN Glo Bernal MD   1 lozenge at 10/12/20 2027    acetaminophen (TYLENOL) tablet 650 mg  650 mg Oral 3 times per day Glo Bernal MD   650 mg at 10/13/20 210    oxyCODONE (ROXICODONE) immediate release tablet 5 mg  5 mg Oral Q4H PRN Glo Bernal MD        Or   [facility] oxyCODONE HCl (OXY-IR) immediate release tablet 10 mg  10 mg Oral Q4H PRN Glo Bernal MD   10 mg at 10/14/20 1030    sodium chloride flush 0.9 % injection 10 mL  10 mL Intravenous 2 times per day Glo Bernal MD   10 mL at 10/14/20 1030    sodium chloride flush 0.9 % injection 10 mL  10 mL Intravenous PRN Glo Bernal MD   10 mL at 10/12/20 2335    ondansetron (ZOFRAN-ODT) disintegrating tablet 4 mg  4 mg Oral Q8H PRN Glo Bernal MD        Or    ondansetron [facility]) injection 4 mg  4 mg Intravenous Q6H PRN Glo Bernal MD        famotidine (PEPCID) tablet 20 mg  20 mg Oral BID Glo Bernal MD   20 mg at 10/14/20 1029    enoxaparin (LOVENOX) injection 40 mg  40 mg Subcutaneous Daily Glo Bernal MD   40 mg at 10/14/20 1029    0.9 % sodium chloride (ZOSYN FLUSH)   Intravenous Q8H Johnny Kirkland MD   Stopped at 10/11/20 1320       Allergies:  No Known Allergies    Problem List:    Patient Active Problem List   Diagnosis Code    Right arm cellulitis L03. 113    [facility] drug abuse ([facility] F19.10    Superficial foreign body of right upper arm S40.851A    Cellulitis of right arm L03. 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     Past Surgical History:        Procedure Laterality Date    APPLY DRESSING Left 10/9/2020    DEBRIDEMENT OF SKIN, SOFT TISSUE, MUSCLE, EXPLORATION OF UPPER ARM INTEGRA GRAFT FOR COVERAGE performed by Reba Giron MD at [place] [id number] Left 10/14/2020    LEFT UPPER EXTREMITY WOUND VAC PLACEMENT performed by Juan Leyden, MD at [address] [place] [address] Right 4/23/2020    RIGHT UPPER EXTREMITY INCISION AND DRAINAGE REMOVAL FOREIGN BODY WITH C-ARM performed by Caffie Osgood, MD at [address] [place] [address] Left 10/7/2020    LEFT ELBOW INCISION AND DRAINAGE performed by Caffie Osgood, MD at [address] [place] [address] OF ARTERY/VEIN Left 10/7/2020    LEFT ARM BRACHIAL ARTERY EXPLORATION, REPAIR OF MYCOTIC ANUERYSM WITH BYPASS performed by Juan Leyden, MD at [facility] TRANSESOPHAGEAL ECHOCARDIOGRAM N/A 4/24/2020    TRANSESOPHAGEAL ECHOCARDIOGRAM performed by Juno Miller MD at [address] [place] [address] History:    Social History     Tobacco Use    Smoking status: Current Every Day Smoker     Packs/day: 1.00     Types: Cigarettes    Smokeless tobacco: Never Used   Substance Use Topics    Alcohol use: Not Currently                                Ready to quit: Not Answered  Counseling given: Not Answered      Vital Signs (Current): There were no vitals filed for this visit. BP Readings from Last 3 Encounters:   10/14/20 (!) 134/102   10/09/20 (!) 107/50   10/07/20 (!) 134/55       NPO Status:  NPO > 8 hours. BMI:   Wt Readings from Last 3 Encounters:   10/09/20 201 lb 4.8 oz (91.3 kg)   10/07/20 230 lb (104.3 kg)   05/15/20 200 lb (90.7 kg)     There is no height or weight on file to calculate BMI.    CBC:   Lab Results   Component Value Date    WBC 7.4 10/12/2020    RBC 3.73 10/12/2020    HGB 9.4 10/12/2020    HCT 29.5 10/12/2020    MCV 79.1 10/12/2020    RDW 14.4 10/12/2020     10/12/2020       CMP:   Lab Results   Component Value Date     10/12/2020    K 4.3 10/12/2020    K 4.6 04/21/2020     10/12/2020    CO2 27 10/12/2020    BUN 11 10/12/2020    CREATININE 0.7 10/12/2020    GFRAA >60 10/12/2020    LABGLOM >60 10/12/2020    GLUCOSE 98 10/12/2020    PROT 5.9 10/07/2020    CALCIUM 9.1 10/12/2020    BILITOT 0.5 10/07/2020    ALKPHOS 59 10/07/2020    AST 8 10/07/2020    ALT 8 10/07/2020       POC Tests: No results for input(s): POCGLU, POCNA, POCK, POCCL, POCBUN, POCHEMO, POCHCT in the last 72 hours.     Coags:   Lab Results   Component Value Date    PROTIME 13.8 04/21/2020    INR 1.2 04/21/2020    APTT 30.3 04/21/2020       HCG (If Applicable): No results found for: PREGTESTUR, PREGSERUM, HCG, HCGQUANT     ABGs: No results found for: PHART, PO2ART, OZB5ZEQ, LJR9RBJ, BEART, X0PCQPZM     Type & Screen (If Applicable):  No results found for: LABABO, LABRH    Drug/Infectious Status (If Applicable):  No results found for: HIV, HEPCAB    COVID-19 Screening (If Applicable): No results found for: COVID19          EKG 12 Lead - 4/21/20  Component  Value  Ref Range & Units  Status  Collected  Lab    Ventricular Rate  105  BPM  Final  04/21/2020  5:09 PM  [facility]    Atrial Rate  105  BPM  Final  04/21/2020  5:09 PM [facility]    P-R Interval  116  ms  Final  04/21/2020  5:09 PM  [facility]    QRS Duration  96  ms  Final  04/21/2020  5:09 PM  [facility]    Q-T Interval  334  ms  Final  04/21/2020  5:09 PM  [facility]M    QTc Calculation (Bazett)  441  ms  Final  04/21/2020  5:09 PM  [facility]M    P Axis  56  degrees  Final  04/21/2020  5:09 PM  [facility]M    R Axis  75  degrees  Final  04/21/2020  5:09 PM  [facility]    T Axis  47  degrees  Final  04/21/2020  5:09 PM  [facility]M      Narrative & Impression     Sinus tachycardia  Otherwise normal ECG  No previous ECGs available  Confirmed by Everardo Galvan ([id number]) on 4/21/2020 8:03:45 PM         ECHO Transesophageal - 4/24/20   Findings      Left Ventricle   Left ventricular ejection fraction is grossly normal.      Right Ventricle   Normal right ventricular function. Left Atrium   No evidence of thrombus within left atrium or appendage. Mitral Valve   Structurally normal mitral valve. Physiologic and/or trace mitral regurgitation is present. No vegetation noted on mitral valve. Tricuspid Valve   The tricuspid valve appears structurally normal.   Physiologic and/or trace tricuspid regurgitation. No tricuspid valve vegetation or masses visualized. Aortic Valve   The aortic valve appears mildly sclerotic. The aortic valve is trileaflet. No evidence of aortic valve regurgitation. No evidence of mass or   vegetation noted on the aortic valve. Pulmonic Valve   The pulmonic valve was not well visualized. Pericardial Effusion   No pericardial effusion. Aorta   Aortic root within normal limits. Conclusions      Summary   No comparison study available. Physiologic and/or trace mitral regurgitation is present. No vegetation noted on mitral valve. No evidence of mass or vegetation noted on the aortic valve. Physiologic and/or trace tricuspid regurgitation.       Signature      ---------------------------------------------------------------- Electronically signed by Abrahan Kolb MD(Interpreting   physician) on 04/24/2020 07:30 PM   ----------------------------------------------------------------      Anesthesia Evaluation  Patient summary reviewed and Nursing notes reviewed no history of anesthetic complications:   Airway: Mallampati: III  TM distance: >3 FB   Neck ROM: full  Comment: Patient report sore throat from recent intubation  Mouth opening: > = 3 FB Dental:          Pulmonary: breath sounds clear to auscultation  (+) current smoker          Patient did not smoke on day of surgery. Cardiovascular:Negative CV ROS          ECG reviewed  Rhythm: regular  Rate: normal  Echocardiogram reviewed                  Neuro/Psych:   (+) psychiatric history (History of IVDA):            GI/Hepatic/Renal:   (+) hepatitis: C,           Endo/Other:    (+) blood dyscrasia (mssa bacteremia): anemia:., .          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 Anesthesia Plan      MAC     ASA 3     (  )  Induction: intravenous. Anesthetic plan and risks discussed with patient. Use of blood products discussed with patient whom consented to blood products. Plan discussed with attending. CESAR Leiva CRNA   10/14/2020    Pt seen, examined, chart reviewed, plan discussed.   [facility]  10/14/2020  1:52 PM

## 2020-10-14 NOTE — PROGRESS NOTES
CHERYL PROGRESS NOTE      Chief complaint: Follow-up of infected aneurysm    The patient is a 45 y.o. male with history of injection drug use, reactive hep C Ab, MSSA bacteremia and right arm abscess in 04/2020 for which he underwent I&D and was seen by Dr. Ashkan Hanson and given a 4 week course of cefazolin and ciprofloxacin, transferred to [facility] intervention from [address] [place] [address] where he initially presented on 10/07 with left arm pain and swelling for 2-3 days for which he was seen by Dr. Ashkan Hanson, started on piperacillin-tazobactam and vancomycin, underwent left elbow I&D on 10/07 during which infected brachial pseudoaneurysm was noted prompting transfer to [facility]. Tissue cultures showed heavy growth of anaerobic Gram-positive cocci and Gram-negative rods, rare growth of Candida albicans, light growth of Streptococcus constellatus, moderate growth of Streptococcus mitis/oralis, moderate growth of Pediococcus pentosaceus. Blood cultures showed no growth.     On transfer, he was septic with fever up to 102.1 °F and leukocytosis of 13,000. HIV screen is nonreactive. He underwent left arm brachial artery exploration, excision of mycotic aneurysm with jump graft. Tissue Gram stain and culture showed rare polymorphonuclear leukocytes, no epithelial cells, moderate gram-negative rods, rare gram-positive cocci in chains, light growth of Streptococcus anginosus (susceptible to penicillin and fluoroquinolones). Piperacillin-tazobactam and vancomycin were resumed. He underwent exploration of left upper arm, evacuation of pus and infected hematoma, debridement of left upper arm wound, coverage of saphenous vein bypass with Integra on 10/09 during which pus from lateral upper arm, necrotic subcutaneous tissue and muscle were noted. JAVED showed no valvular vegetations. He underwent excisional debridement with wound vac placement on 10/14    Subjective: Patient was seen and examined.  No chills, no abdominal pain, no diarrhea, no rash, no itching. He reports pain on left arm is controlled. Objective:    Vitals:    10/14/20 0915   BP: (!) 134/102   Pulse: 78   Resp: 10   Temp: 98.1 °F (36.7 °C)   SpO2: 99%     Constitutional: Alert, not in distress  Respiratory: Clear breath sounds, no crackles, no wheezes  Cardiovascular: Regular rate and rhythm, no murmurs  Gastrointestinal: Bowel sounds present, soft, nontender  Skin: Warm and dry, no active dermatoses  Musculoskeletal: Left arm with dressing and Ace wrap in place    Labs, imaging, and medical records/notes were personally reviewed. 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 JAVED showed no vegetations. Injection drug use  Hep C Ab positive     Plan:  Continue ampicillin-sulbactam 3 g every 6 hours for now pending discharge planning and placement. Continue local wound care. Follow up hep C viral load.     Thank you for involving me in the care of [facility]. I will continue to follow. Please do not hesitate to call for any questions or concerns.       Electronically signed by Laura Bernard MD on 10/14/2020 at 11:37 AM ,[email or web address]

## 2020-10-14 NOTE — OP NOTE
Operative Note      Patient: Cornelio Kaminski  YOB: 1980  MRN: [id number]    Date of Procedure: 10/14/2020    Pre-Op Diagnosis: Left arm wound    Post-Op Diagnosis: Same       Procedure(s):  LEFT UPPER EXTREMITY WOUND VAC PLACEMENT   LEFT UPPER EXTREMITY EXCISIONAL DEBRIDEMENT 40 square cm    Surgeon(s):  Aleah Preston MD    Assistant:   Resident: Katie Cortés MD    Anesthesia: Monitor Anesthesia Care    Estimated Blood Loss (mL): Minimal    Complications: None    Specimens:   * No specimens in log *    Implants:  * No implants in log *      Drains:   Negative Pressure Wound Therapy Arm Left;Upper (Active)       [REMOVED] Negative Pressure Wound Therapy Arm Anterior; Left (Removed)   Dressing Type Black foam;White foam 10/09/20 1639   Cycle Continuous 10/09/20 1639   Target Pressure (mmHg) 125 10/09/20 1639   Dressing Status Dry; Intact; Clean 10/09/20 1639   Dressing Changed Other (Comment) 10/08/20 2000   Drainage Amount Small 10/08/20 0600   Drainage Description Serosanguinous 10/09/20 1639   Output (ml) 50 ml 10/09/20 1445   Wound Assessment Dry; Intact 10/09/20 1639   Virgen-wound Assessment Intact 10/09/20 1639   Odor None 10/09/20 1639       [REMOVED] Urethral Catheter 16 fr (Removed)   Catheter Indications Need for fluid management in critically ill patients in a critical care setting not able to be managed by other means such as BSC with hat, bedpan, urinal, condom catheter, or short term intermittent urethral catherization 10/08/20 1000   Site Assessment No urethral drainage 10/08/20 1000   Urine Color Yellow 10/08/20 1000   Urine Appearance Clear 10/08/20 1000   Output (mL) 205 mL 10/08/20 1000       Findings: healthy wound bed with intact integra graft    Detailed Description of Procedure:   Patient has large LUE wound from abscess I&D and brachial artery reconstruction with vein graft covered with integra and muscle flap. He was consented for wound vac placement today to facilitate healing. Patient was placed supine on OR table and sedated with ketamine. Left arm was prepped with betadine and draped in sterile fashion. Curette was used to perform excisional debridement of wound bed, for total of 40cm squared. Wound bed appeared well perfused with punctate bleeding, and noninfected. Integra graft was completely intact. Skin edges were prepped. Black sponges were cut to shape and placed in wounds and covered with the plastic adhesives. Bridge sponge was placed to connect the two wounds, and vacuum was applied there. Wound vac had good seal. Patient was awoken uneventfully and transferred back to his inpatient room. Dr Immanuel Shepherd was present for entire procedure and counts were correct. Patient is ok for discharge to LTAC from our standpoint - wound care team to continue vac changes.     Electronically signed by Sharath Jack MD on 10/14/2020 at 8:07 AM

## 2020-10-14 NOTE — PROCEDURES
PRELIMINARY TRANSESOPHAGEAL ECHOCARDIOGRAPHY REPORT    Cardiologist: Dr. Soha Damon    Date of Procedure: 10/14/2020    Indications for study:  Bacteremia, assess for endocarditis, MR    Study performed using (Sedation): Per anesthesia    Complications: None    Preliminary findings:  Normal LV function   Trace MR  No evidence of vegetation  No left atrial appendage thrombus  No interatrial shunting on bubble study    Radames Ochoa MD  [facility]) Cardiology

## 2020-10-14 NOTE — CARE COORDINATION
ED Visit Note


First contact with patient:  07:01


Received patient in signout.  History and physical verified by me.   patient 

has been accepted to [facility] and will be transferred via [place].


Current/Historical Medications


No Active Prescriptions or Reported Meds





Allergies


Coded Allergies:  


     No Known Allergies (Unverified , 1/21/18)





Vital Signs











  Date Time  Temp Pulse Resp B/P (MAP) Pulse Ox O2 Delivery O2 Flow Rate FiO2


 


1/21/18 08:14  93 17 138/77 100 Room Air  


 


1/21/18 02:03 37.4 111 20 134/92 95 Room Air  











Laboratory Results


1/21/18 02:40








1/21/18 02:40

















Test


  1/21/18


02:40 1/21/18


02:44


 


Red Blood Count


  4.84 M/uL


(4.7-6.1) 


 


 


Mean Corpuscular Volume


  92.6 fL


() 


 


 


Mean Corpuscular Hemoglobin


  31.4 pg


(25-34) 


 


 


Mean Corpuscular Hemoglobin


Concent 33.9 g/dl


(32-36) 


 


 


RDW Standard Deviation


  44.8 fL


(36.4-46.3) 


 


 


RDW Coefficient of Variation


  13.2 %


(11.5-14.5) 


 


 


Mean Platelet Volume


  10.4 fL


(7.4-10.4) 


 


 


Anion Gap


  4.0 mmol/L


(3-11) 


 


 


Est Creatinine Clear Calc


Drug Dose 84.7 ml/min 


  


 


 


Estimated GFR (


American) 82.3 


  


 


 


Estimated GFR (Non-


American 71.0 


  


 


 


BUN/Creatinine Ratio 22.9 (10-20)  


 


Calcium Level


  8.9 mg/dl


(8.5-10.1) 


 


 


Total Bilirubin


  0.4 mg/dl


(0.2-1) 


 


 


Direct Bilirubin


  < 0.1 mg/dl


(0-0.2) 


 


 


Aspartate Amino Transf


(AST/SGOT) 17 U/L (15-37) 


  


 


 


Alanine Aminotransferase


(ALT/SGPT) 34 U/L (12-78) 


  


 


 


Alkaline Phosphatase


  102 U/L


() 


 


 


Total Protein


  6.7 gm/dl


(6.4-8.2) 


 


 


Albumin


  3.6 gm/dl


(3.4-5.0) 


 


 


Thyroid Stimulating Hormone


(TSH) 1.640 uIu/ml


(0.300-4.500) 


 


 


Salicylates Level


  < 1.7 mg/dl


(2.8-20) 


 


 


Acetaminophen Level


  < 2 ug/ml


(10-30) 


 


 


Ethyl Alcohol mg/dL


  < 3.0 mg/dl


(0-3) 


 


 


Urine Color  YELLOW 


 


Urine Appearance  CLEAR (CLEAR) 


 


Urine pH  6.0 (4.5-7.5) 


 


Urine Specific Gravity


  


  1.021


(1.000-1.030)


 


Urine Protein  TRACE (NEG) 


 


Urine Glucose (UA)  NEG (NEG) 


 


Urine Ketones  NEG (NEG) 


 


Urine Occult Blood  NEG (NEG) 


 


Urine Nitrite  NEG (NEG) 


 


Urine Bilirubin  NEG (NEG) 


 


Urine Urobilinogen  NEG (NEG) 


 


Urine Leukocyte Esterase  NEG (NEG) 


 


Urine WBC (Auto)  1-5 /hpf (0-5) 


 


Urine RBC (Auto)  0-4 /hpf (0-4) 


 


Urine Hyaline Casts (Auto)  1-5 /lpf (0-5) 


 


Urine Epithelial Cells (Auto)


  


  5-10 /lpf


(0-5)


 


Urine Bacteria (Auto)  NEG (NEG) 


 


Urine Sperm (Auto)


  


  PRESENT (NOT


PRESENT)


 


Urine Opiates Screen  NEG (NEG) 


 


Urine Methadone, Qualitative  NEG (NEG) 


 


Urine Barbiturates  NEG (NEG) 


 


Urine Phencyclidine (PCP)


Level 


  NEG (NEG) 


 


 


Ur


Amphetamine/Methamphetamine 


  POS (NEG) 


 


 


MDMA (Ecstasy) Screen  POS (NEG) 


 


Urine Benzodiazepines Screen  NEG (NEG) 


 


Urine Cocaine Metabolite  NEG (NEG) 


 


Urine Marijuana (THC)  NEG (NEG) 











Departure Information


Impression





 Primary Impression:  


 Acute psychosis


 Additional Impression:  


 Sleep deprivation





Dispostion


Still a Patient





Prescriptions





No Active Prescriptions or Reported Meds





Referrals


No Doctor, Assigned (PCP)





Forms


HOME CARE DOCUMENTATION FORM,                                                 

               IMPORTANT VISIT INFORMATION





Patient Instructions


[facility]





Problem Qualifiers Patient had a LEFT UPPER EXTREMITY WOUND VAC PLACEMENT and LEFT UPPER EXTREMITY EXCISIONAL DEBRIDEMENT 40 square cm for  Left arm wound today. He also had a JAVED today. He continues with IV ampicillin-sulbactam 3 g every 6 hours. ID is following. Per Rosalba Tafoya from [facility] [employer], precert has been denied and a P2P is scheduled with Dr. Nohemi Ferreira tomorrow at 1:30 pm. Await determination.   Rosalio Massey RN CM

## 2020-10-15 PROCEDURE — 6370000000 HC RX 637 (ALT 250 FOR IP): Performed by: SURGERY

## 2020-10-15 PROCEDURE — 6360000002 HC RX W HCPCS: Performed by: SURGERY

## 2020-10-15 PROCEDURE — 2580000003 HC RX 258: Performed by: SURGERY

## 2020-10-15 PROCEDURE — 1200000000 HC SEMI PRIVATE

## 2020-10-15 RX ADMIN — OXYCODONE 5 MG: 5 TABLET ORAL at 08:02

## 2020-10-15 RX ADMIN — DOCUSATE SODIUM 50 MG AND SENNOSIDES 8.6 MG 2 TABLET: 8.6; 5 TABLET, FILM COATED ORAL at 22:03

## 2020-10-15 RX ADMIN — ENOXAPARIN SODIUM 40 MG: 40 INJECTION SUBCUTANEOUS at 09:54

## 2020-10-15 RX ADMIN — Medication 10 ML: at 09:56

## 2020-10-15 RX ADMIN — FAMOTIDINE 20 MG: 20 TABLET ORAL at 09:54

## 2020-10-15 RX ADMIN — SODIUM CHLORIDE 3 G: 900 INJECTION INTRAVENOUS at 13:06

## 2020-10-15 RX ADMIN — SODIUM CHLORIDE 3 G: 900 INJECTION INTRAVENOUS at 20:01

## 2020-10-15 RX ADMIN — SODIUM CHLORIDE 3 G: 900 INJECTION INTRAVENOUS at 06:38

## 2020-10-15 RX ADMIN — SODIUM CHLORIDE 3 G: 900 INJECTION INTRAVENOUS at 01:51

## 2020-10-15 RX ADMIN — FAMOTIDINE 20 MG: 20 TABLET ORAL at 22:04

## 2020-10-15 RX ADMIN — ACETAMINOPHEN 650 MG: 325 TABLET, FILM COATED ORAL at 12:27

## 2020-10-15 RX ADMIN — ACETAMINOPHEN 650 MG: 325 TABLET, FILM COATED ORAL at 22:04

## 2020-10-15 RX ADMIN — ACETAMINOPHEN 650 MG: 325 TABLET, FILM COATED ORAL at 06:38

## 2020-10-15 ASSESSMENT — PAIN DESCRIPTION - PAIN TYPE: TYPE: ACUTE PAIN

## 2020-10-15 ASSESSMENT — PAIN SCALES - GENERAL
PAINLEVEL_OUTOF10: 4
PAINLEVEL_OUTOF10: 5
PAINLEVEL_OUTOF10: 3
PAINLEVEL_OUTOF10: 4
PAINLEVEL_OUTOF10: 5

## 2020-10-15 ASSESSMENT — PAIN DESCRIPTION - LOCATION: LOCATION: ARM

## 2020-10-15 ASSESSMENT — PAIN DESCRIPTION - ORIENTATION: ORIENTATION: LEFT

## 2020-10-15 NOTE — PLAN OF CARE
Problem: Skin Integrity:  Goal: Will show no infection signs and symptoms  Description: Will show no infection signs and symptoms  Outcome: Met This Shift  Goal: Absence of new skin breakdown  Description: Absence of new skin breakdown  Outcome: Met This Shift     Problem: Falls - Risk of:  Goal: Will remain free from falls  Description: Will remain free from falls  Outcome: Met This Shift  Goal: Absence of physical injury  Description: Absence of physical injury  Outcome: Met This Shift     Problem: Infection - Surgical Site:  Goal: Will show no infection signs and symptoms  Description: Will show no infection signs and symptoms  Outcome: Met This Shift     Problem: Pain:  Goal: Pain level will decrease  Description: Pain level will decrease  Outcome: Met This Shift

## 2020-10-15 NOTE — CARE COORDINATION
Patient with a hx of IV drug use and Hepatitis C is here with Infected ruptures left brachial artery aneurysm, s/p left brachial artery exploration and excision of mycotic aneurysm with jump graft on 10/7, takeback 10/10 for I&D infected hematoma, wound vac placement 10/14- Infectious disease and vascular surgery are following. Currently on zosyn q 8 hours. JAVED  Performed 10/14 was normal. Follow cultures. precert for [facility] was originally denied. There is a P2P scheduled with Dr. Loyda Jeronimo today at 1:30 pm. Await determination. I met with patient for plan B if LTAC is still denied. He said that if denied he would like to go home with home health care and does not have a preference for any agency. Referral made to Antonina Matute [address] at [facility], await acceptance if [facility] denied. Voicemail message left for T[facility] at [facility] for wound vac referral. Wound vac forms have been started by the wound care nurse and are in the soft chart to be signed and completed by vascular surgery.   Jude Sher RN CM  ·

## 2020-10-15 NOTE — PROGRESS NOTES
Comprehensive Nutrition Assessment    Type and Reason for Visit:  Reassess    Nutrition Recommendations/Plan: Continue current diet, Ensure HP BID, Regis BID    Nutrition Assessment:  Pt remains nutritionally at risk d/t increased nutrient needs for surgical healing 2/2 infected brachial pseudoaneuysm, now s/p LUE debridement w/ wound vac. Will add ONS and monitor. Malnutrition Assessment:  Malnutrition Status:  Insufficient data    Context:  Chronic Illness     Findings of the 6 clinical characteristics of malnutrition:  Energy Intake:  Mild decrease in energy intake (Comment)  Weight Loss:  Unable to assess     Body Fat Loss:  Unable to assess     Muscle Mass Loss:  Unable to assess    Fluid Accumulation:  No significant fluid accumulation     Strength:  Not Performed    Estimated Daily Nutrient Needs:  Energy (kcal):  ; Weight Used for Energy Requirements:  Current     Protein (g):  120-135; Weight Used for Protein Requirements:  Current(1.3-1.5)        Fluid (ml/day):  ; Weight Used for Fluid Requirements:  Current      Nutrition Related Findings:  pt alert, active BS, soft abd, abd WDL, +2 edema, fluids WDL, hx IVDA      Wounds:  Multiple, Surgical Wound, Wound Vac, Open Wounds       Current Nutrition Therapies:    DIET GENERAL; Anthropometric Measures:  · Height: 6' 4\" (193 cm)  · Current Body Weight: 201 lb (91.2 kg)   · Admission Body Weight: 194 lb (88 kg)(10/7 no method)    · Usual Body Weight: (per EMR x1 year, 200# stated/no method)     · Ideal Body Weight: 202 lbs; % Ideal Body Weight 99.5 %   · BMI: 24.5  · BMI Categories: Normal Weight (BMI 18.5-24. 9)       Nutrition Diagnosis:   · Increased nutrient needs related to increase demand for energy/nutrients as evidenced by wounds    Nutrition Interventions:   Food and/or Nutrient Delivery:  Continue Current Diet, Start Oral Nutrition Supplement(Ensure HP BID, Regis BID)  Nutrition Education/Counseling:  Education not indicated Coordination of Nutrition Care:  Continued Inpatient Monitoring    Goals:  pt to consume >75% meals/ONS       Nutrition Monitoring and Evaluation:   Food/Nutrient Intake Outcomes:  Food and Nutrient Intake, Supplement Intake  Physical Signs/Symptoms Outcomes:  Biochemical Data, GI Status, Fluid Status or Edema, Weight, Skin, Nutrition Focused Physical Findings     Electronically signed by Sanjay Lynn MS, RD, LD on 10/15/20 at 11:12 AM EDT    Contact: [phone]

## 2020-10-15 NOTE — ANESTHESIA POSTPROCEDURE EVALUATION
Department of Anesthesiology  Postprocedure Note    Patient: Olga Beck  MRN: [id number]  YOB: 1980  Date of evaluation: 10/15/2020  Time:  5:46 AM     Procedure Summary     Date:  10/14/20 Room / Location:  [facility] OR 03 / [facility]    Anesthesia Start:  2519 Anesthesia Stop:  5049    Procedure:  LEFT UPPER EXTREMITY WOUND VAC PLACEMENT (Left Arm Upper) Diagnosis:  (.)    Surgeon:  Katie Collado MD Responsible Provider:  Carol Trivedi MD    Anesthesia Type:  MAC ASA Status:  3          Anesthesia Type: MAC    Lynne Phase I: Lynne Score: 10    Lynne Phase II:      Last vitals: Reviewed and per EMR flowsheets.        Anesthesia Post Evaluation    Patient location during evaluation: PACU  Patient participation: complete - patient participated  Level of consciousness: awake  Airway patency: patent  Nausea & Vomiting: no nausea and no vomiting  Complications: no  Cardiovascular status: hemodynamically stable  Respiratory status: acceptable  Hydration status: stable

## 2020-10-15 NOTE — PROGRESS NOTES
Hospitalist Progress Note      SYNOPSIS: Patient admitted on 10/7/2020 The patient is a 45 y. o. male with history of injection drug use, reactive hep C Ab, MSSA bacteremia and right arm abscess in 2020 for which he underwent I&D and  Was seen by Dr. Courtney Barrios and given a 4 week course of cefazolin and ciprofloxacin, transferred to [facility] intervention from [facility] where he initially presented on 10/07 with left arm pain and swelling for 2-3 days for which he was seen by Dr. Courtney Barrios, started on piperacillin-tazobactam and vancomycin, underwent left elbow I&D on 10/07 during which infected brachial pseudoaneurysm was noted prompting transfer to [facility]. Tissue cultures showed heavy growth of anaerobic Gram-positive cocci and Gram-negative rods. On transfer, he was septic with fever up to 102.1 °F and leukocytosis of 13,000. HIV screen is nonreactive. 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     SUBJECTIVE:  Stable overnight. No other overnight issues reported. Patient seen and examined  Records reviewed. Patient with no complaints  Patient apparently was having suboxone snuck in to hospital by a visitor and he is \"cheeking\" his oxy  I asked him if he takes suboxone outpatient and he says yes and it it not prescribed  I asked him if someone has been bringing him suboxone and he says no  Awaiting P2P      Temp (24hrs), Av.8 °F (36.6 °C), Min:97.4 °F (36.3 °C), Max:98.2 °F (36.8 °C)    DIET: DIET GENERAL;  CODE: Full Code    Intake/Output Summary (Last 24 hours) at 10/15/2020 0960  Last data filed at 10/15/2020 3878  Gross per 24 hour   Intake 100 ml   Output 1550 ml   Net -1450 ml       Review of Systems  All bolded are positive; please see HPI  General:  Fever, chills, diaphoresis, fatigue, malaise, night sweats, weight loss  Psychological:  Anxiety, disorientation, hallucinations.   ENT:  Epistaxis, headaches, vertigo, visual changes. Cardiovascular:  Chest pain, irregular heartbeats, palpitations, paroxysmal nocturnal dyspnea. Respiratory:  Shortness of breath, coughing, sputum production, hemoptysis, wheezing, orthopnea. Gastrointestinal:  Nausea, vomiting, diarrhea, heartburn, constipation, abdominal pain, hematemesis, hematochezia, melena, acholic stools  Genito-Urinary:  Dysuria, urgency, frequency, hematuria  Musculoskeletal:  Joint pain, joint stiffness, joint swelling, muscle pain  Neurology:  Headache, focal neurological deficits, weakness, numbness, paresthesia  Derm:  Rashes, ulcers, excoriations, bruising  Extremities:  Decreased ROM, peripheral edema, mottling      OBJECTIVE:    /76   Pulse 71   Temp 97.4 °F (36.3 °C) (Temporal)   Resp 16   Ht 6' 4\" (1.93 m)   Wt 201 lb 4.8 oz (91.3 kg)   SpO2 100%   BMI 24.50 kg/m²     General appearance:  awake, alert, and oriented to person, place, time, and purpose; appears stated age and cooperative; no apparent distress no labored breathing  HEENT:  Conjunctivae/corneas clear. Neck: Supple. No jugular venous distention. Respiratory: symmetrical; clear to auscultation bilaterally; no wheezes; no rhonchi; no rales  Cardiovascular: rhythm regular; rate controlled; no murmurs  Abdomen: Soft, nontender, nondistended  Extremities:  peripheral pulses present; no peripheral edema; no ulcers left arm dressed  Musculoskeletal: No clubbing, cyanosis, no bilateral lower extremity edema. Brisk capillary refill. Skin:  No rashes  on visible skin  Neurologic: awake, alert and following commands     ASSESSMENT and PLAN:  · Infected ruptures left brachial artery aneurysm, s/p left brachial artery exploration and excision of mycotic aneurysm with jump graft on 10/7, takeback 10/10 for I&D infected hematoma, wound vac placement 10/14- Infectious disease and vascular surgery are following. Currently on zosyn q 8 hours. JAVED  Performed 10/14 was normal. Follow cultures.    · IV drug use  · Hepatitis C         DISPOSITION: Referral made to [facility], this was denied. Awaiting P2P    Medications:  REVIEWED DAILY    Infusion Medications     Scheduled Medications    ampicillin-sulbactam  3 g Intravenous Q6H    sennosides-docusate sodium  2 tablet Oral BID    acetaminophen  650 mg Oral 3 times per day    sodium chloride flush  10 mL Intravenous 2 times per day    famotidine  20 mg Oral BID    enoxaparin  40 mg Subcutaneous Daily    sodium chloride   Intravenous Q8H     PRN Meds: benzocaine-menthol, oxyCODONE **OR** oxyCODONE, sodium chloride flush, ondansetron **OR** ondansetron    Labs:     No results for input(s): WBC, HGB, HCT, PLT in the last 72 hours. No results for input(s): NA, K, CL, CO2, BUN, CREATININE, CALCIUM, PHOS in the last 72 hours. Invalid input(s): MAGNES    No results for input(s): PROT, ALB, ALKPHOS, ALT, AST, BILITOT, AMYLASE, LIPASE in the last 72 hours. No results for input(s): INR in the last 72 hours. No results for input(s): Michelle Anuel in the last 72 hours. Chronic labs:    Lab Results   Component Value Date    INR 1.2 04/21/2020       Radiology: REVIEWED DAILY    +++++++++++++++++++++++++++++++++++++++++++++++++  [address], [place]  +++++++++++++++++++++++++++++++++++++++++++++++++  NOTE: This report was transcribed using voice recognition software. Every effort was made to ensure accuracy; however, inadvertent computerized transcription errors may be present.

## 2020-10-15 NOTE — PROGRESS NOTES
CHERYL PROGRESS NOTE      Chief complaint: Follow-up of infected aneurysm    The patient is a 45 y.o. male with history of injection drug use, reactive hep C Ab, MSSA bacteremia and right arm abscess in 04/2020 for which he underwent I&D and was seen by Dr. Albert Bone and given a 4 week course of cefazolin and ciprofloxacin, transferred to [facility] intervention from [facility] where he initially presented on 10/07 with left arm pain and swelling for 2-3 days for which he was seen by Dr. Albert Bone, started on piperacillin-tazobactam and vancomycin, underwent left elbow I&D on 10/07 during which infected brachial pseudoaneurysm was noted prompting transfer to [facility]. Tissue cultures showed heavy growth of anaerobic Gram-positive cocci and Gram-negative rods, rare growth of Candida albicans, light growth of Streptococcus constellatus, moderate growth of Streptococcus mitis/oralis, moderate growth of Pediococcus pentosaceus. Blood cultures showed no growth.     On transfer, he was septic with fever up to 102.1 °F and leukocytosis of 13,000. HIV screen is nonreactive. He underwent left arm brachial artery exploration, excision of mycotic aneurysm with jump graft. Tissue Gram stain and culture showed rare polymorphonuclear leukocytes, no epithelial cells, moderate gram-negative rods, rare gram-positive cocci in chains, light growth of Streptococcus anginosus (susceptible to penicillin and fluoroquinolones). Piperacillin-tazobactam and vancomycin were resumed. He underwent exploration of left upper arm, evacuation of pus and infected hematoma, debridement of left upper arm wound, coverage of saphenous vein bypass with Integra on 10/09 during which pus from lateral upper arm, necrotic subcutaneous tissue and muscle were noted. JAVED showed no valvular vegetations. He underwent excisional debridement with wound vac placement on 10/14. Subjective: Patient was seen and examined.  No chills, no abdominal pain, no diarrhea, no rash, no itching. He reports pain on left arm is controlled. Objective:    Vitals:    10/15/20 0815   BP: 123/76   Pulse: 71   Resp: 16   Temp: 97.4 °F (36.3 °C)   SpO2:      Constitutional: Alert, not in distress  Respiratory: Clear breath sounds, no crackles, no wheezes  Cardiovascular: Regular rate and rhythm, no murmurs  Gastrointestinal: Bowel sounds present, soft, nontender  Skin: Warm and dry, no active dermatoses  Musculoskeletal: Left arm with dressing and Ace wrap in place    Labs, imaging, and medical records/notes were personally reviewed. 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 JAVED showed no vegetations. Injection drug use  History of hepatitis C (hepatitis C viral load undetectable as of 10/08/2020)     Plan:  Continue ampicillin-sulbactam 3 g every 6 hours for now. Plan to switch to oral ciprofloxacin 500 mg q12h for 4 weeks of antibiotic therapy on discharge if not able to be discharged to a nursing facility. Continue local wound care.     Thank you for involving me in the care of [facility]. I will continue to follow. Please do not hesitate to call for any questions or concerns.       Electronically signed by Susanne Castleman, MD on 10/15/2020 at 10:50 AM

## 2020-10-15 NOTE — PLAN OF CARE
Problem: Skin Integrity:  Goal: Will show no infection signs and symptoms  Description: Will show no infection signs and symptoms  10/15/2020 0831 by Moises Garrett RN  Outcome: Met This Shift  10/15/2020 0144 by Gabriela Wong RN  Outcome: Met This Shift  Goal: Absence of new skin breakdown  Description: Absence of new skin breakdown  10/15/2020 0831 by Moises Garrett RN  Outcome: Met This Shift  10/15/2020 0144 by Gabriela Wong RN  Outcome: Met This Shift     Problem: Falls - Risk of:  Goal: Will remain free from falls  Description: Will remain free from falls  10/15/2020 0831 by Moises Garrett RN  Outcome: Met This Shift  10/15/2020 0144 by Gabriela Wong RN  Outcome: Met This Shift  Goal: Absence of physical injury  Description: Absence of physical injury  10/15/2020 0831 by Moises Garrett RN  Outcome: Met This Shift  10/15/2020 0144 by Gabriela Wong RN  Outcome: Met This Shift     Problem: Infection - Surgical Site:  Goal: Will show no infection signs and symptoms  Description: Will show no infection signs and symptoms  10/15/2020 0831 by Moises Garrett RN  Outcome: Met This Shift  10/15/2020 0144 by Gabriela Wong RN  Outcome: Met This Shift     Problem: Pain:  Goal: Pain level will decrease  Description: Pain level will decrease  10/15/2020 0831 by Moises Garrett RN  Outcome: Met This Shift  10/15/2020 0144 by Gabriela Wong RN  Outcome: Met This Shift  Goal: Control of acute pain  Description: Control of acute pain  Outcome: Met This Shift  Goal: Control of chronic pain  Description: Control of chronic pain  Outcome: Met This Shift

## 2020-10-15 NOTE — FLOWSHEET NOTE
Inpatient Wound Care (Initial consult)) 8423A    Admit Date: 10/7/2020  7:06 PM    Reason for consult:  Wound vac    Significant history: Per surgical note     Pre-Op Diagnosis: Left arm wound       Procedure(s):  LEFT UPPER EXTREMITY WOUND VAC PLACEMENT   LEFT UPPER EXTREMITY EXCISIONAL DEBRIDEMENT 40 square cm      Findings:      10/15/20 0843   Wound 10/07/20 Arm Left   Date First Assessed: 10/07/20   Present on Hospital Admission: No  Location: Arm  Wound Location Orientation: Left   Dressing Status Intact   Dressing/Treatment Negative pressure wound therapy   Negative Pressure Wound Therapy Arm Left;Upper   Placement Date: 10/14/20   Pre-existing: No  Inserted by: Marguerite Sanchez  Location: Arm  Wound Location Orientation: Left;Upper   Wound Type Surgical   Dressing Type Black foam   Cycle Continuous   Target Pressure (mmHg) 125        Impression:  Left upper arm: surgical    Plan: Wound Vac dressing  Will follow for wound vac dressing change. Patient will need continued preventative care.       Alec Duran 10/15/2020 8:48 AM

## 2020-10-15 NOTE — ANESTHESIA POSTPROCEDURE EVALUATION
Department of Anesthesiology  Postprocedure Note    Patient: Olga Beck  MRN: [id number]  YOB: 1980  Date of evaluation: 10/15/2020  Time:  9:34 AM     Procedure Summary     Date:  10/14/20 Room / Location:  [facility]; [facility]CHO    Anesthesia Start:  9154 Anesthesia Stop:  6475    Procedure:  SEY JAVED Diagnosis:      Scheduled Providers:  CESAR Ledezma - CRNA; Rosemary Oropeza MD Responsible Provider:  Rosemary Oropeza MD    Anesthesia Type:  MAC ASA Status:  3          Anesthesia Type: MAC    Lynne Phase I: Lynne Score: 10    Lynne Phase II:      Last vitals: Reviewed and per EMR flowsheets.        Anesthesia Post Evaluation    Patient location during evaluation: floor  Patient participation: complete - patient participated  Level of consciousness: awake and alert  Airway patency: patent  Nausea & Vomiting: no nausea and no vomiting  Complications: no  Cardiovascular status: hemodynamically stable and blood pressure returned to baseline  Respiratory status: acceptable  Hydration status: euvolemic

## 2020-10-15 NOTE — CARE COORDINATION
Per Dr. Evan Cruz note today, precert is still denied after the P2P. Therefore the plan will be home with home health care. [id number] [facility] is following. Harpal Vernon from [facility] has started an expedited appeal for LTAC in the meantime. Jerry Lockhart at [facility] notified of the patient likely going home with [facility]. Wound vac forms have been started by the wound care nurse and are in the soft chart to be signed and completed by vascular surgery and it can be a resident. oGdwin Maher RN CM.

## 2020-10-16 VITALS
HEIGHT: 76 IN | RESPIRATION RATE: 18 BRPM | HEART RATE: 55 BPM | BODY MASS INDEX: 24.51 KG/M2 | TEMPERATURE: 97.6 F | OXYGEN SATURATION: 93 % | DIASTOLIC BLOOD PRESSURE: 75 MMHG | WEIGHT: 201.3 LBS | SYSTOLIC BLOOD PRESSURE: 121 MMHG

## 2020-10-16 PROCEDURE — 6370000000 HC RX 637 (ALT 250 FOR IP): Performed by: SURGERY

## 2020-10-16 PROCEDURE — 6360000002 HC RX W HCPCS: Performed by: SURGERY

## 2020-10-16 RX ORDER — OXYCODONE HYDROCHLORIDE 5 MG/1
5 TABLET ORAL EVERY 8 HOURS PRN
Qty: 9 TABLET | Refills: 0 | Status: SHIPPED | OUTPATIENT
Start: 2020-10-16 | End: 2020-10-19

## 2020-10-16 RX ORDER — CIPROFLOXACIN 500 MG/1
500 TABLET, FILM COATED ORAL 2 TIMES DAILY
Qty: 52 TABLET | Refills: 0 | Status: SHIPPED | OUTPATIENT
Start: 2020-10-16 | End: 2020-10-16 | Stop reason: SDUPTHER

## 2020-10-16 RX ORDER — SENNA AND DOCUSATE SODIUM 50; 8.6 MG/1; MG/1
2 TABLET, FILM COATED ORAL 2 TIMES DAILY
Qty: 28 TABLET | Refills: 0 | Status: SHIPPED | OUTPATIENT
Start: 2020-10-16 | End: 2020-10-23

## 2020-10-16 RX ORDER — CIPROFLOXACIN 500 MG/1
500 TABLET, FILM COATED ORAL 2 TIMES DAILY
Qty: 52 TABLET | Refills: 0 | Status: SHIPPED | OUTPATIENT
Start: 2020-10-16 | End: 2020-11-11

## 2020-10-16 RX ADMIN — ACETAMINOPHEN 650 MG: 325 TABLET, FILM COATED ORAL at 06:18

## 2020-10-16 RX ADMIN — DOCUSATE SODIUM 50 MG AND SENNOSIDES 8.6 MG 2 TABLET: 8.6; 5 TABLET, FILM COATED ORAL at 10:40

## 2020-10-16 RX ADMIN — FAMOTIDINE 20 MG: 20 TABLET ORAL at 10:40

## 2020-10-16 RX ADMIN — ACETAMINOPHEN 650 MG: 325 TABLET, FILM COATED ORAL at 13:23

## 2020-10-16 ASSESSMENT — PAIN SCALES - GENERAL
PAINLEVEL_OUTOF10: 5
PAINLEVEL_OUTOF10: 0
PAINLEVEL_OUTOF10: 3
PAINLEVEL_OUTOF10: 0

## 2020-10-16 ASSESSMENT — PAIN DESCRIPTION - PROGRESSION: CLINICAL_PROGRESSION: NOT CHANGED

## 2020-10-16 NOTE — CARE COORDINATION
Per Rosalba Tafoya from [facility], expedited appeal is still pending. I met with internal med, patient will likely discharge today with [facility] on oral antibiotics. Eros from [facility] updated. Home health care orders are in. [employer] wound vac application signed by vascular surgery. Await completion of measurement by wound care and will then need to fax the form to [facility] with [facility] to fax# [phone]. Patient's Mom or brother will transport him home at time of discharge. Rosalio Massey RN, CM      Per Rosalba Tafoya from [facility] after expedited appeal, LTAC still denied. Completed wound vac form faxed to [facility] at [facility] for processing.   Rosalio Massey RN, CM

## 2020-10-16 NOTE — PROGRESS NOTES
CHERYL PROGRESS NOTE      Chief complaint: Follow-up of infected aneurysm    The patient is a 45 y.o. male with history of injection drug use, reactive hep C Ab, MSSA bacteremia and right arm abscess in 04/2020 for which he underwent I&D and was seen by Dr. Birdie Powell and given a 4 week course of cefazolin and ciprofloxacin, transferred to [facility] intervention from [address] [place] [address] where he initially presented on 10/07 with left arm pain and swelling for 2-3 days for which he was seen by Dr. Birdie Powell, started on piperacillin-tazobactam and vancomycin, underwent left elbow I&D on 10/07 during which infected brachial pseudoaneurysm was noted prompting transfer to [facility]. Tissue cultures showed heavy growth of anaerobic Gram-positive cocci and Gram-negative rods, rare growth of Candida albicans, light growth of Streptococcus constellatus, moderate growth of Streptococcus mitis/oralis, moderate growth of Pediococcus pentosaceus. Blood cultures showed no growth.     On transfer, he was septic with fever up to 102.1 °F and leukocytosis of 13,000. HIV screen is nonreactive. He underwent left arm brachial artery exploration, excision of mycotic aneurysm with jump graft. Tissue Gram stain and culture showed rare polymorphonuclear leukocytes, no epithelial cells, moderate gram-negative rods, rare gram-positive cocci in chains, light growth of Streptococcus anginosus (susceptible to penicillin and fluoroquinolones). Piperacillin-tazobactam and vancomycin were resumed. He underwent exploration of left upper arm, evacuation of pus and infected hematoma, debridement of left upper arm wound, coverage of saphenous vein bypass with Integra on 10/09 during which pus from lateral upper arm, necrotic subcutaneous tissue and muscle were noted. JAVED showed no valvular vegetations. He underwent excisional debridement with wound vac placement on 10/14. Subjective: Patient was seen and examined.  No chills, no abdominal pain, no diarrhea, no rash, no itching. He reports pain on left arm is controlled. Objective:    Vitals:    10/16/20 0830   BP: 121/75   Pulse: 55   Resp: 18   Temp: 97.6 °F (36.4 °C)   SpO2: 93%     Constitutional: Alert, not in distress  Respiratory: Clear breath sounds, no crackles, no wheezes  Cardiovascular: Regular rate and rhythm, no murmurs  Gastrointestinal: Bowel sounds present, soft, nontender  Skin: Warm and dry, no active dermatoses  Musculoskeletal: Left arm with dressing and Ace wrap in place    Labs, imaging, and medical records/notes were personally reviewed. 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 JAVED showed no vegetations. Injection drug use  History of hepatitis C (hepatitis C viral load undetectable as of 10/08/2020)     Plan:  Continue ampicillin-sulbactam 3 g every 6 hours for now. Plan to switch to oral ciprofloxacin 500 mg q12h for 4 weeks of antibiotic therapy from 10/14-11/11 on discharge if not able to be discharged to a nursing facility. Take ciprofloxacin at least 2 hours before or 6 hours after antacids (containing aluminum or magnesium), calcium or calcium containing foods such as milk or yogurt, MVI (containing iron or zinc), iron, zinc, sucralfate, if on those medications. Continue local wound care. Follow up with Dr. [place] Singh in 1 week.     Thank you for involving me in the care of China Gutiérrez. I will sign off for now. Please do not hesitate to call for any questions, concerns, or new findings.       Electronically signed by Jade Rivera MD on 10/16/2020 at 11:59 AM

## 2020-10-16 NOTE — DISCHARGE SUMMARY
Discharge Summary    Admit date: 10/7/2020    Discharge date and time: 10/16/2020  6:25 PM     Admitting Physician: Pamella Cardenas MD     Consultants: Infectious disease, vascular surgery    Admission Diagnoses:  Left brachial artery     Discharge Diagnoses and Hospital Course:  · Infected ruptures left brachial artery aneurysm, s/p left brachial artery exploration and excision of mycotic aneurysm with jump graft on 10/7, takeback 10/10 for I&D infected hematoma, wound vac placement 10/14- Infectious disease and vascular surgery followed. JAVED  Performed 10/14 was normal.  Attempted to have patient accepted by [facility], however this was denied by his insurance. He will be discharged with home health care with antibiotics along with wound VAC. Will need to follow-up with Dr. Brando Avalos in 1 week  · IV drug use  · Hepatitis C    Discharge Exam:  Vitals:    10/16/20 0830   BP: 121/75   Pulse: 55   Resp: 18   Temp: 97.6 °F (36.4 °C)   SpO2: 93%       General appearance:  awake, alert, and oriented to person, place, time, and purpose; appears stated age and cooperative; no apparent distress no labored breathing  HEENT:  Conjunctivae/corneas clear. Neck: Supple. No jugular venous distention. Respiratory: symmetrical; clear to auscultation bilaterally; no wheezes; no rhonchi; no rales  Cardiovascular: rhythm regular; rate controlled; no murmurs  Abdomen: Soft, nontender, nondistended  Extremities:  peripheral pulses present; no peripheral edema; no ulcers left arm +wound vac  Musculoskeletal: No clubbing, cyanosis, no bilateral lower extremity edema. Brisk capillary refill. Skin:  No rashes  on visible skin  Neurologic: awake, alert and following commands     Disposition: Home with [facility]  The patient's condition is fair. At this time the patient is without objective evidence of an acute process requiring continuing hospitalization or inpatient management. They are stable for discharge with outpatient follow-up.      I have spoken with the patient and discussed the results of the current hospitalization, in addition to providing specific details for the plan of care and counseling regarding the diagnosis and prognosis. The plan has been discussed in detail and they are aware of the specific conditions for emergent return, as well as the importance of follow-up. Their questions are answered at this time and they are agreeable with the plan for discharge to home with [facility]     Patient Instructions: Follow-up with infectious disease in 1 week. Continue to take antibiotics. Wound care with wound VAC.  [facility]. IV drug cessation. Discharge Medications:     Medication List      START taking these medications    ciprofloxacin 500 MG tablet  Commonly known as:  CIPRO  Take 1 tablet by mouth 2 times daily for 26 days Take ciprofloxacin at least 2 hours before or 6 hours after antacids (containing aluminum or magnesium), calcium or calcium containing foods like milk or yogurt, multivitamins, iron, zinc, if on those meds. oxyCODONE 5 MG immediate release tablet  Commonly known as:  ROXICODONE  Take 1 tablet by mouth every 8 hours as needed for Pain for up to 3 days. sennosides-docusate sodium 8.6-50 MG tablet  Commonly known as:  SENOKOT-S  Take 2 tablets by mouth 2 times daily for 7 days           Where to Get Your Medications      These medications were sent to [facility] [address], [address] [address]    Phone:  [phone]   · sennosides-docusate sodium 8.6-50 MG tablet     You can get these medications from any pharmacy    Bring a paper prescription for each of these medications  · ciprofloxacin 500 MG tablet  · oxyCODONE 5 MG immediate release tablet         Activity: Activity as tolerated    Diet: Regular diet    Wound Care: As instructed. [facility].   Wound VAC instructions    Follow-up:    · This patient is instructed to follow-up with her primary care physician. · Patient is instructed to follow-up with the consults listed above as directed by them. · They are instructed to resume home medications and take new medications as indicated in the list above. · If the patient has a recurrence of symptoms, they are instructed to go to the ED. Preparing for this patient's discharge, including paperwork, orders, instructions, and meeting with patient did require > 30 minutes.     Darline Diane,    9:37 PM  10/16/2020

## 2020-10-16 NOTE — PLAN OF CARE
Problem: Skin Integrity:  Goal: Will show no infection signs and symptoms  Description: Will show no infection signs and symptoms  Outcome: Met This Shift  Goal: Absence of new skin breakdown  Description: Absence of new skin breakdown  Outcome: Met This Shift     Problem: Infection - Surgical Site:  Goal: Will show no infection signs and symptoms  Description: Will show no infection signs and symptoms  Outcome: Met This Shift

## 2020-10-16 NOTE — FLOWSHEET NOTE
Inpatient Wound Care (follow up) [id number] Date: 10/7/2020  7:06 PM    Reason for consult:  Left arm wound vac dressing    Findings:       10/16/20 1411   Wound 10/07/20 Arm Left;Proximal   Date First Assessed: 10/07/20   Present on Hospital Admission: No  Location: Arm  Wound Location Orientation: Left;Proximal   Wound Image    Wound Etiology Surgical   Dressing Status New dressing applied   Wound Cleansed Cleansed with saline   Dressing/Treatment ABD; Ace wrap;Moist to dry;Dry dressing;Roll gauze   Wound Length (cm) 17 cm   Wound Width (cm) 6.2 cm   Wound Depth (cm) 0.4 cm   Wound Surface Area (cm^2) 105.4 cm^2   Wound Volume (cm^3) 42.16 cm^3   Wound Assessment   (red)   Drainage Amount Small   Drainage Description Serosanguinous   Odor None   Virgen-wound Assessment Intact   Wound 10/16/20 Arm Left;Mid   Date First Assessed/Time First Assessed: 10/16/20 1412   Present on Hospital Admission: No  Location: Arm  Wound Location Orientation: Left;Mid   Wound Image    Wound Etiology Surgical   Dressing Status New dressing applied   Wound Cleansed Cleansed with saline   Dressing/Treatment ABD; Ace wrap;Dry dressing;Roll gauze   Wound Length (cm) 18 cm   Wound Width (cm) 9.2 cm   Wound Depth (cm) 0.8 cm   Wound Surface Area (cm^2) 165.6 cm^2   Wound Volume (cm^3) 132.48 cm^3   Wound Assessment Graft  (red)   Drainage Amount Small   Drainage Description Serosanguinous   Odor None   Virgen-wound Assessment Intact     **Informed Consent**    The patient has given verbal consent to have photos taken of wounds and inserted into their chart as part of their permanent medical record for purposes of documentation, treatment management and/or medical review. All Images taken on 10/16/20 of patient name: Hallie Clements were transmitted and stored on [employer] located within [facility] [employer] by a registered [employer] Application Device.        Impression:  Left proximal and mid arm: surgical    Plan: Wound vac dressing removed, patient being discharged to home with home care. Moist saline kerlix dressing, 4x4, ABD pads, kerlix, acewrap, applied. Patient will need continued preventative care.         Monserrat Ballard 10/16/2020 2:15 PM

## 2020-10-16 NOTE — PROGRESS NOTES
Pt caught leaving floor with his brother without his medications from outpatient pharmacy. Asked pt where he was going and he said \"im leaving. I was discharged\"  Instructed pt to wait for his meds and discharge instructions.

## 2020-10-28 ENCOUNTER — HOSPITAL ENCOUNTER (OUTPATIENT)
Dept: WOUND CARE | Age: 40
Discharge: HOME | End: 2020-10-28
Payer: COMMERCIAL

## 2020-10-28 VITALS
TEMPERATURE: 97.2 F | RESPIRATION RATE: 18 BRPM | BODY MASS INDEX: 24.48 KG/M2 | DIASTOLIC BLOOD PRESSURE: 70 MMHG | HEART RATE: 60 BPM | SYSTOLIC BLOOD PRESSURE: 130 MMHG | WEIGHT: 201 LBS | HEIGHT: 76 IN

## 2020-10-28 PROCEDURE — 99213 OFFICE O/P EST LOW 20 MIN: CPT | Performed by: SURGERY

## 2020-10-28 PROCEDURE — 99213 OFFICE O/P EST LOW 20 MIN: CPT

## 2020-10-28 RX ORDER — LIDOCAINE HYDROCHLORIDE 40 MG/ML
SOLUTION TOPICAL ONCE
Status: DISCONTINUED | OUTPATIENT
Start: 2020-10-28 | End: 2020-10-29 | Stop reason: HOSPADM

## 2020-10-28 NOTE — H&P
W[facility]aling Center /[facility]   History and Physical/Consultation  Vascular    Referring Physician : No primary care provider on file. Lonny Jeffries  MEDICAL RECORD NUMBER:  [id number]  AGE: 45 y.o. GENDER: male  : 1980  EPISODE DATE:  10/28/2020  Subjective:     Chief Complaint   Patient presents with    Wound Check     Left arm         HISTORY of PRESENT ILLNESS HPI     Lonny Jeffries is a 45 y.o. male who presents today for wound/ulcer evaluation. 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 Presents to wound care center for continued treatment. PAST MEDICAL HISTORY  History reviewed. No pertinent past medical history. Past Surgical History:   Procedure Laterality Date    APPLY DRESSING Left 10/9/2020    DEBRIDEMENT OF SKIN, SOFT TISSUE, MUSCLE, EXPLORATION OF UPPER ARM INTEGRA GRAFT FOR COVERAGE performed by Beverley Alex MD at [place] [address] Left 10/14/2020    LEFT UPPER EXTREMITY WOUND VAC PLACEMENT performed by Gaudencio [facility] MD at [address] [place] [address] Right 2020    RIGHT UPPER EXTREMITY INCISION AND DRAINAGE REMOVAL FOREIGN BODY WITH C-ARM performed by Jordy Arndt MD at [address] [place] [address] Left 10/7/2020    LEFT ELBOW INCISION AND DRAINAGE performed by Jordy Arndt MD at [facility] ARTERY/VEIN Left 10/7/2020    LEFT ARM BRACHIAL ARTERY EXPLORATION, REPAIR OF MYCOTIC ANUERYSM WITH BYPASS performed by Gaudencio [facility] MD at [place] [address] TRANSESOPHAGEAL ECHOCARDIOGRAM N/A 2020    TRANSESOPHAGEAL ECHOCARDIOGRAM performed by Jeni Hood MD at [address] [place] [address] TRANSESOPHAGEAL ECHOCARDIOGRAM  10/14/2020     History reviewed. No pertinent family history.   Social History     Tobacco Use    Smoking status: Current Every Day Smoker     Packs/day: 1.00     Types: Cigarettes    Smokeless tobacco: Never Used   Substance Use Topics    Alcohol use: Not Currently    Drug use: Yes     Types: IV     Comment: Suboxone/IV     No Known Allergies  Current Outpatient Medications on File Prior to Encounter   Medication Sig Dispense Refill    ciprofloxacin (CIPRO) 500 MG tablet Take 1 tablet by mouth 2 times daily for 26 days Take ciprofloxacin at least 2 hours before or 6 hours after antacids (containing aluminum or magnesium), calcium or calcium containing foods like milk or yogurt, multivitamins, iron, zinc, if on those meds. 52 tablet 0     No current facility-administered medications on file prior to encounter.         REVIEW OF SYSTEMS   ROS : All others Negative if blank [], Positive if [x]  General Urinary   [] Fevers [] Hematuria   [] Chills [] Dysuria   [] Weight Loss Vascular   Skin [] Claudication   [] Tissue Loss [] Rest Pain   Eyes Neurologic   [] Wears Glasses/Contacts [] Stroke/TIA   [] Vision Changes [] Focal weakness   Respiratory [] Slurred Speech    [] Shortness of breath ENT   Cardiovascular [] Difficulty swallowing   [] Chest Pain Gastrointestinal   [] Shortness of breath with exertion [] Abdominal Pain    [] Melena       [] Hematochezia               Objective:    /70   Pulse 60   Temp 97.2 °F (36.2 °C) (Temporal)   Resp 18   Ht 6' 4\" (1.93 m)   Wt 201 lb (91.2 kg)   BMI 24.47 kg/m²   Wt Readings from Last 3 Encounters:   10/28/20 201 lb (91.2 kg)   10/09/20 201 lb 4.8 oz (91.3 kg)   10/07/20 230 lb (104.3 kg)       PHYSICAL EXAM  CONSTITUTIONAL:   Awake, alert, cooperative   PSYCHIATRIC :  Oriented to time, place and person      normal insight to disease process  ENT:  External ears and nose without lesions    Hearing deficits is not noted  NECK: symmetrical, trachea midline      LUNGS:  No increased work of breathing                    CARDIOVASCULAR:  regular rate and rhythm   ABDOMEN:  soft, non-distended, non-tender      SKIN:   Skin color is normal   Texture and turgor is normal   Induration is not noted  EXTREMITIES:   R UE Edema is not noted,   L UE Edema is  Noted, radial 2+  R LE Edema is not noted  L LE Edema is not noted  R femoral  L femoral    R dorsalis pedis  L dorsalis pedis    R posterior tibial  L posterior tibial      Assessment:     Problem List Items Addressed This Visit     Skin ulcer of upper arm with fat layer exposed ([address]    * (Principal) Nonhealing nonsurgical wound with fat layer exposed - Primary          Pre Debridement Measurements:  Are located in the [facility]  Documentation Flow Sheet  Post Debridement Measurements:  Wound/Ulcer Descriptions are Pre Debridement except measurements:     Negative Pressure Wound Therapy Arm Left;Upper (Active)   Wound Type Surgical 10/16/20 0400   Dressing Type Black foam 10/16/20 0400   Cycle Continuous 10/16/20 0400   Target Pressure (mmHg) 125 10/16/20 0400   Virgen-wound Assessment Intact 10/16/20 0400   Number of days: 14       Wound 05/08/20 Arm Proximal;Right #1 right arm acquired 4/24/20 (Active)   Number of days: 173       Wound 10/07/20 Arm Left;Proximal (Active)   Wound Image   10/16/20 1411   Wound Etiology Surgical 10/16/20 1411   Dressing Status New dressing applied 10/16/20 1411   Wound Cleansed Cleansed with saline 10/16/20 1411   Dressing/Treatment ABD; Ace wrap;Moist to dry;Dry dressing;Roll gauze 10/16/20 1411   Wound Length (cm) 17 cm 10/16/20 1411   Wound Width (cm) 6.2 cm 10/16/20 1411   Wound Depth (cm) 0.4 cm 10/16/20 1411   Wound Surface Area (cm^2) 105.4 cm^2 10/16/20 1411   Wound Volume (cm^3) 42.16 cm^3 10/16/20 1411   Drainage Amount Small 10/16/20 1411   Drainage Description Serosanguinous 10/16/20 1411   Odor None 10/16/20 1411   Virgen-wound Assessment Intact 10/16/20 1411   Number of days: 21       Wound 10/16/20 Arm Left;Mid (Active)   Wound Image   10/16/20 1411   Wound Etiology Surgical 10/16/20 1411   Dressing Status New dressing applied 10/16/20 1411   Wound Cleansed Cleansed with saline 10/16/20 1411   Dressing/Treatment ABD; Ace wrap;Dry dressing;Roll gauze 10/16/20 1411   Wound Length (cm) 18 cm 10/16/20 1411   Wound Width (cm) 9.2 cm 10/16/20 1411   Wound Depth (cm) 0.8 cm 10/16/20 1411   Wound Surface Area (cm^2) 165.6 cm^2 10/16/20 1411   Wound Volume (cm^3) 132.48 cm^3 10/16/20 1411   Wound Assessment Graft 10/16/20 1411   Drainage Amount Small 10/16/20 1411   Drainage Description Serosanguinous 10/16/20 1411   Odor None 10/16/20 1411   Virgen-wound Assessment Intact 10/16/20 1411   Number of days: 12       Wound 10/28/20 Arm Anterior;Left;Proximal #1 (Active)   Wound Image   10/28/20 1359   Wound Etiology Other 10/28/20 1359   Dressing/Treatment Alginate;Dry dressing 10/28/20 1359   Wound Length (cm) 14.5 cm 10/28/20 1359   Wound Width (cm) 4.5 cm 10/28/20 1359   Wound Depth (cm) 0.1 cm 10/28/20 1359   Wound Surface Area (cm^2) 65.25 cm^2 10/28/20 1359   Wound Volume (cm^3) 6.52 cm^3 10/28/20 1359   Wound Assessment Granulation tissue 10/28/20 1359   Drainage Amount Large 10/28/20 1359   Drainage Description Serosanguinous 10/28/20 1359   Odor None 10/28/20 1359   Virgen-wound Assessment Intact 10/28/20 1359   Number of days: 0       Wound 10/28/20 Arm Left #2 A/C (Active)   Wound Image   10/28/20 1359   Wound Etiology Other 10/28/20 1359   Dressing/Treatment Alginate;Dry dressing 10/28/20 1359   Wound Length (cm) 15 cm 10/28/20 1359   Wound Width (cm) 6.3 cm 10/28/20 1359   Wound Depth (cm) 1.3 cm 10/28/20 1359   Wound Surface Area (cm^2) 94.5 cm^2 10/28/20 1359   Wound Volume (cm^3) 122.85 cm^3 10/28/20 1359   Wound Assessment Fibrin;Exposed structure tendon;Granulation tissue 10/28/20 1359   Drainage Amount Large 10/28/20 1359   Drainage Description Serosanguinous 10/28/20 1359   Odor None 10/28/20 1359   Virgen-wound Assessment Intact 10/28/20 1359   Number of days: 0          Procedure Note  Indications:  Based on my examination of this patient's wound(s)/ulcer(s) today, debridement is not required to promote healing and evaluate the wound base. Performed by: Ann Danielle MD    Consent obtained:  Yes    Time out taken:  No:     Pain Control: Anesthetic  Anesthetic: 4% Lidocaine Liquid Topical     Debridement:Other (comment)    Continue VAC. Plan:     Treatment Note please see attached Discharge Instructions    Written patient dismissal instructions given to patient and signed by patient or POA. Discharge Instructions       Visit Discharge/Physician Orders    Discharge condition: Stable    Assessment of pain at discharge: none    Anesthetic used: LIDOCAINE 4 %    Discharge to: Home    Left via:Private automobile    Accompanied by: FAMILY    ECF/HHA: [facility] LOCATION LEFT ARM APPLY VAC WITH BRIDGE BETWEEN WOUNDS. 125MMHG CONTINUOUS. CHANGE 3 TIMES WEEKLY. APPLY AQUACEL AT THE WOUND CARE CENTER    Treatment Orders:FOLLOW A NUTRITIOUS DIET HIGH IN PROTEIN AND VITAMIN C TO PROMOTE WOUND HEALING. TAKE A MULTIVITAMIN DAILY. KEEP PRESSURE OFF THE WOUND AT ALL TIMES    DECREASE SMOKING AS MUCH AS POSSIBLE    [address] [place] [address],[address] followup visit ____2 WEEK_________________________  (Please note your next appointment above and if you are unable to keep, kindly give a 24 hour notice. Thank you.)    Physician signature:__________________________      If you experience any of the following, please call the [address] [place] [address] Road during business hours:    * Increase in Pain  * Temperature over 101  * Increase in drainage from your wound  * Drainage with a foul odor  * Bleeding  * Increase in swelling  * Need for compression bandage changes due to slippage, breakthrough drainage. If you need medical attention outside of the business hours of the [facility] [address] please contact your PCP or go to the nearest emergency room.         Electronically signed by Ann Danielle MD on 10/28/2020 at 3:48 PM

## 2020-11-09 LAB
FUNGUS (MYCOLOGY) CULTURE: NORMAL
FUNGUS (MYCOLOGY) CULTURE: NORMAL
FUNGUS STAIN: NORMAL
FUNGUS STAIN: NORMAL

## 2020-11-11 ENCOUNTER — HOSPITAL ENCOUNTER (OUTPATIENT)
Dept: WOUND CARE | Age: 40
Discharge: HOME | End: 2020-11-11
Payer: COMMERCIAL

## 2020-11-11 VITALS
SYSTOLIC BLOOD PRESSURE: 126 MMHG | HEART RATE: 78 BPM | TEMPERATURE: 97 F | RESPIRATION RATE: 20 BRPM | DIASTOLIC BLOOD PRESSURE: 60 MMHG

## 2020-11-11 PROCEDURE — 11042 DBRDMT SUBQ TIS 1ST 20SQCM/<: CPT | Performed by: SURGERY

## 2020-11-11 PROCEDURE — 11042 DBRDMT SUBQ TIS 1ST 20SQCM/<: CPT

## 2020-11-11 RX ORDER — LIDOCAINE HYDROCHLORIDE 40 MG/ML
SOLUTION TOPICAL ONCE
Status: DISCONTINUED | OUTPATIENT
Start: 2020-11-11 | End: 2020-11-12 | Stop reason: HOSPADM

## 2020-11-11 ASSESSMENT — PAIN SCALES - GENERAL: PAINLEVEL_OUTOF10: 0

## 2020-11-11 NOTE — PLAN OF CARE
Problem: Wound:  Goal: Will show signs of wound healing; wound closure and no evidence of infection  Description: Will show signs of wound healing; wound closure and no evidence of infection  Outcome: Met This Shift     Problem: Arterial:  Goal: Optimize blood flow for wound healing  Description: Optimize blood flow for wound healing  Outcome: Ongoing     Problem: Smoking cessation:  Goal: Ability to formulate a plan to maintain a tobacco-free life will be supported  Description: Ability to formulate a plan to maintain a tobacco-free life will be supported  Outcome: Ongoing

## 2020-11-12 NOTE — PROGRESS NOTES
Wound Healing Center Followup Visit Note    Referring Physician : No primary care provider on file. Chilo Brooks  MEDICAL RECORD NUMBER:  [id number]  AGE: 45 y.o. GENDER: male  : 1980  EPISODE DATE:  2020    Subjective:     Chief Complaint   Patient presents with    Wound Check     lt arm      HISTORY of PRESENT ILLNESS HPI     Chilo Brooks is a 45 y.o. male who presents today for wound/ulcer evaluation. 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 Presents to wound care center for continued treatment. 20 -patient denies fevers or chills. PAST MEDICAL HISTORY  History reviewed. No pertinent past medical history.   Past Surgical History:   Procedure Laterality Date    APPLY DRESSING Left 10/9/2020    DEBRIDEMENT OF SKIN, SOFT TISSUE, MUSCLE, EXPLORATION OF UPPER ARM INTEGRA GRAFT FOR COVERAGE performed by Gaby Rosen MD at [place] [address] Left 10/14/2020    LEFT UPPER EXTREMITY WOUND VAC PLACEMENT performed by Max Luz MD at [address] [place] [address] Right 2020    RIGHT UPPER EXTREMITY INCISION AND DRAINAGE REMOVAL FOREIGN BODY WITH C-ARM performed by Caitlin Marion MD at [address] [place] [address] Left 10/7/2020    LEFT ELBOW INCISION AND DRAINAGE performed by Caitlin Marion MD at [facility] ARTERY/VEIN Left 10/7/2020    LEFT ARM BRACHIAL ARTERY EXPLORATION, REPAIR OF MYCOTIC ANUERYSM WITH BYPASS performed by Max Luz MD at [place] [address] TRANSESOPHAGEAL ECHOCARDIOGRAM N/A 2020    TRANSESOPHAGEAL ECHOCARDIOGRAM performed by Sheryn Duverney, MD at [address] [place] [address] TRANSESOPHAGEAL ECHOCARDIOGRAM  10/14/2020     Family History   Problem Relation Age of Onset    Heart Disease Father      Social History     Tobacco Use    Smoking status: Current Every Day Smoker Packs/day: 1.00     Types: Cigarettes    Smokeless tobacco: Never Used   Substance Use Topics    Alcohol use: Not Currently    Drug use: Yes     Types: IV     Comment: Suboxone/IV     No Known Allergies  No current outpatient medications on file prior to encounter. No current facility-administered medications on file prior to encounter. REVIEW OF SYSTEMS See HPI    Objective:    /60   Pulse 78   Temp 97 °F (36.1 °C) (Temporal)   Resp 20   Wt Readings from Last 3 Encounters:   10/28/20 201 lb (91.2 kg)   10/09/20 201 lb 4.8 oz (91.3 kg)   10/07/20 230 lb (104.3 kg)     PHYSICAL EXAM  CONSTITUTIONAL:   Awake, alert, cooperative  EYES:  lids and lashes normal  ENT: external ears and nose without lesions  NECK:  supple, symmetrical, trachea midline  SKIN:  Open wound present    Assessment:     Problem List Items Addressed This Visit     * (Principal) Skin ulcer of upper arm with fat layer exposed ([address] - Primary    Nonhealing nonsurgical wound with fat layer exposed          Pre Debridement Measurements:  Are located in the [facility]  Documentation Flow Sheet  Post Debridement Measurements:  Wound/Ulcer Descriptions are Pre Debridement except measurements:     Negative Pressure Wound Therapy Arm Left;Upper (Active)   Wound Type Surgical 10/16/20 0400   Dressing Type Black foam 10/16/20 0400   Cycle Continuous 10/16/20 0400   Target Pressure (mmHg) 125 10/16/20 0400   Virgen-wound Assessment Intact 10/16/20 0400   Number of days: 29       Wound 05/08/20 Arm Proximal;Right #1 right arm acquired 4/24/20 (Active)   Number of days: 188       Wound 10/07/20 Arm Left;Proximal (Active)   Wound Image   10/16/20 1411   Wound Etiology Surgical 10/16/20 1411   Dressing Status New dressing applied 10/16/20 1411   Wound Cleansed Cleansed with saline 10/16/20 1411   Dressing/Treatment ABD; Ace wrap;Moist to dry;Dry dressing;Roll gauze 10/16/20 1411   Wound Length (cm) 17 cm 10/16/20 1411   Wound Width (cm) 6.2 cm 10/16/20 1411   Wound Depth (cm) 0.4 cm 10/16/20 1411   Wound Surface Area (cm^2) 105.4 cm^2 10/16/20 1411   Wound Volume (cm^3) 42.16 cm^3 10/16/20 1411   Drainage Amount Small 10/16/20 1411   Drainage Description Serosanguinous 10/16/20 1411   Odor None 10/16/20 1411   Virgen-wound Assessment Intact 10/16/20 1411   Number of days: 36       Wound 10/16/20 Arm Left;Mid (Active)   Wound Image   10/16/20 1411   Wound Etiology Surgical 10/16/20 1411   Dressing Status New dressing applied 10/16/20 1411   Wound Cleansed Cleansed with saline 10/16/20 1411   Dressing/Treatment ABD; Ace wrap;Dry dressing;Roll gauze 10/16/20 1411   Wound Length (cm) 18 cm 10/16/20 1411   Wound Width (cm) 9.2 cm 10/16/20 1411   Wound Depth (cm) 0.8 cm 10/16/20 1411   Wound Surface Area (cm^2) 165.6 cm^2 10/16/20 1411   Wound Volume (cm^3) 132.48 cm^3 10/16/20 1411   Wound Assessment Graft 10/16/20 1411   Drainage Amount Small 10/16/20 1411   Drainage Description Serosanguinous 10/16/20 1411   Odor None 10/16/20 1411   Virgen-wound Assessment Intact 10/16/20 1411   Number of days: 27       Wound 10/28/20 Arm Anterior;Left;Proximal #1 (Active)   Wound Image   10/28/20 1359   Wound Etiology Other 10/28/20 1359   Wound Cleansed Cleansed with saline 11/11/20 1423   Dressing/Treatment Alginate;Dry dressing 11/11/20 1423   Wound Length (cm) 10.4 cm 11/11/20 1318   Wound Width (cm) 2.3 cm 11/11/20 1318   Wound Depth (cm) 0.1 cm 11/11/20 1318   Wound Surface Area (cm^2) 23.92 cm^2 11/11/20 1318   Change in Wound Size % (l*w) 63.34 11/11/20 1318   Wound Volume (cm^3) 2.39 cm^3 11/11/20 1318   Wound Healing % 63 11/11/20 1318   Post-Procedure Length (cm) 10.5 cm 11/11/20 1345   Post-Procedure Width (cm) 2.4 cm 11/11/20 1345   Post-Procedure Depth (cm) 0.1 cm 11/11/20 1345   Post-Procedure Surface Area (cm^2) 25.2 cm^2 11/11/20 1345   Post-Procedure Volume (cm^3) 2.52 cm^3 11/11/20 1345   Wound Assessment Granulation tissue 11/11/20 1318   Drainage Amount Moderate 11/11/20 1318   Drainage Description Serosanguinous 11/11/20 1318   Odor None 11/11/20 1318   Virgen-wound Assessment Intact 11/11/20 1318   Number of days: 15       Wound 10/28/20 Arm Left #2 A/C (Active)   Wound Image   10/28/20 1359   Wound Etiology Other 10/28/20 1359   Wound Cleansed Cleansed with saline 11/11/20 1423   Dressing/Treatment Alginate;Dry dressing 11/11/20 1423   Wound Length (cm) 10.7 cm 11/11/20 1318   Wound Width (cm) 5 cm 11/11/20 1318   Wound Depth (cm) 0.7 cm 11/11/20 1318   Wound Surface Area (cm^2) 53.5 cm^2 11/11/20 1318   Change in Wound Size % (l*w) 43.39 11/11/20 1318   Wound Volume (cm^3) 37.45 cm^3 11/11/20 1318   Wound Healing % 70 11/11/20 1318   Post-Procedure Length (cm) 10.8 cm 11/11/20 1345   Post-Procedure Width (cm) 5.2 cm 11/11/20 1345   Post-Procedure Depth (cm) 0.7 cm 11/11/20 1345   Post-Procedure Surface Area (cm^2) 56.16 cm^2 11/11/20 1345   Post-Procedure Volume (cm^3) 39.31 cm^3 11/11/20 1345   Wound Assessment Graft 11/11/20 1318   Drainage Amount Large 11/11/20 1318   Drainage Description Serosanguinous 11/11/20 1318   Odor None 11/11/20 1318   Virgen-wound Assessment Intact 11/11/20 1318   Number of days: 15          Procedure Note  Indications:  Based on my examination of this patient's wound(s)/ulcer(s) today, debridement is required to promote healing and evaluate the wound base. Performed by: Pamella Cardenas MD    Consent obtained:  Yes    Time out taken:  Yes    Pain Control: Anesthetic  Anesthetic: 4% Lidocaine Liquid Topical     Debridement:Excisional Debridement    Using curette the wound(s)/ulcer(s) was/were sharply debrided down through and including the removal of subcutaneous tissue.         Devitalized Tissue Debrided:  biofilm to stimulate bleeding to promote healing, post debridement good bleeding base and wound edges noted    Wound/Ulcer #: 2    Percent of Wound/Ulcer Debrided: 50%    Total Surface Area Debrided:  20 sq cm     Estimated

## 2020-11-18 ENCOUNTER — HOSPITAL ENCOUNTER (OUTPATIENT)
Dept: WOUND CARE | Age: 40
Discharge: HOME | End: 2020-11-18
Payer: COMMERCIAL

## 2020-11-18 VITALS
HEART RATE: 60 BPM | DIASTOLIC BLOOD PRESSURE: 60 MMHG | SYSTOLIC BLOOD PRESSURE: 100 MMHG | TEMPERATURE: 97.7 F | RESPIRATION RATE: 16 BRPM

## 2020-11-18 PROCEDURE — 11042 DBRDMT SUBQ TIS 1ST 20SQCM/<: CPT | Performed by: SURGERY

## 2020-11-18 PROCEDURE — 11042 DBRDMT SUBQ TIS 1ST 20SQCM/<: CPT

## 2020-11-18 RX ORDER — LIDOCAINE HYDROCHLORIDE 40 MG/ML
SOLUTION TOPICAL ONCE
Status: DISCONTINUED | OUTPATIENT
Start: 2020-11-18 | End: 2020-11-19 | Stop reason: HOSPADM

## 2020-11-19 NOTE — PROGRESS NOTES
Wound Healing Center Followup Visit Note    Referring Physician : No primary care provider on file. Melani Rucker  MEDICAL RECORD NUMBER:  [id number]  AGE: 45 y.o. GENDER: male  : 1980  EPISODE DATE:  2020    Subjective:     Chief Complaint   Patient presents with    Wound Check     left arm      HISTORY of PRESENT ILLNESS HPI     Melani Rucker is a 45 y.o. male who presents today for wound/ulcer evaluation. 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 Presents to wound care center for continued treatment. 20 -patient denies fevers or chills. PAST MEDICAL HISTORY  History reviewed. No pertinent past medical history.   Past Surgical History:   Procedure Laterality Date    APPLY DRESSING Left 10/9/2020    DEBRIDEMENT OF SKIN, SOFT TISSUE, MUSCLE, EXPLORATION OF UPPER ARM INTEGRA GRAFT FOR COVERAGE performed by Hernandez Oh MD at [place] [address] Left 10/14/2020    LEFT UPPER EXTREMITY WOUND VAC PLACEMENT performed by Valerie Olsen MD at [address] [place] [address] Right 2020    RIGHT UPPER EXTREMITY INCISION AND DRAINAGE REMOVAL FOREIGN BODY WITH C-ARM performed by Paulina Primrose, MD at [address] [place] [address] Left 10/7/2020    LEFT ELBOW INCISION AND DRAINAGE performed by Paulina Primrose, MD at [address] [place] [address] ARTERY/VEIN Left 10/7/2020    LEFT ARM BRACHIAL ARTERY EXPLORATION, REPAIR OF MYCOTIC ANUERYSM WITH BYPASS performed by Valerie Olsen MD at [place] [address] TRANSESOPHAGEAL ECHOCARDIOGRAM N/A 2020    TRANSESOPHAGEAL ECHOCARDIOGRAM performed by Amena Marquez MD at [address] [place] [address] TRANSESOPHAGEAL ECHOCARDIOGRAM  10/14/2020     Family History   Problem Relation Age of Onset    Heart Disease Father      Social History     Tobacco Use    Smoking status: Current Every Day Smoker 15.64 cm^2 11/18/20 1417   Change in Wound Size % (l*w) 76.03 11/18/20 1417   Wound Volume (cm^3) 1.56 cm^3 11/18/20 1417   Wound Healing % 76 11/18/20 1417   Post-Procedure Length (cm) 9.4 cm 11/18/20 1426   Post-Procedure Width (cm) 2 cm 11/18/20 1426   Post-Procedure Depth (cm) 0.1 cm 11/18/20 1426   Post-Procedure Surface Area (cm^2) 18.8 cm^2 11/18/20 1426   Post-Procedure Volume (cm^3) 1.88 cm^3 11/18/20 1426   Wound Assessment Granulation tissue 11/18/20 1417   Drainage Amount Moderate 11/18/20 1417   Drainage Description Serosanguinous 11/18/20 1417   Odor None 11/18/20 1417   Virgen-wound Assessment Intact 11/18/20 1417   Number of days: 22       Wound 10/28/20 Arm Left #2 A/C (Active)   Wound Image   10/28/20 1359   Wound Etiology Other 10/28/20 1359   Dressing Status Clean;Dry; Intact 11/18/20 1443   Wound Cleansed Cleansed with saline 11/18/20 1443   Dressing/Treatment Alginate;Dry dressing 11/18/20 1443   Wound Length (cm) 10.4 cm 11/18/20 1417   Wound Width (cm) 3.7 cm 11/18/20 1417   Wound Depth (cm) 0.3 cm 11/18/20 1417   Wound Surface Area (cm^2) 38.48 cm^2 11/18/20 1417   Change in Wound Size % (l*w) 59.28 11/18/20 1417   Wound Volume (cm^3) 11.54 cm^3 11/18/20 1417   Wound Healing % 91 11/18/20 1417   Post-Procedure Length (cm) 10.6 cm 11/18/20 1426   Post-Procedure Width (cm) 3.8 cm 11/18/20 1426   Post-Procedure Depth (cm) 0.3 cm 11/18/20 1426   Post-Procedure Surface Area (cm^2) 40.28 cm^2 11/18/20 1426   Post-Procedure Volume (cm^3) 12.08 cm^3 11/18/20 1426   Wound Assessment Granulation tissue;Slough 11/18/20 1417   Drainage Amount Large 11/18/20 1417   Drainage Description Serosanguinous; Yellow;Green 11/18/20 1417   Odor None 11/18/20 1417   Virgen-wound Assessment Intact 11/18/20 1417   Number of days: 22          Procedure Note  Indications:  Based on my examination of this patient's wound(s)/ulcer(s) today, debridement is required to promote healing and evaluate the wound base.     Performed by: Refugio Tapia MD    Consent obtained:  Yes    Time out taken:  Yes    Pain Control: Anesthetic  Anesthetic: 4% Lidocaine Liquid Topical     Debridement:Excisional Debridement    Using curette the wound(s)/ulcer(s) was/were sharply debrided down through and including the removal of subcutaneous tissue. Devitalized Tissue Debrided:  biofilm to stimulate bleeding to promote healing, post debridement good bleeding base and wound edges noted    Wound/Ulcer #: 2    Percent of Wound/Ulcer Debrided: 50%    Total Surface Area Debrided:  20 sq cm     Estimated Blood Loss:  Minimal  Hemostasis Achieved:  by pressure    Procedural Pain:  3  / 10   Post Procedural Pain:  1 / 10     Response to treatment:  Well tolerated by patient. Plan:   Treatment Note please see attached Discharge Instructions    Written patient dismissal instructions given to patient and signed by patient or POA. Discharge Instructions       Visit Discharge/Physician Orders     Discharge condition: Stable     Assessment of pain at discharge: none     Anesthetic used: LIDOCAINE 4 %     Discharge to: Home     Left via:Private automobile     Accompanied by: SELF     ECF/HHA: [facility] - D\C [facility]     Dressing Orders:WOUND LOCATION LEFT ARM:  CLEANSE WOUND WITH NORMAL STERILE SALINE, APPLY adaptic then  AQUACEL  TO WOUND, COVER WITH DRY GAUZE AND SECURE WITH TAPE. CHANGE DAILY.     Treatment Orders:FOLLOW A NUTRITIOUS DIET HIGH IN PROTEIN AND VITAMIN C TO PROMOTE WOUND HEALING. TAKE A MULTIVITAMIN DAILY.     KEEP PRESSURE OFF THE WOUND AT ALL TIMES     DECREASE SMOKING AS MUCH AS POSSIBLE     [facility] followup visit ____1  WEEK_________________________  (Please note your next appointment above and if you are unable to keep, kindly give a 24 hour notice.  Thank you.)     Physician signature:__________________________        If you experience any of the following, please call the [address] during business hours:     * Increase in Pain  * Temperature over 101  * Increase in drainage from your wound  * Drainage with a foul odor  * Bleeding  * Increase in swelling  * Need for compression bandage changes due to slippage, breakthrough drainage.     If you need medical attention outside of the business hours of the [address] [place] [address] Road please contact your PCP or go to the nearest emergency room.               Electronically signed by Nellie Goodrich MD on 11/19/2020 at 2:47 PM

## 2020-11-24 LAB
AFB CULTURE (MYCOBACTERIA): NORMAL
AFB SMEAR: NORMAL

## 2020-11-25 ENCOUNTER — HOSPITAL ENCOUNTER (OUTPATIENT)
Dept: WOUND CARE | Age: 40
Discharge: HOME | End: 2020-11-25
Payer: COMMERCIAL

## 2020-12-02 ENCOUNTER — HOSPITAL ENCOUNTER (OUTPATIENT)
Dept: WOUND CARE | Age: 40
Discharge: HOME | End: 2020-12-02
Payer: COMMERCIAL

## 2021-03-05 NOTE — H&P
proximal-mid brachial artery is patent. At the level of the distal humerus the brachial artery becomes indistinct, seen in the area of soft tissue swelling. No focal intraluminal filling defect. No focal external lesion. Radial and ulnar arteries appear reconstituted at the level of the proximal forearm with decreased flow into the wrist.     Narrowing of the right brachial artery at the level of the distal humerus in the area of soft tissue swelling. Radial and ulnar arteries appear reconstituted but have diminished flow. Soft tissue swelling along the antecubital fossa. No focal drainable fluid collection. Retained radiopaque foreign body. EKG: Sinus tachycardia    ASSESSMENT/ PLAN[de-identified]      Active Problems:    Right arm cellulitis  Resolved Problems:    * No resolved hospital problems. *      1. Right arm cellulitis   Tenderness and swelling of the right antecubital region for the past 5 days   Increased restriction of range of motion in the right elbow due to swelling   No loss sensation in the fingers or restriction in motion of the fingers   Radial and ulnar pulses are palpable but weak compared to the left   CT scan of the arm showed narrowing of the right brachial artery at the level of the distal humerus in the area of soft tissue swelling. Radial and ulnar arteries appear reconstituted but have diminished flow. Soft tissue swelling along the antecubital fossa. It also shows retained radiopaque foreign body. Patient mentioned that multiple times he breaks the needle tip inside when he tries to pull it out. He said that this needle tip in his right antecubital area has been there for years   Continue vancomycin start patient on cefepime   IV hydration   Infect disease consult   Monitor for signs of compartment syndrome   Surgery consult- for foreign body extraction and monitor for risk of compartment syndrome  2.  Sepsis   Tachycardia, leukocytosis   No documented fever   Source is right arm cellulitis due to IV drug abuse   Continue antibiotic   IV hydration per sepsis protocol   ID consulted  3. Mild elevated LFTs   Mildly elevated LFTs   History of alcohol abuse- has been sober for years   No history of liver disease    Will check hepatitis panel  4. IV drug abuse   Patient injects Suboxone that he gets off the street   He has been doing this for years   He denied sharing needles   Will check for HIV    Code Status: Full  DVT prophylaxis: Lovenox      Electronically signed by Alec Sanchez MD on 4/21/2020 at 9:41 PM      NOTE: This report was transcribed using voice recognition software. Every effort was made to ensure accuracy; however, inadvertent computerized transcription errors may be present. Yes

## 2021-05-04 NOTE — CONSULTS
Exam  Constitutional: Appears well-developed and well-nourished. Appears as stated age. Eyes: Lids and lashes normal, pupils equal, extra ocular muscles intact, sclera clear   ENT: Sinuses nontender on palpation, external ears and ear canals without lesions, lips normal, good dentition, gums normal, oral pharynx with moist mucus membranes,  normal voice   Neck:  Supple, symmetrical, trachea midline, no adenopathy, thyroid symmetric, not enlarged and no tenderness, skin normal, unable to palpate wings of hyoid bone   Respiratory: No increased work of breathing. No stridor or wheezes   Cardiovascular: Regular rate   Skin: Warm and dry   Neurologic:  Alert and oriented, CN II-XII grossly intact     Endoscopy Procedure Note    Endoscopy Type:  Flexible nasopharyngolaryngoscopy  Procedure Details   The flexible nasopharyngolaryngoscope was passed through the right side(s) of the nose, and the nose, nasopharynx, oropharynx, hypopharynx and larynx were examined. Examination was performed during quiet respiration and with phonation. The following findings were noted. Findings:  Normal nasopharynx, normal epiglottis, normal tongue base, normal pyriform, normal TVC motion and mucosa, no subglottic masses or lesions. Lateral hypopharyngeal wall with bony protuberance   Condition:  Stable  Complications:  None      DATA:                                                                                                                                      Fluoro For Surgical Procedures   Final Result   Intraprocedural fluoroscopic spot images as above. See separate procedure   report for more information. CTA UPPER EXTREMITY RIGHT W CONTRAST   Final Result   Narrowing of the right brachial artery at the level of the distal humerus in   the area of soft tissue swelling. Radial and ulnar arteries appear   reconstituted but have diminished flow. Soft tissue swelling along the antecubital fossa.   No focal drainable fluid   collection. Retained radiopaque foreign body. US DUP UPPER EXTREMITY RIGHT VENOUS   Final Result   No evidence of DVT in the right upper extremity. XR ELBOW RIGHT (2 VIEWS)   Final Result   Retained radiopaque foreign body likely a needle.            CBC with Differential:    Lab Results   Component Value Date    WBC 12.4 04/23/2020    RBC 4.54 04/23/2020    HGB 11.9 04/23/2020    HCT 36.0 04/23/2020     04/23/2020    MCV 79.3 04/23/2020    MCH 26.2 04/23/2020    MCHC 33.1 04/23/2020    RDW 14.4 04/23/2020    LYMPHOPCT 12.5 04/23/2020    MONOPCT 7.1 04/23/2020    BASOPCT 0.3 04/23/2020    MONOSABS 0.88 04/23/2020    LYMPHSABS 1.55 04/23/2020    EOSABS 0.16 04/23/2020    BASOSABS 0.04 04/23/2020     Platelets:    Lab Results   Component Value Date     04/23/2020     Hemoglobin/Hematocrit:    Lab Results   Component Value Date    HGB 11.9 04/23/2020    HCT 36.0 04/23/2020     CMP:    Lab Results   Component Value Date     04/23/2020    K 4.1 04/23/2020    K 4.6 04/21/2020    CL 98 04/23/2020    CO2 23 04/23/2020    BUN 6 04/23/2020    CREATININE 0.6 04/23/2020    GFRAA >60 04/23/2020    LABGLOM >60 04/23/2020    GLUCOSE 107 04/23/2020    PROT 6.7 04/23/2020    LABALBU 2.8 04/23/2020    CALCIUM 9.1 04/23/2020    BILITOT 0.3 04/23/2020    ALKPHOS 98 04/23/2020    AST 19 04/23/2020    ALT 33 04/23/2020     BUN/Creatinine:    Lab Results   Component Value Date    BUN 6 04/23/2020    CREATININE 0.6 04/23/2020     Albumin:    Lab Results   Component Value Date    LABALBU 2.8 04/23/2020     Calcium:    Lab Results   Component Value Date    CALCIUM 9.1 04/23/2020     Ionized Calcium:  No results found for: IONCA  PT/INR:    Lab Results   Component Value Date    PROTIME 13.8 04/21/2020    INR 1.2 04/21/2020     Warfarin PT/INR:  No components found for: PTPATWAR, PTINRWAR  PTT:    Lab Results   Component Value Date    APTT 30.3 04/21/2020   [APTT}    ASSESSMENTAND PLAN: GOAL: Pt will increase static/dynamic standing balance by 1/2 grade in 2wks to decrease fall risk and increase independence with ADLs

## 2021-10-02 ENCOUNTER — HOSPITAL ENCOUNTER (INPATIENT)
Age: 41
LOS: 2 days | Discharge: LEFT BEFORE DISCHARGE | DRG: 603 | End: 2021-10-05
Attending: EMERGENCY MEDICINE | Admitting: INTERNAL MEDICINE
Payer: COMMERCIAL

## 2021-10-02 ENCOUNTER — APPOINTMENT (OUTPATIENT)
Dept: CT IMAGING | Age: 41
DRG: 603 | End: 2021-10-02
Payer: COMMERCIAL

## 2021-10-02 DIAGNOSIS — L02.91 ABSCESS: ICD-10-CM

## 2021-10-02 DIAGNOSIS — M86.9 OSTEOMYELITIS OF OTHER SITE, UNSPECIFIED TYPE (HCC): Primary | ICD-10-CM

## 2021-10-02 DIAGNOSIS — L02.213 ABSCESS OF CHEST WALL: ICD-10-CM

## 2021-10-02 LAB
ANION GAP SERPL CALCULATED.3IONS-SCNC: 11 MMOL/L (ref 7–16)
BUN BLDV-MCNC: 8 MG/DL (ref 6–20)
CALCIUM SERPL-MCNC: 9.5 MG/DL (ref 8.6–10.2)
CHLORIDE BLD-SCNC: 95 MMOL/L (ref 98–107)
CO2: 29 MMOL/L (ref 22–29)
CREAT SERPL-MCNC: 0.7 MG/DL (ref 0.7–1.2)
GFR AFRICAN AMERICAN: >60
GFR NON-AFRICAN AMERICAN: >60 ML/MIN/1.73
GLUCOSE BLD-MCNC: 115 MG/DL (ref 74–99)
HCT VFR BLD CALC: 37.6 % (ref 37–54)
HEMOGLOBIN: 12.1 G/DL (ref 12.5–16.5)
MCH RBC QN AUTO: 25.4 PG (ref 26–35)
MCHC RBC AUTO-ENTMCNC: 32.2 % (ref 32–34.5)
MCV RBC AUTO: 79 FL (ref 80–99.9)
PDW BLD-RTO: 13.7 FL (ref 11.5–15)
PLATELET # BLD: 188 E9/L (ref 130–450)
PMV BLD AUTO: 10.6 FL (ref 7–12)
POTASSIUM SERPL-SCNC: 3.8 MMOL/L (ref 3.5–5)
RBC # BLD: 4.76 E12/L (ref 3.8–5.8)
SODIUM BLD-SCNC: 135 MMOL/L (ref 132–146)
TROPONIN, HIGH SENSITIVITY: 7 NG/L (ref 0–11)
WBC # BLD: 11.5 E9/L (ref 4.5–11.5)

## 2021-10-02 PROCEDURE — 6360000004 HC RX CONTRAST MEDICATION: Performed by: RADIOLOGY

## 2021-10-02 PROCEDURE — 85027 COMPLETE CBC AUTOMATED: CPT

## 2021-10-02 PROCEDURE — 99283 EMERGENCY DEPT VISIT LOW MDM: CPT

## 2021-10-02 PROCEDURE — 71275 CT ANGIOGRAPHY CHEST: CPT

## 2021-10-02 PROCEDURE — 74177 CT ABD & PELVIS W/CONTRAST: CPT

## 2021-10-02 PROCEDURE — 80048 BASIC METABOLIC PNL TOTAL CA: CPT

## 2021-10-02 PROCEDURE — 36415 COLL VENOUS BLD VENIPUNCTURE: CPT

## 2021-10-02 PROCEDURE — 6360000002 HC RX W HCPCS: Performed by: EMERGENCY MEDICINE

## 2021-10-02 PROCEDURE — 84484 ASSAY OF TROPONIN QUANT: CPT

## 2021-10-02 PROCEDURE — 80076 HEPATIC FUNCTION PANEL: CPT

## 2021-10-02 PROCEDURE — 93005 ELECTROCARDIOGRAM TRACING: CPT | Performed by: EMERGENCY MEDICINE

## 2021-10-02 PROCEDURE — 96374 THER/PROPH/DIAG INJ IV PUSH: CPT

## 2021-10-02 RX ORDER — 0.9 % SODIUM CHLORIDE 0.9 %
1000 INTRAVENOUS SOLUTION INTRAVENOUS ONCE
Status: COMPLETED | OUTPATIENT
Start: 2021-10-02 | End: 2021-10-03

## 2021-10-02 RX ORDER — MORPHINE SULFATE 5 MG/ML
5 INJECTION, SOLUTION INTRAMUSCULAR; INTRAVENOUS ONCE
Status: COMPLETED | OUTPATIENT
Start: 2021-10-02 | End: 2021-10-02

## 2021-10-02 RX ADMIN — MORPHINE SULFATE 5 MG: 5 INJECTION, SOLUTION INTRAMUSCULAR; INTRAVENOUS at 22:35

## 2021-10-02 RX ADMIN — IOPAMIDOL 75 ML: 755 INJECTION, SOLUTION INTRAVENOUS at 23:28

## 2021-10-02 ASSESSMENT — PAIN SCALES - GENERAL
PAINLEVEL_OUTOF10: 9
PAINLEVEL_OUTOF10: 10

## 2021-10-02 ASSESSMENT — ENCOUNTER SYMPTOMS
NAUSEA: 0
VOMITING: 0
SORE THROAT: 0
WHEEZING: 0
ABDOMINAL PAIN: 0
COUGH: 0
DIARRHEA: 0
BACK PAIN: 0
SINUS PRESSURE: 0
SHORTNESS OF BREATH: 1
EYE REDNESS: 0
EYE DISCHARGE: 0
EYE PAIN: 0

## 2021-10-02 NOTE — Clinical Note
Patient Class: Inpatient [101]   REQUIRED: Diagnosis: Abdominal wall abscess [[id number]]   Estimated Length of Stay: Estimated stay of more than 2 midnights

## 2021-10-03 PROBLEM — L02.211 ABDOMINAL WALL ABSCESS: Status: ACTIVE | Noted: 2021-10-03

## 2021-10-03 LAB
ALBUMIN SERPL-MCNC: 3.7 G/DL (ref 3.5–5.2)
ALP BLD-CCNC: 89 U/L (ref 40–129)
ALT SERPL-CCNC: 21 U/L (ref 0–40)
AMPHETAMINE SCREEN, URINE: NOT DETECTED
AST SERPL-CCNC: 13 U/L (ref 0–39)
BARBITURATE SCREEN URINE: NOT DETECTED
BENZODIAZEPINE SCREEN, URINE: NOT DETECTED
BILIRUB SERPL-MCNC: 0.2 MG/DL (ref 0–1.2)
BILIRUBIN DIRECT: <0.2 MG/DL (ref 0–0.3)
BILIRUBIN, INDIRECT: NORMAL MG/DL (ref 0–1)
CANNABINOID SCREEN URINE: NOT DETECTED
COCAINE METABOLITE SCREEN URINE: POSITIVE
EKG ATRIAL RATE: 91 BPM
EKG P AXIS: 50 DEGREES
EKG P-R INTERVAL: 124 MS
EKG Q-T INTERVAL: 358 MS
EKG QRS DURATION: 98 MS
EKG QTC CALCULATION (BAZETT): 440 MS
EKG R AXIS: 22 DEGREES
EKG T AXIS: 38 DEGREES
EKG VENTRICULAR RATE: 91 BPM
FENTANYL SCREEN, URINE: NOT DETECTED
HBA1C MFR BLD: 5.4 % (ref 4–5.6)
LACTIC ACID, SEPSIS: 0.6 MMOL/L (ref 0.5–1.9)
LACTIC ACID, SEPSIS: 0.8 MMOL/L (ref 0.5–1.9)
Lab: ABNORMAL
METHADONE SCREEN, URINE: NOT DETECTED
OPIATE SCREEN URINE: POSITIVE
OXYCODONE URINE: NOT DETECTED
PHENCYCLIDINE SCREEN URINE: NOT DETECTED
TOTAL PROTEIN: 7.7 G/DL (ref 6.4–8.3)
TROPONIN, HIGH SENSITIVITY: 7 NG/L (ref 0–11)

## 2021-10-03 PROCEDURE — 2580000003 HC RX 258: Performed by: EMERGENCY MEDICINE

## 2021-10-03 PROCEDURE — 99223 1ST HOSP IP/OBS HIGH 75: CPT | Performed by: INTERNAL MEDICINE

## 2021-10-03 PROCEDURE — 36415 COLL VENOUS BLD VENIPUNCTURE: CPT

## 2021-10-03 PROCEDURE — 6360000002 HC RX W HCPCS: Performed by: EMERGENCY MEDICINE

## 2021-10-03 PROCEDURE — 83036 HEMOGLOBIN GLYCOSYLATED A1C: CPT

## 2021-10-03 PROCEDURE — 6360000002 HC RX W HCPCS: Performed by: INTERNAL MEDICINE

## 2021-10-03 PROCEDURE — 80307 DRUG TEST PRSMV CHEM ANLYZR: CPT

## 2021-10-03 PROCEDURE — 83605 ASSAY OF LACTIC ACID: CPT

## 2021-10-03 PROCEDURE — 99254 IP/OBS CNSLTJ NEW/EST MOD 60: CPT | Performed by: SURGERY

## 2021-10-03 PROCEDURE — 1200000000 HC SEMI PRIVATE

## 2021-10-03 PROCEDURE — 93010 ELECTROCARDIOGRAM REPORT: CPT | Performed by: INTERNAL MEDICINE

## 2021-10-03 PROCEDURE — 2580000003 HC RX 258: Performed by: INTERNAL MEDICINE

## 2021-10-03 PROCEDURE — 87040 BLOOD CULTURE FOR BACTERIA: CPT

## 2021-10-03 PROCEDURE — 6370000000 HC RX 637 (ALT 250 FOR IP): Performed by: NURSE PRACTITIONER

## 2021-10-03 RX ORDER — ACETAMINOPHEN 650 MG/1
650 SUPPOSITORY RECTAL EVERY 6 HOURS PRN
Status: DISCONTINUED | OUTPATIENT
Start: 2021-10-03 | End: 2021-10-05 | Stop reason: HOSPADM

## 2021-10-03 RX ORDER — SODIUM CHLORIDE 9 MG/ML
INJECTION, SOLUTION INTRAVENOUS CONTINUOUS
Status: DISCONTINUED | OUTPATIENT
Start: 2021-10-03 | End: 2021-10-05

## 2021-10-03 RX ORDER — SODIUM CHLORIDE 0.9 % (FLUSH) 0.9 %
5-40 SYRINGE (ML) INJECTION EVERY 12 HOURS SCHEDULED
Status: DISCONTINUED | OUTPATIENT
Start: 2021-10-03 | End: 2021-10-05 | Stop reason: HOSPADM

## 2021-10-03 RX ORDER — POTASSIUM CHLORIDE 7.45 MG/ML
10 INJECTION INTRAVENOUS PRN
Status: DISCONTINUED | OUTPATIENT
Start: 2021-10-03 | End: 2021-10-05 | Stop reason: HOSPADM

## 2021-10-03 RX ORDER — ONDANSETRON 4 MG/1
4 TABLET, ORALLY DISINTEGRATING ORAL EVERY 8 HOURS PRN
Status: DISCONTINUED | OUTPATIENT
Start: 2021-10-03 | End: 2021-10-05 | Stop reason: HOSPADM

## 2021-10-03 RX ORDER — MAGNESIUM SULFATE IN WATER 40 MG/ML
2000 INJECTION, SOLUTION INTRAVENOUS PRN
Status: DISCONTINUED | OUTPATIENT
Start: 2021-10-03 | End: 2021-10-05 | Stop reason: HOSPADM

## 2021-10-03 RX ORDER — SODIUM CHLORIDE 0.9 % (FLUSH) 0.9 %
5-40 SYRINGE (ML) INJECTION PRN
Status: DISCONTINUED | OUTPATIENT
Start: 2021-10-03 | End: 2021-10-05 | Stop reason: HOSPADM

## 2021-10-03 RX ORDER — MORPHINE SULFATE 2 MG/ML
2 INJECTION, SOLUTION INTRAMUSCULAR; INTRAVENOUS EVERY 4 HOURS PRN
Status: DISCONTINUED | OUTPATIENT
Start: 2021-10-03 | End: 2021-10-03

## 2021-10-03 RX ORDER — SODIUM CHLORIDE 9 MG/ML
25 INJECTION, SOLUTION INTRAVENOUS PRN
Status: DISCONTINUED | OUTPATIENT
Start: 2021-10-03 | End: 2021-10-05 | Stop reason: HOSPADM

## 2021-10-03 RX ORDER — ONDANSETRON 2 MG/ML
4 INJECTION INTRAMUSCULAR; INTRAVENOUS EVERY 6 HOURS PRN
Status: DISCONTINUED | OUTPATIENT
Start: 2021-10-03 | End: 2021-10-05 | Stop reason: HOSPADM

## 2021-10-03 RX ORDER — HYDROCODONE BITARTRATE AND ACETAMINOPHEN 5; 325 MG/1; MG/1
1 TABLET ORAL EVERY 4 HOURS PRN
Status: DISCONTINUED | OUTPATIENT
Start: 2021-10-03 | End: 2021-10-05 | Stop reason: HOSPADM

## 2021-10-03 RX ORDER — ACETAMINOPHEN 325 MG/1
650 TABLET ORAL EVERY 6 HOURS PRN
Status: DISCONTINUED | OUTPATIENT
Start: 2021-10-03 | End: 2021-10-05 | Stop reason: HOSPADM

## 2021-10-03 RX ORDER — SODIUM CHLORIDE 9 MG/ML
25 INJECTION, SOLUTION INTRAVENOUS EVERY 12 HOURS
Status: DISCONTINUED | OUTPATIENT
Start: 2021-10-03 | End: 2021-10-05 | Stop reason: ALTCHOICE

## 2021-10-03 RX ORDER — SODIUM CHLORIDE 9 MG/ML
INJECTION, SOLUTION INTRAVENOUS EVERY 12 HOURS
Status: DISCONTINUED | OUTPATIENT
Start: 2021-10-03 | End: 2021-10-03 | Stop reason: CLARIF

## 2021-10-03 RX ORDER — POLYETHYLENE GLYCOL 3350 17 G/17G
17 POWDER, FOR SOLUTION ORAL DAILY PRN
Status: DISCONTINUED | OUTPATIENT
Start: 2021-10-03 | End: 2021-10-05 | Stop reason: HOSPADM

## 2021-10-03 RX ADMIN — VANCOMYCIN HYDROCHLORIDE 1000 MG: 1 INJECTION, POWDER, LYOPHILIZED, FOR SOLUTION INTRAVENOUS at 04:02

## 2021-10-03 RX ADMIN — MORPHINE SULFATE 2 MG: 2 INJECTION, SOLUTION INTRAMUSCULAR; INTRAVENOUS at 09:33

## 2021-10-03 RX ADMIN — MORPHINE SULFATE 2 MG: 2 INJECTION, SOLUTION INTRAMUSCULAR; INTRAVENOUS at 04:42

## 2021-10-03 RX ADMIN — SODIUM CHLORIDE, PRESERVATIVE FREE 10 ML: 5 INJECTION INTRAVENOUS at 08:32

## 2021-10-03 RX ADMIN — HYDROCODONE BITARTRATE AND ACETAMINOPHEN 1 TABLET: 5; 325 TABLET ORAL at 16:25

## 2021-10-03 RX ADMIN — HYDROCODONE BITARTRATE AND ACETAMINOPHEN 1 TABLET: 5; 325 TABLET ORAL at 22:40

## 2021-10-03 RX ADMIN — SODIUM CHLORIDE 25 ML: 9 INJECTION, SOLUTION INTRAVENOUS at 12:50

## 2021-10-03 RX ADMIN — SODIUM CHLORIDE: 9 INJECTION, SOLUTION INTRAVENOUS at 18:18

## 2021-10-03 RX ADMIN — HYDROCODONE BITARTRATE AND ACETAMINOPHEN 1 TABLET: 5; 325 TABLET ORAL at 12:03

## 2021-10-03 RX ADMIN — ENOXAPARIN SODIUM 40 MG: 40 INJECTION SUBCUTANEOUS at 08:30

## 2021-10-03 RX ADMIN — VANCOMYCIN HYDROCHLORIDE 1500 MG: 10 INJECTION, POWDER, LYOPHILIZED, FOR SOLUTION INTRAVENOUS at 14:33

## 2021-10-03 RX ADMIN — SODIUM CHLORIDE 1000 ML: 9 INJECTION, SOLUTION INTRAVENOUS at 02:00

## 2021-10-03 RX ADMIN — PIPERACILLIN SODIUM AND TAZOBACTAM SODIUM 3375 MG: 3; .375 INJECTION, POWDER, LYOPHILIZED, FOR SOLUTION INTRAVENOUS at 03:17

## 2021-10-03 RX ADMIN — SODIUM CHLORIDE: 9 INJECTION, SOLUTION INTRAVENOUS at 05:38

## 2021-10-03 RX ADMIN — CEFEPIME HYDROCHLORIDE 2000 MG: 2 INJECTION, POWDER, FOR SOLUTION INTRAVENOUS at 19:41

## 2021-10-03 RX ADMIN — CEFEPIME HYDROCHLORIDE 2000 MG: 2 INJECTION, POWDER, FOR SOLUTION INTRAVENOUS at 08:32

## 2021-10-03 ASSESSMENT — PAIN DESCRIPTION - FREQUENCY
FREQUENCY: CONTINUOUS
FREQUENCY: CONTINUOUS

## 2021-10-03 ASSESSMENT — PAIN - FUNCTIONAL ASSESSMENT
PAIN_FUNCTIONAL_ASSESSMENT: PREVENTS OR INTERFERES SOME ACTIVE ACTIVITIES AND ADLS

## 2021-10-03 ASSESSMENT — PAIN SCALES - GENERAL
PAINLEVEL_OUTOF10: 8
PAINLEVEL_OUTOF10: 5

## 2021-10-03 ASSESSMENT — PAIN DESCRIPTION - DESCRIPTORS
DESCRIPTORS: SHARP
DESCRIPTORS: CONSTANT;DISCOMFORT;SHARP
DESCRIPTORS: CONSTANT;DISCOMFORT;ACHING

## 2021-10-03 ASSESSMENT — PAIN DESCRIPTION - PROGRESSION: CLINICAL_PROGRESSION: GRADUALLY WORSENING

## 2021-10-03 ASSESSMENT — PAIN DESCRIPTION - ONSET: ONSET: ON-GOING

## 2021-10-03 ASSESSMENT — PAIN DESCRIPTION - ORIENTATION
ORIENTATION: RIGHT;LOWER
ORIENTATION: LOWER
ORIENTATION: LOWER

## 2021-10-03 ASSESSMENT — PAIN DESCRIPTION - LOCATION
LOCATION: ABDOMEN;CHEST
LOCATION: ABDOMEN
LOCATION: ABDOMEN

## 2021-10-03 ASSESSMENT — PAIN DESCRIPTION - PAIN TYPE: TYPE: ACUTE PAIN

## 2021-10-03 NOTE — PROGRESS NOTES
Pharmacy Consultation Note  (Antibiotic Dosing and Monitoring)    Initial consult date: 10/3/21  Consulting physician/provider: Dr Elenita Lopez  Drug: Vancomycin  Indication: RUQ abdominal wall abscess/8th rib osteomyelitis    Age/  Gender Height Weight IBW  Allergy Information   41 y.o./male 6' 4\" (193 cm) 200 lb (90.7 kg)     Ideal body weight: 86.8 kg (191 lb 5.7 oz)   Patient has no known allergies. Renal Function:  Recent Labs     10/02/21  2214   BUN 8   CREATININE 0.7     No intake or output data in the 24 hours ending 10/03/21 1035    Vancomycin Monitoring:  Trough:  No results for input(s): VANCOTROUGH in the last 72 hours. Random:  No results for input(s): VANCORANDOM in the last 72 hours. Vancomycin Administration Times:  Recent vancomycin administrations                   vancomycin (VANCOCIN) 1,000 mg in dextrose 5 % 250 mL IVPB (mg) 1,000 mg New Bag 10/03/21 0402                Assessment:  · Patient is a 37 y.o. male who has been initiated on vancomycin  · Estimated Creatinine Clearance: 159 mL/min (based on SCr of 0.7 mg/dL).   · To dose vancomycin, pharmacy will be utilizing [employer] calculation software for goal AUC/DAYAN 400-600 mg/L-hr    Plan:  · Will order vancomycin 1500 IV every 12 hours  · Will check vancomycin levels when appropriate  · Will continue to monitor renal function   · Clinical pharmacy to follow      Meghna Muse PharmD, BCPS, BCCCP  10/3/2021  10:42 AM

## 2021-10-03 NOTE — ED PROVIDER NOTES
Patient is a 36 y/o male who presents to the ED with pain and swelling of his right side. Patient states that he has had pain in his side for months, however, it became much worse over the past day. The pain is constant and is worsened when he breathes. He is short of breath. Currently, his pain is 10/10. He denies any abdominal pain. Review of Systems   Constitutional: Negative for chills and fever. HENT: Negative for ear pain, sinus pressure and sore throat. Eyes: Negative for pain, discharge and redness. Respiratory: Positive for shortness of breath. Negative for cough and wheezing. Cardiovascular: Positive for chest pain. Gastrointestinal: Negative for abdominal pain, diarrhea, nausea and vomiting. Genitourinary: Negative for dysuria and frequency. Musculoskeletal: Negative for arthralgias and back pain. Skin: Negative for rash and wound. Neurological: Negative for weakness and headaches. Hematological: Negative for adenopathy. All other systems reviewed and are negative. Physical Exam  Vitals and nursing note reviewed. Constitutional:       General: He is not in acute distress. HENT:      Head: Normocephalic and atraumatic. Right Ear: External ear normal.      Left Ear: External ear normal.      Nose: Nose normal.      Mouth/Throat:      Mouth: Mucous membranes are moist.   Eyes:      Conjunctiva/sclera: Conjunctivae normal.      Pupils: Pupils are equal, round, and reactive to light. Cardiovascular:      Rate and Rhythm: Regular rhythm. Tachycardia present. Heart sounds: No murmur heard. Pulmonary:      Effort: Pulmonary effort is normal. No respiratory distress. Breath sounds: Normal breath sounds. No stridor. No wheezing, rhonchi or rales. Chest:      Chest wall: Tenderness (Right lateral chest wall) present. Abdominal:      General: Bowel sounds are normal. There is no distension. Palpations: Abdomen is soft. Tenderness:  There is no abdominal tenderness. There is no guarding. Musculoskeletal:         General: Normal range of motion. Cervical back: Normal range of motion and neck supple. Skin:     General: Skin is warm and dry. Neurological:      Mental Status: He is alert and oriented to person, place, and time. Procedures     MDM          Spoke with Dr. Kinga De Paz (Surgery). Discussed case. They recommend admission to Medicine with possible drainage by IR.         --------------------------------------------- PAST HISTORY ---------------------------------------------  Past Medical History:  has no past medical history on file. Past Surgical History:  has a past surgical history that includes incision and drainage (Right, 4/23/2020); transesophageal echocardiogram (N/A, 4/24/2020); pr exploration of artery/vein (Left, 10/7/2020); incision and drainage (Left, 10/7/2020); Apply dressing (Left, 10/9/2020); Apply dressing (Left, 10/14/2020); and transesophageal echocardiogram (10/14/2020). Social History:  reports that he has been smoking cigarettes. He has been smoking about 1.00 pack per day. He has never used smokeless tobacco. He reports previous alcohol use. He reports current drug use. Drug: IV. Family History: family history includes Heart Disease in his father. The patients home medications have been reviewed. Allergies: Patient has no known allergies.     -------------------------------------------------- RESULTS -------------------------------------------------    LABS:  Results for orders placed or performed during the hospital encounter of 34/11/60   Basic metabolic panel   Result Value Ref Range    Sodium 135 132 - 146 mmol/L    Potassium 3.8 3.5 - 5.0 mmol/L    Chloride 95 (L) 98 - 107 mmol/L    CO2 29 22 - 29 mmol/L    Anion Gap 11 7 - 16 mmol/L    Glucose 115 (H) 74 - 99 mg/dL    BUN 8 6 - 20 mg/dL    CREATININE 0.7 0.7 - 1.2 mg/dL    GFR Non-African American >60 >=60 mL/min/1.73    GFR African American >60     Calcium 9.5 8.6 - 10.2 mg/dL   CBC   Result Value Ref Range    WBC 11.5 4.5 - 11.5 E9/L    RBC 4.76 3.80 - 5.80 E12/L    Hemoglobin 12.1 (L) 12.5 - 16.5 g/dL    Hematocrit 37.6 37.0 - 54.0 %    MCV 79.0 (L) 80.0 - 99.9 fL    MCH 25.4 (L) 26.0 - 35.0 pg    MCHC 32.2 32.0 - 34.5 %    RDW 13.7 11.5 - 15.0 fL    Platelets 281 729 - 807 E9/L    MPV 10.6 7.0 - 12.0 fL   Troponin   Result Value Ref Range    Troponin, High Sensitivity 7 0 - 11 ng/L   Hepatic function panel   Result Value Ref Range    Total Protein 7.7 6.4 - 8.3 g/dL    Albumin 3.7 3.5 - 5.2 g/dL    Alkaline Phosphatase 89 40 - 129 U/L    ALT 21 0 - 40 U/L    AST 13 0 - 39 U/L    Total Bilirubin 0.2 0.0 - 1.2 mg/dL    Bilirubin, Direct <0.2 0.0 - 0.3 mg/dL    Bilirubin, Indirect see below 0.0 - 1.0 mg/dL   Troponin   Result Value Ref Range    Troponin, High Sensitivity 7 0 - 11 ng/L   EKG 12 Lead   Result Value Ref Range    Ventricular Rate 91 BPM    Atrial Rate 91 BPM    P-R Interval 124 ms    QRS Duration 98 ms    Q-T Interval 358 ms    QTc Calculation (Bazett) 440 ms    P Axis 50 degrees    R Axis 22 degrees    T Axis 38 degrees       RADIOLOGY:  CTA CHEST W CONTRAST   Final Result   No definite pulmonary embolus seen. Evaluation limited due to artifact from   respiratory motion. There is masslike soft tissue thickening involving the right lateral lower   chest and upper abdominal wall. This is incompletely visualized on the chest   exam.  See separate CT abdomen pelvis report. CT ABDOMEN PELVIS W IV CONTRAST Additional Contrast? None   Final Result   1. There is prominent soft tissue thickening and abnormal fluid collection   involving the right lateral lower chest wall superficial to anterolateral 8th   and 9th ribs. Collection measures 6.3 x 3.9 x 8.4 cm and has an appearance   concerning for abscess. There is adjacent bony changes involving anterior   8th rib suggesting probable osteomyelitis.   Malignancy as a cause for these   findings is possible although less likely. Expansion of the 8 rib end and   soft tissue thickening along the inner margin of the thoracic cage causes   some extrinsic compression on the right hepatic lobe. 2. Prominent amount of stool involving proximal half the colon. Correlate   for constipation. 3. Mild intrahepatic biliary prominence is of uncertain significance. Correlate clinically to exclude biliary obstruction.               ------------------------- NURSING NOTES AND VITALS REVIEWED ---------------------------  Date / Time Roomed:  10/2/2021  9:38 PM  ED Bed Assignment:  04/04    The nursing notes within the ED encounter and vital signs as below have been reviewed. Patient Vitals for the past 24 hrs:   BP Temp Temp src Pulse Resp SpO2 Height Weight   10/02/21 2144 (!) 140/100 99 °F (37.2 °C) Oral 108 20 96 %     10/02/21 2136 (!) 130/96          10/02/21 2120  97.8 °F (36.6 °C) Infrared 130 22 96 % 6' 4\" (1.93 m) 200 lb (90.7 kg)       Oxygen Saturation Interpretation: Normal    ------------------------------------------ PROGRESS NOTES ------------------------------------------  Re-evaluation(s):  Time: 0200. Patients symptoms show no change  Repeat physical examination is not changed    Counseling:  I have spoken with the patient and discussed todays results, in addition to providing specific details for the plan of care and counseling regarding the diagnosis and prognosis. Their questions are answered at this time and they are agreeable with the plan of admission.    --------------------------------- ADDITIONAL PROVIDER NOTES ---------------------------------  Consultations:  Time: 66 91 28. Spoke with Dr. Jacob Meredith. Discussed case. They will admit the patient.   This patient's ED course included: a personal history and physicial examination, re-evaluation prior to disposition and IV medications    This patient has remained hemodynamically stable during their ED course. Diagnosis:  1. Osteomyelitis of other site, unspecified type ([facility] [address]    2. Abscess of chest wall        Disposition:  Patient's disposition: Admit to med/surg floor  Patient's condition is stable.            [address] [facility],   10/03/21 [id number]

## 2021-10-03 NOTE — PROGRESS NOTES
[address] [place], [place] [address]   Progress Note    Admitting Date and Time: 10/2/2021  9:38 PM  Admit Dx: Abscess of chest wall [L02.213]  Abdominal wall abscess [L02.211]  [facility]omyelitis of other site, unspecified type ([facility] [M86.9]    Subjective:    Pt complaining of abdominal pain. Patient seems agitated with the line of questioning. Patient not very forthcoming with truthful information about his drug use. Emotional support provided  tried to assure patient that NP was not here to  him but to obtain information in order to provide proper care for him. ROS: denies fever, chills, cp, sob, n/v, HA unless stated above.      sodium chloride flush  5-40 mL IntraVENous 2 times per day    enoxaparin  40 mg SubCUTAneous Daily    cefepime (MAXIPIME) 2000 mg IVPB extended (mini-bag)  2,000 mg IntraVENous Q12H    vancomycin  1,500 mg IntraVENous Q12H     sodium chloride flush, 5-40 mL, PRN  sodium chloride, 25 mL, PRN  ondansetron, 4 mg, Q8H PRN   Or  ondansetron, 4 mg, Q6H PRN  polyethylene glycol, 17 g, Daily PRN  acetaminophen, 650 mg, Q6H PRN   Or  acetaminophen, 650 mg, Q6H PRN  potassium chloride, 10 mEq, PRN  magnesium sulfate, 2,000 mg, PRN  HYDROcodone 5 mg - acetaminophen, 1 tablet, Q4H PRN         Objective:    /73   Pulse 95   Temp 98.5 °F (36.9 °C) (Oral)   Resp 14   Ht 6' 4\" (1.93 m)   Wt 176 lb 5.9 oz (80 kg)   SpO2 95%   BMI 21.47 kg/m²   General Appearance: alert and oriented to person, place and time and in mild distress due to abdominal pain  Skin: warm and dry, numerous scabbed areas noted on bilateral arms  Head: normocephalic and atraumatic  Eyes: pupils equal, round, and reactive to light, conjunctivae normal  Neck: neck supple and non tender without mass   Pulmonary/Chest: clear to auscultation on left, wheezing noted in right lobes, no rales or rhonchi, normal air movement, no respiratory distress  Cardiovascular: normal rate, normal rhythm, and no carotid bruits  Abdomen: soft, tender right upper and lower quadrants, swelling noted in right abdomen with erythema and warmth, non-distended, normal bowel sounds  Extremities: no cyanosis, no clubbing and no edema  Neurologic: no cranial nerve deficit and speech normal      Recent Labs     10/02/21  2214      K 3.8   CL 95*   CO2 29   BUN 8   CREATININE 0.7   GLUCOSE 115*   CALCIUM 9.5       Recent Labs     10/02/21  2214   ALKPHOS 89   PROT 7.7   LABALBU 3.7   BILITOT 0.2   AST 13   ALT 21       Recent Labs     10/02/21  2214   WBC 11.5   RBC 4.76   HGB 12.1*   HCT 37.6   MCV 79.0*   MCH 25.4*   MCHC 32.2   RDW 13.7      MPV 10.6         Radiology:   CTA CHEST W CONTRAST   Final Result   No definite pulmonary embolus seen. Evaluation limited due to artifact from   respiratory motion. There is masslike soft tissue thickening involving the right lateral lower   chest and upper abdominal wall. This is incompletely visualized on the chest   exam.  See separate CT abdomen pelvis report. CT ABDOMEN PELVIS W IV CONTRAST Additional Contrast? None   Final Result   1. There is prominent soft tissue thickening and abnormal fluid collection   involving the right lateral lower chest wall superficial to anterolateral 8th   and 9th ribs. Collection measures 6.3 x 3.9 x 8.4 cm and has an appearance   concerning for abscess. There is adjacent bony changes involving anterior   8th rib suggesting probable osteomyelitis. Malignancy as a cause for these   findings is possible although less likely. Expansion of the 8 rib end and   soft tissue thickening along the inner margin of the thoracic cage causes   some extrinsic compression on the right hepatic lobe. 2. Prominent amount of stool involving proximal half the colon. Correlate   for constipation. 3. Mild intrahepatic biliary prominence is of uncertain significance. Correlate clinically to exclude biliary obstruction.          IR INTERVENTIONAL RADIOLOGY PROCEDURE REQUEST    (Results Pending)   IR ABSCESS DRAINAGE PERC    (Results Pending)   IR BIOPSY ABDOMINAL/RETROPERITONEAL MASS PERCUTANEOUS    (Results Pending)       Assessment:  Principal Problem:    Abdominal wall abscess  Active Problems:    IV drug abuse ([address]  Resolved Problems:    * No resolved hospital problems. *      Plan:  1. Abdominal wall abscess - admit to telemetry - CTA chest indicated a mass like soft tissue thickening involving the right lateral lower chest and upper abdominal wall - CT abdomen and pelvis indicated prominent soft tissue thickening and abnormal fluid collection involving the right lateral lower chest wall superficial to anterolateral 8th and 9th ribs concerning for an abscess or probable osteomyelitis - per General Surgery will obtain MRI to evaluate further - IR to drain abscess and biopsy - ID on - continue Cefepime and Vancomycin    2. Polysubstance abuse - history of substance abuse including IV drugs - positive for opiates and cocaine on toxiclogy screen - educated regarding the possible complications of continued use of substances     3. Tobacco use - current pack per day smoker - refused Nicotine patch - will order anyway    4. Hyperglycemia - most likely stress related as A1c 5.4 - BMP in am     NOTE: This report was transcribed using voice recognition software. Every effort was made to ensure accuracy; however, inadvertent computerized transcription errors may be present.      Electronically signed by CESAR Garland CNP on 10/3/2021 at 12:44 PM

## 2021-10-03 NOTE — CONSULTS
[facility]  Consult Note    [address] [facility] [address]  [place], [address]  Phone [phone]   [phone]      Admit Date: 10/2/2021  9:38 PM  Pt Name: Debo Dia  MRN: [id number]  : 1980  Reason for Consult:    Chief Complaint   Patient presents with    Other     R flank swelling started 24 hours ago     Requesting Physician:  Trina Cohen DO  PCP: No primary care provider on file. History Obtained From:  patient, chart   ID consulted for Abscess of chest wall [L02.213]  Abdominal wall abscess [L02.211]  Osteomyelitis of other site, unspecified type ([facility] [M86.9]  on hospital day 0  CHIEF COMPLAINT       Chief Complaint   Patient presents with    Other     R flank swelling started 24 hours ago     HISTORYOF PRESENT ILLNESS   Debo Dia is a 37 y.o. male who presents with   has no past medical history on file. ED TRIAGEVITALS  BP: 101/73, Temp: 98.5 °F (36.9 °C), Pulse: 95, Resp: 14, SpO2: 95 %  HPI  Pt was last sen by ID for 10/2020  The patient is a 45 y. o. male with history of injection drug use, reactive hep C Ab, MSSA bacteremia and right arm abscess in 2020 for which he underwent I&D and was on 4 week course of cefazolin and ciprofloxacin ended 2020. He presented on 10/07 with left arm pain and swelling for 2-3 days for which he was seen by me and was started on piperacillin-tazobactam and vancomycin, underwent left elbow I&D on 10/07 during which infected brachial pseudoaneurysm was noted prompting transfer to [facility]. Tissue cultures showed heavy growth of anaerobic Gram-positive cocci and Gram-negative rods, rare growth of Candida albicans, light growth of Streptococcus constellatus, moderate growth of Streptococcus mitis/oralis, moderate growth of Pediococcus pentosaceus. Blood cultures showed no growth.     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 JAVED showed no vegetations. Injection drug use  History of hepatitis C (hepatitis C viral load undetectable as of 10/08/2020)  Pt was d/c with cipro til 11/11     Pt comes in now with painand swelling his right side FOR THE PAST FEW MONTHS HE DENEIS TRAUMA OR INJECTION TO [facility]   cr0.7 wbc11.5  Admitted with Right upper quadrant abdominal wall abscess and Eighth rib osteomyelitis  Seen by surgery right upper quadrant mass possible osteosarcoma, possible abscess or hematoma poss interventional radiology for possible drainage for culture and or cytology to evaluate for malignancy  Drug screen opiates/ccoaine     PT IN BED HAS PAIN ABDOMEN     cefepime (MAXIPIME) 2000 mg IVPB extended (mini-bag), Q12H  vancomycin (VANCOCIN) 1,500 mg in dextrose 5 % 300 mL IVPB, Q12H        REVIEW OF SYSTEMS     CONSTITUTIONAL:   No fever, chills, weight loss  ALLERGIES:    No urticaria, hay fever,    EYES:     No blurry vision, loss of vision, eye pain  ENT:      No hearing loss, sore throat  CARDIOVASCULAR:  No chest pain or palpitations  RESPIRATORY:   No cough, sob  ENDOCRINE:    No increase thirst, urination   HEME-LYMPH:   No easy bruising or bleeding  GI:     No nausea, vomiting or diarrhea  :     No urinary complaints  NEURO:    No seizures, stroke, HA  MUSCULOSKELETAL:    muscle aches or pain, no joint pain  SKIN:     No rash or itch  PSYCH:    No depression or anxiety    No medications prior to admission.   CURRENT MEDICATIONS     Current Facility-Administered Medications:     sodium chloride flush 0.9 % injection 5-40 mL, 5-40 mL, IntraVENous, 2 times per day, Jammie Sharpe MD, 10 mL at 10/03/21 1798    sodium chloride flush 0.9 % injection 5-40 mL, 5-40 mL, IntraVENous, PRN, Brandan Perez MD    0.9 % sodium chloride infusion, 25 mL, IntraVENous, PRN, [employer] Gio Raymond MD    enoxaparin (LOVENOX) injection 40 mg, 40 mg, SubCUTAneous, Daily, Vitor Sanchez MD, 40 mg at 10/03/21 0830    ondansetron (ZOFRAN-ODT) disintegrating tablet 4 mg, 4 mg, Oral, Q8H PRN **OR** ondansetron (ZOFRAN) injection 4 mg, 4 mg, IntraVENous, Q6H PRN, Jacqueline Perez MD    polyethylene glycol (GLYCOLAX) packet 17 g, 17 g, Oral, Daily PRN, Vitor Sanchez MD    acetaminophen (TYLENOL) tablet 650 mg, 650 mg, Oral, Q6H PRN **OR** acetaminophen (TYLENOL) suppository 650 mg, 650 mg, Rectal, Q6H PRN, Jacqueline Perez MD    0.9 % sodium chloride infusion, , IntraVENous, Continuous, Vitor Sanchez MD, Last Rate: 100 mL/hr at 10/03/21 0538, New Bag at 10/03/21 0538    potassium chloride 10 mEq/100 mL IVPB (Peripheral Line), 10 mEq, IntraVENous, PRN, Vitor Sanchez MD    magnesium sulfate 2000 mg in 50 mL IVPB premix, 2,000 mg, IntraVENous, PRN, Vitor Sanchez MD    cefepime (MAXIPIME) 2000 mg IVPB extended (mini-bag), 2,000 mg, IntraVENous, Q12H, Stopped at 10/03/21 1230 **AND** 0.9 % sodium chloride infusion, 25 mL, IntraVENous, Q12H, Brandan Perez MD, Last Rate: 12.5 mL/hr at 10/03/21 1250, 25 mL at 10/03/21 1250    HYDROcodone-acetaminophen (NORCO) 5-325 MG per tablet 1 tablet, 1 tablet, Oral, Q4H PRN, CESAR Forde - CNP, 1 tablet at 10/03/21 1203    vancomycin (VANCOCIN) 1,500 mg in dextrose 5 % 300 mL IVPB, 1,500 mg, IntraVENous, Q12H, Brandan Cadet MD  ALLERGIES     Patient has no known allergies.   Immunization History   Administered Date(s) Administered    COVID-19, Pfizer, PF, 30mcg/0.3mL 08/09/2021    Tdap (Boostrix, Adacel) 04/21/2020      Internal Administration   First Dose COVID-19, Pfizer, PF, 30mcg/0.3mL  08/09/2021   Second Dose           Last COVID Lab No results found for: SARS-COV-2, SARS-COV-2 RNA, SARS-COV-2, SARS-COV-2, SARS-COV-2 BY PCR, SARS-COV-2, SARS-COV-2, SARS-COV-2         PAST MEDICAL Expenses:    Food Insecurity:     Worried About Running Out of Food in the Last Year:     [address] in the Last Year:    Transportation Needs:     Lack of Transportation (Medical):  Lack of Transportation (Non-Medical):    Physical Activity:     Days of Exercise per Week:     Minutes of Exercise per Session:    Stress:     Feeling of Stress :    Social Connections:     Frequency of Communication with Friends and Family:     Frequency of Social Gatherings with Friends and Family:     Attends Mor[employer] Services:     Active Member of Clubs or Organizations:     Attends Club or Organization Meetings:     Marital Status:    Intimate Partner Violence:     Fear of Current or Ex-Partner:     Emotionally Abused:     Physically Abused:     Sexually Abused:      ·      PHYSICAL EXAM       ED Triage Vitals   BP Temp Temp Source Pulse Resp SpO2 Height Weight   10/02/21 2136 10/02/21 2120 10/02/21 2120 10/02/21 2120 10/02/21 2120 10/02/21 2120 10/02/21 2120 10/02/21 2120   (!) 130/96 97.8 °F (36.6 °C) Infrared 130 22 96 % 6' 4\" (1.93 m) 200 lb (90.7 kg)     Vitals:    Vitals:    10/02/21 2144 10/03/21 0310 10/03/21 0430 10/03/21 0815   BP: (!) 140/100 (!) 158/98 (!) 124/91 101/73   Pulse: 108 88 90 95   Resp: 20  16 14   Temp: 99 °F (37.2 °C)  98.8 °F (37.1 °C) 98.5 °F (36.9 °C)   TempSrc: Oral  Oral Oral   SpO2: 96% 96% 94% 95%   Weight:   176 lb 5.9 oz (80 kg)    Height:   6' 4\" (1.93 m)      Physical Exam   Constitutional/General: Alert and oriented, NAD THIN   Head: NC/AT  Eyes: PERRL, EOMI  Mouth: Normal mucosa, no thrush   Neck: Supple, full ROM,    Pulmonary: Lungs clear to auscultation bilaterally. Not in respiratory distress  Cardiovascular:  Regular rate and rhythm, no murmurs, gallops, or rubs. Abdomen: Soft, + BS. No distension. Nontender. RIGHT ABDOME SWELLING ABOUT 7CM WITH ERYTHEMA   Extremities: Moves all extremities x 4.    Warm and well perfused UE HEALED  Pulses:  Distal pulses chest and upper abdominal wall. There is a 6.3 x 3.9 x 8.4 cm fluid collection within the lower chest wall superficial to anterolateral 80 and 9th rib is. This demonstrates some mild peripheral enhancement. Appearance is concerning for abscess. The tissue planes in this area are blurred which is probably inflammatory. There are some bony changes involving the adjacent anterior 8th rib which could be osteomyelitis. Tumor as a cause for these findings is considered possible but less likely. Correlate clinically. 1. There is prominent soft tissue thickening and abnormal fluid collection involving the right lateral lower chest wall superficial to anterolateral 8th and 9th ribs. Collection measures 6.3 x 3.9 x 8.4 cm and has an appearance concerning for abscess. There is adjacent bony changes involving anterior 8th rib suggesting probable osteomyelitis. Malignancy as a cause for these findings is possible although less likely. Expansion of the 8 rib end and soft tissue thickening along the inner margin of the thoracic cage causes some extrinsic compression on the right hepatic lobe. 2. Prominent amount of stool involving proximal half the colon. Correlate for constipation. 3. Mild intrahepatic biliary prominence is of uncertain significance. Correlate clinically to exclude biliary obstruction. CTA CHEST W CONTRAST    Result Date: 10/3/2021  EXAMINATION: CTA OF THE CHEST 10/2/2021 11:19 pm TECHNIQUE: CTA of the chest was performed after the administration of intravenous contrast.  Multiplanar reformatted images are provided for review. MIP images are provided for review. Dose modulation, iterative reconstruction, and/or weight based adjustment of the mA/kV was utilized to reduce the radiation dose to as low as reasonably achievable. COMPARISON: None.  HISTORY: ORDERING SYSTEM PROVIDED HISTORY: right chest pain/swelling, shortness of breath TECHNOLOGIST PROVIDED HISTORY: Reason for exam:->right chest pain/swelling, shortness of breath Decision Support Exception - unselect if not a suspected or confirmed emergency medical condition->Emergency Medical Condition (MA) FINDINGS: Pulmonary Arteries: There is artifact from respiratory motion limiting evaluation of segmental and subsegmental arteries in the lower lungs especially the lower lobes. No pulmonary embolism seen in the upper lobes or centrally. Mediastinum: No evidence of mediastinal lymphadenopathy. The heart and pericardium demonstrate no acute abnormality. There is no acute abnormality of the thoracic aorta. Lungs/pleura: There is some dependent atelectasis in the right lower lobe. There is no pleural effusion. There is no pneumothorax. There is no acute infiltrate. Upper Abdomen: Limited images of the upper abdomen are unremarkable. Soft Tissues/Bones: There is soft tissue thickening of the right lateral lower chest/upper abdominal wall, incompletely visualized. See separate CT abdomen/pelvis report. No definite pulmonary embolus seen. Evaluation limited due to artifact from respiratory motion. There is masslike soft tissue thickening involving the right lateral lower chest and upper abdominal wall. This is incompletely visualized on the chest exam.  See separate CT abdomen pelvis report. LABS  Recent Labs     10/02/21  2214   WBC 11.5   HGB 12.1*   HCT 37.6   MCV 79.0*        Recent Labs     10/02/21  2214      K 3.8   CL 95*   CO2 29   BUN 8   CREATININE 0.7   GFRAA >60   LABGLOM >60   GLUCOSE 115*   PROT 7.7   LABALBU 3.7   CALCIUM 9.5   BILITOT 0.2   ALKPHOS 89   AST 13   ALT 21     No results for input(s): PROCAL in the last 72 hours.   Lab Results   Component Value Date    CRP 17.6 (H) 10/07/2020    CRP 5.1 (H) 05/12/2020    CRP 1.7 (H) 05/05/2020     Lab Results   Component Value Date    SEDRATE 48 (H) 10/07/2020    SEDRATE 54 (H) 05/12/2020    SEDRATE 49 (H) 05/05/2020     No results found for: [id number]  No results found for: COVID19  COVID-19/LUPE-COV2 LABS  Recent Labs     10/02/21  2214   AST 13   ALT 21     No results found for: CHOL, TRIG, HDL, LDLCALC, LABVLDL      Lab Results   Component Value Date    HEPAIGM Non-Reactive 04/23/2020    HEPBIGM Non-Reactive 04/23/2020    HCVABI REACTIVE (A) 04/23/2020     Hep C Ab Interp   Date Value Ref Range Status   04/23/2020 REACTIVE (A) NON REACT Final     HCV QNT by NAAT IU/ML   Date Value Ref Range Status   10/08/2020 Not Detected IU/mL Final     HCV Qnt by NAAT log IU/ml   Date Value Ref Range Status   10/08/2020 Not Detected log IU/mL Final       MICROBIOLOGY:     Cultures :   Lab Results   Component Value Date    BC 5 Days no growth 10/07/2020    ORG Streptococcus anginosus 10/07/2020    ORG Anaerobic gram positive cocci 10/07/2020    ORG Anaerobic gram negative ga 10/07/2020    ORG Streptococcus constellatus ssp constellatus 10/07/2020    ORG Streptococcus mitis/oralis 10/07/2020    ORG Pediococcus pentosaceus 10/07/2020     Lab Results   Component Value Date    BLOODCULT2 5 Days no growth 10/07/2020    BLOODCULT2 5 Days- no growth 04/22/2020    ORG Streptococcus anginosus 10/07/2020    ORG Anaerobic gram positive cocci 10/07/2020    ORG Anaerobic gram negative ga 10/07/2020    ORG Streptococcus constellatus ssp constellatus 10/07/2020    ORG Streptococcus mitis/oralis 10/07/2020    ORG Pediococcus pentosaceus 10/07/2020       WOUND/ABSCESS   Date Value Ref Range Status   04/27/2020 Light growth  Final       No results found for: RESPSMEAR      Component Value Date/Time    AFBCX No growth after 6 weeks of incubation. 10/07/2020 2025    FUNGSM No Fungal elements seen 10/07/2020 2025    LABFUNG No growth after 4 weeks of incubation. 10/07/2020 2025    LABFUNG No growth after 4 weeks of incubation.  10/07/2020 1612        Culture Surgical   Date Value Ref Range Status   10/07/2020 Light growth  Final   10/07/2020 Light growth  Final   10/07/2020 Light growth  Final   10/07/2020 Light growth  Final     No results found for: LABURIN  MRSA Culture Only   Date Value Ref Range Status   10/07/2020 Methicillin resistant Staph aureus not isolated  Final   04/22/2020 Methicillin resistant Staph aureus not isolated  Final          FINAL IMPRESSION    Patient is a 37 y.o. male who presented with   Chief Complaint   Patient presents with    Other     R flank swelling started 24 hours ago    and admitted for Abscess of chest wall [L02.213]  Abdominal wall abscess [L02.211]  Osteomyelitis of other site, unspecified type ([facility] [address] [M86.9]  h/o IVDU SUBSTANCE ABSUE  H/o hep c     IR TO EVALUATE  CONT ATBX  CHEK BLOOD CX      ASO/ESR/PROCAL   cefepime (MAXIPIME) 2000 mg IVPB extended (mini-bag), Q12H  vancomycin (VANCOCIN) 1,500 mg in dextrose 5 % 300 mL IVPB, Q12H          Imaging and labs were reviewed per medical records and any ID pertinent labs were addressed with the patient. The patient/FAMILY  was educated about the diagnosis, prognosis, indications, risks and benefits of treatment. An opportunity to ask questions was given to the patient/FAMILY and questions were answered. Thank you for involving me in the care of [facility]. Please do not hesitate to call for any questions or concerns.          Electronically signed by Homer Avalos MD on 10/3/2021 at 1:19 PM

## 2021-10-03 NOTE — CONSULTS
General Surgery Consult  Mario Little MD, MS    Patient's Name/Date of Birth: Walter Page / 1980    Date: October 3, 2021     Consulting Surgeon: Naveed Hauser MD    PCP: No primary care provider on file. Chief Complaint: right sided abd mass    HPI:   Walter Page is a 37 y.o. male who presents for evaluation of right sided abd mass. Timing is constant, radiation to RUQ, alleviated by none and started months ago and severity is 7/10. Patient states at least 4 months of abdominal pain in that same area and then had acute swelling in it over the last 24 hours. Denies any fever or chills. She does have a history of IV drug use but he states he has not been using IV drugs for some time however his talk screen was positive for opiates and cocaine. Denies any family history of cancer denies any previous cancer in himself. He has had abscesses before in his upper extremities. Denies shortness of breath chest pain nausea or vomiting. The pain is pretty significant on the right side. There is no cellulitis. 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     Past Surgical History:   Procedure Laterality Date    APPLY DRESSING Left 10/9/2020    DEBRIDEMENT OF SKIN, SOFT TISSUE, MUSCLE, EXPLORATION OF UPPER ARM INTEGRA GRAFT FOR COVERAGE performed by Alesha Hernandez MD at [place] [id number] Left 10/14/2020    LEFT UPPER EXTREMITY WOUND VAC PLACEMENT performed by Niesha Martel MD at [address] [place] [address] Right 4/23/2020    RIGHT UPPER EXTREMITY INCISION AND DRAINAGE REMOVAL FOREIGN BODY WITH C-ARM performed by Patricia Salazar MD at [address] [place] [address] Left 10/7/2020    LEFT ELBOW INCISION AND DRAINAGE performed by Patricia Salazar MD at [address] OF ARTERY/VEIN Left 10/7/2020    LEFT ARM BRACHIAL ARTERY EXPLORATION, REPAIR OF MYCOTIC ANUERYSM WITH BYPASS performed by Niesha Martel MD at [facility] TRANSESOPHAGEAL ECHOCARDIOGRAM N/A 4/24/2020    TRANSESOPHAGEAL ECHOCARDIOGRAM performed by Lorenzo Parekh MD at [address] [place] [address] TRANSESOPHAGEAL ECHOCARDIOGRAM  10/14/2020       Social History     Socioeconomic History    Marital status: Single     Spouse name: Not on file    Number of children: Not on file    Years of education: Not on file    Highest education level: Not on file   Occupational History    Not on file   Tobacco Use    Smoking status: Current Every Day Smoker     Packs/day: 1.00     Types: Cigarettes    Smokeless tobacco: Never Used   Vaping Use    Vaping Use: Never used   Substance and Sexual Activity    Alcohol use: Not Currently    Drug use: Yes     Types: IV     Comment: Suboxone/IV    Sexual activity: Not Currently   Other Topics Concern    Not on file   Social History Narrative    Not on file     Social Determinants of Health     Financial Resource Strain:     Difficulty of Paying Living Expenses:    Food Insecurity:     Worried About Running Out of Food in the Last Year:     Ran Out of Food in the Last Year:    Transportation Needs:     Lack of Transportation (Medical):      Lack of Transportation (Non-Medical):    Physical Activity:     Days of Exercise per Week:     Minutes of Exercise per Session:    Stress:     Feeling of Stress :    Social Connections:     Frequency of Communication with Friends and Family:     Frequency of Social Gatherings with Friends and Family:     Attends Jain Services:     Active Member of Clubs or Organizations:     Attends Club or Organization Meetings:     Marital Status:    Intimate Partner Violence:     Fear of Current or Ex-Partner:     Emotionally Abused:     Physically Abused:     Sexually Abused: The patient has a family history that is negative for severe cardiovascular or respiratory issues, negative for reaction to anesthesia. Recent Labs     10/02/21  2214   WBC 11.5   HGB 12.1*   HCT 37.6   MCV 79.0*          Recent Labs     10/02/21  2214      K 3.8   CO2 29   BUN 8   CREATININE 0.7       Recent Labs     10/02/21  2214   PROT 7.7       No intake or output data in the 24 hours ending 10/03/21 1047    I have reviewed relevant labs from this admission and interpretation is included in my assessment and plan      Review of Systems  A complete 10 system review was performed and are otherwise negative unless mentioned in the above HPI. Specific negatives are listed below but may not include all those reviewed.     General ROS: negative obtundation, AMS  ENT ROS: negative rhinorrhea, epistaxis  Allergy and Immunology ROS: negative itchy/watery eyes or nasal congestion  Hematological and Lymphatic ROS: negative spontaneous bleeding or bruising  Endocrine ROS: negative  lethargy, mood swings, palpitations or polydipsia/polyuria  Respiratory ROS: negative sputum changes, stridor, tachypnea or wheezing  Cardiovascular ROS: negative for - loss of consciousness, murmur or orthopnea  Gastrointestinal ROS: negative for - hematochezia or hematemesis  Genito-Urinary ROS: negative for -  genital discharge or hematuria  Musculoskeletal ROS: negative for - focal weakness, gangrene  Psych/Neuro ROS: negative for - visual or auditory hallucinations, suicidal ideation      Physical exam:   /73   Pulse 95   Temp 98.5 °F (36.9 °C) (Oral)   Resp 14   Ht 6' 4\" (1.93 m)   Wt 176 lb 5.9 oz (80 kg)   SpO2 95% reviewing images and labs, discussing case with nursing, support staff and other physicians; as well as coordinating care.         Physician Signature: Electronically signed by Dr. Ignacio Minaya  [phone] (p)

## 2021-10-04 ENCOUNTER — APPOINTMENT (OUTPATIENT)
Dept: INTERVENTIONAL RADIOLOGY/VASCULAR | Age: 41
DRG: 603 | End: 2021-10-04
Payer: COMMERCIAL

## 2021-10-04 PROBLEM — F17.200 TOBACCO DEPENDENCE: Status: ACTIVE | Noted: 2021-10-04

## 2021-10-04 LAB
ALBUMIN SERPL-MCNC: 2.9 G/DL (ref 3.5–5.2)
ALP BLD-CCNC: 85 U/L (ref 40–129)
ALT SERPL-CCNC: 26 U/L (ref 0–40)
ANION GAP SERPL CALCULATED.3IONS-SCNC: 11 MMOL/L (ref 7–16)
ANTISTREPTOLYSIN-O: <20 IU/ML (ref 0–200)
AST SERPL-CCNC: 20 U/L (ref 0–39)
BASOPHILS ABSOLUTE: 0.04 E9/L (ref 0–0.2)
BASOPHILS RELATIVE PERCENT: 0.4 % (ref 0–2)
BILIRUB SERPL-MCNC: 0.3 MG/DL (ref 0–1.2)
BUN BLDV-MCNC: 7 MG/DL (ref 6–20)
C-REACTIVE PROTEIN: 19.9 MG/DL (ref 0–0.4)
CALCIUM SERPL-MCNC: 9.3 MG/DL (ref 8.6–10.2)
CHLORIDE BLD-SCNC: 100 MMOL/L (ref 98–107)
CO2: 23 MMOL/L (ref 22–29)
CREAT SERPL-MCNC: 0.5 MG/DL (ref 0.7–1.2)
EOSINOPHILS ABSOLUTE: 0.07 E9/L (ref 0.05–0.5)
EOSINOPHILS RELATIVE PERCENT: 0.8 % (ref 0–6)
GFR AFRICAN AMERICAN: >60
GFR NON-AFRICAN AMERICAN: >60 ML/MIN/1.73
GLUCOSE BLD-MCNC: 105 MG/DL (ref 74–99)
HCT VFR BLD CALC: 35.3 % (ref 37–54)
HEMOGLOBIN: 11 G/DL (ref 12.5–16.5)
IMMATURE GRANULOCYTES #: 0.06 E9/L
IMMATURE GRANULOCYTES %: 0.7 % (ref 0–5)
INR BLD: 1.1
LYMPHOCYTES ABSOLUTE: 1.54 E9/L (ref 1.5–4)
LYMPHOCYTES RELATIVE PERCENT: 16.7 % (ref 20–42)
MCH RBC QN AUTO: 25.5 PG (ref 26–35)
MCHC RBC AUTO-ENTMCNC: 31.2 % (ref 32–34.5)
MCV RBC AUTO: 81.7 FL (ref 80–99.9)
MONOCYTES ABSOLUTE: 0.81 E9/L (ref 0.1–0.95)
MONOCYTES RELATIVE PERCENT: 8.8 % (ref 2–12)
NEUTROPHILS ABSOLUTE: 6.71 E9/L (ref 1.8–7.3)
NEUTROPHILS RELATIVE PERCENT: 72.6 % (ref 43–80)
PDW BLD-RTO: 13.6 FL (ref 11.5–15)
PLATELET # BLD: 266 E9/L (ref 130–450)
PMV BLD AUTO: 10.4 FL (ref 7–12)
POTASSIUM REFLEX MAGNESIUM: 4.2 MMOL/L (ref 3.5–5)
PROTHROMBIN TIME: 12.8 SEC (ref 9.3–12.4)
RBC # BLD: 4.32 E12/L (ref 3.8–5.8)
SEDIMENTATION RATE, ERYTHROCYTE: 70 MM/HR (ref 0–15)
SODIUM BLD-SCNC: 134 MMOL/L (ref 132–146)
TOTAL PROTEIN: 6.5 G/DL (ref 6.4–8.3)
WBC # BLD: 9.2 E9/L (ref 4.5–11.5)

## 2021-10-04 PROCEDURE — C1729 CATH, DRAINAGE: HCPCS

## 2021-10-04 PROCEDURE — 2580000003 HC RX 258: Performed by: INTERNAL MEDICINE

## 2021-10-04 PROCEDURE — 80053 COMPREHEN METABOLIC PANEL: CPT

## 2021-10-04 PROCEDURE — 88305 TISSUE EXAM BY PATHOLOGIST: CPT

## 2021-10-04 PROCEDURE — 0W9830Z DRAINAGE OF CHEST WALL WITH DRAINAGE DEVICE, PERCUTANEOUS APPROACH: ICD-10-PCS | Performed by: RADIOLOGY

## 2021-10-04 PROCEDURE — 85025 COMPLETE CBC W/AUTO DIFF WBC: CPT

## 2021-10-04 PROCEDURE — 36415 COLL VENOUS BLD VENIPUNCTURE: CPT

## 2021-10-04 PROCEDURE — 86060 ANTISTREPTOLYSIN O TITER: CPT

## 2021-10-04 PROCEDURE — 10030 IMG GID FLU COLL DRG SFT TIS: CPT

## 2021-10-04 PROCEDURE — 99233 SBSQ HOSP IP/OBS HIGH 50: CPT | Performed by: INTERNAL MEDICINE

## 2021-10-04 PROCEDURE — 1200000000 HC SEMI PRIVATE

## 2021-10-04 PROCEDURE — 86140 C-REACTIVE PROTEIN: CPT

## 2021-10-04 PROCEDURE — 6370000000 HC RX 637 (ALT 250 FOR IP): Performed by: CLINICAL NURSE SPECIALIST

## 2021-10-04 PROCEDURE — 88112 CYTOPATH CELL ENHANCE TECH: CPT

## 2021-10-04 PROCEDURE — 6360000002 HC RX W HCPCS: Performed by: RADIOLOGY

## 2021-10-04 PROCEDURE — 85651 RBC SED RATE NONAUTOMATED: CPT

## 2021-10-04 PROCEDURE — 87205 SMEAR GRAM STAIN: CPT

## 2021-10-04 PROCEDURE — 85610 PROTHROMBIN TIME: CPT

## 2021-10-04 PROCEDURE — 6360000002 HC RX W HCPCS: Performed by: INTERNAL MEDICINE

## 2021-10-04 PROCEDURE — APPSS30 APP SPLIT SHARED TIME 16-30 MINUTES: Performed by: NURSE PRACTITIONER

## 2021-10-04 PROCEDURE — 6370000000 HC RX 637 (ALT 250 FOR IP): Performed by: NURSE PRACTITIONER

## 2021-10-04 RX ORDER — MIDAZOLAM HYDROCHLORIDE 1 MG/ML
INJECTION INTRAMUSCULAR; INTRAVENOUS
Status: COMPLETED | OUTPATIENT
Start: 2021-10-04 | End: 2021-10-04

## 2021-10-04 RX ORDER — METRONIDAZOLE 500 MG/1
500 TABLET ORAL EVERY 8 HOURS SCHEDULED
Status: DISCONTINUED | OUTPATIENT
Start: 2021-10-04 | End: 2021-10-05 | Stop reason: HOSPADM

## 2021-10-04 RX ORDER — FENTANYL CITRATE 50 UG/ML
INJECTION, SOLUTION INTRAMUSCULAR; INTRAVENOUS
Status: COMPLETED | OUTPATIENT
Start: 2021-10-04 | End: 2021-10-04

## 2021-10-04 RX ADMIN — VANCOMYCIN HYDROCHLORIDE 1500 MG: 10 INJECTION, POWDER, LYOPHILIZED, FOR SOLUTION INTRAVENOUS at 13:00

## 2021-10-04 RX ADMIN — METRONIDAZOLE 500 MG: 500 TABLET ORAL at 16:50

## 2021-10-04 RX ADMIN — VANCOMYCIN HYDROCHLORIDE 1500 MG: 10 INJECTION, POWDER, LYOPHILIZED, FOR SOLUTION INTRAVENOUS at 02:15

## 2021-10-04 RX ADMIN — METRONIDAZOLE 500 MG: 500 TABLET ORAL at 22:22

## 2021-10-04 RX ADMIN — SODIUM CHLORIDE: 9 INJECTION, SOLUTION INTRAVENOUS at 04:59

## 2021-10-04 RX ADMIN — SODIUM CHLORIDE: 9 INJECTION, SOLUTION INTRAVENOUS at 16:32

## 2021-10-04 RX ADMIN — CEFEPIME HYDROCHLORIDE 2000 MG: 2 INJECTION, POWDER, FOR SOLUTION INTRAVENOUS at 20:16

## 2021-10-04 RX ADMIN — FENTANYL CITRATE 50 MCG: 50 INJECTION, SOLUTION INTRAMUSCULAR; INTRAVENOUS at 09:34

## 2021-10-04 RX ADMIN — CEFEPIME HYDROCHLORIDE 2000 MG: 2 INJECTION, POWDER, FOR SOLUTION INTRAVENOUS at 08:33

## 2021-10-04 RX ADMIN — HYDROCODONE BITARTRATE AND ACETAMINOPHEN 1 TABLET: 5; 325 TABLET ORAL at 02:36

## 2021-10-04 RX ADMIN — MIDAZOLAM 1 MG: 1 INJECTION INTRAMUSCULAR; INTRAVENOUS at 09:34

## 2021-10-04 ASSESSMENT — PAIN SCALES - GENERAL
PAINLEVEL_OUTOF10: 3
PAINLEVEL_OUTOF10: 8
PAINLEVEL_OUTOF10: 0

## 2021-10-04 NOTE — PROGRESS NOTES
GENERAL SURGERY  DAILY PROGRESS NOTE  10/4/2021    Chief Complaint   Patient presents with    Other     R flank swelling started 24 hours ago       Subjective:  Doing ok, still with pain around mass. No acute changes     Objective:  /83   Pulse 84   Temp 98.6 °F (37 °C) (Oral)   Resp 16   Ht 6' 4\" (1.93 m)   Wt 176 lb 5.9 oz (80 kg)   SpO2 99%   BMI 21.47 kg/m²     GENERAL:  Laying in bed, awake, alert, cooperative, no apparent distress  HEAD: Normocephalic, atraumatic  EYES: No sclera icterus, pupils equal  LUNGS:  No increased work of breathing  CARDIOVASCULAR:  RR  ABDOMEN:  Soft, non-tender, non-distended.  R mass warm to touch, no fluctuance   EXTREMITIES: No edema or swelling  SKIN: Warm and dry    Assessment/Plan:  37 y.o. male with right sided abdominal wall mass    - IR biopsy today  - NPO for procedure  - pain control  - Pending IR biopsy results patient may need referral to tertiary center for further evaluation    Electronically signed by Tere Healy MD on 10/4/2021 at 7:11 AM   Electronically signed by Jerrol Hatchet, MD on 10/4/21 at 7:49 AM EDT

## 2021-10-04 NOTE — PROGRESS NOTES
Patient came down for abscess drainage and possible placement of drainage tube. positioned on cart with O2 and monitoring equipment attached. Patient scanned and images reviewed by Dr Nick Flores    Patient prepped and draped per protocol. 0940  start procedure     drainage tube placed by Dr. Nick Flores    specimen obtained and sent to lab for cytology and gram stain. Patient re-scanned. Puncture site cleansed, MFIXX applied to secure tube and tube connected to KATHY drain bulb.      Patient tolerated well.     6400  Procedure completed    Nurse to nurse called spoke with melecio RN    Transport in for patient to go back to floor

## 2021-10-04 NOTE — PROGRESS NOTES
[address] [place], [place] [address]ist   Progress Note    Admitting Date and Time: 10/2/2021  9:38 PM  Admit Dx: Abscess of chest wall [L02.213]  Abdominal wall abscess [L02.211]  Osteomyelitis of other site, unspecified type ([facility] [address] [M86.9]    Subjective:    Patient had an ultrasound guided placement of 8 fr drain today. Brown purulent material was aspirated and is also currently in his drainage bulb. ROS: denies fever, chills, cp, sob, n/v, HA unless stated above.      metroNIDAZOLE  500 mg Oral 3 times per day    sodium chloride flush  5-40 mL IntraVENous 2 times per day    [Held by provider] enoxaparin  40 mg SubCUTAneous Daily    cefepime (MAXIPIME) 2000 mg IVPB extended (mini-bag)  2,000 mg IntraVENous Q12H    vancomycin  1,500 mg IntraVENous Q12H     sodium chloride flush, 5-40 mL, PRN  sodium chloride, 25 mL, PRN  ondansetron, 4 mg, Q8H PRN   Or  ondansetron, 4 mg, Q6H PRN  polyethylene glycol, 17 g, Daily PRN  acetaminophen, 650 mg, Q6H PRN   Or  acetaminophen, 650 mg, Q6H PRN  potassium chloride, 10 mEq, PRN  magnesium sulfate, 2,000 mg, PRN  HYDROcodone 5 mg - acetaminophen, 1 tablet, Q4H PRN         Objective:    /79   Pulse 94   Temp 98.5 °F (36.9 °C)   Resp 18   Ht 6' 4\" (1.93 m)   Wt 176 lb 5.9 oz (80 kg)   SpO2 97%   BMI 21.47 kg/m²   General Appearance: alert and oriented to person, place and time and in no acute distress  Skin: warm and dry  Head: normocephalic and atraumatic  Eyes: pupils equal, round, and reactive to light, conjunctivae normal  Neck: neck supple and non tender without mass   Pulmonary/Chest: clear to auscultation bilaterally- no wheezes, rales or rhonchi, no respiratory distress  Cardiovascular: normal rate, normal S1 and S2   Abdomen: soft, tender, distended, normal bowel sounds, KATHY drain with brown purulent drainage  Extremities: no cyanosis, no clubbing and no edema  Neurologic: no cranial nerve deficit and speech normal      Recent Labs 10/02/21  2214 10/04/21  0630    134   K 3.8 4.2   CL 95* 100   CO2 29 23   BUN 8 7   CREATININE 0.7 0.5*   GLUCOSE 115* 105*   CALCIUM 9.5 9.3       Recent Labs     10/02/21  2214 10/04/21  0630   ALKPHOS 89 85   PROT 7.7 6.5   LABALBU 3.7 2.9*   BILITOT 0.2 0.3   AST 13 20   ALT 21 26       Recent Labs     10/02/21  2214 10/04/21  0630   WBC 11.5 9.2   RBC 4.76 4.32   HGB 12.1* 11.0*   HCT 37.6 35.3*   MCV 79.0* 81.7   MCH 25.4* 25.5*   MCHC 32.2 31.2*   RDW 13.7 13.6    266   MPV 10.6 10.4           Radiology:   IR ABSCESS DRAINAGE PERC   Final Result   Successful ultrasound-guided 8 Italian pigtail drain placement in right chest   fluid collection. Purulence material obtained. CTA CHEST W CONTRAST   Final Result   No definite pulmonary embolus seen. Evaluation limited due to artifact from   respiratory motion. There is masslike soft tissue thickening involving the right lateral lower   chest and upper abdominal wall. This is incompletely visualized on the chest   exam.  See separate CT abdomen pelvis report. CT ABDOMEN PELVIS W IV CONTRAST Additional Contrast? None   Final Result   1. There is prominent soft tissue thickening and abnormal fluid collection   involving the right lateral lower chest wall superficial to anterolateral 8th   and 9th ribs. Collection measures 6.3 x 3.9 x 8.4 cm and has an appearance   concerning for abscess. There is adjacent bony changes involving anterior   8th rib suggesting probable osteomyelitis. Malignancy as a cause for these   findings is possible although less likely. Expansion of the 8 rib end and   soft tissue thickening along the inner margin of the thoracic cage causes   some extrinsic compression on the right hepatic lobe. 2. Prominent amount of stool involving proximal half the colon. Correlate   for constipation. 3. Mild intrahepatic biliary prominence is of uncertain significance.    Correlate clinically to exclude biliary obstruction. IR INTERVENTIONAL RADIOLOGY PROCEDURE REQUEST    (Results Pending)   IR BIOPSY ABDOMINAL/RETROPERITONEAL MASS PERCUTANEOUS    (Results Pending)       Assessment:  Principal Problem:    Abdominal wall abscess  Active Problems:    IV drug abuse ([address]    Tobacco dependence  Resolved Problems:    * No resolved hospital problems. *      Plan:  1. Abdominal wall abscess:  CTA of the chest showed a mass like soft tissue thickening involving the right lateral lower chest and upper abdominal wall. CT of the abdomen and pelvis showed prominent soft tissue thickening and abnormal fluid collection involving the right lateral chest wall superficial to anterolateral 8th and 9th ribs concerning for an abscess or probable osteomyelitis. General Surgery and ID following. Continue Cefepime and Vancomycin. Flagyl added by ID to cover for anaerobes. S/p Jarrett drain placement today with brown purulent fluid. Fluid culture pending and blood cultures pending. 2. Polysubstance abuse:  History of IV drug abuse. Hx of infected ruptured left brachial artery aneurysm, s/p left brachial artery, exploration and excision of mycotic aneurysm with jump graft on 10/7, takeback 10/10 for I & D infected hematoma, wound Vac placement on 10/14. Drug screen this admission was positive for cocaine and opiates. Patient denies recent drug use and said that it has been at least months since he used. 3. Tobacco dependence: Smoking cessation education. 4. Hyperglycemia:  Hgb A1C is 5.4. Likely related to infection. 5. Hepatitis C:  Outpatient follow up. NOTE: This report was transcribed using voice recognition software. Every effort was made to ensure accuracy; however, inadvertent computerized transcription errors may be present.      Electronically signed by CESAR Turcios CNP on 10/4/2021 at 4:03 PM

## 2021-10-04 NOTE — PROGRESS NOTES
Pharmacy Consultation Note  (Antibiotic Dosing and Monitoring)    Initial consult date: 10/3/21  Consulting physician/provider: Dr Alize Barnes  Drug: Vancomycin  Indication: RUQ abdominal wall abscess/8th rib osteomyelitis    Age/  Gender Height Weight IBW  Allergy Information   41 y.o./male 6' 4\" (193 cm) 200 lb (90.7 kg)     Ideal body weight: 86.8 kg (191 lb 5.7 oz)   Patient has no known allergies. Renal Function:  Recent Labs     10/02/21  2214 10/04/21  0630   BUN 8 7   CREATININE 0.7 0.5*       Intake/Output Summary (Last 24 hours) at 10/4/2021 1452  Last data filed at 10/4/2021 1320  Gross per 24 hour   Intake    Output 2425 ml   Net -2425 ml       Vancomycin Monitoring:  Trough:  No results for input(s): VANCOTROUGH in the last 72 hours. Random:  No results for input(s): VANCORANDOM in the last 72 hours. Recent vancomycin administrations                   vancomycin (VANCOCIN) 1,500 mg in dextrose 5 % 300 mL IVPB (mg) 1,500 mg New Bag 10/04/21 1300     1,500 mg New Bag  0215     1,500 mg New Bag 10/03/21 1433    vancomycin (VANCOCIN) 1,000 mg in dextrose 5 % 250 mL IVPB (mg) 1,000 mg New Bag 10/03/21 0402              Assessment:  · Patient is a 37 y.o. male who has been initiated on vancomycin  Estimated Creatinine Clearance: 222 mL/min (A) (based on SCr of 0.5 mg/dL (L)). · To dose vancomycin, pharmacy will be utilizing [employer] calculation software for goal AUC/DAYAN 400-600 mg/L-hr    Plan:  · Will order vancomycin 1500 IV every 12 hours  · Level tomorrow @ 0030.  Hold dose for level > 20 mcg/mL  · Will continue to monitor renal function   · Clinical pharmacy to follow    Rekha Longo PharmD, BCPS 10/4/2021 2:54 PM   Ext: [phone]

## 2021-10-04 NOTE — PROGRESS NOTES
US guided placement of 8fr drain. Foul smelling brownish purulent material aspirated and sent to lab.

## 2021-10-04 NOTE — CARE COORDINATION
10/4/2021 1427 CM note: No covid testing. Attempted to meet with patient for transition of care needs,however pt sleeping. Per chart review, no PCP listed, hx of [facility] for wound vac. Pt had drain connected to KATHY bulb placed this am. He has hx iv drug use. CM/SW to follow for transition of care assessment/needs/abx.  Tim MCLEAN

## 2021-10-04 NOTE — PROGRESS NOTES
[address] [place] [address] [facility]  Progress Note    CC: abdominal wall mass   Face to face encounter   SUBJECTIVE:  Patient is tolerating medications. No reported adverse drug reactions. ROS: No nausea, vomiting, diarrhea. No fevers. Abdominal mass. + abdominal pain  In bed. Just returned from IR     Medications:  Scheduled Meds:   sodium chloride flush  5-40 mL IntraVENous 2 times per day    [Held by provider] enoxaparin  40 mg SubCUTAneous Daily    cefepime (MAXIPIME) 2000 mg IVPB extended (mini-bag)  2,000 mg IntraVENous Q12H    vancomycin  1,500 mg IntraVENous Q12H     Continuous Infusions:   sodium chloride      sodium chloride 100 mL/hr at 10/04/21 0459    sodium chloride 25 mL (10/03/21 1250)     PRN Meds:sodium chloride flush, sodium chloride, ondansetron **OR** ondansetron, polyethylene glycol, acetaminophen **OR** acetaminophen, potassium chloride, magnesium sulfate, HYDROcodone 5 mg - acetaminophen  OBJECTIVE:  Patient Vitals for the past 24 hrs:   BP Temp Temp src Pulse Resp SpO2   10/04/21 1315 131/79 98.5 °F (36.9 °C)  94 18 97 %   10/04/21 0745 (!) 127/90 98.7 °F (37.1 °C)  83 16 100 %   10/04/21 0020    84     10/03/21 1615    81     10/03/21 1600 125/83 98.6 °F (37 °C) Oral 81 16 99 %     Constitutional: The patient is awake, alert, and oriented. In bed. Skin: Warm and dry. No rashes were noted. Head: Eyes show round, and reactive pupils. No jaundice. Mouth: Moist mucous membranes, no ulcerations, no thrush. Neck: Supple to movements. No lymphadenopathy. Chest: No use of accessory muscles to breathe. Symmetrical expansion. Auscultation reveals no wheezing, crackles, or rhonchi. Cardiovascular: S1 and S2 are rhythmic and regular. No murmurs appreciated. Abdomen: Positive bowel sounds to auscultation. mass noted- + drain placed ++ purulent drainage ++ odor   Extremities: No clubbing, no cyanosis, no edema.   PIV    Laboratory and Tests Review:  Lab Results   Component Value Date    WBC 9.2 10/04/2021    WBC 11.5 10/02/2021    WBC 7.4 10/12/2020    HGB 11.0 (L) 10/04/2021    HCT 35.3 (L) 10/04/2021    MCV 81.7 10/04/2021     10/04/2021     Lab Results   Component Value Date    NEUTROABS 6.71 10/04/2021    NEUTROABS 4.66 10/12/2020    NEUTROABS 5.31 10/10/2020     Lab Results   Component Value Date    CRP 19.9 (H) 10/04/2021    CRP 17.6 (H) 10/07/2020    CRP 5.1 (H) 05/12/2020     Lab Results   Component Value Date    SEDRATE 70 (H) 10/04/2021    SEDRATE 48 (H) 10/07/2020    SEDRATE 54 (H) 05/12/2020     Lab Results   Component Value Date    ALT 26 10/04/2021    AST 20 10/04/2021    ALKPHOS 85 10/04/2021    BILITOT 0.3 10/04/2021     Lab Results   Component Value Date     10/04/2021    K 4.2 10/04/2021     10/04/2021    CO2 23 10/04/2021    BUN 7 10/04/2021    CREATININE 0.5 10/04/2021    GFRAA >60 10/04/2021    LABGLOM >60 10/04/2021    GLUCOSE 105 10/04/2021    PROT 6.5 10/04/2021    LABALBU 2.9 10/04/2021    CALCIUM 9.3 10/04/2021    BILITOT 0.3 10/04/2021    ALKPHOS 85 10/04/2021    AST 20 10/04/2021    ALT 26 10/04/2021     Radiology:     Impression:        Successful ultrasound-guided 8 Macedonian pigtail drain placement in right chest   fluid collection.  Purulence material obtained       Impression:        1. There is prominent soft tissue thickening and abnormal fluid collection   involving the right lateral lower chest wall superficial to anterolateral 8th   and 9th ribs.  Collection measures 6.3 x 3.9 x 8.4 cm and has an appearance   concerning for abscess.  There is adjacent bony changes involving anterior   8th rib suggesting probable osteomyelitis.  Malignancy as a cause for these   findings is possible although less likely.  Expansion of the 8 rib end and   soft tissue thickening along the inner margin of the thoracic cage causes   some extrinsic compression on the right hepatic lobe.    2. Prominent amount of stool involving

## 2021-10-05 VITALS
HEIGHT: 76 IN | SYSTOLIC BLOOD PRESSURE: 128 MMHG | RESPIRATION RATE: 20 BRPM | WEIGHT: 176.37 LBS | BODY MASS INDEX: 21.48 KG/M2 | OXYGEN SATURATION: 98 % | HEART RATE: 81 BPM | TEMPERATURE: 98.1 F | DIASTOLIC BLOOD PRESSURE: 77 MMHG

## 2021-10-05 LAB
GRAM STAIN ORDERABLE: NORMAL
VANCOMYCIN TROUGH: 7.8 MCG/ML (ref 5–16)

## 2021-10-05 PROCEDURE — 6360000002 HC RX W HCPCS: Performed by: INTERNAL MEDICINE

## 2021-10-05 PROCEDURE — APPSS30 APP SPLIT SHARED TIME 16-30 MINUTES: Performed by: NURSE PRACTITIONER

## 2021-10-05 PROCEDURE — APPSS45 APP SPLIT SHARED TIME 31-45 MINUTES: Performed by: NURSE PRACTITIONER

## 2021-10-05 PROCEDURE — 6370000000 HC RX 637 (ALT 250 FOR IP): Performed by: NURSE PRACTITIONER

## 2021-10-05 PROCEDURE — 36415 COLL VENOUS BLD VENIPUNCTURE: CPT

## 2021-10-05 PROCEDURE — 99239 HOSP IP/OBS DSCHRG MGMT >30: CPT | Performed by: INTERNAL MEDICINE

## 2021-10-05 PROCEDURE — 2580000003 HC RX 258: Performed by: INTERNAL MEDICINE

## 2021-10-05 PROCEDURE — 80202 ASSAY OF VANCOMYCIN: CPT

## 2021-10-05 PROCEDURE — 6370000000 HC RX 637 (ALT 250 FOR IP): Performed by: CLINICAL NURSE SPECIALIST

## 2021-10-05 RX ORDER — LINEZOLID 600 MG/1
600 TABLET, FILM COATED ORAL EVERY 12 HOURS SCHEDULED
Qty: 56 TABLET | Refills: 0 | Status: SHIPPED | OUTPATIENT
Start: 2021-10-05 | End: 2021-11-02

## 2021-10-05 RX ORDER — CIPROFLOXACIN 500 MG/1
500 TABLET, FILM COATED ORAL EVERY 12 HOURS SCHEDULED
Qty: 56 TABLET | Refills: 0 | Status: SHIPPED | OUTPATIENT
Start: 2021-10-05 | End: 2021-11-02

## 2021-10-05 RX ORDER — METRONIDAZOLE 500 MG/1
500 TABLET ORAL EVERY 8 HOURS SCHEDULED
Qty: 30 TABLET | Refills: 0 | Status: SHIPPED | OUTPATIENT
Start: 2021-10-05 | End: 2021-10-05

## 2021-10-05 RX ORDER — METRONIDAZOLE 500 MG/1
500 TABLET ORAL EVERY 8 HOURS SCHEDULED
Qty: 84 TABLET | Refills: 0 | Status: SHIPPED | OUTPATIENT
Start: 2021-10-05 | End: 2021-11-02

## 2021-10-05 RX ORDER — LINEZOLID 600 MG/1
600 TABLET, FILM COATED ORAL EVERY 12 HOURS SCHEDULED
Status: DISCONTINUED | OUTPATIENT
Start: 2021-10-05 | End: 2021-10-05 | Stop reason: HOSPADM

## 2021-10-05 RX ORDER — CIPROFLOXACIN 500 MG/1
500 TABLET, FILM COATED ORAL EVERY 12 HOURS SCHEDULED
Status: DISCONTINUED | OUTPATIENT
Start: 2021-10-05 | End: 2021-10-05 | Stop reason: HOSPADM

## 2021-10-05 RX ORDER — CIPROFLOXACIN 500 MG/1
500 TABLET, FILM COATED ORAL EVERY 12 HOURS SCHEDULED
Qty: 20 TABLET | Refills: 0 | Status: SHIPPED | OUTPATIENT
Start: 2021-10-05 | End: 2021-10-05

## 2021-10-05 RX ORDER — LINEZOLID 600 MG/1
600 TABLET, FILM COATED ORAL EVERY 12 HOURS SCHEDULED
Qty: 28 TABLET | Refills: 0 | Status: SHIPPED | OUTPATIENT
Start: 2021-10-05 | End: 2021-10-05

## 2021-10-05 RX ADMIN — CEFEPIME HYDROCHLORIDE 2000 MG: 2 INJECTION, POWDER, FOR SOLUTION INTRAVENOUS at 08:32

## 2021-10-05 RX ADMIN — SODIUM CHLORIDE: 9 INJECTION, SOLUTION INTRAVENOUS at 05:36

## 2021-10-05 RX ADMIN — METRONIDAZOLE 500 MG: 500 TABLET ORAL at 14:06

## 2021-10-05 RX ADMIN — HYDROCODONE BITARTRATE AND ACETAMINOPHEN 1 TABLET: 5; 325 TABLET ORAL at 00:50

## 2021-10-05 RX ADMIN — VANCOMYCIN HYDROCHLORIDE 1500 MG: 10 INJECTION, POWDER, LYOPHILIZED, FOR SOLUTION INTRAVENOUS at 00:48

## 2021-10-05 RX ADMIN — SODIUM CHLORIDE, PRESERVATIVE FREE 10 ML: 5 INJECTION INTRAVENOUS at 08:33

## 2021-10-05 RX ADMIN — SODIUM CHLORIDE 25 ML: 9 INJECTION, SOLUTION INTRAVENOUS at 12:17

## 2021-10-05 RX ADMIN — VANCOMYCIN HYDROCHLORIDE 1500 MG: 10 INJECTION, POWDER, LYOPHILIZED, FOR SOLUTION INTRAVENOUS at 14:05

## 2021-10-05 RX ADMIN — METRONIDAZOLE 500 MG: 500 TABLET ORAL at 05:35

## 2021-10-05 ASSESSMENT — PAIN SCALES - GENERAL
PAINLEVEL_OUTOF10: 7
PAINLEVEL_OUTOF10: 0
PAINLEVEL_OUTOF10: 0

## 2021-10-05 NOTE — PROGRESS NOTES
Pharmacy Consultation Note  (Antibiotic Dosing and Monitoring)    Initial consult date: 10/3/21  Consulting physician/provider: Dr Kareen Unger  Drug: Vancomycin  Indication: RUQ abdominal wall abscess/8th rib osteomyelitis    Age/  Gender Height Weight IBW  Allergy Information   41 y.o./male 6' 4\" (193 cm) 200 lb (90.7 kg)     Ideal body weight: 86.8 kg (191 lb 5.7 oz)   Patient has no known allergies. Renal Function:  Recent Labs     10/02/21  2214 10/04/21  0630   BUN 8 7   CREATININE 0.7 0.5*       Intake/Output Summary (Last 24 hours) at 10/5/2021 0935  Last data filed at 10/5/2021 0901  Gross per 24 hour   Intake 5276.67 ml   Output 1150 ml   Net 4126.67 ml       Vancomycin Monitoring:  Trough:  No results for input(s): VANCOTROUGH in the last 72 hours. Random:  No results for input(s): VANCORANDOM in the last 72 hours. Recent vancomycin administrations                   vancomycin (VANCOCIN) 1,500 mg in dextrose 5 % 300 mL IVPB (mg) 1,500 mg New Bag 10/05/21 0048     1,500 mg New Bag 10/04/21 1300     1,500 mg New Bag  0215     1,500 mg New Bag 10/03/21 1433    vancomycin (VANCOCIN) 1,000 mg in dextrose 5 % 250 mL IVPB (mg) 1,000 mg New Bag 10/03/21 0402              Assessment:  · Patient is a 37 y.o. male who has been initiated on vancomycin  Estimated Creatinine Clearance: 222 mL/min (A) (based on SCr of 0.5 mg/dL (L)). · To dose vancomycin, pharmacy will be utilizing [employer] calculation software for goal AUC/DAYAN 400-600 mg/L-hr   · Level ordered for 10/5 @ 0030 - however, dose given prior to level being drawn. The level was then re-timed for 1230, however, again the dose was administered prior to the level being drawn. Plan:  · Continue vancomycin 1500 IV every 12 hours  · Level re-timed for tomorrow @ 0030.  Hold dose for level > 20 mcg/mL  · Will continue to monitor renal function   · Clinical pharmacy to follow    Mohan Williamson, PharmD, BCPS 10/5/2021 9:35 AM   Ext: [phone]

## 2021-10-05 NOTE — CARE COORDINATION
10/5/2021 1245 CM note: No covid testing. Met with patient for transition of care needs. Pt resides with his mother and brother. He is independent with ADLS, drives. Pt does not have a PCP, has been given a list in past and does not want CM to arrange PCP appt at this time. KATHY intact to right lower chest wall. He states he can manage KATHY drain at home if needed. Pt has hx iv drug use and was informed by ID that he may need IV abx at [facility]. Pt hoping he can be discharged on po abx since he does NOT want to go to a facility. Pt was agreeable for CM to give him an in network SNF list but not willing to review at this time. Pt informed that if he needs a LUPE at [facility] and is agreeable, facility would need to review for acceptance. For SNF, [address] [facility]. CM will await final abx needs/orders and follow with patient.  Tim MCLEAN

## 2021-10-05 NOTE — PROGRESS NOTES
[address] [place] [address] [facility]  Progress Note    CC: abdominal wall mass   Face to face encounter   SUBJECTIVE:  Patient is tolerating medications. No reported adverse drug reactions. ROS: No nausea, vomiting, diarrhea. No fevers. Abdominal mass. + abdominal pain  In bed. On room air. KATHY drain - more sanguinous today     Medications:  Scheduled Meds:   metroNIDAZOLE  500 mg Oral 3 times per day    sodium chloride flush  5-40 mL IntraVENous 2 times per day    enoxaparin  40 mg SubCUTAneous Daily    cefepime (MAXIPIME) 2000 mg IVPB extended (mini-bag)  2,000 mg IntraVENous Q12H    vancomycin  1,500 mg IntraVENous Q12H     Continuous Infusions:   sodium chloride      sodium chloride 25 mL (10/03/21 1250)     PRN Meds:sodium chloride flush, sodium chloride, ondansetron **OR** ondansetron, polyethylene glycol, acetaminophen **OR** acetaminophen, potassium chloride, magnesium sulfate, HYDROcodone 5 mg - acetaminophen  OBJECTIVE:  Patient Vitals for the past 24 hrs:   BP Temp Temp src Pulse Resp SpO2   10/05/21 0930 128/77 98.1 °F (36.7 °C) Oral 81 20 98 %   10/05/21 0359 108/72 97.6 °F (36.4 °C) Oral 68 18 98 %   10/04/21 2013 132/83 99 °F (37.2 °C) Oral 72 18 98 %   10/04/21 1315 131/79 98.5 °F (36.9 °C)  94 18 97 %     Constitutional: The patient is awake, alert, and oriented. In bed. Skin: Warm and dry. No rashes were noted. Head: Eyes show round, and reactive pupils. No jaundice. Mouth: Moist mucous membranes, no ulcerations, no thrush. Neck: Supple to movements. No lymphadenopathy. Chest: No use of accessory muscles to breathe. Symmetrical expansion. Auscultation reveals no wheezing, crackles, or rhonchi. Cardiovascular: S1 and S2 are rhythmic and regular. No murmurs appreciated. Abdomen: Positive bowel sounds to auscultation. mass noted- + drain placed - drainage is more sanguinous today   Extremities: No clubbing, no cyanosis, no edema.   PIV    Laboratory and Tests Review:  Lab Results   Component Value Date    WBC 9.2 10/04/2021    WBC 11.5 10/02/2021    WBC 7.4 10/12/2020    HGB 11.0 (L) 10/04/2021    HCT 35.3 (L) 10/04/2021    MCV 81.7 10/04/2021     10/04/2021     Lab Results   Component Value Date    NEUTROABS 6.71 10/04/2021    NEUTROABS 4.66 10/12/2020    NEUTROABS 5.31 10/10/2020     Lab Results   Component Value Date    CRP 19.9 (H) 10/04/2021    CRP 17.6 (H) 10/07/2020    CRP 5.1 (H) 05/12/2020     Lab Results   Component Value Date    SEDRATE 70 (H) 10/04/2021    SEDRATE 48 (H) 10/07/2020    SEDRATE 54 (H) 05/12/2020     Lab Results   Component Value Date    ALT 26 10/04/2021    AST 20 10/04/2021    ALKPHOS 85 10/04/2021    BILITOT 0.3 10/04/2021     Lab Results   Component Value Date     10/04/2021    K 4.2 10/04/2021     10/04/2021    CO2 23 10/04/2021    BUN 7 10/04/2021    CREATININE 0.5 10/04/2021    GFRAA >60 10/04/2021    LABGLOM >60 10/04/2021    GLUCOSE 105 10/04/2021    PROT 6.5 10/04/2021    LABALBU 2.9 10/04/2021    CALCIUM 9.3 10/04/2021    BILITOT 0.3 10/04/2021    ALKPHOS 85 10/04/2021    AST 20 10/04/2021    ALT 26 10/04/2021     Radiology:     Impression:        Successful ultrasound-guided 8 Yakut pigtail drain placement in right chest   fluid collection.  Purulence material obtained       Impression:        1. There is prominent soft tissue thickening and abnormal fluid collection   involving the right lateral lower chest wall superficial to anterolateral 8th   and 9th ribs.  Collection measures 6.3 x 3.9 x 8.4 cm and has an appearance   concerning for abscess.  There is adjacent bony changes involving anterior   8th rib suggesting probable osteomyelitis.  Malignancy as a cause for these   findings is possible although less likely.  Expansion of the 8 rib end and   soft tissue thickening along the inner margin of the thoracic cage causes   some extrinsic compression on the right hepatic lobe.    2. Prominent amount of stool involving proximal half the colon.  Correlate   for constipation. 3. Mild intrahepatic biliary prominence is of uncertain significance. Correlate clinically to exclude biliary obstruction. Microbiology:   Fluid culture pending  Gram stain with moderate gram positive cocci in pairs and chains, GNR    10/3/2021- blood culture - no growth     ASSESSMENT:  · Abdominal waLL abscess   · S/p IR placement   · OM 8th rib which could be osteomyelitis  · History of IVDU  · History of Hep C     PLAN:  · Continue vancomycin for now  · Pharmacy dosing   · Continue cefepime   · Continue flagyl for anaerobes. · May need PICC- pending cultures and with CT results of ribs for possible OM   · Check cultures  · Monitor labs- sed rate- 70   · Surgery following   · Discussed with attending     CESAR Duran  11:22 AM  10/5/2021     As above    This is a face to face encounter with Refugio Ferris on 10/05/21. I have performed and participated in the history, exam, medical decision making, and  POC with the NURSE PRACTITIONER, CESAR Duran. in bed has no c/of/c/n/v/d  r piv red  Drain less purulent   cx mixed kevin  Wants to go home  Spoke with Dr Aldair Retana    ciprofloxacin (CIPRO) tablet 500 mg, 2 times per day  linezolid (ZYVOX) tablet 600 mg, 2 times per day  metroNIDAZOLE (FLAGYL) tablet 500 mg, 3 times per day          Imaging and labs were reviewed. Refugio Ferris was informed of their diagnosis, indications, risks and benefits of treatment. Refugio Ferris had the opportunity to ask questions. All questions were answered. Thank you for involving me in the care of Refugio Ferris. Please do not hesitate to call for any questions or concerns.     Electronically signed by Maico Mack MD on 10/5/2021 at 3:03 PM    Phone [phone]  Fax [phone]

## 2021-10-05 NOTE — DISCHARGE SUMMARY
[facility] Physician Discharge Summary     Followed up with Carlynn Curling on 10/5/2021 at 4:50 PM. Patient left the 3rd floor medical/surgical unit with a disposition of AMA on 10/5/2021. Patient cited no reason was given. Advised patient to follow up with a primary care physician or return to the Emergency Department if symptoms worsen. Aurea Calderón, APRN - CNP       Carlo Lora MD  [facility] [address] [place] [id number] [phone]    Schedule an appointment as soon as possible for a visit in 1 week  Need to schedule repeat CT scan of chest in 1 week    [facility]    Schedule an appointment as soon as possible for a visit in 1 week      Rey Klein MD  [address] [place] [address]  [phone]    Schedule an appointment as soon as possible for a visit in 2 weeks  Call to schedule a follow up appointment       Activity level: As Tolerated    Diet: ADULT DIET; Regular    Dispo:Home     Condition at discharge: Stable     Continue supplemental oxygen via nasal canula @ 2 LPM round-the-clock. Continue CPAP / BiPAP during sleep as prior to admission. Patient ID:  Carlynn Curling  [id number]  38 y.o.  1980    Admit date: 10/2/2021    Discharge date and time:  10/5/2021  4:49 PM    Admission Diagnoses: Principal Problem:    Abdominal wall abscess  Active Problems:    IV drug abuse ([address]    Tobacco dependence    Abscess of chest wall    Osteomyelitis (HCC)  Resolved Problems:    * No resolved hospital problems. *      Discharge Diagnoses: Principal Problem:    Abdominal wall abscess  Active Problems:    IV drug abuse (HCC)    Tobacco dependence    Abscess of chest wall    Osteomyelitis (HCC)  Resolved Problems:    * No resolved hospital problems.  *      Consults:  IP CONSULT TO GENERAL SURGERY  IP CONSULT TO INFECTIOUS DISEASES  IP CONSULT TO PHARMACY    Procedures: S/p IR insertion of KATHY drain for right abdomen/flank abscess/cellulits     Hospital Course: Patient was admitted with Abscess of chest wall [L02.213]  Abdominal wall abscess [L02.211]  Osteomyelitis of other site, unspecified type ([facility] [M86.9]. Patient is a 42-year-old male who presented to the ED with complaints of right side pain and swelling. Patient was admitted with abdominal wall abscess. During patient's hospital stay patient received IV antibiotics, pain medication and consults to ID and general surgery. General surgery consulted for right sided abdominal pain. MRI was ordered and showed possible abscess and osteomyelitis. IR was consulted for insertion of IR drain. Patient was also seen by infectious disease for abscess and antibiotic management. Patient was started on Maxipime and vancomycin. Wound and blood culture was obtained and remain pending, preliminary report did demonstrate moderate gram-positive cocci in chains and in pairs, abundant gram-negative rods and still waiting for final report. Patient decided to leave [place] despite multiple attempts to encourage patient to wait until culture reports have finalized. Infectious disease did give a prescription for ciprofloxacin antibiotic but it is unknown if patient was even pick the prescription medication up at the drugstore.     Discharge Exam:  Vitals:    10/04/21 1315 10/04/21 2013 10/05/21 0359 10/05/21 0930   BP: 131/79 132/83 108/72 128/77   Pulse: 94 72 68 81   Resp: 18 18 18 20   Temp: 98.5 °F (36.9 °C) 99 °F (37.2 °C) 97.6 °F (36.4 °C) 98.1 °F (36.7 °C)   TempSrc:  Oral Oral Oral   SpO2: 97% 98% 98% 98%   Weight:       Height:         General Appearance: alert and oriented to person, place and time and in no acute distress  Skin: warm and dry,, right KATHY drain intact and draining purulent drainage  Head: normocephalic and atraumatic  Eyes: pupils equal, round, and reactive to light, extraocular eye movements intact, conjunctivae normal  Neck: neck supple and non tender without mass   Pulmonary/Chest: clear to auscultation bilaterally, normal air movement, no respiratory distress  Cardiovascular: normal rate, normal S1 and S2 and no carotid bruits  Abdomen: soft, non-tender, non-distended, normal bowel sounds   Extremities: no cyanosis, no clubbing and no edema  Neurologic: no cranial nerve deficit and speech normal    I/O last 3 completed shifts: In: 5276.7 [P.O.:480; I.V.:4796.7]  Out: 6554 [Urine:1350; Drains:200]  No intake/output data recorded. LABS:  Recent Labs     10/02/21  2214 10/04/21  0630    134   K 3.8 4.2   CL 95* 100   CO2 29 23   BUN 8 7   CREATININE 0.7 0.5*   GLUCOSE 115* 105*   CALCIUM 9.5 9.3       Recent Labs     10/02/21  2214 10/04/21  0630   WBC 11.5 9.2   RBC 4.76 4.32   HGB 12.1* 11.0*   HCT 37.6 35.3*   MCV 79.0* 81.7   MCH 25.4* 25.5*   MCHC 32.2 31.2*   RDW 13.7 13.6    266   MPV 10.6 10.4       No results for input(s): POCGLU in the last 72 hours. Imaging:   IR ABSCESS DRAINAGE PERC   Final Result   Successful ultrasound-guided 8 Amharic pigtail drain placement in right chest   fluid collection. Purulence material obtained. CTA CHEST W CONTRAST   Final Result   No definite pulmonary embolus seen. Evaluation limited due to artifact from   respiratory motion. There is masslike soft tissue thickening involving the right lateral lower   chest and upper abdominal wall. This is incompletely visualized on the chest   exam.  See separate CT abdomen pelvis report. CT ABDOMEN PELVIS W IV CONTRAST Additional Contrast? None   Final Result   1. There is prominent soft tissue thickening and abnormal fluid collection   involving the right lateral lower chest wall superficial to anterolateral 8th   and 9th ribs. Collection measures 6.3 x 3.9 x 8.4 cm and has an appearance   concerning for abscess. There is adjacent bony changes involving anterior   8th rib suggesting probable osteomyelitis.   Malignancy as a cause for these findings is possible although less likely. Expansion of the 8 rib end and   soft tissue thickening along the inner margin of the thoracic cage causes   some extrinsic compression on the right hepatic lobe. 2. Prominent amount of stool involving proximal half the colon. Correlate   for constipation. 3. Mild intrahepatic biliary prominence is of uncertain significance. Correlate clinically to exclude biliary obstruction. IR INTERVENTIONAL RADIOLOGY PROCEDURE REQUEST    (Results Pending)   IR BIOPSY ABDOMINAL/RETROPERITONEAL MASS PERCUTANEOUS    (Results Pending)   IR INTERVENTIONAL RADIOLOGY PROCEDURE REQUEST    (Results Pending)       Patient Instructions:      Medication List      START taking these medications    ciprofloxacin 500 MG tablet  Commonly known as: CIPRO  Take 1 tablet by mouth every 12 hours for 28 days     linezolid 600 MG tablet  Commonly known as: ZYVOX  Take 1 tablet by mouth every 12 hours for 28 days     metroNIDAZOLE 500 MG tablet  Commonly known as: FLAGYL  Take 1 tablet by mouth every 8 hours for 28 days           Where to Get Your Medications      These medications were sent to [facility], [address]    Phone: [phone]   · ciprofloxacin 500 MG tablet  · linezolid 600 MG tablet  · metroNIDAZOLE 500 MG tablet           Note that more than 30 minutes was spent in preparing discharge papers, discussing discharge with patient, medication review, etc.    Signed:  Electronically signed by CESAR Davila CNP on 10/5/2021 at 4:49 PM    NOTE: This report was transcribed using voice recognition software. Every effort was made to ensure accuracy; however, inadvertent computerized transcription errors may be present.

## 2021-10-05 NOTE — PROGRESS NOTES
Patient trying to leave AMA. This nurse asked patient if he can wait for Dr Kedar Atkins to come and talk to him. Dr Kedar Atkins notified. Dr Kedar Atkins spoke to patient, patient adamant to leave. Called and notified Dr Marcus Chapman that patient wants to leave, Dr Marcus Chapman swiched antibiotic to oral and e-scribed to patient's pharmacy. Dr Marcus Chapman also requested for us to schedule with IR for drain removal on Thursday. Message left on answering machine and order entered in epic. Patient signed AMA papers. Information on where to get medication and for removal of drain given to patient. Also notified Dr Mary Ann Campos, who is on for Dr Pooja Bazzi, via perfect serve, that patient is leaving AMA.

## 2021-10-05 NOTE — PROGRESS NOTES
GENERAL SURGERY  DAILY PROGRESS NOTE  10/5/2021    Chief Complaint   Patient presents with    Other     R flank swelling started 24 hours ago       Subjective:  Feeling much better after drain placement yesterday. Objective:  /72   Pulse 68   Temp 97.6 °F (36.4 °C) (Oral)   Resp 18   Ht 6' 4\" (1.93 m)   Wt 176 lb 5.9 oz (80 kg)   SpO2 98%   BMI 21.47 kg/m²     GENERAL:  Laying in bed, awake, alert, cooperative, no apparent distress  HEAD: Normocephalic, atraumatic  EYES: No sclera icterus, pupils equal  LUNGS:  No increased work of breathing  CARDIOVASCULAR:  RR  ABDOMEN:  Soft, non-tender, non-distended.  Drain in place with purulent output   EXTREMITIES: No edema or swelling  SKIN: Warm and dry    Assessment/Plan:  37 y.o. male with right sided abdominal wall mass    - continue IR drain  - analgesia  - await fluid study results   - continue antibiotics   - continue diet   - ok to discharge from surgery standpoint  - repeat CT in 1 week as outpatient     Electronically signed by Mahogany Salmon MD on 10/5/2021 at 6:55 AM

## 2021-10-07 ENCOUNTER — HOSPITAL ENCOUNTER (OUTPATIENT)
Dept: CT IMAGING | Age: 41
Discharge: HOME | End: 2021-10-07
Payer: COMMERCIAL

## 2021-10-07 DIAGNOSIS — L02.91 ABSCESS: ICD-10-CM

## 2021-10-07 NOTE — PROGRESS NOTES
Patient came in to special procedures for removal of abdominal drain. Drain removed and 2 x 2 and tegaderm applied. Patient okay for discharge home.

## 2021-10-08 LAB
BLOOD CULTURE, ROUTINE: NORMAL
CULTURE, BLOOD 2: NORMAL

## 2022-02-22 NOTE — PROGRESS NOTES
OH, OU- MOST LIKELY DUE TO MGD. TREAT MGD FIRST THEN CONSIDER OTHER TREATMENT OPTIONS IF SYMPTOMS PERSIST. VASCULAR SURGERY  DAILY PROGRESS NOTE    Date:10/15/2020       BYAR:[id number]-G  Patient Name:Wagner Ryan     Date of Birth:[birth year]     Age:40 y.o. Chief Complaint:  L arm abscess    Subjective:  Denies paresthesias of the left hand. Pain controlled at rest. He likes the new vac. No issues with seal since placement. Objective:  /76   Pulse 71   Temp 97.4 °F (36.3 °C) (Temporal)   Resp 16   Ht 6' 4\" (1.93 m)   Wt 201 lb 4.8 oz (91.3 kg)   SpO2 100%   BMI 24.50 kg/m²   Temp (24hrs), Av.8 °F (36.6 °C), Min:97.4 °F (36.3 °C), Max:98.2 °F (36.8 °C)      I/O (24Hr):  I/O last 3 completed shifts: In: 700 [I.V.:700]  Out: 1300 [Urine:1300]     GENERAL:  No acute distress. Alert and interactive. LUNGS:  No cough. Nonlabored breathing on room air. CARDIOVASC:  Normal rate, no cyanosis. ABDOMEN:  Soft, non-distended.   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

## 2022-03-04 ENCOUNTER — APPOINTMENT (OUTPATIENT)
Dept: ULTRASOUND IMAGING | Age: 42
DRG: 558 | End: 2022-03-04
Payer: COMMERCIAL

## 2022-03-04 ENCOUNTER — APPOINTMENT (OUTPATIENT)
Dept: GENERAL RADIOLOGY | Age: 42
DRG: 558 | End: 2022-03-04
Payer: COMMERCIAL

## 2022-03-04 ENCOUNTER — HOSPITAL ENCOUNTER (INPATIENT)
Age: 42
LOS: 6 days | Discharge: HOME | DRG: 558 | End: 2022-03-11
Attending: EMERGENCY MEDICINE | Admitting: INTERNAL MEDICINE
Payer: COMMERCIAL

## 2022-03-04 ENCOUNTER — APPOINTMENT (OUTPATIENT)
Dept: CT IMAGING | Age: 42
DRG: 558 | End: 2022-03-04
Payer: COMMERCIAL

## 2022-03-04 DIAGNOSIS — M60.069: Primary | ICD-10-CM

## 2022-03-04 DIAGNOSIS — M25.421 EFFUSION OF JOINT OF RIGHT UPPER ARM: ICD-10-CM

## 2022-03-04 DIAGNOSIS — L03.115 CELLULITIS OF RIGHT LOWER EXTREMITY: ICD-10-CM

## 2022-03-04 LAB
ALBUMIN SERPL-MCNC: 3.4 G/DL (ref 3.5–5.2)
ALP BLD-CCNC: 154 U/L (ref 40–129)
ALT SERPL-CCNC: 52 U/L (ref 0–40)
ANION GAP SERPL CALCULATED.3IONS-SCNC: 9 MMOL/L (ref 7–16)
AST SERPL-CCNC: 60 U/L (ref 0–39)
BASOPHILS ABSOLUTE: 0.04 E9/L (ref 0–0.2)
BASOPHILS RELATIVE PERCENT: 0.4 % (ref 0–2)
BILIRUB SERPL-MCNC: 0.2 MG/DL (ref 0–1.2)
BUN BLDV-MCNC: 12 MG/DL (ref 6–20)
C-REACTIVE PROTEIN: 25.3 MG/DL (ref 0–0.4)
CALCIUM SERPL-MCNC: 9.1 MG/DL (ref 8.6–10.2)
CHLORIDE BLD-SCNC: 101 MMOL/L (ref 98–107)
CO2: 30 MMOL/L (ref 22–29)
CREAT SERPL-MCNC: 0.7 MG/DL (ref 0.7–1.2)
EOSINOPHILS ABSOLUTE: 0.2 E9/L (ref 0.05–0.5)
EOSINOPHILS RELATIVE PERCENT: 2 % (ref 0–6)
GFR AFRICAN AMERICAN: >60
GFR NON-AFRICAN AMERICAN: >60 ML/MIN/1.73
GLUCOSE BLD-MCNC: 69 MG/DL (ref 74–99)
HCT VFR BLD CALC: 35.9 % (ref 37–54)
HEMOGLOBIN: 11.3 G/DL (ref 12.5–16.5)
IMMATURE GRANULOCYTES #: 0.05 E9/L
IMMATURE GRANULOCYTES %: 0.5 % (ref 0–5)
LACTIC ACID: 1.4 MMOL/L (ref 0.5–2.2)
LYMPHOCYTES ABSOLUTE: 1.87 E9/L (ref 1.5–4)
LYMPHOCYTES RELATIVE PERCENT: 18.4 % (ref 20–42)
MCH RBC QN AUTO: 25.6 PG (ref 26–35)
MCHC RBC AUTO-ENTMCNC: 31.5 % (ref 32–34.5)
MCV RBC AUTO: 81.2 FL (ref 80–99.9)
MONOCYTES ABSOLUTE: 0.89 E9/L (ref 0.1–0.95)
MONOCYTES RELATIVE PERCENT: 8.7 % (ref 2–12)
NEUTROPHILS ABSOLUTE: 7.14 E9/L (ref 1.8–7.3)
NEUTROPHILS RELATIVE PERCENT: 70 % (ref 43–80)
PDW BLD-RTO: 15.4 FL (ref 11.5–15)
PLATELET # BLD: 239 E9/L (ref 130–450)
PMV BLD AUTO: 8.7 FL (ref 7–12)
POTASSIUM SERPL-SCNC: 4.1 MMOL/L (ref 3.5–5)
RBC # BLD: 4.42 E12/L (ref 3.8–5.8)
SODIUM BLD-SCNC: 140 MMOL/L (ref 132–146)
STREP GRP A PCR: NEGATIVE
TOTAL PROTEIN: 7 G/DL (ref 6.4–8.3)
WBC # BLD: 10.2 E9/L (ref 4.5–11.5)

## 2022-03-04 PROCEDURE — 85651 RBC SED RATE NONAUTOMATED: CPT

## 2022-03-04 PROCEDURE — 86140 C-REACTIVE PROTEIN: CPT

## 2022-03-04 PROCEDURE — 87040 BLOOD CULTURE FOR BACTERIA: CPT

## 2022-03-04 PROCEDURE — 96374 THER/PROPH/DIAG INJ IV PUSH: CPT

## 2022-03-04 PROCEDURE — 85025 COMPLETE CBC W/AUTO DIFF WBC: CPT

## 2022-03-04 PROCEDURE — 87150 DNA/RNA AMPLIFIED PROBE: CPT

## 2022-03-04 PROCEDURE — 73552 X-RAY EXAM OF FEMUR 2/>: CPT

## 2022-03-04 PROCEDURE — 93971 EXTREMITY STUDY: CPT | Performed by: RADIOLOGY

## 2022-03-04 PROCEDURE — 87077 CULTURE AEROBIC IDENTIFY: CPT

## 2022-03-04 PROCEDURE — 2580000003 HC RX 258: Performed by: NURSE PRACTITIONER

## 2022-03-04 PROCEDURE — 96375 TX/PRO/DX INJ NEW DRUG ADDON: CPT

## 2022-03-04 PROCEDURE — 73562 X-RAY EXAM OF KNEE 3: CPT

## 2022-03-04 PROCEDURE — 83605 ASSAY OF LACTIC ACID: CPT

## 2022-03-04 PROCEDURE — 80053 COMPREHEN METABOLIC PANEL: CPT

## 2022-03-04 PROCEDURE — 96376 TX/PRO/DX INJ SAME DRUG ADON: CPT

## 2022-03-04 PROCEDURE — 6360000002 HC RX W HCPCS: Performed by: NURSE PRACTITIONER

## 2022-03-04 PROCEDURE — 93971 EXTREMITY STUDY: CPT

## 2022-03-04 PROCEDURE — 87880 STREP A ASSAY W/OPTIC: CPT

## 2022-03-04 PROCEDURE — 93005 ELECTROCARDIOGRAM TRACING: CPT | Performed by: NURSE PRACTITIONER

## 2022-03-04 PROCEDURE — 99284 EMERGENCY DEPT VISIT MOD MDM: CPT

## 2022-03-04 PROCEDURE — 87186 SC STD MICRODIL/AGAR DIL: CPT

## 2022-03-04 RX ORDER — MORPHINE SULFATE 2 MG/ML
4 INJECTION, SOLUTION INTRAMUSCULAR; INTRAVENOUS ONCE
Status: COMPLETED | OUTPATIENT
Start: 2022-03-04 | End: 2022-03-04

## 2022-03-04 RX ORDER — 0.9 % SODIUM CHLORIDE 0.9 %
1000 INTRAVENOUS SOLUTION INTRAVENOUS ONCE
Status: COMPLETED | OUTPATIENT
Start: 2022-03-04 | End: 2022-03-05

## 2022-03-04 RX ORDER — ONDANSETRON 2 MG/ML
4 INJECTION INTRAMUSCULAR; INTRAVENOUS ONCE
Status: COMPLETED | OUTPATIENT
Start: 2022-03-04 | End: 2022-03-04

## 2022-03-04 RX ADMIN — MORPHINE SULFATE 4 MG: 2 INJECTION, SOLUTION INTRAMUSCULAR; INTRAVENOUS at 22:47

## 2022-03-04 RX ADMIN — SODIUM CHLORIDE 1000 ML: 9 INJECTION, SOLUTION INTRAVENOUS at 22:47

## 2022-03-04 RX ADMIN — ONDANSETRON 4 MG: 2 INJECTION INTRAMUSCULAR; INTRAVENOUS at 22:47

## 2022-03-04 ASSESSMENT — PAIN DESCRIPTION - PAIN TYPE
TYPE: ACUTE PAIN
TYPE: ACUTE PAIN

## 2022-03-04 ASSESSMENT — PAIN SCALES - GENERAL
PAINLEVEL_OUTOF10: 8
PAINLEVEL_OUTOF10: 8

## 2022-03-04 ASSESSMENT — PAIN DESCRIPTION - ORIENTATION: ORIENTATION: RIGHT

## 2022-03-04 NOTE — ED NOTES
Department of Emergency Medicine  FIRST PROVIDER TRIAGE NOTE             Independent MLP           3/4/22  5:33 PM EST    Date of Encounter: 3/4/22   MRN: [id number]      HPI: Leia Pandey is a 40 y.o. male who presents to the ED for Leg Swelling (right leg swelling, fever, and sore throat), Fever, and Pharyngitis     Patient states for the last few days he has had right lateral thigh swelling of, what into his knee now. Patient is concerned for a blood clot. Patient states also subsequently has had fevers and some sore throat. Patient denies any redness or trauma to the area. Patient denies any history of blood clots in the past    ROS: Negative for cp, sob, abd pain or back pain. PE: Gen Appearance/Constitutional: alert  HEENT: NC/NT. PERRLA,  Airway patent. Mild erythema noted no retropharyngeal or peritonsillar abscess  Neck: supple     Initial Plan of Care: All treatment areas with department are currently occupied. Plan to order/Initiate the following while awaiting opening in ED: ultrasound.   Initiate Treatment-Testing, Proceed toTreatment Area When Bed Available for ED Attending/MLP to Continue Care    [facility]   DD: 3/4/22         [facility]  03/04/22 6998

## 2022-03-05 ENCOUNTER — APPOINTMENT (OUTPATIENT)
Dept: CT IMAGING | Age: 42
DRG: 558 | End: 2022-03-05
Payer: COMMERCIAL

## 2022-03-05 PROBLEM — Z86.19 HX OF HEPATITIS C: Status: ACTIVE | Noted: 2022-03-05

## 2022-03-05 PROBLEM — R00.0 SINUS TACHYCARDIA: Status: ACTIVE | Noted: 2022-03-05

## 2022-03-05 LAB
METER GLUCOSE: 85 MG/DL (ref 74–99)
SEDIMENTATION RATE, ERYTHROCYTE: 80 MM/HR (ref 0–15)

## 2022-03-05 PROCEDURE — 2580000003 HC RX 258: Performed by: INTERNAL MEDICINE

## 2022-03-05 PROCEDURE — 6370000000 HC RX 637 (ALT 250 FOR IP): Performed by: INTERNAL MEDICINE

## 2022-03-05 PROCEDURE — 73701 CT LOWER EXTREMITY W/DYE: CPT

## 2022-03-05 PROCEDURE — 6360000002 HC RX W HCPCS: Performed by: EMERGENCY MEDICINE

## 2022-03-05 PROCEDURE — 82962 GLUCOSE BLOOD TEST: CPT

## 2022-03-05 PROCEDURE — 6360000004 HC RX CONTRAST MEDICATION: Performed by: RADIOLOGY

## 2022-03-05 PROCEDURE — 1200000000 HC SEMI PRIVATE

## 2022-03-05 PROCEDURE — 6360000002 HC RX W HCPCS: Performed by: INTERNAL MEDICINE

## 2022-03-05 PROCEDURE — 2580000003 HC RX 258: Performed by: EMERGENCY MEDICINE

## 2022-03-05 PROCEDURE — 6360000002 HC RX W HCPCS

## 2022-03-05 RX ORDER — ONDANSETRON 4 MG/1
4 TABLET, ORALLY DISINTEGRATING ORAL EVERY 8 HOURS PRN
Status: DISCONTINUED | OUTPATIENT
Start: 2022-03-05 | End: 2022-03-11 | Stop reason: HOSPADM

## 2022-03-05 RX ORDER — SODIUM CHLORIDE 0.9 % (FLUSH) 0.9 %
10 SYRINGE (ML) INJECTION PRN
Status: DISCONTINUED | OUTPATIENT
Start: 2022-03-05 | End: 2022-03-11 | Stop reason: HOSPADM

## 2022-03-05 RX ORDER — SODIUM CHLORIDE 9 MG/ML
INJECTION, SOLUTION INTRAVENOUS CONTINUOUS
Status: DISCONTINUED | OUTPATIENT
Start: 2022-03-05 | End: 2022-03-11 | Stop reason: HOSPADM

## 2022-03-05 RX ORDER — ONDANSETRON 2 MG/ML
4 INJECTION INTRAMUSCULAR; INTRAVENOUS EVERY 6 HOURS PRN
Status: DISCONTINUED | OUTPATIENT
Start: 2022-03-05 | End: 2022-03-11 | Stop reason: HOSPADM

## 2022-03-05 RX ORDER — DEXTROSE MONOHYDRATE 50 MG/ML
100 INJECTION, SOLUTION INTRAVENOUS PRN
Status: DISCONTINUED | OUTPATIENT
Start: 2022-03-05 | End: 2022-03-11 | Stop reason: HOSPADM

## 2022-03-05 RX ORDER — MORPHINE SULFATE 2 MG/ML
INJECTION, SOLUTION INTRAMUSCULAR; INTRAVENOUS
Status: COMPLETED
Start: 2022-03-05 | End: 2022-03-05

## 2022-03-05 RX ORDER — SODIUM CHLORIDE 9 MG/ML
INJECTION, SOLUTION INTRAVENOUS CONTINUOUS
Status: DISCONTINUED | OUTPATIENT
Start: 2022-03-05 | End: 2022-03-05 | Stop reason: ALTCHOICE

## 2022-03-05 RX ORDER — MORPHINE SULFATE 2 MG/ML
4 INJECTION, SOLUTION INTRAMUSCULAR; INTRAVENOUS ONCE
Status: COMPLETED | OUTPATIENT
Start: 2022-03-05 | End: 2022-03-05

## 2022-03-05 RX ORDER — NICOTINE POLACRILEX 4 MG
15 LOZENGE BUCCAL PRN
Status: DISCONTINUED | OUTPATIENT
Start: 2022-03-05 | End: 2022-03-11 | Stop reason: HOSPADM

## 2022-03-05 RX ORDER — SODIUM CHLORIDE 9 MG/ML
25 INJECTION, SOLUTION INTRAVENOUS PRN
Status: DISCONTINUED | OUTPATIENT
Start: 2022-03-05 | End: 2022-03-11 | Stop reason: HOSPADM

## 2022-03-05 RX ORDER — ACETAMINOPHEN 325 MG/1
650 TABLET ORAL EVERY 6 HOURS PRN
Status: DISCONTINUED | OUTPATIENT
Start: 2022-03-05 | End: 2022-03-11 | Stop reason: HOSPADM

## 2022-03-05 RX ORDER — ACETAMINOPHEN 650 MG/1
650 SUPPOSITORY RECTAL EVERY 6 HOURS PRN
Status: DISCONTINUED | OUTPATIENT
Start: 2022-03-05 | End: 2022-03-11 | Stop reason: HOSPADM

## 2022-03-05 RX ORDER — IBUPROFEN 400 MG/1
400 TABLET ORAL EVERY 6 HOURS PRN
Status: DISCONTINUED | OUTPATIENT
Start: 2022-03-05 | End: 2022-03-11 | Stop reason: HOSPADM

## 2022-03-05 RX ORDER — SODIUM CHLORIDE 0.9 % (FLUSH) 0.9 %
10 SYRINGE (ML) INJECTION EVERY 12 HOURS SCHEDULED
Status: DISCONTINUED | OUTPATIENT
Start: 2022-03-05 | End: 2022-03-11 | Stop reason: HOSPADM

## 2022-03-05 RX ORDER — DEXTROSE MONOHYDRATE 25 G/50ML
12.5 INJECTION, SOLUTION INTRAVENOUS PRN
Status: DISCONTINUED | OUTPATIENT
Start: 2022-03-05 | End: 2022-03-11 | Stop reason: HOSPADM

## 2022-03-05 RX ADMIN — MORPHINE SULFATE 4 MG: 2 INJECTION, SOLUTION INTRAMUSCULAR; INTRAVENOUS at 00:45

## 2022-03-05 RX ADMIN — VANCOMYCIN HYDROCHLORIDE 1000 MG: 1 INJECTION, POWDER, LYOPHILIZED, FOR SOLUTION INTRAVENOUS at 23:00

## 2022-03-05 RX ADMIN — PIPERACILLIN AND TAZOBACTAM 3375 MG: 3; .375 INJECTION, POWDER, LYOPHILIZED, FOR SOLUTION INTRAVENOUS at 18:00

## 2022-03-05 RX ADMIN — VANCOMYCIN HYDROCHLORIDE 1000 MG: 1 INJECTION, POWDER, LYOPHILIZED, FOR SOLUTION INTRAVENOUS at 14:45

## 2022-03-05 RX ADMIN — VANCOMYCIN HYDROCHLORIDE 1750 MG: 500 INJECTION, POWDER, LYOPHILIZED, FOR SOLUTION INTRAVENOUS at 03:16

## 2022-03-05 RX ADMIN — IOPAMIDOL 90 ML: 755 INJECTION, SOLUTION INTRAVENOUS at 00:35

## 2022-03-05 RX ADMIN — PIPERACILLIN AND TAZOBACTAM 3375 MG: 3; .375 INJECTION, POWDER, LYOPHILIZED, FOR SOLUTION INTRAVENOUS at 10:04

## 2022-03-05 RX ADMIN — SODIUM CHLORIDE: 9 INJECTION, SOLUTION INTRAVENOUS at 03:16

## 2022-03-05 RX ADMIN — PIPERACILLIN SODIUM AND TAZOBACTAM SODIUM 4500 MG: 4; .5 INJECTION, POWDER, LYOPHILIZED, FOR SOLUTION INTRAVENOUS at 02:28

## 2022-03-05 RX ADMIN — MORPHINE SULFATE 4 MG: 2 INJECTION, SOLUTION INTRAMUSCULAR; INTRAVENOUS at 00:47

## 2022-03-05 RX ADMIN — ACETAMINOPHEN 650 MG: 325 TABLET ORAL at 20:05

## 2022-03-05 ASSESSMENT — PAIN DESCRIPTION - PAIN TYPE: TYPE: ACUTE PAIN

## 2022-03-05 ASSESSMENT — PAIN SCALES - GENERAL
PAINLEVEL_OUTOF10: 10
PAINLEVEL_OUTOF10: 8
PAINLEVEL_OUTOF10: 10
PAINLEVEL_OUTOF10: 6

## 2022-03-05 ASSESSMENT — PAIN DESCRIPTION - DESCRIPTORS: DESCRIPTORS: ACHING

## 2022-03-05 ASSESSMENT — PAIN DESCRIPTION - FREQUENCY: FREQUENCY: CONTINUOUS

## 2022-03-05 ASSESSMENT — PAIN DESCRIPTION - ORIENTATION: ORIENTATION: RIGHT

## 2022-03-05 ASSESSMENT — PAIN DESCRIPTION - ONSET: ONSET: ON-GOING

## 2022-03-05 NOTE — CONSULTS
Department of Orthopedic Surgery  Resident Consult Note          Reason for Consult: Rule out septic arthritis    HISTORY OF PRESENT ILLNESS:       Patient is a 40 y.o. male who presents with right side pain. Patient with history of IV drug abuse, most recent use was 6 weeks ago. Patient also with history of multiple infections related to his IV drug abuse, most recently was admitted in October 2021 for an abdominal mass, during this admission patient was seen by general surgery for the abdominal abscess and infectious disease, had a drain placed. He states that on Tuesday he started to have right leg pain and also an extremely sore throat and complained of fevers at the time. The right leg pain is about the lateral thigh. Patient is denying any pain in the groin or any pain at the knee although he does endorse some difficulty ambulating secondary to the pain about the  thigh. Denies numbness/tingling/paresthesias. Denies any other orthopedic complaints at this time. Past Medical History:    History reviewed. No pertinent past medical history.   Past Surgical History:        Procedure Laterality Date    APPLY DRESSING Left 10/9/2020    DEBRIDEMENT OF SKIN, SOFT TISSUE, MUSCLE, EXPLORATION OF UPPER ARM INTEGRA GRAFT FOR COVERAGE performed by Panda Esparza MD at [place] [address] Left 10/14/2020    LEFT UPPER EXTREMITY WOUND VAC PLACEMENT performed by Kristen Mendez MD at [address] [place] [address] Court Right 4/23/2020    RIGHT UPPER EXTREMITY INCISION AND DRAINAGE REMOVAL FOREIGN BODY WITH C-ARM performed by Ange Kunz MD at [address] [place] [address] Left 10/7/2020    LEFT ELBOW INCISION AND DRAINAGE performed by Ange Kunz MD at [address] [place] [address] ARTERY/VEIN Left 10/7/2020    LEFT ARM BRACHIAL ARTERY EXPLORATION, REPAIR OF MYCOTIC ANUERYSM WITH BYPASS performed by Kristen Mendez MD at [place] [address] TRANSESOPHAGEAL ECHOCARDIOGRAM N/A 4/24/2020 TRANSESOPHAGEAL ECHOCARDIOGRAM performed by Lita Barnhart MD at [id number] [place] [address] TRANSESOPHAGEAL ECHOCARDIOGRAM  10/14/2020     Current Medications:   Current Facility-Administered Medications: 0.9 % sodium chloride bolus, 1,000 mL, IntraVENous, Once  Allergies:  Patient has no known allergies. Social History:   TOBACCO:   reports that he has been smoking cigarettes. He has been smoking about 1.00 pack per day. His smokeless tobacco use includes chew. ETOH:   reports previous alcohol use. DRUGS:   reports current drug use. Drug: IV.   ACTIVITIES OF DAILY LIVING:    OCCUPATION:    Family History:       Problem Relation Age of Onset    Heart Disease Father        REVIEW OF SYSTEMS:  CONSTITUTIONAL:  negative for  fevers, chills  EYES:  negative for blurred vision, visual disturbance  HEENT:  negative for  hearing loss, voice change  RESPIRATORY:  negative for  dyspnea, wheezing  CARDIOVASCULAR:  negative for  chest pain, palpitations  GASTROINTESTINAL:  negative for nausea, vomiting  GENITOURINARY:  negative for frequency, urinary incontinence  HEMATOLOGIC/LYMPHATIC:  negative for bleeding and petechiae  MUSCULOSKELETAL:  positive for  pain  NEUROLOGICAL:  negative for headaches, dizziness  BEHAVIOR/PSYCH:  negative for increased agitation and anxiety    PHYSICAL EXAM:    VITALS:  /83   Pulse 107   Temp 98 °F (36.7 °C) (Oral)   Resp 20   Wt 200 lb (90.7 kg)   SpO2 97%   BMI 24.34 kg/m²   CONSTITUTIONAL:  awake, alert, cooperative, no apparent distress, and appears stated age  MUSCULOSKELETAL:  Right lower Extremity:  Skin is intact circumferentially there is some erythema and swelling about the knee although the patient has no tenderness palpation globally about the knee, patient does have tenderness palpation over the proximal half of the lateral thigh although no definitive fluctuance noted and minimal erythema here  No pain with logroll  Able to passively range the knee from full extension to 90 degrees of flexion with no discomfort, also able to varus valgus stress the knee with no discomfort  Compartments soft and compressible  +5/5 GS/TA/EHL  +2/4 DP & PT pulses, Cap refill <3 sec, Toes warm and perfused  Distal sensation grossly intact to Peroneals, Tibial, Sural, Saphenous nrs      Secondary Exam:   · bilateralUE: No obvious signs of trauma. -TTP to fingers, hand, wrist, forearm, elbow, humerus, shoulder or clavicle. -- Patient able to flex/extend fingers, wrist, elbow and shoulder with active and passive ROM without pain, +2/4 Radial pulse, cap refill <3sec, +AIN/PIN/Radial/Ulnar/Median N, distal sensation grossly intact to C4-T1 dermatomes, compartments soft and compressible. · leftLE: No obvious signs of trauma. -TTP to foot, ankle, leg, knee, thigh, hip.-- Patient able to flex/extend toes, ankle, knee and hip with active and passive ROM without pain,+2/4 DP & PT pulses, cap refill <3sec, +5/5 PF/DF/EHL, distal sensation grossly intact to L4-S1 dermatomes, compartments soft and compressible.     · Pelvis: -TTP, -Log roll, -Heel strike     DATA:    CBC:   Lab Results   Component Value Date    WBC 10.2 03/04/2022    RBC 4.42 03/04/2022    HGB 11.3 03/04/2022    HCT 35.9 03/04/2022    MCV 81.2 03/04/2022    MCH 25.6 03/04/2022    MCHC 31.5 03/04/2022    RDW 15.4 03/04/2022     03/04/2022    MPV 8.7 03/04/2022     PT/INR:    Lab Results   Component Value Date    PROTIME 12.8 10/04/2021    INR 1.1 10/04/2021       Radiology Review:  X-ray right femur demonstrates no acute fractures or dislocations    Rest of images are pending    IMPRESSION:  · Rule out septic arthritis  · Right thigh pain    PLAN:  · Based on examination low suspicion for septic arthritis at both the knee and the hip was able to fully range the knee and move the hip with 0 pain, patient does have erythema overlying the knee that looks more consistent with cellulitis than erythema secondary to a septic arthritis, would not

## 2022-03-05 NOTE — H&P
History and Physical      CHIEF COMPLAINT: Leg pain      HISTORY OF PRESENT ILLNESS:      The patient is a 40 y.o. male history of IV drug abuse no PCP who presents with leg pain. Patient presents to ED with 1 week of worsening leg pain. Patient notes 1 week of pain and swelling of the right thigh to the knee. Now with increasing redness swelling and pain. Exacerbated by movement improves with pain meds. Patient reports associated fever. He denies injecting intravenous drugs in this area. Denies chest pain cough or trouble breathing. In ED patient's found to have left thigh intramuscular abscess. He is treated with IV antibiotics and evaluated by orthopedic surgery. Is admitted for further evaluation and treatment.     Past Medical History:    Past Medical History:   Diagnosis Date    Hx of hepatitis C-resolved 3/5/2022    IV drug abuse ([address]     Nonhealing nonsurgical wound with fat layer exposed        Past Surgical History:    Past Surgical History:   Procedure Laterality Date    APPLY DRESSING Left 10/9/2020    DEBRIDEMENT OF SKIN, SOFT TISSUE, MUSCLE, EXPLORATION OF UPPER ARM INTEGRA GRAFT FOR COVERAGE performed by Delmar Irizarry MD at [place] [address] Left 10/14/2020    LEFT UPPER EXTREMITY WOUND VAC PLACEMENT performed by Ynes Carranza MD at [address] [place] [address] Right 4/23/2020    RIGHT UPPER EXTREMITY INCISION AND DRAINAGE REMOVAL FOREIGN BODY WITH C-ARM performed by Naldo Soria MD at [address] [place] [address] Left 10/7/2020    LEFT ELBOW INCISION AND DRAINAGE performed by Naldo Soria MD at [address] [place] [address] ARTERY/VEIN Left 10/7/2020    LEFT ARM BRACHIAL ARTERY EXPLORATION, REPAIR OF MYCOTIC ANUERYSM WITH BYPASS performed by Ynes Carranza MD at [place] [address] TRANSESOPHAGEAL ECHOCARDIOGRAM N/A 4/24/2020    TRANSESOPHAGEAL ECHOCARDIOGRAM performed by Quentin Quinteros MD at [address] [place] [address] TRANSESOPHAGEAL ECHOCARDIOGRAM 10/14/2020       Medications Prior to Admission:    Not in a hospital admission. Allergies:    Patient has no known allergies. Social History:    reports that he has been smoking cigarettes. He has been smoking about 1.00 pack per day. His smokeless tobacco use includes chew. He reports previous alcohol use. He reports current drug use. Drug: IV. Family History:   family history includes Heart Disease in his father. REVIEW OF SYSTEMS:  As above in the HPI, otherwise negative    PHYSICAL EXAM:    Vitals:  /83   Pulse 107   Temp 98 °F (36.7 °C) (Oral)   Resp 20   Wt 200 lb (90.7 kg)   SpO2 97%   BMI 24.34 kg/m²     General:  Awake, alert, oriented X 3. Well developed, well nourished, well groomed. No apparent distress. HEENT:  Normocephalic, atraumatic. Pupils equal, round, reactive to light. No scleral icterus. No conjunctival injection. Normal lips, teeth, and gums. No nasal discharge. Neck:  Supple  Heart:  RRR, no murmurs, gallops, rubs  Lungs:  CTA bilaterally, bilat symmetrical expansion, no wheeze, rales, or rhonchi  Abdomen:   Bowel sounds present, soft, nontender, no masses, no organomegaly, no peritoneal signs  Extremities: Pain and swelling and warmth and erythema right thigh   skin:  Warm and dry, no open lesions or rash-erythema as above  Neuro:  Cranial nerves 2-12 intact, no focal deficits  Breast: deferred  Rectal: deferred  Genitalia:  deferred    LABS:    CBC with Differential:    Lab Results   Component Value Date    WBC 10.2 03/04/2022    RBC 4.42 03/04/2022    HGB 11.3 03/04/2022    HCT 35.9 03/04/2022     03/04/2022    MCV 81.2 03/04/2022    MCH 25.6 03/04/2022    MCHC 31.5 03/04/2022    RDW 15.4 03/04/2022    NRBC 0.9 05/05/2020    LYMPHOPCT 18.4 03/04/2022    MONOPCT 8.7 03/04/2022    MYELOPCT 0.9 05/05/2020    BASOPCT 0.4 03/04/2022    MONOSABS 0.89 03/04/2022    LYMPHSABS 1.87 03/04/2022    EOSABS 0.20 03/04/2022    BASOSABS 0.04 03/04/2022     CMP: Lab Results   Component Value Date     03/04/2022    K 4.1 03/04/2022    K 4.2 10/04/2021     03/04/2022    CO2 30 03/04/2022    BUN 12 03/04/2022    CREATININE 0.7 03/04/2022    GFRAA >60 03/04/2022    LABGLOM >60 03/04/2022    GLUCOSE 69 03/04/2022    PROT 7.0 03/04/2022    LABALBU 3.4 03/04/2022    CALCIUM 9.1 03/04/2022    BILITOT 0.2 03/04/2022    ALKPHOS 154 03/04/2022    AST 60 03/04/2022    ALT 52 03/04/2022     Magnesium:    Lab Results   Component Value Date    MG 1.9 10/12/2020     Phosphorus:    Lab Results   Component Value Date    PHOS 3.8 10/12/2020     PT/INR:    Lab Results   Component Value Date    PROTIME 12.8 10/04/2021    INR 1.1 10/04/2021     Last 3 Troponin:    Lab Results   Component Value Date    TROPONINI <0.01 04/21/2020     U/A:  No results found for: NITRITE, COLORU, PROTEINU, PHUR, LABCAST, WBCUA, RBCUA, MUCUS, TRICHOMONAS, YEAST, BACTERIA, CLARITYU, SPECGRAV, LEUKOCYTESUR, UROBILINOGEN, BILIRUBINUR, BLOODU, GLUCOSEU, AMORPHOUS  ABG:    Lab Results   Component Value Date    PH 7.364 10/08/2020    PCO2 35.2 10/08/2020    PO2 164.3 10/08/2020    HCO3 19.6 10/08/2020    BE -5.1 10/08/2020    O2SAT 99.1 10/08/2020     HgBA1c:    Lab Results   Component Value Date    LABA1C 5.4 10/03/2021     FLP:  No results found for: TRIG, HDL, LDLCALC, LDLDIRECT, LABVLDL  TSH:  No results found for: TSH    CT FEMUR RIGHT W CONTRAST   Final Result   Large irregular collection containing fluid and gas within the right vastus   lateralis and intermedius muscle bellies, compatible with intramuscular   abscess. No definite radiopaque foreign body. CT HIP RIGHT W CONTRAST   Final Result   Large irregular collection containing fluid and gas within the right vastus   lateralis and intermedius muscle bellies, compatible with intramuscular   abscess. No definite radiopaque foreign body. XR KNEE RIGHT (3 VIEWS)   Final Result   Diffuse mild soft tissue stranding.   Although not a true lateral view, there   is suggestion of a right suprapatellar joint effusion. No acute osseous   findings. Normal alignment. US DUP LOWER EXTREMITY RIGHT JAYMIE   Final Result   Within the visualized vessels there is no evidence for deep venous   thrombosis               XR FEMUR RIGHT (MIN 2 VIEWS)   Final Result   No acute osseous findings seen about the right femur. Mild right hip   osteoarthritis. ASSESSMENT:      Principal Problem:    Abscess  Active Problems:    IV drug abuse ([address]    Tobacco dependence    Sinus tachycardia  Resolved Problems:    * No resolved hospital problems.  *      PLAN:    Admit  IV abx  WCx  BCx  IVF  ID consult  Orthopedics consult recommending IR drainage  IR consult   monitor labs closely  Tobacco cessation counseled-nicotine patch as needed    PT/OT  DVT PPx  Disposition requires inpatient admission      Electronically signed by Deric Jacobo MD on 3/5/2022 at 2:32 AM

## 2022-03-05 NOTE — CONSULTS
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 He noticed increasing redness . He denied fever and chills . WBC was 10 K , CT scan of right thigh showed multilobulated/multiloculated collection within the right vastus lateralis and vastus intermedius muscle bellies, measuring up to   8.4 x 4.2 cm  and approximately 9.5 cm .  Pt was started on IV vancomycin and zosyn       Past Medical History:      Past Medical History:   Diagnosis Date    Hx of hepatitis C-resolved 3/5/2022    IV drug abuse ([address]     Nonhealing nonsurgical wound with fat layer exposed          Past Surgical History:      Past Surgical History:   Procedure Laterality Date    APPLY DRESSING Left 10/9/2020    DEBRIDEMENT OF SKIN, SOFT TISSUE, MUSCLE, EXPLORATION OF UPPER ARM INTEGRA GRAFT FOR COVERAGE performed by Brad Diallo MD at [place] [address] Left 10/14/2020    LEFT UPPER EXTREMITY WOUND VAC PLACEMENT performed by Lizzy Sherwood MD at [address] [place] [address] Right 4/23/2020    RIGHT UPPER EXTREMITY INCISION AND DRAINAGE REMOVAL FOREIGN BODY WITH C-ARM performed by Sarai Yarbrough MD at [address] [place] [address] Left 10/7/2020    LEFT ELBOW INCISION AND DRAINAGE performed by Sarai Yarbrough MD at [address] [place] [address] ARTERY/VEIN Left 10/7/2020    LEFT ARM BRACHIAL ARTERY EXPLORATION, REPAIR OF MYCOTIC ANUERYSM WITH BYPASS performed by Lizzy Sherwood MD at [address] TRANSESOPHAGEAL ECHOCARDIOGRAM N/A 4/24/2020    TRANSESOPHAGEAL ECHOCARDIOGRAM performed by Justen Sheets MD at [address] [place] [address] TRANSESOPHAGEAL ECHOCARDIOGRAM  10/14/2020         Current Medications:      Current Facility-Administered Medications   Medication Dose Route Frequency Provider Last Rate Last Admin    0.9 % sodium chloride infusion   IntraVENous Continuous Quintin Ash MD 75 mL/hr at 03/05/22 0316 New Bag at 03/05/22 0316    sodium chloride flush 0.9 % injection 10 mL  10 mL IntraVENous 2 times per day Quintin Ash MD        sodium chloride flush 0.9 % injection 10 mL  10 mL IntraVENous PRN Quintin Ash MD        0.9 % sodium chloride infusion  25 mL IntraVENous PRN Quintin Ash MD        ondansetron (ZOFRAN-ODT) disintegrating tablet 4 mg  4 mg Oral Q8H PRN Quintin Ash MD        Or    ondansetron [facility]) injection 4 mg  4 mg IntraVENous Q6H PRN Quintin Ash MD        magnesium hydroxide (MILK OF MAGNESIA) 400 MG/5ML suspension 30 mL  30 mL Oral Daily PRN Quintin Ash MD        acetaminophen (TYLENOL) tablet 650 mg  650 mg Oral Q6H PRN Quintin Ash MD        Or    acetaminophen (TYLENOL) suppository 650 mg  650 mg Rectal Q6H PRN Quintin Ash MD        ibuprofen (ADVIL;MOTRIN) tablet 400 mg  400 mg Oral Q6H PRN Quintin Ash MD        piperacillin-tazobactam (ZOSYN) 3,375 mg in dextrose 5 % 100 mL IVPB extended infusion (mini-bag)  3,375 mg IntraVENous Q8H Quintin Ash MD 25 mL/hr at 03/05/22 1004 3,375 mg at 03/05/22 1004    vancomycin 1000 mg IVPB in 250 mL D5W addavial  1,000 mg IntraVENous Q8H Quintin Ash MD        glucose (GLUTOSE) 40 % oral gel 15 g  15 g Oral PRN Tay Valadez, APRN - NP        dextrose 50 % IV solution  12.5 g IntraVENous PRN Tay Valadez, APRN - NP        glucagon (rDNA) injection 1 mg  1 mg IntraMUSCular PRN Tay Valadez, APRN - NP        dextrose 5 % solution  100 mL/hr IntraVENous PRN CESAR Beth NP           Allergies:  Patient has no known allergies.     Social History:      Social History     Socioeconomic History    Marital status: Single Spouse name: Not on file    Number of children: Not on file    Years of education: Not on file    Highest education level: Not on file   Occupational History    Not on file   Tobacco Use    Smoking status: Current Every Day Smoker     Packs/day: 1.00     Types: Cigarettes    Smokeless tobacco: Current User     Types: Chew   Vaping Use    Vaping Use: Never used   Substance and Sexual Activity    Alcohol use: Not Currently    Drug use: Yes     Types: IV     Comment: Suboxone/IV    Sexual activity: Not Currently   Other Topics Concern    Not on file   Social History Narrative    Not on file     Social Determinants of Health     Financial Resource Strain:     Difficulty of Paying Living Expenses: Not on file   Food Insecurity:     Worried About Running Out of Food in the Last Year: Not on file    Aurelio of Food in the Last Year: Not on file   Transportation Needs:     Lack of Transportation (Medical): Not on file    Lack of Transportation (Non-Medical):  Not on file   Physical Activity:     Days of Exercise per Week: Not on file    Minutes of Exercise per Session: Not on file   Stress:     Feeling of Stress : Not on file   Social Connections:     Frequency of Communication with Friends and Family: Not on file    Frequency of Social Gatherings with Friends and Family: Not on file    Attends Islam Services: Not on file    Active Member of [address] [place] [address] or Organizations: Not on file    Attends Club or Organization Meetings: Not on file    Marital Status: Not on file   Intimate Partner Violence:     Fear of Current or Ex-Partner: Not on file    Emotionally Abused: Not on file    Physically Abused: Not on file    Sexually Abused: Not on file   Housing Stability:     Unable to Pay for Housing in the Last Year: Not on file    Number of [place] in the Last Year: Not on file    Unstable Housing in the Last Year: Not on file       Family History:     Family History   Problem Relation Age of Onset    Heart Disease Father        REVIEW OF SYSTEMS:    CONSTITUTIONAL:  Denies fever, chill or rigors, nausea or vomiting. HEENT: denies blurring of vision or double vision, denies hearing problem  RESPIRATORY: denies cough, shortness of breath, sputum expectoration, chest pain. CARDIOVASCULAR:  Denies palpitation  GASTROINTESTINAL:  Denies abdomen pain, diarrhea or constipation. GENITOURINARY:  Denies burning urination or frequency of urination  INTEGUMENT: Redness   HEMATOLOGIC/LYMPHATIC:  Denies lymph node swelling, gum bleeding or easy bruising. MUSCULOSKELETAL:  Right leg, thigh pain,swelling, redness   NEUROLOGICAL:  Denies light headed, dizziness, loss of consciousness, weakness of lower extremities, bowel or bladder incontinence. PHYSICAL EXAM:      Vitals:     Vitals:    03/05/22 0824   BP: 135/81   Pulse: 90   Resp: 18   Temp: 97.5 °F (36.4 °C)   SpO2: 97%       General Appearance:    Awake, alert , no acute distress. Head:    Normocephalic, atraumatic   Eyes:    No pallor, no icterus,   Ears:    No obvious deformity or drainage.    Nose:   No nasal drainage   Throat:   Mucosa moist, no oral thrush   Neck:   Supple, no lymphadenopathy   Back:     no CVA tenderness   Lungs:     Clear to auscultation bilaterally, no wheeze    Heart:    Regular rate and rhythm, no murmur   Abdomen:     Soft, non-tender, bowel sounds present    Extremities:   Right thigh / leg - edema, mild tender, mild erythema , warm to touch    Pulses:   Dorsalis pedis palpable    Skin:   no rashes or lesions     CBC with Differential:      Lab Results   Component Value Date    WBC 10.2 03/04/2022    RBC 4.42 03/04/2022    HGB 11.3 03/04/2022    HCT 35.9 03/04/2022     03/04/2022    MCV 81.2 03/04/2022    MCH 25.6 03/04/2022    MCHC 31.5 03/04/2022    RDW 15.4 03/04/2022    NRBC 0.9 05/05/2020    LYMPHOPCT 18.4 03/04/2022    MONOPCT 8.7 03/04/2022    MYELOPCT 0.9 05/05/2020    BASOPCT 0.4 03/04/2022    MONOSABS 0.89 03/04/2022    LYMPHSABS 1.87 03/04/2022    EOSABS 0.20 03/04/2022    BASOSABS 0.04 03/04/2022       CMP     Lab Results   Component Value Date     03/04/2022    K 4.1 03/04/2022    K 4.2 10/04/2021     03/04/2022    CO2 30 03/04/2022    BUN 12 03/04/2022    CREATININE 0.7 03/04/2022    GFRAA >60 03/04/2022    LABGLOM >60 03/04/2022    GLUCOSE 69 03/04/2022    PROT 7.0 03/04/2022    LABALBU 3.4 03/04/2022    CALCIUM 9.1 03/04/2022    BILITOT 0.2 03/04/2022    ALKPHOS 154 03/04/2022    AST 60 03/04/2022    ALT 52 03/04/2022         Hepatic Function Panel:    Lab Results   Component Value Date    ALKPHOS 154 03/04/2022    ALT 52 03/04/2022    AST 60 03/04/2022    PROT 7.0 03/04/2022    BILITOT 0.2 03/04/2022    BILIDIR <0.2 10/02/2021    IBILI see below 10/02/2021    LABALBU 3.4 03/04/2022       PT/INR:    Lab Results   Component Value Date    PROTIME 12.8 10/04/2021    INR 1.1 10/04/2021       TSH:  No results found for: TSH    U/A:  No results found for: NITRITE, COLORU, PHUR, LABCAST, WBCUA, RBCUA, MUCUS, TRICHOMONAS, YEAST, BACTERIA, CLARITYU, SPECGRAV, LEUKOCYTESUR, UROBILINOGEN, BILIRUBINUR, BLOODU, GLUCOSEU, AMORPHOUS    ABG:  No results found for: ICF5ZFY, BEART, S9UHSKCW, PHART, THGBART, VAX4QKS, PO2ART, LXT3SXT    MICROBIOLOGY:    Blood culture - neg to date       Radiology :   CT scan -    Large irregular collection containing fluid and gas within the right vastus   lateralis and intermedius muscle bellies, compatible with intramuscular   abscess.  No definite radiopaque foreign body. IMPRESSION:     1. Right thigh abscess , IVDU       RECOMMENDATIONS:      1. Vancomycin 1 gram IV q 8 hrs ( pharmacy following )   2. Zosyn 3.375 grams IV q 8 hrs, monitor Cr   3.  Ortho following - I & D of abscess - check cx     Thank you Dr Fuad Mccabe for the consult

## 2022-03-05 NOTE — ED NOTES
[facility]. SBAR faxed and patient placed in transprt.      Madai Gutierrez RN  03/05/22 7225

## 2022-03-05 NOTE — PROGRESS NOTES
CT scan reviewed, demonstrates likely intramuscular abscess in the vastus lateralis. This is consistent with the patient's examination. Upon chart review it seems the patient has had some difficulty in the past with healing wounds and recurrent abscess formation. Was previously treated by IR aspiration on last admission. Recommendation at this time would be for admission with medical management and antibiotics. Would recommend consulting IR for drainage. If IR is unable to completely drain the abscess or patient condition worsens would consider open surgical intervention. Plan to attempt to minimize invasive surgery given patient's history.   Discussed with attending    Vitals:    03/04/22 1731   BP: 128/83   Pulse:    Resp:    Temp:    SpO2:        Electronically signed by Osmel Cramer DO on 3/5/2022 at 1:53 AM

## 2022-03-05 NOTE — ED NOTES
Notified NP of low BGL, food and juice given to patient at bedside.      Sammy Jackson RN  03/04/22 5847

## 2022-03-05 NOTE — PROGRESS NOTES
Department of Orthopedic Surgery  Resident Progress Note    Patient seen and examined. Pain controlled. No new complaints, continued pain about lateral proximal thigh. Denies chest pain, shortness of breath, dizziness/lightheadedness. Denies fever overnight or chills. VITALS:  /86   Pulse 109   Temp 98.5 °F (36.9 °C) (Temporal)   Resp 16   Ht 6' 4\" (1.93 m)   Wt 200 lb (90.7 kg)   SpO2 97%   BMI 24.34 kg/m²     General: alert and oriented    MUSCULOSKELETAL:   right lower extremity:  · Skin intact, minimal erythema around the knee, no pain with ROM at knee, swelling and mild erythema about the lateral thigh  · Compartments soft and compressible  · +PF/DF/EHL  · +2/4 DP & PT pulses, Brisk Cap refill, Toes warm and perfused  · Distal sensation grossly intact to Peroneals, Sural, Saphenous, and tibial nrs    CBC:   Lab Results   Component Value Date    WBC 10.2 03/04/2022    HGB 11.3 03/04/2022    HCT 35.9 03/04/2022     03/04/2022     PT/INR:    Lab Results   Component Value Date    PROTIME 12.8 10/04/2021    INR 1.1 10/04/2021       ASSESSMENT  · Right lateral thigh abscess    PLAN    · CT shows intramuscular abscess about the right lateral thigh  · IR for aspiration/drainage, ortho will continue to follow. If pt.  Worsens/does not improve consider open I&D  · Antibiotics per primary  · Pain control  · Ice and elevation to affected extremity as needed  · Discussed with attending

## 2022-03-05 NOTE — ED PROVIDER NOTES
ED physician  HPI:  3/4/22, Time: 10:00 PM RAUL Spence is a 40 y.o. male presenting to the ED for pain and swelling to right anterior and lateral thigh radiating into his right knee. Patient is denying any injury or trauma, states that on Tuesday he woke up and  he had a fever as well as significant pain to his right thigh. He states that the pain has continued as well as the swelling and then on Thursday he now noticed redness and swelling into the right knee. He reports that he did run a fever for several days and then it did go away on its own. Patient does admit to a past history of drug abuse and denies any recent use as well as no use into his lower extremities. He actually reported to us that he last used heroin 2 weeks ago and then reported to orthopedics that he last used 6 weeks ago. Patient denies any chest pain, shortness of breath or abdominal pain with this. He also denies any injury or fall or trauma or  twisting. Patient also does not have any unilateral weakness or any unusual paresthesias particularly none noted to the right lower extremity. Patient reports pain worsening and no improvement which prompted his visit here today. Symptoms moderate in severity and persistent. Review of Systems:   A complete review of systems was performed and pertinent positives and negatives are stated within HPI, all other systems reviewed and are negative.          --------------------------------------------- PAST HISTORY ---------------------------------------------  Past Medical History:  has no past medical history on file. Past Surgical History:  has a past surgical history that includes incision and drainage (Right, 4/23/2020); transesophageal echocardiogram (N/A, 4/24/2020); pr exploration of artery/vein (Left, 10/7/2020); incision and drainage (Left, 10/7/2020); Apply dressing (Left, 10/9/2020);  Apply dressing (Left, 10/14/2020); and transesophageal echocardiogram (10/14/2020). Social History:  reports that he has been smoking cigarettes. He has been smoking about 1.00 pack per day. His smokeless tobacco use includes chew. He reports previous alcohol use. He reports current drug use. Drug: IV. Family History: family history includes Heart Disease in his father. The patients home medications have been reviewed. Allergies: Patient has no known allergies.     -------------------------------------------------- RESULTS -------------------------------------------------  All laboratory and radiology results have been personally reviewed by myself   LABS:  Results for orders placed or performed during the hospital encounter of 03/04/22   Strep Screen Group A Throat    Specimen: Throat   Result Value Ref Range    Strep Grp A PCR Negative Negative   CBC with Auto Differential   Result Value Ref Range    WBC 10.2 4.5 - 11.5 E9/L    RBC 4.42 3.80 - 5.80 E12/L    Hemoglobin 11.3 (L) 12.5 - 16.5 g/dL    Hematocrit 35.9 (L) 37.0 - 54.0 %    MCV 81.2 80.0 - 99.9 fL    MCH 25.6 (L) 26.0 - 35.0 pg    MCHC 31.5 (L) 32.0 - 34.5 %    RDW 15.4 (H) 11.5 - 15.0 fL    Platelets 278 892 - 997 E9/L    MPV 8.7 7.0 - 12.0 fL    Neutrophils % 70.0 43.0 - 80.0 %    Immature Granulocytes % 0.5 0.0 - 5.0 %    Lymphocytes % 18.4 (L) 20.0 - 42.0 %    Monocytes % 8.7 2.0 - 12.0 %    Eosinophils % 2.0 0.0 - 6.0 %    Basophils % 0.4 0.0 - 2.0 %    Neutrophils Absolute 7.14 1.80 - 7.30 E9/L    Immature Granulocytes # 0.05 E9/L    Lymphocytes Absolute 1.87 1.50 - 4.00 E9/L    Monocytes Absolute 0.89 0.10 - 0.95 E9/L    Eosinophils Absolute 0.20 0.05 - 0.50 E9/L    Basophils Absolute 0.04 0.00 - 0.20 E9/L   Comprehensive Metabolic Panel   Result Value Ref Range    Sodium 140 132 - 146 mmol/L    Potassium 4.1 3.5 - 5.0 mmol/L    Chloride 101 98 - 107 mmol/L    CO2 30 (H) 22 - 29 mmol/L    Anion Gap 9 7 - 16 mmol/L    Glucose 69 (L) 74 - 99 mg/dL    BUN 12 6 - 20 mg/dL    CREATININE 0.7 0.7 - 1.2 mg/dL GFR Non-African American >60 >=60 mL/min/1.73    GFR African American >60     Calcium 9.1 8.6 - 10.2 mg/dL    Total Protein 7.0 6.4 - 8.3 g/dL    Albumin 3.4 (L) 3.5 - 5.2 g/dL    Total Bilirubin 0.2 0.0 - 1.2 mg/dL    Alkaline Phosphatase 154 (H) 40 - 129 U/L    ALT 52 (H) 0 - 40 U/L    AST 60 (H) 0 - 39 U/L   Lactic Acid   Result Value Ref Range    Lactic Acid 1.4 0.5 - 2.2 mmol/L   Sedimentation Rate   Result Value Ref Range    Sed Rate 80 (H) 0 - 15 mm/Hr   C-Reactive Protein   Result Value Ref Range    CRP 25.3 (H) 0.0 - 0.4 mg/dL   EKG 12 Lead   Result Value Ref Range    Ventricular Rate 90 BPM    Atrial Rate 90 BPM    P-R Interval 122 ms    QRS Duration 100 ms    Q-T Interval 362 ms    QTc Calculation (Bazett) 442 ms    P Axis 80 degrees    R Axis 94 degrees    T Axis 16 degrees       RADIOLOGY:  Interpreted by Radiologist.  CT FEMUR RIGHT W CONTRAST   Final Result   Large irregular collection containing fluid and gas within the right vastus   lateralis and intermedius muscle bellies, compatible with intramuscular   abscess. No definite radiopaque foreign body. CT HIP RIGHT W CONTRAST   Final Result   Large irregular collection containing fluid and gas within the right vastus   lateralis and intermedius muscle bellies, compatible with intramuscular   abscess. No definite radiopaque foreign body. XR KNEE RIGHT (3 VIEWS)   Final Result   Diffuse mild soft tissue stranding. Although not a true lateral view, there   is suggestion of a right suprapatellar joint effusion. No acute osseous   findings. Normal alignment. US DUP LOWER EXTREMITY RIGHT JAMYIE   Final Result   Within the visualized vessels there is no evidence for deep venous   thrombosis               XR FEMUR RIGHT (MIN 2 VIEWS)   Final Result   No acute osseous findings seen about the right femur.   Mild right hip   osteoarthritis.             ------------------------- NURSING NOTES AND VITALS REVIEWED ---------------------------   The nursing notes within the ED encounter and vital signs as below have been reviewed. /83   Pulse 107   Temp 98 °F (36.7 °C) (Oral)   Resp 20   Wt 200 lb (90.7 kg)   SpO2 97%   BMI 24.34 kg/m²   Oxygen Saturation Interpretation: Normal      ---------------------------------------------------PHYSICAL EXAM--------------------------------------      Constitutional/General: Alert and oriented x3, moderately uncomfortable  Head: Normocephalic and atraumatic  Eyes: PERRL, EOMI  Mouth: Oropharynx clear, handling secretions, no trismus  Neck: Supple, full ROM,   Pulmonary: Lungs clear to auscultation bilaterally, no wheezes, rales, or rhonchi. Not in respiratory distress  Cardiovascular:  Regular rate and rhythm, no murmurs, gallops, or rubs. 2+ distal pulses  Abdomen: Soft, non tender, non distended,   Extremities: Moves all extremities x 3. Warm and well perfused, right thigh starting slightly anterior going to lateral aspect very firm to touch with significant swelling. No underlying erythema or warmth palpated to this area and into the right patella it is erythematous and warm with significant swelling and the erythema is radiating down into the shin. Compartments soft to calf with full sensations intact and 2+ dorsal pedal pulses. Patient unable to perform flexion to right lower extremity.   Skin: warm and dry without rash  Neurologic: GCS 15,  Psych: Normal Affect      ------------------------------ ED COURSE/MEDICAL DECISION MAKING----------------------  Medications   morphine 2 MG/ML injection (has no administration in time range)   vancomycin (VANCOCIN) 1,750 mg in dextrose 5 % 500 mL IVPB (has no administration in time range)   piperacillin-tazobactam (ZOSYN) 4,500 mg in sodium chloride 0.9 % 100 mL IVPB (mini-bag) (4,500 mg IntraVENous New Bag 3/5/22 0228)   0.9 % sodium chloride infusion (has no administration in time range)   sodium chloride flush 0.9 % injection 10 mL (has no administration in time range)   sodium chloride flush 0.9 % injection 10 mL (has no administration in time range)   0.9 % sodium chloride infusion (has no administration in time range)   ondansetron (ZOFRAN-ODT) disintegrating tablet 4 mg (has no administration in time range)     Or   ondansetron (ZOFRAN) injection 4 mg (has no administration in time range)   magnesium hydroxide (MILK OF MAGNESIA) 400 MG/5ML suspension 30 mL (has no administration in time range)   acetaminophen (TYLENOL) tablet 650 mg (has no administration in time range)     Or   acetaminophen (TYLENOL) suppository 650 mg (has no administration in time range)   ibuprofen (ADVIL;MOTRIN) tablet 400 mg (has no administration in time range)   piperacillin-tazobactam (ZOSYN) 3,375 mg in dextrose 5 % 100 mL IVPB extended infusion (mini-bag) (has no administration in time range)   vancomycin 1000 mg IVPB in 250 mL D5W addavial (has no administration in time range)   0.9 % sodium chloride infusion (has no administration in time range)   0.9 % sodium chloride bolus (0 mLs IntraVENous Stopped 3/5/22 0234)   morphine (PF) injection 4 mg (4 mg IntraVENous Given 3/4/22 2247)   ondansetron (ZOFRAN) injection 4 mg (4 mg IntraVENous Given 3/4/22 2247)   iopamidol (ISOVUE-370) 76 % injection 90 mL (90 mLs IntraVENous Given 3/5/22 0035)   morphine (PF) injection 4 mg (4 mg IntraVENous Given 3/5/22 0047)         ED COURSE:  ED Course as of 03/05/22 0049   Fri Mar 04, 2022   2238 Peripheral Line Placement Procedure Note    Indication: Poor peripheral access, lack of vascular access    Consent: The patient provided verbal consent for this procedure. Procedure: The patient was positioned appropriately and the skin over the right arm was prepped with chlorhexidine. A tourniquet was placed proximal to the vein's location and ultrasound was utilized to identify the vein.  A 20 gauge angiocath was inserted into the vein with ultrasound guidance, with the catheter being visualized as it was advanced within the vein. Blood was obtained from the venous access site if needed and the site was secured with a leur lock and a tegaderm adhesive bandage. The patient tolerated the procedure well. Complications: None    Procedure performed by Zoe Robbins at 3/4/2022 at 10:38 PM     [CB]      ED Course User Index  [CB] Zoe Robbins, DO       Medical Decision Making:    Plan will be for labs will also obtain imaging. Twelve-lead EKG interpreted showing a heart rate of 90, normal sinus rhythm. No acute ST elevation or depression noted. Right axis deviation. Labs resulted CBC unremarkable, chemistry panel with elevation of liver enzymes, alk phos 154, ALT 52, AST 60. Patient does have a history of drug abuse. Lactic acid level negative blood cultures x2 are pending, sed rate elevated at 80, CRP 25, rapid strep negative. Ultrasound unremarkable, x-ray of the right femur negative, patient on exam with firmness palpated to the right hip femur region, will obtain CT scans to rule out abscess. There is no erythema noted to this area. Patient does have notable soft tissue swelling to the right patella with erythema extending down to the lateral shin. Patient was seen and evaluated by the orthopedic resident who reports that there is no suspicion for any septic joint. CT femur with contrast resulted showing a large irregular collection containing fluid and gas within the right vastus lateralis and intermedius muscle bellies compatible with an intramuscular abscess. No definite radiopaque foreign body noted. There is no gas or free fluid noted. CT scan of the hip also confirming this. Plan will be for medical admission. I did speak with the orthopedic resident who will be on consult, they are recommending IR drainage of the abscess as patient has had previous admissions and has had difficulty with healing of wounds and previous surgical sites. Patient will be started on IV antibiotics, call out to [facility] physicians for medical admission. Counseling: The emergency provider has spoken with the patient and discussed todays results, in addition to providing specific details for the plan of care and counseling regarding the diagnosis and prognosis. Questions are answered at this time and they are agreeable with the plan.      --------------------------------- IMPRESSION AND DISPOSITION ---------------------------------    IMPRESSION  1. Abscess of muscle of lower leg    2. Cellulitis of right lower extremity    3. Effusion of joint of right upper arm        DISPOSITION  Disposition: Admit to med/surg floor  Patient condition is good      NOTE: This report was transcribed using voice recognition software.  Every effort was made to ensure accuracy; however, inadvertent computerized transcription errors may be present     CESAR Patrick CNP  03/05/22 0236

## 2022-03-05 NOTE — PROGRESS NOTES
Plan for operative irrigation and debridement of right thigh abscess with orthopedic surgery on 3/6/2022  NPO effective midnight tonight  Hold anticoagulation pending surgical intervention  Treatment consent

## 2022-03-06 ENCOUNTER — ANESTHESIA EVENT (OUTPATIENT)
Dept: OPERATING ROOM | Age: 42
DRG: 558 | End: 2022-03-06
Payer: COMMERCIAL

## 2022-03-06 ENCOUNTER — ANESTHESIA (OUTPATIENT)
Dept: OPERATING ROOM | Age: 42
DRG: 558 | End: 2022-03-06
Payer: COMMERCIAL

## 2022-03-06 VITALS — OXYGEN SATURATION: 87 % | SYSTOLIC BLOOD PRESSURE: 113 MMHG | DIASTOLIC BLOOD PRESSURE: 65 MMHG | TEMPERATURE: 97.5 F

## 2022-03-06 LAB
ALBUMIN SERPL-MCNC: 2.8 G/DL (ref 3.5–5.2)
ALP BLD-CCNC: 133 U/L (ref 40–129)
ALT SERPL-CCNC: 64 U/L (ref 0–40)
ANION GAP SERPL CALCULATED.3IONS-SCNC: 13 MMOL/L (ref 7–16)
AST SERPL-CCNC: 47 U/L (ref 0–39)
BILIRUB SERPL-MCNC: 0.3 MG/DL (ref 0–1.2)
BUN BLDV-MCNC: 12 MG/DL (ref 6–20)
CALCIUM SERPL-MCNC: 8.9 MG/DL (ref 8.6–10.2)
CHLORIDE BLD-SCNC: 101 MMOL/L (ref 98–107)
CO2: 26 MMOL/L (ref 22–29)
CREAT SERPL-MCNC: 0.6 MG/DL (ref 0.7–1.2)
EKG ATRIAL RATE: 90 BPM
EKG P AXIS: 80 DEGREES
EKG P-R INTERVAL: 122 MS
EKG Q-T INTERVAL: 362 MS
EKG QRS DURATION: 100 MS
EKG QTC CALCULATION (BAZETT): 442 MS
EKG R AXIS: 94 DEGREES
EKG T AXIS: 16 DEGREES
EKG VENTRICULAR RATE: 90 BPM
GFR AFRICAN AMERICAN: >60
GFR NON-AFRICAN AMERICAN: >60 ML/MIN/1.73
GLUCOSE BLD-MCNC: 106 MG/DL (ref 74–99)
HCT VFR BLD CALC: 33.7 % (ref 37–54)
HEMOGLOBIN: 10.7 G/DL (ref 12.5–16.5)
MCH RBC QN AUTO: 25.3 PG (ref 26–35)
MCHC RBC AUTO-ENTMCNC: 31.8 % (ref 32–34.5)
MCV RBC AUTO: 79.7 FL (ref 80–99.9)
PDW BLD-RTO: 15.3 FL (ref 11.5–15)
PLATELET # BLD: 271 E9/L (ref 130–450)
PMV BLD AUTO: 8.9 FL (ref 7–12)
POTASSIUM SERPL-SCNC: 4 MMOL/L (ref 3.5–5)
RBC # BLD: 4.23 E12/L (ref 3.8–5.8)
SODIUM BLD-SCNC: 140 MMOL/L (ref 132–146)
TOTAL PROTEIN: 6.4 G/DL (ref 6.4–8.3)
WBC # BLD: 9.9 E9/L (ref 4.5–11.5)

## 2022-03-06 PROCEDURE — 6360000002 HC RX W HCPCS: Performed by: PHYSICAL THERAPY ASSISTANT

## 2022-03-06 PROCEDURE — 99222 1ST HOSP IP/OBS MODERATE 55: CPT | Performed by: ORTHOPAEDIC SURGERY

## 2022-03-06 PROCEDURE — 2580000003 HC RX 258: Performed by: PHYSICAL THERAPY ASSISTANT

## 2022-03-06 PROCEDURE — 3700000000 HC ANESTHESIA ATTENDED CARE: Performed by: ORTHOPAEDIC SURGERY

## 2022-03-06 PROCEDURE — 87206 SMEAR FLUORESCENT/ACID STAI: CPT

## 2022-03-06 PROCEDURE — 6360000002 HC RX W HCPCS: Performed by: NURSE ANESTHETIST, CERTIFIED REGISTERED

## 2022-03-06 PROCEDURE — 3600000013 HC SURGERY LEVEL 3 ADDTL 15MIN: Performed by: ORTHOPAEDIC SURGERY

## 2022-03-06 PROCEDURE — 87077 CULTURE AEROBIC IDENTIFY: CPT

## 2022-03-06 PROCEDURE — 6360000002 HC RX W HCPCS: Performed by: INTERNAL MEDICINE

## 2022-03-06 PROCEDURE — 7100000000 HC PACU RECOVERY - FIRST 15 MIN: Performed by: ORTHOPAEDIC SURGERY

## 2022-03-06 PROCEDURE — 2709999900 HC NON-CHARGEABLE SUPPLY: Performed by: ORTHOPAEDIC SURGERY

## 2022-03-06 PROCEDURE — 93010 ELECTROCARDIOGRAM REPORT: CPT | Performed by: INTERNAL MEDICINE

## 2022-03-06 PROCEDURE — 7100000001 HC PACU RECOVERY - ADDTL 15 MIN: Performed by: ORTHOPAEDIC SURGERY

## 2022-03-06 PROCEDURE — 2580000003 HC RX 258: Performed by: NURSE ANESTHETIST, CERTIFIED REGISTERED

## 2022-03-06 PROCEDURE — 87075 CULTR BACTERIA EXCEPT BLOOD: CPT

## 2022-03-06 PROCEDURE — 87205 SMEAR GRAM STAIN: CPT

## 2022-03-06 PROCEDURE — 27301 DRAIN THIGH/KNEE LESION: CPT | Performed by: ORTHOPAEDIC SURGERY

## 2022-03-06 PROCEDURE — 3600000003 HC SURGERY LEVEL 3 BASE: Performed by: ORTHOPAEDIC SURGERY

## 2022-03-06 PROCEDURE — 85027 COMPLETE CBC AUTOMATED: CPT

## 2022-03-06 PROCEDURE — 87116 MYCOBACTERIA CULTURE: CPT

## 2022-03-06 PROCEDURE — 1200000000 HC SEMI PRIVATE

## 2022-03-06 PROCEDURE — 87102 FUNGUS ISOLATION CULTURE: CPT

## 2022-03-06 PROCEDURE — 2580000003 HC RX 258: Performed by: INTERNAL MEDICINE

## 2022-03-06 PROCEDURE — 36415 COLL VENOUS BLD VENIPUNCTURE: CPT

## 2022-03-06 PROCEDURE — 80053 COMPREHEN METABOLIC PANEL: CPT

## 2022-03-06 PROCEDURE — 6370000000 HC RX 637 (ALT 250 FOR IP): Performed by: PHYSICAL THERAPY ASSISTANT

## 2022-03-06 PROCEDURE — 6370000000 HC RX 637 (ALT 250 FOR IP): Performed by: NURSE PRACTITIONER

## 2022-03-06 PROCEDURE — 87186 SC STD MICRODIL/AGAR DIL: CPT

## 2022-03-06 PROCEDURE — 87070 CULTURE OTHR SPECIMN AEROBIC: CPT

## 2022-03-06 PROCEDURE — 87015 SPECIMEN INFECT AGNT CONCNTJ: CPT

## 2022-03-06 PROCEDURE — 2500000003 HC RX 250 WO HCPCS: Performed by: NURSE ANESTHETIST, CERTIFIED REGISTERED

## 2022-03-06 PROCEDURE — 3700000001 HC ADD 15 MINUTES (ANESTHESIA): Performed by: ORTHOPAEDIC SURGERY

## 2022-03-06 PROCEDURE — 2700000000 HC OXYGEN THERAPY PER DAY

## 2022-03-06 PROCEDURE — 87147 CULTURE TYPE IMMUNOLOGIC: CPT

## 2022-03-06 PROCEDURE — 0J9L0ZZ DRAINAGE OF RIGHT UPPER LEG SUBCUTANEOUS TISSUE AND FASCIA, OPEN APPROACH: ICD-10-PCS | Performed by: ORTHOPAEDIC SURGERY

## 2022-03-06 RX ORDER — HYDROXYZINE PAMOATE 25 MG/1
50 CAPSULE ORAL EVERY 8 HOURS PRN
Status: DISCONTINUED | OUTPATIENT
Start: 2022-03-06 | End: 2022-03-11 | Stop reason: HOSPADM

## 2022-03-06 RX ORDER — TRAMADOL HYDROCHLORIDE 50 MG/1
50 TABLET ORAL PRN
Status: DISPENSED | OUTPATIENT
Start: 2022-03-06 | End: 2022-03-09

## 2022-03-06 RX ORDER — PROMETHAZINE HYDROCHLORIDE 25 MG/1
25 TABLET ORAL EVERY 6 HOURS PRN
Status: DISCONTINUED | OUTPATIENT
Start: 2022-03-06 | End: 2022-03-11 | Stop reason: HOSPADM

## 2022-03-06 RX ORDER — ROCURONIUM BROMIDE 10 MG/ML
INJECTION, SOLUTION INTRAVENOUS PRN
Status: DISCONTINUED | OUTPATIENT
Start: 2022-03-06 | End: 2022-03-06 | Stop reason: SDUPTHER

## 2022-03-06 RX ORDER — PROPOFOL 10 MG/ML
INJECTION, EMULSION INTRAVENOUS PRN
Status: DISCONTINUED | OUTPATIENT
Start: 2022-03-06 | End: 2022-03-06 | Stop reason: SDUPTHER

## 2022-03-06 RX ORDER — SODIUM CHLORIDE 9 MG/ML
25 INJECTION, SOLUTION INTRAVENOUS PRN
Status: DISCONTINUED | OUTPATIENT
Start: 2022-03-06 | End: 2022-03-06 | Stop reason: HOSPADM

## 2022-03-06 RX ORDER — ONDANSETRON 2 MG/ML
INJECTION INTRAMUSCULAR; INTRAVENOUS PRN
Status: DISCONTINUED | OUTPATIENT
Start: 2022-03-06 | End: 2022-03-06 | Stop reason: SDUPTHER

## 2022-03-06 RX ORDER — SODIUM CHLORIDE 0.9 % (FLUSH) 0.9 %
5-40 SYRINGE (ML) INJECTION EVERY 12 HOURS SCHEDULED
Status: DISCONTINUED | OUTPATIENT
Start: 2022-03-06 | End: 2022-03-06 | Stop reason: HOSPADM

## 2022-03-06 RX ORDER — DICYCLOMINE HYDROCHLORIDE 10 MG/1
20 CAPSULE ORAL EVERY 6 HOURS PRN
Status: DISCONTINUED | OUTPATIENT
Start: 2022-03-06 | End: 2022-03-11 | Stop reason: HOSPADM

## 2022-03-06 RX ORDER — OXYCODONE HYDROCHLORIDE AND ACETAMINOPHEN 5; 325 MG/1; MG/1
1 TABLET ORAL EVERY 6 HOURS PRN
Qty: 12 TABLET | Refills: 0 | Status: SHIPPED | OUTPATIENT
Start: 2022-03-06 | End: 2022-03-11 | Stop reason: HOSPADM

## 2022-03-06 RX ORDER — LIDOCAINE HYDROCHLORIDE 20 MG/ML
INJECTION, SOLUTION INTRAVENOUS PRN
Status: DISCONTINUED | OUTPATIENT
Start: 2022-03-06 | End: 2022-03-06 | Stop reason: SDUPTHER

## 2022-03-06 RX ORDER — DEXAMETHASONE SODIUM PHOSPHATE 10 MG/ML
INJECTION INTRAMUSCULAR; INTRAVENOUS PRN
Status: DISCONTINUED | OUTPATIENT
Start: 2022-03-06 | End: 2022-03-06 | Stop reason: SDUPTHER

## 2022-03-06 RX ORDER — DIPHENHYDRAMINE HCL 25 MG
25 TABLET ORAL NIGHTLY PRN
Status: DISCONTINUED | OUTPATIENT
Start: 2022-03-06 | End: 2022-03-11 | Stop reason: HOSPADM

## 2022-03-06 RX ORDER — MORPHINE SULFATE 2 MG/ML
1 INJECTION, SOLUTION INTRAMUSCULAR; INTRAVENOUS EVERY 5 MIN PRN
Status: DISCONTINUED | OUTPATIENT
Start: 2022-03-06 | End: 2022-03-06 | Stop reason: HOSPADM

## 2022-03-06 RX ORDER — MIDAZOLAM HYDROCHLORIDE 1 MG/ML
INJECTION INTRAMUSCULAR; INTRAVENOUS PRN
Status: DISCONTINUED | OUTPATIENT
Start: 2022-03-06 | End: 2022-03-06 | Stop reason: SDUPTHER

## 2022-03-06 RX ORDER — MEPERIDINE HYDROCHLORIDE 25 MG/ML
12.5 INJECTION INTRAMUSCULAR; INTRAVENOUS; SUBCUTANEOUS EVERY 5 MIN PRN
Status: DISCONTINUED | OUTPATIENT
Start: 2022-03-06 | End: 2022-03-06 | Stop reason: HOSPADM

## 2022-03-06 RX ORDER — MORPHINE SULFATE 2 MG/ML
2 INJECTION, SOLUTION INTRAMUSCULAR; INTRAVENOUS EVERY 5 MIN PRN
Status: DISCONTINUED | OUTPATIENT
Start: 2022-03-06 | End: 2022-03-06 | Stop reason: HOSPADM

## 2022-03-06 RX ORDER — FENTANYL CITRATE 50 UG/ML
INJECTION, SOLUTION INTRAMUSCULAR; INTRAVENOUS PRN
Status: DISCONTINUED | OUTPATIENT
Start: 2022-03-06 | End: 2022-03-06 | Stop reason: SDUPTHER

## 2022-03-06 RX ORDER — SODIUM CHLORIDE, SODIUM LACTATE, POTASSIUM CHLORIDE, CALCIUM CHLORIDE 600; 310; 30; 20 MG/100ML; MG/100ML; MG/100ML; MG/100ML
INJECTION, SOLUTION INTRAVENOUS CONTINUOUS PRN
Status: DISCONTINUED | OUTPATIENT
Start: 2022-03-06 | End: 2022-03-06 | Stop reason: SDUPTHER

## 2022-03-06 RX ORDER — SODIUM CHLORIDE 0.9 % (FLUSH) 0.9 %
5-40 SYRINGE (ML) INJECTION PRN
Status: DISCONTINUED | OUTPATIENT
Start: 2022-03-06 | End: 2022-03-06 | Stop reason: HOSPADM

## 2022-03-06 RX ORDER — CLONIDINE HYDROCHLORIDE 0.1 MG/1
0.1 TABLET ORAL PRN
Status: DISCONTINUED | OUTPATIENT
Start: 2022-03-06 | End: 2022-03-11 | Stop reason: HOSPADM

## 2022-03-06 RX ORDER — LANOLIN ALCOHOL/MO/W.PET/CERES
3 CREAM (GRAM) TOPICAL NIGHTLY
Status: DISCONTINUED | OUTPATIENT
Start: 2022-03-06 | End: 2022-03-11 | Stop reason: HOSPADM

## 2022-03-06 RX ORDER — HYDROMORPHONE HCL 110MG/55ML
PATIENT CONTROLLED ANALGESIA SYRINGE INTRAVENOUS PRN
Status: DISCONTINUED | OUTPATIENT
Start: 2022-03-06 | End: 2022-03-06 | Stop reason: SDUPTHER

## 2022-03-06 RX ADMIN — ACETAMINOPHEN 650 MG: 325 TABLET ORAL at 20:06

## 2022-03-06 RX ADMIN — PROPOFOL 200 MG: 10 INJECTION, EMULSION INTRAVENOUS at 09:18

## 2022-03-06 RX ADMIN — MIDAZOLAM 2 MG: 1 INJECTION INTRAMUSCULAR; INTRAVENOUS at 09:18

## 2022-03-06 RX ADMIN — ROCURONIUM BROMIDE 10 MG: 10 INJECTION, SOLUTION INTRAVENOUS at 09:18

## 2022-03-06 RX ADMIN — DEXAMETHASONE SODIUM PHOSPHATE 10 MG: 10 INJECTION INTRAMUSCULAR; INTRAVENOUS at 09:30

## 2022-03-06 RX ADMIN — VANCOMYCIN HYDROCHLORIDE 1000 MG: 1 INJECTION, POWDER, LYOPHILIZED, FOR SOLUTION INTRAVENOUS at 14:05

## 2022-03-06 RX ADMIN — ONDANSETRON HYDROCHLORIDE 4 MG: 2 SOLUTION INTRAMUSCULAR; INTRAVENOUS at 09:30

## 2022-03-06 RX ADMIN — FENTANYL CITRATE 100 MCG: 50 INJECTION, SOLUTION INTRAMUSCULAR; INTRAVENOUS at 09:18

## 2022-03-06 RX ADMIN — LIDOCAINE HYDROCHLORIDE 100 MG: 20 INJECTION, SOLUTION INTRAVENOUS at 09:18

## 2022-03-06 RX ADMIN — HYDROMORPHONE HYDROCHLORIDE 2 MG: 2 INJECTION, SOLUTION INTRAMUSCULAR; INTRAVENOUS; SUBCUTANEOUS at 09:35

## 2022-03-06 RX ADMIN — SODIUM CHLORIDE: 9 INJECTION, SOLUTION INTRAVENOUS at 10:44

## 2022-03-06 RX ADMIN — PIPERACILLIN AND TAZOBACTAM 3375 MG: 3; .375 INJECTION, POWDER, LYOPHILIZED, FOR SOLUTION INTRAVENOUS at 17:42

## 2022-03-06 RX ADMIN — TRAMADOL HYDROCHLORIDE 50 MG: 50 TABLET, COATED ORAL at 17:41

## 2022-03-06 RX ADMIN — FENTANYL CITRATE 50 MCG: 50 INJECTION, SOLUTION INTRAMUSCULAR; INTRAVENOUS at 09:30

## 2022-03-06 RX ADMIN — PIPERACILLIN AND TAZOBACTAM 3375 MG: 3; .375 INJECTION, POWDER, LYOPHILIZED, FOR SOLUTION INTRAVENOUS at 02:11

## 2022-03-06 RX ADMIN — VANCOMYCIN HYDROCHLORIDE 1000 MG: 1 INJECTION, POWDER, LYOPHILIZED, FOR SOLUTION INTRAVENOUS at 20:06

## 2022-03-06 RX ADMIN — SODIUM CHLORIDE, POTASSIUM CHLORIDE, SODIUM LACTATE AND CALCIUM CHLORIDE: 600; 310; 30; 20 INJECTION, SOLUTION INTRAVENOUS at 09:12

## 2022-03-06 RX ADMIN — FENTANYL CITRATE 100 MCG: 50 INJECTION, SOLUTION INTRAMUSCULAR; INTRAVENOUS at 09:25

## 2022-03-06 RX ADMIN — PIPERACILLIN AND TAZOBACTAM 3375 MG: 3; .375 INJECTION, POWDER, LYOPHILIZED, FOR SOLUTION INTRAVENOUS at 10:51

## 2022-03-06 ASSESSMENT — PULMONARY FUNCTION TESTS
PIF_VALUE: 6
PIF_VALUE: 7
PIF_VALUE: 1
PIF_VALUE: 6
PIF_VALUE: 3
PIF_VALUE: 2
PIF_VALUE: 5
PIF_VALUE: 2
PIF_VALUE: 6
PIF_VALUE: 0
PIF_VALUE: 5
PIF_VALUE: 5
PIF_VALUE: 7
PIF_VALUE: 6
PIF_VALUE: 6
PIF_VALUE: 0
PIF_VALUE: 6
PIF_VALUE: 9
PIF_VALUE: 1
PIF_VALUE: 5
PIF_VALUE: 2
PIF_VALUE: 7
PIF_VALUE: 2
PIF_VALUE: 6
PIF_VALUE: 2
PIF_VALUE: 6
PIF_VALUE: 3
PIF_VALUE: 1
PIF_VALUE: 3
PIF_VALUE: 5
PIF_VALUE: 2
PIF_VALUE: 4
PIF_VALUE: 1
PIF_VALUE: 7
PIF_VALUE: 5
PIF_VALUE: 2
PIF_VALUE: 3
PIF_VALUE: 7
PIF_VALUE: 1
PIF_VALUE: 0
PIF_VALUE: 2
PIF_VALUE: 0
PIF_VALUE: 2
PIF_VALUE: 0
PIF_VALUE: 6
PIF_VALUE: 5
PIF_VALUE: 6
PIF_VALUE: 5
PIF_VALUE: 3
PIF_VALUE: 6
PIF_VALUE: 5

## 2022-03-06 ASSESSMENT — PAIN DESCRIPTION - PAIN TYPE: TYPE: SURGICAL PAIN

## 2022-03-06 ASSESSMENT — PAIN SCALES - GENERAL
PAINLEVEL_OUTOF10: 0
PAINLEVEL_OUTOF10: 0
PAINLEVEL_OUTOF10: 4
PAINLEVEL_OUTOF10: 6
PAINLEVEL_OUTOF10: 9
PAINLEVEL_OUTOF10: 5
PAINLEVEL_OUTOF10: 0
PAINLEVEL_OUTOF10: 7

## 2022-03-06 ASSESSMENT — PAIN DESCRIPTION - DESCRIPTORS: DESCRIPTORS: ACHING

## 2022-03-06 ASSESSMENT — PAIN DESCRIPTION - ORIENTATION: ORIENTATION: RIGHT

## 2022-03-06 ASSESSMENT — PAIN DESCRIPTION - LOCATION: LOCATION: OTHER (COMMENT)

## 2022-03-06 ASSESSMENT — PAIN DESCRIPTION - FREQUENCY: FREQUENCY: CONTINUOUS

## 2022-03-06 ASSESSMENT — PAIN DESCRIPTION - ONSET: ONSET: ON-GOING

## 2022-03-06 ASSESSMENT — LIFESTYLE VARIABLES: SMOKING_STATUS: 1

## 2022-03-06 NOTE — OP NOTE
Operative Note      Patient: Jerrell Tallulah  YOB: 1980  MRN: [id number]    Date of Procedure: 3/6/2022    Pre-Op Diagnosis: Intramuscular abscess right thigh    Post-Op Diagnosis: Same       Procedure(s):  IRRIGATION AND DEBRIDEMENT RIGHT THIGH    Surgeon(s):  Nan Cain MD    Assistant:   Resident: Efraín Granados DO; Keshawn Gabriel DO    Anesthesia: General    Estimated Blood Loss (mL): less than 50     Complications: None    Specimens:   ID Type Source Tests Collected by Time Destination   1 : RIGHT THIGH ABSCESS Specimen Leg CULTURE, ANAEROBIC, CULTURE, FUNGUS, GRAM STAIN, CULTURE, SURGICAL, CULTURE WITH SMEAR, ACID FAST Ramos Pepper MD 3/6/2022 1823        Implants:  * No implants in log *      Drains:   Closed/Suction Drain Right Abdomen;RUQ Bulb (Active)       Negative Pressure Wound Therapy Arm Left;Upper (Active)       Operative indications: This is a 28-year-old male with history of IV drug abuse who presented with right thigh pain which is worsened over the past days to weeks. He underwent advanced imaging including CT scan showing multiloculated abscess within the vastus lateralis musculature. Given this finding he was indicated for incision and drainage. Risks were discussed in detail including not limited to persistent infection, need for other procedures, neurovascular injury, DVT/pulmonary embolus, unforeseen risks. He understands would like to proceed    Detailed Description of Procedure: The patient was brought to the operating room at [facility].  He was identified outside the OR site was marked consent form was signed. He was brought into the room and placed supine on the operative table. He underwent general endotracheal anesthesia. All bony prominences were well-padded. The right leg was then prepped and draped in sterile fashion. A 5 cm incision was drawn out over the lateral border of the thigh.   Incision was then made and dissection was carried down to the IT band. This was incised in line with the incision. With minimal probing into the musculature copious amounts of purulent material was expressed. This was collected and sent off for culture. The extent of the abscess cavity was digitally palpated and felt to be fully evacuated. We then copiously irrigated with 3 L of lactated Ringer solution. After this, the wound was fairly clean, no evidence of any remaining purulent material.  At this point, we closed the wound in layered fashion. Sterile dressing was placed. The patient was awoken from anesthesia, transferred back to his hospital bed and taken to the recovery room in stable condition. He tolerated the procedure well    Postoperative plan: We will continue antibiotics with guidance of the infectious disease service. We will monitor for resolution of symptoms. He will require suture removal in 10 to 14 days. He can be weightbearing as tolerated on the right lower extremity.     Electronically signed by Jonah Hartmann MD on 3/6/2022 at 9:46 AM

## 2022-03-06 NOTE — ANESTHESIA PRE PROCEDURE
Department of Anesthesiology  Preprocedure Note       Name:  Jamia Joseph   Age:  40 y.o.  :  1980                                          MRN:  [id number]         Date:  3/6/2022      Surgeon: Jass Cisse):  Ashish Rayo MD    Procedure: Procedure(s):  IRRIGATION AND DEBRIDEMENT RIGHT THIGH    Medications prior to admission:   Prior to Admission medications    Medication Sig Start Date End Date Taking?  Authorizing Provider   Buprenorphine HCl-Naloxone HCl (SUBOXONE SL) Place under the tongue   Yes Historical Provider, MD       Current medications:    Current Facility-Administered Medications   Medication Dose Route Frequency Provider Last Rate Last Admin    0.9 % sodium chloride infusion   IntraVENous Continuous Edu Stevens MD 75 mL/hr at 22 0316 New Bag at 22 0316    sodium chloride flush 0.9 % injection 10 mL  10 mL IntraVENous 2 times per day Edu Stevens MD        sodium chloride flush 0.9 % injection 10 mL  10 mL IntraVENous PRN Edu Stevens MD        0.9 % sodium chloride infusion  25 mL IntraVENous PRN Edu Stevens MD        ondansetron (ZOFRAN-ODT) disintegrating tablet 4 mg  4 mg Oral Q8H PRN Edu Stevens MD        Or    [facility]) [facility] 4 mg  4 mg IntraVENous Q6H PRN Edu Stevens MD        magnesium hydroxide (MILK OF MAGNESIA) 400 MG/5ML suspension 30 mL  30 mL Oral Daily PRN Edu Stevens MD        acetaminophen (TYLENOL) tablet 650 mg  650 mg Oral Q6H PRN Edu Stevens MD   650 mg at 22    Or    acetaminophen (TYLENOL) suppository 650 mg  650 mg Rectal Q6H PRN Edu Stevens MD        ibuprofen (ADVIL;MOTRIN) tablet 400 mg  400 mg Oral Q6H PRN Edu Stevens MD        piperacillin-tazobactam (ZOSYN) 3,375 mg in dextrose 5 % 100 mL IVPB extended infusion (mini-bag)  3,375 mg IntraVENous Q8H Edu Stevens MD   Stopped at 22 0430    vancomycin 1000 mg IVPB in 250 mL D5W addavial  1,000 mg IntraVENous Q8H Katie Wheeler MD   Stopped at 03/06/22 0212    glucose (GLUTOSE) 40 % oral gel 15 g  15 g Oral PRN Masood Lobo, APRN - NP        dextrose 50 % IV solution  12.5 g IntraVENous PRN Masood Pavond, APRN - NP        glucagon (rDNA) injection 1 mg  1 mg IntraMUSCular PRN Olegarioolaus Humchastityd, APRN - NP        dextrose 5 % solution  100 mL/hr IntraVENous PRN Olegarioolaus Pavond, APRN - NP        ceFAZolin (ANCEF) 2000 mg in sterile water 20 mL IV syringe  2,000 mg IntraVENous See Admin Instructions Robert Melendrez DO           Allergies:  No Known Allergies    Problem List:    Patient Active Problem List   Diagnosis Code    Right arm cellulitis L03. 113    IV drug abuse ([facility] F19.10    Superficial foreign body of right upper arm S40.851A    Cellulitis of right arm L03. 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 8XXA    Abdominal wall abscess L02. 80    Tobacco dependence F17.200    Abscess of chest wall L02.213    Osteomyelitis (HCC) M86.9    Hx of hepatitis Cresolved Z86.19    Sinus tachycardia R00.0       Past Medical History:        Diagnosis Date    Hx of hepatitis Cresolved 3/5/2022    IV drug abuse ([facility]     Nonhealing nonsurgical wound with fat layer exposed        Past Surgical History:        Procedure Laterality Date    APPLY DRESSING Left 10/9/2020    DEBRIDEMENT OF SKIN, SOFT TISSUE, MUSCLE, EXPLORATION OF UPPER ARM INTEGRA GRAFT FOR COVERAGE performed by Nadege Mendoza MD at [place] [address] Left 10/14/2020    LEFT UPPER EXTREMITY WOUND VAC PLACEMENT performed by Dk Vinson MD at [address] [place] [address] Right 4/23/2020    RIGHT UPPER EXTREMITY INCISION AND DRAINAGE REMOVAL FOREIGN BODY WITH C-ARM performed by Ange Kunz MD at [address] Left 10/7/2020    LEFT ELBOW INCISION AND DRAINAGE performed by Ange Kunz MD at [address] OF ARTERY/VEIN Left 10/7/2020    LEFT ARM BRACHIAL ARTERY EXPLORATION, REPAIR OF MYCOTIC ANUERYSM WITH BYPASS performed by Kristen Mendez MD at [facility] TRANSESOPHAGEAL ECHOCARDIOGRAM N/A 4/24/2020    TRANSESOPHAGEAL ECHOCARDIOGRAM performed by Elzbieta Banks MD at [address] [place] [address] TRANSESOPHAGEAL ECHOCARDIOGRAM  10/14/2020       Social History:    Social History     Tobacco Use    Smoking status: Current Every Day Smoker     Packs/day: 1.00     Types: Cigarettes    Smokeless tobacco: Current User     Types: Chew   Substance Use Topics    Alcohol use: Not Currently                                Ready to quit: Not Answered  Counseling given: Not Answered      Vital Signs (Current):   Vitals:    03/05/22 0824 03/05/22 1530 03/06/22 0000 03/06/22 0800   BP: 135/81 138/83 128/81 132/84   Pulse: 90 89 101 90   Resp: 18 16 16 18   Temp: 36.4 °C (97.5 °F) 36.7 °C (98.1 °F) 37.5 °C (99.5 °F) 37.2 °C (98.9 °F)   TempSrc: Temporal Temporal Temporal Temporal   SpO2: 97% 100%  99%   Weight:       Height:                                                  BP Readings from Last 3 Encounters:   03/06/22 132/84   10/05/21 128/77   11/18/20 100/60       NPO Status: Time of last liquid consumption: 0000NPO > 8 hours. Date of last liquid consumption: 03/05/22                        Date of last solid food consumption: 03/05/22    BMI:   Wt Readings from Last 3 Encounters:   03/05/22 200 lb (90.7 kg)   10/03/21 176 lb 5.9 oz (80 kg)   10/28/20 201 lb (91.2 kg)     Body mass index is 24.34 kg/m².     CBC:   Lab Results   Component Value Date    WBC 9.9 03/06/2022    RBC 4.23 03/06/2022    HGB 10.7 03/06/2022    HCT 33.7 03/06/2022    MCV 79.7 03/06/2022 RDW 15.3 03/06/2022     03/06/2022       CMP:   Lab Results   Component Value Date     03/06/2022    K 4.0 03/06/2022    K 4.2 10/04/2021     03/06/2022    CO2 26 03/06/2022    BUN 12 03/06/2022    CREATININE 0.6 03/06/2022    GFRAA >60 03/06/2022    LABGLOM >60 03/06/2022    GLUCOSE 106 03/06/2022    PROT 6.4 03/06/2022    CALCIUM 8.9 03/06/2022    BILITOT 0.3 03/06/2022    ALKPHOS 133 03/06/2022    AST 47 03/06/2022    ALT 64 03/06/2022       POC Tests: No results for input(s): POCGLU, POCNA, POCK, POCCL, POCBUN, POCHEMO, POCHCT in the last 72 hours. Coags:   Lab Results   Component Value Date    PROTIME 12.8 10/04/2021    INR 1.1 10/04/2021    APTT 30.3 04/21/2020       HCG (If Applicable): No results found for: PREGTESTUR, PREGSERUM, HCG, HCGQUANT     ABGs: No results found for: PHART, PO2ART, DQS1RMM, VHQ2COO, BEART, N6QSWYHT     Type & Screen (If Applicable):  No results found for: LABABO, LABRH    Drug/Infectious Status (If Applicable):  No results found for: HIV, HEPCAB    COVID-19 Screening (If Applicable): No results found for: COVID19      CT SOFT TISSUE NECK WO CONTRAST - 4/23/20  FINDINGS:    PHARYNX/LARYNX:  No focal abnormality of the nasopharynx is noted. Jose Zachery is    normal in caliber.  Epiglottis is normal in caliber.  Vocal cords are midline.         SALIVARY GLANDS/THYROID:  Noncontrast limited evaluation of the thyroid    gland, submandibular glands and parotid glands demonstrate no focal    abnormality.         LYMPH NODES:  Small cervical nodes are present bilaterally         SOFT TISSUES:  No soft tissue mass or fluid collection is noted.     Prevertebral soft tissues are normal in thickness.         BRAIN/ORBITS/SINUSES:  Limited intracranial evaluation demonstrates no large    area of abnormal density.  There is no midline shift.         LUNG APICES/SUPERIOR MEDIASTINUM:  Limited evaluation of the upper chest    demonstrates a punctate nodular density in the posterior aspect of the right    lung on image 15.  Punctate nodular density in the posterior right lung is    noted on image 10.  Faint ground-glass density in the right upper lung zone    is noted.  This is indeterminate may be artifactual.  Subtle bronchiolitis or    pneumonitis would be difficult to exclude.  There is no consolidation to    suggest pneumonia         BONES:  No destructive lesions are noted.  There is elongation of the sella    hyoid ligament bilaterally, slightly larger on the right.         Canal detail is limited.  No disc herniation is noted              Impression    Elongation of the stylohyoid ligaments bilaterally, right greater than left    and right larger than left.  This is a potentially symptomatic variant         No acute abnormality otherwise            EKG 12 Lead - 4/21/20  Component  Value  Ref Range & Units  Status  Collected  Lab    Ventricular Rate  105  BPM  Final  04/21/2020  5:09 PM  [facility]    Atrial Rate  105  BPM  Final  04/21/2020  5:09 PM  [facility]    P-R Interval  116  ms  Final  04/21/2020  5:09 PM  [facility]    QRS Duration  96  ms  Final  04/21/2020  5:09 PM  [facility]    Q-T Interval  334  ms  Final  04/21/2020  5:09 PM  [facility]    QTc Calculation (Bazett)  441  ms  Final  04/21/2020  5:09 PM  [facility]    P Axis  56  degrees  Final  04/21/2020  5:09 PM  [facility]    R Axis  75  degrees  Final  04/21/2020  5:09 PM  [facility]    T Axis  47  degrees  Final  04/21/2020  5:09 PM  [facility]      Narrative & Impression     Sinus tachycardia  Otherwise normal ECG  No previous ECGs available  Confirmed by Andrei Alanis ([id number]) on 4/21/2020 8:03:45 PM         ECHO Transesophageal - 4/24/20   Findings      Left Ventricle   Left ventricular ejection fraction is grossly normal.      Right Ventricle   Normal right ventricular function. Left Atrium   No evidence of thrombus within left atrium or appendage. Mitral Valve   Structurally normal mitral valve.    Physiologic and/or trace mitral regurgitation is present. No vegetation noted on mitral valve. Tricuspid Valve   The tricuspid valve appears structurally normal.   Physiologic and/or trace tricuspid regurgitation. No tricuspid valve vegetation or masses visualized. Aortic Valve   The aortic valve appears mildly sclerotic. The aortic valve is trileaflet. No evidence of aortic valve regurgitation. No evidence of mass or   vegetation noted on the aortic valve. Pulmonic Valve   The pulmonic valve was not well visualized. Pericardial Effusion   No pericardial effusion. Aorta   Aortic root within normal limits. Conclusions      Summary   No comparison study available. Physiologic and/or trace mitral regurgitation is present. No vegetation noted on mitral valve. No evidence of mass or vegetation noted on the aortic valve. Physiologic and/or trace tricuspid regurgitation. Signature      ----------------------------------------------------------------   Electronically signed by Preston Cantrell MD(Interpreting   physician) on 04/24/2020 07:30 PM   ----------------------------------------------------------------      Anesthesia Evaluation  Patient summary reviewed and Nursing notes reviewed no history of anesthetic complications:   Airway: Mallampati: II  TM distance: >3 FB   Neck ROM: full  Mouth opening: > = 3 FB Dental: normal exam         Pulmonary: breath sounds clear to auscultation  (+) current smoker          Patient did not smoke on day of surgery. ROS comment: Patient on 2L nasal cannula at time of evaluation, denied any SOB, \"not sure why they put it on me. Probably just for comfort. \"   Cardiovascular:Negative CV ROS          ECG reviewed  Rhythm: regular  Rate: normal  Echocardiogram reviewed                  Neuro/Psych:   (+) psychiatric history (History of IVDA):            GI/Hepatic/Renal: Neg GI/Hepatic/Renal ROS  (+) hepatitis: C, liver disease:, Endo/Other:    (+) blood dyscrasia: anemia:., electrolyte abnormalities, . Pt had no PAT visit        ROS comment: C/o sore throat, denies dysphagia, no lymphedema present (recent intubation). 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 Petrona Agrawal 10/7/20. Other Findings:               Anesthesia Plan      general     ASA 2     (PIV R upper arm    General with LMA)  Induction: intravenous. MIPS: Postoperative opioids intended and Prophylactic antiemetics administered. Anesthetic plan and risks discussed with patient. Use of blood products discussed with patient whom consented to blood products. Plan discussed with attending. Kyle Hopkins, CESAR - CRNA   3/6/2022        Pt seen, examined, chart reviewed, plan discussed.   Rick Moran MD  3/6/2022  10:12 AM

## 2022-03-06 NOTE — PROGRESS NOTES
Department of Internal Medicine  Infectious Diseases   Progress  Note      C/C :  Right thigh abscess     Denies fever or chills  Reports right thigh pain   Afebrile       Current Facility-Administered Medications   Medication Dose Route Frequency Provider Last Rate Last Admin    0.9 % sodium chloride infusion   IntraVENous Continuous Alice Peterson MD 75 mL/hr at 03/05/22 0316 New Bag at 03/05/22 0316    sodium chloride flush 0.9 % injection 10 mL  10 mL IntraVENous 2 times per day Alice Peterson MD        sodium chloride flush 0.9 % injection 10 mL  10 mL IntraVENous PRN Alice Peterson MD        0.9 % sodium chloride infusion  25 mL IntraVENous PRN Alice Peterson MD        ondansetron (ZOFRAN-ODT) disintegrating tablet 4 mg  4 mg Oral Q8H PRN Alice Peterson MD        Or    ondansetron [facility]) injection 4 mg  4 mg IntraVENous Q6H PRN Alice Peterson MD        magnesium hydroxide (MILK OF MAGNESIA) 400 MG/5ML suspension 30 mL  30 mL Oral Daily PRN Alice Peterson MD        acetaminophen (TYLENOL) tablet 650 mg  650 mg Oral Q6H PRN Alice Peterson MD   650 mg at 03/05/22 2005    Or    acetaminophen (TYLENOL) suppository 650 mg  650 mg Rectal Q6H PRN Alice Peterson MD        ibuprofen (ADVIL;MOTRIN) tablet 400 mg  400 mg Oral Q6H PRN Alice Peterson MD        piperacillin-tazobactam (ZOSYN) 3,375 mg in dextrose 5 % 100 mL IVPB extended infusion (mini-bag)  3,375 mg IntraVENous Q8H Alice Peterson MD   Stopped at 03/06/22 0430    vancomycin 1000 mg IVPB in 250 mL D5W addavial  1,000 mg IntraVENous Q8H Alice Peterson MD   Stopped at 03/06/22 0212    glucose (GLUTOSE) 40 % oral gel 15 g  15 g Oral PRN Yoana Kang APRN - NP        dextrose 50 % IV solution  12.5 g IntraVENous PRN CESAR Zaragoza - NP        glucagon (rDNA) injection 1 mg  1 mg IntraMUSCular PRN Carmen Potter APRN - NP        dextrose 5 % solution  100 mL/hr IntraVENous PRN Governor CESAR Gimenez NP        ceFAZolin (ANCEF) 2000 mg in sterile water 20 mL IV syringe  2,000 mg IntraVENous See Admin Instructions Marcus Barragan DO         Facility-Administered Medications Ordered in Other Encounters   Medication Dose Route Frequency Provider Last Rate Last Admin    lactated ringers infusion   IntraVENous Continuous PRN Ragena Mazzoni, APRN - CRNA   New Bag at 03/06/22 0912    dexamethasone (DECADRON) injection   IntraVENous PRN Ragena Mazzoni, APRN - CRNA   10 mg at 03/06/22 0930    ondansetron (ZOFRAN) injection   IntraVENous PRN Ragena Mazzoni, APRN - CRNA   4 mg at 03/06/22 0930    rocuronium (ZEMURON) injection   IntraVENous PRN Ragena Mazzoni, APRN - CRNA   10 mg at 03/06/22 9564    lidocaine (cardiac) (XYLOCAINE) injection   IntraVENous PRN Ragena Mazzoni, APRN - CRNA   100 mg at 03/06/22 3602    midazolam (VERSED) injection   IntraVENous PRN Ragena Mazzoni, APRN - CRNA   2 mg at 03/06/22 9400    fentaNYL (SUBLIMAZE) injection   IntraVENous PRN Ragena Mazzoni, APRN - CRNA   50 mcg at 03/06/22 0930    propofol injection   IntraVENous PRN Ragena Mazzoni, APRN - CRNA   200 mg at 03/06/22 6403    HYDROmorphone (DILAUDID) injection   IntraVENous PRN Ragena Mazzoni, APRN - CRNA   2 mg at 03/06/22 0935         REVIEW OF SYSTEMS:    CONSTITUTIONAL:  Denies fever, chill or rigors, nausea or vomiting. HEENT: denies blurring of vision or double vision, denies hearing problem  RESPIRATORY: denies cough, shortness of breath, sputum expectoration,. CARDIOVASCULAR:  Denies palpitation  GASTROINTESTINAL:  Denies abdomen pain, diarrhea or constipation. GENITOURINARY:  Denies burning urination or frequency of urination  INTEGUMENT: Redness   HEMATOLOGIC/LYMPHATIC:  Denies lymph node swelling, gum bleeding or easy bruising.   MUSCULOSKELETAL:  Right leg, thigh pain,swelling, redness   NEUROLOGICAL:  Denies light headed, dizziness, loss of consciousness, weakness of lower extremities, bowel or bladder incontinence. PHYSICAL EXAM:      Vitals:     Vitals:    03/06/22 0800   BP: 132/84   Pulse: 90   Resp: 18   Temp: 98.9 °F (37.2 °C)   SpO2: 99%       General Appearance:    Awake, alert , no acute distress. Head:    Normocephalic, atraumatic   Eyes:    No pallor, no icterus,   Ears:    No obvious deformity or drainage.    Nose:   No nasal drainage   Throat:   Mucosa moist, no oral thrush   Neck:   Supple, no lymphadenopathy   Back:     no CVA tenderness   Lungs:     Clear to auscultation bilaterally, no wheeze    Heart:    Regular rate and rhythm, no murmur   Abdomen:     Soft, non-tender, bowel sounds present    Extremities:   Right thigh / leg-tender - dressed    Pulses:   Dorsalis pedis palpable    Skin:   no rashes      CBC with Differential:      Lab Results   Component Value Date    WBC 9.9 03/06/2022    RBC 4.23 03/06/2022    HGB 10.7 03/06/2022    HCT 33.7 03/06/2022     03/06/2022    MCV 79.7 03/06/2022    MCH 25.3 03/06/2022    MCHC 31.8 03/06/2022    RDW 15.3 03/06/2022    NRBC 0.9 05/05/2020    LYMPHOPCT 18.4 03/04/2022    MONOPCT 8.7 03/04/2022    MYELOPCT 0.9 05/05/2020    BASOPCT 0.4 03/04/2022    MONOSABS 0.89 03/04/2022    LYMPHSABS 1.87 03/04/2022    EOSABS 0.20 03/04/2022    BASOSABS 0.04 03/04/2022       CMP     Lab Results   Component Value Date     03/06/2022    K 4.0 03/06/2022    K 4.2 10/04/2021     03/06/2022    CO2 26 03/06/2022    BUN 12 03/06/2022    CREATININE 0.6 03/06/2022    GFRAA >60 03/06/2022    LABGLOM >60 03/06/2022    GLUCOSE 106 03/06/2022    PROT 6.4 03/06/2022    LABALBU 2.8 03/06/2022    CALCIUM 8.9 03/06/2022    BILITOT 0.3 03/06/2022    ALKPHOS 133 03/06/2022    AST 47 03/06/2022    ALT 64 03/06/2022         Hepatic Function Panel:    Lab Results   Component Value Date    ALKPHOS 133 03/06/2022    ALT 64 03/06/2022    AST 47 03/06/2022    PROT 6.4 03/06/2022    BILITOT 0.3 03/06/2022    BILIDIR <0.2 10/02/2021    IBILI see below 10/02/2021    LABALBU 2.8 03/06/2022       PT/INR:    Lab Results   Component Value Date    PROTIME 12.8 10/04/2021    INR 1.1 10/04/2021       MICROBIOLOGY:    Blood culture - neg to date       Radiology :     CT scan -    Large irregular collection containing fluid and gas within the right vastus   lateralis and intermedius muscle bellies, compatible with intramuscular   abscess.  No definite radiopaque foreign body. IMPRESSION:     1. Right thigh abscess s/p I & D ( 3/6)   2. IVDU       RECOMMENDATIONS:      1. Vancomycin 1 gram IV q 8 hrs ( pharmacy following )   2. Zosyn 3.375 grams IV q 8 hrs, monitor Cr   3.  Await cx

## 2022-03-06 NOTE — PROGRESS NOTES
Hospitalist Progress Note      Synopsis: Patient admitted for a right lateral thigh abscess. Patient presented to ED with 1 week of worsening leg pain with an associated fever. He is an IV drug user and has had issues with abscesses in the past though he denies injecting intravenous drugs in this area. Imaging revealed a left thigh intramuscular abscess. ID is following and he is being treated with Vanco + zosyn. Orthopedic surgery is following and he is s/p I&D today. Hospital day 1     Subjective:  Stable overnight. No issues reported. Patient seen and examined. Doing well with no complaints. Reports he takes 2 mg of Suboxone daily though he gets it off the streets. Records reviewed. Temp (24hrs), Av.8 °F (37.1 °C), Min:98.1 °F (36.7 °C), Max:99.5 °F (37.5 °C)    DIET: Diet NPO  CODE: Full Code    Intake/Output Summary (Last 24 hours) at 3/6/2022 0909  Last data filed at 3/6/2022 2951  Gross per 24 hour   Intake --   Output 1650 ml   Net -1650 ml       Review of Systems: All bolded are positive; please see HPI  General:  Fever, chills, diaphoresis, fatigue, malaise, night sweats, weight loss  Psychological:  Anxiety, disorientation, hallucinations. ENT:  Epistaxis, headaches, vertigo, visual changes. Cardiovascular:  Chest pain, irregular heartbeats, palpitations, paroxysmal nocturnal dyspnea. Respiratory:  Shortness of breath, coughing, sputum production, hemoptysis, wheezing, orthopnea.   Gastrointestinal:  Nausea, vomiting, diarrhea, heartburn, constipation, abdominal pain, hematemesis, hematochezia, melena, acholic stools  Genito-Urinary:  Dysuria, urgency, frequency, hematuria  Musculoskeletal:  Joint pain, joint stiffness, joint swelling, muscle pain  Neurology:  Headache, focal neurological deficits, weakness, numbness, paresthesia  Derm:  Rashes, ulcers, excoriations, bruising  Extremities:  Decreased ROM, peripheral edema, mottling    Objective:    /84   Pulse 90 Temp 98.9 °F (37.2 °C) (Temporal)   Resp 18   Ht 6' 4\" (1.93 m)   Wt 200 lb (90.7 kg)   SpO2 99%   BMI 24.34 kg/m²     General appearance: Middle aged male in no apparent distress, appears stated age and cooperative. HEENT: Conjunctivae/corneas clear. Mucous membranes moist.  Neck: Supple. No JVD. Respiratory:  Clear to auscultation bilaterally. Normal respiratory effort. Cardiovascular:  RRR. S1, S2 without MRG. PV: Pulses palpable. No edema. Abdomen: Soft, non-tender, non-distended. +BS  Musculoskeletal: No obvious deformities. Skin: Normal skin color. Surgical dressing covering incision on right thigh with no evident edema or erythema. Neurologic:  Grossly non-focal. Awake, alert, following commands. Psychiatric: Alert and oriented, thought content appropriate. Medications:  REVIEWED DAILY    Infusion Medications    sodium chloride 75 mL/hr at 03/05/22 0316    sodium chloride      dextrose       Scheduled Medications    sodium chloride flush  10 mL IntraVENous 2 times per day    piperacillin-tazobactam  3,375 mg IntraVENous Q8H    vancomycin  1,000 mg IntraVENous Q8H    ceFAZolin  2,000 mg IntraVENous See Admin Instructions     PRN Meds: sodium chloride flush, sodium chloride, ondansetron **OR** ondansetron, magnesium hydroxide, acetaminophen **OR** acetaminophen, ibuprofen, glucose, dextrose, glucagon (rDNA), dextrose    Labs:     Recent Labs     03/04/22 2206 03/06/22  0630   WBC 10.2 9.9   HGB 11.3* 10.7*   HCT 35.9* 33.7*    271       Recent Labs     03/04/22 2206 03/06/22  0630    140   K 4.1 4.0    101   CO2 30* 26   BUN 12 12   CREATININE 0.7 0.6*   CALCIUM 9.1 8.9       Recent Labs     03/04/22 2206 03/06/22  0630   PROT 7.0 6.4   ALKPHOS 154* 133*   ALT 52* 64*   AST 60* 47*   BILITOT 0.2 0.3       No results for input(s): INR in the last 72 hours. No results for input(s): Erica Benny in the last 72 hours.     Chronic labs:    Lab Results Component Value Date    INR 1.1 10/04/2021    LABA1C 5.4 10/03/2021       Radiology: REVIEWED DAILY    Assessment:  Abscess, right thigh  IV drug abuse  Elevated LFT  Anemia, chronic, microcytic  Sinus tachycardia   Tobacco dependence    Plan:  Orthopedic surgery following - s/p I&D 3/6  ID following - currently being treated with Vanco + zosyn  Monitor for withdrawal- COWS  Continue IVF  Trend LFT  Check iron panel, B12, and folate    DVT Prophylaxis [] Lovenox  []  Heparin [] DOAC [x] PCDs [] Ambulation    GI Prophylaxis [] PPI  [] H2 Blocker   [] Carafate  [x] Diet/Tube Feeds   Level of care [x] Med/Surg  [] Intermediate  []  ICU   Diet Diet NPO    Family contact [x]  N/A - pt A&O  [] At bedside  [] Phone call   Disposition Patient requires continued admission d/t abscess   MDM [] Low    [] Moderate  [x]   High        Discharge Plan: Home when stable    +++++++++++++++++++++++++++++++++++++++++++++++++  MARK Frances/ [place] [address]  +++++++++++++++++++++++++++++++++++++++++++++++++  NOTE: This report was transcribed using voice recognition software. Every effort was made to ensure accuracy; however, inadvertent computerized transcription errors may be present.

## 2022-03-06 NOTE — ANESTHESIA POSTPROCEDURE EVALUATION
Department of Anesthesiology  Postprocedure Note    Patient: Lan Medrano  MRN: [id number]  YOB: 1980  Date of evaluation: 3/6/2022  Time:  12:12 PM     Procedure Summary     Date: 03/06/22 Room / Location: [facility] 09 / [facility]    Anesthesia Start: 1500 Anesthesia Stop: 1011    Procedure: IRRIGATION AND DEBRIDEMENT RIGHT THIGH (Right Leg Upper) Diagnosis: (infection)    Surgeons: Petar Huerta MD Responsible Provider: Rick Moran MD    Anesthesia Type: general ASA Status: 2          Anesthesia Type: general    Lynne Phase I: Lynne Score: 10    Lynne Phase II:      Last vitals: Reviewed and per EMR flowsheets.        Anesthesia Post Evaluation    Patient location during evaluation: PACU  Patient participation: complete - patient participated  Level of consciousness: awake  Pain score: 3  Airway patency: patent  Nausea & Vomiting: no nausea and no vomiting  Complications: no  Cardiovascular status: blood pressure returned to baseline  Respiratory status: acceptable  Hydration status: euvolemic

## 2022-03-07 ENCOUNTER — APPOINTMENT (OUTPATIENT)
Dept: ULTRASOUND IMAGING | Age: 42
DRG: 558 | End: 2022-03-07
Payer: COMMERCIAL

## 2022-03-07 LAB
ACINETOBACTER CALCOAC BAUMANNII COMPLEX BY PCR: NOT DETECTED
ALBUMIN SERPL-MCNC: 2.6 G/DL (ref 3.5–5.2)
ALP BLD-CCNC: 152 U/L (ref 40–129)
ALT SERPL-CCNC: 121 U/L (ref 0–40)
ANION GAP SERPL CALCULATED.3IONS-SCNC: 8 MMOL/L (ref 7–16)
AST SERPL-CCNC: 100 U/L (ref 0–39)
BACTEROIDES FRAGILIS BY PCR: NOT DETECTED
BILIRUB SERPL-MCNC: <0.2 MG/DL (ref 0–1.2)
BOTTLE TYPE: ABNORMAL
BUN BLDV-MCNC: 12 MG/DL (ref 6–20)
CALCIUM SERPL-MCNC: 9 MG/DL (ref 8.6–10.2)
CANDIDA ALBICANS BY PCR: NOT DETECTED
CANDIDA AURIS BY PCR: NOT DETECTED
CANDIDA GLABRATA BY PCR: NOT DETECTED
CANDIDA KRUSEI BY PCR: NOT DETECTED
CANDIDA PARAPSILOSIS BY PCR: NOT DETECTED
CANDIDA TROPICALIS BY PCR: NOT DETECTED
CARBAPENEM RESISTANCE IMP GENE BY PCR: NOT DETECTED
CARBAPENEM RESISTANCE KPC BY PCR: NOT DETECTED
CARBAPENEM RESISTANCE NDM GENE BY PCR: NOT DETECTED
CARBAPENEM RESISTANCE OXA-48 GENE BY PCR: NOT DETECTED
CARBAPENEM RESISTANCE VIM GENE BY PCR: NOT DETECTED
CEPHALOSPORIN RESISTANCE CTX-M GENE BY PCR: NOT DETECTED
CHLORIDE BLD-SCNC: 100 MMOL/L (ref 98–107)
CO2: 27 MMOL/L (ref 22–29)
CREAT SERPL-MCNC: 0.6 MG/DL (ref 0.7–1.2)
CRYPTOCOCCUS NEOFORMANS/GATTII BY PCR: NOT DETECTED
ENTEROBACTER CLOACAE COMPLEX BY PCR: NOT DETECTED
ENTEROBACTERALES BY PCR: DETECTED
ENTEROCOCCUS FAECALIS BY PCR: NOT DETECTED
ENTEROCOCCUS FAECIUM BY PCR: NOT DETECTED
ESCHERICHIA COLI BY PCR: NOT DETECTED
FOLATE: 9.8 NG/ML (ref 4.8–24.2)
GFR AFRICAN AMERICAN: >60
GFR NON-AFRICAN AMERICAN: >60 ML/MIN/1.73
GLUCOSE BLD-MCNC: 127 MG/DL (ref 74–99)
GRAM STAIN ORDERABLE: NORMAL
HAEMOPHILUS INFLUENZAE BY PCR: NOT DETECTED
HCT VFR BLD CALC: 35.1 % (ref 37–54)
HEMOGLOBIN: 10.5 G/DL (ref 12.5–16.5)
INR BLD: 1.1
IRON SATURATION: 23 % (ref 20–55)
IRON: 40 MCG/DL (ref 59–158)
KLEBSIELLA AEROGENES BY PCR: NOT DETECTED
KLEBSIELLA OXYTOCA BY PCR: NOT DETECTED
KLEBSIELLA PNEUMONIAE GROUP BY PCR: NOT DETECTED
LISTERIA MONOCYTOGENES BY PCR: NOT DETECTED
MCH RBC QN AUTO: 25.5 PG (ref 26–35)
MCHC RBC AUTO-ENTMCNC: 29.9 % (ref 32–34.5)
MCV RBC AUTO: 85.4 FL (ref 80–99.9)
NEISSERIA MENINGITIDIS BY PCR: NOT DETECTED
ORDER NUMBER: ABNORMAL
PDW BLD-RTO: 15.1 FL (ref 11.5–15)
PLATELET # BLD: 320 E9/L (ref 130–450)
PMV BLD AUTO: 8.9 FL (ref 7–12)
POTASSIUM SERPL-SCNC: 5 MMOL/L (ref 3.5–5)
PROTEUS SPECIES BY PCR: NOT DETECTED
PROTHROMBIN TIME: 11.4 SEC (ref 9.3–12.4)
PSEUDOMONAS AERUGINOSA BY PCR: NOT DETECTED
RBC # BLD: 4.11 E12/L (ref 3.8–5.8)
SALMONELLA SPECIES BY PCR: NOT DETECTED
SARS-COV-2, NAAT: NOT DETECTED
SERRATIA MARCESCENS BY PCR: DETECTED
SODIUM BLD-SCNC: 135 MMOL/L (ref 132–146)
SOURCE OF BLOOD CULTURE: ABNORMAL
STAPHYLOCOCCUS AUREUS BY PCR: NOT DETECTED
STAPHYLOCOCCUS EPIDERMIDIS BY PCR: NOT DETECTED
STAPHYLOCOCCUS LUGDUNENSIS BY PCR: NOT DETECTED
STAPHYLOCOCCUS SPECIES BY PCR: NOT DETECTED
STENOTROPHOMONAS MALTOPHILIA BY PCR: NOT DETECTED
STREPTOCOCCUS AGALACTIAE BY PCR: NOT DETECTED
STREPTOCOCCUS PNEUMONIAE BY PCR: NOT DETECTED
STREPTOCOCCUS PYOGENES  BY PCR: NOT DETECTED
STREPTOCOCCUS SPECIES BY PCR: NOT DETECTED
TOTAL IRON BINDING CAPACITY: 172 MCG/DL (ref 250–450)
TOTAL PROTEIN: 6 G/DL (ref 6.4–8.3)
VITAMIN B-12: 316 PG/ML (ref 211–946)
WBC # BLD: 10.6 E9/L (ref 4.5–11.5)

## 2022-03-07 PROCEDURE — 85610 PROTHROMBIN TIME: CPT

## 2022-03-07 PROCEDURE — 83550 IRON BINDING TEST: CPT

## 2022-03-07 PROCEDURE — 87635 SARS-COV-2 COVID-19 AMP PRB: CPT

## 2022-03-07 PROCEDURE — 6360000002 HC RX W HCPCS: Performed by: PHYSICAL THERAPY ASSISTANT

## 2022-03-07 PROCEDURE — 82607 VITAMIN B-12: CPT

## 2022-03-07 PROCEDURE — 83540 ASSAY OF IRON: CPT

## 2022-03-07 PROCEDURE — 82746 ASSAY OF FOLIC ACID SERUM: CPT

## 2022-03-07 PROCEDURE — 80074 ACUTE HEPATITIS PANEL: CPT

## 2022-03-07 PROCEDURE — 6370000000 HC RX 637 (ALT 250 FOR IP): Performed by: NURSE PRACTITIONER

## 2022-03-07 PROCEDURE — 76705 ECHO EXAM OF ABDOMEN: CPT

## 2022-03-07 PROCEDURE — 6370000000 HC RX 637 (ALT 250 FOR IP): Performed by: PHYSICAL THERAPY ASSISTANT

## 2022-03-07 PROCEDURE — 85027 COMPLETE CBC AUTOMATED: CPT

## 2022-03-07 PROCEDURE — 80053 COMPREHEN METABOLIC PANEL: CPT

## 2022-03-07 PROCEDURE — 2580000003 HC RX 258: Performed by: PHYSICAL THERAPY ASSISTANT

## 2022-03-07 PROCEDURE — 36415 COLL VENOUS BLD VENIPUNCTURE: CPT

## 2022-03-07 PROCEDURE — 1200000000 HC SEMI PRIVATE

## 2022-03-07 RX ADMIN — VANCOMYCIN HYDROCHLORIDE 1000 MG: 1 INJECTION, POWDER, LYOPHILIZED, FOR SOLUTION INTRAVENOUS at 21:30

## 2022-03-07 RX ADMIN — PIPERACILLIN AND TAZOBACTAM 3375 MG: 3; .375 INJECTION, POWDER, LYOPHILIZED, FOR SOLUTION INTRAVENOUS at 02:03

## 2022-03-07 RX ADMIN — PIPERACILLIN AND TAZOBACTAM 3375 MG: 3; .375 INJECTION, POWDER, LYOPHILIZED, FOR SOLUTION INTRAVENOUS at 22:36

## 2022-03-07 RX ADMIN — VANCOMYCIN HYDROCHLORIDE 1000 MG: 1 INJECTION, POWDER, LYOPHILIZED, FOR SOLUTION INTRAVENOUS at 16:16

## 2022-03-07 RX ADMIN — Medication 3 MG: at 21:30

## 2022-03-07 RX ADMIN — PIPERACILLIN AND TAZOBACTAM 3375 MG: 3; .375 INJECTION, POWDER, LYOPHILIZED, FOR SOLUTION INTRAVENOUS at 11:27

## 2022-03-07 RX ADMIN — ACETAMINOPHEN 650 MG: 325 TABLET ORAL at 18:19

## 2022-03-07 RX ADMIN — VANCOMYCIN HYDROCHLORIDE 1000 MG: 1 INJECTION, POWDER, LYOPHILIZED, FOR SOLUTION INTRAVENOUS at 04:29

## 2022-03-07 ASSESSMENT — PAIN DESCRIPTION - PAIN TYPE: TYPE: SURGICAL PAIN

## 2022-03-07 ASSESSMENT — PAIN SCALES - GENERAL
PAINLEVEL_OUTOF10: 0
PAINLEVEL_OUTOF10: 0
PAINLEVEL_OUTOF10: 5

## 2022-03-07 ASSESSMENT — PAIN SCALES - WONG BAKER: WONGBAKER_NUMERICALRESPONSE: 0

## 2022-03-07 ASSESSMENT — PAIN DESCRIPTION - ORIENTATION: ORIENTATION: RIGHT

## 2022-03-07 ASSESSMENT — PAIN DESCRIPTION - LOCATION: LOCATION: OTHER (COMMENT)

## 2022-03-07 ASSESSMENT — PAIN DESCRIPTION - DESCRIPTORS: DESCRIPTORS: ACHING

## 2022-03-07 NOTE — CARE COORDINATION
Met with the pt at the bedside to discuss transition of care. The pt lives with his mom and brother. He does not have a PCP. He would like to return home when medically stable. Explained that if he needs IV antibiotics, he will need to go to a facility. Will follow.  Bennie Barlow RN

## 2022-03-07 NOTE — PROGRESS NOTES
Department of Orthopedic Surgery  Resident Progress Note    Patient seen and examined. Pain controlled. No new complaints. Denies chest pain, shortness of breath, dizziness/lightheadedness. Denies any fevers or chills overnight. States his pain is significantly improved.     VITALS:  /86   Pulse 85   Temp 98.2 °F (36.8 °C) (Temporal)   Resp 18   Ht 6' 4\" (1.93 m)   Wt 200 lb (90.7 kg)   SpO2 98%   BMI 24.34 kg/m²     General: alert and oriented to person, place and time, well-developed and well-nourished, in no acute distress    MUSCULOSKELETAL:   right lower extremity:  · Dressing C/D/I, no surrounding erythema  · Compartments soft and compressible  · +PF/DF/EHL  · +2/4 DP & PT pulses, Brisk Cap refill, Toes warm and perfused  · Distal sensation grossly intact to Peroneals, Sural, Saphenous, and tibial nrs    CBC:   Lab Results   Component Value Date    WBC 9.9 03/06/2022    HGB 10.7 03/06/2022    HCT 33.7 03/06/2022     03/06/2022     PT/INR:    Lab Results   Component Value Date    PROTIME 12.8 10/04/2021    INR 1.1 10/04/2021     Surgical specimen: poly microbial on gram stain, cultures pending     ASSESSMENT  · S/P R thigh abscess InD - 3/6    PLAN      · Continue physical therapy and protocol: WBAT - RLE  · Abx per ID  · Deep venous thrombosis prophylaxis - per primary, up as tolerated, early mobilization  · Doing well POD1  · PT/OT  · Multimodal pain control  · Monitor H&H  · D/C Plan:  Pt/OT, CM/SW recs appreciated

## 2022-03-07 NOTE — PROGRESS NOTES
(36.8 °C) (Temporal)   Resp 18   Ht 6' 4\" (1.93 m)   Wt 200 lb (90.7 kg)   SpO2 98%   BMI 24.34 kg/m²     General appearance: Middle aged male in no apparent distress, appears stated age and cooperative. HEENT: Conjunctivae/corneas clear. Mucous membranes moist.  Neck: Supple. No JVD. Respiratory:  Clear to auscultation bilaterally. Normal respiratory effort. Cardiovascular:  RRR. S1, S2 without MRG. PV: Pulses palpable. No edema. Abdomen: Soft, non-tender, non-distended. +BS  Musculoskeletal: No obvious deformities. Skin: Normal skin color. Surgical dressing covering incision on right thigh with no evident edema or erythema. Neurologic:  Grossly non-focal. Awake, alert, following commands. Psychiatric: Alert and oriented, thought content appropriate.     Medications:  REVIEWED DAILY    Infusion Medications    sodium chloride 75 mL/hr at 03/06/22 1044    sodium chloride      dextrose       Scheduled Medications    melatonin  3 mg Oral Nightly    enoxaparin  40 mg SubCUTAneous Daily    sodium chloride flush  10 mL IntraVENous 2 times per day    piperacillin-tazobactam  3,375 mg IntraVENous Q8H    vancomycin  1,000 mg IntraVENous Q8H    ceFAZolin  2,000 mg IntraVENous See Admin Instructions     PRN Meds: traMADol **AND** cloNIDine, dicyclomine, hydrOXYzine, promethazine, diphenhydrAMINE, sodium chloride flush, sodium chloride, ondansetron **OR** ondansetron, magnesium hydroxide, acetaminophen **OR** acetaminophen, ibuprofen, glucose, dextrose, glucagon (rDNA), dextrose    Labs:     Recent Labs     03/04/22 2206 03/06/22  0630   WBC 10.2 9.9   HGB 11.3* 10.7*   HCT 35.9* 33.7*    271       Recent Labs     03/04/22 2206 03/06/22  0630 03/07/22  0428    140 135   K 4.1 4.0 5.0    101 100   CO2 30* 26 27   BUN 12 12 12   CREATININE 0.7 0.6* 0.6*   CALCIUM 9.1 8.9 9.0       Recent Labs     03/04/22 2206 03/06/22  0630 03/07/22  0428   PROT 7.0 6.4 6.0*   ALKPHOS 154* 133* 152*   ALT 52* 64* 121*   AST 60* 47* 100*   BILITOT 0.2 0.3 <0.2       No results for input(s): INR in the last 72 hours. No results for input(s): Trever Fernandez in the last 72 hours. Chronic labs:    Lab Results   Component Value Date    INR 1.1 10/04/2021    LABA1C 5.4 10/03/2021       Radiology: REVIEWED DAILY    Assessment:  Abscess, right thigh  Serratia bacteremia  IV drug abuse  Elevated LFT  Anemia, chronic, microcytic  Sinus tachycardia   Tobacco dependence    Plan:  Orthopedic surgery following - s/p I&D 3/6  ID following - currently being treated with Vanco + zosyn, await cx  Monitor for withdrawal- COWS  Increase IVF  Continue to monitor LFT - R factor suggests mixed injury  Check hep panel and ABD US  Awaiting B12 + folate    DVT Prophylaxis [x] Lovenox  []  Heparin [] DOAC [] PCDs [] Ambulation    GI Prophylaxis [] PPI  [] H2 Blocker   [] Carafate  [x] Diet/Tube Feeds   Level of care [x] Med/Surg  [] Intermediate  []  ICU   Diet ADULT DIET; Regular    Family contact [x]  N/A - pt A&O  [] At bedside  [] Phone call   Disposition Patient requires continued admission d/t abscess w/ need for IV ATB   MDM [] Low    [] Moderate  [x]   High        Discharge Plan: Pending final ATB plans     +++++++++++++++++++++++++++++++++++++++++++++++++  Rick Jones[address]  +++++++++++++++++++++++++++++++++++++++++++++++++  NOTE: This report was transcribed using voice recognition software. Every effort was made to ensure accuracy; however, inadvertent computerized transcription errors may be present.

## 2022-03-07 NOTE — PROGRESS NOTES
Dr Alejandro Davis and stated that he is fine with a diet but to verify with admitting.    Dr Florette Brittle at this time

## 2022-03-07 NOTE — PROGRESS NOTES
Department of Internal Medicine  Infectious Diseases   Progress  Note      C/C :  Right thigh abscess ,Serratia bacteremia     Denies fever or chills  Reports right thigh pain   Afebrile       Current Facility-Administered Medications   Medication Dose Route Frequency Provider Last Rate Last Admin    traMADol (ULTRAM) tablet 50 mg  50 mg Oral PRN Sherrilee Public, APRN - NP   50 mg at 03/06/22 1741    And    cloNIDine (CATAPRES) tablet 0.1 mg  0.1 mg Oral PRN Sherrilee Public, APRN - NP        melatonin tablet 3 mg  3 mg Oral Nightly Yoana Lalla Dage, APRN - NP        dicyclomine (BENTYL) capsule 20 mg  20 mg Oral Q6H PRN Sherrilee Public, APRN - NP        hydrOXYzine (VISTARIL) capsule 50 mg  50 mg Oral Q8H PRN [facility], APR[facility] - NP        promethazine (PHENERGAN) tablet 25 mg  25 mg Oral Q6H PRN [facility], APRN - NP        diphenhydrAMINE (BENADRYL) tablet 25 mg  25 mg Oral Nightly PRN [facility], APRN - NP        enoxaparin (LOVENOX) injection 40 mg  40 mg SubCUTAneous Daily Izzy Wilson MD        0.9 % sodium chloride infusion   IntraVENous Continuous Sherrilee Public, APRN -  mL/hr at 03/07/22 0955 Rate Change at 03/07/22 0955    sodium chloride flush 0.9 % injection 10 mL  10 mL IntraVENous 2 times per day [place] L Chen, DO        sodium chloride flush 0.9 % injection 10 mL  10 mL IntraVENous PRN [place] L Chen, DO        0.9 % sodium chloride infusion  25 mL IntraVENous PRN [place] L Chen, DO        ondansetron (ZOFRAN-ODT) disintegrating tablet 4 mg  4 mg Oral Q8H PRN [place] L Chen, DO        Or    ondansetron (ZOFRAN) injection 4 mg  4 mg IntraVENous Q6H PRN [place] L Chen, DO        magnesium hydroxide (MILK OF MAGNESIA) 400 MG/5ML suspension 30 mL  30 mL Oral Daily PRN [place] L Chen, DO        acetaminophen (TYLENOL) tablet 650 mg  650 mg Oral Q6H PRN [place] L Chen, DO   650 mg at 03/06/22 2006    Or    acetaminophen (TYLENOL) suppository 650 mg  650 mg Rectal Q6H PRN Dayna BOWLES Chen, DO        ibuprofen (ADVIL;MOTRIN) tablet 400 mg  400 mg Oral Q6H PR[facility], [facility]        piperacillin-tazobactam (ZOSYN) 3,375 mg in dextrose 5 % 100 mL IVPB extended infusion (mini-bag)  3,375 mg IntraVENous Q8H oRger Siddiqui, DO 25 mL/hr at 03/07/22 0953 3,375 mg at 03/07/22 0953    vancomycin 1000 mg IVPB in 250 mL D5W addavial  1,000 mg IntraVENous Q8H [facility], DO   Stopped at 03/07/22 0535    glucose (GLUTOSE) 40 % oral gel 15 g  15 g Oral PRN [facility], DO        dextrose 50 % IV solution  12.5 g IntraVENous PRN [facility] S[facility], DO        glucagon (rDNA) injection 1 mg  1 mg IntraMUSCular PRN [facility] Chen, DO        dextrose 5 % solution  100 mL/hr IntraVENous PRN [facility] Chen, DO        ceFAZolin (ANCEF) 2000 mg in sterile water 20 mL IV syringe  2,000 mg IntraVENous See Admin Instructions Dayna Siddiqui, DO             REVIEW OF SYSTEMS:    CONSTITUTIONAL:  Denies fever, chill or rigors, nausea or vomiting. HEENT: denies blurring of vision or double vision, denies hearing problem  RESPIRATORY: denies cough, shortness of breath, sputum expectoration,. CARDIOVASCULAR:  Denies palpitation  GASTROINTESTINAL:  Denies abdomen pain, diarrhea or constipation. GENITOURINARY:  Denies burning urination or frequency of urination  INTEGUMENT: Redness   HEMATOLOGIC/LYMPHATIC:  No external bleeding . MUSCULOSKELETAL:  Right leg, thigh pain,swelling, redness   NEUROLOGICAL:  Denies light headed, dizziness, loss of consciousness, weakness of lower extremities, bowel or bladder incontinence. PHYSICAL EXAM:      Vitals:     Vitals:    03/07/22 0757   BP: 114/79   Pulse: 76   Resp: 18   Temp: 97.9 °F (36.6 °C)   SpO2: 99%       General Appearance:    Awake, alert , no acute distress. Head:    Normocephalic, atraumatic   Eyes:    No pallor, no icterus,   Ears:    No obvious deformity or drainage.    Nose:   No nasal drainage   Throat:   Mucosa moist, no oral thrush   Neck:   Supple, no lymphadenopathy   Back:     no CVA tenderness   Lungs:     Clear to auscultation bilaterally, no wheeze    Heart:    Regular rate and rhythm, no murmur   Abdomen:     Soft, non-tender, bowel sounds present    Extremities:   Right thigh / leg-tender - dressed    Pulses:   Dorsalis pedis palpable    Skin:   no rashes      CBC with Differential:      Lab Results   Component Value Date    WBC 10.6 03/07/2022    RBC 4.11 03/07/2022    HGB 10.5 03/07/2022    HCT 35.1 03/07/2022     03/07/2022    MCV 85.4 03/07/2022    MCH 25.5 03/07/2022    MCHC 29.9 03/07/2022    RDW 15.1 03/07/2022    NRBC 0.9 05/05/2020    LYMPHOPCT 18.4 03/04/2022    MONOPCT 8.7 03/04/2022    MYELOPCT 0.9 05/05/2020    BASOPCT 0.4 03/04/2022    MONOSABS 0.89 03/04/2022    LYMPHSABS 1.87 03/04/2022    EOSABS 0.20 03/04/2022    BASOSABS 0.04 03/04/2022       CMP     Lab Results   Component Value Date     03/07/2022    K 5.0 03/07/2022    K 4.2 10/04/2021     03/07/2022    CO2 27 03/07/2022    BUN 12 03/07/2022    CREATININE 0.6 03/07/2022    GFRAA >60 03/07/2022    LABGLOM >60 03/07/2022    GLUCOSE 127 03/07/2022    PROT 6.0 03/07/2022    LABALBU 2.6 03/07/2022    CALCIUM 9.0 03/07/2022    BILITOT <0.2 03/07/2022    ALKPHOS 152 03/07/2022     03/07/2022     03/07/2022         Hepatic Function Panel:    Lab Results   Component Value Date    ALKPHOS 152 03/07/2022     03/07/2022     03/07/2022    PROT 6.0 03/07/2022    BILITOT <0.2 03/07/2022    BILIDIR <0.2 10/02/2021    IBILI see below 10/02/2021    LABALBU 2.6 03/07/2022       PT/INR:    Lab Results   Component Value Date    PROTIME 11.4 03/07/2022    INR 1.1 03/07/2022       MICROBIOLOGY:    Blood culture -     Bottle Type Aerobic    Source of Blood Culture No site given    Order Number [id number]    Acinetobacter calcoac baumannii complex by PCR Not Detected    Bacteroides fragilis by PCR Not Detected    Enterobacteriaceae by PCR DETECTED Panic      Enterobacter cloacae complex by PCR Not Detected    Enterococcus faecalis by PCR Not Detected    Enterococcus faecium by PCR Not Detected    Escherichia coli by PCR Not Detected    Haemophilus Influenzae by PCR Not Detected    Klebsiella aerogenes by PCR Not Detected    Klebsiella oxytoca by PCR Not Detected    Klebsiella pneumoniae group by PCR Not Detected    Listeria monocytogenes by PCR Not Detected    Neisseria meningitidis by PCR Not Detected    Proteus species by PCR Not Detected    Pseudomonas aeruginosa by PCR Not Detected    Salmonella species by PCR Not Detected    Streptococcus agalactiae by PCR Not Detected    Staphylococcus aureus by PCR Not Detected    Staphylococcus epidermidis by PCR Not Detected    Staphylococcus lugdunensis by PCR Not Detected    Staphylococcus species by PCR Not Detected    Serratia marcescens by PCR DETECTED Panic           Abscess cx -      Gram Stain [[id number]]    Collected: 03/06/22 5384    Updated: 03/07/22 0742    Specimen Source: Abscess     Gram Stain Orderable --    Gram stain performed from swab, interpret results with   caution. Swab specimens of sterile fluids are inferior to   aspirate specimens for organism recovery. Abundant Polymorphonuclear leukocytes   Epithelial cells not seen   Abundant Gram negative rods   Abundant Gram positive cocci in pairs   Abundant Gram positive cocci    Narrative:     Source: ABSC       Site: RT THIGH                     Radiology :     CT scan -    Large irregular collection containing fluid and gas within the right vastus   lateralis and intermedius muscle bellies, compatible with intramuscular   abscess.  No definite radiopaque foreign body. IMPRESSION:     1. Serratia bacteremia  2. Right thigh abscess s/p I & D ( 3/6)   3. IVDU        RECOMMENDATIONS:      1. Vancomycin 1 gram IV q 8 hrs ( pharmacy following )   2. Zosyn 3.375 grams IV q 8 hrs, monitor Cr   3.  Await cx

## 2022-03-08 LAB
ALBUMIN SERPL-MCNC: 2.8 G/DL (ref 3.5–5.2)
ALP BLD-CCNC: 115 U/L (ref 40–129)
ALT SERPL-CCNC: 195 U/L (ref 0–40)
AMPHETAMINE SCREEN, URINE: NOT DETECTED
ANION GAP SERPL CALCULATED.3IONS-SCNC: 9 MMOL/L (ref 7–16)
AST SERPL-CCNC: 123 U/L (ref 0–39)
BARBITURATE SCREEN URINE: NOT DETECTED
BENZODIAZEPINE SCREEN, URINE: NOT DETECTED
BILIRUB SERPL-MCNC: 0.2 MG/DL (ref 0–1.2)
BUN BLDV-MCNC: 12 MG/DL (ref 6–20)
CALCIUM SERPL-MCNC: 9.1 MG/DL (ref 8.6–10.2)
CANNABINOID SCREEN URINE: NOT DETECTED
CHLORIDE BLD-SCNC: 102 MMOL/L (ref 98–107)
CO2: 29 MMOL/L (ref 22–29)
COCAINE METABOLITE SCREEN URINE: POSITIVE
CREAT SERPL-MCNC: 0.7 MG/DL (ref 0.7–1.2)
FENTANYL SCREEN, URINE: NOT DETECTED
GFR AFRICAN AMERICAN: >60
GFR NON-AFRICAN AMERICAN: >60 ML/MIN/1.73
GLUCOSE BLD-MCNC: 101 MG/DL (ref 74–99)
HAV IGM SER IA-ACNC: ABNORMAL
HCT VFR BLD CALC: 32.3 % (ref 37–54)
HEMOGLOBIN: 10.2 G/DL (ref 12.5–16.5)
HEPATITIS B CORE IGM ANTIBODY: ABNORMAL
HEPATITIS B SURFACE ANTIGEN INTERPRETATION: ABNORMAL
HEPATITIS C ANTIBODY INTERPRETATION: REACTIVE
Lab: ABNORMAL
MCH RBC QN AUTO: 25.6 PG (ref 26–35)
MCHC RBC AUTO-ENTMCNC: 31.6 % (ref 32–34.5)
MCV RBC AUTO: 81 FL (ref 80–99.9)
METHADONE SCREEN, URINE: NOT DETECTED
OPIATE SCREEN URINE: NOT DETECTED
OXYCODONE URINE: NOT DETECTED
PDW BLD-RTO: 15.1 FL (ref 11.5–15)
PHENCYCLIDINE SCREEN URINE: NOT DETECTED
PLATELET # BLD: 296 E9/L (ref 130–450)
PMV BLD AUTO: 8.6 FL (ref 7–12)
POTASSIUM SERPL-SCNC: 4.6 MMOL/L (ref 3.5–5)
RBC # BLD: 3.99 E12/L (ref 3.8–5.8)
SODIUM BLD-SCNC: 140 MMOL/L (ref 132–146)
TOTAL PROTEIN: 6.1 G/DL (ref 6.4–8.3)
VANCOMYCIN RANDOM: 34.1 MCG/ML (ref 5–40)
VANCOMYCIN TROUGH: 11.3 MCG/ML (ref 5–16)
WBC # BLD: 7.2 E9/L (ref 4.5–11.5)

## 2022-03-08 PROCEDURE — 80202 ASSAY OF VANCOMYCIN: CPT

## 2022-03-08 PROCEDURE — 80307 DRUG TEST PRSMV CHEM ANLYZR: CPT

## 2022-03-08 PROCEDURE — 6360000002 HC RX W HCPCS: Performed by: INTERNAL MEDICINE

## 2022-03-08 PROCEDURE — 6370000000 HC RX 637 (ALT 250 FOR IP): Performed by: PHYSICAL THERAPY ASSISTANT

## 2022-03-08 PROCEDURE — 36415 COLL VENOUS BLD VENIPUNCTURE: CPT

## 2022-03-08 PROCEDURE — 2580000003 HC RX 258: Performed by: PHYSICAL THERAPY ASSISTANT

## 2022-03-08 PROCEDURE — 80053 COMPREHEN METABOLIC PANEL: CPT

## 2022-03-08 PROCEDURE — 6370000000 HC RX 637 (ALT 250 FOR IP): Performed by: NURSE PRACTITIONER

## 2022-03-08 PROCEDURE — 6360000002 HC RX W HCPCS: Performed by: PHYSICAL THERAPY ASSISTANT

## 2022-03-08 PROCEDURE — 1200000000 HC SEMI PRIVATE

## 2022-03-08 PROCEDURE — 85027 COMPLETE CBC AUTOMATED: CPT

## 2022-03-08 RX ADMIN — VANCOMYCIN HYDROCHLORIDE 1000 MG: 1 INJECTION, POWDER, LYOPHILIZED, FOR SOLUTION INTRAVENOUS at 03:36

## 2022-03-08 RX ADMIN — VANCOMYCIN HYDROCHLORIDE 1000 MG: 1 INJECTION, POWDER, LYOPHILIZED, FOR SOLUTION INTRAVENOUS at 11:08

## 2022-03-08 RX ADMIN — ACETAMINOPHEN 650 MG: 325 TABLET ORAL at 17:12

## 2022-03-08 RX ADMIN — VANCOMYCIN HYDROCHLORIDE 1000 MG: 1 INJECTION, POWDER, LYOPHILIZED, FOR SOLUTION INTRAVENOUS at 19:01

## 2022-03-08 RX ADMIN — ACETAMINOPHEN 650 MG: 325 TABLET ORAL at 09:52

## 2022-03-08 RX ADMIN — Medication 3 MG: at 22:00

## 2022-03-08 RX ADMIN — ENOXAPARIN SODIUM 40 MG: 100 INJECTION SUBCUTANEOUS at 08:42

## 2022-03-08 RX ADMIN — PIPERACILLIN AND TAZOBACTAM 3375 MG: 3; .375 INJECTION, POWDER, LYOPHILIZED, FOR SOLUTION INTRAVENOUS at 22:00

## 2022-03-08 RX ADMIN — ACETAMINOPHEN 650 MG: 325 TABLET ORAL at 03:36

## 2022-03-08 RX ADMIN — PIPERACILLIN AND TAZOBACTAM 3375 MG: 3; .375 INJECTION, POWDER, LYOPHILIZED, FOR SOLUTION INTRAVENOUS at 12:15

## 2022-03-08 RX ADMIN — Medication 10 ML: at 22:37

## 2022-03-08 RX ADMIN — PIPERACILLIN AND TAZOBACTAM 3375 MG: 3; .375 INJECTION, POWDER, LYOPHILIZED, FOR SOLUTION INTRAVENOUS at 04:54

## 2022-03-08 ASSESSMENT — PAIN DESCRIPTION - FREQUENCY
FREQUENCY: CONTINUOUS

## 2022-03-08 ASSESSMENT — PAIN DESCRIPTION - ORIENTATION
ORIENTATION: RIGHT
ORIENTATION: RIGHT;UPPER
ORIENTATION: RIGHT

## 2022-03-08 ASSESSMENT — PAIN SCALES - GENERAL
PAINLEVEL_OUTOF10: 6
PAINLEVEL_OUTOF10: 7
PAINLEVEL_OUTOF10: 5
PAINLEVEL_OUTOF10: 3
PAINLEVEL_OUTOF10: 5
PAINLEVEL_OUTOF10: 5

## 2022-03-08 ASSESSMENT — PAIN DESCRIPTION - DESCRIPTORS
DESCRIPTORS: ACHING

## 2022-03-08 ASSESSMENT — PAIN DESCRIPTION - ONSET
ONSET: ON-GOING

## 2022-03-08 ASSESSMENT — PAIN DESCRIPTION - PAIN TYPE
TYPE: SURGICAL PAIN
TYPE: ACUTE PAIN
TYPE: SURGICAL PAIN

## 2022-03-08 ASSESSMENT — PAIN DESCRIPTION - PROGRESSION
CLINICAL_PROGRESSION: NOT CHANGED

## 2022-03-08 ASSESSMENT — PAIN SCALES - WONG BAKER: WONGBAKER_NUMERICALRESPONSE: 0

## 2022-03-08 ASSESSMENT — PAIN DESCRIPTION - LOCATION
LOCATION: LEG

## 2022-03-08 NOTE — PROGRESS NOTES
Department of Internal Medicine  Infectious Diseases   Progress  Note      C/C :  Right thigh abscess ,Serratia bacteremia     Denies fever or chills  Reports right thigh pain and stiffness   Afebrile       Current Facility-Administered Medications   Medication Dose Route Frequency Provider Last Rate Last Admin    traMADol (ULTRAM) tablet 50 mg  50 mg Oral PRN Gilmore Meenakshi, APRN - NP   50 mg at 03/06/22 1741    And    cloNIDine (CATAPRES) tablet 0.1 mg  0.1 mg Oral PRN Gilmore Meenakshi, APRN - NP        melatonin tablet 3 mg  3 mg Oral Nightly Gilmore Meenakshi, APRN - NP   3 mg at 03/07/22 2130    dicyclomine (BENTYL) capsule 20 mg  20 mg Oral Q6H PRN Gilmore Meenakshi, APRN - NP        hydrOXYzine (VISTARIL) capsule 50 mg  50 mg Oral Q8H PRN Gilmore Meenakshi, APRN - NP        promethazine (PHENERGAN) tablet 25 mg  25 mg Oral Q6H PRN Gilmore Meenakshi, APRN - NP        diphenhydrAMINE (BENADRYL) tablet 25 mg  25 mg Oral Nightly PRN Gilmore Meenakshi, APRN - NP        enoxaparin (LOVENOX) injection 40 mg  40 mg SubCUTAneous Daily Joey Aguirre MD   40 mg at 03/08/22 0842    0.9 % sodium chloride infusion   IntraVENous Continuous Gilmore Meenakshi, APRN -  mL/hr at 03/07/22 0955 Rate Change at 03/07/22 0955    sodium chloride flush 0.9 % injection 10 mL  10 mL IntraVENous 2 times per day [place] L Chen, DO        sodium chloride flush 0.9 % injection 10 mL  10 mL IntraVENous PRN [place] L Chen, DO        0.9 % sodium chloride infusion  25 mL IntraVENous PRN [place] L Chen, DO        ondansetron (ZOFRAN-ODT) disintegrating tablet 4 mg  4 mg Oral Q8H PRN [place] L Chen, DO        Or    ondansetron (ZOFRAN) injection 4 mg  4 mg IntraVENous Q6H PRN [place] L Chen, DO        magnesium hydroxide (MILK OF MAGNESIA) 400 MG/5ML suspension 30 mL  30 mL Oral Daily PRN [place] L Chen, DO        acetaminophen (TYLENOL) tablet 650 mg  650 mg Oral Q6H PRN [place] L Chen, DO   650 mg at 03/08/22 5894    Or    acetaminophen (TYLENOL) suppository 650 mg  650 mg Rectal Q6H [facility], [facility]        ibuprofen (ADVIL;MOTRIN) tablet 400 mg  400 mg Oral Q6H PRN [facility] L Chen, DO        piperacillin-tazobactam (ZOSYN) 3,375 mg in dextrose 5 % 100 mL IVPB extended infusion (mini-bag)  3,375 mg IntraVENous Q8H [facility] L Chen, DO   Stopped at 03/08/22 9326    vancomycin 1000 mg IVPB in 250 mL D5W addavial  1,000 mg IntraVENous Q8H Roger L Chen,  mL/hr at 03/08/22 1108 1,000 mg at 03/08/22 1108    glucose (GLUTOSE) 40 % oral gel 15 g  15 g Oral PRN [facility] L Chen, DO        dextrose 50 % IV solution  12.5 g IntraVENous PRN [facility] Chen, DO        glucagon (rDNA) injection 1 mg  1 mg IntraMUSCular PRN [facility] Chen, DO        dextrose 5 % solution  100 mL/hr IntraVENous PRN [facility] Chen, DO        ceFAZolin (ANCEF) 2000 mg in sterile water 20 mL IV syringe  2,000 mg IntraVENous See Admin Instructions [place]ia L Chen, DO             REVIEW OF SYSTEMS:    CONSTITUTIONAL:  Denies fever or chills   HEENT: denies blurring of vision or double vision, denies hearing problem  RESPIRATORY: denies cough, shortness of breath, sputum expectoration,. CARDIOVASCULAR:  Denies palpitation  GASTROINTESTINAL:  Denies abdomen pain, diarrhea or constipation. GENITOURINARY:  Denies burning urination or frequency of urination  INTEGUMENT:  No rash   HEMATOLOGIC/LYMPHATIC:  No external bleeding . MUSCULOSKELETAL:  Right leg, thigh pain,swelling, redness   NEUROLOGICAL:  Denies light headed, dizziness, loss of consciousness, weakness of lower extremities, bowel or bladder incontinence. PHYSICAL EXAM:      Vitals:     Vitals:    03/08/22 0755   BP: 112/84   Pulse: 68   Resp: 18   Temp: 97.9 °F (36.6 °C)   SpO2: 100%       General Appearance:    Awake, alert , no acute distress. Head:    Normocephalic, atraumatic   Eyes:    No pallor, no icterus,   Ears:    No obvious deformity or drainage.    Nose:   No nasal drainage   Throat:   Mucosa moist, no oral thrush   Neck:   Supple, no lymphadenopathy   Back:     no CVA tenderness   Lungs:     Clear to auscultation bilaterally, no wheeze    Heart:    Regular rate and rhythm, no murmur   Abdomen:     Soft, non-tender, bowel sounds present    Extremities:   Right thigh mild tender - wound dressed    Pulses:   Dorsalis pedis palpable    Skin:   no rashes      CBC with Differential:      Lab Results   Component Value Date    WBC 7.2 03/08/2022    RBC 3.99 03/08/2022    HGB 10.2 03/08/2022    HCT 32.3 03/08/2022     03/08/2022    MCV 81.0 03/08/2022    MCH 25.6 03/08/2022    MCHC 31.6 03/08/2022    RDW 15.1 03/08/2022    NRBC 0.9 05/05/2020    LYMPHOPCT 18.4 03/04/2022    MONOPCT 8.7 03/04/2022    MYELOPCT 0.9 05/05/2020    BASOPCT 0.4 03/04/2022    MONOSABS 0.89 03/04/2022    LYMPHSABS 1.87 03/04/2022    EOSABS 0.20 03/04/2022    BASOSABS 0.04 03/04/2022       CMP     Lab Results   Component Value Date     03/08/2022    K 4.6 03/08/2022    K 4.2 10/04/2021     03/08/2022    CO2 29 03/08/2022    BUN 12 03/08/2022    CREATININE 0.7 03/08/2022    GFRAA >60 03/08/2022    LABGLOM >60 03/08/2022    GLUCOSE 101 03/08/2022    PROT 6.1 03/08/2022    LABALBU 2.8 03/08/2022    CALCIUM 9.1 03/08/2022    BILITOT 0.2 03/08/2022    ALKPHOS 115 03/08/2022     03/08/2022     03/08/2022         Hepatic Function Panel:    Lab Results   Component Value Date    ALKPHOS 115 03/08/2022     03/08/2022     03/08/2022    PROT 6.1 03/08/2022    BILITOT 0.2 03/08/2022    BILIDIR <0.2 10/02/2021    IBILI see below 10/02/2021    LABALBU 2.8 03/08/2022       PT/INR:    Lab Results   Component Value Date    PROTIME 11.4 03/07/2022    INR 1.1 03/07/2022       MICROBIOLOGY:    Blood culture -     Bottle Type Aerobic    Source of Blood Culture No site given    Order Number [id number]    Acinetobacter calcoac baumannii complex by PCR Not Detected    Bacteroides fragilis by PCR Not Detected    Enterobacteriaceae by PCR DETECTED Panic      Enterobacter cloacae complex by PCR Not Detected    Enterococcus faecalis by PCR Not Detected    Enterococcus faecium by PCR Not Detected    Escherichia coli by PCR Not Detected    Haemophilus Influenzae by PCR Not Detected    Klebsiella aerogenes by PCR Not Detected    Klebsiella oxytoca by PCR Not Detected    Klebsiella pneumoniae group by PCR Not Detected    Listeria monocytogenes by PCR Not Detected    Neisseria meningitidis by PCR Not Detected    Proteus species by PCR Not Detected    Pseudomonas aeruginosa by PCR Not Detected    Salmonella species by PCR Not Detected    Streptococcus agalactiae by PCR Not Detected    Staphylococcus aureus by PCR Not Detected    Staphylococcus epidermidis by PCR Not Detected    Staphylococcus lugdunensis by PCR Not Detected    Staphylococcus species by PCR Not Detected    Serratia marcescens by PCR DETECTED Panic           Abscess cx -      Gram Stain [[id number]]    Collected: 03/06/22 5952    Updated: 03/07/22 0742    Specimen Source: Abscess     Gram Stain Orderable --    Gram stain performed from swab, interpret results with   caution. Swab specimens of sterile fluids are inferior to   aspirate specimens for organism recovery. Abundant Polymorphonuclear leukocytes   Epithelial cells not seen   Abundant Gram negative rods   Abundant Gram positive cocci in pairs   Abundant Gram positive cocci    Narrative:     Source: ABSC       Site: RT THIGH                     Radiology :     CT scan -    Large irregular collection containing fluid and gas within the right vastus   lateralis and intermedius muscle bellies, compatible with intramuscular   abscess.  No definite radiopaque foreign body. IMPRESSION:     1. Serratia bacteremia  2. Right thigh abscess s/p I & D ( 3/6)   3. IVDU    4.  Hx of Hep C infection, neg PCR       RECOMMENDATIONS:      1. Vancomycin 1 gram IV q 8 hrs ( pharmacy following ) - vanco random   2. Zosyn 3.375 grams IV q 8 hrs, monitor Cr   3.  Await cx

## 2022-03-08 NOTE — PROGRESS NOTES
Pharmacy Consultation Note  (Antibiotic Dosing and Monitoring)    Initial consult date: 3/8/2022  Consulting physician/provider: Dr. Leora Raza  Drug: Vancomycin  Indication: SSTI - right thigh abscess     Age/  Gender Height Weight IBW  Allergy Information   42 y.o./male 6' 4\" (193 cm) 200 lb (90.7 kg)     Ideal body weight: 86.8 kg (191 lb 5.7 oz)  Adjusted ideal body weight: 88.4 kg (194 lb 13 oz)   Patient has no known allergies. Renal Function:  Recent Labs     03/06/22  0630 03/07/22  0428 03/08/22  0541   BUN 12 12 12   CREATININE 0.6* 0.6* 0.7       Intake/Output Summary (Last 24 hours) at 3/8/2022 1724  Last data filed at 3/8/2022 1500  Gross per 24 hour   Intake 420 ml   Output 1050 ml   Net -630 ml       Vancomycin Monitoring:  Trough:  No results for input(s): VANCOTROUGH in the last 72 hours. Random:    Recent Labs     03/08/22  1133   VANCORANDOM 34.1       Vancomycin Administration Times:  Recent vancomycin administrations                   vancomycin 1000 mg IVPB in 250 mL D5W addavial (mg) 1,000 mg New Bag 03/08/22 1108     1,000 mg New Bag  0336     1,000 mg New Bag 03/07/22 2130     1,000 mg New Bag  1616     1,000 mg New Bag  0429     1,000 mg New Bag 03/06/22 2006     1,000 mg New Bag  1405     1,000 mg New Bag 03/05/22 2300                Assessment:  · Patient is a 40 y.o. male who has been initiated on vancomycin  · Estimated Creatinine Clearance: 171 mL/min (based on SCr of 0.7 mg/dL). · To dose vancomycin, pharmacy will be utilizing [employer] calculation software for goal AUC/DAYAN 400-600 mg/L-hr   · 3/8: Vancomycin 1000 mg IV q8h initiated on 3/5/2022, vancomycin level at 11:33 = 34.1 mcg/mL - drawn while vancomycin dose infusing (dose started at 11:08). Scr 0.7 stable. ID consulted, discussed with Dr. Leora Raza would like pharmacy to follow.      Plan:  · Continue vancomycin 1000 mg IV q8h (predicted AUC/MUC = 558, Tr = 18.4)  · Will check vancomycin level prior to dose due at 20:00  · Will continue to monitor renal function   · Clinical pharmacy to follow      Nixon Llanos PharmD   Pharmacy Resident   Phone: [phone]  3/8/2022 5:33 PM

## 2022-03-08 NOTE — PROGRESS NOTES
Normal respiratory effort. Clear to auscultation, bilaterally without Rales/Wheezes/Rhonchi. Cardiovascular: Regular rate and rhythm with normal S1/S2 without murmurs, rubs or gallops. Abdomen: Soft, non-tender, non-distended with normal bowel sounds. Musculoskeletal: No clubbing, cyanosis or edema bilaterally. Right lateral upper thigh gel form dressing. No erythema around dressing. Skin: Skin color, texture, turgor normal.  No rashes or lesions. Neurologic:  Neurovascularly intact without any focal sensory/motor deficits. Cranial nerves: II-XII intact, grossly non-focal.  Psychiatric: Alert and oriented, thought content appropriate, normal insight      Labs:   Recent Labs     03/06/22  0630 03/07/22  0811 03/08/22  0541   WBC 9.9 10.6 7.2   HGB 10.7* 10.5* 10.2*   HCT 33.7* 35.1* 32.3*    320 296     Recent Labs     03/06/22  0630 03/07/22  0428 03/08/22  0541    135 140   K 4.0 5.0 4.6    100 102   CO2 26 27 29   BUN 12 12 12   CREATININE 0.6* 0.6* 0.7   CALCIUM 8.9 9.0 9.1     Recent Labs     03/06/22  0630 03/07/22  0428 03/08/22  0541   AST 47* 100* 123*   ALT 64* 121* 195*   BILITOT 0.3 <0.2 0.2   ALKPHOS 133* 152* 115     Recent Labs     03/07/22  0811   INR 1.1     No results for input(s): CKTOTAL, TROPONINI in the last 72 hours. Urinalysis:    No results found for: Sueanne Bicker, BACTERIA, RBCUA, BLOODU, [address], [place]    Radiology:  [address] organ? LIVER   Final Result   Enlargement of the liver otherwise unremarkable right upper quadrant   ultrasound. RECOMMENDATIONS:   Unavailable         CT FEMUR RIGHT W CONTRAST   Final Result   Large irregular collection containing fluid and gas within the right vastus   lateralis and intermedius muscle bellies, compatible with intramuscular   abscess. No definite radiopaque foreign body.          CT HIP RIGHT W CONTRAST   Final Result   Large irregular collection containing fluid and gas within the right vastus lateralis and intermedius muscle bellies, compatible with intramuscular   abscess. No definite radiopaque foreign body. XR KNEE RIGHT (3 VIEWS)   Final Result   Diffuse mild soft tissue stranding. Although not a true lateral view, there   is suggestion of a right suprapatellar joint effusion. No acute osseous   findings. Normal alignment. US DUP LOWER EXTREMITY RIGHT JAYMIE   Final Result   Within the visualized vessels there is no evidence for deep venous   thrombosis               XR FEMUR RIGHT (MIN 2 VIEWS)   Final Result   No acute osseous findings seen about the right femur. Mild right hip   osteoarthritis. Assessment/Plan:    Active Hospital Problems    Diagnosis     Sinus tachycardia [R00.0]     Tobacco dependence [F17.200]     Abscess [L02.91]     IV drug abuse ([address] [F19.10]      Right thigh Abscess  -Orthopedic surgery. Pt I&D on 3/6. Surgical culture growing gram negative ga,await identification. Continue cefazolin and zosyn. -WBAT    Serratia bacteremia-likely from abscess  -ID consulted. Continue zosyn. IV drug abuse  -COWS protocol. Monitor for withdrawal     Elevated LFT  -Hep panel positive for Hep C. He will need treatment outpatient with GI.  -US liver shows enlargement of liver. Chronic Anemia  -Hgb stable     Tobacco dependence  -Cessation advised.     DVT Prophylaxis: lovenox  Diet: ADULT DIET;  Regular  Code Status: Full Code    PT/OT Eval Status: as needed    Dispo -Pending final culture data    Luh Holman MD

## 2022-03-09 LAB
ALBUMIN SERPL-MCNC: 2.7 G/DL (ref 3.5–5.2)
ALP BLD-CCNC: 103 U/L (ref 40–129)
ALT SERPL-CCNC: 131 U/L (ref 0–40)
ANION GAP SERPL CALCULATED.3IONS-SCNC: 10 MMOL/L (ref 7–16)
AST SERPL-CCNC: 40 U/L (ref 0–39)
BILIRUB SERPL-MCNC: <0.2 MG/DL (ref 0–1.2)
BUN BLDV-MCNC: 10 MG/DL (ref 6–20)
CALCIUM SERPL-MCNC: 8.8 MG/DL (ref 8.6–10.2)
CHLORIDE BLD-SCNC: 98 MMOL/L (ref 98–107)
CO2: 28 MMOL/L (ref 22–29)
CREAT SERPL-MCNC: 0.8 MG/DL (ref 0.7–1.2)
CULTURE, BLOOD 2: NORMAL
GFR AFRICAN AMERICAN: >60
GFR NON-AFRICAN AMERICAN: >60 ML/MIN/1.73
GLUCOSE BLD-MCNC: 92 MG/DL (ref 74–99)
HCT VFR BLD CALC: 32.1 % (ref 37–54)
HEMOGLOBIN: 10.1 G/DL (ref 12.5–16.5)
MCH RBC QN AUTO: 25.4 PG (ref 26–35)
MCHC RBC AUTO-ENTMCNC: 31.5 % (ref 32–34.5)
MCV RBC AUTO: 80.9 FL (ref 80–99.9)
METER GLUCOSE: 113 MG/DL (ref 74–99)
METER GLUCOSE: 127 MG/DL (ref 74–99)
PDW BLD-RTO: 14.9 FL (ref 11.5–15)
PLATELET # BLD: 341 E9/L (ref 130–450)
PMV BLD AUTO: 8.3 FL (ref 7–12)
POTASSIUM SERPL-SCNC: 4.1 MMOL/L (ref 3.5–5)
RBC # BLD: 3.97 E12/L (ref 3.8–5.8)
SODIUM BLD-SCNC: 136 MMOL/L (ref 132–146)
TOTAL PROTEIN: 6.1 G/DL (ref 6.4–8.3)
WBC # BLD: 5.6 E9/L (ref 4.5–11.5)

## 2022-03-09 PROCEDURE — 6370000000 HC RX 637 (ALT 250 FOR IP): Performed by: NURSE PRACTITIONER

## 2022-03-09 PROCEDURE — 36415 COLL VENOUS BLD VENIPUNCTURE: CPT

## 2022-03-09 PROCEDURE — 6370000000 HC RX 637 (ALT 250 FOR IP): Performed by: PHYSICAL THERAPY ASSISTANT

## 2022-03-09 PROCEDURE — 2580000003 HC RX 258: Performed by: PHYSICAL THERAPY ASSISTANT

## 2022-03-09 PROCEDURE — 6360000002 HC RX W HCPCS: Performed by: INTERNAL MEDICINE

## 2022-03-09 PROCEDURE — 85027 COMPLETE CBC AUTOMATED: CPT

## 2022-03-09 PROCEDURE — 82962 GLUCOSE BLOOD TEST: CPT

## 2022-03-09 PROCEDURE — 80053 COMPREHEN METABOLIC PANEL: CPT

## 2022-03-09 PROCEDURE — 1200000000 HC SEMI PRIVATE

## 2022-03-09 PROCEDURE — 2580000003 HC RX 258: Performed by: NURSE PRACTITIONER

## 2022-03-09 PROCEDURE — 6360000002 HC RX W HCPCS: Performed by: PHYSICAL THERAPY ASSISTANT

## 2022-03-09 RX ADMIN — ACETAMINOPHEN 650 MG: 325 TABLET ORAL at 23:54

## 2022-03-09 RX ADMIN — PIPERACILLIN AND TAZOBACTAM 3375 MG: 3; .375 INJECTION, POWDER, LYOPHILIZED, FOR SOLUTION INTRAVENOUS at 20:30

## 2022-03-09 RX ADMIN — VANCOMYCIN HYDROCHLORIDE 1000 MG: 1 INJECTION, POWDER, LYOPHILIZED, FOR SOLUTION INTRAVENOUS at 04:30

## 2022-03-09 RX ADMIN — IBUPROFEN 400 MG: 400 TABLET ORAL at 18:18

## 2022-03-09 RX ADMIN — Medication 10 ML: at 20:55

## 2022-03-09 RX ADMIN — SODIUM CHLORIDE: 9 INJECTION, SOLUTION INTRAVENOUS at 18:58

## 2022-03-09 RX ADMIN — VANCOMYCIN HYDROCHLORIDE 1000 MG: 1 INJECTION, POWDER, LYOPHILIZED, FOR SOLUTION INTRAVENOUS at 11:10

## 2022-03-09 RX ADMIN — ACETAMINOPHEN 650 MG: 325 TABLET ORAL at 09:09

## 2022-03-09 RX ADMIN — ENOXAPARIN SODIUM 40 MG: 100 INJECTION SUBCUTANEOUS at 09:09

## 2022-03-09 RX ADMIN — PIPERACILLIN AND TAZOBACTAM 3375 MG: 3; .375 INJECTION, POWDER, LYOPHILIZED, FOR SOLUTION INTRAVENOUS at 12:26

## 2022-03-09 RX ADMIN — ACETAMINOPHEN 650 MG: 325 TABLET ORAL at 15:28

## 2022-03-09 RX ADMIN — PIPERACILLIN AND TAZOBACTAM 3375 MG: 3; .375 INJECTION, POWDER, LYOPHILIZED, FOR SOLUTION INTRAVENOUS at 06:00

## 2022-03-09 RX ADMIN — Medication 10 ML: at 09:42

## 2022-03-09 RX ADMIN — ACETAMINOPHEN 650 MG: 325 TABLET ORAL at 00:35

## 2022-03-09 RX ADMIN — Medication 3 MG: at 20:55

## 2022-03-09 RX ADMIN — VANCOMYCIN HYDROCHLORIDE 1000 MG: 1 INJECTION, POWDER, LYOPHILIZED, FOR SOLUTION INTRAVENOUS at 18:58

## 2022-03-09 ASSESSMENT — PAIN DESCRIPTION - PAIN TYPE
TYPE: ACUTE PAIN

## 2022-03-09 ASSESSMENT — PAIN DESCRIPTION - DESCRIPTORS
DESCRIPTORS: ACHING
DESCRIPTORS: ACHING
DESCRIPTORS: ACHING;CONSTANT;DISCOMFORT
DESCRIPTORS: ACHING

## 2022-03-09 ASSESSMENT — PAIN DESCRIPTION - LOCATION
LOCATION: LEG

## 2022-03-09 ASSESSMENT — PAIN SCALES - GENERAL
PAINLEVEL_OUTOF10: 5
PAINLEVEL_OUTOF10: 6
PAINLEVEL_OUTOF10: 5
PAINLEVEL_OUTOF10: 6
PAINLEVEL_OUTOF10: 0
PAINLEVEL_OUTOF10: 3
PAINLEVEL_OUTOF10: 6

## 2022-03-09 ASSESSMENT — PAIN DESCRIPTION - PROGRESSION
CLINICAL_PROGRESSION: NOT CHANGED

## 2022-03-09 ASSESSMENT — PAIN DESCRIPTION - ONSET
ONSET: ON-GOING

## 2022-03-09 ASSESSMENT — PAIN DESCRIPTION - FREQUENCY
FREQUENCY: CONTINUOUS

## 2022-03-09 ASSESSMENT — PAIN DESCRIPTION - ORIENTATION
ORIENTATION: RIGHT

## 2022-03-09 ASSESSMENT — PAIN SCALES - WONG BAKER
WONGBAKER_NUMERICALRESPONSE: 4
WONGBAKER_NUMERICALRESPONSE: 4

## 2022-03-09 NOTE — PROGRESS NOTES
Hospitalist Progress Note      SYNOPSIS: Patient admitted on 3/4/2022 for worsening leg pain. \"38yo M presented with 1 week of worsening leg pain with an associated fever.  He is an IV drug user and has had issues with abscesses in the past..  Imaging revealed a left thigh intramuscular abscess.  Patient admitted for a right lateral thigh abscess. ID is following and he is being treated with Vanco + zosyn. Orthopedic surgery is following and he is s/p I&D on 3/6. Blcx revealed serratia bacteremia. \"     SUBJECTIVE:    Patient seen and examined in his room. Denies any chest pain, nausea, vomiting. Patient anxious to be discharged. Records reviewed. Stable overnight. No other overnight issues reported. Temp (24hrs), Av.6 °F (36.4 °C), Min:97.5 °F (36.4 °C), Max:97.6 °F (36.4 °C)    DIET: ADULT DIET; Regular  CODE: Full Code    Intake/Output Summary (Last 24 hours) at 3/9/2022 0931  Last data filed at 3/9/2022 0700  Gross per 24 hour   Intake 1020 ml   Output 1400 ml   Net -380 ml       OBJECTIVE:    /71   Pulse 73   Temp 97.5 °F (36.4 °C) (Temporal)   Resp 16   Ht 6' 4\" (1.93 m)   Wt 200 lb (90.7 kg)   SpO2 99%   BMI 24.34 kg/m²     General appearance: No apparent distress, appears stated age and cooperative. HEENT:  Conjunctivae/corneas clear. Neck: Supple. No jugular venous distention. Respiratory: Clear to auscultation bilaterally, normal respiratory effort  Cardiovascular: Regular rate rhythm, normal S1-S2  Abdomen: Soft, nontender, nondistended  Musculoskeletal: No clubbing, cyanosis, no bilateral lower extremity edema. Brisk capillary refill. Skin:  No rashes  on visible skin  Neurologic: awake, alert and following commands     ASSESSMENT & PLAN:       Right thigh Abscess  -Orthopedic surgery. Pt I&D on 3/6. Surgical culture growing gram negative ga,await identification. Continue cefazolin and zosyn. -WBAT     Serratia bacteremia-likely from abscess  -ID consulted. Continue zosyn and vancomycin  Waiting for final cultures     IV drug abuse  -COWS protocol. Monitor for withdrawal      Elevated LFT  -Hep panel positive for Hep C. He will need treatment outpatient with GI.  -US liver shows enlargement of liver. Outpatient follow-up for management     Chronic Anemia  -Hgb stable     Tobacco dependence  -Cessation advised.     DVT Prophylaxis: lovenox  Diet: ADULT DIET; Regular  Code Status: Full Code     PT/OT Eval Status: as needed     Dispo -Pending final culture data    DISPOSITION:   Possible discharge in next 1 to 2 days. Medications:  REVIEWED DAILY    Infusion Medications    sodium chloride 100 mL/hr at 03/07/22 0955    sodium chloride      dextrose       Scheduled Medications    melatonin  3 mg Oral Nightly    enoxaparin  40 mg SubCUTAneous Daily    sodium chloride flush  10 mL IntraVENous 2 times per day    piperacillin-tazobactam  3,375 mg IntraVENous Q8H    vancomycin  1,000 mg IntraVENous Q8H    ceFAZolin  2,000 mg IntraVENous See Admin Instructions     PRN Meds: traMADol **AND** cloNIDine, dicyclomine, hydrOXYzine, promethazine, diphenhydrAMINE, sodium chloride flush, sodium chloride, ondansetron **OR** ondansetron, magnesium hydroxide, acetaminophen **OR** acetaminophen, ibuprofen, glucose, dextrose, glucagon (rDNA), dextrose    Labs:     Recent Labs     03/07/22  0811 03/08/22  0541 03/09/22  0814   WBC 10.6 7.2 5.6   HGB 10.5* 10.2* 10.1*   HCT 35.1* 32.3* 32.1*    296 341       Recent Labs     03/07/22  0428 03/08/22  0541    140   K 5.0 4.6    102   CO2 27 29   BUN 12 12   CREATININE 0.6* 0.7   CALCIUM 9.0 9.1       Recent Labs     03/07/22  0428 03/08/22  0541   PROT 6.0* 6.1*   ALKPHOS 152* 115   * 195*   * 123*   BILITOT <0.2 0.2       Recent Labs     03/07/22  0811   INR 1.1       No results for input(s): CKTOTAL, TROPONINI in the last 72 hours.     Chronic labs:    Lab Results   Component Value Date    INR 1.1 03/07/2022    LABA1C 5.4 10/03/2021       Radiology: REVIEWED DAILY    +++++++++++++++++++++++++++++++++++++++++++++++++  Daniel Medrano MD  [facility] - [address] [place], [place]  +++++++++++++++++++++++++++++++++++++++++++++++++  NOTE: This report was transcribed using voice recognition software. Every effort was made to ensure accuracy; however, inadvertent computerized transcription errors may be present.

## 2022-03-09 NOTE — PROGRESS NOTES
Pharmacy Consultation Note  (Antibiotic Dosing and Monitoring)    Initial consult date: 3/8/2022  Consulting physician/provider: Dr. Gadiel Gomez  Drug: Vancomycin  Indication: SSTI - right thigh abscess     Age/  Gender Height Weight IBW  Allergy Information   42 y.o./male 6' 4\" (193 cm) 200 lb (90.7 kg)     Ideal body weight: 86.8 kg (191 lb 5.7 oz)  Adjusted ideal body weight: 88.4 kg (194 lb 13 oz)   Patient has no known allergies. Renal Function:  Recent Labs     03/07/22  0428 03/08/22  0541   BUN 12 12   CREATININE 0.6* 0.7       Intake/Output Summary (Last 24 hours) at 3/9/2022 1256  Last data filed at 3/9/2022 1229  Gross per 24 hour   Intake 1500 ml   Output 2100 ml   Net -600 ml       Vancomycin Monitoring:  Trough:    Recent Labs     03/08/22  1758   VANCOTROUGH 11.3     Random:    Recent Labs     03/08/22  1133   VANCORANDOM 34.1       Vancomycin Administration Times:  Recent vancomycin administrations                   vancomycin 1000 mg IVPB in 250 mL D5W addavial (mg) 1,000 mg New Bag 03/08/22 1108     1,000 mg New Bag  0336     1,000 mg New Bag 03/07/22 2130     1,000 mg New Bag  1616     1,000 mg New Bag  0429     1,000 mg New Bag 03/06/22 2006     1,000 mg New Bag  1405     1,000 mg New Bag 03/05/22 2300                Assessment:  · Patient is a 40 y.o. male who has been initiated on vancomycin  Estimated Creatinine Clearance: 171 mL/min (based on SCr of 0.7 mg/dL). · To dose vancomycin, pharmacy will be utilizing [employer] calculation software for goal AUC/DAYAN 400-600 mg/L-hr   · 3/8: Vancomycin 1000 mg IV q8h initiated on 3/5/2022, vancomycin level at 11:33 = 34.1 mcg/mL - drawn while vancomycin dose infusing (dose started at 11:08). Scr 0.7 stable. ID consulted, discussed with Dr. Gadiel Gomez would like pharmacy to follow.    · 3/9: WBC WNL; afebrile; Trough 11.3 mg/L    Plan:  · Continue vancomycin 1000 mg IV q8h (predicted AUC/MUC = 411, Tr = 11.6)  · Will check vancomycin level when appropriate  · Will continue to monitor renal function   · Clinical pharmacy to follow      Lexus MossD  Pharmacy Resident  P: [phone]  3/9/2022 12:56 PM

## 2022-03-09 NOTE — CARE COORDINATION
Discharge plan is home, depending on cultures. If IV antibiotics are needed, pt will need to go to facility. Will follow.  Fred Kimball RN

## 2022-03-09 NOTE — PROGRESS NOTES
Department of Internal Medicine  Infectious Diseases   Progress  Note      C/C :  Right thigh abscess ,Serratia bacteremia     Denies fever or chills  Reports right thigh pain and stiffness   Afebrile       Current Facility-Administered Medications   Medication Dose Route Frequency Provider Last Rate Last Admin    cloNIDine (CATAPRES) tablet 0.1 mg  0.1 mg Oral PRN Wilbert Nova, APRN - NP        melatonin tablet 3 mg  3 mg Oral Nightly Wilbert Nova, APRN - NP   3 mg at 03/08/22 2200    dicyclomine (BENTYL) capsule 20 mg  20 mg Oral Q6H PRN Wilbert Nova, APRN - NP        hydrOXYzine (VISTARIL) capsule 50 mg  50 mg Oral Q8H PRN Wilbert Nova, APRN - NP        promethazine (PHENERGAN) tablet 25 mg  25 mg Oral Q6H PRN Wilbert Nova, APRN - NP        diphenhydrAMINE (BENADRYL) tablet 25 mg  25 mg Oral Nightly PRN Wilbert Nova, APRN - NP        enoxaparin (LOVENOX) injection 40 mg  40 mg SubCUTAneous Daily Mahnaz Kate MD   40 mg at 03/09/22 0909    0.9 % sodium chloride infusion   IntraVENous Continuous Wilbert Nova, APRN -  mL/hr at 03/07/22 0955 Rate Change at 03/07/22 0955    sodium chloride flush 0.9 % injection 10 mL  10 mL IntraVENous 2 times per day [place] L Chen, DO   10 mL at 03/09/22 0942    sodium chloride flush 0.9 % injection 10 mL  10 mL IntraVENous PRN [place] L Chen, DO        0.9 % sodium chloride infusion  25 mL IntraVENous PRN [place] L Chen, DO        ondansetron (ZOFRAN-ODT) disintegrating tablet 4 mg  4 mg Oral Q8H PRN Gambia L Chen, DO        Or    ondansetron (ZOFRAN) injection 4 mg  4 mg IntraVENous Q6H PRN Gamb[place] L Chen, DO        magnesium hydroxide (MILK OF MAGNESIA) 400 MG/5ML suspension 30 mL  30 mL Oral Daily PRN Gambia L Chen, DO        acetaminophen (TYLENOL) tablet 650 mg  650 mg Oral Q6H PRN [place] L Chen, DO   650 mg at 03/09/22 0909    Or    acetaminophen (TYLENOL) suppository 650 mg  650 mg Rectal Q6H PRN Ramez Bay DO        ibuprofen (ADVIL;MOTRIN) tablet 400 mg  400 mg Oral Q6H PRN [facility] Cehn, DO        piperacillin-tazobactam (ZOSYN) 3,375 mg in dextrose 5 % 100 mL IVPB extended infusion (mini-bag)  3,375 mg IntraVENous Q8H Roger WILBUR Chen, DO 25 mL/hr at 03/09/22 1226 3,375 mg at 03/09/22 1226    vancomycin 1000 mg IVPB in 250 mL D5W addavial  1,000 mg IntraVENous Q8H Gambia L Chen, DO   Stopped at 03/09/22 1226    glucose (GLUTOSE) 40 % oral gel 15 g  15 g Oral PRN Gambia L Chen, DO        dextrose 50 % IV solution  12.5 g IntraVENous PRN [facility] Chen, DO        glucagon (rDNA) injection 1 mg  1 mg IntraMUSCular PRN [facility] Chen, DO        dextrose 5 % solution  100 mL/hr IntraVENous PRN [facility] Chen, DO        ceFAZolin (ANCEF) 2000 mg in sterile water 20 mL IV syringe  2,000 mg IntraVENous See Admin Instructions [place] L Chen, DO             REVIEW OF SYSTEMS:    CONSTITUTIONAL:  Denies fever or chills   HEENT: denies blurring of vision or double vision, denies hearing problem  RESPIRATORY: denies cough, shortness of breath, sputum expectoration,. CARDIOVASCULAR:  Denies palpitation  GASTROINTESTINAL:  Denies abdomen pain, diarrhea or constipation. GENITOURINARY:  Denies burning urination or frequency of urination  INTEGUMENT:  No rash   HEMATOLOGIC/LYMPHATIC:  No external bleeding . MUSCULOSKELETAL:  Right leg, thigh pain,swelling, redness   NEUROLOGICAL:  Denies light headed, dizziness, loss of consciousness, weakness of lower extremities, bowel or bladder incontinence. PHYSICAL EXAM:      Vitals:     Vitals:    03/09/22 0812   BP: 120/82   Pulse: 76   Resp: 16   Temp: 98.2 °F (36.8 °C)   SpO2: 99%       General Appearance:    Awake, alert , no acute distress. Head:    Normocephalic, atraumatic   Eyes:    No pallor, no icterus,   Ears:    No obvious deformity or drainage.    Nose:   No nasal drainage   Throat:   Mucosa moist, no oral thrush   Neck:   Supple, no lymphadenopathy   Back:     no CVA tenderness   Lungs:     Clear to auscultation bilaterally, no wheeze    Heart:    Regular rate and rhythm, no murmur   Abdomen:     Soft, non-tender, bowel sounds present    Extremities:   Right thigh mild tender - wound dressed    Pulses:   Dorsalis pedis palpable    Skin:   no rashes      CBC with Differential:      Lab Results   Component Value Date    WBC 5.6 03/09/2022    RBC 3.97 03/09/2022    HGB 10.1 03/09/2022    HCT 32.1 03/09/2022     03/09/2022    MCV 80.9 03/09/2022    MCH 25.4 03/09/2022    MCHC 31.5 03/09/2022    RDW 14.9 03/09/2022    NRBC 0.9 05/05/2020    LYMPHOPCT 18.4 03/04/2022    MONOPCT 8.7 03/04/2022    MYELOPCT 0.9 05/05/2020    BASOPCT 0.4 03/04/2022    MONOSABS 0.89 03/04/2022    LYMPHSABS 1.87 03/04/2022    EOSABS 0.20 03/04/2022    BASOSABS 0.04 03/04/2022       CMP     Lab Results   Component Value Date     03/08/2022    K 4.6 03/08/2022    K 4.2 10/04/2021     03/08/2022    CO2 29 03/08/2022    BUN 12 03/08/2022    CREATININE 0.7 03/08/2022    GFRAA >60 03/08/2022    LABGLOM >60 03/08/2022    GLUCOSE 101 03/08/2022    PROT 6.1 03/08/2022    LABALBU 2.8 03/08/2022    CALCIUM 9.1 03/08/2022    BILITOT 0.2 03/08/2022    ALKPHOS 115 03/08/2022     03/08/2022     03/08/2022         Hepatic Function Panel:    Lab Results   Component Value Date    ALKPHOS 115 03/08/2022     03/08/2022     03/08/2022    PROT 6.1 03/08/2022    BILITOT 0.2 03/08/2022    BILIDIR <0.2 10/02/2021    IBILI see below 10/02/2021    LABALBU 2.8 03/08/2022       PT/INR:    Lab Results   Component Value Date    PROTIME 11.4 03/07/2022    INR 1.1 03/07/2022       MICROBIOLOGY:    Blood culture -    Culture, Blood 1 [[id number]] (Abnormal)  Collected: 03/04/22 2206   Order Status: Completed Specimen: Blood Updated: 03/09/22 0710    Blood Culture, Routine -- Abnormal     24 Hours no growth   Gram stain performed from blood culture bottle media   Gram negative rods  Abnormal     Organism Serratia marcescens Abnormal     Blood Culture, Routine Identification and sensitivity to follow   Narrative:     Source: BLOOD       Site:           Microbiology results called to and read back by Dereck Aquino           Serratia marcescens by PCR DETECTED Panic           Abscess cx -      Culture, Surgical [[id number]] (Abnormal)  Collected: 03/06/22 0937   Order Status: Completed Specimen: Specimen from Leg Updated: 03/09/22 2850    Culture Surgical -- Abnormal     Growth not present, incubation continues   Growth present, evaluating for:   Gram positive organisms    Abnormal     Organism Serratia marcescens Abnormal     Culture Surgical --    Moderate growth   Identification and sensitivity to follow     Organism Beta Strep Group F Abnormal     Culture Surgical --    Moderate growth   Sensitivity to follow    Narrative:     Source: ABSC       Site: RT THIGH                   Vancomycin 34.1          Radiology :     CT scan -    Large irregular collection containing fluid and gas within the right vastus   lateralis and intermedius muscle bellies, compatible with intramuscular   abscess.  No definite radiopaque foreign body. IMPRESSION:     1. Serratia bacteremia  2. Right thigh abscess s/p I & D ( 3/6)   3. IVDU    4. Hx of Hep C infection, neg PCR       RECOMMENDATIONS:      1. Vancomycin 1 gram IV q 8 hrs ( pharmacy following )  2. Zosyn 3.375 grams IV q 8 hrs, monitor Cr   3.  Await cx

## 2022-03-10 LAB
ANAEROBIC CULTURE: ABNORMAL
CULTURE SURGICAL: ABNORMAL
CULTURE SURGICAL: ABNORMAL
ORGANISM: ABNORMAL

## 2022-03-10 PROCEDURE — 2580000003 HC RX 258: Performed by: PHYSICAL THERAPY ASSISTANT

## 2022-03-10 PROCEDURE — 6370000000 HC RX 637 (ALT 250 FOR IP): Performed by: NURSE PRACTITIONER

## 2022-03-10 PROCEDURE — 87040 BLOOD CULTURE FOR BACTERIA: CPT

## 2022-03-10 PROCEDURE — 36415 COLL VENOUS BLD VENIPUNCTURE: CPT

## 2022-03-10 PROCEDURE — 6360000002 HC RX W HCPCS: Performed by: PHYSICAL THERAPY ASSISTANT

## 2022-03-10 PROCEDURE — 6360000002 HC RX W HCPCS: Performed by: INTERNAL MEDICINE

## 2022-03-10 PROCEDURE — 1200000000 HC SEMI PRIVATE

## 2022-03-10 PROCEDURE — 2580000003 HC RX 258: Performed by: NURSE PRACTITIONER

## 2022-03-10 PROCEDURE — 6370000000 HC RX 637 (ALT 250 FOR IP): Performed by: PHYSICAL THERAPY ASSISTANT

## 2022-03-10 RX ADMIN — PIPERACILLIN AND TAZOBACTAM 3375 MG: 3; .375 INJECTION, POWDER, LYOPHILIZED, FOR SOLUTION INTRAVENOUS at 04:15

## 2022-03-10 RX ADMIN — PIPERACILLIN AND TAZOBACTAM 3375 MG: 3; .375 INJECTION, POWDER, LYOPHILIZED, FOR SOLUTION INTRAVENOUS at 12:52

## 2022-03-10 RX ADMIN — Medication 3 MG: at 21:35

## 2022-03-10 RX ADMIN — PIPERACILLIN AND TAZOBACTAM 3375 MG: 3; .375 INJECTION, POWDER, LYOPHILIZED, FOR SOLUTION INTRAVENOUS at 18:54

## 2022-03-10 RX ADMIN — ACETAMINOPHEN 650 MG: 325 TABLET ORAL at 22:39

## 2022-03-10 RX ADMIN — IBUPROFEN 400 MG: 400 TABLET ORAL at 18:56

## 2022-03-10 RX ADMIN — ACETAMINOPHEN 650 MG: 325 TABLET ORAL at 08:56

## 2022-03-10 RX ADMIN — ACETAMINOPHEN 650 MG: 325 TABLET ORAL at 16:02

## 2022-03-10 RX ADMIN — ENOXAPARIN SODIUM 40 MG: 100 INJECTION SUBCUTANEOUS at 08:56

## 2022-03-10 RX ADMIN — VANCOMYCIN HYDROCHLORIDE 1000 MG: 1 INJECTION, POWDER, LYOPHILIZED, FOR SOLUTION INTRAVENOUS at 03:09

## 2022-03-10 RX ADMIN — SODIUM CHLORIDE: 9 INJECTION, SOLUTION INTRAVENOUS at 23:59

## 2022-03-10 RX ADMIN — Medication 10 ML: at 21:36

## 2022-03-10 ASSESSMENT — PAIN DESCRIPTION - FREQUENCY
FREQUENCY: CONTINUOUS

## 2022-03-10 ASSESSMENT — PAIN DESCRIPTION - LOCATION
LOCATION: LEG

## 2022-03-10 ASSESSMENT — PAIN SCALES - GENERAL
PAINLEVEL_OUTOF10: 3
PAINLEVEL_OUTOF10: 2
PAINLEVEL_OUTOF10: 6
PAINLEVEL_OUTOF10: 3
PAINLEVEL_OUTOF10: 1
PAINLEVEL_OUTOF10: 5
PAINLEVEL_OUTOF10: 3
PAINLEVEL_OUTOF10: 5
PAINLEVEL_OUTOF10: 3

## 2022-03-10 ASSESSMENT — PAIN DESCRIPTION - PAIN TYPE
TYPE: ACUTE PAIN
TYPE: SURGICAL PAIN
TYPE: ACUTE PAIN
TYPE: SURGICAL PAIN

## 2022-03-10 ASSESSMENT — PAIN DESCRIPTION - ONSET
ONSET: ON-GOING

## 2022-03-10 ASSESSMENT — PAIN DESCRIPTION - DESCRIPTORS
DESCRIPTORS: DISCOMFORT
DESCRIPTORS: ACHING

## 2022-03-10 ASSESSMENT — PAIN DESCRIPTION - ORIENTATION
ORIENTATION: RIGHT

## 2022-03-10 ASSESSMENT — PAIN DESCRIPTION - PROGRESSION
CLINICAL_PROGRESSION: NOT CHANGED
CLINICAL_PROGRESSION: GRADUALLY IMPROVING
CLINICAL_PROGRESSION: NOT CHANGED

## 2022-03-10 NOTE — PROGRESS NOTES
Pharmacy Consultation Note  (Antibiotic Dosing and Monitoring)    Initial consult date: 3/8/2022  Consulting physician/provider: Dr. Vlad Velez  Drug: Vancomycin  Indication: SSTI - right thigh abscess     Age/  Gender Height Weight IBW  Allergy Information   42 y.o./male 6' 4\" (193 cm) 200 lb (90.7 kg)     Ideal body weight: 86.8 kg (191 lb 5.7 oz)  Adjusted ideal body weight: 88.4 kg (194 lb 13 oz)   Patient has no known allergies. Renal Function:  Recent Labs     03/08/22  0541 03/09/22  0814   BUN 12 10   CREATININE 0.7 0.8       Intake/Output Summary (Last 24 hours) at 3/10/2022 0916  Last data filed at 3/10/2022 6035  Gross per 24 hour   Intake 1712 ml   Output 2650 ml   Net -938 ml       Vancomycin Monitoring:  Trough:    Recent Labs     03/08/22  1758   VANCOTROUGH 11.3     Random:    Recent Labs     03/08/22  1133   VANCORANDOM 34.1       Vancomycin Administration Times:  Recent vancomycin administrations                   vancomycin 1000 mg IVPB in 250 mL D5W addavial (mg) 1,000 mg New Bag 03/10/22 0309     1,000 mg New Bag 03/09/22 1858     1,000 mg New Bag  1110     1,000 mg New Bag  0430     1,000 mg New Bag 03/08/22 1901     1,000 mg New Bag  1108     1,000 mg New Bag  0336     1,000 mg New Bag 03/07/22 2130     1,000 mg New Bag  1616                  Assessment:  · Patient is a 40 y.o. male who has been initiated on vancomycin  Estimated Creatinine Clearance: 149 mL/min (based on SCr of 0.8 mg/dL). · To dose vancomycin, pharmacy will be utilizing [employer] calculation software for goal AUC/DAYAN 400-600 mg/L-hr   · 3/8: Vancomycin 1000 mg IV q8h initiated on 3/5/2022, vancomycin level at 11:33 = 34.1 mcg/mL - drawn while vancomycin dose infusing (dose started at 11:08). Scr 0.7 stable. ID consulted, discussed with Dr. Vlad Velez would like pharmacy to follow.    · 3/9: WBC WNL; afebrile; Trough 11.3 mg/L  · 3/10: no labs yet today; afebrile    Plan:  · Continue vancomycin 1000 mg IV q8h (predicted AUC/MUC = 411, Tr = 11.6)  · Will check vancomycin level when appropriate  · Will continue to monitor renal function   · Clinical pharmacy to follow      Chelo Carballo PharmD  Pharmacy Resident  P: [phone]  3/10/2022 9:16 AM

## 2022-03-10 NOTE — PROGRESS NOTES
Hospitalist Progress Note      SYNOPSIS: Patient admitted on 3/4/2022 for worsening leg pain. \"38yo M presented with 1 week of worsening leg pain with an associated fever.  He is an IV drug user and has had issues with abscesses in the past..  Imaging revealed a left thigh intramuscular abscess.  Patient admitted for a right lateral thigh abscess. ID is following and he is being treated with Vanco + zosyn. Orthopedic surgery is following and he is s/p I&D on 3/6. Blcx revealed serratia bacteremia. \"   Patient was maintained on IV vancomycin and Zosyn. SUBJECTIVE:    Patient seen and examined in his room. Denies any chest pain, nausea, vomiting. Patient denies any active complaint. Records reviewed. Stable overnight. No other overnight issues reported. Temp (24hrs), Av.9 °F (36.6 °C), Min:97.3 °F (36.3 °C), Max:98.2 °F (36.8 °C)    DIET: ADULT DIET; Regular  CODE: Full Code    Intake/Output Summary (Last 24 hours) at 3/10/2022 0748  Last data filed at 3/10/2022 3893  Gross per 24 hour   Intake 1952 ml   Output 2950 ml   Net -998 ml       OBJECTIVE:    /86   Pulse 72   Temp 97.3 °F (36.3 °C) (Temporal)   Resp 16   Ht 6' 4\" (1.93 m)   Wt 200 lb (90.7 kg)   SpO2 99%   BMI 24.34 kg/m²     General appearance: No apparent distress, appears stated age and cooperative. HEENT:  Conjunctivae/corneas clear. Neck: Supple. No jugular venous distention. Respiratory: Clear to auscultation bilaterally, normal respiratory effort  Cardiovascular: Regular rate rhythm, normal S1-S2  Abdomen: Soft, nontender, nondistended  Musculoskeletal: No clubbing, cyanosis, no bilateral lower extremity edema. Brisk capillary refill. Skin:  No rashes  on visible skin  Neurologic: awake, alert and following commands     ASSESSMENT & PLAN:       Right thigh Abscess  -Orthopedic surgery. Pt I&D on 3/6. Surgical culture growing gram negative ga,await identification. continue vancomycin and Zosyn as per ID  -WBAT     Serratia bacteremia-likely from abscess  -ID consulted. Continue zosyn and vancomycin  Waiting for final cultures     IV drug abuse  -COWS protocol. Monitor for withdrawal      Elevated LFT, trending down at the time of discharge  -Hep panel positive for Hep C. He will need treatment outpatient with GI.  -US liver shows enlargement of liver. Outpatient follow-up for management     Chronic Anemia  -Hgb stable     Tobacco dependence  -Cessation advised.     DVT Prophylaxis: lovenox  Diet: ADULT DIET; Regular  Code Status: Full Code     PT/OT Eval Status: as needed     Dispo -Pending final culture data    DISPOSITION:   Possible discharge in next 1 to 2 days. Medications:  REVIEWED DAILY    Infusion Medications    sodium chloride 100 mL/hr at 22 1858    sodium chloride      dextrose       Scheduled Medications    melatonin  3 mg Oral Nightly    enoxaparin  40 mg SubCUTAneous Daily    sodium chloride flush  10 mL IntraVENous 2 times per day    piperacillin-tazobactam  3,375 mg IntraVENous Q8H    vancomycin  1,000 mg IntraVENous Q8H     PRN Meds: [] traMADol **AND** cloNIDine, dicyclomine, hydrOXYzine, promethazine, diphenhydrAMINE, sodium chloride flush, sodium chloride, ondansetron **OR** ondansetron, magnesium hydroxide, acetaminophen **OR** acetaminophen, ibuprofen, glucose, dextrose, glucagon (rDNA), dextrose    Labs:     Recent Labs     22  0811 22  0541 22  0814   WBC 10.6 7.2 5.6   HGB 10.5* 10.2* 10.1*   HCT 35.1* 32.3* 32.1*    296 341       Recent Labs     22  0541 22  0814    136   K 4.6 4.1    98   CO2 29 28   BUN 12 10   CREATININE 0.7 0.8   CALCIUM 9.1 8.8       Recent Labs     22  0541 22  0814   PROT 6.1* 6.1*   ALKPHOS 115 103   * 131*   * 40*   BILITOT 0.2 <0.2       Recent Labs     22  0811   INR 1.1       No results for input(s): CKTOTAL, TROPONINI in the last 72 hours.     Chronic labs:    Lab Results   Component Value Date    INR 1.1 03/07/2022    LABA1C 5.4 10/03/2021       Radiology: REVIEWED DAILY    +++++++++++++++++++++++++++++++++++++++++++++++++  Devora Beasley MD  [facility] Physician - [address] [place] [address]  +++++++++++++++++++++++++++++++++++++++++++++++++  NOTE: This report was transcribed using voice recognition software. Every effort was made to ensure accuracy; however, inadvertent computerized transcription errors may be present.

## 2022-03-10 NOTE — PROGRESS NOTES
Department of Internal Medicine  Infectious Diseases   Progress  Note      C/C :  Right thigh abscess ,Serratia bacteremia     Denies fever or chills  Reports right thigh pain and stiffness   Afebrile       Current Facility-Administered Medications   Medication Dose Route Frequency Provider Last Rate Last Admin    cloNIDine (CATAPRES) tablet 0.1 mg  0.1 mg Oral PRN Burnadette Salines, APRN - NP        melatonin tablet 3 mg  3 mg Oral Nightly Burnadette Salines, APRN - NP   3 mg at 03/09/22 2055    dicyclomine (BENTYL) capsule 20 mg  20 mg Oral Q6H PRN Burnadette Salines, APRN - NP        hydrOXYzine (VISTARIL) capsule 50 mg  50 mg Oral Q8H PRN Burnadette Salines, APRN - NP        promethazine (PHENERGAN) tablet 25 mg  25 mg Oral Q6H PRN Burnadette Salines, APRN - NP        diphenhydrAMINE (BENADRYL) tablet 25 mg  25 mg Oral Nightly PRN Burnadette Salines, APRN - NP        enoxaparin (LOVENOX) injection 40 mg  40 mg SubCUTAneous Daily Kvng Wilkinson MD   40 mg at 03/10/22 0856    0.9 % sodium chloride infusion   IntraVENous Continuous Burnadette Salines, APRN -  mL/hr at 03/09/22 1858 New Bag at 03/09/22 1858    sodium chloride flush 0.9 % injection 10 mL  10 mL IntraVENous 2 times per day Wen Cas L Chen, DO   10 mL at 03/09/22 2055    sodium chloride flush 0.9 % injection 10 mL  10 mL IntraVENous PRN Wen Cas L Chen, DO        0.9 % sodium chloride infusion  25 mL IntraVENous PRN Wen Cas L Chen, DO        ondansetron (ZOFRAN-ODT) disintegrating tablet 4 mg  4 mg Oral Q8H PRN Wen Cas L Chen, DO        Or    ondansetron (ZOFRAN) injection 4 mg  4 mg IntraVENous Q6H PRN Wen Cas L Chen, DO        magnesium hydroxide (MILK OF MAGNESIA) 400 MG/5ML suspension 30 mL  30 mL Oral Daily PRN Wen Cas L Chen, DO        acetaminophen (TYLENOL) tablet 650 mg  650 mg Oral Q6H PRN Wen Cas L Chen, DO   650 mg at 03/10/22 0856    Or    acetaminophen (TYLENOL) suppository 650 mg  650 mg Rectal Q6H PRN Eboni Hauser DO        ibuprofen (ADVIL;MOTRIN) tablet 400 mg  400 mg Oral Q6H PRN [place] L Chen, DO   400 mg at 03/09/22 1818    piperacillin-tazobactam (ZOSYN) 3,375 mg in dextrose 5 % 100 mL IVPB extended infusion (mini-bag)  3,375 mg IntraVENous Q8H [place] L Chen, DO   Stopped at 03/10/22 0851    vancomycin 1000 mg IVPB in 250 mL D5W addavial  1,000 mg IntraVENous Q8H Gambia L Chen, DO   Stopped at 03/10/22 0414    glucose (GLUTOSE) 40 % oral gel 15 g  15 g Oral PRN Gambia L Chen, DO        dextrose 50 % IV solution  12.5 g IntraVENous PRN Gambia L Chen, DO        glucagon (rDNA) injection 1 mg  1 mg IntraMUSCular PRN Gamb[place] L Chen, DO        dextrose 5 % solution  100 mL/hr IntraVENous PRN Gamb[place] L Chen, DO             REVIEW OF SYSTEMS:    CONSTITUTIONAL:  Denies fever or chills   HEENT: denies blurring of vision or double vision, denies hearing problem  RESPIRATORY: denies cough, shortness of breath, sputum expectoration,. CARDIOVASCULAR:  Denies palpitation  GASTROINTESTINAL:  Denies abdomen pain, diarrhea or constipation. GENITOURINARY:  Denies burning urination or frequency of urination  INTEGUMENT:  No rash   HEMATOLOGIC/LYMPHATIC:  No external bleeding . MUSCULOSKELETAL:  Right leg, thigh pain,swelling, redness   NEUROLOGICAL:  Denies light headed, dizziness, loss of consciousness, weakness of lower extremities, bowel or bladder incontinence. PHYSICAL EXAM:      Vitals:     Vitals:    03/10/22 0715   BP: 103/74   Pulse: 69   Resp: 16   Temp: 98.6 °F (37 °C)   SpO2: 98%       General Appearance:    Awake, alert , no acute distress. Head:    Normocephalic, atraumatic   Eyes:    No pallor, no icterus,   Ears:    No obvious deformity or drainage.    Nose:   No nasal drainage   Throat:   Mucosa moist, no oral thrush   Neck:   Supple, no lymphadenopathy   Back:     no CVA tenderness   Lungs:     Clear to auscultation bilaterally, no wheeze    Heart:    Regular rate and rhythm, no murmur   Abdomen: Soft, non-tender, bowel sounds present    Extremities:   Right thigh mild tender - wound dressed    Pulses:   Dorsalis pedis palpable    Skin:   no rashes      CBC with Differential:      Lab Results   Component Value Date    WBC 5.6 03/09/2022    RBC 3.97 03/09/2022    HGB 10.1 03/09/2022    HCT 32.1 03/09/2022     03/09/2022    MCV 80.9 03/09/2022    MCH 25.4 03/09/2022    MCHC 31.5 03/09/2022    RDW 14.9 03/09/2022    NRBC 0.9 05/05/2020    LYMPHOPCT 18.4 03/04/2022    MONOPCT 8.7 03/04/2022    MYELOPCT 0.9 05/05/2020    BASOPCT 0.4 03/04/2022    MONOSABS 0.89 03/04/2022    LYMPHSABS 1.87 03/04/2022    EOSABS 0.20 03/04/2022    BASOSABS 0.04 03/04/2022       CMP     Lab Results   Component Value Date     03/09/2022    K 4.1 03/09/2022    K 4.2 10/04/2021    CL 98 03/09/2022    CO2 28 03/09/2022    BUN 10 03/09/2022    CREATININE 0.8 03/09/2022    GFRAA >60 03/09/2022    LABGLOM >60 03/09/2022    GLUCOSE 92 03/09/2022    PROT 6.1 03/09/2022    LABALBU 2.7 03/09/2022    CALCIUM 8.8 03/09/2022    BILITOT <0.2 03/09/2022    ALKPHOS 103 03/09/2022    AST 40 03/09/2022     03/09/2022         Hepatic Function Panel:    Lab Results   Component Value Date    ALKPHOS 103 03/09/2022     03/09/2022    AST 40 03/09/2022    PROT 6.1 03/09/2022    BILITOT <0.2 03/09/2022    BILIDIR <0.2 10/02/2021    IBILI see below 10/02/2021    LABALBU 2.7 03/09/2022       PT/INR:    Lab Results   Component Value Date    PROTIME 11.4 03/07/2022    INR 1.1 03/07/2022       MICROBIOLOGY:    Blood culture -    Culture, Blood 1 [[id number]] (Abnormal)  Collected: 03/04/22 2206   Order Status: Completed Specimen: Blood Updated: 03/09/22 0710    Blood Culture, Routine -- Abnormal     24 Hours no growth   Gram stain performed from blood culture bottle media   Gram negative rods    Abnormal     Organism Serratia marcescens Abnormal     Blood Culture, Routine Identification and sensitivity to follow   Narrative:     Source: BLOOD       Site:           Microbiology results called to and read back by Scout Gonzalez           Serratia marcescens by PCR DETECTED Panic           Abscess cx -      Culture, Surgical [[id number]] (Abnormal)  Collected: 03/06/22 0937   Order Status: Completed Specimen: Specimen from Leg Updated: 03/09/22 1077    Culture Surgical -- Abnormal     Growth not present, incubation continues   Growth present, evaluating for:   Gram positive organisms    Abnormal     Organism Serratia marcescens Abnormal     Culture Surgical --    Moderate growth   Identification and sensitivity to follow     Organism Beta Strep Group F Abnormal     Culture Surgical --    Moderate growth   Sensitivity to follow    Narrative:     Source: ABSC       Site: RT THIGH                   Vancomycin 34.1          Radiology :     CT scan -    Large irregular collection containing fluid and gas within the right vastus   lateralis and intermedius muscle bellies, compatible with intramuscular   abscess.  No definite radiopaque foreign body. IMPRESSION:     1. Serratia bacteremia  2. Right thigh abscess s/p I & D ( 3/6)   3. IVDU    4. Hx of Hep C infection, neg PCR       RECOMMENDATIONS:      1. Stop vancomycin   2. Zosyn 3.375 grams IV q 8 hrs, monitor Cr   3. Blood cx,   4.  D/C home on oral abx soon

## 2022-03-11 VITALS
RESPIRATION RATE: 16 BRPM | HEIGHT: 76 IN | WEIGHT: 200 LBS | TEMPERATURE: 96.8 F | SYSTOLIC BLOOD PRESSURE: 120 MMHG | OXYGEN SATURATION: 99 % | HEART RATE: 60 BPM | DIASTOLIC BLOOD PRESSURE: 86 MMHG | BODY MASS INDEX: 24.36 KG/M2

## 2022-03-11 LAB
ALBUMIN SERPL-MCNC: 2.7 G/DL (ref 3.5–5.2)
ALP BLD-CCNC: 95 U/L (ref 40–129)
ALT SERPL-CCNC: 73 U/L (ref 0–40)
ANION GAP SERPL CALCULATED.3IONS-SCNC: 11 MMOL/L (ref 7–16)
AST SERPL-CCNC: 28 U/L (ref 0–39)
BILIRUB SERPL-MCNC: <0.2 MG/DL (ref 0–1.2)
BUN BLDV-MCNC: 13 MG/DL (ref 6–20)
CALCIUM SERPL-MCNC: 8.8 MG/DL (ref 8.6–10.2)
CHLORIDE BLD-SCNC: 103 MMOL/L (ref 98–107)
CO2: 23 MMOL/L (ref 22–29)
CREAT SERPL-MCNC: 0.7 MG/DL (ref 0.7–1.2)
GFR AFRICAN AMERICAN: >60
GFR NON-AFRICAN AMERICAN: >60 ML/MIN/1.73
GLUCOSE BLD-MCNC: 84 MG/DL (ref 74–99)
HCT VFR BLD CALC: 36.1 % (ref 37–54)
HEMOGLOBIN: 10.4 G/DL (ref 12.5–16.5)
MCH RBC QN AUTO: 25.5 PG (ref 26–35)
MCHC RBC AUTO-ENTMCNC: 28.8 % (ref 32–34.5)
MCV RBC AUTO: 88.5 FL (ref 80–99.9)
PDW BLD-RTO: 15.2 FL (ref 11.5–15)
PLATELET # BLD: 333 E9/L (ref 130–450)
PMV BLD AUTO: 8.8 FL (ref 7–12)
POTASSIUM SERPL-SCNC: 4.6 MMOL/L (ref 3.5–5)
RBC # BLD: 4.08 E12/L (ref 3.8–5.8)
SODIUM BLD-SCNC: 137 MMOL/L (ref 132–146)
TOTAL PROTEIN: 6.1 G/DL (ref 6.4–8.3)
WBC # BLD: 5.6 E9/L (ref 4.5–11.5)

## 2022-03-11 PROCEDURE — 36415 COLL VENOUS BLD VENIPUNCTURE: CPT

## 2022-03-11 PROCEDURE — 2580000003 HC RX 258: Performed by: PHYSICAL THERAPY ASSISTANT

## 2022-03-11 PROCEDURE — 6370000000 HC RX 637 (ALT 250 FOR IP): Performed by: PHYSICAL THERAPY ASSISTANT

## 2022-03-11 PROCEDURE — 6360000002 HC RX W HCPCS: Performed by: INTERNAL MEDICINE

## 2022-03-11 PROCEDURE — 6370000000 HC RX 637 (ALT 250 FOR IP): Performed by: INTERNAL MEDICINE

## 2022-03-11 PROCEDURE — 80053 COMPREHEN METABOLIC PANEL: CPT

## 2022-03-11 PROCEDURE — 85027 COMPLETE CBC AUTOMATED: CPT

## 2022-03-11 PROCEDURE — 6360000002 HC RX W HCPCS: Performed by: PHYSICAL THERAPY ASSISTANT

## 2022-03-11 RX ORDER — LEVOFLOXACIN 500 MG/1
500 TABLET, FILM COATED ORAL DAILY
Status: DISCONTINUED | OUTPATIENT
Start: 2022-03-11 | End: 2022-03-11 | Stop reason: HOSPADM

## 2022-03-11 RX ORDER — AMOXICILLIN AND CLAVULANATE POTASSIUM 562.5; 437.5; 62.5 MG/1; MG/1; MG/1
2 TABLET, FILM COATED, EXTENDED RELEASE ORAL EVERY 12 HOURS SCHEDULED
Qty: 56 TABLET | Refills: 0 | Status: SHIPPED | OUTPATIENT
Start: 2022-03-11 | End: 2022-03-25

## 2022-03-11 RX ORDER — AMOXICILLIN AND CLAVULANATE POTASSIUM 562.5; 437.5; 62.5 MG/1; MG/1; MG/1
2 TABLET, FILM COATED, EXTENDED RELEASE ORAL EVERY 12 HOURS SCHEDULED
Status: DISCONTINUED | OUTPATIENT
Start: 2022-03-11 | End: 2022-03-11 | Stop reason: HOSPADM

## 2022-03-11 RX ORDER — LEVOFLOXACIN 500 MG/1
500 TABLET, FILM COATED ORAL DAILY
Qty: 14 TABLET | Refills: 0 | Status: SHIPPED | OUTPATIENT
Start: 2022-03-11 | End: 2022-03-25

## 2022-03-11 RX ADMIN — PIPERACILLIN AND TAZOBACTAM 3375 MG: 3; .375 INJECTION, POWDER, LYOPHILIZED, FOR SOLUTION INTRAVENOUS at 03:35

## 2022-03-11 RX ADMIN — LEVOFLOXACIN 500 MG: 500 TABLET, FILM COATED ORAL at 12:05

## 2022-03-11 RX ADMIN — ENOXAPARIN SODIUM 40 MG: 100 INJECTION SUBCUTANEOUS at 08:09

## 2022-03-11 RX ADMIN — AMOXICILLIN AND CLAVULANATE POTASSIUM 2 TABLET: 562.5; 437.5; 62.5 TABLET, MULTILAYER, EXTENDED RELEASE ORAL at 12:06

## 2022-03-11 RX ADMIN — IBUPROFEN 400 MG: 400 TABLET ORAL at 08:09

## 2022-03-11 ASSESSMENT — PAIN DESCRIPTION - PAIN TYPE
TYPE: SURGICAL PAIN
TYPE: SURGICAL PAIN

## 2022-03-11 ASSESSMENT — PAIN DESCRIPTION - ONSET
ONSET: ON-GOING
ONSET: ON-GOING

## 2022-03-11 ASSESSMENT — PAIN DESCRIPTION - FREQUENCY
FREQUENCY: CONTINUOUS
FREQUENCY: CONTINUOUS

## 2022-03-11 ASSESSMENT — PAIN DESCRIPTION - DESCRIPTORS
DESCRIPTORS: ACHING;CONSTANT;DISCOMFORT
DESCRIPTORS: ACHING

## 2022-03-11 ASSESSMENT — PAIN - FUNCTIONAL ASSESSMENT
PAIN_FUNCTIONAL_ASSESSMENT: PREVENTS OR INTERFERES SOME ACTIVE ACTIVITIES AND ADLS
PAIN_FUNCTIONAL_ASSESSMENT: PREVENTS OR INTERFERES SOME ACTIVE ACTIVITIES AND ADLS

## 2022-03-11 ASSESSMENT — PAIN DESCRIPTION - ORIENTATION
ORIENTATION: RIGHT
ORIENTATION: RIGHT

## 2022-03-11 ASSESSMENT — PAIN DESCRIPTION - PROGRESSION
CLINICAL_PROGRESSION: NOT CHANGED
CLINICAL_PROGRESSION: NOT CHANGED

## 2022-03-11 ASSESSMENT — PAIN SCALES - GENERAL
PAINLEVEL_OUTOF10: 5
PAINLEVEL_OUTOF10: 2

## 2022-03-11 ASSESSMENT — PAIN DESCRIPTION - LOCATION
LOCATION: LEG
LOCATION: LEG

## 2022-03-11 NOTE — PROGRESS NOTES
Comprehensive Nutrition Assessment    Type and Reason for Visit:  Initial,RD Nutrition Re-Screen/LOS    Nutrition Recommendations/Plan: Continue current diet and start ONS to aid in wound healing; will monitor. Nutrition Assessment:  pt adm d/t leg swelling, fever, pharyngitis; note adm on 10/2021 for abd mass; s/p I&D (3/6); hx of IV drug abuse, non-healing wounds, hep C; pt intake good since adm at ~% at meals; will start ONS to aid in wound healing. Malnutrition Assessment:  Malnutrition Status:  Insufficient data    Context:  Acute Illness     Findings of the 6 clinical characteristics of malnutrition:  Energy Intake:  No significant decrease in energy intake  Weight Loss:  Unable to assess (d/t lack of measured wt hx per EMR)     Body Fat Loss:  Unable to assess     Muscle Mass Loss:  Unable to assess    Fluid Accumulation:  No significant fluid accumulation     Strength:  Not Performed    Estimated Daily Nutrient Needs:  Energy (kcal):  2273-3957; Weight Used for Energy Requirements:  Current     Protein (g):  1.3-1. 5uDOL=028-112u; Weight Used for Protein Requirements:  Current        Fluid (ml/day):  9800-5602; Method Used for Fluid Requirements:  1 ml/kcal      Nutrition Related Findings:  -I/O; A&Ox4; no edema; abd WNL      Wounds:  Surgical Incision (I&D/thigh)       Current Nutrition Therapies:    ADULT DIET; Regular  ADULT ORAL NUTRITION SUPPLEMENT; Breakfast, Lunch; Low Calorie/High Protein Oral Supplement    Anthropometric Measures:  · Height: 6' 4\" (193 cm)  · Current Body Weight: 200 lb (90.7 kg) (3/5- no method)   · Admission Body Weight: 200 lb (90.7 kg) (3/5- no method)    · Usual Body Weight: 176 lb 5.9 oz (80 kg) (10/2/21- [facility])     · Ideal Body Weight: 202 lbs; % Ideal Body Weight 99 %   · BMI: 24.4  · Adjusted Body Weight:  ; No Adjustment    · BMI Categories: Normal Weight (BMI 18.5-24. 9)       Nutrition Diagnosis:   · Increased nutrient needs related to increase demand for energy/nutrients as evidenced by wounds      Nutrition Interventions:   Food and/or Nutrient Delivery:  Continue Current Diet,Start Oral Nutrition Supplement (Ensure HP BID)  Nutrition Education/Counseling:  Education not indicated   Coordination of Nutrition Care:  Continue to monitor while inpatient    Goals:  Continues to consume >75% of meals and consumes >50% ONS. Nutrition Monitoring and Evaluation:   Behavioral-Environmental Outcomes:  None Identified   Food/Nutrient Intake Outcomes:  Food and Nutrient Intake,Supplement Intake  Physical Signs/Symptoms Outcomes:  Biochemical Data,Nutrition Focused Physical Findings,Skin,Weight,Chewing or Swallowing,GI Status,Fluid Status or Edema     Discharge Planning:     Too soon to determine     Electronically signed by Nguyễn Kevin RD on 3/11/22 at 2:27 PM EST    Contact: [phone]

## 2022-03-11 NOTE — DISCHARGE SUMMARY
Hospitalist Discharge Summary    Patient ID: Haseeb Baig   Patient : 1980  Patient's PCP: No primary care provider on file. Admit Date: 3/4/2022   Admitting Physician: Quintin Ash MD    Discharge Date:  3/11/2022   Discharge Physician: Holly Lewis MD   Discharge Condition: Stable  Discharge Disposition: [facility] course in brief:  (Please refer to daily progress notes for a comprehensive review of the hospitalization by requesting medical records)    Patient admitted on 3/4/2022 for worsening leg pain.     \"41yo M presented with 1 week of worsening leg pain with an associated fever.  He is an IV drug user and has had issues with abscesses in the past..  Imaging revealed a left thigh intramuscular abscess.  Patient admitted for a right lateral thigh abscess. ID is following and he is being treated with Vanco + zosyn. Orthopedic surgery is following and he is s/p I&D on 3/6. Blcx revealed serratia bacteremia. \"   Patient was maintained on IV vancomycin and Zosyn. 1. Stop zosyn   2. Oral augmentin XR 2 grams po q 12 hrs and levaquin 500 mg po q 24 hrs for 2 weeks   ID follow up 2 weeks     Consults:   IP CONSULT TO ORTHOPEDIC SURGERY  IP CONSULT TO INFECTIOUS DISEASES  IP CONSULT TO SOCIAL WORK  PHARMACY TO DOSE VANCOMYCIN    Discharge Diagnoses:      Right thigh Abscess  -Orthopedic surgery. Pt I&D on 3/6. Surgical culture growing gram negative ga,await identification. continue vancomycin and Zosyn as per ID  -WBAT     Serratia bacteremia-likely from abscess  -ID consulted. Continue zosyn and vancomycin  Waiting for final cultures     IV drug abuse  -COWS protocol. Monitor for withdrawal      Elevated LFT, trending down at the time of discharge  -Hep panel positive for Hep C. He will need treatment outpatient with GI.  -US liver shows enlargement of liver.   Outpatient follow-up for management     Chronic Anemia  -Hgb stable     Tobacco dependence  -Cessation advised.     DVT Prophylaxis: lovenox  Diet: ADULT DIET; Regular  Code Status: Full Code     PT/OT Eval Status: as needed     Dispo -Pending final culture data       Discharge Instructions / Follow up:    No future appointments. Continued appropriate risk factor modification of blood pressure, diabetes and serum lipids will remain essential to reducing risk of future atherosclerotic development    Activity: activity as tolerated    Significant labs:  CBC:   Recent Labs     03/09/22 0814 03/11/22 0426   WBC 5.6 5.6   RBC 3.97 4.08   HGB 10.1* 10.4*   HCT 32.1* 36.1*   MCV 80.9 88.5   RDW 14.9 15.2*    333     BMP:   Recent Labs     03/09/22 0814 03/11/22 0426    137   K 4.1 4.6   CL 98 103   CO2 28 23   BUN 10 13   CREATININE 0.8 0.7     LFT:  Recent Labs     03/09/22 0814 03/11/22 0426   PROT 6.1* 6.1*   ALKPHOS 103 95   * 73*   AST 40* 28   BILITOT <0.2 <0.2     PT/INR: No results for input(s): INR, APTT in the last 72 hours. BNP: No results for input(s): BNP in the last 72 hours. Hgb A1C:   Lab Results   Component Value Date    LABA1C 5.4 10/03/2021     Folate and B12:   Lab Results   Component Value Date    ZEVSLYZD83 822 03/07/2022   ,   Lab Results   Component Value Date    FOLATE 9.8 03/07/2022     Thyroid Studies: No results found for: TSH, J9VKRIH, T1QIOXS, THYROIDAB    Urinalysis:  No results found for: NITRU, WBCUA, BACTERIA, RBCUA, BLOODU, SPECGRAV, GLUCOSEU    Imaging:  XR FEMUR RIGHT (MIN 2 VIEWS)    Result Date: 3/4/2022  EXAMINATION: 4 XRAY VIEWS OF THE RIGHT FEMUR 3/4/2022 7:21 pm COMPARISON: None. HISTORY: ORDERING SYSTEM PROVIDED HISTORY: right lateral thigh pain TECHNOLOGIST PROVIDED HISTORY: Reason for exam:->right lateral thigh pain What reading provider will be dictating this exam?->CRC FINDINGS: Right superior and inferior pubic rami appear unremarkable. Mild right hip joint space narrowing with acetabular sclerosis and spurring. Normal contour to the right femoral head. Femoral head/neck trabeculations appear maintained. No cortical irregularities identified along the course of the right femur. No abnormal periosteal elevation. Osseous mineralization is normal.  No soft tissue abnormality on this exam.     No acute osseous findings seen about the right femur. Mild right hip osteoarthritis. XR KNEE RIGHT (3 VIEWS)    Result Date: 3/4/2022  EXAMINATION: THREE XRAY VIEWS OF THE RIGHT KNEE 3/4/2022 11:08 pm COMPARISON: None. HISTORY: ORDERING SYSTEM PROVIDED HISTORY: pain and swelling TECHNOLOGIST PROVIDED HISTORY: Reason for exam:->pain and swelling What reading provider will be dictating this exam?->CRC FINDINGS: Radiographs of the right knee demonstrate no fractures with preserved alignment. Joint spaces appear normal. No significant degenerative changes. Osseous mineralization is normal.  Although not a true lateral view, there appears to be a right suprapatellar joint effusion. Mild diffuse soft tissue stranding. Diffuse mild soft tissue stranding. Although not a true lateral view, there is suggestion of a right suprapatellar joint effusion. No acute osseous findings. Normal alignment. CT HIP RIGHT W CONTRAST    Result Date: 3/5/2022  EXAMINATION: CT OF THE RIGHT HIP WITH CONTRAST; CT OF THE RIGHT FEMUR WITH CONTRAST 3/5/2022 12:36 am TECHNIQUE: CT of the right hip was performed with the administration of intravenous contrast.  Multiplanar reformatted images are provided for review. Dose modulation, iterative reconstruction, and/or weight based adjustment of the mA/kV was utilized to reduce the radiation dose to as low as reasonably achievable.; CT of the right femur was performed with the administration of intravenous contrast.  Multiplanar reformatted images are provided for review. Dose modulation, iterative reconstruction, and/or weight based adjustment of the mA/kV was utilized to reduce the radiation dose to as low as reasonably achievable.  COMPARISON: 3/7/2022 10:06 am COMPARISON: None HISTORY: ORDERING SYSTEM PROVIDED HISTORY: transaminitis TECHNOLOGIST PROVIDED HISTORY: Reason for exam:->transaminitis Specify organ?->LIVER What reading provider will be dictating this exam?->CRC FINDINGS: LIVER:  The liver demonstrates normal echogenicity without evidence of intrahepatic biliary ductal dilatation. The liver measures 22.5 cm. BILIARY SYSTEM:  Gallbladder is unremarkable without evidence of pericholecystic fluid, wall thickening or stones. Negative sonographic Yao's sign. Common bile duct is within normal limits measuring 4 mm. RIGHT KIDNEY: The right kidney is grossly unremarkable without evidence of hydronephrosis. The right kidney measures in length from pole to pole 11.8 cm. No solid cortical mass or renal cortical cyst seen within the right kidney. No hydronephrosis or nephrolithiasis. PANCREAS:  Visualized portions of the pancreas are unremarkable. OTHER: No evidence of right upper quadrant ascites. Enlargement of the liver otherwise unremarkable right upper quadrant ultrasound. RECOMMENDATIONS: Unavailable     US DUP LOWER EXTREMITY RIGHT JAYMIE    Result Date: 3/4/2022  Patient MRN:  [id number] : 1980 Age: 40 years Gender: Male Order Date:  3/4/2022 7:28 PM EXAM: US DUP LOWER EXTREMITY RIGHT JAYMIE NUMBER OF IMAGES:  24 INDICATION:  LEG SWELLING, PAIN, DVT SUSPECTED right leg swelling What reading provider will be dictating this exam?->[facility] COMPARISON: None Within the visualized vessels, there is no evidence for deep venous thrombosis There is good compressibility, there is good augmentation, there is good color flow.      Within the visualized vessels there is no evidence for deep venous thrombosis       Discharge Medications:      Medication List      START taking these medications    amoxicillin-clavulanate 1000-62.5 MG per extended release tablet  Commonly known as: AUGMENTIN XR  Take 2 tablets by mouth every 12 hours for 14 days levoFLOXacin 500 MG tablet  Commonly known as: LEVAQUIN  Take 1 tablet by mouth daily for 14 days        CONTINUE taking these medications    SUBOXONE SL           Where to Get Your Medications      You can get these medications from any pharmacy    Bring a paper prescription for each of these medications  · amoxicillin-clavulanate 1000-62.5 MG per extended release tablet  · levoFLOXacin 500 MG tablet         Time Spent on discharge is more than 45 minutes in the examination, evaluation, counseling and review of medications and discharge plan.    +++++++++++++++++++++++++++++++++++++++++++++++++  Darcy West MD  [place], [place]  +++++++++++++++++++++++++++++++++++++++++++++++++  NOTE: This report was transcribed using voice recognition software. Every effort was made to ensure accuracy; however, inadvertent computerized transcription errors may be present.

## 2022-03-11 NOTE — PLAN OF CARE
Problem: Pain:  Goal: Pain level will decrease  Description: Pain level will decrease  3/11/2022 1023 by Deidre Thomas RN  Outcome: Ongoing  3/10/2022 2201 by Florina Unger RN  Outcome: Ongoing  Goal: Control of acute pain  Description: Control of acute pain  3/11/2022 1023 by Deidre Thomas RN  Outcome: Ongoing  3/10/2022 2201 by Florina Unger RN  Outcome: Ongoing  Goal: Control of chronic pain  Description: Control of chronic pain  3/11/2022 1023 by Deidre Thomas RN  Outcome: Ongoing  3/10/2022 2201 by Florina Unger RN  Outcome: Ongoing

## 2022-03-11 NOTE — PROGRESS NOTES
Hospitalist Progress Note      SYNOPSIS: Patient admitted on 3/4/2022 for worsening leg pain. \"38yo M presented with 1 week of worsening leg pain with an associated fever.  He is an IV drug user and has had issues with abscesses in the past..  Imaging revealed a left thigh intramuscular abscess.  Patient admitted for a right lateral thigh abscess. ID is following and he is being treated with Vanco + zosyn. Orthopedic surgery is following and he is s/p I&D on 3/6. Blcx revealed serratia bacteremia. \"   Patient was maintained on IV vancomycin and Zosyn. SUBJECTIVE:    Patient seen and examined in his room. Denies any chest pain, nausea, vomiting. Patient denies any active complaint. Records reviewed. Stable overnight. No other overnight issues reported. Temp (24hrs), Av.6 °F (36.4 °C), Min:96.8 °F (36 °C), Max:98.1 °F (36.7 °C)    DIET: ADULT DIET; Regular  CODE: Full Code    Intake/Output Summary (Last 24 hours) at 3/11/2022 0817  Last data filed at 3/11/2022 0630  Gross per 24 hour   Intake 960 ml   Output 2650 ml   Net -1690 ml       OBJECTIVE:    /86   Pulse 60   Temp 96.8 °F (36 °C) (Tympanic) Comment (Src): non contact  Resp 16   Ht 6' 4\" (1.93 m)   Wt 200 lb (90.7 kg)   SpO2 99%   BMI 24.34 kg/m²     General appearance: No apparent distress, appears stated age and cooperative. HEENT:  Conjunctivae/corneas clear. Neck: Supple. No jugular venous distention. Respiratory: Clear to auscultation bilaterally, normal respiratory effort  Cardiovascular: Regular rate rhythm, normal S1-S2  Abdomen: Soft, nontender, nondistended  Musculoskeletal: No clubbing, cyanosis, no bilateral lower extremity edema. Brisk capillary refill. Skin:  No rashes  on visible skin  Neurologic: awake, alert and following commands     ASSESSMENT & PLAN:       Right thigh Abscess  -Orthopedic surgery. Pt I&D on 3/6. Surgical culture growing gram negative ga,await identification. continue vancomycin and Zosyn as per ID  -WBAT     Serratia bacteremia-likely from abscess  -ID consulted. Continue zosyn and vancomycin  Waiting for final cultures     IV drug abuse  -COWS protocol. Monitor for withdrawal      Elevated LFT, trending down at the time of discharge  -Hep panel positive for Hep C. He will need treatment outpatient with GI.  -US liver shows enlargement of liver. Outpatient follow-up for management     Chronic Anemia  -Hgb stable     Tobacco dependence  -Cessation advised.     DVT Prophylaxis: lovenox  Diet: ADULT DIET; Regular  Code Status: Full Code     PT/OT Eval Status: as needed     Dispo -Pending final culture data    DISPOSITION:   Possible discharge in next 1 to 2 days. Medications:  REVIEWED DAILY    Infusion Medications    sodium chloride 100 mL/hr at 03/10/22 3626    sodium chloride      dextrose       Scheduled Medications    melatonin  3 mg Oral Nightly    enoxaparin  40 mg SubCUTAneous Daily    sodium chloride flush  10 mL IntraVENous 2 times per day    piperacillin-tazobactam  3,375 mg IntraVENous Q8H     PRN Meds: [] traMADol **AND** cloNIDine, dicyclomine, hydrOXYzine, promethazine, diphenhydrAMINE, sodium chloride flush, sodium chloride, ondansetron **OR** ondansetron, magnesium hydroxide, acetaminophen **OR** acetaminophen, ibuprofen, glucose, dextrose, glucagon (rDNA), dextrose    Labs:     Recent Labs     22  0814 22  0426   WBC 5.6 5.6   HGB 10.1* 10.4*   HCT 32.1* 36.1*    333       Recent Labs     22  0814 22  0426    137   K 4.1 4.6   CL 98 103   CO2 28 23   BUN 10 13   CREATININE 0.8 0.7   CALCIUM 8.8 8.8       Recent Labs     22  0814 22  0426   PROT 6.1* 6.1*   ALKPHOS 103 95   * 73*   AST 40* 28   BILITOT <0.2 <0.2       No results for input(s): INR in the last 72 hours. No results for input(s): Beverly De La Rosa in the last 72 hours.     Chronic labs:    Lab Results   Component Value Date    INR 1.1 03/07/2022    LABA1C 5.4 10/03/2021       Radiology: REVIEWED DAILY    +++++++++++++++++++++++++++++++++++++++++++++++++  Darcy West MD  [facility] Physician - [address], [place]  +++++++++++++++++++++++++++++++++++++++++++++++++  NOTE: This report was transcribed using voice recognition software. Every effort was made to ensure accuracy; however, inadvertent computerized transcription errors may be present.

## 2022-03-11 NOTE — PROGRESS NOTES
Pharmacy Consultation Note  (Antibiotic Dosing and Monitoring)    Initial consult date: 3/8/2022  Consulting physician/provider: Dr. Cas Lopez  Drug: Vancomycin  Indication: SSTI - right thigh abscess      Vancomycin has been discontinued   [address] Physician to re-consult pharmacy if future dosing is needed    Thank you for the consult,    Caldwell Goodell, PharmD  Pharmacy Resident  P: [id number]  3/11/2022 2:24 PM

## 2022-03-11 NOTE — CARE COORDINATION
Discharge plan is home with family. Pt will be on po antibiotics. Latoya Aguirre RN   No prior auth needed on augmentin.  Latoya Aguirre RN

## 2022-03-11 NOTE — PROGRESS NOTES
Department of Internal Medicine  Infectious Diseases   Progress  Note      C/C :  Right thigh abscess ,Serratia bacteremia     Denies fever or chills  Reports right thigh pain and stiffness   Afebrile       Current Facility-Administered Medications   Medication Dose Route Frequency Provider Last Rate Last Admin    cloNIDine (CATAPRES) tablet 0.1 mg  0.1 mg Oral PRN Erika Galea, APRN - NP        melatonin tablet 3 mg  3 mg Oral Nightly Erika Galea, APRN - NP   3 mg at 03/10/22 2135    dicyclomine (BENTYL) capsule 20 mg  20 mg Oral Q6H PRN Erika Galea, APRN - NP        hydrOXYzine (VISTARIL) capsule 50 mg  50 mg Oral Q8H PRN Erika Galea, APRN - NP        promethazine (PHENERGAN) tablet 25 mg  25 mg Oral Q6H PRN Erika Galea, APRN - NP        diphenhydrAMINE (BENADRYL) tablet 25 mg  25 mg Oral Nightly PRN Erika Galea, APRN - NP        enoxaparin (LOVENOX) injection 40 mg  40 mg SubCUTAneous Daily Marguerite Agustin MD   40 mg at 03/11/22 0809    0.9 % sodium chloride infusion   IntraVENous Continuous Erika Galea, APRN -  mL/hr at 03/10/22 2359 New Bag at 03/10/22 2359    sodium chloride flush 0.9 % injection 10 mL  10 mL IntraVENous 2 times per day [place] L Chen, DO   10 mL at 03/10/22 2136    sodium chloride flush 0.9 % injection 10 mL  10 mL IntraVENous PRN [place] L Chen, DO        0.9 % sodium chloride infusion  25 mL IntraVENous PRN [place] L Chen, DO        ondansetron (ZOFRAN-ODT) disintegrating tablet 4 mg  4 mg Oral Q8H PRN [place] L Chen, DO        Or    ondansetron (ZOFRAN) injection 4 mg  4 mg IntraVENous Q6H PRN [place] L Chen, DO        magnesium hydroxide (MILK OF MAGNESIA) 400 MG/5ML suspension 30 mL  30 mL Oral Daily PRN [place] L Chen, DO        acetaminophen (TYLENOL) tablet 650 mg  650 mg Oral Q6H PRN [place] L Chen, DO   650 mg at 03/10/22 2239    Or    acetaminophen (TYLENOL) suppository 650 mg  650 mg Rectal Q6H PRN Manual Quiver, DO        ibuprofen (ADVIL;MOTRIN) tablet 400 mg  400 mg Oral Q6H PRN [place] Chen, DO   400 mg at 03/11/22 0809    piperacillin-tazobactam (ZOSYN) 3,375 mg in dextrose 5 % 100 mL IVPB extended infusion (mini-bag)  3,375 mg IntraVENous Q8H [place] Chen, DO   Stopped at 03/11/22 0809    glucose (GLUTOSE) 40 % oral gel 15 g  15 g Oral PRN [place] Chen, DO        dextrose 50 % IV solution  12.5 g IntraVENous PRN [place] Chen, DO        glucagon (rDNA) injection 1 mg  1 mg IntraMUSCular PRN [place] Chen, DO        dextrose 5 % solution  100 mL/hr IntraVENous PRN [place] Chen, DO             REVIEW OF SYSTEMS:    CONSTITUTIONAL:  Denies fever or chills   HEENT: denies blurring of vision or double vision, denies hearing problem  RESPIRATORY: denies cough, shortness of breath, sputum expectoration,. CARDIOVASCULAR:  Denies palpitation  GASTROINTESTINAL:  Denies abdomen pain, diarrhea or constipation. GENITOURINARY:  Denies burning urination or frequency of urination  INTEGUMENT:  No rash   HEMATOLOGIC/LYMPHATIC:  No external bleeding . MUSCULOSKELETAL:  Right leg, thigh pain,swelling, redness   NEUROLOGICAL:  Denies light headed, dizziness, loss of consciousness, weakness of lower extremities, bowel or bladder incontinence. PHYSICAL EXAM:      Vitals:     Vitals:    03/11/22 0809   BP: 120/86   Pulse: 60   Resp: 16   Temp: 96.8 °F (36 °C)   SpO2: 99%       General Appearance:    Awake, alert , no acute distress. Head:    Normocephalic, atraumatic   Eyes:    No pallor, no icterus,   Ears:    No obvious deformity or drainage.    Nose:   No nasal drainage   Throat:   Mucosa moist, no oral thrush   Neck:   Supple, no lymphadenopathy   Back:     no CVA tenderness   Lungs:     Clear to auscultation bilaterally, no wheeze    Heart:    Regular rate and rhythm, no murmur   Abdomen:     Soft, non-tender, bowel sounds present    Extremities:   Right thigh mild tender - wound dressed    Pulses:   Dorsalis pedis palpable    Skin:   no rashes      CBC with Differential:      Lab Results   Component Value Date    WBC 5.6 03/11/2022    RBC 4.08 03/11/2022    HGB 10.4 03/11/2022    HCT 36.1 03/11/2022     03/11/2022    MCV 88.5 03/11/2022    MCH 25.5 03/11/2022    MCHC 28.8 03/11/2022    RDW 15.2 03/11/2022    NRBC 0.9 05/05/2020    LYMPHOPCT 18.4 03/04/2022    MONOPCT 8.7 03/04/2022    MYELOPCT 0.9 05/05/2020    BASOPCT 0.4 03/04/2022    MONOSABS 0.89 03/04/2022    LYMPHSABS 1.87 03/04/2022    EOSABS 0.20 03/04/2022    BASOSABS 0.04 03/04/2022       CMP     Lab Results   Component Value Date     03/11/2022    K 4.6 03/11/2022    K 4.2 10/04/2021     03/11/2022    CO2 23 03/11/2022    BUN 13 03/11/2022    CREATININE 0.7 03/11/2022    GFRAA >60 03/11/2022    LABGLOM >60 03/11/2022    GLUCOSE 84 03/11/2022    PROT 6.1 03/11/2022    LABALBU 2.7 03/11/2022    CALCIUM 8.8 03/11/2022    BILITOT <0.2 03/11/2022    ALKPHOS 95 03/11/2022    AST 28 03/11/2022    ALT 73 03/11/2022         Hepatic Function Panel:    Lab Results   Component Value Date    ALKPHOS 95 03/11/2022    ALT 73 03/11/2022    AST 28 03/11/2022    PROT 6.1 03/11/2022    BILITOT <0.2 03/11/2022    BILIDIR <0.2 10/02/2021    IBILI see below 10/02/2021    LABALBU 2.7 03/11/2022       PT/INR:    Lab Results   Component Value Date    PROTIME 11.4 03/07/2022    INR 1.1 03/07/2022       MICROBIOLOGY:    Blood culture -    Culture, Blood 1 [[id number]] (Abnormal)  Collected: 03/04/22 4347   Order Status: Completed Specimen: Blood Updated: 03/09/22 0710    Blood Culture, Routine -- Abnormal     24 Hours no growth   Gram stain performed from blood culture bottle media   Gram negative rods    Abnormal     Organism Serratia marcescens Abnormal     Blood Culture, Routine Identification and sensitivity to follow   Narrative:     Source: BLOOD       Site:           Microbiology results called to and read back by Lauren Tran           Serratia marcescens by PCR DETECTED Panic           Abscess cx -      Culture, Surgical [[id number]] (Abnormal)  Collected: 03/06/22 0937   Order Status: Completed Specimen: Specimen from Leg Updated: 03/09/22 3449    Culture Surgical -- Abnormal     Growth not present, incubation continues   Growth present, evaluating for:   Gram positive organisms    Abnormal     Organism Serratia marcescens Abnormal     Culture Surgical --    Moderate growth   Identification and sensitivity to follow     Organism Beta Strep Group F Abnormal     Culture Surgical --    Moderate growth   Sensitivity to follow    Narrative:     Source: ABSC       Site: [facility]                  Specimen: Specimen from Leg Updated: 03/10/22 1311    Organism Anaerobic gram positive ga Abnormal     Anaerobic Culture --    Heavy growth   Beta Lactamase negative   A negative Beta Lactamase test does not necessarily assure   susceptibility to this drug.     Narrative:     Source: ABSC       Site: [facility]             Culture, Blood 1 [[id number]] Collected: 03/10/22 1141   Order Status: Sent Specimen: Blood Updated: 03/10/22 1154   Narrative:     Collection has been rescheduled by Toribio Alejandro at 03/10/2022 11:15 Reason: Failed attempt at   venipuncture   Culture, Blood 2 [[id number]] Collected: 03/10/22 1141   Order Status: Sent Specimen: Blood Updated: 03/10/22 1154   Narrative:     Collection has been rescheduled by Toribio Alejandro at 03/10/2022 11:15 Reason: Failed attempt at   venipuncture   Culture, Surgical [[id number]] (Abnormal)  Collected: 03/06/22 0937   Order Status: Completed Specimen: Specimen from Leg Updated: 03/10/22 0639    Organism Serratia marcescens Abnormal     Culture Surgical Moderate growth    Organism Beta Strep Group F Abnormal     Culture Surgical Moderate growth   Narrative:               Radiology :     CT scan -    Large irregular collection containing fluid and gas within the right vastus   lateralis and intermedius muscle bellies, compatible with intramuscular abscess.  No definite radiopaque foreign body. IMPRESSION:     1. Serratia bacteremia  2. Right thigh abscess s/p I & D ( 3/6)   3. IVDU    4. Hx of Hep C infection, neg PCR       RECOMMENDATIONS:      1. Stop zosyn   2.  Oral augmentin XR 2 grams po q 12 hrs and levaquin 500 mg po q 24 hrs for 2 weeks   ID follow up 2 weeks

## 2022-03-15 LAB
BLOOD CULTURE, ROUTINE: NORMAL
CULTURE, BLOOD 2: NORMAL

## 2022-04-11 LAB
FUNGUS (MYCOLOGY) CULTURE: NORMAL
FUNGUS STAIN: NORMAL

## 2022-04-26 LAB
AFB CULTURE (MYCOBACTERIA): NORMAL
AFB SMEAR: NORMAL

## 2024-07-25 ENCOUNTER — HOSPITAL ENCOUNTER (EMERGENCY)
Age: 44
Discharge: HOME | End: 2024-07-25
Attending: FAMILY MEDICINE

## 2024-07-25 VITALS
RESPIRATION RATE: 18 BRPM | DIASTOLIC BLOOD PRESSURE: 98 MMHG | SYSTOLIC BLOOD PRESSURE: 147 MMHG | OXYGEN SATURATION: 98 % | HEART RATE: 98 BPM | TEMPERATURE: 98.1 F

## 2024-07-25 DIAGNOSIS — S63.92XA HAND SPRAIN, LEFT, INITIAL ENCOUNTER: Primary | ICD-10-CM

## 2024-07-25 PROCEDURE — 99283 EMERGENCY DEPT VISIT LOW MDM: CPT

## 2024-07-25 RX ORDER — IBUPROFEN 600 MG/1
600 TABLET ORAL 3 TIMES DAILY PRN
Qty: 30 TABLET | Refills: 0 | Status: SHIPPED | OUTPATIENT
Start: 2024-07-25

## 2024-07-25 NOTE — ED PROVIDER NOTES
HPI:  7/25/24,   Time: 4:09 PM EDT         Wagner Ryan is a 44 y.o. 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  He complains of an aching type pain of moderate intensity is worse with movement of the hand and also complains of swelling on the back of the hand as well.          ROS:   Pertinent positives and negatives are stated within HPI, all other systems reviewed and are negative.  --------------------------------------------- PAST HISTORY ---------------------------------------------  Past Medical History:  has a past medical history of Hx of hepatitis C-resolved, IV drug abuse (HCC), and Nonhealing nonsurgical wound with fat layer exposed.    Past Surgical History:  has a past surgical history that includes incision and drainage (Right, 4/23/2020); transesophageal echocardiogram (N/A, 4/24/2020); pr exploration of artery/vein (Left, 10/7/2020); incision and drainage (Left, 10/7/2020); Apply dressing (Left, 10/9/2020); Apply dressing (Left, 10/14/2020); transesophageal echocardiogram (10/14/2020); and Leg Surgery (Right, 3/6/2022).    Social History:  reports that he has been smoking cigarettes. His smokeless tobacco use includes chew. He reports that he does not currently use alcohol. He reports current drug use. Drug: IV.    Family History: family history includes Heart Disease in his father.     The patient’s home medications have been reviewed.    Allergies: Patient has no known allergies.    -------------------------------------------------- RESULTS -------------------------------------------------  All laboratory and radiology results have been personally reviewed by myself   LABS:  No results found for this visit on 07/25/24.    RADIOLOGY:  Interpreted by Radiologist.  No orders to display       ------------------------- NURSING NOTES AND

## 2025-01-01 ENCOUNTER — CLINICAL DOCUMENTATION (OUTPATIENT)
Dept: EMERGENCY DEPT | Age: 45
End: 2025-01-01

## 2025-02-16 ENCOUNTER — HOSPITAL ENCOUNTER (EMERGENCY)
Age: 45
Discharge: HOME | End: 2025-02-16
Attending: FAMILY MEDICINE

## 2025-02-16 VITALS
SYSTOLIC BLOOD PRESSURE: 112 MMHG | DIASTOLIC BLOOD PRESSURE: 80 MMHG | HEART RATE: 106 BPM | RESPIRATION RATE: 18 BRPM | TEMPERATURE: 97.2 F | OXYGEN SATURATION: 100 %

## 2025-02-16 DIAGNOSIS — J20.9 ACUTE BRONCHITIS, UNSPECIFIED ORGANISM: Primary | ICD-10-CM

## 2025-02-16 PROCEDURE — 99283 EMERGENCY DEPT VISIT LOW MDM: CPT

## 2025-02-16 RX ORDER — IBUPROFEN 600 MG/1
600 TABLET, FILM COATED ORAL 3 TIMES DAILY PRN
Qty: 30 TABLET | Refills: 0 | Status: ON HOLD | OUTPATIENT
Start: 2025-02-16 | End: 2025-02-18

## 2025-02-16 RX ORDER — DEXTROMETHORPHAN HYDROBROMIDE AND PROMETHAZINE HYDROCHLORIDE 15; 6.25 MG/5ML; MG/5ML
5 SYRUP ORAL 4 TIMES DAILY PRN
Qty: 118 ML | Refills: 0 | Status: ON HOLD | OUTPATIENT
Start: 2025-02-16 | End: 2025-02-18

## 2025-02-16 ASSESSMENT — PAIN DESCRIPTION - FREQUENCY: FREQUENCY: INTERMITTENT

## 2025-02-16 ASSESSMENT — PAIN DESCRIPTION - PAIN TYPE: TYPE: ACUTE PAIN

## 2025-02-16 ASSESSMENT — PAIN DESCRIPTION - LOCATION: LOCATION: HEAD

## 2025-02-16 ASSESSMENT — PAIN DESCRIPTION - DESCRIPTORS: DESCRIPTORS: DISCOMFORT

## 2025-02-16 ASSESSMENT — PAIN - FUNCTIONAL ASSESSMENT
PAIN_FUNCTIONAL_ASSESSMENT: PREVENTS OR INTERFERES SOME ACTIVE ACTIVITIES AND ADLS
PAIN_FUNCTIONAL_ASSESSMENT: 0-10

## 2025-02-16 ASSESSMENT — PAIN SCALES - GENERAL: PAINLEVEL_OUTOF10: 6

## 2025-02-16 NOTE — ED PROVIDER NOTES
HPI:  2/16/25,   Time: 2:29 PM RAUL Ryan is a 45 y.o. male presenting to the ED for 2-day history of cough and he has complains of a frontal headache when he coughs.  He denies fever chills or bodyaches.  He denies nausea vomiting or diarrhea.  He smokes about a pack cigarettes daily.  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          ROS:   Pertinent positives and negatives are stated within HPI, all other systems reviewed and are negative.  --------------------------------------------- PAST HISTORY ---------------------------------------------  Past Medical History:  has a past medical history of Hx of hepatitis C-resolved, IV drug abuse (HCC), and Nonhealing nonsurgical wound with fat layer exposed.    Past Surgical History:  has a past surgical history that includes incision and drainage (Right, 4/23/2020); transesophageal echocardiogram (N/A, 4/24/2020); pr exploration of artery/vein (Left, 10/7/2020); incision and drainage (Left, 10/7/2020); Apply dressing (Left, 10/9/2020); Apply dressing (Left, 10/14/2020); transesophageal echocardiogram (10/14/2020); and Leg Surgery (Right, 3/6/2022).    Social History:  reports that he has been smoking cigarettes. His smokeless tobacco use includes chew. He reports that he does not currently use alcohol. He reports current drug use. Drug: IV.    Family History: family history includes Heart Disease in his father.     The patient’s home medications have been reviewed.    Allergies: Patient has no known allergies.    -------------------------------------------------- RESULTS -------------------------------------------------  All laboratory and radiology results have been personally reviewed by myself   LABS:  No results found for this visit on 02/16/25.    RADIOLOGY:  Interpreted by Radiologist.  No orders to display       ------------------------- NURSING NOTES AND VITALS REVIEWED ---------------------------   The nursing notes within the ED encounter and

## 2025-02-17 ENCOUNTER — HOSPITAL ENCOUNTER (INPATIENT)
Age: 45
LOS: 1 days | Discharge: ANOTHER HOSPITAL | DRG: 321 | End: 2025-02-18
Attending: EMERGENCY MEDICINE | Admitting: INTERNAL MEDICINE

## 2025-02-17 ENCOUNTER — APPOINTMENT (OUTPATIENT)
Dept: GENERAL RADIOLOGY | Age: 45
DRG: 321 | End: 2025-02-17

## 2025-02-17 ENCOUNTER — APPOINTMENT (OUTPATIENT)
Age: 45
DRG: 321 | End: 2025-02-17
Attending: INTERNAL MEDICINE

## 2025-02-17 ENCOUNTER — APPOINTMENT (OUTPATIENT)
Dept: CT IMAGING | Age: 45
DRG: 321 | End: 2025-02-17

## 2025-02-17 DIAGNOSIS — R41.82 ALTERED MENTAL STATUS, UNSPECIFIED ALTERED MENTAL STATUS TYPE: Primary | ICD-10-CM

## 2025-02-17 DIAGNOSIS — I24.9 ACUTE CORONARY SYNDROME (HCC): ICD-10-CM

## 2025-02-17 DIAGNOSIS — I33.0 ACUTE BACTERIAL ENDOCARDITIS: ICD-10-CM

## 2025-02-17 DIAGNOSIS — I21.3 STEMI (ST ELEVATION MYOCARDIAL INFARCTION) (HCC): ICD-10-CM

## 2025-02-17 DIAGNOSIS — R78.81 BACTEREMIA: ICD-10-CM

## 2025-02-17 DIAGNOSIS — I87.8 POOR VENOUS ACCESS: ICD-10-CM

## 2025-02-17 DIAGNOSIS — I21.3 ST ELEVATION MYOCARDIAL INFARCTION (STEMI), UNSPECIFIED ARTERY (HCC): ICD-10-CM

## 2025-02-17 DIAGNOSIS — I33.9 SUBACUTE ENDOCARDITIS, UNSPECIFIED ENDOCARDITIS TYPE: ICD-10-CM

## 2025-02-17 PROBLEM — D69.6 THROMBOCYTOPENIA: Status: ACTIVE | Noted: 2025-02-17

## 2025-02-17 PROBLEM — A41.9 SEPTIC SHOCK (HCC): Status: ACTIVE | Noted: 2025-02-17

## 2025-02-17 PROBLEM — R65.21 SEPTIC SHOCK (HCC): Status: ACTIVE | Noted: 2025-02-17

## 2025-02-17 PROBLEM — A41.9 SEPSIS WITHOUT ACUTE ORGAN DYSFUNCTION (HCC): Status: ACTIVE | Noted: 2025-02-17

## 2025-02-17 LAB
ACTIVATED CLOTTING TIME, LOW RANGE: 245 SEC
ALBUMIN SERPL-MCNC: 2.7 G/DL (ref 3.5–5.2)
ALBUMIN SERPL-MCNC: 2.9 G/DL (ref 3.5–5.2)
ALP SERPL-CCNC: 108 U/L (ref 40–129)
ALP SERPL-CCNC: 124 U/L (ref 40–129)
ALT SERPL-CCNC: 22 U/L (ref 0–40)
ALT SERPL-CCNC: 22 U/L (ref 0–40)
AMPHET UR QL SCN: NEGATIVE
ANION GAP SERPL CALCULATED.3IONS-SCNC: 12 MMOL/L (ref 7–16)
ANION GAP SERPL CALCULATED.3IONS-SCNC: 14 MMOL/L (ref 7–16)
APAP SERPL-MCNC: <5 UG/ML (ref 10–30)
AST SERPL-CCNC: 27 U/L (ref 0–39)
AST SERPL-CCNC: 42 U/L (ref 0–39)
B PARAP IS1001 DNA NPH QL NAA+NON-PROBE: NOT DETECTED
B PERT DNA SPEC QL NAA+PROBE: NOT DETECTED
B.E.: 5.3 MMOL/L (ref -3–3)
B.E.: 6.4 MMOL/L (ref -3–3)
BACTERIA URNS QL MICRO: ABNORMAL
BARBITURATES UR QL SCN: NEGATIVE
BASOPHILS # BLD: 0 K/UL (ref 0–0.2)
BASOPHILS # BLD: 0.09 K/UL (ref 0–0.2)
BASOPHILS NFR BLD: 0 % (ref 0–2)
BASOPHILS NFR BLD: 1 % (ref 0–2)
BENZODIAZ UR QL: POSITIVE
BILIRUB SERPL-MCNC: 0.9 MG/DL (ref 0–1.2)
BILIRUB SERPL-MCNC: 1 MG/DL (ref 0–1.2)
BILIRUB UR QL STRIP: NEGATIVE
BUN SERPL-MCNC: 31 MG/DL (ref 6–20)
BUN SERPL-MCNC: 31 MG/DL (ref 6–20)
BUPRENORPHINE UR QL: POSITIVE
C PNEUM DNA NPH QL NAA+NON-PROBE: NOT DETECTED
CALCIUM SERPL-MCNC: 8.7 MG/DL (ref 8.6–10.2)
CALCIUM SERPL-MCNC: 8.8 MG/DL (ref 8.6–10.2)
CANNABINOIDS UR QL SCN: NEGATIVE
CHLORIDE SERPL-SCNC: 89 MMOL/L (ref 98–107)
CHLORIDE SERPL-SCNC: 92 MMOL/L (ref 98–107)
CLARITY UR: CLEAR
CO2 SERPL-SCNC: 27 MMOL/L (ref 22–29)
CO2 SERPL-SCNC: 27 MMOL/L (ref 22–29)
COCAINE UR QL SCN: NEGATIVE
COHB: 1.2 % (ref 0–1.5)
COHB: 1.7 % (ref 0–1.5)
COLOR UR: YELLOW
COMMENT: ABNORMAL
COMMENT: ABNORMAL
CORTIS SERPL-MCNC: 48.4 UG/DL (ref 2.7–18.4)
CORTISOL COLLECTION INFO: ABNORMAL
CREAT SERPL-MCNC: 1.1 MG/DL (ref 0.7–1.2)
CREAT SERPL-MCNC: 1.2 MG/DL (ref 0.7–1.2)
CRITICAL: ABNORMAL
CRITICAL: ABNORMAL
DATE ANALYZED: ABNORMAL
DATE ANALYZED: ABNORMAL
DATE OF COLLECTION: ABNORMAL
DATE OF COLLECTION: ABNORMAL
EOSINOPHIL # BLD: 0 K/UL (ref 0.05–0.5)
EOSINOPHIL # BLD: 0 K/UL (ref 0.05–0.5)
EOSINOPHILS RELATIVE PERCENT: 0 % (ref 0–6)
EOSINOPHILS RELATIVE PERCENT: 0 % (ref 0–6)
ERYTHROCYTE [DISTWIDTH] IN BLOOD BY AUTOMATED COUNT: 15.2 % (ref 11.5–15)
ERYTHROCYTE [DISTWIDTH] IN BLOOD BY AUTOMATED COUNT: 15.4 % (ref 11.5–15)
ERYTHROCYTE [SEDIMENTATION RATE] IN BLOOD BY WESTERGREN METHOD: 65 MM/HR (ref 0–15)
ETHANOLAMINE SERPL-MCNC: <10 MG/DL (ref 0–0.08)
FENTANYL UR QL: NEGATIVE
FLUAV RNA NPH QL NAA+NON-PROBE: NOT DETECTED
FLUBV RNA NPH QL NAA+NON-PROBE: NOT DETECTED
GFR, ESTIMATED: 77 ML/MIN/1.73M2
GFR, ESTIMATED: 81 ML/MIN/1.73M2
GLUCOSE SERPL-MCNC: 120 MG/DL (ref 74–99)
GLUCOSE SERPL-MCNC: 120 MG/DL (ref 74–99)
GLUCOSE UR STRIP-MCNC: NEGATIVE MG/DL
HADV DNA NPH QL NAA+NON-PROBE: NOT DETECTED
HCO3: 26.3 MMOL/L (ref 22–26)
HCO3: 30.3 MMOL/L (ref 22–26)
HCOV 229E RNA NPH QL NAA+NON-PROBE: NOT DETECTED
HCOV HKU1 RNA NPH QL NAA+NON-PROBE: NOT DETECTED
HCOV NL63 RNA NPH QL NAA+NON-PROBE: NOT DETECTED
HCOV OC43 RNA NPH QL NAA+NON-PROBE: NOT DETECTED
HCT VFR BLD AUTO: 32.1 % (ref 37–54)
HCT VFR BLD AUTO: 34.4 % (ref 37–54)
HGB BLD-MCNC: 10.8 G/DL (ref 12.5–16.5)
HGB BLD-MCNC: 11.3 G/DL (ref 12.5–16.5)
HGB UR QL STRIP.AUTO: ABNORMAL
HHB: 1.6 % (ref 0–5)
HHB: 55.4 % (ref 0–5)
HMPV RNA NPH QL NAA+NON-PROBE: NOT DETECTED
HPIV1 RNA NPH QL NAA+NON-PROBE: NOT DETECTED
HPIV2 RNA NPH QL NAA+NON-PROBE: NOT DETECTED
HPIV3 RNA NPH QL NAA+NON-PROBE: NOT DETECTED
HPIV4 RNA NPH QL NAA+NON-PROBE: NOT DETECTED
INR PPP: 1.1
KETONES UR STRIP-MCNC: NEGATIVE MG/DL
LAB: ABNORMAL
LAB: ABNORMAL
LACTATE BLDV-SCNC: 1.8 MMOL/L (ref 0.5–2.2)
LACTATE BLDV-SCNC: 2.1 MMOL/L (ref 0.5–1.9)
LACTATE BLDV-SCNC: 2.4 MMOL/L (ref 0.5–1.9)
LEUKOCYTE ESTERASE UR QL STRIP: NEGATIVE
LYMPHOCYTES NFR BLD: 0.27 K/UL (ref 1.5–4)
LYMPHOCYTES NFR BLD: 0.43 K/UL (ref 1.5–4)
LYMPHOCYTES RELATIVE PERCENT: 3 % (ref 20–42)
LYMPHOCYTES RELATIVE PERCENT: 4 % (ref 20–42)
Lab: 1450
Lab: 1505
M PNEUMO DNA NPH QL NAA+NON-PROBE: NOT DETECTED
MAGNESIUM SERPL-MCNC: 2.1 MG/DL (ref 1.6–2.6)
MAGNESIUM SERPL-MCNC: 2.2 MG/DL (ref 1.6–2.6)
MCH RBC QN AUTO: 24.4 PG (ref 26–35)
MCH RBC QN AUTO: 24.5 PG (ref 26–35)
MCHC RBC AUTO-ENTMCNC: 32.8 G/DL (ref 32–34.5)
MCHC RBC AUTO-ENTMCNC: 33.6 G/DL (ref 32–34.5)
MCV RBC AUTO: 72.8 FL (ref 80–99.9)
MCV RBC AUTO: 74.3 FL (ref 80–99.9)
METHADONE UR QL: NEGATIVE
METHB: 0.3 % (ref 0–1.5)
METHB: 0.3 % (ref 0–1.5)
MODE: ABNORMAL
MODE: ABNORMAL
MONOCYTES NFR BLD: 0.32 K/UL (ref 0.1–0.95)
MONOCYTES NFR BLD: 1.07 K/UL (ref 0.1–0.95)
MONOCYTES NFR BLD: 10 % (ref 2–12)
MONOCYTES NFR BLD: 3 % (ref 2–12)
NEUTROPHILS NFR BLD: 86 % (ref 43–80)
NEUTROPHILS NFR BLD: 94 % (ref 43–80)
NEUTS SEG NFR BLD: 11.55 K/UL (ref 1.8–7.3)
NEUTS SEG NFR BLD: 8.87 K/UL (ref 1.8–7.3)
NITRITE UR QL STRIP: POSITIVE
O2 CONTENT: 15.7 ML/DL
O2 CONTENT: 6.8 ML/DL
O2 SATURATION: 43.5 % (ref 92–98.5)
O2 SATURATION: 98.4 % (ref 92–98.5)
O2HB: 42.6 % (ref 94–97)
O2HB: 96.9 % (ref 94–97)
OPERATOR ID: ABNORMAL
OPERATOR ID: ABNORMAL
OPIATES UR QL SCN: NEGATIVE
OXYCODONE UR QL SCN: NEGATIVE
PARTIAL THROMBOPLASTIN TIME: 26.7 SEC (ref 24.5–35.1)
PATIENT TEMP: 37 C
PATIENT TEMP: 37 C
PCO2: 27.2 MMHG (ref 35–45)
PCO2: 41 MMHG (ref 35–45)
PCP UR QL SCN: NEGATIVE
PH BLOOD GAS: 7.49 (ref 7.35–7.45)
PH BLOOD GAS: 7.6 (ref 7.35–7.45)
PH UR STRIP: 6.5 [PH] (ref 5–8)
PHOSPHATE SERPL-MCNC: 4 MG/DL (ref 2.5–4.5)
PLATELET # BLD AUTO: 75 K/UL (ref 130–450)
PLATELET CONFIRMATION: NORMAL
PLATELET CONFIRMATION: NORMAL
PLATELET, FLUORESCENCE: 78 K/UL (ref 130–450)
PMV BLD AUTO: 10.6 FL (ref 7–12)
PMV BLD AUTO: 10.7 FL (ref 7–12)
PO2: 22.7 MMHG (ref 75–100)
PO2: 98.9 MMHG (ref 75–100)
POTASSIUM SERPL-SCNC: 4 MMOL/L (ref 3.5–5)
POTASSIUM SERPL-SCNC: 4 MMOL/L (ref 3.5–5)
PROCALCITONIN SERPL-MCNC: 3.48 NG/ML (ref 0–0.08)
PROT SERPL-MCNC: 6.6 G/DL (ref 6.4–8.3)
PROT SERPL-MCNC: 7 G/DL (ref 6.4–8.3)
PROT UR STRIP-MCNC: 100 MG/DL
PROTHROMBIN TIME: 11.9 SEC (ref 9.3–12.4)
RBC # BLD AUTO: 4.41 M/UL (ref 3.8–5.8)
RBC # BLD AUTO: 4.63 M/UL (ref 3.8–5.8)
RBC # BLD: ABNORMAL 10*6/UL
RBC # BLD: NORMAL 10*6/UL
RBC #/AREA URNS HPF: ABNORMAL /HPF
RSV RNA NPH QL NAA+NON-PROBE: NOT DETECTED
RV+EV RNA NPH QL NAA+NON-PROBE: NOT DETECTED
SALICYLATES SERPL-MCNC: <0.3 MG/DL (ref 0–30)
SARS-COV-2 RNA NPH QL NAA+NON-PROBE: NOT DETECTED
SODIUM SERPL-SCNC: 130 MMOL/L (ref 132–146)
SODIUM SERPL-SCNC: 131 MMOL/L (ref 132–146)
SOURCE, BLOOD GAS: ABNORMAL
SOURCE, BLOOD GAS: ABNORMAL
SP GR UR STRIP: <1.005 (ref 1–1.03)
SPECIMEN DESCRIPTION: NORMAL
TEST INFORMATION: ABNORMAL
THB: 11.4 G/DL (ref 11.5–16.5)
THB: 11.4 G/DL (ref 11.5–16.5)
TIME ANALYZED: 1450
TIME ANALYZED: 1505
TOXIC TRICYCLIC SC,BLOOD: NEGATIVE
TROPONIN I SERPL HS-MCNC: 336 NG/L (ref 0–11)
TROPONIN I SERPL HS-MCNC: 749 NG/L (ref 0–11)
TROPONIN I SERPL HS-MCNC: 765 NG/L (ref 0–11)
TSH SERPL DL<=0.05 MIU/L-ACNC: 0.54 UIU/ML (ref 0.27–4.2)
UROBILINOGEN UR STRIP-ACNC: 4 EU/DL (ref 0–1)
WBC #/AREA URNS HPF: ABNORMAL /HPF
WBC OTHER # BLD: 10.3 K/UL (ref 4.5–11.5)
WBC OTHER # BLD: 12.3 K/UL (ref 4.5–11.5)

## 2025-02-17 PROCEDURE — 6360000004 HC RX CONTRAST MEDICATION: Performed by: INTERNAL MEDICINE

## 2025-02-17 PROCEDURE — G0480 DRUG TEST DEF 1-7 CLASSES: HCPCS

## 2025-02-17 PROCEDURE — 84484 ASSAY OF TROPONIN QUANT: CPT

## 2025-02-17 PROCEDURE — 81001 URINALYSIS AUTO W/SCOPE: CPT

## 2025-02-17 PROCEDURE — 99291 CRITICAL CARE FIRST HOUR: CPT | Performed by: INTERNAL MEDICINE

## 2025-02-17 PROCEDURE — 2580000003 HC RX 258: Performed by: INTERNAL MEDICINE

## 2025-02-17 PROCEDURE — 2000000000 HC ICU R&B

## 2025-02-17 PROCEDURE — 36556 INSERT NON-TUNNEL CV CATH: CPT | Performed by: INTERNAL MEDICINE

## 2025-02-17 PROCEDURE — 2500000003 HC RX 250 WO HCPCS: Performed by: INTERNAL MEDICINE

## 2025-02-17 PROCEDURE — 80143 DRUG ASSAY ACETAMINOPHEN: CPT

## 2025-02-17 PROCEDURE — 7100000000 HC PACU RECOVERY - FIRST 15 MIN

## 2025-02-17 PROCEDURE — 87150 DNA/RNA AMPLIFIED PROBE: CPT

## 2025-02-17 PROCEDURE — 86403 PARTICLE AGGLUT ANTBDY SCRN: CPT

## 2025-02-17 PROCEDURE — 51798 US URINE CAPACITY MEASURE: CPT

## 2025-02-17 PROCEDURE — 87081 CULTURE SCREEN ONLY: CPT

## 2025-02-17 PROCEDURE — 99153 MOD SED SAME PHYS/QHP EA: CPT | Performed by: INTERNAL MEDICINE

## 2025-02-17 PROCEDURE — 6360000002 HC RX W HCPCS

## 2025-02-17 PROCEDURE — 93458 L HRT ARTERY/VENTRICLE ANGIO: CPT

## 2025-02-17 PROCEDURE — 86704 HEP B CORE ANTIBODY TOTAL: CPT

## 2025-02-17 PROCEDURE — 92941 PRQ TRLML REVSC TOT OCCL AMI: CPT

## 2025-02-17 PROCEDURE — 70470 CT HEAD/BRAIN W/O & W/DYE: CPT

## 2025-02-17 PROCEDURE — C1769 GUIDE WIRE: HCPCS | Performed by: INTERNAL MEDICINE

## 2025-02-17 PROCEDURE — 6360000002 HC RX W HCPCS: Performed by: INTERNAL MEDICINE

## 2025-02-17 PROCEDURE — 83605 ASSAY OF LACTIC ACID: CPT

## 2025-02-17 PROCEDURE — 86140 C-REACTIVE PROTEIN: CPT

## 2025-02-17 PROCEDURE — 2709999900 HC NON-CHARGEABLE SUPPLY: Performed by: INTERNAL MEDICINE

## 2025-02-17 PROCEDURE — 93005 ELECTROCARDIOGRAM TRACING: CPT

## 2025-02-17 PROCEDURE — 87389 HIV-1 AG W/HIV-1&-2 AB AG IA: CPT

## 2025-02-17 PROCEDURE — 87340 HEPATITIS B SURFACE AG IA: CPT

## 2025-02-17 PROCEDURE — 82533 TOTAL CORTISOL: CPT

## 2025-02-17 PROCEDURE — 6370000000 HC RX 637 (ALT 250 FOR IP)

## 2025-02-17 PROCEDURE — 85652 RBC SED RATE AUTOMATED: CPT

## 2025-02-17 PROCEDURE — 85347 COAGULATION TIME ACTIVATED: CPT

## 2025-02-17 PROCEDURE — 7100000001 HC PACU RECOVERY - ADDTL 15 MIN

## 2025-02-17 PROCEDURE — 2580000003 HC RX 258

## 2025-02-17 PROCEDURE — 99152 MOD SED SAME PHYS/QHP 5/>YRS: CPT | Performed by: INTERNAL MEDICINE

## 2025-02-17 PROCEDURE — 36215 PLACE CATHETER IN ARTERY: CPT

## 2025-02-17 PROCEDURE — 85025 COMPLETE CBC W/AUTO DIFF WBC: CPT

## 2025-02-17 PROCEDURE — 86036 ANCA SCREEN EACH ANTIBODY: CPT

## 2025-02-17 PROCEDURE — 75710 ARTERY X-RAYS ARM/LEG: CPT

## 2025-02-17 PROCEDURE — C1874 STENT, COATED/COV W/DEL SYS: HCPCS | Performed by: INTERNAL MEDICINE

## 2025-02-17 PROCEDURE — 86317 IMMUNOASSAY INFECTIOUS AGENT: CPT

## 2025-02-17 PROCEDURE — 85610 PROTHROMBIN TIME: CPT

## 2025-02-17 PROCEDURE — 80307 DRUG TEST PRSMV CHEM ANLYZR: CPT

## 2025-02-17 PROCEDURE — 87040 BLOOD CULTURE FOR BACTERIA: CPT

## 2025-02-17 PROCEDURE — 84443 ASSAY THYROID STIM HORMONE: CPT

## 2025-02-17 PROCEDURE — 87522 HEPATITIS C REVRS TRNSCRPJ: CPT

## 2025-02-17 PROCEDURE — 86738 MYCOPLASMA ANTIBODY: CPT

## 2025-02-17 PROCEDURE — 6360000002 HC RX W HCPCS: Performed by: EMERGENCY MEDICINE

## 2025-02-17 PROCEDURE — 36556 INSERT NON-TUNNEL CV CATH: CPT

## 2025-02-17 PROCEDURE — 87086 URINE CULTURE/COLONY COUNT: CPT

## 2025-02-17 PROCEDURE — 83735 ASSAY OF MAGNESIUM: CPT

## 2025-02-17 PROCEDURE — 86803 HEPATITIS C AB TEST: CPT

## 2025-02-17 PROCEDURE — 36592 COLLECT BLOOD FROM PICC: CPT

## 2025-02-17 PROCEDURE — 71260 CT THORAX DX C+: CPT

## 2025-02-17 PROCEDURE — C1725 CATH, TRANSLUMIN NON-LASER: HCPCS | Performed by: INTERNAL MEDICINE

## 2025-02-17 PROCEDURE — 84145 PROCALCITONIN (PCT): CPT

## 2025-02-17 PROCEDURE — 83516 IMMUNOASSAY NONANTIBODY: CPT

## 2025-02-17 PROCEDURE — 84100 ASSAY OF PHOSPHORUS: CPT

## 2025-02-17 PROCEDURE — B2111ZZ FLUOROSCOPY OF MULTIPLE CORONARY ARTERIES USING LOW OSMOLAR CONTRAST: ICD-10-PCS | Performed by: INTERNAL MEDICINE

## 2025-02-17 PROCEDURE — 82805 BLOOD GASES W/O2 SATURATION: CPT

## 2025-02-17 PROCEDURE — 6360000004 HC RX CONTRAST MEDICATION: Performed by: RADIOLOGY

## 2025-02-17 PROCEDURE — 027034Z DILATION OF CORONARY ARTERY, ONE ARTERY WITH DRUG-ELUTING INTRALUMINAL DEVICE, PERCUTANEOUS APPROACH: ICD-10-PCS | Performed by: INTERNAL MEDICINE

## 2025-02-17 PROCEDURE — 80179 DRUG ASSAY SALICYLATE: CPT

## 2025-02-17 PROCEDURE — 96374 THER/PROPH/DIAG INJ IV PUSH: CPT

## 2025-02-17 PROCEDURE — 6370000000 HC RX 637 (ALT 250 FOR IP): Performed by: EMERGENCY MEDICINE

## 2025-02-17 PROCEDURE — 4A023N7 MEASUREMENT OF CARDIAC SAMPLING AND PRESSURE, LEFT HEART, PERCUTANEOUS APPROACH: ICD-10-PCS | Performed by: INTERNAL MEDICINE

## 2025-02-17 PROCEDURE — 93306 TTE W/DOPPLER COMPLETE: CPT

## 2025-02-17 PROCEDURE — C1894 INTRO/SHEATH, NON-LASER: HCPCS | Performed by: INTERNAL MEDICINE

## 2025-02-17 PROCEDURE — 85730 THROMBOPLASTIN TIME PARTIAL: CPT

## 2025-02-17 PROCEDURE — B2151ZZ FLUOROSCOPY OF LEFT HEART USING LOW OSMOLAR CONTRAST: ICD-10-PCS | Performed by: INTERNAL MEDICINE

## 2025-02-17 PROCEDURE — 99285 EMERGENCY DEPT VISIT HI MDM: CPT

## 2025-02-17 PROCEDURE — 71045 X-RAY EXAM CHEST 1 VIEW: CPT

## 2025-02-17 PROCEDURE — 92941 PRQ TRLML REVSC TOT OCCL AMI: CPT | Performed by: INTERNAL MEDICINE

## 2025-02-17 PROCEDURE — 0202U NFCT DS 22 TRGT SARS-COV-2: CPT

## 2025-02-17 PROCEDURE — 6370000000 HC RX 637 (ALT 250 FOR IP): Performed by: INTERNAL MEDICINE

## 2025-02-17 PROCEDURE — 87899 AGENT NOS ASSAY W/OPTIC: CPT

## 2025-02-17 PROCEDURE — 93458 L HRT ARTERY/VENTRICLE ANGIO: CPT | Performed by: INTERNAL MEDICINE

## 2025-02-17 PROCEDURE — 99223 1ST HOSP IP/OBS HIGH 75: CPT | Performed by: INTERNAL MEDICINE

## 2025-02-17 PROCEDURE — 80053 COMPREHEN METABOLIC PANEL: CPT

## 2025-02-17 PROCEDURE — C1760 CLOSURE DEV, VASC: HCPCS | Performed by: INTERNAL MEDICINE

## 2025-02-17 PROCEDURE — C1887 CATHETER, GUIDING: HCPCS | Performed by: INTERNAL MEDICINE

## 2025-02-17 PROCEDURE — 87449 NOS EACH ORGANISM AG IA: CPT

## 2025-02-17 PROCEDURE — 06HY33Z INSERTION OF INFUSION DEVICE INTO LOWER VEIN, PERCUTANEOUS APPROACH: ICD-10-PCS | Performed by: INTERNAL MEDICINE

## 2025-02-17 DEVICE — STENT ONYXNG30018UX ONYX 3.00X18RX
Type: IMPLANTABLE DEVICE | Status: FUNCTIONAL
Brand: ONYX FRONTIER™

## 2025-02-17 RX ORDER — LOSARTAN POTASSIUM 50 MG/1
25 TABLET ORAL DAILY
Status: DISCONTINUED | OUTPATIENT
Start: 2025-02-17 | End: 2025-02-18 | Stop reason: HOSPADM

## 2025-02-17 RX ORDER — POTASSIUM CHLORIDE 7.45 MG/ML
10 INJECTION INTRAVENOUS PRN
Status: DISCONTINUED | OUTPATIENT
Start: 2025-02-17 | End: 2025-02-18 | Stop reason: HOSPADM

## 2025-02-17 RX ORDER — SODIUM CHLORIDE 0.9 % (FLUSH) 0.9 %
5-40 SYRINGE (ML) INJECTION PRN
Status: DISCONTINUED | OUTPATIENT
Start: 2025-02-17 | End: 2025-02-18 | Stop reason: HOSPADM

## 2025-02-17 RX ORDER — SODIUM CHLORIDE 0.9 % (FLUSH) 0.9 %
5-40 SYRINGE (ML) INJECTION EVERY 12 HOURS SCHEDULED
Status: DISCONTINUED | OUTPATIENT
Start: 2025-02-17 | End: 2025-02-18 | Stop reason: HOSPADM

## 2025-02-17 RX ORDER — POLYETHYLENE GLYCOL 3350 17 G/17G
17 POWDER, FOR SOLUTION ORAL DAILY PRN
Status: DISCONTINUED | OUTPATIENT
Start: 2025-02-17 | End: 2025-02-18 | Stop reason: HOSPADM

## 2025-02-17 RX ORDER — FENTANYL CITRATE 50 UG/ML
INJECTION, SOLUTION INTRAMUSCULAR; INTRAVENOUS PRN
Status: DISCONTINUED | OUTPATIENT
Start: 2025-02-17 | End: 2025-02-17 | Stop reason: HOSPADM

## 2025-02-17 RX ORDER — POTASSIUM CHLORIDE 1500 MG/1
40 TABLET, EXTENDED RELEASE ORAL PRN
Status: DISCONTINUED | OUTPATIENT
Start: 2025-02-17 | End: 2025-02-18 | Stop reason: HOSPADM

## 2025-02-17 RX ORDER — SODIUM CHLORIDE, SODIUM LACTATE, POTASSIUM CHLORIDE, AND CALCIUM CHLORIDE .6; .31; .03; .02 G/100ML; G/100ML; G/100ML; G/100ML
1000 INJECTION, SOLUTION INTRAVENOUS ONCE
Status: COMPLETED | OUTPATIENT
Start: 2025-02-17 | End: 2025-02-17

## 2025-02-17 RX ORDER — VANCOMYCIN 1.75 G/350ML
1250 INJECTION, SOLUTION INTRAVENOUS EVERY 12 HOURS
Status: DISCONTINUED | OUTPATIENT
Start: 2025-02-18 | End: 2025-02-18 | Stop reason: HOSPADM

## 2025-02-17 RX ORDER — ONDANSETRON 4 MG/1
4 TABLET, ORALLY DISINTEGRATING ORAL EVERY 8 HOURS PRN
Status: DISCONTINUED | OUTPATIENT
Start: 2025-02-17 | End: 2025-02-18 | Stop reason: HOSPADM

## 2025-02-17 RX ORDER — ATORVASTATIN CALCIUM 40 MG/1
40 TABLET, FILM COATED ORAL NIGHTLY
Status: DISCONTINUED | OUTPATIENT
Start: 2025-02-17 | End: 2025-02-18 | Stop reason: HOSPADM

## 2025-02-17 RX ORDER — HEPARIN SODIUM 1000 [USP'U]/ML
4000 INJECTION, SOLUTION INTRAVENOUS; SUBCUTANEOUS ONCE
Status: COMPLETED | OUTPATIENT
Start: 2025-02-17 | End: 2025-02-17

## 2025-02-17 RX ORDER — ACETAMINOPHEN 650 MG/1
650 SUPPOSITORY RECTAL EVERY 6 HOURS PRN
Status: DISCONTINUED | OUTPATIENT
Start: 2025-02-17 | End: 2025-02-18 | Stop reason: HOSPADM

## 2025-02-17 RX ORDER — ASPIRIN 81 MG/1
81 TABLET, CHEWABLE ORAL DAILY
Status: DISCONTINUED | OUTPATIENT
Start: 2025-02-18 | End: 2025-02-18

## 2025-02-17 RX ORDER — SODIUM CHLORIDE 9 MG/ML
INJECTION, SOLUTION INTRAVENOUS PRN
Status: DISCONTINUED | OUTPATIENT
Start: 2025-02-17 | End: 2025-02-17 | Stop reason: SDUPTHER

## 2025-02-17 RX ORDER — NITROGLYCERIN 20 MG/100ML
INJECTION INTRAVENOUS CONTINUOUS PRN
Status: COMPLETED | OUTPATIENT
Start: 2025-02-17 | End: 2025-02-17

## 2025-02-17 RX ORDER — ONDANSETRON 2 MG/ML
4 INJECTION INTRAMUSCULAR; INTRAVENOUS EVERY 6 HOURS PRN
Status: DISCONTINUED | OUTPATIENT
Start: 2025-02-17 | End: 2025-02-18 | Stop reason: HOSPADM

## 2025-02-17 RX ORDER — ACETAMINOPHEN 325 MG/1
650 TABLET ORAL EVERY 4 HOURS PRN
Status: DISCONTINUED | OUTPATIENT
Start: 2025-02-17 | End: 2025-02-17 | Stop reason: SDUPTHER

## 2025-02-17 RX ORDER — NITROGLYCERIN 0.4 MG/1
0.4 TABLET SUBLINGUAL ONCE
Status: COMPLETED | OUTPATIENT
Start: 2025-02-17 | End: 2025-02-17

## 2025-02-17 RX ORDER — ENOXAPARIN SODIUM 100 MG/ML
40 INJECTION SUBCUTANEOUS DAILY
Status: DISCONTINUED | OUTPATIENT
Start: 2025-02-18 | End: 2025-02-18

## 2025-02-17 RX ORDER — ASPIRIN 81 MG/1
324 TABLET, CHEWABLE ORAL ONCE
Status: COMPLETED | OUTPATIENT
Start: 2025-02-17 | End: 2025-02-17

## 2025-02-17 RX ORDER — METOPROLOL TARTRATE 25 MG/1
25 TABLET, FILM COATED ORAL 2 TIMES DAILY
Status: DISCONTINUED | OUTPATIENT
Start: 2025-02-17 | End: 2025-02-18 | Stop reason: HOSPADM

## 2025-02-17 RX ORDER — 0.9 % SODIUM CHLORIDE 0.9 %
500 INTRAVENOUS SOLUTION INTRAVENOUS ONCE
Status: COMPLETED | OUTPATIENT
Start: 2025-02-17 | End: 2025-02-17

## 2025-02-17 RX ORDER — DEXAMETHASONE SODIUM PHOSPHATE 4 MG/ML
4 INJECTION, SOLUTION INTRA-ARTICULAR; INTRALESIONAL; INTRAMUSCULAR; INTRAVENOUS; SOFT TISSUE EVERY 6 HOURS
Status: DISCONTINUED | OUTPATIENT
Start: 2025-02-17 | End: 2025-02-18 | Stop reason: HOSPADM

## 2025-02-17 RX ORDER — IOPAMIDOL 755 MG/ML
INJECTION, SOLUTION INTRAVASCULAR PRN
Status: DISCONTINUED | OUTPATIENT
Start: 2025-02-17 | End: 2025-02-17 | Stop reason: HOSPADM

## 2025-02-17 RX ORDER — ASPIRIN 325 MG
325 TABLET, DELAYED RELEASE (ENTERIC COATED) ORAL DAILY
Status: DISCONTINUED | OUTPATIENT
Start: 2025-02-17 | End: 2025-02-17

## 2025-02-17 RX ORDER — SODIUM CHLORIDE 9 MG/ML
INJECTION, SOLUTION INTRAVENOUS PRN
Status: DISCONTINUED | OUTPATIENT
Start: 2025-02-17 | End: 2025-02-18 | Stop reason: HOSPADM

## 2025-02-17 RX ORDER — IOPAMIDOL 755 MG/ML
75 INJECTION, SOLUTION INTRAVASCULAR
Status: COMPLETED | OUTPATIENT
Start: 2025-02-17 | End: 2025-02-17

## 2025-02-17 RX ORDER — MAGNESIUM SULFATE IN WATER 40 MG/ML
2000 INJECTION, SOLUTION INTRAVENOUS PRN
Status: DISCONTINUED | OUTPATIENT
Start: 2025-02-17 | End: 2025-02-18 | Stop reason: HOSPADM

## 2025-02-17 RX ORDER — SODIUM CHLORIDE 9 MG/ML
INJECTION, SOLUTION INTRAVENOUS CONTINUOUS
Status: ACTIVE | OUTPATIENT
Start: 2025-02-17 | End: 2025-02-18

## 2025-02-17 RX ORDER — MIDAZOLAM HYDROCHLORIDE 1 MG/ML
INJECTION, SOLUTION INTRAMUSCULAR; INTRAVENOUS PRN
Status: DISCONTINUED | OUTPATIENT
Start: 2025-02-17 | End: 2025-02-17 | Stop reason: HOSPADM

## 2025-02-17 RX ORDER — VANCOMYCIN 1.75 G/350ML
1250 INJECTION, SOLUTION INTRAVENOUS ONCE
Status: COMPLETED | OUTPATIENT
Start: 2025-02-17 | End: 2025-02-17

## 2025-02-17 RX ORDER — ACETAMINOPHEN 325 MG/1
650 TABLET ORAL EVERY 6 HOURS PRN
Status: DISCONTINUED | OUTPATIENT
Start: 2025-02-17 | End: 2025-02-18 | Stop reason: HOSPADM

## 2025-02-17 RX ORDER — HEPARIN SODIUM 5000 [USP'U]/ML
4000 INJECTION, SOLUTION INTRAVENOUS; SUBCUTANEOUS
Status: DISCONTINUED | OUTPATIENT
Start: 2025-02-17 | End: 2025-02-17

## 2025-02-17 RX ADMIN — SODIUM CHLORIDE, POTASSIUM CHLORIDE, SODIUM LACTATE AND CALCIUM CHLORIDE 1000 ML: 600; 310; 30; 20 INJECTION, SOLUTION INTRAVENOUS at 15:15

## 2025-02-17 RX ADMIN — VANCOMYCIN 1250 MG: 1.75 INJECTION, SOLUTION INTRAVENOUS at 17:38

## 2025-02-17 RX ADMIN — SODIUM CHLORIDE: 0.9 INJECTION, SOLUTION INTRAVENOUS at 16:13

## 2025-02-17 RX ADMIN — ASPIRIN 81 MG CHEWABLE TABLET 324 MG: 81 TABLET CHEWABLE at 12:26

## 2025-02-17 RX ADMIN — IOPAMIDOL 75 ML: 755 INJECTION, SOLUTION INTRAVENOUS at 17:34

## 2025-02-17 RX ADMIN — AMPICILLIN 1000 MG: 1 INJECTION, POWDER, FOR SOLUTION INTRAMUSCULAR; INTRAVENOUS at 23:48

## 2025-02-17 RX ADMIN — SODIUM CHLORIDE 500 ML: 0.9 INJECTION, SOLUTION INTRAVENOUS at 12:24

## 2025-02-17 RX ADMIN — TICAGRELOR 180 MG: 90 TABLET ORAL at 12:31

## 2025-02-17 RX ADMIN — ACYCLOVIR SODIUM 800 MG: 50 INJECTION, SOLUTION INTRAVENOUS at 20:24

## 2025-02-17 RX ADMIN — DEXAMETHASONE SODIUM PHOSPHATE 4 MG: 4 INJECTION, SOLUTION INTRAMUSCULAR; INTRAVENOUS at 20:25

## 2025-02-17 RX ADMIN — Medication 10 ML: at 20:42

## 2025-02-17 RX ADMIN — NITROGLYCERIN 0.4 MG: 0.4 TABLET, ORALLY DISINTEGRATING SUBLINGUAL at 12:42

## 2025-02-17 RX ADMIN — AMPICILLIN 1000 MG: 1 INJECTION, POWDER, FOR SOLUTION INTRAMUSCULAR; INTRAVENOUS at 19:40

## 2025-02-17 RX ADMIN — ATORVASTATIN CALCIUM 40 MG: 40 TABLET, FILM COATED ORAL at 21:04

## 2025-02-17 RX ADMIN — CEFEPIME 2000 MG: 2 INJECTION, POWDER, FOR SOLUTION INTRAVENOUS at 17:42

## 2025-02-17 RX ADMIN — HEPARIN SODIUM 4000 UNITS: 1000 INJECTION, SOLUTION INTRAVENOUS; SUBCUTANEOUS at 12:32

## 2025-02-17 RX ADMIN — METOPROLOL TARTRATE 25 MG: 25 TABLET, FILM COATED ORAL at 20:56

## 2025-02-17 RX ADMIN — TICAGRELOR 90 MG: 90 TABLET ORAL at 20:56

## 2025-02-17 ASSESSMENT — LIFESTYLE VARIABLES
HOW OFTEN DO YOU HAVE A DRINK CONTAINING ALCOHOL: NEVER
HOW MANY STANDARD DRINKS CONTAINING ALCOHOL DO YOU HAVE ON A TYPICAL DAY: PATIENT DOES NOT DRINK

## 2025-02-17 ASSESSMENT — PAIN DESCRIPTION - LOCATION: LOCATION: CHEST

## 2025-02-17 ASSESSMENT — PAIN SCALES - GENERAL: PAINLEVEL_OUTOF10: 10

## 2025-02-17 NOTE — PROGRESS NOTES
4 Eyes Skin Assessment     NAME:  Wagner Ryan  YOB: 1980  MEDICAL RECORD NUMBER:  [id number]    The patient is being assessed for  Admission    Generalized bruising/scabbing/needlemarks All extremities, right hand discolored purple but great cap refill with full sensation, redness buttocks/heels, BLE generalized light mottling, puncture site right ulner, left femoral, right femoral, and arterial everett right radial    I agree that at least one RN has performed a thorough Head to Toe Skin Assessment on the patient. ALL assessment sites listed below have been assessed.      Areas assessed by both nurses:    Head, Face, Ears, Shoulders, Back, Chest, Arms, Elbows, Hands, Sacrum. Buttock, Coccyx, Ischium, Legs. Feet and Heels, and Under Medical Devices         Does the Patient have a Wound? No noted wound(s)       [place] Prevention initiated by RN: Yes  Wound Care Orders initiated by RN: No    Pressure Injury (Stage 3,4, Unstageable, DTI, NWPT, and Complex wounds) if present, place Wound referral order by RN under : No    New Ostomies, if present place, Ostomy referral order under : No     Nurse 1 eSignature: Electronically signed by Hany Liang RN on 2/17/25 at 2:09 PM EST    **SHARE this note so that the co-signing nurse can place an eSignature**    Nurse 2 eSignature: Electronically signed by Darrin Subramanian RN on 2/17/25 at 6:10 PM EST

## 2025-02-17 NOTE — PROCEDURES
PROCEDURE NOTE  Date: 2/17/2025   Name: Wagenr Ryan  YOB: 1980    CENTRAL LINE    Date/Time: 2/17/2025 2:58 PM    Performed by: Maria Dolores Galloway DO  Authorized by: Maria Dolores Galloway DO        Central Line    Date/Time:02/17/25       Performed by: Maria Dolores Galloway D.O.     Consent: Verbal consent obtained.    Risks and benefits: risks, benefits and alternatives were discussed  Consent given by: patient   Patient understanding: patient states understanding of the procedure being performed   Patient consent: the patient's understanding of the procedure matches consent given   Procedure consent: procedure consent matches procedure scheduled   Relevant documents: relevant documents present and verified   Site marked: the operative site was marked  Imaging studies: imaging studies available   Patient identity confirmed: verbally with patient and arm band   Indications: vascular access  Anesthesia: local infiltration  Local anesthetic: lidocaine 1% without epinephrine  Patient sedated: no  Preparation: skin prepped with 2% chlorhexidine  Skin prep agent dried: skin prep agent completely dried prior to procedure  Sterile barriers: all five maximum sterile barriers used - cap, mask, sterile gown, sterile gloves, and large sterile sheet  Hand hygiene: hand hygiene performed prior to central venous catheter insertion  Location details: right femoral  Patient position: flat  Catheter type: triple lumen  Pre-procedure: landmarks identified  Ultrasound guidance: yes  Number of attempts: 1  Successful placement: yes  Post-procedure: line sutured and dressing applied  Assessment: blood return through all parts, free fluid flow, placement verified by x-ray and no pneumothorax on x-ray  Patient tolerance: Patient tolerated the procedure well with no immediate complications.      Maria Dolores Galloway D.O.

## 2025-02-17 NOTE — PROGRESS NOTES
Pt arrived to ICU from cathlab with no post-cath orders or assignment to ICU. No IV access was provided and patient was admitted to the ICU with femoral and radial sheaths in place.  Pt very lethargic and intermittently apneic with shallow respirations, unable to follow commands.  pt experiencing altered mentation and unable to verbalize simple answers to questions     Dr. Aguilar at bedside to place central venous access and made aware of altered mentation. CT ordered

## 2025-02-17 NOTE — ED PROVIDER NOTES
[facility] SPECIAL PROCEDURES  EMERGENCY DEPARTMENT ENCOUNTER        Pt Name: Wagner Ryan  MRN: [id number]  Birthdate 1980  Date of evaluation: 2/17/2025  Provider: Sergio Aparicio DO  PCP: No primary care provider on file.  Note Started: 12:23 PM EST 2/17/25    CHIEF COMPLAINT       Chief Complaint   Patient presents with    Altered Mental Status     Fatigue, Seen at urgent care yesterday, dx bronchitis       HISTORY OF PRESENT ILLNESS: 1 or more Elements   History From: Patient    Limitations to history : Altered Mental Status    Wagner Ryan is a 45 y.o. male who presents to the ED via EMS for shortness of breath and altered mental status.  Patient was alert and oriented to person and place, confused about time and events.  Patient states he is short of breath, he denies any pain but appears to be in pain when moving extremities.  He denies abdominal pain, nausea or vomiting, urinary or bowel symptoms.    Patient denies fever, chills, headachechest pain, abdominal pain, nausea, vomiting, diarrhea, lightheadedness, dysuria, hematuria, hematochezia, and melena.    Nursing Notes were all reviewed and agreed with or any disagreements were addressed in the HPI.        REVIEW OF EXTERNAL NOTES :         Patient was seen through 2/16/2025 for acute bronchitis.      REVIEW OF SYSTEMS :           Positives and Pertinent negatives as per HPI.     SURGICAL HISTORY     Past Surgical History:   Procedure Laterality Date    APPLY DRESSING Left 10/9/2020    DEBRIDEMENT OF SKIN, SOFT TISSUE, MUSCLE, EXPLORATION OF UPPER ARM INTEGRA GRAFT FOR COVERAGE performed by Camilo Denny MD at [facility]    APPLY DRESSING Left 10/14/2020    LEFT UPPER EXTREMITY WOUND VAC PLACEMENT performed by Eddie Ruff MD at [facility] OR    INCISION AND DRAINAGE Right 4/23/2020    RIGHT UPPER EXTREMITY INCISION AND DRAINAGE REMOVAL FOREIGN BODY WITH C-ARM performed by Kapil Yuan MD at [facility] OR

## 2025-02-17 NOTE — H&P
Comprehensive Metabolic Panel    Collection Time: 02/17/25  3:06 PM   Result Value Ref Range    Sodium 131 (L) 132 - 146 mmol/L    Potassium 4.0 3.5 - 5.0 mmol/L    Chloride 92 (L) 98 - 107 mmol/L    CO2 27 22 - 29 mmol/L    Anion Gap 12 7 - 16 mmol/L    Glucose 120 (H) 74 - 99 mg/dL    BUN 31 (H) 6 - 20 mg/dL    Creatinine 1.1 0.70 - 1.20 mg/dL    Est Glom Filt Rate 81 >60 mL/min/1.73m2    Calcium 8.7 8.6 - 10.2 mg/dL    Total Protein 6.6 6.4 - 8.3 g/dL    Albumin 2.7 (L) 3.5 - 5.2 g/dL    Total Bilirubin 1.0 0.0 - 1.2 mg/dL    Alkaline Phosphatase 108 40 - 129 U/L    ALT 22 0 - 40 U/L    AST 42 (H) 0 - 39 U/L   Magnesium    Collection Time: 02/17/25  3:06 PM   Result Value Ref Range    Magnesium 2.2 1.6 - 2.6 mg/dL   Phosphorus    Collection Time: 02/17/25  3:06 PM   Result Value Ref Range    Phosphorus 4.0 2.5 - 4.5 mg/dL   Urinalysis with Microscopic    Collection Time: 02/17/25  3:07 PM   Result Value Ref Range    Color, UA Yellow Yellow    Turbidity UA Clear Clear    Glucose, Ur NEGATIVE NEGATIVE mg/dL    Bilirubin, Urine NEGATIVE NEGATIVE    Ketones, Urine NEGATIVE NEGATIVE mg/dL    Specific Gravity, UA <1.005 (L) 1.005 - 1.030    Urine Hgb LARGE (A) NEGATIVE    pH, Urine 6.5 5.0 - 8.0    Protein,  (A) NEGATIVE mg/dL    Urobilinogen, Urine 4.0 (H) 0.0 - 1.0 EU/dL    Nitrite, Urine POSITIVE (A) NEGATIVE    Leukocyte Esterase, Urine NEGATIVE NEGATIVE    WBC, UA 0 TO 5 0 TO 5 /HPF    RBC, UA 10 TO 20 (A) 0 TO 2 /HPF    Bacteria, UA 2+ (A) None   APTT    Collection Time: 02/17/25  3:13 PM   Result Value Ref Range    APTT 26.7 24.5 - 35.1 sec        Imaging/Diagnostics Last 24 Hours   XR CHEST PORTABLE    Result Date: 2/17/2025  EXAMINATION: ONE XRAY VIEW OF THE CHEST 2/17/2025 12:07 pm COMPARISON: None. HISTORY: ORDERING SYSTEM PROVIDED HISTORY: SOB TECHNOLOGIST PROVIDED HISTORY: Reason for exam:->SOB FINDINGS: The lungs are without acute focal process.  There is no effusion or pneumothorax. The

## 2025-02-18 ENCOUNTER — APPOINTMENT (OUTPATIENT)
Dept: MRI IMAGING | Age: 45
DRG: 321 | End: 2025-02-18

## 2025-02-18 ENCOUNTER — HOSPITAL ENCOUNTER (INPATIENT)
Age: 45
LOS: 17 days | Discharge: NURSING, REHAB OR LONG-TERM CARE | End: 2025-03-07
Attending: INTERNAL MEDICINE | Admitting: INTERNAL MEDICINE

## 2025-02-18 ENCOUNTER — APPOINTMENT (OUTPATIENT)
Dept: CT IMAGING | Age: 45
DRG: 321 | End: 2025-02-18

## 2025-02-18 ENCOUNTER — APPOINTMENT (OUTPATIENT)
Age: 45
DRG: 321 | End: 2025-02-18
Attending: INTERNAL MEDICINE

## 2025-02-18 VITALS
WEIGHT: 179.01 LBS | BODY MASS INDEX: 26.51 KG/M2 | HEIGHT: 69 IN | TEMPERATURE: 98.6 F | DIASTOLIC BLOOD PRESSURE: 74 MMHG | HEART RATE: 97 BPM | OXYGEN SATURATION: 93 % | RESPIRATION RATE: 23 BRPM | SYSTOLIC BLOOD PRESSURE: 125 MMHG

## 2025-02-18 DIAGNOSIS — F19.10 SUBSTANCE ABUSE (HCC): ICD-10-CM

## 2025-02-18 DIAGNOSIS — I21.3 ST ELEVATION MYOCARDIAL INFARCTION (STEMI), UNSPECIFIED ARTERY (HCC): Primary | ICD-10-CM

## 2025-02-18 PROBLEM — I33.0 ACUTE BACTERIAL ENDOCARDITIS: Status: ACTIVE | Noted: 2025-02-18

## 2025-02-18 PROBLEM — I38 ENDOCARDITIS: Status: ACTIVE | Noted: 2025-02-18

## 2025-02-18 LAB
ALBUMIN SERPL-MCNC: 2.5 G/DL (ref 3.5–5.2)
ALP SERPL-CCNC: 95 U/L (ref 40–129)
ALT SERPL-CCNC: 22 U/L (ref 0–40)
ANION GAP SERPL CALCULATED.3IONS-SCNC: 12 MMOL/L (ref 7–16)
AST SERPL-CCNC: 60 U/L (ref 0–39)
BASOPHILS # BLD: 0 K/UL (ref 0–0.2)
BASOPHILS NFR BLD: 0 % (ref 0–2)
BILIRUB SERPL-MCNC: 0.7 MG/DL (ref 0–1.2)
BUN SERPL-MCNC: 29 MG/DL (ref 6–20)
CALCIUM SERPL-MCNC: 8.2 MG/DL (ref 8.6–10.2)
CHLORIDE SERPL-SCNC: 101 MMOL/L (ref 98–107)
CO2 SERPL-SCNC: 25 MMOL/L (ref 22–29)
CREAT SERPL-MCNC: 1.1 MG/DL (ref 0.7–1.2)
CRP SERPL HS-MCNC: 312 MG/L (ref 0–5)
DATE LAST DOSE: NORMAL
ECHO AO ASC DIAM: 3 CM
ECHO AO ASCENDING AORTA INDEX: 1.52 CM/M2
ECHO AV AREA PEAK VELOCITY: 2.2 CM2
ECHO AV AREA PEAK VELOCITY: 2.3 CM2
ECHO AV AREA PEAK VELOCITY: 2.8 CM2
ECHO AV AREA PEAK VELOCITY: 2.8 CM2
ECHO AV AREA VTI: 2.7 CM2
ECHO AV AREA/BSA VTI: 1.4 CM2/M2
ECHO AV CUSP MM: 2.3 CM
ECHO AV MEAN GRADIENT: 4 MMHG
ECHO AV MEAN VELOCITY: 1 M/S
ECHO AV PEAK GRADIENT: 7 MMHG
ECHO AV PEAK GRADIENT: 9 MMHG
ECHO AV PEAK VELOCITY: 1.3 M/S
ECHO AV PEAK VELOCITY: 1.4 M/S
ECHO AV VTI: 24.4 CM
ECHO BSA: 1.99 M2
ECHO BSA: 1.99 M2
ECHO BSA: 2.09 M2
ECHO EST RA PRESSURE: 3 MMHG
ECHO LA DIAMETER INDEX: 1.98 CM/M2
ECHO LA DIAMETER: 3.9 CM
ECHO LA VOL A-L A2C: 93 ML (ref 18–58)
ECHO LA VOL A-L A4C: 81 ML (ref 18–58)
ECHO LA VOL BP: 80 ML (ref 18–58)
ECHO LA VOL MOD A2C: 83 ML (ref 18–58)
ECHO LA VOL MOD A4C: 73 ML (ref 18–58)
ECHO LA VOL/BSA BIPLANE: 41 ML/M2 (ref 16–34)
ECHO LA VOLUME AREA LENGTH: 89 ML
ECHO LA VOLUME INDEX A-L A2C: 47 ML/M2 (ref 16–34)
ECHO LA VOLUME INDEX A-L A4C: 41 ML/M2 (ref 16–34)
ECHO LA VOLUME INDEX AREA LENGTH: 45 ML/M2 (ref 16–34)
ECHO LA VOLUME INDEX MOD A2C: 42 ML/M2 (ref 16–34)
ECHO LA VOLUME INDEX MOD A4C: 37 ML/M2 (ref 16–34)
ECHO LV EDV A2C: 175 ML
ECHO LV EDV A4C: 194 ML
ECHO LV EDV BP: 184 ML (ref 67–155)
ECHO LV EDV INDEX A4C: 98 ML/M2
ECHO LV EDV INDEX BP: 93 ML/M2
ECHO LV EDV NDEX A2C: 89 ML/M2
ECHO LV EF PHYSICIAN: 47 %
ECHO LV EJECTION FRACTION A2C: 50 %
ECHO LV EJECTION FRACTION A4C: 47 %
ECHO LV EJECTION FRACTION BIPLANE: 47 % (ref 55–100)
ECHO LV ESV A2C: 88 ML
ECHO LV ESV A4C: 103 ML
ECHO LV ESV BP: 97 ML (ref 22–58)
ECHO LV ESV INDEX A2C: 45 ML/M2
ECHO LV ESV INDEX A4C: 52 ML/M2
ECHO LV ESV INDEX BP: 49 ML/M2
ECHO LV FRACTIONAL SHORTENING: 23 % (ref 28–44)
ECHO LV INTERNAL DIMENSION DIASTOLE INDEX: 2.84 CM/M2
ECHO LV INTERNAL DIMENSION DIASTOLIC: 5.6 CM (ref 4.2–5.9)
ECHO LV INTERNAL DIMENSION SYSTOLIC INDEX: 2.18 CM/M2
ECHO LV INTERNAL DIMENSION SYSTOLIC: 4.3 CM
ECHO LV IVSD: 1.1 CM (ref 0.6–1)
ECHO LV IVSS: 1.5 CM
ECHO LV MASS 2D: 264.7 G (ref 88–224)
ECHO LV MASS INDEX 2D: 134.4 G/M2 (ref 49–115)
ECHO LV POSTERIOR WALL DIASTOLIC: 1.2 CM (ref 0.6–1)
ECHO LV POSTERIOR WALL SYSTOLIC: 1.3 CM
ECHO LV RELATIVE WALL THICKNESS RATIO: 0.43
ECHO LVOT AREA: 3.1 CM2
ECHO LVOT AV VTI INDEX: 0.84
ECHO LVOT DIAM: 2 CM
ECHO LVOT MEAN GRADIENT: 3 MMHG
ECHO LVOT PEAK GRADIENT: 4 MMHG
ECHO LVOT PEAK GRADIENT: 5 MMHG
ECHO LVOT PEAK VELOCITY: 0.9 M/S
ECHO LVOT PEAK VELOCITY: 1.2 M/S
ECHO LVOT STROKE VOLUME INDEX: 32.5 ML/M2
ECHO LVOT SV: 64.1 ML
ECHO LVOT VTI: 20.4 CM
ECHO MV "A" WAVE DURATION: 125.6 MSEC
ECHO MV A VELOCITY: 1.24 M/S
ECHO MV AREA PHT: 6.6 CM2
ECHO MV AREA VTI: 2.5 CM2
ECHO MV E DECELERATION TIME (DT): 158.5 MS
ECHO MV E VELOCITY: 0.97 M/S
ECHO MV E/A RATIO: 0.78
ECHO MV LVOT VTI INDEX: 1.27
ECHO MV MAX VELOCITY: 1.3 M/S
ECHO MV MEAN GRADIENT: 4 MMHG
ECHO MV MEAN VELOCITY: 0.9 M/S
ECHO MV PEAK GRADIENT: 7 MMHG
ECHO MV PRESSURE HALF TIME (PHT): 33.3 MS
ECHO MV VTI: 25.9 CM
ECHO PV MAX VELOCITY: 1.1 M/S
ECHO PV MEAN GRADIENT: 3 MMHG
ECHO PV MEAN VELOCITY: 0.8 M/S
ECHO PV PEAK GRADIENT: 5 MMHG
ECHO PV VTI: 19.3 CM
ECHO PVEIN A DURATION: 125.6 MS
ECHO PVEIN A VELOCITY: 0.3 M/S
ECHO PVEIN PEAK D VELOCITY: 0.4 M/S
ECHO PVEIN PEAK S VELOCITY: 0.6 M/S
ECHO PVEIN S/D RATIO: 1.5
ECHO RIGHT VENTRICULAR SYSTOLIC PRESSURE (RVSP): 28 MMHG
ECHO RV INTERNAL DIMENSION: 3 CM
ECHO RV LONGITUDINAL DIMENSION: 6.8 CM
ECHO RV MID DIMENSION: 3.7 CM
ECHO RVOT MEAN GRADIENT: 1 MMHG
ECHO RVOT PEAK GRADIENT: 2 MMHG
ECHO RVOT PEAK VELOCITY: 0.7 M/S
ECHO RVOT VTI: 10.5 CM
ECHO TV REGURGITANT MAX VELOCITY: 2.5 M/S
ECHO TV REGURGITANT PEAK GRADIENT: 25 MMHG
EOSINOPHIL # BLD: 0 K/UL (ref 0.05–0.5)
EOSINOPHILS RELATIVE PERCENT: 0 % (ref 0–6)
ERYTHROCYTE [DISTWIDTH] IN BLOOD BY AUTOMATED COUNT: 15.3 % (ref 11.5–15)
GFR, ESTIMATED: 86 ML/MIN/1.73M2
GLUCOSE SERPL-MCNC: 120 MG/DL (ref 74–99)
HBV SURFACE AB SERPL IA-ACNC: <3.1 MIU/ML (ref 0–9.99)
HBV SURFACE AG SERPL QL IA: NONREACTIVE
HCT VFR BLD AUTO: 29.6 % (ref 37–54)
HCV AB SERPL QL IA: REACTIVE
HGB BLD-MCNC: 9.9 G/DL (ref 12.5–16.5)
HIV 1+2 AB+HIV1 P24 AG SERPL QL IA: NONREACTIVE
L PNEUMO1 AG UR QL IA.RAPID: NEGATIVE
LACTATE BLDV-SCNC: 1.5 MMOL/L (ref 0.5–2.2)
LYMPHOCYTES NFR BLD: 0.47 K/UL (ref 1.5–4)
LYMPHOCYTES RELATIVE PERCENT: 5 % (ref 20–42)
MAGNESIUM SERPL-MCNC: 2.4 MG/DL (ref 1.6–2.6)
MCH RBC QN AUTO: 24.1 PG (ref 26–35)
MCHC RBC AUTO-ENTMCNC: 33.4 G/DL (ref 32–34.5)
MCV RBC AUTO: 72 FL (ref 80–99.9)
MONOCYTES NFR BLD: 0.31 K/UL (ref 0.1–0.95)
MONOCYTES NFR BLD: 4 % (ref 2–12)
NEUTROPHILS NFR BLD: 91 % (ref 43–80)
NEUTS SEG NFR BLD: 8.22 K/UL (ref 1.8–7.3)
PHOSPHATE SERPL-MCNC: 3.6 MG/DL (ref 2.5–4.5)
PLATELET # BLD AUTO: 85 K/UL (ref 130–450)
PLATELET CONFIRMATION: NORMAL
PMV BLD AUTO: 10.7 FL (ref 7–12)
POTASSIUM SERPL-SCNC: 3.7 MMOL/L (ref 3.5–5)
PROT SERPL-MCNC: 6.1 G/DL (ref 6.4–8.3)
RBC # BLD AUTO: 4.11 M/UL (ref 3.8–5.8)
RBC # BLD: ABNORMAL 10*6/UL
S PNEUM AG SPEC QL: NEGATIVE
SODIUM SERPL-SCNC: 138 MMOL/L (ref 132–146)
SPECIMEN SOURCE: NORMAL
TME LAST DOSE: NORMAL H
VANCOMYCIN DOSE: NORMAL MG
VANCOMYCIN TROUGH SERPL-MCNC: 10.9 UG/ML (ref 5–16)
WBC OTHER # BLD: 9 K/UL (ref 4.5–11.5)

## 2025-02-18 PROCEDURE — 83735 ASSAY OF MAGNESIUM: CPT

## 2025-02-18 PROCEDURE — 36592 COLLECT BLOOD FROM PICC: CPT

## 2025-02-18 PROCEDURE — 2000000000 HC ICU R&B

## 2025-02-18 PROCEDURE — 93320 DOPPLER ECHO COMPLETE: CPT

## 2025-02-18 PROCEDURE — 6370000000 HC RX 637 (ALT 250 FOR IP): Performed by: INTERNAL MEDICINE

## 2025-02-18 PROCEDURE — 2580000003 HC RX 258: Performed by: INTERNAL MEDICINE

## 2025-02-18 PROCEDURE — 70553 MRI BRAIN STEM W/O & W/DYE: CPT

## 2025-02-18 PROCEDURE — 93005 ELECTROCARDIOGRAM TRACING: CPT | Performed by: INTERNAL MEDICINE

## 2025-02-18 PROCEDURE — 6360000002 HC RX W HCPCS: Performed by: INTERNAL MEDICINE

## 2025-02-18 PROCEDURE — 99291 CRITICAL CARE FIRST HOUR: CPT | Performed by: SURGERY

## 2025-02-18 PROCEDURE — 51798 US URINE CAPACITY MEASURE: CPT

## 2025-02-18 PROCEDURE — B24BZZ4 ULTRASONOGRAPHY OF HEART WITH AORTA, TRANSESOPHAGEAL: ICD-10-PCS | Performed by: INTERNAL MEDICINE

## 2025-02-18 PROCEDURE — 70498 CT ANGIOGRAPHY NECK: CPT

## 2025-02-18 PROCEDURE — 83605 ASSAY OF LACTIC ACID: CPT

## 2025-02-18 PROCEDURE — 70546 MR ANGIOGRAPH HEAD W/O&W/DYE: CPT

## 2025-02-18 PROCEDURE — 80202 ASSAY OF VANCOMYCIN: CPT

## 2025-02-18 PROCEDURE — 99223 1ST HOSP IP/OBS HIGH 75: CPT | Performed by: PSYCHIATRY & NEUROLOGY

## 2025-02-18 PROCEDURE — 2500000003 HC RX 250 WO HCPCS: Performed by: INTERNAL MEDICINE

## 2025-02-18 PROCEDURE — 6360000002 HC RX W HCPCS

## 2025-02-18 PROCEDURE — 85025 COMPLETE CBC W/AUTO DIFF WBC: CPT

## 2025-02-18 PROCEDURE — 6360000004 HC RX CONTRAST MEDICATION: Performed by: RADIOLOGY

## 2025-02-18 PROCEDURE — 80053 COMPREHEN METABOLIC PANEL: CPT

## 2025-02-18 PROCEDURE — 99239 HOSP IP/OBS DSCHRG MGMT >30: CPT | Performed by: INTERNAL MEDICINE

## 2025-02-18 PROCEDURE — A9577 INJ MULTIHANCE: HCPCS | Performed by: RADIOLOGY

## 2025-02-18 PROCEDURE — 70496 CT ANGIOGRAPHY HEAD: CPT

## 2025-02-18 PROCEDURE — 99291 CRITICAL CARE FIRST HOUR: CPT | Performed by: INTERNAL MEDICINE

## 2025-02-18 PROCEDURE — 84100 ASSAY OF PHOSPHORUS: CPT

## 2025-02-18 RX ORDER — LOSARTAN POTASSIUM 25 MG/1
25 TABLET ORAL DAILY
Status: DISCONTINUED | OUTPATIENT
Start: 2025-02-19 | End: 2025-03-07 | Stop reason: HOSPADM

## 2025-02-18 RX ORDER — POTASSIUM CHLORIDE 7.45 MG/ML
10 INJECTION INTRAVENOUS PRN
Status: CANCELLED | OUTPATIENT
Start: 2025-02-18

## 2025-02-18 RX ORDER — SODIUM CHLORIDE 0.9 % (FLUSH) 0.9 %
5-40 SYRINGE (ML) INJECTION EVERY 12 HOURS SCHEDULED
Status: CANCELLED | OUTPATIENT
Start: 2025-02-18

## 2025-02-18 RX ORDER — SODIUM CHLORIDE 0.9 % (FLUSH) 0.9 %
5-40 SYRINGE (ML) INJECTION PRN
Status: DISCONTINUED | OUTPATIENT
Start: 2025-02-18 | End: 2025-02-20 | Stop reason: SDUPTHER

## 2025-02-18 RX ORDER — SODIUM CHLORIDE 0.9 % (FLUSH) 0.9 %
5-40 SYRINGE (ML) INJECTION PRN
Status: CANCELLED | OUTPATIENT
Start: 2025-02-18

## 2025-02-18 RX ORDER — ACETAMINOPHEN 325 MG/1
650 TABLET ORAL EVERY 6 HOURS PRN
Status: CANCELLED | OUTPATIENT
Start: 2025-02-18

## 2025-02-18 RX ORDER — CLOPIDOGREL BISULFATE 75 MG/1
75 TABLET ORAL DAILY
Status: CANCELLED | OUTPATIENT
Start: 2025-02-19

## 2025-02-18 RX ORDER — MAGNESIUM SULFATE IN WATER 40 MG/ML
2000 INJECTION, SOLUTION INTRAVENOUS PRN
Status: CANCELLED | OUTPATIENT
Start: 2025-02-18

## 2025-02-18 RX ORDER — SODIUM CHLORIDE 0.9 % (FLUSH) 0.9 %
5-40 SYRINGE (ML) INJECTION EVERY 12 HOURS SCHEDULED
Status: DISCONTINUED | OUTPATIENT
Start: 2025-02-18 | End: 2025-02-20 | Stop reason: SDUPTHER

## 2025-02-18 RX ORDER — VANCOMYCIN 1.75 G/350ML
1250 INJECTION, SOLUTION INTRAVENOUS EVERY 12 HOURS
Status: CANCELLED | OUTPATIENT
Start: 2025-02-18

## 2025-02-18 RX ORDER — DEXAMETHASONE SODIUM PHOSPHATE 4 MG/ML
4 INJECTION, SOLUTION INTRA-ARTICULAR; INTRALESIONAL; INTRAMUSCULAR; INTRAVENOUS; SOFT TISSUE EVERY 6 HOURS
Status: DISCONTINUED | OUTPATIENT
Start: 2025-02-18 | End: 2025-02-21

## 2025-02-18 RX ORDER — POTASSIUM CHLORIDE 1500 MG/1
40 TABLET, EXTENDED RELEASE ORAL PRN
Status: DISCONTINUED | OUTPATIENT
Start: 2025-02-18 | End: 2025-03-07 | Stop reason: HOSPADM

## 2025-02-18 RX ORDER — ACETAMINOPHEN 325 MG/1
650 TABLET ORAL EVERY 6 HOURS PRN
Status: DISCONTINUED | OUTPATIENT
Start: 2025-02-18 | End: 2025-03-07 | Stop reason: HOSPADM

## 2025-02-18 RX ORDER — SODIUM CHLORIDE 9 MG/ML
INJECTION, SOLUTION INTRAVENOUS PRN
Status: CANCELLED | OUTPATIENT
Start: 2025-02-18

## 2025-02-18 RX ORDER — METOPROLOL TARTRATE 25 MG/1
25 TABLET, FILM COATED ORAL 2 TIMES DAILY
Status: DISCONTINUED | OUTPATIENT
Start: 2025-02-18 | End: 2025-02-19

## 2025-02-18 RX ORDER — ONDANSETRON 2 MG/ML
4 INJECTION INTRAMUSCULAR; INTRAVENOUS EVERY 6 HOURS PRN
Status: CANCELLED | OUTPATIENT
Start: 2025-02-18

## 2025-02-18 RX ORDER — ACETAMINOPHEN 650 MG/1
650 SUPPOSITORY RECTAL EVERY 6 HOURS PRN
Status: CANCELLED | OUTPATIENT
Start: 2025-02-18

## 2025-02-18 RX ORDER — POTASSIUM CHLORIDE 1500 MG/1
40 TABLET, EXTENDED RELEASE ORAL PRN
Status: CANCELLED | OUTPATIENT
Start: 2025-02-18

## 2025-02-18 RX ORDER — MAGNESIUM SULFATE IN WATER 40 MG/ML
2000 INJECTION, SOLUTION INTRAVENOUS PRN
Status: DISCONTINUED | OUTPATIENT
Start: 2025-02-18 | End: 2025-03-07 | Stop reason: HOSPADM

## 2025-02-18 RX ORDER — VANCOMYCIN 1.75 G/350ML
1250 INJECTION, SOLUTION INTRAVENOUS EVERY 12 HOURS
Status: DISCONTINUED | OUTPATIENT
Start: 2025-02-19 | End: 2025-02-18 | Stop reason: CLARIF

## 2025-02-18 RX ORDER — LOSARTAN POTASSIUM 50 MG/1
25 TABLET ORAL DAILY
Status: CANCELLED | OUTPATIENT
Start: 2025-02-19

## 2025-02-18 RX ORDER — DEXAMETHASONE SODIUM PHOSPHATE 4 MG/ML
4 INJECTION, SOLUTION INTRA-ARTICULAR; INTRALESIONAL; INTRAMUSCULAR; INTRAVENOUS; SOFT TISSUE EVERY 6 HOURS
Status: CANCELLED | OUTPATIENT
Start: 2025-02-18

## 2025-02-18 RX ORDER — LORAZEPAM 2 MG/ML
2 INJECTION INTRAMUSCULAR ONCE
Status: COMPLETED | OUTPATIENT
Start: 2025-02-18 | End: 2025-02-18

## 2025-02-18 RX ORDER — ATORVASTATIN CALCIUM 40 MG/1
40 TABLET, FILM COATED ORAL NIGHTLY
Status: DISCONTINUED | OUTPATIENT
Start: 2025-02-18 | End: 2025-03-07 | Stop reason: HOSPADM

## 2025-02-18 RX ORDER — ATORVASTATIN CALCIUM 40 MG/1
40 TABLET, FILM COATED ORAL NIGHTLY
Status: CANCELLED | OUTPATIENT
Start: 2025-02-18

## 2025-02-18 RX ORDER — SODIUM CHLORIDE 9 MG/ML
INJECTION, SOLUTION INTRAVENOUS CONTINUOUS
Status: DISCONTINUED | OUTPATIENT
Start: 2025-02-18 | End: 2025-02-19

## 2025-02-18 RX ORDER — METOPROLOL TARTRATE 25 MG/1
25 TABLET, FILM COATED ORAL 2 TIMES DAILY
Status: CANCELLED | OUTPATIENT
Start: 2025-02-18

## 2025-02-18 RX ORDER — ONDANSETRON 4 MG/1
4 TABLET, ORALLY DISINTEGRATING ORAL EVERY 8 HOURS PRN
Status: CANCELLED | OUTPATIENT
Start: 2025-02-18

## 2025-02-18 RX ORDER — CLOPIDOGREL BISULFATE 75 MG/1
75 TABLET ORAL DAILY
Status: DISCONTINUED | OUTPATIENT
Start: 2025-02-19 | End: 2025-03-07 | Stop reason: HOSPADM

## 2025-02-18 RX ORDER — ONDANSETRON 2 MG/ML
4 INJECTION INTRAMUSCULAR; INTRAVENOUS EVERY 6 HOURS PRN
Status: DISCONTINUED | OUTPATIENT
Start: 2025-02-18 | End: 2025-03-07 | Stop reason: HOSPADM

## 2025-02-18 RX ORDER — POTASSIUM CHLORIDE 29.8 MG/ML
20 INJECTION INTRAVENOUS ONCE
Status: COMPLETED | OUTPATIENT
Start: 2025-02-18 | End: 2025-02-18

## 2025-02-18 RX ORDER — SODIUM CHLORIDE 9 MG/ML
INJECTION, SOLUTION INTRAVENOUS CONTINUOUS
Status: CANCELLED | OUTPATIENT
Start: 2025-02-18 | End: 2025-02-19

## 2025-02-18 RX ORDER — POTASSIUM CHLORIDE 7.45 MG/ML
10 INJECTION INTRAVENOUS PRN
Status: DISCONTINUED | OUTPATIENT
Start: 2025-02-18 | End: 2025-03-07 | Stop reason: HOSPADM

## 2025-02-18 RX ORDER — LORAZEPAM 2 MG/ML
1 INJECTION INTRAMUSCULAR
Status: DISCONTINUED | OUTPATIENT
Start: 2025-02-18 | End: 2025-02-18 | Stop reason: HOSPADM

## 2025-02-18 RX ORDER — IOPAMIDOL 755 MG/ML
75 INJECTION, SOLUTION INTRAVASCULAR
Status: COMPLETED | OUTPATIENT
Start: 2025-02-18 | End: 2025-02-18

## 2025-02-18 RX ORDER — ACETAMINOPHEN 650 MG/1
650 SUPPOSITORY RECTAL EVERY 6 HOURS PRN
Status: DISCONTINUED | OUTPATIENT
Start: 2025-02-18 | End: 2025-03-07 | Stop reason: HOSPADM

## 2025-02-18 RX ORDER — CLOPIDOGREL BISULFATE 75 MG/1
75 TABLET ORAL DAILY
Status: DISCONTINUED | OUTPATIENT
Start: 2025-02-18 | End: 2025-02-18 | Stop reason: HOSPADM

## 2025-02-18 RX ORDER — ONDANSETRON 4 MG/1
4 TABLET, ORALLY DISINTEGRATING ORAL EVERY 8 HOURS PRN
Status: DISCONTINUED | OUTPATIENT
Start: 2025-02-18 | End: 2025-03-07 | Stop reason: HOSPADM

## 2025-02-18 RX ORDER — SODIUM CHLORIDE 9 MG/ML
INJECTION, SOLUTION INTRAVENOUS PRN
Status: DISCONTINUED | OUTPATIENT
Start: 2025-02-18 | End: 2025-02-20 | Stop reason: SDUPTHER

## 2025-02-18 RX ADMIN — VANCOMYCIN 1250 MG: 1.75 INJECTION, SOLUTION INTRAVENOUS at 05:55

## 2025-02-18 RX ADMIN — DEXAMETHASONE SODIUM PHOSPHATE 4 MG: 4 INJECTION, SOLUTION INTRAMUSCULAR; INTRAVENOUS at 02:30

## 2025-02-18 RX ADMIN — VANCOMYCIN 1250 MG: 1.75 INJECTION, SOLUTION INTRAVENOUS at 18:24

## 2025-02-18 RX ADMIN — ONDANSETRON 4 MG: 2 INJECTION, SOLUTION INTRAMUSCULAR; INTRAVENOUS at 16:23

## 2025-02-18 RX ADMIN — AMPICILLIN 1000 MG: 1 INJECTION, POWDER, FOR SOLUTION INTRAMUSCULAR; INTRAVENOUS at 10:12

## 2025-02-18 RX ADMIN — Medication 10 ML: at 11:53

## 2025-02-18 RX ADMIN — LORAZEPAM 2 MG: 2 INJECTION INTRAMUSCULAR at 09:25

## 2025-02-18 RX ADMIN — LORAZEPAM 1 MG: 2 INJECTION INTRAMUSCULAR; INTRAVENOUS at 08:35

## 2025-02-18 RX ADMIN — IOPAMIDOL 75 ML: 755 INJECTION, SOLUTION INTRAVENOUS at 15:45

## 2025-02-18 RX ADMIN — DEXAMETHASONE SODIUM PHOSPHATE 4 MG: 4 INJECTION, SOLUTION INTRAMUSCULAR; INTRAVENOUS at 14:07

## 2025-02-18 RX ADMIN — GADOBENATE DIMEGLUMINE 20 ML: 529 INJECTION, SOLUTION INTRAVENOUS at 09:58

## 2025-02-18 RX ADMIN — ACYCLOVIR SODIUM 800 MG: 50 INJECTION, SOLUTION INTRAVENOUS at 02:37

## 2025-02-18 RX ADMIN — SODIUM CHLORIDE: 0.9 INJECTION, SOLUTION INTRAVENOUS at 20:55

## 2025-02-18 RX ADMIN — POTASSIUM CHLORIDE 20 MEQ: 29.8 INJECTION, SOLUTION INTRAVENOUS at 05:52

## 2025-02-18 RX ADMIN — Medication 10 ML: at 09:00

## 2025-02-18 RX ADMIN — ACETAMINOPHEN 650 MG: 325 TABLET ORAL at 04:19

## 2025-02-18 RX ADMIN — DEXAMETHASONE SODIUM PHOSPHATE 4 MG: 4 INJECTION INTRA-ARTICULAR; INTRALESIONAL; INTRAMUSCULAR; INTRAVENOUS; SOFT TISSUE at 20:54

## 2025-02-18 RX ADMIN — AMPICILLIN 1000 MG: 1 INJECTION, POWDER, FOR SOLUTION INTRAMUSCULAR; INTRAVENOUS at 04:27

## 2025-02-18 RX ADMIN — CEFEPIME 2000 MG: 2 INJECTION, POWDER, FOR SOLUTION INTRAVENOUS at 02:30

## 2025-02-18 RX ADMIN — DEXAMETHASONE SODIUM PHOSPHATE 4 MG: 4 INJECTION, SOLUTION INTRAMUSCULAR; INTRAVENOUS at 05:48

## 2025-02-18 RX ADMIN — LORAZEPAM 1 MG: 2 INJECTION INTRAMUSCULAR; INTRAVENOUS at 08:54

## 2025-02-18 RX ADMIN — SODIUM CHLORIDE: 0.9 INJECTION, SOLUTION INTRAVENOUS at 05:50

## 2025-02-18 RX ADMIN — SODIUM CHLORIDE, PRESERVATIVE FREE 10 ML: 5 INJECTION INTRAVENOUS at 20:59

## 2025-02-18 RX ADMIN — ACYCLOVIR SODIUM 800 MG: 50 INJECTION, SOLUTION INTRAVENOUS at 12:06

## 2025-02-18 RX ADMIN — ACYCLOVIR SODIUM 800 MG: 50 INJECTION, SOLUTION INTRAVENOUS at 22:27

## 2025-02-18 ASSESSMENT — PAIN SCALES - GENERAL
PAINLEVEL_OUTOF10: 0

## 2025-02-18 NOTE — ACP (ADVANCE CARE PLANNING)
Advance Care Planning   Healthcare Decision Maker:    Primary Decision Maker: [email or web address] - Child - [phone]    Secondary Decision Maker: Patti Ryan - Parent - [phone]      Today we documented Decision Maker(s) consistent with Legal Next of Kin hierarchy.      Electronically signed by YAMILE Vega on 2/18/2025 at 10:14 AM

## 2025-02-18 NOTE — PROGRESS NOTES
Progress Note  Date:2025       [facility]  Patient Name:Wagner Ryan     YOB: 1980     Age:45 y.o.        Subjective    Subjective:  Symptoms:  Stable.  (Patient was seen earlier today right after he came back from MRI.  Patient was sedated with Ativan for the MRI.  He does not follow commands.  But grimaces and groans with painful stimuli).    Diet:  NPO.       Review of Systems   Reason unable to perform ROS: Unable to perform since patient is sedated.     Objective         Vitals Last 24 Hours:  TEMPERATURE:  Temp  Av.8 °F (37.7 °C)  Min: 98.4 °F (36.9 °C)  Max: 101.3 °F (38.5 °C)  RESPIRATIONS RANGE: Resp  Av.6  Min: 16  Max: 50  PULSE OXIMETRY RANGE: SpO2  Av.8 %  Min: 93 %  Max: 100 %  PULSE RANGE: Pulse  Av.3  Min: 87  Max: 126  BLOOD PRESSURE RANGE: Systolic (24hrs), Av , Min:95 , Max:156   ; Diastolic (24hrs), Av, Min:61, Max:106    I/O (24Hr):    Intake/Output Summary (Last 24 hours) at 2025 1246  Last data filed at 2025 0400  Gross per 24 hour   Intake 2130.63 ml   Output 2660 ml   Net -529.37 ml     Objective:  General Appearance:  In no acute distress.    Vital signs: (most recent): Blood pressure 98/61, pulse 92, temperature 98.7 °F (37.1 °C), temperature source Axillary, resp. rate 26, height 1.753 m (5' 9\"), weight 81.2 kg (179 lb 0.2 oz), SpO2 94%.    Output: Producing urine.    HEENT: Normal HEENT exam.    Lungs:  Normal effort and normal respiratory rate.  Breath sounds clear to auscultation.    Heart: Normal rate.  Regular rhythm.  S1 normal.    Abdomen: Abdomen is soft and non-distended.  Bowel sounds are normal.   There is no abdominal tenderness.     Extremities: There is no dependent edema.    Neurological: (Drowsy sedated.  Does not follow commands.).    Pupils:  Pupils are equal, round, and reactive to light.      Labs/Imaging/Diagnostics    Labs:  CBC:  Recent Labs     25  1209 25  1506 25  0430   WBC

## 2025-02-18 NOTE — CARE COORDINATION
Case Management Assessment  Initial Evaluation    Date/Time of Evaluation: 2/18/2025 10:18 AM  Assessment Completed by: Yareli Kelly RN    If patient is discharged prior to next notation, then this note serves as note for discharge by case management.    Patient Name: Wagner Ryan                   YOB: 1980  Diagnosis: STEMI (ST elevation myocardial infarction) (HCC) [I21.3]                   Date / Time: 2/17/2025 11:28 AM    Patient Admission Status: Inpatient   Readmission Risk (Low < 19, Mod (19-27), High > 27): Readmission Risk Score: 14.1    Current PCP: No primary care provider on file.  PCP verified by CM? No (will speak to pt when appropriate- no PCP listed)    Chart Reviewed: Yes      History Provided by: Medical Record  Patient Orientation: Unable to Assess, Other (see comment) (AMS- drug abuse)    Patient Cognition: Other (see comment) (drug abuse- AMS)    Hospitalization in the last 30 days (Readmission):  No        Advance Directives:      Code Status: Full Code   Patient's Primary Decision Maker is: Legal Next of Kin    Primary Decision Maker: samm navas - Child - [phone]    Secondary Decision Maker: Patti Ryan  Parent - [phone]    Discharge Planning:    Patient lives with: Parent Type of Home: House  Primary Care Giver: Self (pta)  Patient Support Systems include: Family Members, Children, Parent   Current Financial resources:    Current community resources:    Current services prior to admission: None            Current DME:              Type of Home Care services:  None    ADLS  Prior functional level: Independent in ADLs/IADLs  Current functional level: Other (see comment) (unclear function at this time)    Family can provide assistance at DC: Other (comment) (pt needs rehab at discharge)  Would you like Case Management to discuss the discharge plan with any other family members/significant others, and if so, who? Yes (legal nok daughter and mother who

## 2025-02-18 NOTE — PROGRESS NOTES
Patient - Wagner Ryan,  Age - 45 y.o.    - 1980      Room Number - IC04/IC04-01   N -  [id number]   [facility] # - [id number]  Date of Admission -  2025 11:28 AM    Problem list of patient:     Hospital Problems             Last Modified POA    * (Principal) STEMI (ST elevation myocardial infarction) (HCC) 2025 Yes    IV drug abuse (HCC) 2025 Yes    Tobacco dependence 2025 Yes    Sepsis without acute organ dysfunction (HCC) 2025 Yes    Thrombocytopenia (HCC) 2025 Yes    Altered mental status 2025 Yes    Acute coronary syndrome (HCC) 2025 Yes    Septic shock ([facility]) 2025 Yes     ASSESSMENT/PLAN     Sepsis  Fevers tachycardia leukocytosis   MRSA septicemia will need IE eval    JAVED  F/u blood cx  Vanco   Esr65   MS changes PRES versus   PCA territory infarction possibly from vasculitis.   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  This included education to patient/representative and/for hospital staff in regards to disease transmission risk assessment and mitigation, public health investigation, analysis and testing, and complex antimicrobial therapy counseling and treatment.    SUBJECTIVE:   DOS:  26 in icu  wally nunes RA  not awake daughter

## 2025-02-18 NOTE — DISCHARGE SUMMARY
iterative reconstruction, and/or weight based adjustment of the mA/kV was utilized to reduce the radiation dose to as low as reasonably achievable. COMPARISON: None. HISTORY: ORDERING SYSTEM PROVIDED HISTORY: SUBHASH HAMMER TECHNOLOGIST PROVIDED HISTORY: Reason for exam:->AMS, SOB Additional Contrast?->None FINDINGS: Mediastinum: Normal caliber and contour of the thoracic aorta.  No mediastinal adenopathy.  Normal size heart.  Mild coronary artery calcification.  No pericardial effusion. Lungs/pleura: No pulmonary infiltrate, contusion or effusion.  Mild atelectasis or scarring through the superior lingula.  No pneumothorax. Upper Abdomen: Unremarkable. Soft Tissues/Bones: No acute osseous or body wall soft tissue abnormalities.     1. No acute cardiopulmonary process. 2. Mild coronary artery calcification.     CT HEAD W WO CONTRAST    Addendum Date: 2/17/2025    ADDENDUM: I discussed the findings with Dr. Nguyen at 6:38 p.m. on 02/17/2025. The patient has a history of polysubstance abuse and is being treated for MRI with anticoagulation for stent placement. There is subtle hypodensity of the occipital lobes posteriorly, raising the possibility of PRES versus posterior PCA territory infarction possibly from vasculitis. PRES can be associated with hemorrhage, as can vasculitis and infarction. Both of these etiologies are likely.  I discounted the possibility of PRES because of the hemorrhage during my discussion with Dr. Nguyen.     Result Date: 2/17/2025  EXAMINATION: CT OF THE HEAD WITH AND WITHOUT CONTRAST  2/17/2025 4:33 pm TECHNIQUE: CT of the head/brain was performed without and with the administration of intravenous contrast. Multiplanar reformatted images are provided for review. Automated exposure control, iterative reconstruction, and/or weight based adjustment of the mA/kV was utilized to reduce the radiation dose to as low as reasonably achievable. COMPARISON: None. HISTORY: ORDERING SYSTEM PROVIDED HISTORY:

## 2025-02-18 NOTE — CARE COORDINATION
Pt is self pay- CM spoke to Zoie with [employer]. No pending Medicaid coverage. CM updated Dr. Holbrook. May be for higher level of care transfer. ? [facility] Ambulance form in soft chart. Electronically signed by Yareli Kelly RN-jjBC on 2/18/2025 at 12:26 PM

## 2025-02-18 NOTE — PROGRESS NOTES
Progress Note  Date:2025       [facility]  Patient Name:Wagner Ryan     YOB: 1980     Age:45 y.o.        Subjective    Subjective:  Symptoms:  Stable.  (Patient was seen earlier today right after he came back from MRI.  Patient was sedated with Ativan for the MRI.  He does not follow commands.  But grimaces and groans with painful stimuli).    Diet:  NPO.       Review of Systems   Reason unable to perform ROS: Unable to perform since patient is sedated.     Objective         Vitals Last 24 Hours:  TEMPERATURE:  Temp  Av.8 °F (37.7 °C)  Min: 98.4 °F (36.9 °C)  Max: 101.3 °F (38.5 °C)  RESPIRATIONS RANGE: Resp  Av.2  Min: 20  Max: 50  PULSE OXIMETRY RANGE: SpO2  Av.8 %  Min: 93 %  Max: 100 %  PULSE RANGE: Pulse  Av.4  Min: 91  Max: 126  BLOOD PRESSURE RANGE: Systolic (24hrs), Av , Min:98 , Max:156   ; Diastolic (24hrs), Av, Min:61, Max:106    I/O (24Hr):    Intake/Output Summary (Last 24 hours) at 2025 1222  Last data filed at 2025 0400  Gross per 24 hour   Intake 2130.63 ml   Output 2660 ml   Net -529.37 ml     Objective:  General Appearance:  In no acute distress.    Vital signs: (most recent): Blood pressure 98/61, pulse 92, temperature 98.7 °F (37.1 °C), temperature source Axillary, resp. rate 26, height 1.753 m (5' 9\"), weight 81.2 kg (179 lb 0.2 oz), SpO2 94%.    Output: Producing urine.    HEENT: Normal HEENT exam.    Lungs:  Normal effort and normal respiratory rate.  Breath sounds clear to auscultation.    Heart: Normal rate.  Regular rhythm.  S1 normal.    Abdomen: Abdomen is soft and non-distended.  Bowel sounds are normal.   There is no abdominal tenderness.     Extremities: There is no dependent edema.    Neurological: (Drowsy sedated.  Does not follow commands.).    Pupils:  Pupils are equal, round, and reactive to light.      Labs/Imaging/Diagnostics    Labs:  CBC:  Recent Labs     25  1209 25  1506 25  0430   WBC

## 2025-02-18 NOTE — PROGRESS NOTES
Pharmacy Consultation Note  (Antibiotic Dosing and Monitoring)    Initial consult date: 2/17/25  Consulting physician/provider: Dr. Galloway  Drug: Vancomycin  Indication: CNS Infection    Age/  Gender Height Weight IBW  Allergy Information   45 y.o./male 175.3 cm (5' 9\") 81.5 kg (179 lb 11.2 oz)     Ideal body weight: 70.7 kg (155 lb 13.8 oz)  Adjusted ideal body weight: 74.9 kg (165 lb 1.9 oz)   Patient has no known allergies.      Renal Function:  Recent Labs     02/17/25  1209 02/17/25  1506   BUN 31* 31*   CREATININE 1.2 1.1       Intake/Output Summary (Last 24 hours) at 2/17/2025 1940  Last data filed at 2/17/2025 1600  Gross per 24 hour   Intake --   Output 360 ml   Net -360 ml       Vancomycin Monitoring:  Trough:  No results for input(s): \"VANCOTROUGH\" in the last 72 hours.  Random:  No results for input(s): \"VANCORANDOM\" in the last 72 hours.    Vancomycin Administration Times:  Recent vancomycin administrations                     vancomycin (VANCOCIN) 1250 mg in 250 mL IVPB (mg) 1,250 mg New Bag 02/17/25 9227                    Assessment:  Patient is a 45 y.o. male who has been initiated on vancomycin  Estimated Creatinine Clearance: 86 mL/min (based on SCr of 1.1 mg/dL).  To dose vancomycin, pharmacy will be utilizing [employer] calculation software for goal AUC/DAYAN 400-600 mg/L-hr    Plan:  Will start vancomycin 1,250 mg IV every 12 hours  Will check vancomycin levels when appropriate  Will continue to monitor renal function   Pharmacy to follow      Toya Ball, PharmD, BCPS 2/17/2025 7:40 PM   [phone]    [facility]: [phone]

## 2025-02-18 NOTE — DISCHARGE INSTRUCTIONS
Cardiac Rehabilitation: Discharge instructions        Cardiac rehabilitation is a program for people who have a heart problem, such as a heart attack, coronary stent placed, heart failure, or a heart valve disease. The program includes exercise, lifestyle changes, education, and emotional support. Cardiac rehab can help you improve the quality of your life through better overall health. It can help you lose weight and feel better about yourself.    On your cardiac rehab team, you may have your doctor, a nurse specialist, an exercise physiologist, and a dietitian. They will design your cardiac rehab program specifically for you. You will learn how to reduce your risk for heart problems, how to manage stress, and how to eat a heart-healthy diet. By the end of the program, you will be ready to maintain a healthier lifestyle on your own.    Follow-up care is a key part of your treatment and safety. Be sure to make and go to all appointments, and call your doctor if you are having problems. It's also a good idea to know your test results and keep a list of the medicines you take.    Please call to schedule your first appointment once you have been cleared by your cardiologist to attend Phase II Outpatient Cardiac Rehabilitation.       Cardiac Rehabilitation Options:  [facility]  Cardiac Rehab  [address], [place]                                                                                                   P- [phone]                                                                                           Hours: M/W/F 7am-5pm T/Th 7-3    [facility] Cardiac Rehab             [facility]  [address]                                                                                          Cardiology Services  [place], [place]

## 2025-02-18 NOTE — PLAN OF CARE
Problem: Discharge Planning  Goal: Discharge to home or other facility with appropriate resources  Outcome: Progressing     Problem: Skin/Tissue Integrity  Goal: Skin integrity remains intact  Description: 1.  Monitor for areas of redness and/or skin breakdown  2.  Assess vascular access sites hourly  3.  Every 4-6 hours minimum:  Change oxygen saturation probe site  4.  Every 4-6 hours:  If on nasal continuous positive airway pressure, respiratory therapy assess nares and determine need for appliance change or resting period  Outcome: Progressing     Problem: Safety - Adult  Goal: Free from fall injury  Outcome: Progressing

## 2025-02-18 NOTE — PLAN OF CARE
Problem: Skin/Tissue Integrity  Goal: Skin integrity remains intact  Description: 1.  Monitor for areas of redness and/or skin breakdown  2.  Assess vascular access sites hourly  3.  Every 4-6 hours minimum:  Change oxygen saturation probe site  4.  Every 4-6 hours:  If on nasal continuous positive airway pressure, respiratory therapy assess nares and determine need for appliance change or resting period  2/18/2025 1230 by Darrin Subramanian RN  Outcome: Progressing     Problem: Safety - Adult  Goal: Free from fall injury  2/18/2025 1230 by Darrin Subramanian RN  Outcome: Progressing     Problem: Pain  Goal: Verbalizes/displays adequate comfort level or baseline comfort level  Outcome: Progressing

## 2025-02-18 NOTE — PROGRESS NOTES
Spiritual Health History and Assessment/Progress Note  [facility]    Initial Encounter, Spiritual/Emotional Needs, Rituals, Rites and Sacraments,  Encounter, Family Care (Fr. Alicia),  ,  ,      Name: Wagner Ryan MRN: [id number]    Age: 45 y.o.     Sex: male   Language: English   Gnosticism: Islam   STEMI (ST elevation myocardial infarction) ([facility])     Date: 2/18/2025                           Spiritual Assessment began in [facility] 2 ICU        Referral/Consult From: Rounding   Encounter Overview/Reason: Initial Encounter, Spiritual/Emotional Needs, Rituals, Rites and Sacraments,  Encounter, Family Care (Fr. Alicia)  Service Provided For: Patient and family together    Love, Belief, Meaning:   Patient is connected with a love tradition or spiritual practice  Family/Friends identify as spiritual and are connected with a love tradition or spiritual practice      Importance and Influence:  Patient unable to assess at this time  Family/Friends Other: unknown    Community:  Patient is connected with a spiritual community  Family/Friends are connected with a spiritual community:    Assessment and Plan of Care:     Patient Interventions include: Provided sacramental/Quaker ritual  Family/Friends Interventions include: Facilitated expression of thoughts and feelings and Affirmed coping skills/support systems    Patient Plan of Care: No spiritual needs identified for follow-up and Spiritual Care available upon further referral  Family/Friends Plan of Care: No spiritual needs identified for follow-up and Spiritual Care available upon further referral    Electronically signed by Chaplain Pranav on 2/18/2025 at 1:58 PM

## 2025-02-18 NOTE — PROGRESS NOTES
Ordered repeat Head CT with contrast    Pt Already received contrast this morning for MRI    Dr. Galloway and Dr. Singh aware and ok with receiving contrast again

## 2025-02-18 NOTE — PROGRESS NOTES
CRITICAL CARE PROGRESS NOTE      [facility]  Department of Pulmonary, Critical Care and Sleep Medicine  [facility]  Dr. Monsalve, Dr. Galloway, Dr. Nielsen    SUBJECTIVE:    Patient seen and examined.  No acute events overnight.  While he is slightly more alert than yesterday he is still slow to answer questions.  Neurologically not oriented to situation however he is able to tell me that he is in a hospital and recognizes daughter    OBJECTIVE:  Vitals:    02/18/25 0600 02/18/25 0800 02/18/25 1200 02/18/25 1204   BP: 121/68 117/83 95/65 98/61   Pulse: 91 87 88 92   Resp: 26  16    Temp:       TempSrc:       SpO2: 94%      Weight:       Height:           1. General: Alert oriented to person and that he is in the hospital.  Ill-appearing  2. Eyes: Vision - grossly normal, PERRLA   3. HENT: Head is atraumatic & normocephalic. Neck Supple, No jugular venous distention   4. Respiratory: Lungs are clear to auscultation, Respirations are non-labored, Breath sounds are equal   5. Cardiovascular: S1, S2 normal, Regular rate & rhythm, No murmur, No pedal edema   6. Gastrointestinal: Soft, Non-tender, Non-distended, Normal bowel sounds. No organomegaly.  7. Neurologic: Patient is awake, he is oriented to person and knows that he is in the hospital.  He does move all 4 extremities spontaneously.  He does intermittently follow commands.  8. Skin: Multiple areas of scarring secondary to IVDA and prior vascular procedures  9. Musculoskeletal: no LE edema, no joint effusion.  10. Psychiatric -unable to assess    DATA:    CBC:   Lab Results   Component Value Date    WBC 9.0 02/18/2025    HGB 9.9 (L) 02/18/2025    HCT 29.6 (L) 02/18/2025    MCV 72.0 (L) 02/18/2025    PLT 85 (L) 02/18/2025     LFTs:   Lab Results   Component Value Date    ALT 22 02/18/2025    AST 60 (H) 02/18/2025    ALKPHOS 95 02/18/2025    BILITOT 0.7 02/18/2025    ALBUMIN 2.5 (L) 02/18/2025

## 2025-02-18 NOTE — CONSULTS
CONSULTATION NOTE :ID     Patient - Wagner Ryan,  Age - 45 y.o.    - 1980      Room Number - IC04/IC04-01   MRN -  [id number]   [facility] # - [id number]  Date of Admission -  2025 11:28 AM  Patient's PCP: No primary care provider on file.     Requesting Physician: Leonardo Holbrook MD    REASON FOR CONSULTATION   atbx  HISTORY OF PRESENT ILLNESS   This is a 45 y.o. male who was admitted on 2025   to the hospital with a chief complaints of   Chief Complaint   Patient presents with    Altered Mental Status     Fatigue, Seen at urgent care yesterday, dx bronchitis     ID was consulted on 25  Pt is keyona wto ID Dr Reyes/Best last seen for Serratia bacteremia right thigh abscess  Pt came in with NSTEMI s/p heart cath   Poor historian   Per chart pt was in ER on  had cough and headache  cute bronchitis d/c to home   Pt came back in  with sob altered MS found to have  STEMI   Pt was seen in ICU  post heart cath   Ywef543.3 tachy  RA  Cr1.2->1.1  Wbc12.3->10.3 plt 75  Tox benzo cocaine  buprenorphine    Current antibiotics     vancomycin (VANCOCIN) 1250 mg in 250 mL IVPB, Once  ceFEPIme (MAXIPIME) 2,000 mg in sodium chloride 0.9 % 100 mL IVPB (mini-bag), Once        PAST MEDICAL AND SURGICAL HISTORY     Past Medical History:   Diagnosis Date    Hx of hepatitis C-resolved 3/5/2022    IV drug abuse (HCC)     Nonhealing nonsurgical wound with fat layer exposed        Past Surgical History:   Procedure Laterality Date    APPLY DRESSING Left 10/9/2020    DEBRIDEMENT OF SKIN, SOFT TISSUE, MUSCLE, EXPLORATION OF UPPER ARM INTEGRA GRAFT FOR COVERAGE performed by Camilo Denny MD at [facility] [place]    APPLY DRESSING Left 10/14/2020    LEFT UPPER EXTREMITY WOUND VAC PLACEMENT performed by Eddie Ruff MD at [facility] OR    INCISION AND DRAINAGE Right 2020    RIGHT UPPER EXTREMITY INCISION AND DRAINAGE REMOVAL FOREIGN BODY WITH C-ARM performed by Kapil Yuan MD at [facility] OR 
    MICU Consult       Wagner Ryan  :  1980(45 y.o.)  MRN:  [id number]    Date: 2025       Subjective:      Chief Complaint: Altered status, STEMI    HPI: Unable to obtain history from patient initial history was taken from the chart, later this evening the patient's mother and daughter were in the room.  The patient apparently had been feeling ill recently and had been to an urgent care he had been having quite a bit of coughing and symptoms of shortness of breath.  He then presented to the emergency room today with symptoms of shortness of breath and altered mental status.  Per the ER he was alert to person and place but confused about time and events.  Subsequently in the emergency room a CT of the chest was ordered along with a CT of the head however prior to these being done it was noted that the patient was having an acute STEMI and he was taken to the Cath Lab.  And a stent was placed to the LAD.      Past Medical History:   Diagnosis Date    Hx of hepatitis C-resolved 3/5/2022    IV drug abuse (HCC)     Nonhealing nonsurgical wound with fat layer exposed        Past Surgical History:   Procedure Laterality Date    APPLY DRESSING Left 10/9/2020    DEBRIDEMENT OF SKIN, SOFT TISSUE, MUSCLE, EXPLORATION OF UPPER ARM INTEGRA GRAFT FOR COVERAGE performed by Camilo Denny MD at [facility] [place]    APPLY DRESSING Left 10/14/2020    LEFT UPPER EXTREMITY WOUND VAC PLACEMENT performed by Eddie Ruff MD at [facility] [place]    INCISION AND DRAINAGE Right 2020    RIGHT UPPER EXTREMITY INCISION AND DRAINAGE REMOVAL FOREIGN BODY WITH C-ARM performed by Kapil Yuan MD at [facility] OR    INCISION AND DRAINAGE Left 10/7/2020    LEFT ELBOW INCISION AND DRAINAGE performed by Kapil Yuan MD at [facility] OR    LEG SURGERY Right 3/6/2022    IRRIGATION AND DEBRIDEMENT RIGHT THIGH performed by Wes Branch MD at [facility] [place]    FL EXPLORATION OF ARTERY/VEIN Left 10/7/2020    LEFT ARM BRACHIAL ARTERY 
Met with patient's family and discussed our outpatient Phase II Cardiac Rehabilitation program. Reviewed the benefits of cardiac rehabilitation based on their diagnosis and personal risk factors. Patient's family demonstrates strong interest in Cardiac Rehabilitation at this time.  Cardiac Rehabilitation brochure provided to patient/family. The patient may call [facility] Cardiac Rehabilitation at [phone] for additional information or questions. Contact information for [facility] Cardiac Rehabilitation and other choices close to the patient's residence have been provided in the discharge instructions so that the patient may call and schedule an appointment when cleared by their physician.   
Met with patient's family and discussed that their physician has ordered a referral to our outpatient Phase II Cardiac Rehabilitation program. Reviewed the benefits of cardiac rehabilitation based on their diagnosis and personal risk factors. Patient demonstrates strong interest in Cardiac Rehabilitation at this time.  Cardiac Rehabilitation brochure provided to patient/family. The Cardiac Rehabilitation Program has been provided the patient's referral information and pertinent patient details and history. The patient may call [facility] Cardiac Rehabilitation at [phone] for additional information or questions. Contact information for [facility] Cardiac Rehabilitation and other choices close to the patient's residence have been provided in the discharge instructions so that the patient may call and schedule an appointment when cleared by their physician. Thank you for the referral.  
Today's Date: 2/17/2025  Patient Name: Wagner Ryan  Date of admission: 2/17/2025 11:28 AM  Patient's age: 45 y.o., 1980male    Admission Dx: STEMI (ST elevation myocardial infarction) (HCC) [I21.3]    Requesting Physician: Leonardo Holbrook MD    Chief Complaint   Patient presents with    Altered Mental Status     Fatigue, Seen at urgent care yesterday, dx bronchitis         Cardiology critical care note      HISTORY OF PRESENT ILLNESS:      45-year-old patient that has history of polysubstance abuse, hepatitis C, tobacco abuse, chronic IV drug abuse, and no previously known cardiac problems, presented to the urgent care yesterday because of headache and cough.  According to his mother, he has been having flu symptoms for the past week.  Today He developmental status changes and severe headache.  He continued to have cough.  The EMS arrived in his house and the EKG showed evolving anterolateral ST elevation MI.  Although he was denying chest pain but he was moaning and he was in severe distress putting his hand on his chest and pointing to his chest.  STEMI alert was called in the emergency room.  He did not have any focal neurological deficits.  He was alert and responsive but appeared to be slightly confused.    He admitted using cocaine.      Past Medical History:   has a past medical history of Hx of hepatitis C-resolved, IV drug abuse (HCC), and Nonhealing nonsurgical wound with fat layer exposed.    Past Surgical History:   has a past surgical history that includes incision and drainage (Right, 4/23/2020); transesophageal echocardiogram (N/A, 4/24/2020); pr exploration of artery/vein (Left, 10/7/2020); incision and drainage (Left, 10/7/2020); Apply dressing (Left, 10/9/2020); Apply dressing (Left, 10/14/2020); transesophageal echocardiogram (10/14/2020); and Leg Surgery (Right, 3/6/2022).     Social History:   reports that he has been smoking cigarettes. His smokeless tobacco use includes chew. He 
INCISION AND DRAINAGE Right 4/23/2020    RIGHT UPPER EXTREMITY INCISION AND DRAINAGE REMOVAL FOREIGN BODY WITH C-ARM performed by Kapil Yuan MD at [facility] [place]    INCISION AND DRAINAGE Left 10/7/2020    LEFT ELBOW INCISION AND DRAINAGE performed by Kapil Yuan MD at [facility] OR    LEG SURGERY Right 3/6/2022    IRRIGATION AND DEBRIDEMENT RIGHT THIGH performed by Wes Branch MD at [facility] [place]    CT EXPLORATION OF ARTERY/VEIN Left 10/7/2020    LEFT ARM BRACHIAL ARTERY EXPLORATION, REPAIR OF MYCOTIC ANUERYSM WITH BYPASS performed by Eddie Ruff MD at [facility] [place]    TRANSESOPHAGEAL ECHOCARDIOGRAM N/A 4/24/2020    TRANSESOPHAGEAL ECHOCARDIOGRAM performed by Natasha Bowie MD at [facility] ENDOSCOPY    TRANSESOPHAGEAL ECHOCARDIOGRAM  10/14/2020        Family History:  Family History   Problem Relation Age of Onset    Heart Disease Father        Social History:  Social History     Tobacco Use    Smoking status: Every Day     Current packs/day: 1.00     Types: Cigarettes    Smokeless tobacco: Current     Types: Chew   Vaping Use    Vaping status: Never Used   Substance Use Topics    Alcohol use: Not Currently    Drug use: Yes     Types: IV     Comment: Suboxone/IV        Current Medications:      Current Facility-Administered Medications   Medication Dose Route Frequency Provider Last Rate Last Admin    LORazepam (ATIVAN) injection 1 mg  1 mg IntraVENous Q15 Min PRN Maria Dolores Galloway DO   1 mg at 02/18/25 0854    clopidogrel (PLAVIX) tablet 75 mg  75 mg Oral Daily Bg Gaytan MD   75 mg at 02/18/25 1204    sodium chloride flush 0.9 % injection 5-40 mL  5-40 mL IntraVENous 2 times per day Bg Gaytan MD   10 mL at 02/18/25 0900    sodium chloride flush 0.9 % injection 5-40 mL  5-40 mL IntraVENous PRN Bg Gaytan MD        atorvastatin (LIPITOR) tablet 40 mg  40 mg Oral Nightly Bg Gaytan MD   40 mg at 02/17/25 2104    metoprolol tartrate (LOPRESSOR) tablet 25 mg  25 mg Oral BID Bg Gaytan MD   25 mg at

## 2025-02-18 NOTE — PROGRESS NOTES
Pharmacist Review and Automatic Dose Adjustment of Extended Antibiotic Infusions    The reviewing pharmacist has made an adjustment to the ordered cefepime dose per the approved [facility] protocol and table as identified below.      Leonardo Renteria MD,    Wagner Ryan is a 45 y.o. male.     Renal Function Assessment:    Date Body Weight IBW  Adjusted BW SCr  CrCl Dialysis status   2/17/2025 81.2 kg (179 lb)  Ideal body weight: 70.7 kg (155 lb 13.8 oz)  Adjusted ideal body weight: 74.9 kg (165 lb 1.9 oz) Serum creatinine: 1.1 mg/dL 02/17/25 1506  Estimated creatinine clearance: 86 mL/min N/a     Estimated Creatinine Clearance: 86 mL/min (based on SCr of 1.1 mg/dL).    Recent Labs     02/17/25  1209 02/17/25  1506   CREATININE 1.2 1.1       Height:   Ht Readings from Last 1 Encounters:   02/17/25 1.753 m (5' 9\")     Weight:  Wt Readings from Last 1 Encounters:   02/17/25 81.2 kg (179 lb)                   Plan: Based upon the patient's weight, antibiotic indication, and renal function, the ordered cefepime dose of 2000 mg every 12 hours has been changed/converted to 2000 mg every 8 hours.      Thank you,  Diane Gallegos [facility]  2/17/2025   7:12 PM

## 2025-02-18 NOTE — PROGRESS NOTES
Pharmacy Consultation Note  (Antibiotic Dosing and Monitoring)    Initial consult date: 2/17/25  Consulting physician/provider: Dr. Galloway  Drug: Vancomycin  Indication: CNS Infection    Age/  Gender Height Weight IBW  Allergy Information   45 y.o./male 175.3 cm (5' 9\") 81.5 kg (179 lb 11.2 oz)     Ideal body weight: 70.7 kg (155 lb 13.8 oz)  Adjusted ideal body weight: 74.9 kg (165 lb 2 oz)   Patient has no known allergies.      Renal Function:  Recent Labs     02/17/25  1209 02/17/25  1506 02/18/25  0430   BUN 31* 31* 29*   CREATININE 1.2 1.1 1.1       Intake/Output Summary (Last 24 hours) at 2/18/2025 1253  Last data filed at 2/18/2025 0400  Gross per 24 hour   Intake 2130.63 ml   Output 2660 ml   Net -529.37 ml       Vancomycin Monitoring:  Trough:  No results for input(s): \"VANCOTROUGH\" in the last 72 hours.  Random:  No results for input(s): \"VANCORANDOM\" in the last 72 hours.    Vancomycin Administration Times:  Recent vancomycin administrations                     vancomycin (VANCOCIN) 1250 mg in 250 mL IVPB (mg) 1,250 mg New Bag 02/18/25 0555    vancomycin (VANCOCIN) 1250 mg in 250 mL IVPB (mg) 1,250 mg New Bag 02/17/25 1738                      Assessment:  Patient is a 45 y.o. male who has been initiated on vancomycin  Estimated Creatinine Clearance: 86 mL/min (based on SCr of 1.1 mg/dL).  To dose vancomycin, pharmacy will be utilizing [employer] calculation software for goal AUC/DAYAN 400-600 mg/L-hr    Plan:  Vancomycin 1,250 mg IV every 12 hours  Level today @ 1700  Will continue to monitor renal function   Pharmacy to follow      Toya Ball PharmD, BCPS 2/18/2025 12:53 PM   [phone]    W: [phone]

## 2025-02-18 NOTE — PROGRESS NOTES
Spiritual Health History and Assessment/Progress Note  [facility]    (P) Spiritual/Emotional Needs,  , (P) New Diagnosis,      Name: Wagner Ryan MRN: [id number]    Age: 45 y.o.     Sex: male   Language: English   Church: [employer]   STEMI (ST elevation myocardial infarction) (HCC)     Date: 2/18/2025                           Spiritual Assessment began in [facility] 2 ICU        Referral/Consult From: (P) Rounding   Encounter Overview/Reason: (P) Spiritual/Emotional Needs  Service Provided For: (P) Patient and family together    Love, Belief, Meaning:   Patient identifies as spiritual and has beliefs or practices that help with coping during difficult times  Family/Friends identify as spiritual and have beliefs or practices that help with coping during difficult times      Importance and Influence:  Patient has spiritual/personal beliefs that influence decisions regarding their health  Family/Friends have spiritual/personal beliefs that influence decisions regarding the patient's health    Community:  Patient feels well-supported. Support system includes: Parent/s, Children, and Extended family  Family/Friends feel well-supported. Support system includes: Children and Extended family    Assessment and Plan of Care:     Patient Interventions include: Facilitated expression of thoughts and feelings and Engaged in theological reflection  Family/Friends Interventions include: Facilitated expression of thoughts and feelings, Explored spiritual coping/struggle/distress, and Affirmed coping skills/support systems    Patient Plan of Care: Spiritual Care available upon further referral  Family/Friends Plan of Care: Spiritual Care available upon further referral    Electronically signed by FLORINA Jones on 2/18/2025 at 3:26 PM

## 2025-02-19 PROBLEM — I63.413 CEREBROVASCULAR ACCIDENT (CVA) DUE TO BILATERAL EMBOLISM OF MIDDLE CEREBRAL ARTERIES (HCC): Status: ACTIVE | Noted: 2025-02-19

## 2025-02-19 PROBLEM — I66.9 CEREBRAL SEPTIC EMBOLI (HCC): Status: ACTIVE | Noted: 2025-02-19

## 2025-02-19 PROBLEM — I76 CEREBRAL SEPTIC EMBOLI (HCC): Status: ACTIVE | Noted: 2025-02-19

## 2025-02-19 PROBLEM — R40.4 ALTERED LEVEL OF CONSCIOUSNESS: Status: ACTIVE | Noted: 2025-02-19

## 2025-02-19 PROBLEM — I42.9 CARDIOMYOPATHY (HCC): Status: ACTIVE | Noted: 2025-02-19

## 2025-02-19 LAB
ACB COMPLEX DNA BLD POS QL NAA+NON-PROBE: NOT DETECTED
B FRAGILIS DNA BLD POS QL NAA+NON-PROBE: NOT DETECTED
BIOFIRE TEST COMMENT: ABNORMAL
C ALBICANS DNA BLD POS QL NAA+NON-PROBE: NOT DETECTED
C AURIS DNA BLD POS QL NAA+NON-PROBE: NOT DETECTED
C GATTII+NEOFOR DNA BLD POS QL NAA+N-PRB: NOT DETECTED
C GLABRATA DNA BLD POS QL NAA+NON-PROBE: NOT DETECTED
C KRUSEI DNA BLD POS QL NAA+NON-PROBE: NOT DETECTED
C PARAP DNA BLD POS QL NAA+NON-PROBE: NOT DETECTED
C TROPICLS DNA BLD POS QL NAA+NON-PROBE: NOT DETECTED
CHOLEST SERPL-MCNC: 142 MG/DL
E CLOAC COMP DNA BLD POS NAA+NON-PROBE: NOT DETECTED
E COLI DNA BLD POS QL NAA+NON-PROBE: NOT DETECTED
E FAECALIS DNA BLD POS QL NAA+NON-PROBE: NOT DETECTED
E FAECIUM DNA BLD POS QL NAA+NON-PROBE: NOT DETECTED
EKG ATRIAL RATE: 100 BPM
EKG ATRIAL RATE: 125 BPM
EKG P AXIS: 49 DEGREES
EKG P AXIS: 59 DEGREES
EKG P-R INTERVAL: 120 MS
EKG P-R INTERVAL: 138 MS
EKG Q-T INTERVAL: 322 MS
EKG Q-T INTERVAL: 368 MS
EKG QRS DURATION: 104 MS
EKG QRS DURATION: 98 MS
EKG QTC CALCULATION (BAZETT): 464 MS
EKG QTC CALCULATION (BAZETT): 474 MS
EKG R AXIS: -18 DEGREES
EKG R AXIS: -5 DEGREES
EKG T AXIS: 53 DEGREES
EKG T AXIS: 72 DEGREES
EKG VENTRICULAR RATE: 100 BPM
EKG VENTRICULAR RATE: 125 BPM
ENTEROBACTERALES DNA BLD POS NAA+N-PRB: NOT DETECTED
GP B STREP DNA BLD POS QL NAA+NON-PROBE: NOT DETECTED
HAEM INFLU DNA BLD POS QL NAA+NON-PROBE: NOT DETECTED
HBV CORE AB SER QL: NONREACTIVE
HDLC SERPL-MCNC: 14 MG/DL
K OXYTOCA DNA BLD POS QL NAA+NON-PROBE: NOT DETECTED
KLEBSIELLA SP DNA BLD POS QL NAA+NON-PRB: NOT DETECTED
KLEBSIELLA SP DNA BLD POS QL NAA+NON-PRB: NOT DETECTED
L MONOCYTOG DNA BLD POS QL NAA+NON-PROBE: NOT DETECTED
LDLC SERPL CALC-MCNC: 72 MG/DL
MECA+MECC+MREJ ISLT/SPM QL: DETECTED
MICROORGANISM SPEC CULT: ABNORMAL
MICROORGANISM/AGENT SPEC: ABNORMAL
MICROORGANISM/AGENT SPEC: ABNORMAL
MYCOPLASMA AB,IGM: NORMAL
N MEN DNA BLD POS QL NAA+NON-PROBE: NOT DETECTED
P AERUGINOSA DNA BLD POS NAA+NON-PROBE: NOT DETECTED
PROTEUS SP DNA BLD POS QL NAA+NON-PROBE: NOT DETECTED
S AUREUS DNA BLD POS QL NAA+NON-PROBE: DETECTED
S AUREUS+CONS DNA BLD POS NAA+NON-PROBE: DETECTED
S EPIDERMIDIS DNA BLD POS QL NAA+NON-PRB: NOT DETECTED
S LUGDUNENSIS DNA BLD POS QL NAA+NON-PRB: NOT DETECTED
S MALTOPHILIA DNA BLD POS QL NAA+NON-PRB: NOT DETECTED
S MARCESCENS DNA BLD POS NAA+NON-PROBE: NOT DETECTED
S PNEUM DNA BLD POS QL NAA+NON-PROBE: NOT DETECTED
S PYO DNA BLD POS QL NAA+NON-PROBE: NOT DETECTED
SALMONELLA DNA BLD POS QL NAA+NON-PROBE: NOT DETECTED
SERVICE CMNT-IMP: ABNORMAL
SPECIMEN DESCRIPTION: ABNORMAL
STREPTOCOCCUS DNA BLD POS NAA+NON-PROBE: NOT DETECTED
TRIGL SERPL-MCNC: 278 MG/DL
VLDLC SERPL CALC-MCNC: 56 MG/DL

## 2025-02-19 PROCEDURE — 6370000000 HC RX 637 (ALT 250 FOR IP): Performed by: STUDENT IN AN ORGANIZED HEALTH CARE EDUCATION/TRAINING PROGRAM

## 2025-02-19 PROCEDURE — 2500000003 HC RX 250 WO HCPCS: Performed by: INTERNAL MEDICINE

## 2025-02-19 PROCEDURE — 6360000002 HC RX W HCPCS

## 2025-02-19 PROCEDURE — 6370000000 HC RX 637 (ALT 250 FOR IP): Performed by: INTERNAL MEDICINE

## 2025-02-19 PROCEDURE — 80061 LIPID PANEL: CPT

## 2025-02-19 PROCEDURE — 99223 1ST HOSP IP/OBS HIGH 75: CPT | Performed by: INTERNAL MEDICINE

## 2025-02-19 PROCEDURE — 85025 COMPLETE CBC W/AUTO DIFF WBC: CPT

## 2025-02-19 PROCEDURE — 6360000002 HC RX W HCPCS: Performed by: INTERNAL MEDICINE

## 2025-02-19 PROCEDURE — 6370000000 HC RX 637 (ALT 250 FOR IP)

## 2025-02-19 PROCEDURE — 99233 SBSQ HOSP IP/OBS HIGH 50: CPT | Performed by: INTERNAL MEDICINE

## 2025-02-19 PROCEDURE — 2580000003 HC RX 258: Performed by: INTERNAL MEDICINE

## 2025-02-19 PROCEDURE — 82330 ASSAY OF CALCIUM: CPT

## 2025-02-19 PROCEDURE — 99254 IP/OBS CNSLTJ NEW/EST MOD 60: CPT | Performed by: THORACIC SURGERY (CARDIOTHORACIC VASCULAR SURGERY)

## 2025-02-19 PROCEDURE — 36592 COLLECT BLOOD FROM PICC: CPT

## 2025-02-19 PROCEDURE — 2000000000 HC ICU R&B

## 2025-02-19 PROCEDURE — 80053 COMPREHEN METABOLIC PANEL: CPT

## 2025-02-19 PROCEDURE — 99233 SBSQ HOSP IP/OBS HIGH 50: CPT | Performed by: CLINICAL NURSE SPECIALIST

## 2025-02-19 PROCEDURE — 99291 CRITICAL CARE FIRST HOUR: CPT | Performed by: SURGERY

## 2025-02-19 PROCEDURE — 2580000003 HC RX 258

## 2025-02-19 PROCEDURE — APPSS60 APP SPLIT SHARED TIME 46-60 MINUTES

## 2025-02-19 RX ORDER — HYDROXYZINE PAMOATE 25 MG/1
25 CAPSULE ORAL 3 TIMES DAILY PRN
Status: DISCONTINUED | OUTPATIENT
Start: 2025-02-19 | End: 2025-03-07 | Stop reason: HOSPADM

## 2025-02-19 RX ORDER — METOPROLOL SUCCINATE 25 MG/1
25 TABLET, EXTENDED RELEASE ORAL 2 TIMES DAILY
Status: DISCONTINUED | OUTPATIENT
Start: 2025-02-19 | End: 2025-03-05

## 2025-02-19 RX ORDER — LORAZEPAM 2 MG/ML
0.5 INJECTION INTRAMUSCULAR EVERY 6 HOURS PRN
Status: DISCONTINUED | OUTPATIENT
Start: 2025-02-19 | End: 2025-03-07 | Stop reason: HOSPADM

## 2025-02-19 RX ORDER — MUPIROCIN 20 MG/G
OINTMENT TOPICAL 2 TIMES DAILY
Status: COMPLETED | OUTPATIENT
Start: 2025-02-19 | End: 2025-02-23

## 2025-02-19 RX ORDER — LORAZEPAM 2 MG/ML
1 INJECTION INTRAMUSCULAR ONCE
Status: COMPLETED | OUTPATIENT
Start: 2025-02-19 | End: 2025-02-19

## 2025-02-19 RX ORDER — NICOTINE 21 MG/24HR
1 PATCH, TRANSDERMAL 24 HOURS TRANSDERMAL DAILY
Status: DISCONTINUED | OUTPATIENT
Start: 2025-02-19 | End: 2025-03-07 | Stop reason: HOSPADM

## 2025-02-19 RX ADMIN — SODIUM CHLORIDE, PRESERVATIVE FREE 10 ML: 5 INJECTION INTRAVENOUS at 09:19

## 2025-02-19 RX ADMIN — METOPROLOL TARTRATE 25 MG: 25 TABLET, FILM COATED ORAL at 09:20

## 2025-02-19 RX ADMIN — SODIUM CHLORIDE, PRESERVATIVE FREE 10 ML: 5 INJECTION INTRAVENOUS at 20:22

## 2025-02-19 RX ADMIN — VANCOMYCIN HYDROCHLORIDE 1250 MG: 1.25 INJECTION, POWDER, LYOPHILIZED, FOR SOLUTION INTRAVENOUS at 09:23

## 2025-02-19 RX ADMIN — ATORVASTATIN CALCIUM 40 MG: 40 TABLET, FILM COATED ORAL at 20:24

## 2025-02-19 RX ADMIN — METOPROLOL SUCCINATE 25 MG: 25 TABLET, EXTENDED RELEASE ORAL at 20:24

## 2025-02-19 RX ADMIN — DEXAMETHASONE SODIUM PHOSPHATE 4 MG: 4 INJECTION INTRA-ARTICULAR; INTRALESIONAL; INTRAMUSCULAR; INTRAVENOUS; SOFT TISSUE at 02:05

## 2025-02-19 RX ADMIN — DEXAMETHASONE SODIUM PHOSPHATE 4 MG: 4 INJECTION INTRA-ARTICULAR; INTRALESIONAL; INTRAMUSCULAR; INTRAVENOUS; SOFT TISSUE at 09:20

## 2025-02-19 RX ADMIN — DEXAMETHASONE SODIUM PHOSPHATE 4 MG: 4 INJECTION INTRA-ARTICULAR; INTRALESIONAL; INTRAMUSCULAR; INTRAVENOUS; SOFT TISSUE at 14:07

## 2025-02-19 RX ADMIN — VANCOMYCIN HYDROCHLORIDE 1250 MG: 1.25 INJECTION, POWDER, LYOPHILIZED, FOR SOLUTION INTRAVENOUS at 20:23

## 2025-02-19 RX ADMIN — SODIUM CHLORIDE: 0.9 INJECTION, SOLUTION INTRAVENOUS at 09:25

## 2025-02-19 RX ADMIN — DEXAMETHASONE SODIUM PHOSPHATE 4 MG: 4 INJECTION INTRA-ARTICULAR; INTRALESIONAL; INTRAMUSCULAR; INTRAVENOUS; SOFT TISSUE at 20:23

## 2025-02-19 RX ADMIN — LOSARTAN POTASSIUM 25 MG: 50 TABLET, FILM COATED ORAL at 09:20

## 2025-02-19 RX ADMIN — ACYCLOVIR SODIUM 800 MG: 50 INJECTION, SOLUTION INTRAVENOUS at 08:52

## 2025-02-19 RX ADMIN — HYDROXYZINE PAMOATE 25 MG: 25 CAPSULE ORAL at 15:38

## 2025-02-19 RX ADMIN — HYDROXYZINE PAMOATE 25 MG: 25 CAPSULE ORAL at 20:24

## 2025-02-19 RX ADMIN — SODIUM CHLORIDE, PRESERVATIVE FREE 10 ML: 5 INJECTION INTRAVENOUS at 08:52

## 2025-02-19 RX ADMIN — MUPIROCIN: 20 OINTMENT TOPICAL at 11:24

## 2025-02-19 RX ADMIN — MUPIROCIN: 20 OINTMENT TOPICAL at 21:00

## 2025-02-19 RX ADMIN — LORAZEPAM 1 MG: 2 INJECTION INTRAMUSCULAR; INTRAVENOUS at 10:26

## 2025-02-19 RX ADMIN — SODIUM CHLORIDE 800 MG: 9 INJECTION INTRAMUSCULAR; INTRAVENOUS; SUBCUTANEOUS at 16:13

## 2025-02-19 RX ADMIN — CLOPIDOGREL 75 MG: 75 TABLET, FILM COATED ORAL at 09:20

## 2025-02-19 ASSESSMENT — PAIN SCALES - GENERAL
PAINLEVEL_OUTOF10: 0

## 2025-02-19 NOTE — CONSULTS
SURGICAL INTENSIVE CARE  CONSULT NOTE      Date of admission:  2/18/2025  Reason for ICU transfer:  Endocarditis     Physician Consulted: Dr. Howell   Reason for Consult: Endocarditis   Referring Physician: Dr. Cabrera    SUBJECTIVE:  Wagner Ryan is a 45 y.o. male who presents to the CVICU for evaluation by CTS surgery. Patient was being followed at [facility] for AMS and a STEMI and underwent stent placement of the LAD on 2/17.  He was admitted to the MICU following cath lab and started on broad spectrum antibiotics, ASA and Brilinta. During work up for his AMS, a CTA of the head showed tiny area of intraparenchymal hemorrhage in the right occipital lobe w/ MRI of the brain showed multiple small foci of acute and early subacute infarcts in bilateral frontal and parietal occipital lobe left more than right consistent with micro emboli. Neurology was consulted. They cleared the patient to continue on plavix given his recent stent placement.  He underwent an ECHO which was found to have vegetations and the decision was made to transfer to [facility] for evaluation by CTS Surgery.     Medical history is significant for polysubstance abuse, hepatitis C.       reports that he has been smoking cigarettes. His smokeless tobacco use includes chew. He reports that he does not currently use alcohol. He reports current drug use. Drug: IV.    On arrival the patient is lethargic on exam, will intermittently follow commands and is protecting his airway. He remains on vancomycin and acyclovir.     Critical care was consulted for the above    Past Medical History:   Diagnosis Date    Hx of hepatitis C-resolved 3/5/2022    IV drug abuse (HCC)     Nonhealing nonsurgical wound with fat layer exposed        Past Surgical History:   Procedure Laterality Date    APPLY DRESSING Left 10/9/2020    DEBRIDEMENT OF SKIN, SOFT TISSUE, MUSCLE, EXPLORATION OF UPPER ARM INTEGRA GRAFT FOR COVERAGE performed by Camilo Denny MD at [facility] [place]     APPLY DRESSING Left 10/14/2020    LEFT UPPER EXTREMITY WOUND VAC PLACEMENT performed by Eddie Ruff MD [place] [facility] [place]    INCISION AND DRAINAGE Right 4/23/2020    RIGHT UPPER EXTREMITY INCISION AND DRAINAGE REMOVAL FOREIGN BODY WITH C-ARM performed by Kapil Yuan MD at [facility] OR    INCISION AND DRAINAGE Left 10/7/2020    LEFT ELBOW INCISION AND DRAINAGE performed by Kapil Yuan MD at [facility] OR    LEG SURGERY Right 3/6/2022    IRRIGATION AND DEBRIDEMENT RIGHT THIGH performed by Wes Branch MD at [facility] [place]    OR EXPLORATION OF ARTERY/VEIN Left 10/7/2020    LEFT ARM BRACHIAL ARTERY EXPLORATION, REPAIR OF MYCOTIC ANUERYSM WITH BYPASS performed by Eddie Ruff MD at [facility] [place]    TRANSESOPHAGEAL ECHOCARDIOGRAM N/A 4/24/2020    TRANSESOPHAGEAL ECHOCARDIOGRAM performed by Natasha Bowie MD at [facility] ENDOSCOPY    TRANSESOPHAGEAL ECHOCARDIOGRAM  10/14/2020       Medications Prior to Admission:    Prior to Admission medications    Not on File       No Known Allergies    Family History   Problem Relation Age of Onset    Heart Disease Father          REVIEW OF SYSTEMS  Review of Systems   Unable to perform ROS: Acuity of condition          PHYSICAL EXAM:  BP (!) 123/91   Pulse 100   Resp 20   Ht 1.93 m (6' 4\")   Wt 80.2 kg (176 lb 12.8 oz)   SpO2 96%   BMI 21.52 kg/m²     General Appearance:  awake, chronically ill appearing, poor dentation   Skin:  rashes, prior track marks from IV drug use  Head/face: Ill appearing, temporal wasting   Lungs/Chest:  Normal expansion. No increase of breathing noted, rhonchi present   Heart: Warm throughout. Regular rate   Abdomen:  Soft,  tenderness,  distended.  No palpable organomegaly or masses. R fem            LABS:  CBC  Recent Labs     02/18/25  0430   WBC 9.0   HGB 9.9*   HCT 29.6*   PLT 85*     BMP  Recent Labs     02/18/25  0430      K 3.7      CO2 25   BUN 29*   CREATININE 1.1   CALCIUM 8.2*     Liver Function  Recent Labs     02/18/25  0430   BILITOT

## 2025-02-19 NOTE — PROGRESS NOTES
[facility] Hospitalist Progress Note    Admitting Date and Time: 2/18/2025  7:30 PM  Admit Dx: Endocarditis [I38]    Synopsis:    Mr. Wagner Ryan, a 45 y.o. year old male  who  has a past medical history of Hx of hepatitis C-resolved, IV drug abuse (HCC), and Nonhealing nonsurgical wound with fat layer exposed.      Wagner Ryan is a 45 y.o. male who presents to the CVICU for evaluation by CTS surgery. Patient was being followed at [facility] for AMS and a STEMI and underwent stent placement of the LAD on 2/17.  He was admitted to the MICU following cath lab and started on broad spectrum antibiotics, ASA and Brilinta. During work up for his AMS, a CTA of the head showed tiny area of intraparenchymal hemorrhage in the right occipital lobe w/ MRI of the brain showed multiple small foci of acute and early subacute infarcts in bilateral frontal and parietal occipital lobe left more than right consistent with micro emboli. Neurology was consulted. They cleared the patient to continue on plavix given his recent stent placement.  He underwent an ECHO which was found to have vegetations and the decision was made to transfer to [facility] for evaluation by CTS Surgery.     2/19: Patient seen in CVICU.  Per ICU team and CTS, patient can be transferred as he has no ICU needs.  Transfer order placed.    Subjective:  Patient is being followed for Endocarditis [I38]     Patient seen and examined at bedside this morning.  Patient confused but can answer some questions appropriately.  Denying any complaints    ROS: denies fever, chills, cp, sob, n/v, HA unless stated above.      mupirocin   Topical BID    atorvastatin  40 mg Oral Nightly    clopidogrel  75 mg Oral Daily    dexAMETHasone  4 mg IntraVENous Q6H    losartan  25 mg Oral Daily    metoprolol tartrate  25 mg Oral BID    sodium chloride flush  5-40 mL IntraVENous 2 times per day    sodium chloride flush  5-40 mL IntraVENous 2 times per day    vancomycin  1,250

## 2025-02-19 NOTE — PROGRESS NOTES
Pharmacy Consultation Note  (Antibiotic Dosing and Monitoring)    Initial consult date: 2/18/25  Consulting physician/provider: Shelly  Drug: Vancomycin  Indication: Endocarditis/Endovascular     Age/  Gender Height Weight IBW  Allergy Information   45 y.o./male 193 cm (6' 4\") 80.2 kg (176 lb 12.8 oz)     Ideal body weight: 86.8 kg (191 lb 5.7 oz)   Patient has no known allergies.      Renal Function:  Recent Labs     02/17/25  1506 02/18/25  0430 02/19/25  0423   BUN 31* 29* 38*   CREATININE 1.1 1.1 1.0       Intake/Output Summary (Last 24 hours) at 2/19/2025 1050  Last data filed at 2/19/2025 0930  Gross per 24 hour   Intake 945.24 ml   Output 1975 ml   Net -1029.76 ml       Vancomycin Monitoring:  Trough:    Recent Labs     02/18/25  1700   VANCOTROUGH 10.9     Random:  No results for input(s): \"VANCORANDOM\" in the last 72 hours.    Vancomycin Administration Times:  Recent vancomycin administrations                     vancomycin (VANCOCIN) 1,250 mg in sodium chloride 0.9 % 250 mL IVPB ([id number]) (mg) 1,250 mg New Bag 02/19/25 0923    vancomycin (VANCOCIN) 1250 mg in 250 mL IVPB (mg) 1,250 mg New Bag 02/18/25 1824     1,250 mg New Bag  0555    vancomycin (VANCOCIN) 1250 mg in 250 mL IVPB (mg) 1,250 mg New Bag 02/17/25 1738                  Assessment:  Patient is a 45 y.o. male who has been initiated on vancomycin  Estimated Creatinine Clearance: 107 mL/min (based on SCr of 1 mg/dL).  To dose vancomycin, pharmacy will be utilizing [employer] calculation software for goal AUC/DAYAN 400-600 mg/L-hr (predicted AUC/DAYAN = 570, Tr =15.6 mcg/mL)    Plan:  Will continue vancomycin 1250 mg IV every 12 hours  Will check vancomycin trough tomorrow AM   Will continue to monitor renal function   Pharmacy to follow      Lizzeth Randle, PharmD, BCPS, BCCCP 2/19/2025 10:50 AM    SEB: [phone]  SEY: [phone]  SJW: [phone]

## 2025-02-19 NOTE — CARE COORDINATION
2/19 Care Coordination:Pt was a transfer from [facility], Dx STEMI, had successful angioplasty and stent placed. Pt developed AMS, Fever,Patient had a CT of the head that showed intraparenchymal hemorrhage. MRI of the brain showed multiple small foci of subacute infarcts consistent with micro emboli. echo which showed vegetations, he was transferred to [facility] for evaluation of his endocarditis. Patient has significant medical history of IV drug abuse and hep C. Admit to CVIC,Altered mental status-secondary to septic emboli. CTS consult.PTA pt from home ,lives with his mom.He does not have a PCP. PB following, no insurance. With Current status unclear on discharge needs.  CM/SW will continue to follow for discharge planning.   Hernesto OCHOA,RN--BC  [phone]

## 2025-02-19 NOTE — H&P
Hospitalist History & Physical      PCP: No primary care provider on file.    Date of Service:  2/18/2025    Chief Complaint:  had no chief complaint listed for this encounter.    History Of Present Illness:    Mr. Wagner Ryan, a 45 y.o. year old male  who  has a past medical history of Hx of hepatitis C-resolved, IV drug abuse (HCC), and Nonhealing nonsurgical wound with fat layer exposed.     Wagner Ryan is a 45 y.o. male who presents to the CVICU for evaluation by CTS surgery. Patient was being followed at [facility] for AMS and a STEMI and underwent stent placement of the LAD on 2/17.  He was admitted to the MICU following cath lab and started on broad spectrum antibiotics, ASA and Brilinta. During work up for his AMS, a CTA of the head showed tiny area of intraparenchymal hemorrhage in the right occipital lobe w/ MRI of the brain showed multiple small foci of acute and early subacute infarcts in bilateral frontal and parietal occipital lobe left more than right consistent with micro emboli. Neurology was consulted. They cleared the patient to continue on plavix given his recent stent placement.  He underwent an ECHO which was found to have vegetations and the decision was made to transfer to [facility] for evaluation by CTS Surgery.          Past Medical History:   Diagnosis Date    Hx of hepatitis C-resolved 3/5/2022    IV drug abuse (HCC)     Nonhealing nonsurgical wound with fat layer exposed        Past Surgical History:   Procedure Laterality Date    APPLY DRESSING Left 10/9/2020    DEBRIDEMENT OF SKIN, SOFT TISSUE, MUSCLE, EXPLORATION OF UPPER ARM INTEGRA GRAFT FOR COVERAGE performed by Camilo Denny MD at [facility]    APPLY DRESSING Left 10/14/2020    LEFT UPPER EXTREMITY WOUND VAC PLACEMENT performed by Eddie Ruff MD at [facility] OR    INCISION AND DRAINAGE Right 4/23/2020    RIGHT UPPER EXTREMITY INCISION AND DRAINAGE REMOVAL FOREIGN BODY WITH C-ARM performed by Kapil Yuan MD  of a central venous catheter via left common femoral artery for IV access.  Selective angiography of right brachial artery via right radial artery.  Selective angiography of right brachial artery via right ulnar artery.  Left heart catheterization, left ventriculogram, selective coronary angiogram, conscious sedation, PTCA mid LAD.  OBED mid LAD.  Selective angiography of right femoral artery prior to the use of a closure device.  Angio-Seal for closure of the right femoral artery. Indications: Acute anterolateral ST elevation MI.  STEMI alert was called by emergency room physician. Procedure details: Sedation was achieved with the use of IV Versed and fentanyl. I administered moderate sedation throughout this procedure. An independent, trained observer pushed medications at my direction and monitored the patient's level of consciousness, oxygen saturation, blood pressure, pulse, and physiological status throughout. I placed a 5 Zimbabwean central venous catheter in the left common femoral artery initially for IV access since patient had very poor peripheral access. The catheterization procedure was attempted initially via right radial approach. Wire could not be advanced to the brachial artery.  The catheter was advanced to the brachial artery and selective injection was done in that area which revealed severe tortuosity and filling of the radial and ulnar arteries from collaterals raising suspicion of occluded right brachial artery or severe tortuosity. A 6 Zimbabwean sheath was then placed in the right ulnar artery and a guidewire was advanced through that she that could not cross to the brachial artery. 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 At this point I decided to use the femoral artery approach. The patient had deep

## 2025-02-19 NOTE — PROGRESS NOTES
[facility] on unit, leaving with patient to transfer to [facility]  Facesheet, MAR report and SBAR sent with ambulance team to give to receiving staff    All patient belongings sent home with mother including Termo cath instruction card

## 2025-02-19 NOTE — PLAN OF CARE
Problem: Discharge Planning  Goal: Discharge to home or other facility with appropriate resources  Outcome: Progressing     Problem: Pain  Goal: Verbalizes/displays adequate comfort level or baseline comfort level  Outcome: Progressing     Problem: Safety - Adult  Goal: Free from fall injury  Outcome: Progressing     Problem: ABCDS Injury Assessment  Goal: Absence of physical injury  Outcome: Progressing     Problem: Skin/Tissue Integrity  Goal: Skin integrity remains intact  Outcome: Progressing     Problem: Neurosensory - Adult  Goal: Achieves stable or improved neurological status  Outcome: Progressing  Goal: Absence of seizures  Outcome: Progressing  Goal: Remains free of injury related to seizures activity  Outcome: Progressing  Goal: Achieves maximal functionality and self care  Outcome: Progressing     Problem: Neurosensory - Adult  Goal: Absence of seizures  Outcome: Progressing     Problem: Neurosensory - Adult  Goal: Remains free of injury related to seizures activity  Outcome: Progressing     Problem: Neurosensory - Adult  Goal: Achieves maximal functionality and self care  Outcome: Progressing     Problem: Respiratory - Adult  Goal: Achieves optimal ventilation and oxygenation  Outcome: Progressing     Problem: Cardiovascular - Adult  Goal: Maintains optimal cardiac output and hemodynamic stability  Outcome: Progressing  Goal: Absence of cardiac dysrhythmias or at baseline  Outcome: Progressing     Problem: Skin/Tissue Integrity - Adult  Goal: Skin integrity remains intact  Outcome: Progressing  Goal: Incisions, wounds, or drain sites healing without S/S of infection  Outcome: Progressing     Problem: Musculoskeletal - Adult  Goal: Return mobility to safest level of function  Outcome: Progressing     Problem: Gastrointestinal - Adult  Goal: Minimal or absence of nausea and vomiting  Outcome: Progressing  Goal: Maintains or returns to baseline bowel function  Outcome: Progressing  Goal: Maintains  adequate nutritional intake  Outcome: Progressing     Problem: Genitourinary - Adult  Goal: Absence of urinary retention  Outcome: Progressing     Problem: Infection - Adult  Goal: Absence of infection at discharge  Outcome: Progressing  Goal: Absence of infection during hospitalization  Outcome: Progressing     Problem: Metabolic/Fluid and Electrolytes - Adult  Goal: Electrolytes maintained within normal limits  Outcome: Progressing  Goal: Hemodynamic stability and optimal renal function maintained  Outcome: Progressing  Goal: Glucose maintained within prescribed range  Outcome: Progressing     Problem: Hematologic - Adult  Goal: Maintains hematologic stability  Outcome: Progressing

## 2025-02-19 NOTE — PROGRESS NOTES
Patient admitted into CVICU bed 3814. Connected to bedside monitor, alarm parameters set, patient oriented to unit and call light. Call placed to patients cecy Armstrong, updated on patients location and gave unit phone number.

## 2025-02-19 NOTE — CONSULTS
Inpatient Cardiology Consultation      Reason for Consult: Active endocarditis STEMI s/p stent    Consulting Physician: Dr. Landrum     Requesting Physician:  Dr. Gaytan     Date of Consultation: 2/19/2025    HISTORY OF PRESENT ILLNESS: 44 yo  male known to Dr Gaytan.     [facility] ED 2/17/2025 via EMS mother concerned d/t flulike symptoms x1 week, severe headache, AMS, cough. Anterolateral STEMI    Troponin 336>> 765  procalcitonin 3.48 sed rat 65 >> 130 K 4.0>> 3.7 mag 2.4 BUN 29 CR 1.1 albumin 2.5 AST 42>> 60 total protein 6.1 cortisol 4 8.4 TSH 0.54  WBC 12.3>> 9.0>> 12.4 Hgb 11.3>> 9.2 PLT 75>> 153  UDS + benzodiazepine & buprenorphine. + UTI  Hep C reactive  BC + MRSA  ABGs on 2L NC pH 7.48 pCO2 41 pCO2 22.7  HCO3 30.3      2/17/2025 Emergently sent to cath lab s/p PCI/OBED to mid LAD    2/17/2025 ID Consult: ID consulted -infective endocarditis, serratia bacteremia of right thigh abscess     2/18/2025 TTE EF 45-50%     2/18/2025 Neurology consult for AMS: Repeat CT head in 24 hours.CTA head and neck    2/18/2025 transfer to [facility] for CTS consult 2/2 endocarditis    RAD:   CXR 2/17/2025: No acute process  CT chest W contrast 2/17/2025: Mild coronary artery calcification  CT head W WO contrast 2/17/2025: Tiny area of intraparenchymal hemorrhage located at the gray-white junction in the medial right occipital lobe. This is not associated with any mass effect or edema.   MRI brain W WO contrast 2/18/2025: Multiple small foci of acute or early subacute infarctions in the bilateral frontal, parietal and occipital lobes, left more than right.  Many of the infarctions are in a watershed territory distribution.  Findings may be related to hypoperfusion or micro emboli. Subtle foci of enhancement in the right frontal, parietal and occipital lobes. These are likely related to subacute infarctions.    CT head W WO contrast 2/18/2025: Unremarkable  CTA Head/Neck 2/18/2025: No dissection or

## 2025-02-19 NOTE — PLAN OF CARE
Problem: Discharge Planning  Goal: Discharge to home or other facility with appropriate resources  2/19/2025 1102 by Denis Durham RN  Outcome: Progressing  Flowsheets (Taken 2/19/2025 0800)  Discharge to home or other facility with appropriate resources:   Identify barriers to discharge with patient and caregiver   Arrange for needed discharge resources and transportation as appropriate   Identify discharge learning needs (meds, wound care, etc)  2/19/2025 0007 by Harper Miles RN  Outcome: Progressing  Flowsheets (Taken 2/18/2025 2022)  Discharge to home or other facility with appropriate resources:   Identify barriers to discharge with patient and caregiver   Identify discharge learning needs (meds, wound care, etc)     Problem: Neurosensory - Adult  Goal: Achieves stable or improved neurological status  2/19/2025 1102 by Denis Durham RN  Outcome: Progressing  Flowsheets (Taken 2/19/2025 0800)  Achieves stable or improved neurological status:   Assess for and report changes in neurological status   Initiate measures to prevent increased intracranial pressure   Maintain blood pressure and fluid volume within ordered parameters to optimize cerebral perfusion and minimize risk of hemorrhage   Monitor temperature, glucose, and sodium. Initiate appropriate interventions as ordered  2/19/2025 0007 by Harper Miles RN  Outcome: Progressing  Goal: Absence of seizures  2/19/2025 1102 by Denis Durham RN  Outcome: Progressing  Flowsheets (Taken 2/19/2025 0800)  Absence of seizures:   Monitor for seizure activity.  If seizure occurs, document type and location of movements and any associated apnea   If seizure occurs, turn head to side and suction secretions as needed   Administer anticonvulsants as ordered   Diagnostic studies as ordered   Support airway/breathing, administer oxygen as needed  2/19/2025 0007 by Harper Miles RN  Outcome: Progressing  Goal: Remains free of injury related to seizures  activity  2/19/2025 1102 by Denis Durham RN  Outcome: Progressing  Flowsheets (Taken 2/19/2025 0800)  Remains free of injury related to seizure activity:   Maintain airway, patient safety  and administer oxygen as ordered   Monitor patient for seizure activity, document and report duration and description of seizure to Licensed Independent Practitioner   If seizure occurs, turn patient to side and suction secretions as needed  2/19/2025 0007 by Harper Miles RN  Outcome: Progressing  Goal: Achieves maximal functionality and self care  2/19/2025 1102 by Denis Durham RN  Outcome: Progressing  Flowsheets (Taken 2/19/2025 0800)  Achieves maximal functionality and self care:   Monitor swallowing and airway patency with patient fatigue and changes in neurological status   Encourage and assist patient to increase activity and self care with guidance from physical therapy/occupational therapy   Encourage visually impaired, hearing impaired and aphasic patients to use assistive/communication devices  2/19/2025 0007 by Harper Miles RN  Outcome: Progressing     Problem: Hematologic - Adult  Goal: Maintains hematologic stability  2/19/2025 1102 by Denis Durham RN  Outcome: Progressing  Flowsheets (Taken 2/19/2025 0800)  Maintains hematologic stability:   Assess for signs and symptoms of bleeding or hemorrhage   Monitor labs for bleeding or clotting disorders   Administer blood products/factors as ordered  2/19/2025 0007 by Harper Miles RN  Outcome: Progressing

## 2025-02-19 NOTE — CONSULTS
CTS Consult    Patient name: Wagner Ryan    Reason for consult:  MV and AV endocarditis     Primary Care Physician: No primary care provider on file.    Date of service: 2/19/2025    Chief Complaint:  SOB    HPI:  45 y.o. male who presents to the CVICU for evaluation by CTS surgery. Patient was being followed at [facility] for AMS and a STEMI and underwent stent placement of the LAD on 2/17. He was admitted to the MICU following cath lab and started on broad spectrum antibiotics, ASA and Brilinta. During work up for his AMS, a CTA of the head showed tiny area of intraparenchymal hemorrhage in the right occipital lobe w/ MRI of the brain showed multiple small foci of acute and early subacute infarcts in bilateral frontal and parietal occipital lobe left more than right consistent with micro emboli.  JAVED revealed mitral and aortic valve endocarditis.      Allergies: No Known Allergies    Home medications:    Current Facility-Administered Medications   Medication Dose Route Frequency Provider Last Rate Last Admin    LORazepam (ATIVAN) injection 0.5 mg  0.5 mg IntraVENous Q6H PRN Nalini Reyna DO        hydrOXYzine pamoate (VISTARIL) capsule 25 mg  25 mg Oral TID PRN Nalini Reyna DO        mupirocin (BACTROBAN) 2 % ointment   Topical BID Toya Adler MD   Given at 02/19/25 1124    0.9 % sodium chloride infusion   IntraVENous PRN Leonardo Holbrook MD        0.9 % sodium chloride infusion   IntraVENous Continuous Leonardo Holbrook MD 75 mL/hr at 02/19/25 0925 New Bag at 02/19/25 0925    acetaminophen (TYLENOL) tablet 650 mg  650 mg Oral Q6H PRN Leonardo Holbrook MD        Or    acetaminophen (TYLENOL) suppository 650 mg  650 mg Rectal Q6H PRN Leonardo Holbrook MD        acyclovir (ZOVIRAX) 800 mg in sodium chloride 0.9 % 250 mL IVPB  800 mg IntraVENous Q8H Leonardo Holbrook MD   Stopped at 02/19/25 1026    atorvastatin (LIPITOR) tablet 40 mg  40 mg Oral Nightly Leonardo Holbrook MD        clopidogrel (PLAVIX)

## 2025-02-19 NOTE — PROGRESS NOTES
Neurology and cardiology consults placed. Attempted to place consult for ID and currently unavailable.

## 2025-02-19 NOTE — PROGRESS NOTES
4 Eyes Skin Assessment     NAME:  Wagner Ryan  YOB: 1980  MEDICAL RECORD NUMBER:  [id number]    The patient is being assessed for  Admission    I agree that at least one RN has performed a thorough Head to Toe Skin Assessment on the patient. ALL assessment sites listed below have been assessed.      Areas assessed by both nurses:    Head, Face, Ears, Shoulders, Back, Chest, Arms, Elbows, Hands, Sacrum. Buttock, Coccyx, Ischium, Legs. Feet and Heels, and Under Medical Devices         Does the Patient have a Wound? Yes wound(s) were present on assessment. LDA wound assessment was Initiated and completed by RN       [place] Prevention initiated by RN: Yes  Wound Care Orders initiated by RN: No    Pressure Injury (Stage 3,4, Unstageable, DTI, NWPT, and Complex wounds) if present, place Wound referral order by RN under : No    New Ostomies, if present place, Ostomy referral order under : No     Nurse 1 eSignature: Electronically signed by Harper Miles RN on 2/18/25 at 8:32 PM EST    **SHARE this note so that the co-signing nurse can place an eSignature**    Nurse 2 eSignature: Electronically signed by ROSALIE RODRIGUEZ RN on 2/19/25 at 12:46 AM EST

## 2025-02-19 NOTE — PROGRESS NOTES
Wagner Ryan is a 45 y.o. male     Patient presented the ED on 2/17/2025 at [facility] with fatigue and alteration in mentation.  He was found to have NSTEMI and taken to the Cath Lab where he had stent placed.    Because of alteration in mentation MRI of the brain did demonstrate multiple areas of ischemia some in the parieto-occipital area demonstrating some vasogenic edema.  Transesophageal echo was completed which was highly suspicious for vegetation therefore he was transferred to [facility] for cardiothoracic evaluation    No new neuro events reported      Allergies as of 02/18/2025    (No Known Allergies)       Objective:     BP (!) 150/96   Pulse (!) 104   Temp 97.3 °F (36.3 °C)   Resp 25   Ht 1.93 m (6' 4\")   Wt 80.2 kg (176 lb 12.8 oz)   SpO2 98%   BMI 21.52 kg/m²      General appearance: Awake looking around the room  Head: Normocephalic, without obvious abnormality, atraumatic  Extremities: no cyanosis or edema  Pulses: 2+ and symmetric  Skin: no rashes or lesions    Mental Status: Awake looking around the room oriented to self and year    Speech: Dysarthric  Language: appropriate but laconic    Cranial Nerves:  I: smell    II: visual acuity     II: visual fields Full   II: pupils KENZIE   III,VII: ptosis None   III,IV,VI: extraocular muscles  EOMI without nystagmus    V: mastication Normal   V: facial light touch sensation  Normal   V,VII: corneal reflex  Present   VII: facial muscle function - upper     VII: facial muscle function - lower Normal   VIII: hearing Normal   IX: soft palate elevation  Normal   IX,X: gag reflex    XI: trapezius strength  5/5   XI: sternocleidomastoid strength 5/5   XI: neck extension strength  5/5   XII: tongue strength  Normal     Motor:  Moving all limbs-left leg appears to be with more random movement than right leg    Sensory:  Appreciates LT in all limbs    DTR:   No reflexes    No Babinski  No Maurice's     Laboratory/Radiology:     CBC with

## 2025-02-19 NOTE — PROGRESS NOTES
Date:  2/18/2025  Patient: Wagner Ryan  Admission:  2/17/2025 11:28 AM  Admit DX: STEMI (ST elevation myocardial infarction) (HCC) [I21.3]  Age:  45 y.o., 1980     LOS: 1 day       SUBJECTIVE:        Patient continued to be patient was agitated therefore he was given IV Ativan for sedation.  He was lethargic when I saw him  I to T at bedside which showed small vegetations involving the mitral and aortic valves.  Blood cultures were positive for heavy bacteremia.  MRI of the brain showed multiple areas of infarcts suspicious for embolization.  No intracranial hemorrhage    OBJECTIVE:    /74   Pulse 97   Temp 98.6 °F (37 °C) (Axillary)   Resp 23   Ht 1.753 m (5' 9\")   Wt 81.2 kg (179 lb 0.2 oz)   SpO2 93%   BMI 21.79 kg/m²     Intake/Output Summary (Last 24 hours) at 2/18/2025 1926  Last data filed at 2/18/2025 1800  Gross per 24 hour   Intake 3719.88 ml   Output 3050 ml   Net 669.88 ml       EXAM:       General: Lethargic and disoriented, No acute distress.  Appears as stated age.  Eye:  Pupils are equal, round and reactive to light, Extraocular movements are intact, Normal conjunctiva.    Head: Normocephalic, Normal hearing, Oral mucosa is moist.  Neck: Supple, No carotid bruit, No jugular venous distention, No lymphadenopathy.  Respiratory: Lungs are clear to auscultation, Respirations are non-labored, Breath sounds are equal, Symmetrical chest wall expansion.  Cardiovascular: Normal rate, No murmur, No gallop, Good pulses equal in all extremities, No edema.  Gastrointestinal: Soft, Non-tender, Non-distended, Normal bowel sounds.  Musculoskeletal: Normal strength, No tenderness, No deformity.  Integumentary:  Warm, Dry.    Neurologic: Lethargic and disoriented.      Current Inpatient Medications:   clopidogrel  75 mg Oral Daily    sodium chloride flush  5-40 mL IntraVENous 2 times per day    atorvastatin  40 mg Oral Nightly    metoprolol tartrate  25 mg Oral BID    losartan  25 mg Oral

## 2025-02-19 NOTE — PROGRESS NOTES
Pharmacy Consultation Note  (Antibiotic Dosing and Monitoring)    Initial consult date: 2/18/25  Consulting physician/provider: Shelly  Drug: Vancomycin  Indication: Endocarditis/Endovascular     Age/  Gender Height Weight IBW  Allergy Information   45 y.o./male 193 cm (6' 4\") 80.2 kg (176 lb 12.8 oz)     Ideal body weight: 86.8 kg (191 lb 5.7 oz)   Patient has no known allergies.      Renal Function:  Recent Labs     02/17/25  1209 02/17/25  1506 02/18/25  0430   BUN 31* 31* 29*   CREATININE 1.2 1.1 1.1     No intake or output data in the 24 hours ending 02/18/25 2211    Vancomycin Monitoring:  Trough:    Recent Labs     02/18/25  1700   VANCOTROUGH 10.9     Random:  No results for input(s): \"VANCORANDOM\" in the last 72 hours.    Vancomycin Administration Times:  Recent vancomycin administrations                     vancomycin (VANCOCIN) 1250 mg in 250 mL IVPB (mg) 1,250 mg New Bag 02/18/25 1824     1,250 mg New Bag  0555    vancomycin (VANCOCIN) 1250 mg in 250 mL IVPB (mg) 1,250 mg New Bag 02/17/25 1738                    Assessment:  Patient is a 45 y.o. male who has been initiated on vancomycin  Estimated Creatinine Clearance: 97 mL/min (based on SCr of 1.1 mg/dL).  To dose vancomycin, pharmacy will be utilizing [employer] calculation software for goal AUC/DAYAN 400-600 mg/L-hr (predicted AUC/DAYAN = 570, Tr =15.6 mcg/mL)    Plan:  Will continue vancomycin 1250 mg IV every 12 hours  Will check vancomycin levels when appropriate  Will continue to monitor renal function   Pharmacy to follow      Masood Rodriguez, PharmD, 2/18/2025 10:11 PM    NAHOMI: [phone]  SEY: [phone]  SJW: [phone]

## 2025-02-19 NOTE — CONSULTS
[facility]  [facility]    Consultation Note     Admit Date: 2/18/2025  7:30 PM    Reason for Consult:   Endocarditis, bacteremia     Attending Physician:  John Mckeon MD    Chief Complaint: fatigue, AMS     HISTORY OF PRESENT ILLNESS:   The patient is a 45 y.o.  male known to the Infectious Diseases service. The patient initially presented to [facility] for fatigue and AMS where he was found to have an acute anterolateral STEMI. He was taken to cath lab and and had a OBED stent placed in LAD. Further workup for AMS showed multiple areas of restricted diffusion on MRI brain and areas of micro-hemorrhage suspicious for septic emboli. JAVED was done on 2/18/25 and did show vegetations on mitral and aortic valves. He was transferred to [facility] for evaluation per CTS. ID was consulted for endocarditis and MRSA bacteremia.       Past Medical History:        Diagnosis Date    Hx of hepatitis C-resolved 3/5/2022    IV drug abuse (HCC)     Nonhealing nonsurgical wound with fat layer exposed      Past Surgical History:        Procedure Laterality Date    APPLY DRESSING Left 10/9/2020    DEBRIDEMENT OF SKIN, SOFT TISSUE, MUSCLE, EXPLORATION OF UPPER ARM INTEGRA GRAFT FOR COVERAGE performed by Camilo Denny MD at [facility] [place]    APPLY DRESSING Left 10/14/2020    LEFT UPPER EXTREMITY WOUND VAC PLACEMENT performed by Eddie Ruff MD at [facility] OR    INCISION AND DRAINAGE Right 4/23/2020    RIGHT UPPER EXTREMITY INCISION AND DRAINAGE REMOVAL FOREIGN BODY WITH C-ARM performed by Kapil Yuan MD at [facility] OR    INCISION AND DRAINAGE Left 10/7/2020    LEFT ELBOW INCISION AND DRAINAGE performed by Kapil Yuan MD at [facility] OR    LEG SURGERY Right 3/6/2022    IRRIGATION AND DEBRIDEMENT RIGHT THIGH performed by Wes Branch MD at [facility] OR    WA EXPLORATION OF ARTERY/VEIN Left 10/7/2020    LEFT ARM BRACHIAL ARTERY EXPLORATION, REPAIR OF MYCOTIC ANUERYSM WITH BYPASS performed by Eddie VILLEGAS  unable to answer   Housing Stability: Patient Unable To Answer (2/18/2025)    Housing Stability Vital Sign     Unable to Pay for Housing in the Last Year: Patient unable to answer     Number of Times Moved in the Last Year: 0     Homeless in the Last Year: Patient unable to answer     Tobacco: No  Alcohol: No  Pets: No  Travel: No    Family History:       Problem Relation Age of Onset    Heart Disease Father    . Otherwise non-pertinent to the chief complaint.    REVIEW OF SYSTEMS:    CONSTITUTIONAL:  No chills, fevers or night sweats. No loss of weight.  EYES:  No double vision or drainage from eyes, ears or throat.  HEENT:  No neck stiffness. No dysphagia. No drainage from eyes, ears or throat  RESPIRATORY:  No cough, productive sputum or hemoptysis.   CARDIOVASCULAR:  No chest pain, palpitations, orthopnea or dyspnea on exertion.  GASTROINTESTINAL:  No nausea, vomiting, diarrhea or constipation or hematochezia   GENITOURINARY:  No frequency burning dysuria or hematuria.  INTEGUMENT/BREAST:  No rash or breast masses.  HEMATOLOGIC/LYMPHATIC:  No lymphadenopathy or blood dyscrasics.   ALLERGIC/IMMUNOLOGIC:  No anaphylaxis.   ENDOCRINE:  No polyuria or polydipsia or temperature intolerance.    MUSCULOSKELETAL:  No myalgia or arthralgia.  Full ROM.  NEUROLOGICAL:  No focal motor sensory deficit.  BEHAVIOR/PSYCH:  No psychosis.     PHYSICAL EXAM:    Vitals:    BP (!) 136/97   Pulse (!) 105   Temp 97.3 °F (36.3 °C)   Resp 22   Ht 1.93 m (6' 4\")   Wt 80.2 kg (176 lb 12.8 oz)   SpO2 97%   BMI 21.52 kg/m²   Constitutional: The patient is awake, able to intermittently nod head to answer questions but not oriented    Skin: Warm and dry. No rashes were noted. No jaundice.  HEENT: Eyes show round, and reactive pupils. Moist mucous membranes, no ulcerations, no thrush.   Neck: Supple to movements. No lymphadenopathy.   Chest: No use of accessory muscles to breathe. Symmetrical expansion. Auscultation reveals no wheezing,

## 2025-02-19 NOTE — PROGRESS NOTES
[facility]  SURGICAL INTENSIVE CARE UNIT (SICU)  ATTENDING PHYSICIAN CRITICAL CARE PROGRESS NOTE     I have personally examined the patient, personally reviewed the record, and personally discussed the case with the resident. I have personally reviewed all relevant labs and imaging data. I am actively managing this patient's medications.  Please refer to the resident's note. I agree with the assessment and plan with the following corrections/additions. The following summarizes my clinical findings and independent assessment.     CC: critical care management for endocarditis    Hospital Course/Overnight Events:  --STEMI--cardiac stents placed at [facility]  --septic emboli seen on MRI of brain and echo revealed vegetations--transferred to [facility] for evaluation by CT surgery  --no overnight issues    Pt reports feeling restless.    Medications   Scheduled Meds:    mupirocin   Topical BID    acyclovir  800 mg IntraVENous Q8H    atorvastatin  40 mg Oral Nightly    clopidogrel  75 mg Oral Daily    dexAMETHasone  4 mg IntraVENous Q6H    losartan  25 mg Oral Daily    metoprolol tartrate  25 mg Oral BID    sodium chloride flush  5-40 mL IntraVENous 2 times per day    sodium chloride flush  5-40 mL IntraVENous 2 times per day    vancomycin  1,250 mg IntraVENous Q12H     Continuous Infusions:    sodium chloride      sodium chloride 75 mL/hr at 25 0925     PRN Meds: LORazepam, hydrOXYzine pamoate, sodium chloride, acetaminophen **OR** acetaminophen, magnesium sulfate, ondansetron **OR** ondansetron, potassium chloride **OR** potassium alternative oral replacement **OR** potassium chloride, sodium chloride flush, sodium chloride flush, Carmex Classic Lip Balm    Physical Examination      Vitals:  BP (!) 150/96   Pulse (!) 104   Temp 97.3 °F (36.3 °C)   Resp 25   Ht 1.93 m (6' 4\")   Wt 80.2 kg (176 lb 12.8 oz)   SpO2 98%   BMI 21.52 kg/m²   Temp (24hrs), Av.8 °F (36.6 °C), Min:97.2 °F  02/17/2025    Altered mental status 02/17/2025    Acute coronary syndrome (HCC) 02/17/2025    Septic shock (HCC) 02/17/2025    Hx of hepatitis C-resolved 03/05/2022    Sinus tachycardia 03/05/2022    Abscess of chest wall     Osteomyelitis (HCC)     Tobacco dependence 10/04/2021    Abdominal wall abscess 10/03/2021    Nonhealing nonsurgical wound with fat layer exposed     Mycotic aneurysm     Abscess 10/07/2020    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    Pseudoaneurysm 10/07/2020    Poor venous access     Skin ulcer of upper arm with fat layer exposed (HCC) 05/08/2020    Cellulitis of right arm     Bacteremia     IV drug abuse (HCC)     Superficial foreign body of right upper arm     Right arm cellulitis 04/21/2020       MRSA bacteremia/bacteruria--cont vanco #3  Acyclovir #3 for presumed encephalitis--altered mental status likely due to septic emboli--likely can stop acyclovir  STEMI--s/p cardiac stent placement--cont Plavix  HTN--cont losartan/metoprolol  Septic emboli--cont decadron; monitor neuro status    I am actively managing this pt's meds and the risk for hemodynamic/metabolic/neruologic deterioration.  Requires ongoing ICU care.    Chaka Garza MD, FACS  2/19/2025  11:48 AM    Critical care time exclusive of teaching and procedures = 38 minutes

## 2025-02-20 ENCOUNTER — APPOINTMENT (OUTPATIENT)
Dept: ULTRASOUND IMAGING | Age: 45
End: 2025-02-20
Attending: INTERNAL MEDICINE

## 2025-02-20 ENCOUNTER — APPOINTMENT (OUTPATIENT)
Dept: INTERVENTIONAL RADIOLOGY/VASCULAR | Age: 45
End: 2025-02-20
Attending: INTERNAL MEDICINE

## 2025-02-20 PROBLEM — I35.8 AORTIC VALVE ENDOCARDITIS: Status: ACTIVE | Noted: 2025-02-18

## 2025-02-20 LAB
ALBUMIN SERPL-MCNC: 2.8 G/DL (ref 3.5–5.2)
ALP SERPL-CCNC: 105 U/L (ref 40–129)
ALT SERPL-CCNC: 19 U/L (ref 0–40)
ANION GAP SERPL CALCULATED.3IONS-SCNC: 10 MMOL/L (ref 7–16)
ANION GAP SERPL CALCULATED.3IONS-SCNC: 12 MMOL/L (ref 7–16)
AST SERPL-CCNC: 22 U/L (ref 0–39)
BASOPHILS # BLD: 0 K/UL (ref 0–0.2)
BASOPHILS NFR BLD: 0 % (ref 0–2)
BILIRUB SERPL-MCNC: 0.4 MG/DL (ref 0–1.2)
BNP SERPL-MCNC: ABNORMAL PG/ML (ref 0–125)
BUN SERPL-MCNC: 39 MG/DL (ref 6–20)
BUN SERPL-MCNC: 40 MG/DL (ref 6–20)
CA-I BLD-SCNC: 1.21 MMOL/L (ref 1.15–1.33)
CALCIUM SERPL-MCNC: 8.2 MG/DL (ref 8.6–10.2)
CALCIUM SERPL-MCNC: 8.4 MG/DL (ref 8.6–10.2)
CHLORIDE SERPL-SCNC: 118 MMOL/L (ref 98–107)
CHLORIDE SERPL-SCNC: 119 MMOL/L (ref 98–107)
CO2 SERPL-SCNC: 22 MMOL/L (ref 22–29)
CO2 SERPL-SCNC: 23 MMOL/L (ref 22–29)
CREAT SERPL-MCNC: 0.9 MG/DL (ref 0.7–1.2)
CREAT SERPL-MCNC: 0.9 MG/DL (ref 0.7–1.2)
DATE LAST DOSE: NORMAL
ECHO BSA: 2.07 M2
EOSINOPHIL # BLD: 0 K/UL (ref 0.05–0.5)
EOSINOPHILS RELATIVE PERCENT: 0 % (ref 0–6)
ERYTHROCYTE [DISTWIDTH] IN BLOOD BY AUTOMATED COUNT: 16.4 % (ref 11.5–15)
GFR, ESTIMATED: >90 ML/MIN/1.73M2
GFR, ESTIMATED: >90 ML/MIN/1.73M2
GLUCOSE SERPL-MCNC: 110 MG/DL (ref 74–99)
GLUCOSE SERPL-MCNC: 123 MG/DL (ref 74–99)
HCT VFR BLD AUTO: 28.8 % (ref 37–54)
HCV RNA SERPL NAA+PROBE-ACNC: NOT DETECTED IU/ML
HCV RNA SERPL NAA+PROBE-LOG IU: NOT DETECTED LOG IU/ML
HCV RNA SERPL QL NAA+PROBE: NOT DETECTED
HGB BLD-MCNC: 9.1 G/DL (ref 12.5–16.5)
LYMPHOCYTES NFR BLD: 0.32 K/UL (ref 1.5–4)
LYMPHOCYTES RELATIVE PERCENT: 3 % (ref 20–42)
MCH RBC QN AUTO: 24 PG (ref 26–35)
MCHC RBC AUTO-ENTMCNC: 31.6 G/DL (ref 32–34.5)
MCV RBC AUTO: 76 FL (ref 80–99.9)
MONOCYTES NFR BLD: 0.21 K/UL (ref 0.1–0.95)
MONOCYTES NFR BLD: 2 % (ref 2–12)
NEUTROPHILS NFR BLD: 96 % (ref 43–80)
NEUTS SEG NFR BLD: 11.37 K/UL (ref 1.8–7.3)
PLATELET # BLD AUTO: 176 K/UL (ref 130–450)
PMV BLD AUTO: 10.7 FL (ref 7–12)
POTASSIUM SERPL-SCNC: 3.3 MMOL/L (ref 3.5–5)
POTASSIUM SERPL-SCNC: 3.8 MMOL/L (ref 3.5–5)
PROT SERPL-MCNC: 6.1 G/DL (ref 6.4–8.3)
RBC # BLD AUTO: 3.79 M/UL (ref 3.8–5.8)
RBC # BLD: ABNORMAL 10*6/UL
SODIUM SERPL-SCNC: 152 MMOL/L (ref 132–146)
SODIUM SERPL-SCNC: 152 MMOL/L (ref 132–146)
T4 FREE SERPL-MCNC: 0.9 NG/DL (ref 0.9–1.7)
TME LAST DOSE: NORMAL H
VANCOMYCIN DOSE: NORMAL MG
VANCOMYCIN TROUGH SERPL-MCNC: 16 UG/ML (ref 5–16)
WBC OTHER # BLD: 11.9 K/UL (ref 4.5–11.5)

## 2025-02-20 PROCEDURE — 6360000002 HC RX W HCPCS

## 2025-02-20 PROCEDURE — 2580000003 HC RX 258

## 2025-02-20 PROCEDURE — 6370000000 HC RX 637 (ALT 250 FOR IP): Performed by: INTERNAL MEDICINE

## 2025-02-20 PROCEDURE — 93922 UPR/L XTREMITY ART 2 LEVELS: CPT | Performed by: SURGERY

## 2025-02-20 PROCEDURE — 82330 ASSAY OF CALCIUM: CPT

## 2025-02-20 PROCEDURE — 6360000002 HC RX W HCPCS: Performed by: INTERNAL MEDICINE

## 2025-02-20 PROCEDURE — 99232 SBSQ HOSP IP/OBS MODERATE 35: CPT | Performed by: CLINICAL NURSE SPECIALIST

## 2025-02-20 PROCEDURE — 87040 BLOOD CULTURE FOR BACTERIA: CPT

## 2025-02-20 PROCEDURE — 86403 PARTICLE AGGLUT ANTBDY SCRN: CPT

## 2025-02-20 PROCEDURE — 85025 COMPLETE CBC W/AUTO DIFF WBC: CPT

## 2025-02-20 PROCEDURE — 80053 COMPREHEN METABOLIC PANEL: CPT

## 2025-02-20 PROCEDURE — 36592 COLLECT BLOOD FROM PICC: CPT

## 2025-02-20 PROCEDURE — 2060000000 HC ICU INTERMEDIATE R&B

## 2025-02-20 PROCEDURE — 99232 SBSQ HOSP IP/OBS MODERATE 35: CPT | Performed by: INTERNAL MEDICINE

## 2025-02-20 PROCEDURE — 99291 CRITICAL CARE FIRST HOUR: CPT | Performed by: THORACIC SURGERY (CARDIOTHORACIC VASCULAR SURGERY)

## 2025-02-20 PROCEDURE — 80202 ASSAY OF VANCOMYCIN: CPT

## 2025-02-20 PROCEDURE — 87150 DNA/RNA AMPLIFIED PROBE: CPT

## 2025-02-20 PROCEDURE — 2500000003 HC RX 250 WO HCPCS: Performed by: INTERNAL MEDICINE

## 2025-02-20 PROCEDURE — 83880 ASSAY OF NATRIURETIC PEPTIDE: CPT

## 2025-02-20 PROCEDURE — 93970 EXTREMITY STUDY: CPT

## 2025-02-20 PROCEDURE — 93922 UPR/L XTREMITY ART 2 LEVELS: CPT

## 2025-02-20 PROCEDURE — 80048 BASIC METABOLIC PNL TOTAL CA: CPT

## 2025-02-20 PROCEDURE — 36415 COLL VENOUS BLD VENIPUNCTURE: CPT

## 2025-02-20 PROCEDURE — 2580000003 HC RX 258: Performed by: INTERNAL MEDICINE

## 2025-02-20 PROCEDURE — 84439 ASSAY OF FREE THYROXINE: CPT

## 2025-02-20 PROCEDURE — 93880 EXTRACRANIAL BILAT STUDY: CPT

## 2025-02-20 RX ORDER — DIVALPROEX SODIUM 125 MG/1
125 CAPSULE, COATED PELLETS ORAL EVERY 8 HOURS SCHEDULED
Status: DISCONTINUED | OUTPATIENT
Start: 2025-02-20 | End: 2025-02-27

## 2025-02-20 RX ORDER — SODIUM CHLORIDE 9 MG/ML
INJECTION, SOLUTION INTRAVENOUS PRN
Status: DISCONTINUED | OUTPATIENT
Start: 2025-02-20 | End: 2025-03-07 | Stop reason: HOSPADM

## 2025-02-20 RX ORDER — LIDOCAINE HYDROCHLORIDE 10 MG/ML
50 INJECTION, SOLUTION EPIDURAL; INFILTRATION; INTRACAUDAL; PERINEURAL ONCE
Status: DISCONTINUED | OUTPATIENT
Start: 2025-02-20 | End: 2025-03-07 | Stop reason: HOSPADM

## 2025-02-20 RX ORDER — SODIUM CHLORIDE 0.9 % (FLUSH) 0.9 %
5-40 SYRINGE (ML) INJECTION EVERY 12 HOURS SCHEDULED
Status: DISCONTINUED | OUTPATIENT
Start: 2025-02-20 | End: 2025-03-07 | Stop reason: HOSPADM

## 2025-02-20 RX ORDER — MECOBALAMIN 5000 MCG
5 TABLET,DISINTEGRATING ORAL NIGHTLY
Status: DISCONTINUED | OUTPATIENT
Start: 2025-02-20 | End: 2025-02-25

## 2025-02-20 RX ORDER — SODIUM CHLORIDE 0.9 % (FLUSH) 0.9 %
5-40 SYRINGE (ML) INJECTION PRN
Status: DISCONTINUED | OUTPATIENT
Start: 2025-02-20 | End: 2025-03-07 | Stop reason: HOSPADM

## 2025-02-20 RX ORDER — LORAZEPAM 2 MG/ML
1 INJECTION INTRAMUSCULAR ONCE
Status: COMPLETED | OUTPATIENT
Start: 2025-02-20 | End: 2025-02-20

## 2025-02-20 RX ORDER — DEXTROSE MONOHYDRATE 50 MG/ML
INJECTION, SOLUTION INTRAVENOUS CONTINUOUS
Status: DISCONTINUED | OUTPATIENT
Start: 2025-02-20 | End: 2025-02-22

## 2025-02-20 RX ADMIN — SODIUM CHLORIDE, PRESERVATIVE FREE 10 ML: 5 INJECTION INTRAVENOUS at 22:07

## 2025-02-20 RX ADMIN — VANCOMYCIN HYDROCHLORIDE 1000 MG: 1 INJECTION, POWDER, LYOPHILIZED, FOR SOLUTION INTRAVENOUS at 15:33

## 2025-02-20 RX ADMIN — LORAZEPAM 0.5 MG: 2 INJECTION INTRAMUSCULAR; INTRAVENOUS at 00:51

## 2025-02-20 RX ADMIN — SODIUM CHLORIDE, PRESERVATIVE FREE 10 ML: 5 INJECTION INTRAVENOUS at 08:18

## 2025-02-20 RX ADMIN — CLOPIDOGREL 75 MG: 75 TABLET, FILM COATED ORAL at 08:19

## 2025-02-20 RX ADMIN — MUPIROCIN: 20 OINTMENT TOPICAL at 22:11

## 2025-02-20 RX ADMIN — LOSARTAN POTASSIUM 25 MG: 50 TABLET, FILM COATED ORAL at 08:19

## 2025-02-20 RX ADMIN — LORAZEPAM 1 MG: 2 INJECTION INTRAMUSCULAR; INTRAVENOUS at 14:55

## 2025-02-20 RX ADMIN — ACETAMINOPHEN 650 MG: 325 TABLET ORAL at 10:17

## 2025-02-20 RX ADMIN — DIVALPROEX SODIUM 125 MG: 125 CAPSULE, COATED PELLETS ORAL at 22:07

## 2025-02-20 RX ADMIN — METOPROLOL SUCCINATE 25 MG: 25 TABLET, EXTENDED RELEASE ORAL at 22:06

## 2025-02-20 RX ADMIN — DIVALPROEX SODIUM 125 MG: 125 CAPSULE, COATED PELLETS ORAL at 17:11

## 2025-02-20 RX ADMIN — DEXAMETHASONE SODIUM PHOSPHATE 4 MG: 4 INJECTION INTRA-ARTICULAR; INTRALESIONAL; INTRAMUSCULAR; INTRAVENOUS; SOFT TISSUE at 00:51

## 2025-02-20 RX ADMIN — MUPIROCIN: 20 OINTMENT TOPICAL at 08:18

## 2025-02-20 RX ADMIN — ATORVASTATIN CALCIUM 40 MG: 40 TABLET, FILM COATED ORAL at 22:07

## 2025-02-20 RX ADMIN — SODIUM CHLORIDE, PRESERVATIVE FREE 10 ML: 5 INJECTION INTRAVENOUS at 08:21

## 2025-02-20 RX ADMIN — VANCOMYCIN HYDROCHLORIDE 1250 MG: 1.25 INJECTION, POWDER, LYOPHILIZED, FOR SOLUTION INTRAVENOUS at 08:16

## 2025-02-20 RX ADMIN — METOPROLOL SUCCINATE 25 MG: 25 TABLET, EXTENDED RELEASE ORAL at 08:19

## 2025-02-20 RX ADMIN — POTASSIUM BICARBONATE 40 MEQ: 782 TABLET, EFFERVESCENT ORAL at 15:28

## 2025-02-20 RX ADMIN — LORAZEPAM 0.5 MG: 2 INJECTION INTRAMUSCULAR; INTRAVENOUS at 10:17

## 2025-02-20 RX ADMIN — Medication 5 MG: at 22:06

## 2025-02-20 RX ADMIN — DEXAMETHASONE SODIUM PHOSPHATE 4 MG: 4 INJECTION INTRA-ARTICULAR; INTRALESIONAL; INTRAMUSCULAR; INTRAVENOUS; SOFT TISSUE at 08:17

## 2025-02-20 RX ADMIN — DEXTROSE MONOHYDRATE: 50 INJECTION, SOLUTION INTRAVENOUS at 10:13

## 2025-02-20 ASSESSMENT — PAIN SCALES - GENERAL
PAINLEVEL_OUTOF10: 5
PAINLEVEL_OUTOF10: 0

## 2025-02-20 ASSESSMENT — PAIN DESCRIPTION - LOCATION: LOCATION: BACK

## 2025-02-20 NOTE — CONSULTS
Department of Internal Medicine  Nephrology Attending Consult Note      Reason for Consult: Hypernatremia  Requesting Physician:  John Mckeon MD     CHIEF COMPLAINT: Shortness of breath    History Obtained From:  electronic medical record    HISTORY OF PRESENT ILLNESS: Briefly Mr. Rhodes is a 45-year-old man with history of hepatitis C, IV drug abuse, multiple episodes of bacteremia, who was initially admitted to [facility] with shortness of breath, he was found to have ST elevation MI, and therefore he underwent urgent cardiac catheterization with his stenting of the LAD, he is well was found to have MRSA bacteremia, JAVED showed mitral valve and tricuspid valve reason for his transfer to this hospital on 2/18/2025.  Of note MRI of the brain showed multiple small foci of acute or early subacute infarctions in the bilateral frontoparietal and occipital lobes.  On admission his sodium level was 130 but since then his sodium has progressively increase up to 152, reason for this consultation.    Past Medical History:        Diagnosis Date    Hx of hepatitis C-resolved 3/5/2022    IV drug abuse (HCC)     Nonhealing nonsurgical wound with fat layer exposed      Past Surgical History:        Procedure Laterality Date    APPLY DRESSING Left 10/9/2020    DEBRIDEMENT OF SKIN, SOFT TISSUE, MUSCLE, EXPLORATION OF UPPER ARM INTEGRA GRAFT FOR COVERAGE performed by Camilo Denny MD at [facility] [place]    APPLY DRESSING Left 10/14/2020    LEFT UPPER EXTREMITY WOUND VAC PLACEMENT performed by Eddie Ruff MD at [facility] [place]    CARDIAC PROCEDURE N/A 2/17/2025    Left heart cath / coronary angiography performed by Bg Gaytan MD at [facility] CARDIAC CATH/IR    CARDIAC PROCEDURE Left 2/17/2025    Percutaneous coronary intervention performed by Bg Gaytan MD at [facility] CARDIAC CATH/IR    INCISION AND DRAINAGE Right 4/23/2020    RIGHT UPPER EXTREMITY INCISION AND DRAINAGE REMOVAL FOREIGN BODY WITH C-ARM performed by  IntraVENous, Q6H  losartan (COZAAR) tablet 25 mg, 25 mg, Oral, Daily  magnesium sulfate 2000 mg in 50 mL IVPB premix, 2,000 mg, IntraVENous, PRN  ondansetron (ZOFRAN-ODT) disintegrating tablet 4 mg, 4 mg, Oral, Q8H PRN **OR** ondansetron (ZOFRAN) injection 4 mg, 4 mg, IntraVENous, Q6H PRN  potassium chloride (KLOR-CON M) extended release tablet 40 mEq, 40 mEq, Oral, PRN **OR** potassium bicarb-citric acid (EFFER-K) effervescent tablet 40 mEq, 40 mEq, Oral, PRN **OR** potassium chloride 10 mEq/100 mL IVPB (Peripheral Line), 10 mEq, IntraVENous, PRN  Carmex Classic Lip Balm OINT 1 Application, 1 Application, Apply externally, PRN  Allergies:  Patient has no known allergies.    Social History:    TOBACCO:   reports that he has been smoking cigarettes. His smokeless tobacco use includes chew.  ETOH:   reports that he does not currently use alcohol.    Family History:       Problem Relation Age of Onset    Heart Disease Father      REVIEW OF SYSTEMS:    Review of systems not obtained due to patient factors - mental status  PHYSICAL EXAM:      Vitals:    VITALS:  BP (!) 148/100   Pulse 98   Temp 98.6 °F (37 °C) (Temporal)   Resp 21   Ht 1.93 m (6' 4\")   Wt 78.9 kg (174 lb)   SpO2 99%   BMI 21.18 kg/m²   24HR PULSE OXIMETRY RANGE:  SpO2  Av.3 %  Min: 94 %  Max: 100 %  24HR INTAKE/OUTPUT:    Intake/Output Summary (Last 24 hours) at 2025 1855  Last data filed at 2025 1626  Gross per 24 hour   Intake 2616.77 ml   Output 500 ml   Net 2116.77 ml       Constitutional: Patient is awake nonverbal no distress  HEENT: Pupils equal reactive, mucous membranes are dry  Respiratory: Lungs are clear  Cardiovascular/Edema: Heart sounds are regular  Gastrointestinal: Abdomen is soft  Neurologic: Patient is lethargic, nonverbal  Skin: No skin rashes  Other: No edema    DATA:    CBC:   Lab Results   Component Value Date/Time    WBC 11.9 2025 05:22 AM    RBC 3.79 2025 05:22 AM    HGB 9.1 2025 05:22 AM

## 2025-02-20 NOTE — PROGRESS NOTES
Attempted a right arm picc unable to advance guidewire,examined left arm veins are branching and unable to access . If requires a picc I recommend IR.

## 2025-02-20 NOTE — PROGRESS NOTES
Wagner Ryan is a 45 y.o. male     Patient presented the ED on 2/17/2025 at [facility] with fatigue and alteration in mentation.  He was found to have NSTEMI and taken to the Cath Lab where he had stent placed.    Because of alteration in mentation MRI of the brain did demonstrate multiple areas of ischemia some in the parieto-occipital area demonstrating some vasogenic edema.  Transesophageal echo was completed which was highly suspicious for vegetation therefore he was transferred to [facility] for cardiothoracic evaluation    No new neuro events reported    CTS notes reviewed    Allergies as of 02/18/2025    (No Known Allergies)       Objective:     /79   Pulse 85   Temp 97.5 °F (36.4 °C) (Temporal)   Resp 27   Ht 1.93 m (6' 4\")   Wt 78.9 kg (174 lb)   SpO2 99%   BMI 21.18 kg/m²      General appearance: Awake looking around the room  Head: Normocephalic, without obvious abnormality, atraumatic  Extremities: no cyanosis or edema  Pulses: 2+ and symmetric  Skin: no rashes or lesions    Mental Status: Awake looking around the room oriented to self and year --some echolalia with subsequent questioning    Speech: Dysarthric  Language: appropriate but laconic with some echolalia    Cranial Nerves:  I: smell    II: visual acuity     II: visual fields Full   II: pupils KENZIE   III,VII: ptosis None   III,IV,VI: extraocular muscles  EOMI without nystagmus    V: mastication Normal   V: facial light touch sensation  Normal   V,VII: corneal reflex  Present   VII: facial muscle function - upper     VII: facial muscle function - lower Normal   VIII: hearing Normal   IX: soft palate elevation  Normal   IX,X: gag reflex    XI: trapezius strength  5/5   XI: sternocleidomastoid strength 5/5   XI: neck extension strength  5/5   XII: tongue strength  Normal     Motor:  Moving all limbs-left leg appears to be with more random movement than right leg    Sensory:  Appreciates LT in all limbs    DTR:   No  reflexes    No Babinski  No Maurice's     Laboratory/Radiology:     CBC with Differential:    Lab Results   Component Value Date/Time    WBC 11.9 02/20/2025 05:22 AM    RBC 3.79 02/20/2025 05:22 AM    HGB 9.1 02/20/2025 05:22 AM    HCT 28.8 02/20/2025 05:22 AM     02/20/2025 05:22 AM    MCV 76.0 02/20/2025 05:22 AM    MCH 24.0 02/20/2025 05:22 AM    MCHC 31.6 02/20/2025 05:22 AM    RDW 16.4 02/20/2025 05:22 AM    NRBC PENDING 02/20/2025 05:22 AM    METASPCT PENDING 02/20/2025 05:22 AM    LYMPHOPCT PENDING 02/20/2025 05:22 AM    PROMYELOPCT PENDING 02/20/2025 05:22 AM    MONOPCT PENDING 02/20/2025 05:22 AM    MYELOPCT PENDING 02/20/2025 05:22 AM    MYELOPCT 0.9 05/05/2020 10:40 AM    EOSPCT PENDING 02/20/2025 05:22 AM    BASOPCT PENDING 02/20/2025 05:22 AM    MONOSABS PENDING 02/20/2025 05:22 AM    LYMPHSABS PENDING 02/20/2025 05:22 AM    EOSABS PENDING 02/20/2025 05:22 AM    BASOSABS PENDING 02/20/2025 05:22 AM     CMP:    Lab Results   Component Value Date/Time     02/20/2025 05:22 AM    K 3.8 02/20/2025 05:22 AM    K 4.2 10/04/2021 06:30 AM     02/20/2025 05:22 AM    CO2 22 02/20/2025 05:22 AM    BUN 39 02/20/2025 05:22 AM    CREATININE 0.9 02/20/2025 05:22 AM    GFRAA >60 03/11/2022 04:26 AM    LABGLOM >90 02/20/2025 05:22 AM    GLUCOSE 110 02/20/2025 05:22 AM    CALCIUM 8.4 02/20/2025 05:22 AM    BILITOT 0.4 02/20/2025 05:22 AM    ALKPHOS 105 02/20/2025 05:22 AM    AST 22 02/20/2025 05:22 AM    ALT 19 02/20/2025 05:22 AM     HgBA1c:    Lab Results   Component Value Date/Time    LABA1C 5.4 10/03/2021 02:57 AM         MRI Brain:  1. Multiple small foci of acute or early subacute infarctions in the  bilateral frontal, parietal and occipital lobes, left more than right.  Many  of the infarctions are in a watershed territory distribution.  Findings may  be related to hypoperfusion or micro emboli.  2. Subtle foci of enhancement in the right frontal, parietal and occipital  lobes. These are likely

## 2025-02-20 NOTE — PLAN OF CARE
Problem: Respiratory - Adult  Goal: Achieves optimal ventilation and oxygenation  Note: Monitor resp status sao2 and breathe sounds encourage pt to cough and deep breathe      Problem: Cardiovascular - Adult  Goal: Maintains optimal cardiac output and hemodynamic stability     Problem: Infection - Adult  Goal: Absence of infection at discharge  Note: Monitor temp and wbc count adm antibiotics as  ordered

## 2025-02-20 NOTE — PROGRESS NOTES
CC: SOB    Brief HPI:  Patient seen, discussed with Dr. Grant.  Awake, alert but sluggish to answer questions.       Past Medical History:   Diagnosis Date    Hx of hepatitis C-resolved 3/5/2022    IV drug abuse (HCC)     Nonhealing nonsurgical wound with fat layer exposed      Past Surgical History:   Procedure Laterality Date    APPLY DRESSING Left 10/9/2020    DEBRIDEMENT OF SKIN, SOFT TISSUE, MUSCLE, EXPLORATION OF UPPER ARM INTEGRA GRAFT FOR COVERAGE performed by Camilo Denny MD at [facility] OR    APPLY DRESSING Left 10/14/2020    LEFT UPPER EXTREMITY WOUND VAC PLACEMENT performed by Eddie Ruff MD at [facility] OR    CARDIAC PROCEDURE N/A 2/17/2025    Left heart cath / coronary angiography performed by Bg Gaytan MD at [facility] CARDIAC CATH/IR    CARDIAC PROCEDURE Left 2/17/2025    Percutaneous coronary intervention performed by Bg Gaytan MD at [facility] CARDIAC CATH/IR    INCISION AND DRAINAGE Right 4/23/2020    RIGHT UPPER EXTREMITY INCISION AND DRAINAGE REMOVAL FOREIGN BODY WITH C-ARM performed by Kapil Yuan MD at [facility] OR    INCISION AND DRAINAGE Left 10/7/2020    LEFT ELBOW INCISION AND DRAINAGE performed by Kapil Yuan MD at [facility] OR    LEG SURGERY Right 3/6/2022    IRRIGATION AND DEBRIDEMENT RIGHT THIGH performed by Wes Branch MD at [facility] OR    SD EXPLORATION OF ARTERY/VEIN Left 10/7/2020    LEFT ARM BRACHIAL ARTERY EXPLORATION, REPAIR OF MYCOTIC ANUERYSM WITH BYPASS performed by Eddie Ruff MD at [facility] OR    TRANSESOPHAGEAL ECHOCARDIOGRAM N/A 4/24/2020    TRANSESOPHAGEAL ECHOCARDIOGRAM performed by Natasha Bowie MD at [facility] ENDOSCOPY    TRANSESOPHAGEAL ECHOCARDIOGRAM  10/14/2020     Social History     Socioeconomic History    Marital status: Single     Spouse name: Not on file    Number of children: Not on file    Years of education: Not on file    Highest education level: Not on file   Occupational History    Not on file   Tobacco Use    Smoking status: Every Day      Sensitive      vancomycin <=0.5  Sensitive                   [1]  Gentamicin is used only in combination with other active agents that test susceptible.                    Culture, Blood 2 [[id number]]  (Abnormal) Collected: 02/17/25 1209    Order Status: Completed Specimen: Blood Updated: 02/19/25 1530     Specimen Description .BLOOD     Special Requests          Direct Exam DIRECT GRAM STAIN FROM BOTTLE: GRAM POSITIVE COCCI IN CLUSTERS Previous panic on this admission, call not needed per  SOP.     Culture METHICILLIN RESISTANT STAPHYLOCOCCUS AUREUS For susceptibility, refer to previous culture. [id number] 2/17/25 1209 Most isolates are usually resistant to multiple antibiotics including other B lactams, Aminoglycosides, Macrolides, Clindamycin and Tetracycline. CORRECTED ON 02/19 AT 1529: PREVIOUSLY REPORTED AS STAPHYLOCOCCUS AUREUS For susceptibility, refer to previous culture. [id number] BC 2/17/25 1209                                Note: Greater than or equal to 35 minutes was spent providing face-to-face patient care, including:  and coordinating care, reviewing the chart, labs, and diagnostics, as well as medical decision making. Greater than 50% of this time was spent instructing and counseling the patient face to face regarding findings and recommendations.        Agree with above   Theoretically we would need to hold Plavix for 5-7 days before surgery and he will need to be cleared from a neuro standpoint regarding his acute intraparenchymal hemorrhage and the ~ 30,000 U heparin needed for cardiopulmonary bypass    CCT 35 mins

## 2025-02-20 NOTE — PROGRESS NOTES
[facility] Hospitalist Progress Note    Admitting Date and Time: 2/18/2025  7:30 PM  Admit Dx: Endocarditis [I38]    Synopsis:    Mr. Wagner Ryan, a 45 y.o. year old male  who  has a past medical history of Hx of hepatitis C-resolved, IV drug abuse (HCC), and Nonhealing nonsurgical wound with fat layer exposed.      Wagner Ryan is a 45 y.o. male who presents to the CVICU for evaluation by CTS surgery. Patient was being followed at Health system for AMS and a STEMI and underwent stent placement of the LAD on 2/17.  He was admitted to the MICU following cath lab and started on broad spectrum antibiotics, ASA and Brilinta. During work up for his AMS, a CTA of the head showed tiny area of intraparenchymal hemorrhage in the right occipital lobe w/ MRI of the brain showed multiple small foci of acute and early subacute infarcts in bilateral frontal and parietal occipital lobe left more than right consistent with micro emboli. Neurology was consulted. They cleared the patient to continue on plavix given his recent stent placement.  He underwent an ECHO which was found to have vegetations and the decision was made to transfer to [facility] for evaluation by CTS Surgery.     2/19: Patient seen in CVICU.  Per ICU team and CTS, patient can be transferred as he has no ICU needs.  Transfer order placed.    2/20: Undergoing CTS workup.  Patient continues to have persistent confusion and delirium.  Seems like he was started on Decadron at previous hospital for concern of meningitis and it has not been stopped or weaned.  No mention for continuation of Decadron per ID here and treating for MRSA bacteremia as of now.  Hold Decadron.    Subjective:  Patient is being followed for Endocarditis [I38]     Patient seen and examined at bedside this morning.  Patient confused but can answer some questions appropriately.  Denying any complaints.    ROS: denies fever, chills, cp, sob, n/v, HA unless stated above.      vancomycin

## 2025-02-20 NOTE — PROGRESS NOTES
Report called to [id number]. Patient going to [id number].     OK for patient to go with TLC in the R fem. Patient unable to have picc placed by IV team, order for IR tunnel catheter placed.

## 2025-02-20 NOTE — PROGRESS NOTES
Lab able to obtain 1 set of blood cultures, they will have someone from the next shift come attempt for the 2nd set.

## 2025-02-20 NOTE — PROGRESS NOTES
Pharmacy Consultation Note  (Antibiotic Dosing and Monitoring)    Initial consult date: 2/18/25  Consulting physician/provider: Shelly  Drug: Vancomycin  Indication: Endocarditis/Endovascular     Age/  Gender Height Weight IBW  Allergy Information   45 y.o./male 193 cm (6' 4\") 80.2 kg (176 lb 12.8 oz)     Ideal body weight: 86.8 kg (191 lb 5.7 oz)   Patient has no known allergies.      Renal Function:  Recent Labs     02/18/25  0430 02/19/25  0423 02/20/25  0522   BUN 29* 38* 39*   CREATININE 1.1 1.0 0.9       Intake/Output Summary (Last 24 hours) at 2/20/2025 0934  Last data filed at 2/20/2025 0605  Gross per 24 hour   Intake 2818.2 ml   Output 500 ml   Net 2318.2 ml       Vancomycin Monitoring:  Trough:    Recent Labs     02/18/25  1700 02/20/25  0522   VANCOTROUGH 10.9 16.0     Random:  No results for input(s): \"VANCORANDOM\" in the last 72 hours.    Vancomycin Administration Times:  Recent vancomycin administrations                     vancomycin (VANCOCIN) 1,250 mg in sodium chloride 0.9 % 250 mL IVPB ([id number]) (mg) 1,250 mg New Bag 02/20/25 0816     1,250 mg New Bag 02/19/25 2023     1,250 mg New Bag  0923    vancomycin (VANCOCIN) 1250 mg in 250 mL IVPB (mg) 1,250 mg New Bag 02/18/25 1824     1,250 mg New Bag  0555    vancomycin (VANCOCIN) 1250 mg in 250 mL IVPB (mg) 1,250 mg New Bag 02/17/25 1738                  Assessment:  Patient is a 45 y.o. male who has been initiated on vancomycin  Estimated Creatinine Clearance: 117 mL/min (based on SCr of 0.9 mg/dL).  To dose vancomycin, pharmacy will be utilizing [employer] calculation software for goal AUC/DAYAN 400-600 mg/L-hr (predicted AUC/DAYAN = 599, Tr =18.4 mcg/mL)    Plan:  Increase to vancomycin 1000mg q8h  Will check vancomycin levels as needed  Will continue to monitor renal function   Pharmacy to follow      Lizzeth Randle, PharmD, BCPS, BCCCP 2/20/2025 9:34 AM    NAHOMI: [phone]  SEY: [phone]  W: [phone]

## 2025-02-20 NOTE — PROGRESS NOTES
[facility]  [facility]  Progress Note      No chief complaint on file.      SUBJECTIVE:    Patient is tolerating medications. No reported adverse drug reactions.  No nausea, vomiting, diarrhea.    Review of systems:  As stated above in the chief complaint, otherwise negative.    Medications:  Scheduled Meds:   vancomycin  1,000 mg IntraVENous Q8H    sodium chloride flush  5-40 mL IntraVENous 2 times per day    lidocaine 1 % injection  50 mg IntraDERmal Once    divalproex  125 mg Oral 3 times per day    melatonin  5 mg Oral Nightly    mupirocin   Topical BID    metoprolol succinate  25 mg Oral BID    nicotine  1 patch TransDERmal Daily    atorvastatin  40 mg Oral Nightly    [Held by provider] clopidogrel  75 mg Oral Daily    [Held by provider] dexAMETHasone  4 mg IntraVENous Q6H    losartan  25 mg Oral Daily     Continuous Infusions:   dextrose 150 mL/hr at 25 1457    sodium chloride       PRN Meds:sodium chloride flush, sodium chloride, LORazepam, hydrOXYzine pamoate, acetaminophen **OR** acetaminophen, magnesium sulfate, ondansetron **OR** ondansetron, potassium chloride **OR** potassium alternative oral replacement **OR** potassium chloride, Carmex Classic Lip Balm    OBJECTIVE:  BP (!) 136/90   Pulse 97   Temp 98.4 °F (36.9 °C) (Temporal)   Resp (!) 33   Ht 1.93 m (6' 4\")   Wt 78.9 kg (174 lb)   SpO2 100%   BMI 21.18 kg/m²   Temp  Av.6 °F (36.4 °C)  Min: 97.4 °F (36.3 °C)  Max: 98.4 °F (36.9 °C)  Constitutional: The patient is awake, able to intermittently nod head to answer questions but not oriented    Skin: Warm and dry. No rashes were noted. No jaundice.  HEENT: Eyes show round, and reactive pupils. Moist mucous membranes, no ulcerations, no thrush.   Neck: Supple to movements. No lymphadenopathy.   Chest: No use of accessory muscles to breathe. Symmetrical expansion. Auscultation reveals no wheezing, crackles, or rhonchi.   Cardiovascular: S1 and S2 are  neurological clearance.  ID will continue to follow    Db Frazier MD  3:42 PM  2/20/2025

## 2025-02-21 LAB
ALBUMIN SERPL-MCNC: 2.5 G/DL (ref 3.5–5.2)
ALP SERPL-CCNC: 72 U/L (ref 40–129)
ALT SERPL-CCNC: 14 U/L (ref 0–40)
ANCA AB PATTERN SER IF-IMP: NORMAL
ANCA IGG TITR SER IF: NORMAL {TITER}
ANION GAP SERPL CALCULATED.3IONS-SCNC: 11 MMOL/L (ref 7–16)
ANION GAP SERPL CALCULATED.3IONS-SCNC: 15 MMOL/L (ref 7–16)
ANION GAP SERPL CALCULATED.3IONS-SCNC: 7 MMOL/L (ref 7–16)
ANION GAP SERPL CALCULATED.3IONS-SCNC: 8 MMOL/L (ref 7–16)
AST SERPL-CCNC: 16 U/L (ref 0–39)
BASOPHILS # BLD: 0 K/UL (ref 0–0.2)
BASOPHILS NFR BLD: 0 % (ref 0–2)
BILIRUB SERPL-MCNC: 0.4 MG/DL (ref 0–1.2)
BUN SERPL-MCNC: 38 MG/DL (ref 6–20)
BUN SERPL-MCNC: 39 MG/DL (ref 6–20)
BUN SERPL-MCNC: 39 MG/DL (ref 6–20)
BUN SERPL-MCNC: 41 MG/DL (ref 6–20)
CA-I BLD-SCNC: 1.19 MMOL/L (ref 1.15–1.33)
CALCIUM SERPL-MCNC: 7.9 MG/DL (ref 8.6–10.2)
CALCIUM SERPL-MCNC: 7.9 MG/DL (ref 8.6–10.2)
CALCIUM SERPL-MCNC: 8 MG/DL (ref 8.6–10.2)
CALCIUM SERPL-MCNC: 8 MG/DL (ref 8.6–10.2)
CHLORIDE SERPL-SCNC: 115 MMOL/L (ref 98–107)
CHLORIDE SERPL-SCNC: 116 MMOL/L (ref 98–107)
CHLORIDE SERPL-SCNC: 116 MMOL/L (ref 98–107)
CHLORIDE SERPL-SCNC: 119 MMOL/L (ref 98–107)
CO2 SERPL-SCNC: 21 MMOL/L (ref 22–29)
CO2 SERPL-SCNC: 24 MMOL/L (ref 22–29)
CO2 SERPL-SCNC: 25 MMOL/L (ref 22–29)
CO2 SERPL-SCNC: 25 MMOL/L (ref 22–29)
CREAT SERPL-MCNC: 1 MG/DL (ref 0.7–1.2)
CREAT SERPL-MCNC: 1 MG/DL (ref 0.7–1.2)
CREAT SERPL-MCNC: 1.1 MG/DL (ref 0.7–1.2)
CREAT SERPL-MCNC: 1.2 MG/DL (ref 0.7–1.2)
CREAT UR-MCNC: 66.2 MG/DL (ref 40–278)
DEPRECATED S PNEUM 1 IGG SER-MCNC: 0 AU/ML (ref 0–19)
EOSINOPHIL # BLD: 0 K/UL (ref 0.05–0.5)
EOSINOPHILS RELATIVE PERCENT: 0 % (ref 0–6)
ERYTHROCYTE [DISTWIDTH] IN BLOOD BY AUTOMATED COUNT: 16.3 % (ref 11.5–15)
GFR, ESTIMATED: 79 ML/MIN/1.73M2
GFR, ESTIMATED: 88 ML/MIN/1.73M2
GFR, ESTIMATED: >90 ML/MIN/1.73M2
GFR, ESTIMATED: >90 ML/MIN/1.73M2
GLUCOSE SERPL-MCNC: 109 MG/DL (ref 74–99)
GLUCOSE SERPL-MCNC: 117 MG/DL (ref 74–99)
GLUCOSE SERPL-MCNC: 118 MG/DL (ref 74–99)
GLUCOSE SERPL-MCNC: 119 MG/DL (ref 74–99)
HBA1C MFR BLD: 5.6 % (ref 4–5.6)
HCT VFR BLD AUTO: 27.9 % (ref 37–54)
HGB BLD-MCNC: 8.9 G/DL (ref 12.5–16.5)
INR PPP: 1.2
LYMPHOCYTES NFR BLD: 0.32 K/UL (ref 1.5–4)
LYMPHOCYTES RELATIVE PERCENT: 3 % (ref 20–42)
MCH RBC QN AUTO: 23.9 PG (ref 26–35)
MCHC RBC AUTO-ENTMCNC: 31.9 G/DL (ref 32–34.5)
MCV RBC AUTO: 74.8 FL (ref 80–99.9)
MONOCYTES NFR BLD: 0.42 K/UL (ref 0.1–0.95)
MONOCYTES NFR BLD: 4 % (ref 2–12)
NEUTROPHILS NFR BLD: 94 % (ref 43–80)
NEUTS SEG NFR BLD: 11.46 K/UL (ref 1.8–7.3)
OSMOLALITY UR: 443 MOSM/KG (ref 300–900)
PLATELET # BLD AUTO: 183 K/UL (ref 130–450)
PMV BLD AUTO: 10.1 FL (ref 7–12)
POTASSIUM SERPL-SCNC: 3.3 MMOL/L (ref 3.5–5)
POTASSIUM SERPL-SCNC: 3.4 MMOL/L (ref 3.5–5)
POTASSIUM SERPL-SCNC: 3.5 MMOL/L (ref 3.5–5)
POTASSIUM SERPL-SCNC: 3.7 MMOL/L (ref 3.5–5)
POTASSIUM, UR: 16.7 MMOL/L
PROT SERPL-MCNC: 6 G/DL (ref 6.4–8.3)
PROTHROMBIN TIME: 13 SEC (ref 9.3–12.4)
RBC # BLD AUTO: 3.73 M/UL (ref 3.8–5.8)
RBC # BLD: ABNORMAL 10*6/UL
SERINE PROTEASE 3, IGG: 1 AU/ML (ref 0–19)
SODIUM SERPL-SCNC: 148 MMOL/L (ref 132–146)
SODIUM SERPL-SCNC: 148 MMOL/L (ref 132–146)
SODIUM SERPL-SCNC: 152 MMOL/L (ref 132–146)
SODIUM SERPL-SCNC: 154 MMOL/L (ref 132–146)
SODIUM UR-SCNC: 32 MMOL/L
WBC # BLD: ABNORMAL 10*3/UL
WBC OTHER # BLD: 12.2 K/UL (ref 4.5–11.5)

## 2025-02-21 PROCEDURE — 85025 COMPLETE CBC W/AUTO DIFF WBC: CPT

## 2025-02-21 PROCEDURE — 6370000000 HC RX 637 (ALT 250 FOR IP): Performed by: INTERNAL MEDICINE

## 2025-02-21 PROCEDURE — 94729 DIFFUSING CAPACITY: CPT

## 2025-02-21 PROCEDURE — 84300 ASSAY OF URINE SODIUM: CPT

## 2025-02-21 PROCEDURE — 2580000003 HC RX 258

## 2025-02-21 PROCEDURE — 6360000002 HC RX W HCPCS

## 2025-02-21 PROCEDURE — 2500000003 HC RX 250 WO HCPCS: Performed by: INTERNAL MEDICINE

## 2025-02-21 PROCEDURE — 99233 SBSQ HOSP IP/OBS HIGH 50: CPT | Performed by: CLINICAL NURSE SPECIALIST

## 2025-02-21 PROCEDURE — 99233 SBSQ HOSP IP/OBS HIGH 50: CPT | Performed by: INTERNAL MEDICINE

## 2025-02-21 PROCEDURE — 82570 ASSAY OF URINE CREATININE: CPT

## 2025-02-21 PROCEDURE — 80053 COMPREHEN METABOLIC PANEL: CPT

## 2025-02-21 PROCEDURE — 82330 ASSAY OF CALCIUM: CPT

## 2025-02-21 PROCEDURE — 2580000003 HC RX 258: Performed by: INTERNAL MEDICINE

## 2025-02-21 PROCEDURE — 2060000000 HC ICU INTERMEDIATE R&B

## 2025-02-21 PROCEDURE — 85610 PROTHROMBIN TIME: CPT

## 2025-02-21 PROCEDURE — 83036 HEMOGLOBIN GLYCOSYLATED A1C: CPT

## 2025-02-21 PROCEDURE — 80048 BASIC METABOLIC PNL TOTAL CA: CPT

## 2025-02-21 PROCEDURE — 83935 ASSAY OF URINE OSMOLALITY: CPT

## 2025-02-21 PROCEDURE — 94375 RESPIRATORY FLOW VOLUME LOOP: CPT

## 2025-02-21 PROCEDURE — 84133 ASSAY OF URINE POTASSIUM: CPT

## 2025-02-21 RX ORDER — ASPIRIN 81 MG/1
81 TABLET, CHEWABLE ORAL DAILY
Status: DISCONTINUED | OUTPATIENT
Start: 2025-02-21 | End: 2025-03-07 | Stop reason: HOSPADM

## 2025-02-21 RX ADMIN — Medication 5 MG: at 20:20

## 2025-02-21 RX ADMIN — DIVALPROEX SODIUM 125 MG: 125 CAPSULE, COATED PELLETS ORAL at 22:10

## 2025-02-21 RX ADMIN — VANCOMYCIN HYDROCHLORIDE 1000 MG: 1 INJECTION, POWDER, LYOPHILIZED, FOR SOLUTION INTRAVENOUS at 18:19

## 2025-02-21 RX ADMIN — DIVALPROEX SODIUM 125 MG: 125 CAPSULE, COATED PELLETS ORAL at 16:37

## 2025-02-21 RX ADMIN — ASPIRIN 81 MG CHEWABLE TABLET 81 MG: 81 TABLET CHEWABLE at 12:44

## 2025-02-21 RX ADMIN — MUPIROCIN: 20 OINTMENT TOPICAL at 12:37

## 2025-02-21 RX ADMIN — MUPIROCIN: 20 OINTMENT TOPICAL at 20:23

## 2025-02-21 RX ADMIN — LORAZEPAM 0.5 MG: 2 INJECTION INTRAMUSCULAR; INTRAVENOUS at 20:20

## 2025-02-21 RX ADMIN — SODIUM CHLORIDE, PRESERVATIVE FREE 10 ML: 5 INJECTION INTRAVENOUS at 20:22

## 2025-02-21 RX ADMIN — DEXTROSE MONOHYDRATE: 50 INJECTION, SOLUTION INTRAVENOUS at 09:57

## 2025-02-21 RX ADMIN — POTASSIUM BICARBONATE 40 MEQ: 782 TABLET, EFFERVESCENT ORAL at 09:22

## 2025-02-21 RX ADMIN — SODIUM CHLORIDE, PRESERVATIVE FREE 10 ML: 5 INJECTION INTRAVENOUS at 09:41

## 2025-02-21 RX ADMIN — ATORVASTATIN CALCIUM 40 MG: 40 TABLET, FILM COATED ORAL at 20:21

## 2025-02-21 RX ADMIN — LOSARTAN POTASSIUM 25 MG: 50 TABLET, FILM COATED ORAL at 09:22

## 2025-02-21 RX ADMIN — VANCOMYCIN HYDROCHLORIDE 1000 MG: 1 INJECTION, POWDER, LYOPHILIZED, FOR SOLUTION INTRAVENOUS at 00:33

## 2025-02-21 RX ADMIN — METOPROLOL SUCCINATE 25 MG: 25 TABLET, EXTENDED RELEASE ORAL at 09:22

## 2025-02-21 RX ADMIN — METOPROLOL SUCCINATE 25 MG: 25 TABLET, EXTENDED RELEASE ORAL at 20:21

## 2025-02-21 RX ADMIN — VANCOMYCIN HYDROCHLORIDE 1000 MG: 1 INJECTION, POWDER, LYOPHILIZED, FOR SOLUTION INTRAVENOUS at 09:59

## 2025-02-21 RX ADMIN — LORAZEPAM 0.5 MG: 2 INJECTION INTRAMUSCULAR; INTRAVENOUS at 02:40

## 2025-02-21 RX ADMIN — DIVALPROEX SODIUM 125 MG: 125 CAPSULE, COATED PELLETS ORAL at 05:25

## 2025-02-21 NOTE — PROGRESS NOTES
informed.    Subjective:  Patient is being followed for Endocarditis [I38]     Patient seen and examined at bedside this morning.  Patient mentation significantly improved today with holding of steroids and initiation of Depakote.  No complaints.    ROS: denies fever, chills, cp, sob, n/v, HA unless stated above.      aspirin  81 mg Oral Daily    vancomycin  1,000 mg IntraVENous Q8H    sodium chloride flush  5-40 mL IntraVENous 2 times per day    lidocaine 1 % injection  50 mg IntraDERmal Once    divalproex  125 mg Oral 3 times per day    melatonin  5 mg Oral Nightly    mupirocin   Topical BID    metoprolol succinate  25 mg Oral BID    nicotine  1 patch TransDERmal Daily    atorvastatin  40 mg Oral Nightly    clopidogrel  75 mg Oral Daily    [Held by provider] dexAMETHasone  4 mg IntraVENous Q6H    losartan  25 mg Oral Daily     sodium chloride flush, 5-40 mL, PRN  sodium chloride, , PRN  LORazepam, 0.5 mg, Q6H PRN  hydrOXYzine pamoate, 25 mg, TID PRN  acetaminophen, 650 mg, Q6H PRN   Or  acetaminophen, 650 mg, Q6H PRN  magnesium sulfate, 2,000 mg, PRN  ondansetron, 4 mg, Q8H PRN   Or  ondansetron, 4 mg, Q6H PRN  potassium chloride, 40 mEq, PRN   Or  potassium alternative oral replacement, 40 mEq, PRN   Or  potassium chloride, 10 mEq, PRN  Carmex Classic Lip Balm, 1 Application, PRN         Objective:    /79   Pulse 85   Temp 98.2 °F (36.8 °C) (Temporal)   Resp 18   Ht 1.93 m (6' 4\")   Wt 78.9 kg (174 lb)   SpO2 96%   BMI 21.18 kg/m²     General Appearance: alert and oriented to person, place and time and in no acute distress  Skin: warm and dry  Head: normocephalic and atraumatic  Eyes: pupils equal, round, and reactive to light, extraocular eye movements intact, conjunctivae normal  Neck: neck supple and non tender without mass   Pulmonary/Chest: clear to auscultation bilaterally- no wheezes, rales or rhonchi, normal air movement, no respiratory distress  Cardiovascular: normal rate, normal S1 and S2  cardiology in 2/21  ID following:  Continue vancomycin IV  Bactroban for nose this colonization twice daily for 5 days  Discussed case with cardiology in 2/21 regarding need for DAPT.  They stated patient will stay on aspirin and Plavix given recent stenting.  Continue other medications including Lipitor, Plavix, Cozaar, Lopressor, Bactroban  Continue Depakote and melatonin scheduled for confusion/delirium  Patient has had right femoral CVC for 3 days.  PICC line attempted by IV team on 2/20 but was unsuccessful and recommended IR tunneled PICC.  Discussed with nursing and advised obtaining some peripheral IV access in the event IR will not be able to place PICC today as right femoral CVC has to be removed to prevent further worsening of bacteremia.  Nephrology following for hypernatremia.  Continue IV fluids.      DC planning: Pending CTS workup and need for IV antibiotics    NOTE: This report was transcribed using voice recognition software. Every effort was made to ensure accuracy; however, inadvertent computerized transcription errors may be present.    Electronically signed by John Gallagher MD on 2/21/2025 at 12:24 PM

## 2025-02-21 NOTE — PROGRESS NOTES
Wagner Ryan is a 45 y.o. male     Patient presented the ED on 2/17/2025 at [facility] with fatigue and alteration in mentation.  He was found to have NSTEMI and taken to the Cath Lab where he had stent placed.    Because of alteration in mentation MRI of the brain did demonstrate multiple areas of ischemia some in the parieto-occipital area demonstrating some vasogenic edema.  Transesophageal echo was completed which was highly suspicious for vegetation therefore he was transferred to [facility] for cardiothoracic evaluation    No new neuro events reported    CTS notes reviewed    Spoke with CTS this a.m.-MRI did not demonstrate intracranial hemorrhage-CT prior to MRI did.  Okay for heparin for intervention but will also repeat CT on Monday prior to intervention.      Allergies as of 02/18/2025    (No Known Allergies)       Objective:     /86   Pulse 88   Temp 98.6 °F (37 °C) (Temporal)   Resp 22   Ht 1.93 m (6' 4\")   Wt 78.9 kg (174 lb)   SpO2 96%   BMI 21.18 kg/m²      General appearance: Awake looking around the room  Head: Normocephalic, without obvious abnormality, atraumatic  Extremities: no cyanosis or edema  Pulses: 2+ and symmetric  Skin: no rashes or lesions    Mental Status: Awake looking around the room oriented to self and year --some echolalia with subsequent questioning    Speech: Dysarthric  Language: appropriate but laconic with some echolalia    Cranial Nerves:  I: smell    II: visual acuity     II: visual fields Full   II: pupils KENZIE   III,VII: ptosis None   III,IV,VI: extraocular muscles  EOMI without nystagmus    V: mastication Normal   V: facial light touch sensation  Normal   V,VII: corneal reflex  Present   VII: facial muscle function - upper     VII: facial muscle function - lower Normal   VIII: hearing Normal   IX: soft palate elevation  Normal   IX,X: gag reflex    XI: trapezius strength  5/5   XI: sternocleidomastoid strength 5/5   XI: neck extension

## 2025-02-21 NOTE — CARE COORDINATION
2/21/25 Update CM Note.  Pt admitted 2/18/25 endocarditis.  CTS planning for AV and MV replacements.  Need to coordinate timing of surgery with cardiology as pt s/p heart cath with OBED placement 2/17/25.  Per ID will need to remain on IV antibiotics.  Hx IV drug user.  Referrals to [facility] and [facility]. Pt is self pay  Gabriela OCHOA RN-BC  [phone] Addendum: [facility] able to accept pt but would need 2wk paid upfront estimated at $20k.  [facility] also able to accept but requiring 30 days up front paid estimated $9k.  Unable to discuss with pt as he is currently off unit for Pfts.

## 2025-02-21 NOTE — PROGRESS NOTES
Pharmacy Consultation Note  (Antibiotic Dosing and Monitoring)    Initial consult date: 2/18/25  Consulting physician/provider: Shelly  Drug: Vancomycin  Indication: Endocarditis/Endovascular     Age/  Gender Height Weight IBW  Allergy Information   45 y.o./male 193 cm (6' 4\") 80.2 kg (176 lb 12.8 oz)     Ideal body weight: 86.8 kg (191 lb 5.7 oz)   Patient has no known allergies.      Renal Function:  Recent Labs     02/20/25  1345 02/21/25  0030 02/21/25  0530   BUN 40* 39* 38*   CREATININE 0.9 1.0 1.0       Intake/Output Summary (Last 24 hours) at 2/21/2025 1102  Last data filed at 2/21/2025 1049  Gross per 24 hour   Intake 3511.68 ml   Output 1150 ml   Net 2361.68 ml       Vancomycin Monitoring:  Trough:    Recent Labs     02/18/25  1700 02/20/25  0522   VANCOTROUGH 10.9 16.0     Random:  No results for input(s): \"VANCORANDOM\" in the last 72 hours.    Recent vancomycin administrations                     vancomycin (VANCOCIN) 1,000 mg in sodium chloride 0.9 % 250 mL IVPB ([id number]) (mg) 1,000 mg New Bag 02/21/25 0959     1,000 mg New Bag  0033     1,000 mg New Bag 02/20/25 1533    vancomycin (VANCOCIN) 1,250 mg in sodium chloride 0.9 % 250 mL IVPB ([id number]) (mg) 1,250 mg New Bag 02/20/25 0816     1,250 mg New Bag 02/19/25 2023     1,250 mg New Bag  0923    vancomycin (VANCOCIN) 1250 mg in 250 mL IVPB (mg) 1,250 mg New Bag 02/18/25 1824                      Assessment:  Patient is a 45 y.o. male who has been initiated on vancomycin  Estimated Creatinine Clearance: 105 mL/min (based on SCr of 1 mg/dL).  To dose vancomycin, pharmacy will be utilizing elastic.io calculation software for goal AUC/DAYAN 400-600 mg/L-hr (predicted AUC/DAYAN = 599, Tr =18.4 mcg/mL)    Plan:  Continue vancomycin 1000mg IV q8h  Will check vancomycin levels as needed - will double check a trough tomorrow AM   Will continue to monitor renal function  Pharmacy to follow      Miky Jaimes [facility], 2/21/2025 11:03 AM  [phone]    SEB: [phone]  SEY:  [phone]  [facility]: [phone]

## 2025-02-21 NOTE — PROGRESS NOTES
[facility]  [facility]  Progress Note      No chief complaint on file.      SUBJECTIVE:    Patient is tolerating medications. No reported adverse drug reactions.  No nausea, vomiting, diarrhea.    Review of systems:  As stated above in the chief complaint, otherwise negative.    Medications:  Scheduled Meds:   aspirin  81 mg Oral Daily    vancomycin  1,000 mg IntraVENous Q8H    sodium chloride flush  5-40 mL IntraVENous 2 times per day    lidocaine 1 % injection  50 mg IntraDERmal Once    divalproex  125 mg Oral 3 times per day    melatonin  5 mg Oral Nightly    mupirocin   Topical BID    metoprolol succinate  25 mg Oral BID    nicotine  1 patch TransDERmal Daily    atorvastatin  40 mg Oral Nightly    clopidogrel  75 mg Oral Daily    losartan  25 mg Oral Daily     Continuous Infusions:   dextrose 150 mL/hr at 25 0957    sodium chloride       PRN Meds:sodium chloride flush, sodium chloride, LORazepam, hydrOXYzine pamoate, acetaminophen **OR** acetaminophen, magnesium sulfate, ondansetron **OR** ondansetron, potassium chloride **OR** potassium alternative oral replacement **OR** potassium chloride, Carmex Classic Lip Balm    OBJECTIVE:  /79   Pulse 85   Temp 98.2 °F (36.8 °C) (Temporal)   Resp 18   Ht 1.93 m (6' 4\")   Wt 78.9 kg (174 lb)   SpO2 96%   BMI 21.18 kg/m²   Temp  Av.9 °F (36.6 °C)  Min: 97.1 °F (36.2 °C)  Max: 98.6 °F (37 °C)  Constitutional: The patient is awake, able to intermittently nod head to answer questions but not oriented    Skin: Warm and dry. No rashes were noted. No jaundice.  HEENT: Eyes show round, and reactive pupils. Moist mucous membranes, no ulcerations, no thrush.   Neck: Supple to movements. No lymphadenopathy.   Chest: No use of accessory muscles to breathe. Symmetrical expansion. Auscultation reveals no wheezing, crackles, or rhonchi.   Cardiovascular: S1 and S2 are rhythmic and regular. No murmurs appreciated.   Abdomen: Positive  AM     Lab Results   Component Value Date/Time    BLOODCULT2 5 Days no growth 03/10/2022 11:41 AM    BLOODCULT2 5 Days no growth 03/04/2022 10:06 PM    BLOODCULT2 5 Days no growth 10/03/2021 03:02 AM    ORG Anaerobic gram positive ga 03/06/2022 09:37 AM    ORG Serratia marcescens 03/06/2022 09:37 AM    ORG Beta Strep Group F 03/06/2022 09:37 AM     WOUND/ABSCESS   Date Value Ref Range Status   04/27/2020 Light growth  Final     No results found for: \"RESPSMEAR\"      Component Value Date/Time    MPNEUMO Not Detected 02/17/2025 1220    AFBCX No growth after 6 weeks of incubation. 03/06/2022 0937    AFBCX No growth after 6 weeks of incubation. 10/07/2020 2025    FUNGSM No Fungal elements seen 03/06/2022 0937    LABFUNG No growth after 4 weeks of incubation. 03/06/2022 0937    LABFUNG No growth after 4 weeks of incubation. 10/07/2020 2025    LABFUNG No growth after 4 weeks of incubation. 10/07/2020 1612     No results found for: \"CULTRESP\"  No results found for: \"CXCATHTIP\"  No results found for: \"BFCS\"  Culture Surgical   Date Value Ref Range Status   03/06/2022 Moderate growth  Final   03/06/2022 Moderate growth  Final   10/07/2020 Light growth  Final     No results found for: \"LABURIN\"  MRSA Culture Only   Date Value Ref Range Status   10/07/2020 Methicillin resistant Staph aureus not isolated  Final   04/22/2020 Methicillin resistant Staph aureus not isolated  Final       ASSESSMENT:  MRSA bacteremia  MRSA AV, MV endocarditis  Septic emboli to brain with no intraparenchymal hemorrhage in MRI   History of IVDU, hep C  History of Serratia 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  ID will continue to

## 2025-02-21 NOTE — PROGRESS NOTES
CC: SOB    Brief HPI:  Patient seen. Awake, alert. No complaints.      Past Medical History:   Diagnosis Date    Hx of hepatitis C-resolved 3/5/2022    IV drug abuse (HCC)     Nonhealing nonsurgical wound with fat layer exposed      Past Surgical History:   Procedure Laterality Date    APPLY DRESSING Left 10/9/2020    DEBRIDEMENT OF SKIN, SOFT TISSUE, MUSCLE, EXPLORATION OF UPPER ARM INTEGRA GRAFT FOR COVERAGE performed by Camilo Denny MD at [facility] OR    APPLY DRESSING Left 10/14/2020    LEFT UPPER EXTREMITY WOUND VAC PLACEMENT performed by Eddie Ruff MD at [facility] OR    CARDIAC PROCEDURE N/A 2/17/2025    Left heart cath / coronary angiography performed by Bg Gaytan MD at [facility] CARDIAC CATH/IR    CARDIAC PROCEDURE Left 2/17/2025    Percutaneous coronary intervention performed by Bg Gaytan MD at [facility] CARDIAC CATH/IR    INCISION AND DRAINAGE Right 4/23/2020    RIGHT UPPER EXTREMITY INCISION AND DRAINAGE REMOVAL FOREIGN BODY WITH C-ARM performed by Kapil Yuan MD at [facility] OR    INCISION AND DRAINAGE Left 10/7/2020    LEFT ELBOW INCISION AND DRAINAGE performed by Kapil Yuan MD at [facility] OR    LEG SURGERY Right 3/6/2022    IRRIGATION AND DEBRIDEMENT RIGHT THIGH performed by Wes Branch MD at [facility] OR    AZ EXPLORATION OF ARTERY/VEIN Left 10/7/2020    LEFT ARM BRACHIAL ARTERY EXPLORATION, REPAIR OF MYCOTIC ANUERYSM WITH BYPASS performed by Eddie Ruff MD at [facility] OR    TRANSESOPHAGEAL ECHOCARDIOGRAM N/A 4/24/2020    TRANSESOPHAGEAL ECHOCARDIOGRAM performed by Natasha Bowie MD at [facility] ENDOSCOPY    TRANSESOPHAGEAL ECHOCARDIOGRAM  10/14/2020     Social History     Socioeconomic History    Marital status: Single     Spouse name: Not on file    Number of children: Not on file    Years of education: Not on file    Highest education level: Not on file   Occupational History    Not on file   Tobacco Use    Smoking status: Every Day     Current packs/day: 1.00     Types: Cigarettes     providing face-to-face patient care, including:  and coordinating care, reviewing the chart, labs, and diagnostics, as well as medical decision making. Greater than 50% of this time was spent instructing and counseling the patient face to face regarding findings and recommendations.      Agree with above  Workup in progress  Repeat imaging planned for next week to assess IPH  Would need Plavix stopped for 5-7 days

## 2025-02-21 NOTE — PROGRESS NOTES
Spoke to patients RN regarding ordered procedure.  Notified that d/t STAT/Emergent cases in Angio department, patient cannot be done today.   Requested to keep pt NPO after midnight and to verify with ordering provider that it is ok to continue to hold all blood thinning medications.  Angio staff will call in am to proceed with procedure.

## 2025-02-21 NOTE — CONSULTS
Consult Note    Dental Resident    Patient: Wagner Ryan  MRN: [id number]  Date of Service: 2/21/2025    Reason for Consult/CHIEF COMPLAINT:  floor patient presents to clinic for dental clearance for cardiac surgery     History Obtained From:  patient, electronic medical record  PCP: No primary care provider on file.    Requesting Physician: Dr. Gallagher    HISTORY OF PRESENT ILLNESS:   The patient is a 45 y.o. male who presents with          Diagnosis Date    Hx of hepatitis C-resolved 3/5/2022    IV drug abuse (HCC)     Nonhealing nonsurgical wound with fat layer exposed            Procedure Laterality Date    APPLY DRESSING Left 10/9/2020    DEBRIDEMENT OF SKIN, SOFT TISSUE, MUSCLE, EXPLORATION OF UPPER ARM INTEGRA GRAFT FOR COVERAGE performed [place] Camilo Denny [place] [facility] [place]    APPLY DRESSING Left 10/14/2020    LEFT UPPER EXTREMITY WOUND VAC PLACEMENT performed by Eddie Ruff MD at [facility] [place]    CARDIAC PROCEDURE N/A 2/17/2025    Left heart cath / coronary angiography performed by Bg Gaytan MD at [facility] CARDIAC CATH/IR    CARDIAC PROCEDURE Left 2/17/2025    Percutaneous coronary intervention performed by Bg Gaytan MD at [facility] CARDIAC CATH/IR    INCISION AND DRAINAGE Right 4/23/2020    RIGHT UPPER EXTREMITY INCISION AND DRAINAGE REMOVAL FOREIGN BODY WITH C-ARM performed by Kapil Yuan MD at [facility] OR    INCISION AND DRAINAGE Left 10/7/2020    LEFT ELBOW INCISION AND DRAINAGE performed by Kapil Yuan MD at [facility] OR    LEG SURGERY Right 3/6/2022    IRRIGATION AND DEBRIDEMENT RIGHT THIGH performed by Wes Branch MD [place] [facility] [place]    CA EXPLORATION OF ARTERY/VEIN Left 10/7/2020    LEFT ARM BRACHIAL ARTERY EXPLORATION, REPAIR OF MYCOTIC ANUERYSM WITH BYPASS performed by Eddie Ruff MD at [facility] [place]    TRANSESOPHAGEAL ECHOCARDIOGRAM N/A 4/24/2020    TRANSESOPHAGEAL ECHOCARDIOGRAM performed by Natasha Bowie MD at [facility] ENDOSCOPY    TRANSESOPHAGEAL ECHOCARDIOGRAM  10/14/2020

## 2025-02-21 NOTE — PLAN OF CARE
Problem: Discharge Planning  Goal: Discharge to home or other facility with appropriate resources  Outcome: Progressing     Problem: Pain  Goal: Verbalizes/displays adequate comfort level or baseline comfort level  Outcome: Progressing     Problem: Safety - Adult  Goal: Free from fall injury  Outcome: Progressing     Problem: ABCDS Injury Assessment  Goal: Absence of physical injury  Outcome: Progressing     Problem: Skin/Tissue Integrity  Goal: Skin integrity remains intact  Description: 1.  Monitor for areas of redness and/or skin breakdown  2.  Assess vascular access sites hourly  3.  Every 4-6 hours minimum:  Change oxygen saturation probe site  4.  Every 4-6 hours:  If on nasal continuous positive airway pressure, respiratory therapy assess nares and determine need for appliance change or resting period  Outcome: Progressing     Problem: Neurosensory - Adult  Goal: Achieves stable or improved neurological status  Outcome: Progressing     Problem: Neurosensory - Adult  Goal: Absence of seizures  Outcome: Progressing     Problem: Neurosensory - Adult  Goal: Remains free of injury related to seizures activity  Outcome: Progressing     Problem: Neurosensory - Adult  Goal: Achieves maximal functionality and self care  Outcome: Progressing

## 2025-02-22 LAB
ALBUMIN SERPL-MCNC: 2.2 G/DL (ref 3.5–5.2)
ALP SERPL-CCNC: 69 U/L (ref 40–129)
ALT SERPL-CCNC: 12 U/L (ref 0–40)
ANION GAP SERPL CALCULATED.3IONS-SCNC: 14 MMOL/L (ref 7–16)
AST SERPL-CCNC: 15 U/L (ref 0–39)
BASOPHILS # BLD: 0 K/UL (ref 0–0.2)
BASOPHILS NFR BLD: 0 % (ref 0–2)
BILIRUB SERPL-MCNC: 0.5 MG/DL (ref 0–1.2)
BUN SERPL-MCNC: 38 MG/DL (ref 6–20)
CALCIUM SERPL-MCNC: 7.5 MG/DL (ref 8.6–10.2)
CHLORIDE SERPL-SCNC: 101 MMOL/L (ref 98–107)
CO2 SERPL-SCNC: 20 MMOL/L (ref 22–29)
CREAT SERPL-MCNC: 1.3 MG/DL (ref 0.7–1.2)
DATE LAST DOSE: ABNORMAL
EOSINOPHIL # BLD: 0.11 K/UL (ref 0.05–0.5)
EOSINOPHILS RELATIVE PERCENT: 1 % (ref 0–6)
ERYTHROCYTE [DISTWIDTH] IN BLOOD BY AUTOMATED COUNT: 16.2 % (ref 11.5–15)
FERRITIN SERPL-MCNC: 362 NG/ML
FOLATE SERPL-MCNC: 5.8 NG/ML (ref 4.8–24.2)
GFR, ESTIMATED: 68 ML/MIN/1.73M2
GLUCOSE SERPL-MCNC: 131 MG/DL (ref 74–99)
HCT VFR BLD AUTO: 27.9 % (ref 37–54)
HGB BLD-MCNC: 8.9 G/DL (ref 12.5–16.5)
IRON SATN MFR SERPL: 18 % (ref 20–55)
IRON SERPL-MCNC: 25 UG/DL (ref 59–158)
LYMPHOCYTES NFR BLD: 0.96 K/UL (ref 1.5–4)
LYMPHOCYTES RELATIVE PERCENT: 8 % (ref 20–42)
MCH RBC QN AUTO: 23.5 PG (ref 26–35)
MCHC RBC AUTO-ENTMCNC: 31.9 G/DL (ref 32–34.5)
MCV RBC AUTO: 73.6 FL (ref 80–99.9)
MONOCYTES NFR BLD: 0.32 K/UL (ref 0.1–0.95)
MONOCYTES NFR BLD: 3 % (ref 2–12)
NEUTROPHILS NFR BLD: 89 % (ref 43–80)
NEUTS SEG NFR BLD: 10.91 K/UL (ref 1.8–7.3)
PLATELET # BLD AUTO: 193 K/UL (ref 130–450)
PMV BLD AUTO: 10 FL (ref 7–12)
POTASSIUM SERPL-SCNC: 3 MMOL/L (ref 3.5–5)
PROT SERPL-MCNC: 5.9 G/DL (ref 6.4–8.3)
RBC # BLD AUTO: 3.79 M/UL (ref 3.8–5.8)
RBC # BLD: ABNORMAL 10*6/UL
SODIUM SERPL-SCNC: 135 MMOL/L (ref 132–146)
TIBC SERPL-MCNC: 140 UG/DL (ref 250–450)
TME LAST DOSE: ABNORMAL H
VANCOMYCIN DOSE: ABNORMAL MG
VANCOMYCIN TROUGH SERPL-MCNC: 30.2 UG/ML (ref 5–16)
VIT B12 SERPL-MCNC: 706 PG/ML (ref 211–946)
WBC OTHER # BLD: 12.3 K/UL (ref 4.5–11.5)

## 2025-02-22 PROCEDURE — P9047 ALBUMIN (HUMAN), 25%, 50ML: HCPCS | Performed by: INTERNAL MEDICINE

## 2025-02-22 PROCEDURE — 80053 COMPREHEN METABOLIC PANEL: CPT

## 2025-02-22 PROCEDURE — 92610 EVALUATE SWALLOWING FUNCTION: CPT

## 2025-02-22 PROCEDURE — 6360000002 HC RX W HCPCS

## 2025-02-22 PROCEDURE — 6370000000 HC RX 637 (ALT 250 FOR IP): Performed by: INTERNAL MEDICINE

## 2025-02-22 PROCEDURE — 2060000000 HC ICU INTERMEDIATE R&B

## 2025-02-22 PROCEDURE — 2580000003 HC RX 258

## 2025-02-22 PROCEDURE — 6360000002 HC RX W HCPCS: Performed by: INTERNAL MEDICINE

## 2025-02-22 PROCEDURE — 80202 ASSAY OF VANCOMYCIN: CPT

## 2025-02-22 PROCEDURE — 99233 SBSQ HOSP IP/OBS HIGH 50: CPT | Performed by: INTERNAL MEDICINE

## 2025-02-22 PROCEDURE — 2580000003 HC RX 258: Performed by: INTERNAL MEDICINE

## 2025-02-22 PROCEDURE — 85025 COMPLETE CBC W/AUTO DIFF WBC: CPT

## 2025-02-22 PROCEDURE — 83550 IRON BINDING TEST: CPT

## 2025-02-22 PROCEDURE — 82607 VITAMIN B-12: CPT

## 2025-02-22 PROCEDURE — 2500000003 HC RX 250 WO HCPCS: Performed by: INTERNAL MEDICINE

## 2025-02-22 PROCEDURE — 99232 SBSQ HOSP IP/OBS MODERATE 35: CPT | Performed by: THORACIC SURGERY (CARDIOTHORACIC VASCULAR SURGERY)

## 2025-02-22 PROCEDURE — 82746 ASSAY OF FOLIC ACID SERUM: CPT

## 2025-02-22 PROCEDURE — 83540 ASSAY OF IRON: CPT

## 2025-02-22 PROCEDURE — 82728 ASSAY OF FERRITIN: CPT

## 2025-02-22 RX ORDER — ALBUMIN (HUMAN) 12.5 G/50ML
25 SOLUTION INTRAVENOUS EVERY 8 HOURS
Status: COMPLETED | OUTPATIENT
Start: 2025-02-22 | End: 2025-02-24

## 2025-02-22 RX ADMIN — VANCOMYCIN HYDROCHLORIDE 1000 MG: 1 INJECTION, POWDER, LYOPHILIZED, FOR SOLUTION INTRAVENOUS at 02:20

## 2025-02-22 RX ADMIN — POTASSIUM CHLORIDE 10 MEQ: 7.46 INJECTION, SOLUTION INTRAVENOUS at 16:47

## 2025-02-22 RX ADMIN — MUPIROCIN: 20 OINTMENT TOPICAL at 20:47

## 2025-02-22 RX ADMIN — POTASSIUM CHLORIDE 10 MEQ: 7.46 INJECTION, SOLUTION INTRAVENOUS at 14:06

## 2025-02-22 RX ADMIN — DIVALPROEX SODIUM 125 MG: 125 CAPSULE, COATED PELLETS ORAL at 06:22

## 2025-02-22 RX ADMIN — POTASSIUM CHLORIDE 10 MEQ: 7.46 INJECTION, SOLUTION INTRAVENOUS at 12:59

## 2025-02-22 RX ADMIN — METOPROLOL SUCCINATE 25 MG: 25 TABLET, EXTENDED RELEASE ORAL at 08:13

## 2025-02-22 RX ADMIN — DEXTROSE MONOHYDRATE: 50 INJECTION, SOLUTION INTRAVENOUS at 00:18

## 2025-02-22 RX ADMIN — DIVALPROEX SODIUM 125 MG: 125 CAPSULE, COATED PELLETS ORAL at 21:36

## 2025-02-22 RX ADMIN — VANCOMYCIN HYDROCHLORIDE 750 MG: 750 INJECTION, POWDER, LYOPHILIZED, FOR SOLUTION INTRAVENOUS at 20:39

## 2025-02-22 RX ADMIN — ATORVASTATIN CALCIUM 40 MG: 40 TABLET, FILM COATED ORAL at 20:43

## 2025-02-22 RX ADMIN — MUPIROCIN: 20 OINTMENT TOPICAL at 08:13

## 2025-02-22 RX ADMIN — POTASSIUM CHLORIDE 10 MEQ: 7.46 INJECTION, SOLUTION INTRAVENOUS at 11:01

## 2025-02-22 RX ADMIN — CLOPIDOGREL 75 MG: 75 TABLET, FILM COATED ORAL at 08:12

## 2025-02-22 RX ADMIN — SODIUM CHLORIDE, PRESERVATIVE FREE 10 ML: 5 INJECTION INTRAVENOUS at 20:46

## 2025-02-22 RX ADMIN — POTASSIUM CHLORIDE 10 MEQ: 7.46 INJECTION, SOLUTION INTRAVENOUS at 15:21

## 2025-02-22 RX ADMIN — METOPROLOL SUCCINATE 25 MG: 25 TABLET, EXTENDED RELEASE ORAL at 20:43

## 2025-02-22 RX ADMIN — LORAZEPAM 0.5 MG: 2 INJECTION INTRAMUSCULAR; INTRAVENOUS at 20:43

## 2025-02-22 RX ADMIN — SODIUM CHLORIDE, PRESERVATIVE FREE 5 ML: 5 INJECTION INTRAVENOUS at 08:13

## 2025-02-22 RX ADMIN — ASPIRIN 81 MG CHEWABLE TABLET 81 MG: 81 TABLET CHEWABLE at 08:12

## 2025-02-22 RX ADMIN — POTASSIUM CHLORIDE 10 MEQ: 7.46 INJECTION, SOLUTION INTRAVENOUS at 11:52

## 2025-02-22 RX ADMIN — LOSARTAN POTASSIUM 25 MG: 50 TABLET, FILM COATED ORAL at 08:12

## 2025-02-22 RX ADMIN — ALBUMIN (HUMAN) 25 G: 0.25 INJECTION, SOLUTION INTRAVENOUS at 09:51

## 2025-02-22 RX ADMIN — ALBUMIN (HUMAN) 25 G: 0.25 INJECTION, SOLUTION INTRAVENOUS at 16:50

## 2025-02-22 RX ADMIN — Medication 5 MG: at 20:43

## 2025-02-22 RX ADMIN — DEXTROSE MONOHYDRATE: 50 INJECTION, SOLUTION INTRAVENOUS at 08:12

## 2025-02-22 RX ADMIN — DIVALPROEX SODIUM 125 MG: 125 CAPSULE, COATED PELLETS ORAL at 14:29

## 2025-02-22 RX ADMIN — ACETAMINOPHEN 650 MG: 325 TABLET ORAL at 08:12

## 2025-02-22 ASSESSMENT — PAIN DESCRIPTION - LOCATION: LOCATION: CHEST

## 2025-02-22 ASSESSMENT — PAIN DESCRIPTION - DESCRIPTORS: DESCRIPTORS: ACHING;DULL;DISCOMFORT

## 2025-02-22 ASSESSMENT — PAIN SCALES - GENERAL: PAINLEVEL_OUTOF10: 6

## 2025-02-22 ASSESSMENT — PAIN DESCRIPTION - ORIENTATION: ORIENTATION: LEFT;MID

## 2025-02-22 NOTE — PLAN OF CARE
Problem: Discharge Planning  Goal: Discharge to home or other facility with appropriate resources  Outcome: Progressing     Problem: Pain  Goal: Verbalizes/displays adequate comfort level or baseline comfort level  Outcome: Progressing     Problem: Safety - Adult  Goal: Free from fall injury  Outcome: Progressing     Problem: ABCDS Injury Assessment  Goal: Absence of physical injury  Outcome: Progressing     Problem: Skin/Tissue Integrity  Goal: Skin integrity remains intact  Description: 1.  Monitor for areas of redness and/or skin breakdown  2.  Assess vascular access sites hourly  3.  Every 4-6 hours minimum:  Change oxygen saturation probe site  4.  Every 4-6 hours:  If on nasal continuous positive airway pressure, respiratory therapy assess nares and determine need for appliance change or resting period  Outcome: Progressing     Problem: Neurosensory - Adult  Goal: Achieves stable or improved neurological status  Outcome: Progressing  Goal: Absence of seizures  Outcome: Progressing  Goal: Remains free of injury related to seizures activity  Outcome: Progressing  Goal: Achieves maximal functionality and self care  Outcome: Progressing

## 2025-02-22 NOTE — PROGRESS NOTES
Paged regarding R femoral central line replacement because it is 4 days old. Patient is planned to get PICC on Monday 2/24 with IR. There is no need to subject the patient to another procedure at this time and the line is only 4 days old. R femoral triple lumen catheter is okay to use until patient gets PICC on Monday. Discussed with Dr. Garza.     Electronically signed by Miky Norton DO on 2/22/2025 at 10:21 AM

## 2025-02-22 NOTE — PROGRESS NOTES
SPEECH/LANGUAGE PATHOLOGY  CLINICAL ASSESSMENT OF SWALLOWING FUNCTION   and PLAN OF CARE      PATIENT NAME:  Wagner Ryan  (male)     MRN:  [id number]    :  1980  (45 y.o.)  STATUS:  Inpatient: Room 7410/7410-A    TODAY'S DATE:  2025  ORDER DATE, DESCRIPTION AND REFERRING PROVIDER: 25 1530    SLP swallowing-dysphagia evaluation and treatment  Start:  25,   End:  25,   ONE TIME,   Standing Count:  1 Occurrences,   R       John Mckeon MD  REASON FOR REFERRAL: risk of aspiration   EVALUATING THERAPIST: STACY Sutton                 RESULTS:    DYSPHAGIA DIAGNOSIS:   Clinical indicators of minimal-mild oral phase dysphagia       DIET RECOMMENDATIONS:  Easy to chew consistency solids (IDDSI level 7, transitional) with  thin liquids (IDDSI level 0)      MEDICATION ADMINISTRATION, and Per patient choice/nursing judgement      No signs of aspiration were noted during this evaluation however, silent aspiration cannot be ruled out at bedside.  If silent aspiration is suspected, a Videofluoroscopic Study of Swallowing (MBS) is recommended and requires a physician order.     FEEDING RECOMMENDATIONS:     Assistance level:  Supervision is needed during all oral intake      Compensatory strategies recommended: Thorough oral care to prevent colonization of oral bacteria   Upright in bed/ chair as tolerated  Fully alert for all PO  Encourage oral clearing of bolus before next bite/sip is taken  Slow rate of intake   SINGLE cup sips  SINGLE straw sips   SMALL bites  Liquid wash to help clear oral cavity of thicker consistency items      Discussed recommendations with:  patient nurse in person    SPEECH THERAPY  PLAN OF CARE   The dysphagia POC is established based on physician order, dysphagia diagnosis and results of clinical assessment     Meal time assessment for 1-2 sessions to provide diet modification and compensatory strategy implementation to determine if  this evaluation  Evaluation of Education:  Verbalizes understanding and Needs further instruction    This plan may be re-evaluated and revised as warranted.      Evaluation Time includes thorough review of current medical information, gathering information on past medical history/social history and prior level of function, completion of standardized testing/informal observation of tasks, assessment of data and education on plan of care and goals.    [x]The admitting diagnosis and active problem list, have been reviewed prior to initiation of this evaluation.        ACTIVE PROBLEM LIST:   Patient Active Problem List   Diagnosis    Right arm cellulitis    IV drug abuse (HCC)    Superficial foreign body of right upper arm    Cellulitis of right arm    Bacteremia    Skin ulcer of upper arm with fat layer exposed (HCC)    Abscess    Pseudoaneurysm of brachial artery    Poor venous access    Pseudoaneurysm    Mycotic aneurysm    Nonhealing nonsurgical wound with fat layer exposed    Abdominal wall abscess    Tobacco dependence    Abscess of chest wall    Osteomyelitis (HCC)    Hx of hepatitis C-resolved    Sinus tachycardia    STEMI (ST elevation myocardial infarction) (HCC)    Sepsis without acute organ dysfunction (HCC)    Thrombocytopenia    Altered mental status    Acute coronary syndrome (HCC)    Septic shock (HCC)    Acute bacterial endocarditis    Aortic valve endocarditis    Cerebrovascular accident (CVA) due to bilateral embolism of middle cerebral arteries (HCC)    Cardiomyopathy (HCC)    Cerebral septic emboli (HCC)    Altered level of consciousness         CPT code:  88213  bedside swallow fernando Claros M.S., CCC-SLP  Speech-Language Pathologist  SP. [phone]

## 2025-02-22 NOTE — PROGRESS NOTES
Department of Internal Medicine  Nephrology Progress Note      Events reviewed     SUBJECTIVE: Shortness of breath We are following for hyponatremia. He has no complaints today.    PHYSICAL EXAM:      Vitals:    VITALS:  /76   Pulse (!) 101   Temp 97.9 °F (36.6 °C)   Resp 16   Ht 1.93 m (6' 4\")   Wt 78.9 kg (174 lb)   SpO2 98%   BMI 21.18 kg/m²   24HR PULSE OXIMETRY RANGE:  SpO2  Av.6 %  Min: 95 %  Max: 100 %  24HR INTAKE/OUTPUT:    Intake/Output Summary (Last 24 hours) at 2025 1232  Last data filed at 2025 1932  Gross per 24 hour   Intake --   Output 400 ml   Net -400 ml       Constitutional: Patient is awake nonverbal no distress  HEENT: Pupils equal reactive, mucous membranes are dry  Respiratory: Lungs are clear  Cardiovascular/Edema: Heart sounds are regular  Gastrointestinal: Abdomen is soft  Neurologic: Patient is lethargic, nonverbal  Skin: No skin rashes  Other: No edema    Scheduled Meds:   albumin human 25%  25 g IntraVENous Q8H    vancomycin  750 mg IntraVENous Q8H    aspirin  81 mg Oral Daily    sodium chloride flush  5-40 mL IntraVENous 2 times per day    lidocaine 1 % injection  50 mg IntraDERmal Once    divalproex  125 mg Oral 3 times per day    melatonin  5 mg Oral Nightly    mupirocin   Topical BID    metoprolol succinate  25 mg Oral BID    nicotine  1 patch TransDERmal Daily    atorvastatin  40 mg Oral Nightly    clopidogrel  75 mg Oral Daily    losartan  25 mg Oral Daily     Continuous Infusions:   sodium chloride       PRN Meds:.sodium chloride flush, sodium chloride, LORazepam, hydrOXYzine pamoate, acetaminophen **OR** acetaminophen, magnesium sulfate, ondansetron **OR** ondansetron, potassium chloride **OR** potassium alternative oral replacement **OR** potassium chloride, Carmex Classic Lip Balm      DATA:    CBC:   Lab Results   Component Value Date/Time    WBC 12.3 2025 08:00 AM    RBC 3.79 2025 08:00 AM    HGB 8.9 2025 08:00 AM    HCT 27.9

## 2025-02-22 NOTE — PROGRESS NOTES
Pharmacy Consultation Note  (Antibiotic Dosing and Monitoring)    Initial consult date: 2/18/25  Consulting physician/provider: Shelly  Drug: Vancomycin  Indication: Endocarditis/Endovascular     Age/  Gender Height Weight IBW  Allergy Information   45 y.o./male 193 cm (6' 4\") 80.2 kg (176 lb 12.8 oz)     Ideal body weight: 86.8 kg (191 lb 5.7 oz)   Patient has no known allergies.      Renal Function:  Recent Labs     02/21/25  0530 02/21/25  0730 02/21/25  1705   BUN 38* 39* 41*   CREATININE 1.0 1.1 1.2       Intake/Output Summary (Last 24 hours) at 2/22/2025 0731  Last data filed at 2/21/2025 1932  Gross per 24 hour   Intake 2575.09 ml   Output 400 ml   Net 2175.09 ml       Vancomycin Monitoring:  Trough:    Recent Labs     02/20/25  0522   VANCOTROUGH 16.0     Random:  No results for input(s): \"VANCORANDOM\" in the last 72 hours.    Recent vancomycin administrations                     vancomycin (VANCOCIN) 1,000 mg in sodium chloride 0.9 % 250 mL IVPB ([id number]) (mg) 1,000 mg New Bag 02/22/25 0220     1,000 mg New Bag 02/21/25 1819     1,000 mg New Bag  0959     1,000 mg New Bag  0033     1,000 mg New Bag 02/20/25 1533    vancomycin (VANCOCIN) 1,250 mg in sodium chloride 0.9 % 250 mL IVPB ([id number]) (mg) 1,250 mg New Bag 02/20/25 0816     1,250 mg New Bag 02/19/25 2023     1,250 mg New Bag  0923                  Assessment:  Patient is a 45 y.o. male who has been initiated on vancomycin  Estimated Creatinine Clearance: 88 mL/min (based on SCr of 1.2 mg/dL).  To dose vancomycin, pharmacy will be utilizing [employer] calculation software for goal AUC/DAYAN 400-600 mg/L-hr (predicted AUC/DAYAN = 599, Tr =18.4 mcg/mL)  Trough today = 30.2 mcg/mL    Plan:  Adjust to vancomycin 750mg q8h to start this evening   Will check vancomycin levels as needed  Will continue to monitor renal function  Pharmacy to follow      Lizzethchastity Randle PharmD, BCPS, BCCCP 2/22/2025 7:32 AM    NAHOMI: [phone]  SEY: [phone]  SJW: [phone]

## 2025-02-22 NOTE — PROGRESS NOTES
Recent Labs     02/21/25  0730 02/21/25  1705 02/22/25  0800   BUN 39* 41* 38*   CREATININE 1.1 1.2 1.3*     Recent Labs     02/20/25  1006 02/17/25  1559   TSH  --  0.54   PROBNP 18,068*  --        Creatinine   Date/Time Value Ref Range Status   02/22/2025 08:00 AM 1.3 (H) 0.70 - 1.20 mg/dL Final   02/21/2025 05:05 PM 1.2 0.70 - 1.20 mg/dL Final   02/21/2025 07:30 AM 1.1 0.70 - 1.20 mg/dL Final       Medication Review:    albumin human 25%, 25 g, IntraVENous, Q8H    vancomycin, 750 mg, IntraVENous, Q8H    aspirin, 81 mg, Oral, Daily    sodium chloride flush, 5-40 mL, IntraVENous, 2 times per day    lidocaine 1 % injection, 50 mg, IntraDERmal, Once    divalproex, 125 mg, Oral, 3 times per day    melatonin, 5 mg, Oral, Nightly    mupirocin, , Topical, BID    metoprolol succinate, 25 mg, Oral, BID    nicotine, 1 patch, TransDERmal, Daily    atorvastatin, 40 mg, Oral, Nightly    clopidogrel, 75 mg, Oral, Daily    losartan, 25 mg, Oral, Daily   sodium chloride flush, 5-40 mL, PRN  sodium chloride, , PRN  LORazepam, 0.5 mg, Q6H PRN  hydrOXYzine pamoate, 25 mg, TID PRN  acetaminophen, 650 mg, Q6H PRN   Or  acetaminophen, 650 mg, Q6H PRN  magnesium sulfate, 2,000 mg, PRN  ondansetron, 4 mg, Q8H PRN   Or  ondansetron, 4 mg, Q6H PRN  potassium chloride, 40 mEq, PRN   Or  potassium alternative oral replacement, 40 mEq, PRN   Or  potassium chloride, 10 mEq, PRN  Carmex Classic Lip Balm, 1 Application, PRN         ROS:   Negative for CP, palpitations, SOB at rest, dizziness/lightheadedness.    Physical Exam   Constitutional: Oriented to person, place, and time. Appears well-developed. No distress.   CARDIOVASCULAR:  Normal apical impulse, regular rate and rhythm, normal S1 and S2, no S3 or S4, and 1/6 murmur noted  Pulmonary/Chest: Effort normal. No respiratory distress.   Abdominal: Soft. Bowel sounds are normal.   Musculoskeletal: Normal range of motion.   Neurological: alert and oriented to person, place, and time.   Skin:  Skin is warm and dry.   Psychiatric: normal mood and affect.       ASSESSMENT:   Patient Active Problem List   Diagnosis    Right arm cellulitis    IV drug abuse (HCC)    Superficial foreign body of right upper arm    Cellulitis of right arm    Bacteremia    Skin ulcer of upper arm with fat layer exposed (HCC)    Abscess    Pseudoaneurysm of brachial artery    Poor venous access    Pseudoaneurysm    Mycotic aneurysm    Nonhealing nonsurgical wound with fat layer exposed    Abdominal wall abscess    Tobacco dependence    Abscess of chest wall    Osteomyelitis (HCC)    Hx of hepatitis C-resolved    Sinus tachycardia    STEMI (ST elevation myocardial infarction) (HCC)    Sepsis without acute organ dysfunction (HCC)    Thrombocytopenia    Altered mental status    Acute coronary syndrome (HCC)    Septic shock (HCC)    Acute bacterial endocarditis    Aortic valve endocarditis    Cerebrovascular accident (CVA) due to bilateral embolism of middle cerebral arteries (HCC)    Cardiomyopathy (HCC)    Cerebral septic emboli (HCC)    Altered level of consciousness               PLAN:  Work up underway.  STEMI with PCI complicates things tremendously.  Jordan Perez MD        Note: Greater than or equal to 35 minutes was spent providing face-to-face patient care, including:  and coordinating care, reviewing the chart, labs, and diagnostics, as well as medical decision making. Greater than 50% of this time was spent instructing and counseling the patient face to face regarding findings and recommendations.

## 2025-02-22 NOTE — PROGRESS NOTES
[facility] Hospitalist Progress Note    Admitting Date and Time: 2/18/2025  7:30 PM  Admit Dx: Endocarditis [I38]    Synopsis:    Mr. Wagner Ryan, a 45 y.o. year old male  who  has a past medical history of Hx of hepatitis C-resolved, IV drug abuse (HCC), and Nonhealing nonsurgical wound with fat layer exposed.      Wagner Ryan is a 45 y.o. male who presents to the CVICU for evaluation by CTS surgery. Patient was being followed at [facility] for AMS and a STEMI and underwent stent placement of the LAD on 2/17.  He was admitted to the MICU following cath lab and started on broad spectrum antibiotics, ASA and Brilinta. During work up for his AMS, a CTA of the head showed tiny area of intraparenchymal hemorrhage in the right occipital lobe w/ MRI of the brain showed multiple small foci of acute and early subacute infarcts in bilateral frontal and parietal occipital lobe left more than right consistent with micro emboli. Neurology was consulted. They cleared the patient to continue on plavix given his recent stent placement.  He underwent an ECHO which was found to have vegetations and the decision was made to transfer to [facility] for evaluation by CTS Surgery.     2/19: Patient seen in CVICU.  Per ICU team and CTS, patient can be transferred as he has no ICU needs.  Transfer order placed.    2/20: Undergoing CTS workup.  Patient continues to have persistent confusion and delirium.  Seems like he was started on Decadron at previous hospital for concern of meningitis and it has not been stopped or weaned.  No mention for continuation of Decadron per ID here and treating for MRSA bacteremia as of now.  Hold Decadron.    2/21: Neurology stating patient has no brain bleed and that cardiology has to decide on DAPT.  Contacted cardiology and stated patient must be on DAPT given recent cardiac stenting and 2/17.  Advised to start aspirin and continue Plavix.  Orders placed.  CTS to be informed.    2/22: Per

## 2025-02-22 NOTE — PROGRESS NOTES
Department of Internal Medicine  Nephrology Attending Consult Note    Events reviewed.    SUBJECTIVE: We are following Mr. Rhodes for hypernatremia.  Reports no complaints.    PHYSICAL EXAM:      Vitals:    VITALS:  /81   Pulse 94   Temp 98 °F (36.7 °C) (Temporal)   Resp 18   Ht 1.93 m (6' 4\")   Wt 78.9 kg (174 lb)   SpO2 96%   BMI 21.18 kg/m²   24HR PULSE OXIMETRY RANGE:  SpO2  Av.7 %  Min: 95 %  Max: 96 %  24HR INTAKE/OUTPUT:    Intake/Output Summary (Last 24 hours) at 2025 2242  Last data filed at 2025 1932  Gross per 24 hour   Intake 2575.09 ml   Output 1150 ml   Net 1425.09 ml       Constitutional: Patient is awake nonverbal no distress  HEENT: Pupils equal reactive, mucous membranes are dry  Respiratory: Lungs are clear  Cardiovascular/Edema: Heart sounds are regular  Gastrointestinal: Abdomen is soft  Neurologic: Patient is lethargic, nonverbal  Skin: No skin rashes  Other: No edema    Scheduled Meds:   aspirin  81 mg Oral Daily    vancomycin  1,000 mg IntraVENous Q8H    sodium chloride flush  5-40 mL IntraVENous 2 times per day    lidocaine 1 % injection  50 mg IntraDERmal Once    divalproex  125 mg Oral 3 times per day    melatonin  5 mg Oral Nightly    mupirocin   Topical BID    metoprolol succinate  25 mg Oral BID    nicotine  1 patch TransDERmal Daily    atorvastatin  40 mg Oral Nightly    clopidogrel  75 mg Oral Daily    losartan  25 mg Oral Daily     Continuous Infusions:   dextrose 150 mL/hr at 25 0957    sodium chloride       PRN Meds:.sodium chloride flush, sodium chloride, LORazepam, hydrOXYzine pamoate, acetaminophen **OR** acetaminophen, magnesium sulfate, ondansetron **OR** ondansetron, potassium chloride **OR** potassium alternative oral replacement **OR** potassium chloride, Carmex Classic Lip Balm      DATA:    CBC:   Lab Results   Component Value Date/Time    WBC 12.2 2025 05:30 AM    RBC 3.73 2025 05:30 AM    HGB 8.9 2025 05:30 AM    HCT  27.9 02/21/2025 05:30 AM    MCV 74.8 02/21/2025 05:30 AM    MCH 23.9 02/21/2025 05:30 AM    MCHC 31.9 02/21/2025 05:30 AM    RDW 16.3 02/21/2025 05:30 AM     02/21/2025 05:30 AM    MPV 10.1 02/21/2025 05:30 AM     CMP:    Lab Results   Component Value Date/Time     02/21/2025 05:05 PM    K 3.7 02/21/2025 05:05 PM    K 4.2 10/04/2021 06:30 AM     02/21/2025 05:05 PM    CO2 25 02/21/2025 05:05 PM    BUN 41 02/21/2025 05:05 PM    CREATININE 1.2 02/21/2025 05:05 PM    GFRAA >60 03/11/2022 04:26 AM    LABGLOM 79 02/21/2025 05:05 PM    GLUCOSE 118 02/21/2025 05:05 PM    CALCIUM 7.9 02/21/2025 05:05 PM    BILITOT 0.4 02/21/2025 05:30 AM    ALKPHOS 72 02/21/2025 05:30 AM    AST 16 02/21/2025 05:30 AM    ALT 14 02/21/2025 05:30 AM     Magnesium:    Lab Results   Component Value Date/Time    MG 2.4 02/18/2025 04:30 AM     Phosphorus:    Lab Results   Component Value Date/Time    PHOS 3.6 02/18/2025 04:30 AM     Radiology Review:      MRI BRAIN W WO CONTRAST [[id number] 2/18/2025]     IMPRESSION:  1. Multiple small foci of acute or early subacute infarctions in the  bilateral frontal, parietal and occipital lobes, left more than right.  Many  of the infarctions are in a watershed territory distribution.  Findings may  be related to hypoperfusion or micro emboli.  2. Subtle foci of enhancement in the right frontal, parietal and occipital  lobes. These are likely related to subacute infarctions.  If there a history  of malignancy, follow-up with contrast enhanced MRI is recommended.    XR CHEST PORTABLE [[id number] 2/17/2025]     FINDINGS:  The lungs are without acute focal process.  There is no effusion or  pneumothorax. The cardiomediastinal silhouette is without acute process. The  osseous structures are without acute process.     IMPRESSION:  No acute process.         BRIEF SUMMARY OF INITIAL CONSULT:    Briefly Mr. Rhodes is a 45-year-old man with history of hepatitis C, IV drug abuse, multiple episodes of

## 2025-02-22 NOTE — PROGRESS NOTES
[facility]  [facility]  Progress Note      No chief complaint on file.      SUBJECTIVE:    Patient is tolerating medications. No reported adverse drug reactions.  No nausea, vomiting, diarrhea.    Review of systems:  As stated above in the chief complaint, otherwise negative.    Medications:  Scheduled Meds:   albumin human 25%  25 g IntraVENous Q8H    vancomycin  750 mg IntraVENous Q8H    aspirin  81 mg Oral Daily    sodium chloride flush  5-40 mL IntraVENous 2 times per day    lidocaine 1 % injection  50 mg IntraDERmal Once    divalproex  125 mg Oral 3 times per day    melatonin  5 mg Oral Nightly    mupirocin   Topical BID    metoprolol succinate  25 mg Oral BID    nicotine  1 patch TransDERmal Daily    atorvastatin  40 mg Oral Nightly    clopidogrel  75 mg Oral Daily    losartan  25 mg Oral Daily     Continuous Infusions:   sodium chloride       PRN Meds:sodium chloride flush, sodium chloride, LORazepam, hydrOXYzine pamoate, acetaminophen **OR** acetaminophen, magnesium sulfate, ondansetron **OR** ondansetron, potassium chloride **OR** potassium alternative oral replacement **OR** potassium chloride, Carmex Classic Lip Balm    OBJECTIVE:  /76   Pulse (!) 101   Temp 97.9 °F (36.6 °C)   Resp 16   Ht 1.93 m (6' 4\")   Wt 78.9 kg (174 lb)   SpO2 98%   BMI 21.18 kg/m²   Temp  Av.9 °F (36.6 °C)  Min: 97.5 °F (36.4 °C)  Max: 98.1 °F (36.7 °C)  Constitutional: The patient is awake, able to intermittently nod head to answer questions but not oriented    Skin: Warm and dry. No rashes were noted. No jaundice.  HEENT: Eyes show round, and reactive pupils. Moist mucous membranes, no ulcerations, no thrush.   Neck: Supple to movements. No lymphadenopathy.   Chest: No use of accessory muscles to breathe. Symmetrical expansion. Auscultation reveals no wheezing, crackles, or rhonchi.   Cardiovascular: S1 and S2 are rhythmic and regular. No murmurs appreciated.   Abdomen: Positive  PM  2/22/2025

## 2025-02-23 LAB
ACB COMPLEX DNA BLD POS QL NAA+NON-PROBE: NOT DETECTED
ALBUMIN SERPL-MCNC: 2.3 G/DL (ref 3.5–5.2)
ALP SERPL-CCNC: 57 U/L (ref 40–129)
ALT SERPL-CCNC: 10 U/L (ref 0–40)
ANION GAP SERPL CALCULATED.3IONS-SCNC: 10 MMOL/L (ref 7–16)
ANION GAP SERPL CALCULATED.3IONS-SCNC: 14 MMOL/L (ref 7–16)
AST SERPL-CCNC: 13 U/L (ref 0–39)
B FRAGILIS DNA BLD POS QL NAA+NON-PROBE: NOT DETECTED
BASOPHILS # BLD: 0.01 K/UL (ref 0–0.2)
BASOPHILS NFR BLD: 0 % (ref 0–2)
BILIRUB SERPL-MCNC: 0.5 MG/DL (ref 0–1.2)
BIOFIRE TEST COMMENT: ABNORMAL
BUN SERPL-MCNC: 45 MG/DL (ref 6–20)
BUN SERPL-MCNC: 47 MG/DL (ref 6–20)
C ALBICANS DNA BLD POS QL NAA+NON-PROBE: NOT DETECTED
C AURIS DNA BLD POS QL NAA+NON-PROBE: NOT DETECTED
C GATTII+NEOFOR DNA BLD POS QL NAA+N-PRB: NOT DETECTED
C GLABRATA DNA BLD POS QL NAA+NON-PROBE: NOT DETECTED
C KRUSEI DNA BLD POS QL NAA+NON-PROBE: NOT DETECTED
C PARAP DNA BLD POS QL NAA+NON-PROBE: NOT DETECTED
C TROPICLS DNA BLD POS QL NAA+NON-PROBE: NOT DETECTED
CALCIUM SERPL-MCNC: 7.5 MG/DL (ref 8.6–10.2)
CALCIUM SERPL-MCNC: 7.8 MG/DL (ref 8.6–10.2)
CHLORIDE SERPL-SCNC: 104 MMOL/L (ref 98–107)
CHLORIDE SERPL-SCNC: 106 MMOL/L (ref 98–107)
CO2 SERPL-SCNC: 19 MMOL/L (ref 22–29)
CO2 SERPL-SCNC: 22 MMOL/L (ref 22–29)
CREAT SERPL-MCNC: 1.8 MG/DL (ref 0.7–1.2)
CREAT SERPL-MCNC: 1.8 MG/DL (ref 0.7–1.2)
CREAT UR-MCNC: 62.7 MG/DL (ref 40–278)
CREAT UR-MCNC: 64 MG/DL (ref 40–278)
DATE LAST DOSE: ABNORMAL
E CLOAC COMP DNA BLD POS NAA+NON-PROBE: NOT DETECTED
E COLI DNA BLD POS QL NAA+NON-PROBE: NOT DETECTED
E FAECALIS DNA BLD POS QL NAA+NON-PROBE: NOT DETECTED
E FAECIUM DNA BLD POS QL NAA+NON-PROBE: NOT DETECTED
ENTEROBACTERALES DNA BLD POS NAA+N-PRB: NOT DETECTED
EOSINOPHIL # BLD: 0.2 K/UL (ref 0.05–0.5)
EOSINOPHILS RELATIVE PERCENT: 1 % (ref 0–6)
ERYTHROCYTE [DISTWIDTH] IN BLOOD BY AUTOMATED COUNT: 15.9 % (ref 11.5–15)
GFR, ESTIMATED: 46 ML/MIN/1.73M2
GFR, ESTIMATED: 47 ML/MIN/1.73M2
GLUCOSE SERPL-MCNC: 101 MG/DL (ref 74–99)
GLUCOSE SERPL-MCNC: 113 MG/DL (ref 74–99)
GP B STREP DNA BLD POS QL NAA+NON-PROBE: NOT DETECTED
HAEM INFLU DNA BLD POS QL NAA+NON-PROBE: NOT DETECTED
HCT VFR BLD AUTO: 24.8 % (ref 37–54)
HGB BLD-MCNC: 8 G/DL (ref 12.5–16.5)
IMM GRANULOCYTES # BLD AUTO: 0.33 K/UL (ref 0–0.58)
IMM GRANULOCYTES NFR BLD: 2 % (ref 0–5)
K OXYTOCA DNA BLD POS QL NAA+NON-PROBE: NOT DETECTED
KLEBSIELLA SP DNA BLD POS QL NAA+NON-PRB: NOT DETECTED
KLEBSIELLA SP DNA BLD POS QL NAA+NON-PRB: NOT DETECTED
L MONOCYTOG DNA BLD POS QL NAA+NON-PROBE: NOT DETECTED
LYMPHOCYTES NFR BLD: 2.15 K/UL (ref 1.5–4)
LYMPHOCYTES RELATIVE PERCENT: 16 % (ref 20–42)
MCH RBC QN AUTO: 23.8 PG (ref 26–35)
MCHC RBC AUTO-ENTMCNC: 32.3 G/DL (ref 32–34.5)
MCV RBC AUTO: 73.8 FL (ref 80–99.9)
MECA+MECC+MREJ ISLT/SPM QL: DETECTED
MICROORGANISM SPEC CULT: ABNORMAL
MICROORGANISM SPEC CULT: ABNORMAL
MICROORGANISM/AGENT SPEC: ABNORMAL
MICROORGANISM/AGENT SPEC: ABNORMAL
MONOCYTES NFR BLD: 0.73 K/UL (ref 0.1–0.95)
MONOCYTES NFR BLD: 5 % (ref 2–12)
N MEN DNA BLD POS QL NAA+NON-PROBE: NOT DETECTED
NEUTROPHILS NFR BLD: 75 % (ref 43–80)
NEUTS SEG NFR BLD: 10.45 K/UL (ref 1.8–7.3)
P AERUGINOSA DNA BLD POS NAA+NON-PROBE: NOT DETECTED
PLATELET # BLD AUTO: 225 K/UL (ref 130–450)
PMV BLD AUTO: 10 FL (ref 7–12)
POTASSIUM SERPL-SCNC: 3 MMOL/L (ref 3.5–5)
POTASSIUM SERPL-SCNC: 4 MMOL/L (ref 3.5–5)
POTASSIUM SERPL-SCNC: 4 MMOL/L (ref 3.5–5)
PROT SERPL-MCNC: 5.6 G/DL (ref 6.4–8.3)
PROTEUS SP DNA BLD POS QL NAA+NON-PROBE: NOT DETECTED
RBC # BLD AUTO: 3.36 M/UL (ref 3.8–5.8)
S AUREUS DNA BLD POS QL NAA+NON-PROBE: DETECTED
S AUREUS+CONS DNA BLD POS NAA+NON-PROBE: DETECTED
S EPIDERMIDIS DNA BLD POS QL NAA+NON-PRB: NOT DETECTED
S LUGDUNENSIS DNA BLD POS QL NAA+NON-PRB: NOT DETECTED
S MALTOPHILIA DNA BLD POS QL NAA+NON-PRB: NOT DETECTED
S MARCESCENS DNA BLD POS NAA+NON-PROBE: NOT DETECTED
S PNEUM DNA BLD POS QL NAA+NON-PROBE: NOT DETECTED
S PYO DNA BLD POS QL NAA+NON-PROBE: NOT DETECTED
SALMONELLA DNA BLD POS QL NAA+NON-PROBE: NOT DETECTED
SERVICE CMNT-IMP: ABNORMAL
SERVICE CMNT-IMP: ABNORMAL
SODIUM SERPL-SCNC: 136 MMOL/L (ref 132–146)
SODIUM SERPL-SCNC: 139 MMOL/L (ref 132–146)
SODIUM UR-SCNC: <20 MMOL/L
SPECIMEN DESCRIPTION: ABNORMAL
SPECIMEN DESCRIPTION: ABNORMAL
STREPTOCOCCUS DNA BLD POS NAA+NON-PROBE: NOT DETECTED
TME LAST DOSE: ABNORMAL H
TOTAL PROTEIN, URINE: 39 MG/DL (ref 0–12)
URINE TOTAL PROTEIN CREATININE RATIO: 0.63 (ref 0–0.2)
VANCOMYCIN DOSE: ABNORMAL MG
VANCOMYCIN TROUGH SERPL-MCNC: 17.1 UG/ML (ref 5–16)
WBC OTHER # BLD: 13.9 K/UL (ref 4.5–11.5)

## 2025-02-23 PROCEDURE — 99232 SBSQ HOSP IP/OBS MODERATE 35: CPT | Performed by: INTERNAL MEDICINE

## 2025-02-23 PROCEDURE — 2060000000 HC ICU INTERMEDIATE R&B

## 2025-02-23 PROCEDURE — 99232 SBSQ HOSP IP/OBS MODERATE 35: CPT | Performed by: CLINICAL NURSE SPECIALIST

## 2025-02-23 PROCEDURE — 99232 SBSQ HOSP IP/OBS MODERATE 35: CPT | Performed by: THORACIC SURGERY (CARDIOTHORACIC VASCULAR SURGERY)

## 2025-02-23 PROCEDURE — 84156 ASSAY OF PROTEIN URINE: CPT

## 2025-02-23 PROCEDURE — 6370000000 HC RX 637 (ALT 250 FOR IP): Performed by: INTERNAL MEDICINE

## 2025-02-23 PROCEDURE — 85025 COMPLETE CBC W/AUTO DIFF WBC: CPT

## 2025-02-23 PROCEDURE — 36415 COLL VENOUS BLD VENIPUNCTURE: CPT

## 2025-02-23 PROCEDURE — 84300 ASSAY OF URINE SODIUM: CPT

## 2025-02-23 PROCEDURE — 84132 ASSAY OF SERUM POTASSIUM: CPT

## 2025-02-23 PROCEDURE — 2580000003 HC RX 258

## 2025-02-23 PROCEDURE — 80048 BASIC METABOLIC PNL TOTAL CA: CPT

## 2025-02-23 PROCEDURE — 6370000000 HC RX 637 (ALT 250 FOR IP)

## 2025-02-23 PROCEDURE — 2500000003 HC RX 250 WO HCPCS: Performed by: INTERNAL MEDICINE

## 2025-02-23 PROCEDURE — 99233 SBSQ HOSP IP/OBS HIGH 50: CPT | Performed by: INTERNAL MEDICINE

## 2025-02-23 PROCEDURE — 80202 ASSAY OF VANCOMYCIN: CPT

## 2025-02-23 PROCEDURE — 6360000002 HC RX W HCPCS

## 2025-02-23 PROCEDURE — P9047 ALBUMIN (HUMAN), 25%, 50ML: HCPCS | Performed by: INTERNAL MEDICINE

## 2025-02-23 PROCEDURE — 6360000002 HC RX W HCPCS: Performed by: INTERNAL MEDICINE

## 2025-02-23 PROCEDURE — 80053 COMPREHEN METABOLIC PANEL: CPT

## 2025-02-23 PROCEDURE — 82570 ASSAY OF URINE CREATININE: CPT

## 2025-02-23 RX ORDER — TRAMADOL HYDROCHLORIDE 50 MG/1
50 TABLET ORAL ONCE
Status: COMPLETED | OUTPATIENT
Start: 2025-02-23 | End: 2025-02-23

## 2025-02-23 RX ADMIN — SODIUM CHLORIDE, PRESERVATIVE FREE 5 ML: 5 INJECTION INTRAVENOUS at 21:25

## 2025-02-23 RX ADMIN — HYDROXYZINE PAMOATE 25 MG: 25 CAPSULE ORAL at 09:26

## 2025-02-23 RX ADMIN — METOPROLOL SUCCINATE 25 MG: 25 TABLET, EXTENDED RELEASE ORAL at 19:54

## 2025-02-23 RX ADMIN — ATORVASTATIN CALCIUM 40 MG: 40 TABLET, FILM COATED ORAL at 19:54

## 2025-02-23 RX ADMIN — LOSARTAN POTASSIUM 25 MG: 50 TABLET, FILM COATED ORAL at 09:26

## 2025-02-23 RX ADMIN — ALBUMIN (HUMAN) 25 G: 0.25 INJECTION, SOLUTION INTRAVENOUS at 01:08

## 2025-02-23 RX ADMIN — METOPROLOL SUCCINATE 25 MG: 25 TABLET, EXTENDED RELEASE ORAL at 09:26

## 2025-02-23 RX ADMIN — DIVALPROEX SODIUM 125 MG: 125 CAPSULE, COATED PELLETS ORAL at 21:27

## 2025-02-23 RX ADMIN — ALBUMIN (HUMAN) 25 G: 0.25 INJECTION, SOLUTION INTRAVENOUS at 09:35

## 2025-02-23 RX ADMIN — ONDANSETRON 4 MG: 2 INJECTION, SOLUTION INTRAMUSCULAR; INTRAVENOUS at 19:52

## 2025-02-23 RX ADMIN — TRAMADOL HYDROCHLORIDE 50 MG: 50 TABLET, COATED ORAL at 16:45

## 2025-02-23 RX ADMIN — MUPIROCIN: 20 OINTMENT TOPICAL at 21:25

## 2025-02-23 RX ADMIN — ALBUMIN (HUMAN) 25 G: 0.25 INJECTION, SOLUTION INTRAVENOUS at 16:46

## 2025-02-23 RX ADMIN — DIVALPROEX SODIUM 125 MG: 125 CAPSULE, COATED PELLETS ORAL at 05:52

## 2025-02-23 RX ADMIN — MUPIROCIN: 20 OINTMENT TOPICAL at 09:40

## 2025-02-23 RX ADMIN — Medication 5 MG: at 19:54

## 2025-02-23 RX ADMIN — VANCOMYCIN HYDROCHLORIDE 750 MG: 750 INJECTION, POWDER, LYOPHILIZED, FOR SOLUTION INTRAVENOUS at 05:48

## 2025-02-23 RX ADMIN — CLOPIDOGREL 75 MG: 75 TABLET, FILM COATED ORAL at 09:26

## 2025-02-23 RX ADMIN — HYDROXYZINE PAMOATE 25 MG: 25 CAPSULE ORAL at 19:54

## 2025-02-23 RX ADMIN — SODIUM CHLORIDE, PRESERVATIVE FREE 10 ML: 5 INJECTION INTRAVENOUS at 09:26

## 2025-02-23 RX ADMIN — VANCOMYCIN HYDROCHLORIDE 750 MG: 750 INJECTION, POWDER, LYOPHILIZED, FOR SOLUTION INTRAVENOUS at 13:50

## 2025-02-23 RX ADMIN — ASPIRIN 81 MG CHEWABLE TABLET 81 MG: 81 TABLET CHEWABLE at 09:26

## 2025-02-23 RX ADMIN — DIVALPROEX SODIUM 125 MG: 125 CAPSULE, COATED PELLETS ORAL at 13:50

## 2025-02-23 ASSESSMENT — PAIN DESCRIPTION - DESCRIPTORS: DESCRIPTORS: ACHING;JABBING;POUNDING

## 2025-02-23 ASSESSMENT — PAIN SCALES - GENERAL
PAINLEVEL_OUTOF10: 3
PAINLEVEL_OUTOF10: 0
PAINLEVEL_OUTOF10: 5

## 2025-02-23 ASSESSMENT — PAIN DESCRIPTION - LOCATION: LOCATION: HEAD

## 2025-02-23 NOTE — PLAN OF CARE
Problem: Discharge Planning  Goal: Discharge to home or other facility with appropriate resources  2/23/2025 0302 by Hailey Aguilera RN  Outcome: Progressing     Problem: Pain  Goal: Verbalizes/displays adequate comfort level or baseline comfort level  2/23/2025 0302 by Hailey Aguilera RN  Outcome: Progressing     Problem: Safety - Adult  Goal: Free from fall injury  2/23/2025 0302 by Hailey Aguilera RN  Outcome: Progressing     Problem: ABCDS Injury Assessment  Goal: Absence of physical injury  2/23/2025 0302 by Hailey Aguilera RN  Outcome: Progressing     Problem: Skin/Tissue Integrity  Goal: Skin integrity remains intact  Description: 1.  Monitor for areas of redness and/or skin breakdown  2.  Assess vascular access sites hourly  3.  Every 4-6 hours minimum:  Change oxygen saturation probe site  4.  Every 4-6 hours:  If on nasal continuous positive airway pressure, respiratory therapy assess nares and determine need for appliance change or resting period  2/23/2025 0302 by Hailey Aguilera RN  Outcome: Progressing     Problem: Neurosensory - Adult  Goal: Achieves stable or improved neurological status  2/23/2025 0302 by Hailey Aguilera RN  Outcome: Progressing     Problem: Neurosensory - Adult  Goal: Absence of seizures  2/23/2025 0302 by Hailey Aguilera RN  Outcome: Progressing     Problem: Neurosensory - Adult  Goal: Remains free of injury related to seizures activity  2/23/2025 0302 by Hailey Aguilera RN  Outcome: Progressing     Problem: Neurosensory - Adult  Goal: Achieves maximal functionality and self care  2/23/2025 0302 by Hailey Aguilera RN  Outcome: Progressing     Problem: Respiratory - Adult  Goal: Achieves optimal ventilation and oxygenation  2/23/2025 0302 by Hailey Aguilera RN  Outcome: Progressing     Problem: Cardiovascular - Adult  Goal: Maintains optimal cardiac output and hemodynamic stability  2/23/2025 0302 by Hailey Aguilera RN  Outcome: Progressing    0302 by Hailey Aguilera RN  Outcome: Progressing     Problem: Metabolic/Fluid and Electrolytes - Adult  Goal: Hemodynamic stability and optimal renal function maintained  2/23/2025 0302 by Hailey Aguilera RN  Outcome: Progressing     Problem: Metabolic/Fluid and Electrolytes - Adult  Goal: Glucose maintained within prescribed range  2/23/2025 0302 by Hailey Aguilera RN  Outcome: Progressing     Problem: Hematologic - Adult  Goal: Maintains hematologic stability  2/23/2025 0302 by Hailey Aguilera RN  Outcome: Progressing     Problem: Chronic Conditions and Co-morbidities  Goal: Patient's chronic conditions and co-morbidity symptoms are monitored and maintained or improved  2/23/2025 0302 by Hailey Aguilera RN  Outcome: Progressing

## 2025-02-23 NOTE — PLAN OF CARE
Assessed patient at bedside given new onset bifrontal headache.  Patient states he has a mild headache 5/10 bifrontal region, denying any focal weakness, not worsened by light, no complaints of nausea.      Physical exam showed pupils equally reactive to light, no obvious focal deficit, 5/5 strength on bilateral upper/lower extremities, sensation intact in all extremities.  Given previous findings of septic emboli to brain with concern of intraparenchymal hemorrhage at [facility], stat CT head ordered.  Ultram x 1 ordered for pain.    Daughter and mother at bedside and provided extensive update regarding patient's complex condition.  All questions and concerns answered.    Electronically signed by John Gallagher MD on 2/23/2025 at 4:54 PM

## 2025-02-23 NOTE — PROGRESS NOTES
[facility] Hospitalist Progress Note    Admitting Date and Time: 2/18/2025  7:30 PM  Admit Dx: Endocarditis [I38]    Synopsis:    Mr. Wagner Ryan, a 45 y.o. year old male  who  has a past medical history of Hx of hepatitis C-resolved, IV drug abuse (HCC), and Nonhealing nonsurgical wound with fat layer exposed.      Wagner Ryan is a 45 y.o. male who presents to the CVICU for evaluation by CTS surgery. Patient was being followed at [facility] for AMS and a STEMI and underwent stent placement of the LAD on 2/17.  He was admitted to the MICU following cath lab and started on broad spectrum antibiotics, ASA and Brilinta. During work up for his AMS, a CTA of the head showed tiny area of intraparenchymal hemorrhage in the right occipital lobe w/ MRI of the brain showed multiple small foci of acute and early subacute infarcts in bilateral frontal and parietal occipital lobe left more than right consistent with micro emboli. Neurology was consulted. They cleared the patient to continue on plavix given his recent stent placement.  He underwent an ECHO which was found to have vegetations and the decision was made to transfer to [facility] for evaluation by CTS Surgery.     2/19: Patient seen in CVICU.  Per ICU team and CTS, patient can be transferred as he has no ICU needs.  Transfer order placed.    2/20: Undergoing CTS workup.  Patient continues to have persistent confusion and delirium.  Seems like he was started on Decadron at previous hospital for concern of meningitis and it has not been stopped or weaned.  No mention for continuation of Decadron per ID here and treating for MRSA bacteremia as of now.  Hold Decadron.    2/21: Neurology stating patient has no brain bleed and that cardiology has to decide on DAPT.  Contacted cardiology and stated patient must be on DAPT given recent cardiac stenting and 2/17.  Advised to start aspirin and continue Plavix.  Orders placed.  CTS to be informed.    2/22: Per  S1 and S2 and no carotid bruits  Abdomen: soft, non-tender, non-distended, normal bowel sounds, no masses or organomegaly  Extremities: no cyanosis, no clubbing and no edema  Neurologic: no cranial nerve deficit and speech normal        Recent Labs     02/21/25  1705 02/22/25  0800 02/23/25  0500   * 135 139   K 3.7 3.0* 4.0   * 101 106   CO2 25 20* 19*   BUN 41* 38* 45*   CREATININE 1.2 1.3* 1.8*   GLUCOSE 118* 131* 101*   CALCIUM 7.9* 7.5* 7.8*       Recent Labs     02/21/25  0530 02/22/25  0800 02/23/25  0500   WBC 12.2* 12.3* 13.9*   RBC 3.73* 3.79* 3.36*   HGB 8.9* 8.9* 8.0*   HCT 27.9* 27.9* 24.8*   MCV 74.8* 73.6* 73.8*   MCH 23.9* 23.5* 23.8*   MCHC 31.9* 31.9* 32.3   RDW 16.3* 16.2* 15.9*    193 225   MPV 10.1 10.0 10.0         Assessment:    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  Neurology stated initial CT head showed intraparenchymal hemorrhage but MRI brain after that did not show these findings.  They defer DAPT to cardiology.  Cardiology stated if patient is cleared from neurostandpoint, patient must be on DAPT aspirin/Plavix for recent cardiac stenting on 2/17.  CTS following:  Undergoing preop workup  They are recommending holding Plavix prior to procedure but Cardiology stated on 2/21 patient must remain on DAPT  Discussed this with CTS on 2/21 and they stated they will talk to Cardiology.  Defer to specialists.  Neurology following for septic emboli in brain and concern of brain bleed:  They plan for repeat CT head on 2/24 to reevaluate for bleed  ID

## 2025-02-23 NOTE — PROGRESS NOTES
[facility]  [facility]  Progress Note      No chief complaint on file.      SUBJECTIVE:    Patient is tolerating medications. No reported adverse drug reactions.  No nausea, vomiting, diarrhea.    Review of systems:  As stated above in the chief complaint, otherwise negative.    Medications:  Scheduled Meds:   [START ON 2025] vancomycin  750 mg IntraVENous Q12H    albumin human 25%  25 g IntraVENous Q8H    aspirin  81 mg Oral Daily    sodium chloride flush  5-40 mL IntraVENous 2 times per day    lidocaine 1 % injection  50 mg IntraDERmal Once    divalproex  125 mg Oral 3 times per day    melatonin  5 mg Oral Nightly    mupirocin   Topical BID    metoprolol succinate  25 mg Oral BID    nicotine  1 patch TransDERmal Daily    atorvastatin  40 mg Oral Nightly    clopidogrel  75 mg Oral Daily    [Held by provider] losartan  25 mg Oral Daily     Continuous Infusions:   sodium chloride       PRN Meds:sodium chloride flush, sodium chloride, LORazepam, hydrOXYzine pamoate, acetaminophen **OR** acetaminophen, magnesium sulfate, ondansetron **OR** ondansetron, potassium chloride **OR** potassium alternative oral replacement **OR** potassium chloride, Carmex Classic Lip Balm    OBJECTIVE:  /76   Pulse 98   Temp 98.7 °F (37.1 °C) (Temporal)   Resp 20   Ht 1.93 m (6' 4\")   Wt 78.9 kg (174 lb)   SpO2 100%   BMI 21.18 kg/m²   Temp  Av.8 °F (36.6 °C)  Min: 97 °F (36.1 °C)  Max: 98.7 °F (37.1 °C)  Constitutional: The patient is awake, able to intermittently nod head to answer questions but not oriented    Skin: Warm and dry. No rashes were noted. No jaundice.  HEENT: Eyes show round, and reactive pupils. Moist mucous membranes, no ulcerations, no thrush.   Neck: Supple to movements. No lymphadenopathy.   Chest: No use of accessory muscles to breathe. Symmetrical expansion. Auscultation reveals no wheezing, crackles, or rhonchi.   Cardiovascular: S1 and S2 are rhythmic and regular.

## 2025-02-23 NOTE — PROGRESS NOTES
Wagner Ryan is a 45 y.o. male     Patient presented the ED on 2/17/2025 at [facility] with fatigue and alteration in mentation.  He was found to have NSTEMI and taken to the Cath Lab where he had stent placed.    Because of alteration in mentation MRI of the brain did demonstrate multiple areas of ischemia some in the parieto-occipital area demonstrating some vasogenic edema.  Transesophageal echo was completed which was highly suspicious for vegetation therefore [facility]    No new neuro events reported    CTS notes reviewed    Spoke with CTS this a.m.-MRI did not demonstrate intracranial hemorrhage-CT prior to MRI did.  Okay for heparin for intervention but will also repeat CT on Monday prior to intervention.    Mentation has been improving every day    Allergies as of 02/18/2025    (No Known Allergies)       Objective:     /67   Pulse (!) 103   Temp 98.4 °F (36.9 °C) (Temporal)   Resp 22   Ht 1.93 m (6' 4\")   Wt 78.9 kg (174 lb)   SpO2 100%   BMI 21.18 kg/m²      General appearance: Awake looking around the room  Head: Normocephalic, without obvious abnormality, atraumatic  Extremities: no cyanosis or edema  Pulses: 2+ and symmetric  Skin: no rashes or lesions    Mental Status: Awake looking around the room oriented to self and year   Also aware of month today    Speech: Dysarthric  Language: appropriate but laconic     No echolalia today    Cranial Nerves:  I: smell    II: visual acuity     II: visual fields Full   II: pupils KENZIE   III,VII: ptosis None   III,IV,VI: extraocular muscles  EOMI without nystagmus    V: mastication Normal   V: facial light touch sensation  Normal   V,VII: corneal reflex  Present   VII: facial muscle function - upper     VII: facial muscle function - lower Normal   VIII: hearing Normal   IX: soft palate elevation  Normal   IX,X: gag reflex    XI: trapezius strength  5/5   XI: sternocleidomastoid  right frontal, parietal and occipital  lobes. These are likely related to subacute infarctions.  If there a history  of malignancy, follow-up with contrast enhanced MRI is recommended.    MRV  Unremarkable MRV of the head.     CTA NECK:  1. No dissection or hemodynamically significant stenosis of the major  arteries of the neck.  2. 1.4 cm hypodense lesion in the left submandibular gland. Further  evaluation with ultrasound is recommended.  3. Significant elongation of the stylohyoid ligaments. Clinical correlation  for Algaaciq syndrome is recommended.    CTA HEAD:  No large vessel occlusion or significant stenosis.    CT Head (prior to MRI)  Tiny area of intraparenchymal hemorrhage     JAVED  Small vegetations involving the mitral and aortic valves consistent with endocarditis without valvular destruction.     I personally reviewed the patient's lab and imaging studies at this time.    Assessment:     Patient with a history of drug abuse presenting with alteration in mentation status post stent placement found to have multiple areas of acute ischemia on MRI of the brain -transesophageal echo suspected septic embolization   CTS following     Plan:     Cardiothoracic surgery to follow    Will repeat CT Head Monday (for ?bleed)    Continue to monitor neurologically    Patient will need antiplatelet for recent stenting-was receiving Brilinta but is now transition to clopidogrel?   Defer to cardiology if pause is needed prior to any surgical intervention due to stent    Norm Stevenson, CESAR - CNS  10:37 AM  2/23/2025

## 2025-02-23 NOTE — PROGRESS NOTES
CC:SOB    Brief HPI:  Awake, alert. No complaints.    Past Medical History:   Diagnosis Date    Hx of hepatitis C-resolved 3/5/2022    IV drug abuse (HCC)     Nonhealing nonsurgical wound with fat layer exposed      Past Surgical History:   Procedure Laterality Date    APPLY DRESSING Left 10/9/2020    DEBRIDEMENT OF SKIN, SOFT TISSUE, MUSCLE, EXPLORATION OF UPPER ARM INTEGRA GRAFT FOR COVERAGE performed by Camilo Denny MD at [facility] OR    APPLY DRESSING Left 10/14/2020    LEFT UPPER EXTREMITY WOUND VAC PLACEMENT performed by Eddie Ruff MD at [facility] OR    CARDIAC PROCEDURE N/A 2/17/2025    Left heart cath / coronary angiography performed by Bg Gaytan MD at [facility] CARDIAC CATH/IR    CARDIAC PROCEDURE Left 2/17/2025    Percutaneous coronary intervention performed by Bg Gaytan MD at [facility] CARDIAC CATH/IR    INCISION AND DRAINAGE Right 4/23/2020    RIGHT UPPER EXTREMITY INCISION AND DRAINAGE REMOVAL FOREIGN BODY WITH C-ARM performed by Kapil Yuan MD at [facility] OR    INCISION AND DRAINAGE Left 10/7/2020    LEFT ELBOW INCISION AND DRAINAGE performed by Kapil Yuan MD at [facility] OR    LEG SURGERY Right 3/6/2022    IRRIGATION AND DEBRIDEMENT RIGHT THIGH performed by Wes Branhc MD at [facility] OR    LA EXPLORATION OF ARTERY/VEIN Left 10/7/2020    LEFT ARM BRACHIAL ARTERY EXPLORATION, REPAIR OF MYCOTIC ANUERYSM WITH BYPASS performed by Eddie Ruff MD at [facility] OR    TRANSESOPHAGEAL ECHOCARDIOGRAM N/A 4/24/2020    TRANSESOPHAGEAL ECHOCARDIOGRAM performed by Natasha Bowie MD at [facility] ENDOSCOPY    TRANSESOPHAGEAL ECHOCARDIOGRAM  10/14/2020     Social History     Socioeconomic History    Marital status: Single     Spouse name: Not on file    Number of children: Not on file    Years of education: Not on file    Highest education level: Not on file   Occupational History    Not on file   Tobacco Use    Smoking status: Every Day     Current packs/day: 1.00     Types: Cigarettes    Smokeless tobacco:  warm and dry.   Psychiatric: normal mood and affect.       ASSESSMENT:   Patient Active Problem List   Diagnosis    Right arm cellulitis    IV drug abuse (HCC)    Superficial foreign body of right upper arm    Cellulitis of right arm    Bacteremia    Skin ulcer of upper arm with fat layer exposed (HCC)    Abscess    Pseudoaneurysm of brachial artery    Poor venous access    Pseudoaneurysm    Mycotic aneurysm    Nonhealing nonsurgical wound with fat layer exposed    Abdominal wall abscess    Tobacco dependence    Abscess of chest wall    Osteomyelitis (HCC)    Hx of hepatitis C-resolved    Sinus tachycardia    STEMI (ST elevation myocardial infarction) (HCC)    Sepsis without acute organ dysfunction (HCC)    Thrombocytopenia    Altered mental status    Acute coronary syndrome (HCC)    Septic shock (HCC)    Acute bacterial endocarditis    Aortic valve endocarditis    Cerebrovascular accident (CVA) due to bilateral embolism of middle cerebral arteries (HCC)    Cardiomyopathy (HCC)    Cerebral septic emboli (HCC)    Altered level of consciousness               PLAN:  Work up underway.  His echo does not show much in the way of AI or MR, and he is certainly not in heart failure.  The risk of operating with a brand new OBED would outweigh the benefit at this point, likely.  Jordan Perez MD        Note: Greater than or equal to 35 minutes was spent providing face-to-face patient care, including:  and coordinating care, reviewing the chart, labs, and diagnostics, as well as medical decision making. Greater than 50% of this time was spent instructing and counseling the patient face to face regarding findings and recommendations.

## 2025-02-23 NOTE — CONSULTS
Reconsult note         CARDIOLOGY    Reason for consultation: Cardiology was reconsulted today for recommendations regarding dual antiplatelet therapy for his coronary artery disease.    He denies chest pain or shortness of breath today.    Wt Readings from Last 3 Encounters:   02/20/25 78.9 kg (174 lb)   02/18/25 81.2 kg (179 lb 0.2 oz)   02/17/25 81.2 kg (179 lb)     Temp Readings from Last 3 Encounters:   02/23/25 98.7 °F (37.1 °C) (Temporal)   02/18/25 98.6 °F (37 °C) (Axillary)   02/16/25 97.2 °F (36.2 °C) (Temporal)     BP Readings from Last 3 Encounters:   02/23/25 111/76   02/18/25 125/74   02/16/25 112/80     Pulse Readings from Last 3 Encounters:   02/23/25 98   02/18/25 97   02/16/25 (!) 106         Intake/Output Summary (Last 24 hours) at 2/23/2025 1220  Last data filed at 2/23/2025 1205  Gross per 24 hour   Intake 840 ml   Output 1125 ml   Net -285 ml       Recent Labs     02/21/25  0530 02/22/25  0800 02/23/25  0500   WBC 12.2* 12.3* 13.9*   HGB 8.9* 8.9* 8.0*   HCT 27.9* 27.9* 24.8*   MCV 74.8* 73.6* 73.8*    193 225     Recent Labs     02/21/25  1705 02/22/25  0800 02/23/25  0500   * 135 139   K 3.7 3.0* 4.0   * 101 106   CO2 25 20* 19*   BUN 41* 38* 45*   CREATININE 1.2 1.3* 1.8*     Recent Labs     02/21/25  0730   PROTIME 13.0*   INR 1.2     No results for input(s): \"CKTOTAL\", \"CKMB\", \"CKMBINDEX\", \"TROPONINI\" in the last 72 hours.  No results for input(s): \"BNP\" in the last 72 hours.  No results for input(s): \"CHOL\", \"HDL\", \"TRIG\" in the last 72 hours.    Invalid input(s): \"CHOLHDLR\", \"LDLCALCU\"  No results for input(s): \"TROPHS\" in the last 72 hours.      albumin human 25% IV solution 25 g, Q8H  vancomycin (VANCOCIN) 750 mg in sodium chloride 0.9 % 250 mL IVPB ([id number]), Q8H  aspirin chewable tablet 81 mg, Daily  sodium chloride flush 0.9 % injection 5-40 mL, 2 times per day  sodium chloride flush 0.9 % injection 5-40 mL, PRN  0.9 % sodium chloride infusion, PRN  lidocaine PF  1 % injection 50 mg, Once  divalproex (DEPAKOTE SPRINKLE) DR capsule 125 mg, 3 times per day  melatonin disintegrating tablet 5 mg, Nightly  LORazepam (ATIVAN) injection 0.5 mg, Q6H PRN  hydrOXYzine pamoate (VISTARIL) capsule 25 mg, TID PRN  mupirocin (BACTROBAN) 2 % ointment, BID  metoprolol succinate (TOPROL XL) extended release tablet 25 mg, BID  nicotine (NICODERM CQ) 21 MG/24HR 1 patch, Daily  acetaminophen (TYLENOL) tablet 650 mg, Q6H PRN   Or  acetaminophen (TYLENOL) suppository 650 mg, Q6H PRN  atorvastatin (LIPITOR) tablet 40 mg, Nightly  clopidogrel (PLAVIX) tablet 75 mg, Daily  losartan (COZAAR) tablet 25 mg, Daily  magnesium sulfate 2000 mg in 50 mL IVPB premix, PRN  ondansetron (ZOFRAN-ODT) disintegrating tablet 4 mg, Q8H PRN   Or  ondansetron (ZOFRAN) injection 4 mg, Q6H PRN  potassium chloride (KLOR-CON M) extended release tablet 40 mEq, PRN   Or  potassium bicarb-citric acid (EFFER-K) effervescent tablet 40 mEq, PRN   Or  potassium chloride 10 mEq/100 mL IVPB (Peripheral Line), PRN  Carmex Classic Lip Balm OINT 1 Application, PRN        Review of systems:     Heart: as above   Lungs: as above   Eyes: denies changes in vision or discharge.    Ears: denies changes in hearing or pain.   Nose: denies epistaxis or masses   Throat: denies sore throat or trouble swallowing.    Neuro: denies numbness, tingling, tremors.   Skin: denies rashes or itching.   : denies hematuria, dysuria   GI: denies vomiting, diarrhea   Psych: denies mood changed, anxiety, depression.         Physical exam:    Constitutional: A&O x3, communicates well, no acute distress.  Eyes: extraocular muscles intact, PERRL.  Normal lids & conjunctiva.  No icterus.   ENT: clear, no bleeding.  No external masses.  Lips normal formation.  Neck: supple, full ROM, no JVD, no bruits, no lymphadenopathy.  No masses.  trachea midline.  Heart: regular rate & rhythm, normal S1 & S2, 2/6 systolic murmur.  No heave.  Lungs: CTA.  No accessory

## 2025-02-23 NOTE — PROGRESS NOTES
hepatitis C, IV drug abuse, multiple episodes of bacteremia, who was initially admitted to [facility] with shortness of breath, he was found to have ST elevation MI, and therefore he underwent urgent cardiac catheterization with his stenting of the LAD, he is well was found to have MRSA bacteremia, JAVED showed mitral valve and tricuspid valve reason for his transfer to this hospital on 2/18/2025.  Of note MRI of the brain showed multiple small foci of acute or early subacute infarctions in the bilateral frontoparietal and occipital lobes.  On admission his sodium level was 130 but since then his sodium has progressively increase up to 152, reason for this consultation.      Problems resolved:  Hyponatremia, seem to be with water deficit due to poor oral intake, improved to 135 today        IMPRESSION/RECOMMENDATIONS:      BELLO stage II, probably hemodynamic mediated due to volume transition of ARB administration, r/o urinary retention.  Creatinine level has increased last 24 hours    Hypokalemia, 2/2 poor intake and probably renal potassium wasting, resolved  -----------------------------  Microcytic anemia  MRSA endocarditis, mitral valve and aortic valve endocarditis  STEMI status post LAD stent placement  Septic emboli to the brain  Hepatitis C  Hypoalbuminemia, multifactorial    Plan:    Obtain bladder scan  Albumin IV 25 g IV 8 x 6  Repeat BMP   Hold losartan  Obtain urine indices  Continue monitor sodium level  Monitor renal function, BMP at 4 PM  Replace potassium  Strict I's and O's    I saw and evaluated the patient, performing the key elements of the service. I discussed the findings, assessment and plan with NP and agree with her findings and plans as documented in her note.        Deon Case MD

## 2025-02-23 NOTE — PROGRESS NOTES
Pharmacy Consultation Note  (Antibiotic Dosing and Monitoring)    Initial consult date: 2/18/25  Consulting physician/provider: Shelly  Drug: Vancomycin  Indication: Endocarditis/Endovascular     Age/  Gender Height Weight IBW  Allergy Information   45 y.o./male 193 cm (6' 4\") 80.2 kg (176 lb 12.8 oz)     Ideal body weight: 86.8 kg (191 lb 5.7 oz)   Patient has no known allergies.      Renal Function:  Recent Labs     02/21/25  1705 02/22/25  0800 02/23/25  0500   BUN 41* 38* 45*   CREATININE 1.2 1.3* 1.8*       Intake/Output Summary (Last 24 hours) at 2/23/2025 0756  Last data filed at 2/22/2025 1841  Gross per 24 hour   Intake 360 ml   Output 1000 ml   Net -640 ml       Vancomycin Monitoring:  Trough:    Recent Labs     02/22/25  0805   VANCOTROUGH 30.2*     Random:  No results for input(s): \"VANCORANDOM\" in the last 72 hours.    Recent vancomycin administrations                     vancomycin (VANCOCIN) 1,000 mg in sodium chloride 0.9 % 250 mL IVPB ([id number]) (mg) 1,000 mg New Bag 02/22/25 0220     1,000 mg New Bag 02/21/25 1819     1,000 mg New Bag  0959     1,000 mg New Bag  0033     1,000 mg New Bag 02/20/25 1533    vancomycin (VANCOCIN) 1,250 mg in sodium chloride 0.9 % 250 mL IVPB ([id number]) (mg) 1,250 mg New Bag 02/20/25 0816     1,250 mg New Bag 02/19/25 2023     1,250 mg New Bag  0923                  Assessment:  Patient is a 45 y.o. male who has been initiated on vancomycin  Estimated Creatinine Clearance: 58 mL/min (A) (based on SCr of 1.8 mg/dL (H)).  To dose vancomycin, pharmacy will be utilizing [employer] calculation software for goal AUC/DAYAN 400-600 mg/L-hr (predicted AUC/DAYAN = 599, Tr =18.4 mcg/mL)  Trough today = 17.1, Scr 1.8 today    Plan:  Adjust to vancomycin 750mg q12h   Will check vancomycin levels as needed  Will continue to monitor renal function  Pharmacy to follow      Lizzeth Randle PharmD, BCPS, BCCCP 2/23/2025 7:56 AM    NAHOMI: [phone]  SEY: [phone]  SJW: [phone]

## 2025-02-24 ENCOUNTER — APPOINTMENT (OUTPATIENT)
Dept: CT IMAGING | Age: 45
End: 2025-02-24
Attending: INTERNAL MEDICINE

## 2025-02-24 ENCOUNTER — APPOINTMENT (OUTPATIENT)
Dept: GENERAL RADIOLOGY | Age: 45
End: 2025-02-24
Attending: INTERNAL MEDICINE

## 2025-02-24 ENCOUNTER — APPOINTMENT (OUTPATIENT)
Dept: INTERVENTIONAL RADIOLOGY/VASCULAR | Age: 45
End: 2025-02-24
Attending: INTERNAL MEDICINE

## 2025-02-24 LAB
ALBUMIN SERPL-MCNC: 3 G/DL (ref 3.5–5.2)
ALP SERPL-CCNC: 59 U/L (ref 40–129)
ALT SERPL-CCNC: 13 U/L (ref 0–40)
ANION GAP SERPL CALCULATED.3IONS-SCNC: 14 MMOL/L (ref 7–16)
AST SERPL-CCNC: 14 U/L (ref 0–39)
BASOPHILS # BLD: 0.02 K/UL (ref 0–0.2)
BASOPHILS NFR BLD: 0 % (ref 0–2)
BILIRUB SERPL-MCNC: 0.4 MG/DL (ref 0–1.2)
BUN SERPL-MCNC: 47 MG/DL (ref 6–20)
CALCIUM SERPL-MCNC: 8.1 MG/DL (ref 8.6–10.2)
CHLORIDE SERPL-SCNC: 106 MMOL/L (ref 98–107)
CO2 SERPL-SCNC: 20 MMOL/L (ref 22–29)
CREAT SERPL-MCNC: 1.8 MG/DL (ref 0.7–1.2)
EOSINOPHIL # BLD: 0.17 K/UL (ref 0.05–0.5)
EOSINOPHILS RELATIVE PERCENT: 1 % (ref 0–6)
ERYTHROCYTE [DISTWIDTH] IN BLOOD BY AUTOMATED COUNT: 16.3 % (ref 11.5–15)
GFR, ESTIMATED: 46 ML/MIN/1.73M2
GLUCOSE SERPL-MCNC: 110 MG/DL (ref 74–99)
HCT VFR BLD AUTO: 22.6 % (ref 37–54)
HGB BLD-MCNC: 7.4 G/DL (ref 12.5–16.5)
IMM GRANULOCYTES # BLD AUTO: 0.2 K/UL (ref 0–0.58)
IMM GRANULOCYTES NFR BLD: 2 % (ref 0–5)
LYMPHOCYTES NFR BLD: 1.77 K/UL (ref 1.5–4)
LYMPHOCYTES RELATIVE PERCENT: 13 % (ref 20–42)
MCH RBC QN AUTO: 24 PG (ref 26–35)
MCHC RBC AUTO-ENTMCNC: 32.7 G/DL (ref 32–34.5)
MCV RBC AUTO: 73.4 FL (ref 80–99.9)
MONOCYTES NFR BLD: 0.87 K/UL (ref 0.1–0.95)
MONOCYTES NFR BLD: 6 % (ref 2–12)
NEUTROPHILS NFR BLD: 78 % (ref 43–80)
NEUTS SEG NFR BLD: 10.57 K/UL (ref 1.8–7.3)
PLATELET # BLD AUTO: 267 K/UL (ref 130–450)
PMV BLD AUTO: 9.7 FL (ref 7–12)
POTASSIUM SERPL-SCNC: 3.9 MMOL/L (ref 3.5–5)
PROT SERPL-MCNC: 5.9 G/DL (ref 6.4–8.3)
RBC # BLD AUTO: 3.08 M/UL (ref 3.8–5.8)
SODIUM SERPL-SCNC: 140 MMOL/L (ref 132–146)
WBC OTHER # BLD: 13.6 K/UL (ref 4.5–11.5)

## 2025-02-24 PROCEDURE — 77001 FLUOROGUIDE FOR VEIN DEVICE: CPT

## 2025-02-24 PROCEDURE — 36558 INSERT TUNNELED CV CATH: CPT

## 2025-02-24 PROCEDURE — 6360000002 HC RX W HCPCS

## 2025-02-24 PROCEDURE — 2060000000 HC ICU INTERMEDIATE R&B

## 2025-02-24 PROCEDURE — 6370000000 HC RX 637 (ALT 250 FOR IP): Performed by: INTERNAL MEDICINE

## 2025-02-24 PROCEDURE — 70450 CT HEAD/BRAIN W/O DYE: CPT

## 2025-02-24 PROCEDURE — 99232 SBSQ HOSP IP/OBS MODERATE 35: CPT | Performed by: PHYSICIAN ASSISTANT

## 2025-02-24 PROCEDURE — P9047 ALBUMIN (HUMAN), 25%, 50ML: HCPCS | Performed by: INTERNAL MEDICINE

## 2025-02-24 PROCEDURE — 2709999900 IR TUNNELED CVC PLACE WO SQ PORT/PUMP > 5 YEARS

## 2025-02-24 PROCEDURE — 80053 COMPREHEN METABOLIC PANEL: CPT

## 2025-02-24 PROCEDURE — 99232 SBSQ HOSP IP/OBS MODERATE 35: CPT | Performed by: THORACIC SURGERY (CARDIOTHORACIC VASCULAR SURGERY)

## 2025-02-24 PROCEDURE — 6360000002 HC RX W HCPCS: Performed by: RADIOLOGY

## 2025-02-24 PROCEDURE — 36415 COLL VENOUS BLD VENIPUNCTURE: CPT

## 2025-02-24 PROCEDURE — 2500000003 HC RX 250 WO HCPCS: Performed by: RADIOLOGY

## 2025-02-24 PROCEDURE — 2580000003 HC RX 258

## 2025-02-24 PROCEDURE — 6360000002 HC RX W HCPCS: Performed by: INTERNAL MEDICINE

## 2025-02-24 PROCEDURE — 71045 X-RAY EXAM CHEST 1 VIEW: CPT

## 2025-02-24 PROCEDURE — 2500000003 HC RX 250 WO HCPCS: Performed by: INTERNAL MEDICINE

## 2025-02-24 PROCEDURE — 02HV33Z INSERTION OF INFUSION DEVICE INTO SUPERIOR VENA CAVA, PERCUTANEOUS APPROACH: ICD-10-PCS | Performed by: INTERNAL MEDICINE

## 2025-02-24 PROCEDURE — 87040 BLOOD CULTURE FOR BACTERIA: CPT

## 2025-02-24 PROCEDURE — 99232 SBSQ HOSP IP/OBS MODERATE 35: CPT | Performed by: INTERNAL MEDICINE

## 2025-02-24 PROCEDURE — 85025 COMPLETE CBC W/AUTO DIFF WBC: CPT

## 2025-02-24 PROCEDURE — 76937 US GUIDE VASCULAR ACCESS: CPT

## 2025-02-24 PROCEDURE — 51798 US URINE CAPACITY MEASURE: CPT

## 2025-02-24 RX ORDER — LIDOCAINE HYDROCHLORIDE 20 MG/ML
INJECTION, SOLUTION INFILTRATION; PERINEURAL PRN
Status: COMPLETED | OUTPATIENT
Start: 2025-02-24 | End: 2025-02-24

## 2025-02-24 RX ORDER — MIDAZOLAM HYDROCHLORIDE 2 MG/2ML
INJECTION, SOLUTION INTRAMUSCULAR; INTRAVENOUS PRN
Status: COMPLETED | OUTPATIENT
Start: 2025-02-24 | End: 2025-02-24

## 2025-02-24 RX ORDER — FUROSEMIDE 10 MG/ML
20 INJECTION INTRAMUSCULAR; INTRAVENOUS ONCE
Status: COMPLETED | OUTPATIENT
Start: 2025-02-24 | End: 2025-02-24

## 2025-02-24 RX ORDER — IPRATROPIUM BROMIDE AND ALBUTEROL SULFATE 2.5; .5 MG/3ML; MG/3ML
1 SOLUTION RESPIRATORY (INHALATION) EVERY 4 HOURS PRN
Status: DISCONTINUED | OUTPATIENT
Start: 2025-02-24 | End: 2025-03-07 | Stop reason: HOSPADM

## 2025-02-24 RX ORDER — FENTANYL CITRATE 50 UG/ML
INJECTION, SOLUTION INTRAMUSCULAR; INTRAVENOUS PRN
Status: COMPLETED | OUTPATIENT
Start: 2025-02-24 | End: 2025-02-24

## 2025-02-24 RX ADMIN — MIDAZOLAM HYDROCHLORIDE 1 MG: 1 INJECTION, SOLUTION INTRAMUSCULAR; INTRAVENOUS at 13:05

## 2025-02-24 RX ADMIN — LIDOCAINE HYDROCHLORIDE 2 ML: 20 INJECTION, SOLUTION INFILTRATION; PERINEURAL at 13:21

## 2025-02-24 RX ADMIN — FENTANYL CITRATE 50 MCG: 50 INJECTION, SOLUTION INTRAMUSCULAR; INTRAVENOUS at 13:12

## 2025-02-24 RX ADMIN — METOPROLOL SUCCINATE 25 MG: 25 TABLET, EXTENDED RELEASE ORAL at 08:52

## 2025-02-24 RX ADMIN — LIDOCAINE HYDROCHLORIDE 5 ML: 20 INJECTION, SOLUTION INFILTRATION; PERINEURAL at 13:17

## 2025-02-24 RX ADMIN — DIVALPROEX SODIUM 125 MG: 125 CAPSULE, COATED PELLETS ORAL at 14:33

## 2025-02-24 RX ADMIN — ASPIRIN 81 MG CHEWABLE TABLET 81 MG: 81 TABLET CHEWABLE at 10:50

## 2025-02-24 RX ADMIN — ALBUMIN (HUMAN) 25 G: 0.25 INJECTION, SOLUTION INTRAVENOUS at 01:40

## 2025-02-24 RX ADMIN — MIDAZOLAM HYDROCHLORIDE 1 MG: 1 INJECTION, SOLUTION INTRAMUSCULAR; INTRAVENOUS at 13:12

## 2025-02-24 RX ADMIN — CLOPIDOGREL 75 MG: 75 TABLET, FILM COATED ORAL at 10:51

## 2025-02-24 RX ADMIN — VANCOMYCIN HYDROCHLORIDE 750 MG: 750 INJECTION, POWDER, LYOPHILIZED, FOR SOLUTION INTRAVENOUS at 01:39

## 2025-02-24 RX ADMIN — Medication 5 MG: at 20:29

## 2025-02-24 RX ADMIN — FUROSEMIDE 20 MG: 10 INJECTION, SOLUTION INTRAMUSCULAR; INTRAVENOUS at 17:50

## 2025-02-24 RX ADMIN — ATORVASTATIN CALCIUM 40 MG: 40 TABLET, FILM COATED ORAL at 20:29

## 2025-02-24 RX ADMIN — CEFAZOLIN 2000 MG: 2 INJECTION, POWDER, FOR SOLUTION INTRAMUSCULAR; INTRAVENOUS at 12:44

## 2025-02-24 RX ADMIN — FENTANYL CITRATE 50 MCG: 50 INJECTION, SOLUTION INTRAMUSCULAR; INTRAVENOUS at 13:05

## 2025-02-24 RX ADMIN — DIVALPROEX SODIUM 125 MG: 125 CAPSULE, COATED PELLETS ORAL at 20:29

## 2025-02-24 RX ADMIN — SODIUM CHLORIDE, PRESERVATIVE FREE 10 ML: 5 INJECTION INTRAVENOUS at 08:51

## 2025-02-24 RX ADMIN — SODIUM CHLORIDE, PRESERVATIVE FREE 10 ML: 5 INJECTION INTRAVENOUS at 20:29

## 2025-02-24 RX ADMIN — VANCOMYCIN HYDROCHLORIDE 750 MG: 750 INJECTION, POWDER, LYOPHILIZED, FOR SOLUTION INTRAVENOUS at 17:50

## 2025-02-24 RX ADMIN — METOPROLOL SUCCINATE 25 MG: 25 TABLET, EXTENDED RELEASE ORAL at 20:29

## 2025-02-24 RX ADMIN — DIVALPROEX SODIUM 125 MG: 125 CAPSULE, COATED PELLETS ORAL at 06:23

## 2025-02-24 ASSESSMENT — PAIN SCALES - GENERAL: PAINLEVEL_OUTOF10: 0

## 2025-02-24 NOTE — PROGRESS NOTES
[facility]  [facility]  Progress Note      No chief complaint on file.      SUBJECTIVE:    Patient is tolerating medications. No reported adverse drug reactions.  No nausea, vomiting, diarrhea.    Review of systems:  As stated above in the chief complaint, otherwise negative.    Medications:  Scheduled Meds:   vancomycin  750 mg IntraVENous Q12H    aspirin  81 mg Oral Daily    sodium chloride flush  5-40 mL IntraVENous 2 times per day    lidocaine 1 % injection  50 mg IntraDERmal Once    divalproex  125 mg Oral 3 times per day    melatonin  5 mg Oral Nightly    metoprolol succinate  25 mg Oral BID    nicotine  1 patch TransDERmal Daily    atorvastatin  40 mg Oral Nightly    clopidogrel  75 mg Oral Daily    [Held by provider] losartan  25 mg Oral Daily     Continuous Infusions:   sodium chloride       PRN Meds:sodium chloride flush, sodium chloride, LORazepam, hydrOXYzine pamoate, acetaminophen **OR** acetaminophen, magnesium sulfate, ondansetron **OR** ondansetron, potassium chloride **OR** potassium alternative oral replacement **OR** potassium chloride, Carmex Classic Lip Balm    OBJECTIVE:  BP (!) 179/90   Pulse 95   Temp 98.5 °F (36.9 °C) (Temporal)   Resp 18   Ht 1.93 m (6' 4\")   Wt 78.9 kg (174 lb)   SpO2 100%   BMI 21.18 kg/m²   Temp  Av °F (36.7 °C)  Min: 97.2 °F (36.2 °C)  Max: 98.7 °F (37.1 °C)  Constitutional: The patient is awake, able to intermittently nod head to answer questions but not oriented    Skin: Warm and dry. No rashes were noted. No jaundice.  HEENT: Eyes show round, and reactive pupils. Moist mucous membranes, no ulcerations, no thrush.   Neck: Supple to movements. No lymphadenopathy.   Chest: No use of accessory muscles to breathe. Symmetrical expansion. Auscultation reveals no wheezing, crackles, or rhonchi.   Cardiovascular: S1 and S2 are rhythmic and regular. No murmurs appreciated.   Abdomen: Positive bowel sounds to auscultation. Benign to  follow    Db Frazier MD  2:33 PM  2/24/2025

## 2025-02-24 NOTE — PROCEDURES
PROCEDURE NOTE  Date: 2/24/2025   Name: Wagner Ryan  YOB: 1980    Procedures: Tunneled Central venous catheter placement.     Patient arrived to Radiology department for paracentesis. Allergies, home medications, H&P and fasting instructions reviewed with patient. Vital signs taken. Procedural instructions given, questions answered, understanding expressed and consent signed. Patient given fluoroscopy education, no questions at this time.

## 2025-02-24 NOTE — PROGRESS NOTES
Department of Internal Medicine  Nephrology Progress Note      Events reviewed     SUBJECTIVE: Shortness of breath We are following for hyponatremia. He has no complaints today.    PHYSICAL EXAM:      Vitals:    VITALS:  BP (!) 139/91   Pulse 99   Temp 97.5 °F (36.4 °C) (Temporal)   Resp 18   Ht 1.93 m (6' 4\")   Wt 78.9 kg (174 lb)   SpO2 96%   BMI 21.18 kg/m²   24HR PULSE OXIMETRY RANGE:  SpO2  Av.4 %  Min: 95 %  Max: 100 %  24HR INTAKE/OUTPUT:    Intake/Output Summary (Last 24 hours) at 2025 0907  Last data filed at 2025 1900  Gross per 24 hour   Intake 900 ml   Output 125 ml   Net 775 ml       Constitutional: Patient is awake nonverbal no distress  HEENT: Pupils equal reactive, mucous membranes are dry  Respiratory: Lungs are clear  Cardiovascular/Edema: Heart sounds are regular  Gastrointestinal: Abdomen is soft  Neurologic: Patient is lethargic, nonverbal  Skin: No skin rashes  Other: No edema    Scheduled Meds:   vancomycin  750 mg IntraVENous Q12H    aspirin  81 mg Oral Daily    sodium chloride flush  5-40 mL IntraVENous 2 times per day    lidocaine 1 % injection  50 mg IntraDERmal Once    divalproex  125 mg Oral 3 times per day    melatonin  5 mg Oral Nightly    metoprolol succinate  25 mg Oral BID    nicotine  1 patch TransDERmal Daily    atorvastatin  40 mg Oral Nightly    clopidogrel  75 mg Oral Daily    [Held by provider] losartan  25 mg Oral Daily     Continuous Infusions:   sodium chloride       PRN Meds:.sodium chloride flush, sodium chloride, LORazepam, hydrOXYzine pamoate, acetaminophen **OR** acetaminophen, magnesium sulfate, ondansetron **OR** ondansetron, potassium chloride **OR** potassium alternative oral replacement **OR** potassium chloride, Carmex Classic Lip Balm  .    DATA:    CBC:   Lab Results   Component Value Date/Time    WBC 13.6 2025 06:19 AM    RBC 3.08 2025 06:19 AM    HGB 7.4 2025 06:19 AM    HCT 22.6 2025 06:19 AM    MCV 73.4  to [facility] with shortness of breath, he was found to have ST elevation MI, and therefore he underwent urgent cardiac catheterization with his stenting of the LAD, he is well was found to have MRSA bacteremia, JAVED showed mitral valve and tricuspid valve reason for his transfer to this hospital on 2/18/2025.  Of note MRI of the brain showed multiple small foci of acute or early subacute infarctions in the bilateral frontoparietal and occipital lobes.  On admission his sodium level was 130 but since then his sodium has progressively increase up to 152, reason for this consultation.      Problems resolved:  Hyponatremia, seem to be with water deficit due to poor oral intake, improved to 135 today        IMPRESSION/RECOMMENDATIONS:      BELLO stage II, probably hemodynamic mediated due to volume depletion in the setting of ARB administration , bladder scan 148 mL, creatinine stable at 1.8 mg/dL    Hypokalemia, 2/2 poor intake and probably renal potassium wasting, resolved  -----------------------------  Microcytic anemia  MRSA endocarditis, mitral valve and aortic valve endocarditis  STEMI status post LAD stent placement  Septic emboli to the brain  Hepatitis C  Hypoalbuminemia, multifactorial    Plan:    Albumin IV 25 g IV 8 x 6  Continue to hold losartan  Continue monitor sodium level  Continue to monitor renal function  Strict I's and O's    Electronically signed by CESAR Yoder CNP on 2/24/2025 at 11:38 AM     I saw and evaluated the patient, performing the key elements of the service. I discussed the findings, assessment and plan with NP and agree with her findings and plans as documented in her note.    Deon Case MD

## 2025-02-24 NOTE — OR NURSING
CHG infused, transparent sterile dressing applied to CVC insertion site.    The dressing application occurred in a well-lit room with strict adherence to aseptic technique. Procedure site revealed no signs of hematoma or other obvious complications after catheter removal.    The dressing should be removed occur in 24 hours. No submersion of puncture site in water for 72 hours post procedure.

## 2025-02-24 NOTE — PROGRESS NOTES
Spoke with Dr. Ponce and patient can get thinners since he is a new stent patient and we will still do the CVC placement. Bedside RN aware. Will call when able to send for the patient.

## 2025-02-24 NOTE — PRE SEDATION
Sedation Pre-Procedure Note    Patient Name: Wagner Ryan   YOB: 1980  Room/Bed: 7410/7410-A  Medical Record Number: [id number]  Date: 2/24/2025   Time: 1:31 PM       Indication:  Need for long term IV access    Consent: I have discussed with the patient and/or the patient representative the indication, alternatives, and the possible risks and/or complications of the planned procedure and the anesthesia methods. The patient and/or patient representative appear to understand and agree to proceed.    Vital Signs:   Vitals:    02/24/25 1325   BP: (!) 179/101   Pulse: 97   Resp: (!) 40   Temp:    SpO2: 100%       Past Medical History:   has a past medical history of Hx of hepatitis C-resolved, IV drug abuse (HCC), and Nonhealing nonsurgical wound with fat layer exposed.    Past Surgical History:   has a past surgical history that includes incision and drainage (Right, 4/23/2020); transesophageal echocardiogram (N/A, 4/24/2020); pr exploration of artery/vein (Left, 10/7/2020); incision and drainage (Left, 10/7/2020); Apply dressing (Left, 10/9/2020); Apply dressing (Left, 10/14/2020); transesophageal echocardiogram (10/14/2020); Leg Surgery (Right, 3/6/2022); Cardiac procedure (N/A, 2/17/2025); and Cardiac procedure (Left, 2/17/2025).    Medications:   Scheduled Meds:    vancomycin  750 mg IntraVENous Q12H    aspirin  81 mg Oral Daily    sodium chloride flush  5-40 mL IntraVENous 2 times per day    lidocaine 1 % injection  50 mg IntraDERmal Once    divalproex  125 mg Oral 3 times per day    melatonin  5 mg Oral Nightly    metoprolol succinate  25 mg Oral BID    nicotine  1 patch TransDERmal Daily    atorvastatin  40 mg Oral Nightly    clopidogrel  75 mg Oral Daily    [Held by provider] losartan  25 mg Oral Daily     Continuous Infusions:    sodium chloride       PRN Meds: ceFAZolin, fentanNYL, midazolam, lidocaine, sodium chloride flush, sodium chloride, LORazepam, hydrOXYzine pamoate, acetaminophen  **OR** acetaminophen, magnesium sulfate, ondansetron **OR** ondansetron, potassium chloride **OR** potassium alternative oral replacement **OR** potassium chloride, Carmex Classic Lip Balm  Home Meds:   Prior to Admission medications    Not on File         Pre-Sedation Documentation and Exam:   I have personally reviewed the history, physical exam & review of systems for this patient (see notes).    Mallampati Airway Assessment:  Mallampati Class II - (soft palate, fauces & uvula are visible)    Prior History of Anesthesia Complications:   none    ASA Classification:  Class 3 - A patient with severe systemic disease that limits activity but is not incapacitating    Sedation/ Anesthesia Plan:   intravenous sedation    Medications Planned:   midazolam (Versed)  intravenously and fentanyl intravenously    Patient is an appropriate candidate for plan of sedation: yes    Electronically signed by Kapil Ponce MD on 2/24/2025 at 1:31 PM

## 2025-02-24 NOTE — PROGRESS NOTES
Pharmacy Consultation Note  (Antibiotic Dosing and Monitoring)    Initial consult date: 2/18/25  Consulting physician/provider: Jake Bravo DO   Drug: Vancomycin  Indication: Endocarditis/Endovascular     Age/  Gender Height Weight IBW  Allergy Information   45 y.o./male 193 cm (6' 4\") 80.2 kg (176 lb 12.8 oz)     Ideal body weight: 86.8 kg (191 lb 5.7 oz)   Patient has no known allergies.      Renal Function:  Recent Labs     02/23/25  0500 02/23/25  1356 02/24/25  0619   BUN 45* 47* 47*   CREATININE 1.8* 1.8* 1.8*       Intake/Output Summary (Last 24 hours) at 2/24/2025 0848  Last data filed at 2/23/2025 1900  Gross per 24 hour   Intake 900 ml   Output 125 ml   Net 775 ml       Vancomycin Monitoring:  Trough:    Recent Labs     02/22/25  0805 02/23/25  1120   VANCOTROUGH 30.2* 17.1*     Random:  No results for input(s): \"VANCORANDOM\" in the last 72 hours.    Vancomycin Administration Times:  Recent vancomycin administrations                     vancomycin (VANCOCIN) 750 mg in sodium chloride 0.9 % 250 mL IVPB ([id number]) (mg) 750 mg New Bag 02/24/25 0139    vancomycin (VANCOCIN) 750 mg in sodium chloride 0.9 % 250 mL IVPB ([id number]) (mg) 750 mg New Bag 02/23/25 1350     750 mg New Bag  0548     750 mg New Bag 02/22/25 2039    vancomycin (VANCOCIN) 1,000 mg in sodium chloride 0.9 % 250 mL IVPB (Yzvl0Mpd) (mg) 1,000 mg New Bag 02/22/25 0220     1,000 mg New Bag 02/21/25 1819     1,000 mg New Bag  0959                  Assessment:  Patient is a 45 y.o. male who has been initiated on vancomycin  Estimated Creatinine Clearance: 58 mL/min (A) (based on SCr of 1.8 mg/dL (H)).  To dose vancomycin, pharmacy will be utilizing  trough based dosing.    Plan:  Continue vancomycin 750 mg IV every 12 hours    Will check vancomycin levels as needed - check trough level tomorrow morning. Hold dose if >20.   Will continue to monitor renal function  Pharmacy to follow      Marquita Schmidt PharmD, BCPS 2/24/2025 8:50 AM  Ext

## 2025-02-24 NOTE — PROGRESS NOTES
Patient return from IR with new tunneled CVC. Patient is a limb alert on bilateral upper extremities. Dr. Frazier notified. Okay to draw off limb per Dr. Frazier.

## 2025-02-24 NOTE — PROGRESS NOTES
Neurology Progress Note     Wagner Ryan is a 45 y.o. male     Patient presented the ED on 2/17/2025 at [facility] with fatigue and alteration in mentation.  He was found to have NSTEMI and taken to the Cath Lab where he had stent placed.    Because of alteration in mentation MRI of the brain did demonstrate multiple areas of ischemia some in the parieto-occipital area demonstrating some vasogenic edema.  Transesophageal echo was completed which was highly suspicious for vegetation therefore he was transferred to [facility] for cardiothoracic evaluation    CTS notes reviewed    Spoke with CTS this a.m.-MRI did not demonstrate intracranial hemorrhage-CT prior to MRI did.  Okay for heparin for intervention but will also repeat CT on Monday prior to intervention.    2/24/25  Patient is sleeping in bed, sitter is at bedside. No family. He wakes easily and is interactive with exam. No new concerns or events overnight. He reports that he does feel better from yesterday. The patient is scheduled for CVC placement today. Discussed results of repeat CT head. All questions answered and patient verbalized understanding.        Allergies as of 02/18/2025    (No Known Allergies)       Objective:     /75   Pulse 90   Temp 98.7 °F (37.1 °C) (Temporal)   Resp 24   Ht 1.93 m (6' 4\")   Wt 78.9 kg (174 lb)   SpO2 95%   BMI 21.18 kg/m²      General appearance: Awake looking around the room, cooperative  Head: Normocephalic, without obvious abnormality, atraumatic  Extremities: no cyanosis or edema  Skin: no rashes or lesions    Mental Status:Alert, oriented x 4. Attention and concentration are intact. Speech is mildly dysarthric. Language is appropriate but laconic. No difficulty following commands.        Cranial Nerves:  I: smell    II: visual acuity     II: visual fields Full   II: pupils KENZIE   III,VII: ptosis None   III,IV,VI: extraocular muscles  EOMI without nystagmus    V: mastication Normal   V:  Brain:  1. Multiple small foci of acute or early subacute infarctions in the  bilateral frontal, parietal and occipital lobes, left more than right.  Many  of the infarctions are in a watershed territory distribution.  Findings may  be related to hypoperfusion or micro emboli.  2. Subtle foci of enhancement in the right frontal, parietal and occipital  lobes. These are likely related to subacute infarctions.  If there a history  of malignancy, follow-up with contrast enhanced MRI is recommended.    MRV  Unremarkable MRV of the head.     CTA NECK:  1. No dissection or hemodynamically significant stenosis of the major  arteries of the neck.  2. 1.4 cm hypodense lesion in the left submandibular gland. Further  evaluation with ultrasound is recommended.  3. Significant elongation of the stylohyoid ligaments. Clinical correlation  for [place] syndrome is recommended.    CTA HEAD:  No large vessel occlusion or significant stenosis.    CT Head (prior to MRI)  Tiny area of intraparenchymal hemorrhage     CT Head (repeat)   1. No acute intracranial abnormality.  2. Decreasing density of 5 mm left posterior frontal parenchymal hemorrhage.  3. Resolution of previously demonstrated left occipital hemorrhage.  4. Unchanged biparietal and left occipital encephalomalacia.    JAVED  Small vegetations involving the mitral and aortic valves consistent with endocarditis without valvular destruction.     I personally reviewed the patient's lab and imaging studies at this time.    Assessment:     Patient with a history of drug abuse presenting with alteration in mentation status post stent placement found to have multiple areas of acute ischemia on MRI of the brain -transesophageal echo suspected septic embolization   CTS following   Repeat head CT demonstrated decreasing density of the 5 mm left posterior frontal parenchymal hemorrhage    Plan:     Cardiothoracic surgery to follow    Continue to monitor neurologically    Patient will need

## 2025-02-24 NOTE — PLAN OF CARE
Problem: Discharge Planning  Goal: Discharge to home or other facility with appropriate resources  2/24/2025 0910 by Darline Sims RN  Outcome: Progressing  2/24/2025 0336 by Emma Jeffers RN  Outcome: Progressing     Problem: Pain  Goal: Verbalizes/displays adequate comfort level or baseline comfort level  2/24/2025 0910 by Darline Sims RN  Outcome: Progressing  2/24/2025 0336 by Emma Jeffers RN  Outcome: Progressing     Problem: Safety - Adult  Goal: Free from fall injury  2/24/2025 0910 by Darline Sims RN  Outcome: Progressing  2/24/2025 0336 by Emma Jeffers RN  Outcome: Progressing     Problem: ABCDS Injury Assessment  Goal: Absence of physical injury  2/24/2025 0910 by Darline Sims RN  Outcome: Progressing  2/24/2025 0336 by Emma Jeffers RN  Outcome: Progressing     Problem: Skin/Tissue Integrity  Goal: Skin integrity remains intact  Description: 1.  Monitor for areas of redness and/or skin breakdown  2.  Assess vascular access sites hourly  3.  Every 4-6 hours minimum:  Change oxygen saturation probe site  4.  Every 4-6 hours:  If on nasal continuous positive airway pressure, respiratory therapy assess nares and determine need for appliance change or resting period  2/24/2025 0910 by Darline Sims RN  Outcome: Progressing  2/24/2025 0336 by Emma Jeffers RN  Outcome: Progressing     Problem: Neurosensory - Adult  Goal: Achieves stable or improved neurological status  2/24/2025 0910 by Darline Sims RN  Outcome: Progressing  2/24/2025 0336 by Emma Jeffers RN  Outcome: Progressing  Goal: Absence of seizures  2/24/2025 0910 by Darline Sims RN  Outcome: Progressing  2/24/2025 0336 by Emma Jeffers RN  Outcome: Progressing  Goal: Remains free of injury related to seizures activity  2/24/2025 0910 by Darline Sims RN  Outcome: Progressing  2/24/2025 0336 by Emma Jeffers RN  Outcome: Progressing  Goal: Achieves maximal functionality and self care  2/24/2025 0910 by Darline Sims  RN  Outcome: Progressing  2/24/2025 0336 by Emma Jeffers RN  Outcome: Progressing     Problem: Respiratory - Adult  Goal: Achieves optimal ventilation and oxygenation  2/24/2025 0910 by Darline Sims RN  Outcome: Progressing  2/24/2025 0336 by Emma Jeffers RN  Outcome: Progressing     Problem: Cardiovascular - Adult  Goal: Maintains optimal cardiac output and hemodynamic stability  2/24/2025 0910 by Darline Sims RN  Outcome: Progressing  2/24/2025 0336 by Emma Jeffers RN  Outcome: Progressing  Goal: Absence of cardiac dysrhythmias or at baseline  2/24/2025 0336 by Emma Jeffers RN  Outcome: Progressing     Problem: Skin/Tissue Integrity - Adult  Goal: Skin integrity remains intact  Description: 1.  Monitor for areas of redness and/or skin breakdown  2.  Assess vascular access sites hourly  3.  Every 4-6 hours minimum:  Change oxygen saturation probe site  4.  Every 4-6 hours:  If on nasal continuous positive airway pressure, respiratory therapy assess nares and determine need for appliance change or resting period  2/24/2025 0910 by Darline Sims RN  Outcome: Progressing  2/24/2025 0336 by Emma Jeffers RN  Outcome: Progressing  Goal: Incisions, wounds, or drain sites healing without S/S of infection  2/24/2025 0910 by Darline Sims RN  Outcome: Progressing  2/24/2025 0336 by Emma Jeffers RN  Outcome: Progressing     Problem: Musculoskeletal - Adult  Goal: Return mobility to safest level of function  2/24/2025 0910 by Darline Sims RN  Outcome: Progressing  2/24/2025 0336 by Emma Jeffers RN  Outcome: Progressing     Problem: Gastrointestinal - Adult  Goal: Minimal or absence of nausea and vomiting  2/24/2025 0910 by Darline Sims RN  Outcome: Progressing  2/24/2025 0336 by Emma Jeffers RN  Outcome: Progressing  Goal: Maintains or returns to baseline bowel function  2/24/2025 0910 by Darline Sims RN  Outcome: Progressing  2/24/2025 0336 by Emma Jeffers RN  Outcome: Progressing  Goal: Maintains

## 2025-02-24 NOTE — PROCEDURES
Discussed patient and IR procedure with bedside RN, all questions answered.  Instructed to hold plavix and ASA and keep NPO. Will call when able to send for patient.

## 2025-02-24 NOTE — PLAN OF CARE
Problem: Discharge Planning  Goal: Discharge to home or other facility with appropriate resources  Outcome: Progressing     Problem: Pain  Goal: Verbalizes/displays adequate comfort level or baseline comfort level  Outcome: Progressing     Problem: Safety - Adult  Goal: Free from fall injury  Outcome: Progressing     Problem: Skin/Tissue Integrity  Goal: Skin integrity remains intact  Description: 1.  Monitor for areas of redness and/or skin breakdown  2.  Assess vascular access sites hourly  3.  Every 4-6 hours minimum:  Change oxygen saturation probe site  4.  Every 4-6 hours:  If on nasal continuous positive airway pressure, respiratory therapy assess nares and determine need for appliance change or resting period  Outcome: Progressing     Problem: Neurosensory - Adult  Goal: Achieves stable or improved neurological status  Outcome: Progressing  Goal: Absence of seizures  Outcome: Progressing  Goal: Remains free of injury related to seizures activity  Outcome: Progressing  Goal: Achieves maximal functionality and self care  Outcome: Progressing     Problem: Respiratory - Adult  Goal: Achieves optimal ventilation and oxygenation  Outcome: Progressing     Problem: Cardiovascular - Adult  Goal: Maintains optimal cardiac output and hemodynamic stability  Outcome: Progressing  Goal: Absence of cardiac dysrhythmias or at baseline  Outcome: Progressing     Problem: Skin/Tissue Integrity - Adult  Goal: Skin integrity remains intact  Description: 1.  Monitor for areas of redness and/or skin breakdown  2.  Assess vascular access sites hourly  3.  Every 4-6 hours minimum:  Change oxygen saturation probe site  4.  Every 4-6 hours:  If on nasal continuous positive airway pressure, respiratory therapy assess nares and determine need for appliance change or resting period  Outcome: Progressing  Goal: Incisions, wounds, or drain sites healing without S/S of infection  Outcome: Progressing     Problem: Musculoskeletal -  Adult  Goal: Return mobility to safest level of function  Outcome: Progressing     Problem: Gastrointestinal - Adult  Goal: Minimal or absence of nausea and vomiting  Outcome: Progressing  Goal: Maintains or returns to baseline bowel function  Outcome: Progressing  Goal: Maintains adequate nutritional intake  Outcome: Progressing     Problem: Genitourinary - Adult  Goal: Absence of urinary retention  Outcome: Progressing     Problem: Infection - Adult  Goal: Absence of infection at discharge  Outcome: Progressing  Goal: Absence of infection during hospitalization  Outcome: Progressing     Problem: Metabolic/Fluid and Electrolytes - Adult  Goal: Electrolytes maintained within normal limits  Outcome: Progressing  Goal: Hemodynamic stability and optimal renal function maintained  Outcome: Progressing  Goal: Glucose maintained within prescribed range  Outcome: Progressing     Problem: Hematologic - Adult  Goal: Maintains hematologic stability  Outcome: Progressing     Problem: Chronic Conditions and Co-morbidities  Goal: Patient's chronic conditions and co-morbidity symptoms are monitored and maintained or improved  Outcome: Progressing

## 2025-02-24 NOTE — CARE COORDINATION
2/24/25 Update CM Note. Pt admitted 2/18/25 endocarditis. Hx IV drug use. Pt needs AV and MV replacements at some point but unable to be off DAPT as just had heart cath with OBED placement 2/17/25. Current plan to remain on IV antibiotic therapy. Scheduled for IR placement for tunneled CVC today. Discharge need remain unclear.  [facility] able to accept pt but would need 2wk paid upfront estimated at $20k. [facility] also able to accept but requiring 30 days up front paid estimated $9k. Met with pt, states he does not have financial means to pay that. Spoke with [facility] in public benefits, pt income will be re reviewed since he has been off d/t hospitalization.   Gabriela GAMINGN RN-BC  [phone]

## 2025-02-24 NOTE — OR NURSING
Post-Procedure SBAR Report    Patient Name: Wagner Ryan  MRN: [id number]  : 1980  Date of Procedure: 25  Completed Procedure: Tunneled CVC    Telephone SBAR report provided to CARIDAD Payne.     Information from the following report(s) Nurse Handoff Report, MAR, and Procedure Verification was reviewed.    Opportunity for questions and clarification was provided.

## 2025-02-24 NOTE — PROGRESS NOTES
CC:SOB    Brief HPI:  Awake, alert. No complaints.    Past Medical History:   Diagnosis Date    Hx of hepatitis C-resolved 3/5/2022    IV drug abuse (HCC)     Nonhealing nonsurgical wound with fat layer exposed      Past Surgical History:   Procedure Laterality Date    APPLY DRESSING Left 10/9/2020    DEBRIDEMENT OF SKIN, SOFT TISSUE, MUSCLE, EXPLORATION OF UPPER ARM INTEGRA GRAFT FOR COVERAGE performed by [place] eDnny [place] [facility] [place]    APPLY DRESSING Left 10/14/2020    LEFT UPPER EXTREMITY WOUND VAC PLACEMENT performed by Eddie Ruff [place] [facility] [place]    CARDIAC PROCEDURE N/A 2/17/2025    Left heart cath / coronary angiography performed by Bg Gaytan MD at [facility] CARDIAC CATH/IR    CARDIAC PROCEDURE Left 2/17/2025    Percutaneous coronary intervention performed by Bg Gaytan MD at [facility] CARDIAC CATH/IR    INCISION AND DRAINAGE Right 4/23/2020    RIGHT UPPER EXTREMITY INCISION AND DRAINAGE REMOVAL FOREIGN BODY WITH C-ARM performed by Kapil Yuan MD at [facility] OR    INCISION AND DRAINAGE Left 10/7/2020    LEFT ELBOW INCISION AND DRAINAGE performed by Kapil Yuan MD at [facility] OR    LEG SURGERY Right 3/6/2022    IRRIGATION AND DEBRIDEMENT RIGHT THIGH performed [place] Wes Branch [place] [facility] [place]    SD EXPLORATION OF ARTERY/VEIN Left 10/7/2020    LEFT ARM BRACHIAL ARTERY EXPLORATION, REPAIR OF MYCOTIC ANUERYSM WITH BYPASS performed by Eddie Ruff [place] [facility] [place]    TRANSESOPHAGEAL ECHOCARDIOGRAM N/A 4/24/2020    TRANSESOPHAGEAL ECHOCARDIOGRAM performed by Natasha Bowie MD at [facility] ENDOSCOPY    TRANSESOPHAGEAL ECHOCARDIOGRAM  10/14/2020     Social History     Socioeconomic History    Marital status: Single     Spouse name: Not on file    Number of children: Not on file    Years of education: Not on file    Highest education level: Not on file   Occupational History    Not on file   Tobacco Use    Smoking status: Every Day     Current packs/day: 1.00     Types: Cigarettes    Smokeless tobacco:

## 2025-02-24 NOTE — PROGRESS NOTES
[facility] Hospitalist Progress Note    Admitting Date and Time: 2/18/2025  7:30 PM  Admit Dx: Endocarditis [I38]    Synopsis:    Mr. Wagner Ryan, a 45 y.o. year old male  who  has a past medical history of Hx of hepatitis C-resolved, IV drug abuse (HCC), and Nonhealing nonsurgical wound with fat layer exposed.      Wagner Ryan is a 45 y.o. male who presents to the CVICU for evaluation by CTS surgery. Patient was being followed at [facility] for AMS and a STEMI and underwent stent placement of the LAD on 2/17.  He was admitted to the MICU following cath lab and started on broad spectrum antibiotics, ASA and Brilinta. During work up for his AMS, a CTA of the head showed tiny area of intraparenchymal hemorrhage in the right occipital lobe w/ MRI of the brain showed multiple small foci of acute and early subacute infarcts in bilateral frontal and parietal occipital lobe left more than right consistent with micro emboli. Neurology was consulted. They cleared the patient to continue on plavix given his recent stent placement.  He underwent an ECHO which was found to have vegetations and the decision was made to transfer to [facility] for evaluation by CTS Surgery.     2/19: Patient seen in CVICU.  Per ICU team and CTS, patient can be transferred as he has no ICU needs.  Transfer order placed.    2/20: Undergoing CTS workup.  Patient continues to have persistent confusion and delirium.  Seems like he was started on Decadron at previous hospital for concern of meningitis and it has not been stopped or weaned.  No mention for continuation of Decadron per ID here and treating for MRSA bacteremia as of now.  Hold Decadron.    2/21: Neurology stating patient has no brain bleed and that cardiology has to decide on DAPT.  Contacted cardiology and stated patient must be on DAPT given recent cardiac stenting and 2/17.  Advised to start aspirin and continue Plavix.  Orders placed.  CTS to be informed.    2/22: Per  line replaced despite being 4 days old has IR plans to do PICC line on 2/24.  Nephrology following  IR and dental asking to hold all anticoagulants for their planned procedures this week.  As stated in this note patient must stay on DAPT given recent cardiac stenting per Cardiology.      DC planning: Pending tunneled PICC line and dental extraction.  Patient will be complex placement as he is self-pay, IV drug abuser, and needs long-term antibiotics.  Case management aware.    NOTE: This report was transcribed using voice recognition software. Every effort was made to ensure accuracy; however, inadvertent computerized transcription errors may be present.    Electronically signed by John Gallagher MD on 2/24/2025 at 11:26 AM

## 2025-02-25 ENCOUNTER — APPOINTMENT (OUTPATIENT)
Dept: GENERAL RADIOLOGY | Age: 45
End: 2025-02-25
Attending: INTERNAL MEDICINE

## 2025-02-25 LAB
ALBUMIN SERPL-MCNC: 3 G/DL (ref 3.5–5.2)
ALP SERPL-CCNC: 108 U/L (ref 40–129)
ALT SERPL-CCNC: 22 U/L (ref 0–40)
ANION GAP SERPL CALCULATED.3IONS-SCNC: 11 MMOL/L (ref 7–16)
AST SERPL-CCNC: 21 U/L (ref 0–39)
BASOPHILS # BLD: 0.01 K/UL (ref 0–0.2)
BASOPHILS NFR BLD: 0 % (ref 0–2)
BILIRUB SERPL-MCNC: 0.5 MG/DL (ref 0–1.2)
BUN SERPL-MCNC: 40 MG/DL (ref 6–20)
CALCIUM SERPL-MCNC: 8.2 MG/DL (ref 8.6–10.2)
CHLORIDE SERPL-SCNC: 109 MMOL/L (ref 98–107)
CO2 SERPL-SCNC: 21 MMOL/L (ref 22–29)
CREAT SERPL-MCNC: 1.9 MG/DL (ref 0.7–1.2)
DATE LAST DOSE: 0
EOSINOPHIL # BLD: 0.13 K/UL (ref 0.05–0.5)
EOSINOPHILS RELATIVE PERCENT: 1 % (ref 0–6)
ERYTHROCYTE [DISTWIDTH] IN BLOOD BY AUTOMATED COUNT: 17 % (ref 11.5–15)
GFR, ESTIMATED: 43 ML/MIN/1.73M2
GLUCOSE SERPL-MCNC: 101 MG/DL (ref 74–99)
HCT VFR BLD AUTO: 24.5 % (ref 37–54)
HGB BLD-MCNC: 8 G/DL (ref 12.5–16.5)
IMM GRANULOCYTES # BLD AUTO: 0.17 K/UL (ref 0–0.58)
IMM GRANULOCYTES NFR BLD: 1 % (ref 0–5)
LYMPHOCYTES NFR BLD: 1.41 K/UL (ref 1.5–4)
LYMPHOCYTES RELATIVE PERCENT: 10 % (ref 20–42)
MCH RBC QN AUTO: 24.5 PG (ref 26–35)
MCHC RBC AUTO-ENTMCNC: 32.7 G/DL (ref 32–34.5)
MCV RBC AUTO: 75.2 FL (ref 80–99.9)
MONOCYTES NFR BLD: 0.83 K/UL (ref 0.1–0.95)
MONOCYTES NFR BLD: 6 % (ref 2–12)
NEUTROPHILS NFR BLD: 82 % (ref 43–80)
NEUTS SEG NFR BLD: 11.52 K/UL (ref 1.8–7.3)
PLATELET # BLD AUTO: 313 K/UL (ref 130–450)
PMV BLD AUTO: 9.3 FL (ref 7–12)
POTASSIUM SERPL-SCNC: 4.5 MMOL/L (ref 3.5–5)
PROT SERPL-MCNC: 6.4 G/DL (ref 6.4–8.3)
RBC # BLD AUTO: 3.26 M/UL (ref 3.8–5.8)
SODIUM SERPL-SCNC: 141 MMOL/L (ref 132–146)
TME LAST DOSE: 0 H
VANCOMYCIN DOSE: 0 MG
VANCOMYCIN TROUGH SERPL-MCNC: 14.4 UG/ML (ref 5–16)
WBC OTHER # BLD: 14.1 K/UL (ref 4.5–11.5)

## 2025-02-25 PROCEDURE — 2500000003 HC RX 250 WO HCPCS: Performed by: INTERNAL MEDICINE

## 2025-02-25 PROCEDURE — 99232 SBSQ HOSP IP/OBS MODERATE 35: CPT | Performed by: PHYSICIAN ASSISTANT

## 2025-02-25 PROCEDURE — 6370000000 HC RX 637 (ALT 250 FOR IP): Performed by: INTERNAL MEDICINE

## 2025-02-25 PROCEDURE — 6360000002 HC RX W HCPCS

## 2025-02-25 PROCEDURE — 71045 X-RAY EXAM CHEST 1 VIEW: CPT

## 2025-02-25 PROCEDURE — 2580000003 HC RX 258

## 2025-02-25 PROCEDURE — 6360000002 HC RX W HCPCS: Performed by: INTERNAL MEDICINE

## 2025-02-25 PROCEDURE — 2060000000 HC ICU INTERMEDIATE R&B

## 2025-02-25 PROCEDURE — 2700000000 HC OXYGEN THERAPY PER DAY

## 2025-02-25 PROCEDURE — 87040 BLOOD CULTURE FOR BACTERIA: CPT

## 2025-02-25 PROCEDURE — 80202 ASSAY OF VANCOMYCIN: CPT

## 2025-02-25 PROCEDURE — 6370000000 HC RX 637 (ALT 250 FOR IP)

## 2025-02-25 PROCEDURE — 85025 COMPLETE CBC W/AUTO DIFF WBC: CPT

## 2025-02-25 PROCEDURE — 36415 COLL VENOUS BLD VENIPUNCTURE: CPT

## 2025-02-25 PROCEDURE — 80053 COMPREHEN METABOLIC PANEL: CPT

## 2025-02-25 PROCEDURE — 94640 AIRWAY INHALATION TREATMENT: CPT

## 2025-02-25 PROCEDURE — 94060 EVALUATION OF WHEEZING: CPT | Performed by: INTERNAL MEDICINE

## 2025-02-25 PROCEDURE — 5A0935A ASSISTANCE WITH RESPIRATORY VENTILATION, LESS THAN 24 CONSECUTIVE HOURS, HIGH NASAL FLOW/VELOCITY: ICD-10-PCS | Performed by: INTERNAL MEDICINE

## 2025-02-25 PROCEDURE — 94729 DIFFUSING CAPACITY: CPT | Performed by: INTERNAL MEDICINE

## 2025-02-25 PROCEDURE — 99232 SBSQ HOSP IP/OBS MODERATE 35: CPT

## 2025-02-25 RX ORDER — CLONIDINE HYDROCHLORIDE 0.1 MG/1
0.1 TABLET ORAL EVERY 6 HOURS PRN
Status: DISCONTINUED | OUTPATIENT
Start: 2025-02-25 | End: 2025-03-07 | Stop reason: HOSPADM

## 2025-02-25 RX ORDER — SODIUM CHLORIDE, SODIUM LACTATE, POTASSIUM CHLORIDE, CALCIUM CHLORIDE 600; 310; 30; 20 MG/100ML; MG/100ML; MG/100ML; MG/100ML
INJECTION, SOLUTION INTRAVENOUS CONTINUOUS
Status: DISCONTINUED | OUTPATIENT
Start: 2025-02-25 | End: 2025-02-26

## 2025-02-25 RX ORDER — LOPERAMIDE HYDROCHLORIDE 2 MG/1
2 CAPSULE ORAL 4 TIMES DAILY PRN
Status: DISCONTINUED | OUTPATIENT
Start: 2025-02-25 | End: 2025-03-07 | Stop reason: HOSPADM

## 2025-02-25 RX ORDER — BUPRENORPHINE HYDROCHLORIDE AND NALOXONE HYDROCHLORIDE DIHYDRATE 2; .5 MG/1; MG/1
2 TABLET SUBLINGUAL PRN
Status: DISCONTINUED | OUTPATIENT
Start: 2025-02-25 | End: 2025-03-07 | Stop reason: HOSPADM

## 2025-02-25 RX ORDER — LORAZEPAM 2 MG/ML
1 INJECTION INTRAMUSCULAR ONCE
Status: COMPLETED | OUTPATIENT
Start: 2025-02-25 | End: 2025-02-25

## 2025-02-25 RX ORDER — DICYCLOMINE HYDROCHLORIDE 10 MG/1
20 CAPSULE ORAL EVERY 6 HOURS PRN
Status: DISCONTINUED | OUTPATIENT
Start: 2025-02-25 | End: 2025-03-07 | Stop reason: HOSPADM

## 2025-02-25 RX ADMIN — ASPIRIN 81 MG CHEWABLE TABLET 81 MG: 81 TABLET CHEWABLE at 08:38

## 2025-02-25 RX ADMIN — SODIUM CHLORIDE, SODIUM LACTATE, POTASSIUM CHLORIDE, AND CALCIUM CHLORIDE: .6; .31; .03; .02 INJECTION, SOLUTION INTRAVENOUS at 12:20

## 2025-02-25 RX ADMIN — SODIUM CHLORIDE, PRESERVATIVE FREE 10 ML: 5 INJECTION INTRAVENOUS at 08:37

## 2025-02-25 RX ADMIN — CLOPIDOGREL 75 MG: 75 TABLET, FILM COATED ORAL at 08:39

## 2025-02-25 RX ADMIN — IPRATROPIUM BROMIDE AND ALBUTEROL SULFATE 1 DOSE: 2.5; .5 SOLUTION RESPIRATORY (INHALATION) at 03:33

## 2025-02-25 RX ADMIN — VANCOMYCIN HYDROCHLORIDE 750 MG: 750 INJECTION, POWDER, LYOPHILIZED, FOR SOLUTION INTRAVENOUS at 05:51

## 2025-02-25 RX ADMIN — VANCOMYCIN HYDROCHLORIDE 750 MG: 750 INJECTION, POWDER, LYOPHILIZED, FOR SOLUTION INTRAVENOUS at 18:15

## 2025-02-25 RX ADMIN — BUPRENORPHINE HYDROCHLORIDE AND NALOXONE HYDROCHLORIDE DIHYDRATE 2 TABLET: 2; .5 TABLET SUBLINGUAL at 19:57

## 2025-02-25 RX ADMIN — DIVALPROEX SODIUM 125 MG: 125 CAPSULE, COATED PELLETS ORAL at 20:06

## 2025-02-25 RX ADMIN — DIVALPROEX SODIUM 125 MG: 125 CAPSULE, COATED PELLETS ORAL at 05:50

## 2025-02-25 RX ADMIN — ONDANSETRON 4 MG: 2 INJECTION, SOLUTION INTRAMUSCULAR; INTRAVENOUS at 03:29

## 2025-02-25 RX ADMIN — SODIUM CHLORIDE, PRESERVATIVE FREE 10 ML: 5 INJECTION INTRAVENOUS at 19:57

## 2025-02-25 RX ADMIN — Medication 3 MG: at 19:57

## 2025-02-25 RX ADMIN — METOPROLOL SUCCINATE 25 MG: 25 TABLET, EXTENDED RELEASE ORAL at 19:57

## 2025-02-25 RX ADMIN — ATORVASTATIN CALCIUM 40 MG: 40 TABLET, FILM COATED ORAL at 19:57

## 2025-02-25 RX ADMIN — LORAZEPAM 0.5 MG: 2 INJECTION INTRAMUSCULAR; INTRAVENOUS at 01:28

## 2025-02-25 RX ADMIN — BUPRENORPHINE HYDROCHLORIDE AND NALOXONE HYDROCHLORIDE DIHYDRATE 2 TABLET: 2; .5 TABLET SUBLINGUAL at 12:24

## 2025-02-25 RX ADMIN — METOPROLOL SUCCINATE 25 MG: 25 TABLET, EXTENDED RELEASE ORAL at 08:39

## 2025-02-25 RX ADMIN — LORAZEPAM 1 MG: 2 INJECTION INTRAMUSCULAR; INTRAVENOUS at 02:22

## 2025-02-25 NOTE — CARE COORDINATION
2/25/25  Update CM Note. Pt admitted 2/18/25 endocarditis. Hx IV drug use. Pt needs AV and MV replacements at some point but unable to be off DAPT as just had heart cath with OBED placement 2/17/25. Current plan to remain on IV antibiotic therapy. Right IJ tunneled CVC placed yesterday. Pt accepted at [facility] but cost $9K up front payment for 1st 30 days. Spoke with Kirstin in public benefits again, pt income will be re reviewed later this week but pt may still be above Medicaid limits. Daughter Wily to attempt to get accurate info on income.  Discharge plan remains unclear. Referral also made to [facility].   Gabriela OCHOA RN-BC  [phone] Addendum: [facility] declined.  Referral to Alona Mccann

## 2025-02-25 NOTE — PROGRESS NOTES
Pharmacy Consultation Note  (Antibiotic Dosing and Monitoring)    Initial consult date: 2/18/25  Consulting physician/provider: Jake Bravo DO   Drug: Vancomycin  Indication: Endocarditis/Endovascular     Age/  Gender Height Weight IBW  Allergy Information   45 y.o./male 193 cm (6' 4\") 80.2 kg (176 lb 12.8 oz)     Ideal body weight: 86.8 kg (191 lb 5.7 oz)   Patient has no known allergies.      Renal Function:  Recent Labs     02/23/25  1356 02/24/25  0619 02/25/25  0713   BUN 47* 47* 40*   CREATININE 1.8* 1.8* 1.9*       Intake/Output Summary (Last 24 hours) at 2/25/2025 0825  Last data filed at 2/25/2025 0653  Gross per 24 hour   Intake 495.47 ml   Output 2700 ml   Net -2204.53 ml       Vancomycin Monitoring:  Trough:    Recent Labs     02/23/25  1120   VANCOTROUGH 17.1*     Random:  No results for input(s): \"VANCORANDOM\" in the last 72 hours.    Vancomycin Administration Times:  Recent vancomycin administrations                     vancomycin (VANCOCIN) 750 mg in sodium chloride 0.9 % 250 mL IVPB (Plyd1Hyo) (mg) 750 mg New Bag 02/25/25 0551     750 mg New Bag 02/24/25 1750     750 mg New Bag  0139    vancomycin (VANCOCIN) 750 mg in sodium chloride 0.9 % 250 mL IVPB (Ldfl4Lpe) (mg) 750 mg New Bag 02/23/25 1350     750 mg New Bag  0548     750 mg New Bag 02/22/25 2039                  Assessment:  Patient is a 45 y.o. male who has been initiated on vancomycin  Estimated Creatinine Clearance: 58 mL/min (A) (based on SCr of 1.8 mg/dL (H)).  To dose vancomycin, pharmacy will be utilizing  trough based dosing.  2/25: Vancomycin trough level not drawn this morning.     Plan:  Continue vancomycin 750 mg IV every 12 hours    Will check vancomycin levels as needed - check trough level this afternoon. Hold dose if >21.   Will continue to monitor renal function  Pharmacy to follow      Marquita Schmidt PharmD, BCPS 2/25/2025 7:38 AM  Ext [phone]

## 2025-02-25 NOTE — PLAN OF CARE
Problem: Discharge Planning  Goal: Discharge to home or other facility with appropriate resources  2/25/2025 0948 by Darline Sims RN  Outcome: Progressing  2/24/2025 2107 by Phyllis Giraldo RN  Outcome: Progressing     Problem: Pain  Goal: Verbalizes/displays adequate comfort level or baseline comfort level  2/25/2025 0948 by Darline Sims RN  Outcome: Progressing  2/24/2025 2107 by Phyllis Giraldo RN  Outcome: Progressing     Problem: Safety - Adult  Goal: Free from fall injury  2/25/2025 0948 by Darline Sims RN  Outcome: Progressing  2/24/2025 2107 by Phyllis Giraldo RN  Outcome: Progressing     Problem: ABCDS Injury Assessment  Goal: Absence of physical injury  2/25/2025 0948 by Darline Sims RN  Outcome: Progressing  2/24/2025 2107 by Phyllis Giraldo RN  Outcome: Progressing     Problem: Skin/Tissue Integrity  Goal: Skin integrity remains intact  Description: 1.  Monitor for areas of redness and/or skin breakdown  2.  Assess vascular access sites hourly  3.  Every 4-6 hours minimum:  Change oxygen saturation probe site  4.  Every 4-6 hours:  If on nasal continuous positive airway pressure, respiratory therapy assess nares and determine need for appliance change or resting period  2/25/2025 0948 by Darline Sims RN  Outcome: Progressing  2/24/2025 2107 by Phyllis Giraldo RN  Outcome: Progressing     Problem: Neurosensory - Adult  Goal: Achieves stable or improved neurological status  2/25/2025 0948 by Darline Sims RN  Outcome: Progressing  2/24/2025 2107 by Phyllis Giraldo RN  Outcome: Progressing  Goal: Absence of seizures  2/25/2025 0948 by Darline Sims RN  Outcome: Progressing  2/24/2025 2107 by Phyllis Giraldo RN  Outcome: Progressing  Goal: Remains free of injury related to seizures activity  2/25/2025 0948 by Darline Sims RN  Outcome: Progressing  2/24/2025 2107 by Phyllis Giraldo RN  Outcome: Progressing  Goal: Achieves maximal functionality

## 2025-02-25 NOTE — PROGRESS NOTES
[facility]  [facility]  Progress Note      No chief complaint on file.      SUBJECTIVE:    Patient is tolerating medications. No reported adverse drug reactions.  No nausea, vomiting, diarrhea.    Review of systems:  As stated above in the chief complaint, otherwise negative.    Medications:  Scheduled Meds:   melatonin  3 mg Oral Nightly    vancomycin  750 mg IntraVENous Q12H    aspirin  81 mg Oral Daily    sodium chloride flush  5-40 mL IntraVENous 2 times per day    lidocaine 1 % injection  50 mg IntraDERmal Once    divalproex  125 mg Oral 3 times per day    metoprolol succinate  25 mg Oral BID    nicotine  1 patch TransDERmal Daily    atorvastatin  40 mg Oral Nightly    clopidogrel  75 mg Oral Daily    [Held by provider] losartan  25 mg Oral Daily     Continuous Infusions:   lactated ringers 100 mL/hr at 25 1220    sodium chloride       PRN Meds:buprenorphine-naloxone, cloNIDine, loperamide, dicyclomine, ipratropium 0.5 mg-albuterol 2.5 mg, sodium chloride flush, sodium chloride, LORazepam, hydrOXYzine pamoate, acetaminophen **OR** acetaminophen, magnesium sulfate, ondansetron **OR** ondansetron, potassium chloride **OR** potassium alternative oral replacement **OR** potassium chloride, Carmex Classic Lip Balm    OBJECTIVE:  /83   Pulse 99   Temp 97.9 °F (36.6 °C) (Temporal)   Resp 24   Ht 1.93 m (6' 4\")   Wt 78.9 kg (174 lb)   SpO2 96%   BMI 21.18 kg/m²   Temp  Av °F (36.7 °C)  Min: 97.8 °F (36.6 °C)  Max: 98.1 °F (36.7 °C)  Constitutional: The patient is awake, able to intermittently nod head to answer questions but not oriented    Skin: Warm and dry. No rashes were noted. No jaundice.  HEENT: Eyes show round, and reactive pupils. Moist mucous membranes, no ulcerations, no thrush.   Neck: Supple to movements. No lymphadenopathy.   Chest: No use of accessory muscles to breathe. Symmetrical expansion. Auscultation reveals no wheezing, crackles, or rhonchi.  cultures  CT surgery is planning for valve replacement but needs to hold Plavix for 5 to 7 days   I reviewed cardiology note, patient needs to be at least 3 months on Plavix then can be held for valve replacement surgery  Case management working on disposition of the patient  Repeat blood cultures  ID will continue to follow    Db Frazier MD  2:03 PM  2/25/2025

## 2025-02-25 NOTE — PROGRESS NOTES
Neurology Progress Note     Wagner Ryan is a 45 y.o. male     Patient presented the ED on 2/17/2025 at [facility] with fatigue and alteration in mentation.  He was found to have NSTEMI and taken to the Cath Lab where he had stent placed.    Because of alteration in mentation MRI of the brain did demonstrate multiple areas of ischemia some in the parieto-occipital area demonstrating some vasogenic edema.  Transesophageal echo was completed which was highly suspicious for vegetation therefore he was transferred to [facility] for cardiothoracic evaluation    CTS notes reviewed    Spoke with CTS this a.m.-MRI did not demonstrate intracranial hemorrhage-CT prior to MRI did.  Okay for heparin for intervention but will also repeat CT on Monday prior to intervention.    2/24/25  Patient is sleeping in bed, sitter is at bedside. No family. He wakes easily and is interactive with exam. No new concerns or events overnight. He reports that he does feel better from yesterday. The patient is scheduled for CVC placement today. Discussed results of repeat CT head. All questions answered and patient verbalized understanding.     2/25/25  Th patient is resting in bed, telesitter at bedside. No family. Yesteday he went for CVC placement. Overnight he has developed SOB and tachycardia. He is also requesting suboxone. Discussed with nursing that he has been taking suboxone for 10 years and he is withdrawing as he has not been available to him during hospitalization. All questions answered and patient verbalized understanding.        Allergies as of 02/18/2025    (No Known Allergies)       Objective:     BP (!) 142/99   Pulse (!) 102   Temp 98 °F (36.7 °C) (Temporal)   Resp (!) 32   Ht 1.93 m (6' 4\")   Wt 78.9 kg (174 lb)   SpO2 97%   BMI 21.18 kg/m²      General appearance: Awake looking around the room, cooperative, pale, restless  Head: Normocephalic, without obvious abnormality, atraumatic  Extremities: no  history of drug abuse presenting with alteration in mentation status post stent placement found to have multiple areas of acute ischemia on MRI of the brain -transesophageal echo suspected septic embolization   CTS following   Repeat head CT demonstrated decreasing density of the 5 mm left posterior frontal parenchymal hemorrhage    Plan:     Cardiothoracic surgery to follow    Continue to monitor neurologically, patient remains alert and oriented x 4, no focal deficits    Patient will need antiplatelet for recent stenting-was receiving Brilinta but is now transition to clopidogrel?   Defer to cardiology if pause is needed prior to any surgical intervention due to stent    Neuro to follow to follow peripherally.   Loren Doe PA-C  8:30 AM  2/25/2025

## 2025-02-25 NOTE — PROCEDURES
[facility]              [address] [place] [address]                           PULMONARY FUNCTION      PATIENT NAME: SANGEETA SORTO            : 1980  MED REC NO: [id number]                        ROOM: 7410  ACCOUNT NO: [id number]                       ADMIT DATE: 2025  PROVIDER: Enrique Garner MD      DATE OF PROCEDURE: 2025    SURGEON:  Enrique Garner MD    REFERRING PHYSICIAN:  MEGHA HUTTON    The spirometry shows mild reduction in FVC and moderate reduction in FEV1.  The FEV1/FVC is mildly decreased.  The maximum voluntary ventilation is 43% of the predicted value.    According to Z-score criteria, FVC, FEV1, and FEV1/FVC are outside the area of curve, more than standard deviation of -1.64.    The diffusion capacity is mildly decreased.    IMPRESSION:  The above study shows moderate obstructive ventilatory impairment.  GOLD criteria stage II chronic obstructive pulmonary disease.          ENRIQUE GARNER MD      D:  2025 08:09:14     T:  2025 00:06:35     HERBERT/DARSHAN  Job #:  [id number]     Doc#:  [id number]

## 2025-02-25 NOTE — PROGRESS NOTES
Department of Internal Medicine  Nephrology Progress Note      Events reviewed     SUBJECTIVE: Shortness of breath We are following for hyponatremia. He has no complaints today.    PHYSICAL EXAM:      Vitals:    VITALS:  BP (!) 182/98   Pulse (!) 102   Temp 98.1 °F (36.7 °C) (Temporal)   Resp 25   Ht 1.93 m (6' 4\")   Wt 78.9 kg (174 lb)   SpO2 93%   BMI 21.18 kg/m²   24HR PULSE OXIMETRY RANGE:  SpO2  Av.5 %  Min: 93 %  Max: 100 %  24HR INTAKE/OUTPUT:    Intake/Output Summary (Last 24 hours) at 2025 0858  Last data filed at 2025 0846  Gross per 24 hour   Intake 495.47 ml   Output 3000 ml   Net -2504.53 ml       Constitutional: Patient is awake nonverbal no distress  HEENT: Pupils equal reactive, mucous membranes are dry  Respiratory: Lungs are clear  Cardiovascular/Edema: Heart sounds are regular  Gastrointestinal: Abdomen is soft  Neurologic: Patient is lethargic, nonverbal  Skin: No skin rashes  Other: No edema    Scheduled Meds:   vancomycin  750 mg IntraVENous Q12H    aspirin  81 mg Oral Daily    sodium chloride flush  5-40 mL IntraVENous 2 times per day    lidocaine 1 % injection  50 mg IntraDERmal Once    divalproex  125 mg Oral 3 times per day    melatonin  5 mg Oral Nightly    metoprolol succinate  25 mg Oral BID    nicotine  1 patch TransDERmal Daily    atorvastatin  40 mg Oral Nightly    clopidogrel  75 mg Oral Daily    [Held by provider] losartan  25 mg Oral Daily     Continuous Infusions:   sodium chloride       PRN Meds:.ipratropium 0.5 mg-albuterol 2.5 mg, sodium chloride flush, sodium chloride, LORazepam, hydrOXYzine pamoate, acetaminophen **OR** acetaminophen, magnesium sulfate, ondansetron **OR** ondansetron, potassium chloride **OR** potassium alternative oral replacement **OR** potassium chloride, Carmex Classic Lip Balm  .    DATA:    CBC:   Lab Results   Component Value Date/Time    WBC 14.1 2025 07:13 AM    RBC 3.26 2025 07:13 AM    HGB 8.0 2025 07:13 AM

## 2025-02-25 NOTE — PROGRESS NOTES
Perfect serve sent to provider due to SOB, agitation, nausea, and tachycardia. Orders placed from provider.

## 2025-02-25 NOTE — PLAN OF CARE
Problem: Discharge Planning  Goal: Discharge to home or other facility with appropriate resources  2/24/2025 2107 by Phyllis Giraldo RN  Outcome: Progressing  2/24/2025 0910 by Darline Sims RN  Outcome: Progressing     Problem: Pain  Goal: Verbalizes/displays adequate comfort level or baseline comfort level  2/24/2025 2107 by Phyllis Giraldo RN  Outcome: Progressing  2/24/2025 0910 by Darline Sims RN  Outcome: Progressing     Problem: Safety - Adult  Goal: Free from fall injury  2/24/2025 2107 by Phyllis Giraldo RN  Outcome: Progressing  2/24/2025 0910 by Darline Sims RN  Outcome: Progressing     Problem: ABCDS Injury Assessment  Goal: Absence of physical injury  2/24/2025 2107 by Phyllis Giraldo RN  Outcome: Progressing  2/24/2025 0910 by Darline Sims RN  Outcome: Progressing     Problem: Skin/Tissue Integrity  Goal: Skin integrity remains intact  Description: 1.  Monitor for areas of redness and/or skin breakdown  2.  Assess vascular access sites hourly  3.  Every 4-6 hours minimum:  Change oxygen saturation probe site  4.  Every 4-6 hours:  If on nasal continuous positive airway pressure, respiratory therapy assess nares and determine need for appliance change or resting period  2/24/2025 2107 by Phyllis Giraldo RN  Outcome: Progressing  2/24/2025 0910 by Darline Sims RN  Outcome: Progressing     Problem: Neurosensory - Adult  Goal: Achieves stable or improved neurological status  2/24/2025 2107 by Phyllis Giraldo RN  Outcome: Progressing  2/24/2025 0910 by Darline Sims RN  Outcome: Progressing  Goal: Absence of seizures  2/24/2025 2107 by Phyllis Giraldo RN  Outcome: Progressing  2/24/2025 0910 by Darline Sims RN  Outcome: Progressing  Goal: Remains free of injury related to seizures activity  2/24/2025 2107 by Phyllis Giraldo RN  Outcome: Progressing  2/24/2025 0910 by Darline Sims RN  Outcome: Progressing  Goal: Achieves maximal functionality  Progressing  2/24/2025 0910 by Darline Sims RN  Outcome: Progressing  Goal: Maintains adequate nutritional intake  2/24/2025 2107 by Phyllis Giraldo RN  Outcome: Progressing  2/24/2025 0910 by Darline Sims RN  Outcome: Progressing     Problem: Genitourinary - Adult  Goal: Absence of urinary retention  2/24/2025 0910 by Darline Sims RN  Outcome: Progressing     Problem: Infection - Adult  Goal: Absence of infection at discharge  2/24/2025 2107 by Phyllis Giraldo RN  Outcome: Progressing  2/24/2025 0910 by Darline Sims RN  Outcome: Progressing  Goal: Absence of infection during hospitalization  2/24/2025 2107 by Phyllis Giraldo RN  Outcome: Progressing  2/24/2025 0910 by Darline Sims RN  Outcome: Progressing     Problem: Metabolic/Fluid and Electrolytes - Adult  Goal: Electrolytes maintained within normal limits  2/24/2025 2107 by Phyllis Giraldo RN  Outcome: Progressing  2/24/2025 0910 by Darline Sims RN  Outcome: Progressing  Goal: Hemodynamic stability and optimal renal function maintained  2/24/2025 0910 by Darline Sims RN  Outcome: Progressing  Goal: Glucose maintained within prescribed range  2/24/2025 2107 by Phyllis Giraldo RN  Outcome: Progressing  2/24/2025 0910 by Darline Sims RN  Outcome: Progressing     Problem: Hematologic - Adult  Goal: Maintains hematologic stability  2/24/2025 2107 by Phyllis Giraldo RN  Outcome: Progressing  2/24/2025 0910 by Darline Sims RN  Outcome: Progressing     Problem: Chronic Conditions and Co-morbidities  Goal: Patient's chronic conditions and co-morbidity symptoms are monitored and maintained or improved  2/24/2025 2107 by Phyllis Giraldo RN  Outcome: Progressing  2/24/2025 0910 by Darline Sims RN  Outcome: Progressing

## 2025-02-25 NOTE — PROGRESS NOTES
Comprehensive Nutrition Assessment    Type and Reason for Visit:  Initial, LOS    Nutrition Recommendations/Plan:   Continue current diet  Start ONS to promote oral intake and aid in wound healing  Will monitor     Malnutrition Assessment:  Malnutrition Status:  Insufficient data (02/25/25 4286)    Context:  Acute Illness     Findings of the 6 clinical characteristics of malnutrition:  Energy Intake:  Mild decrease in energy intake  Weight Loss:  Unable to assess (d/t lack of wt hx per EMR)     Body Fat Loss:  Unable to assess     Muscle Mass Loss:  Unable to assess    Fluid Accumulation:  No fluid accumulation     Strength:  Not Performed    Nutrition Assessment:    pt adm to [facility] d/t SOB/STEMI/AMS s/p angiogram/stent placement 2/17, CT showed hemorrhage, transf to [facility] for MRSA AV/ MV endocarditis; MRI did not show hemorrhage; PMhx of hep C, IV Drug abuse; s/p SLP eval 2/22, recommend easy to chew solids/thin liquids; pt s/p tunneled CVC placement 2/24; will start ONS and will monitor.    Nutrition Related Findings:    I/O WNL; A&Ox4; poor dentition; active BS; trace edema Wound Type: Multiple (wounds noted x 4)       Current Nutrition Intake & Therapies:    Average Meal Intake: 51-75%  Average Supplements Intake: None Ordered  ADULT DIET; Easy to Chew; Low Fat/Low Chol/High Fiber/SWETHA; No Added Salt (3-4 gm)  ADULT ORAL NUTRITION SUPPLEMENT; Breakfast, Lunch; Standard High Calorie/High Protein Oral Supplement    Anthropometric Measures:  Height: 193 cm (6' 3.98\")  Ideal Body Weight (IBW): 202 lbs (92 kg)       Current Body Weight: 78.9 kg (173 lb 15.1 oz) (2/25-BS/dry wt), 86.1 % IBW. Weight Source: Bed scale  Current BMI (kg/m2): 21.2  Usual Body Weight:  (UTO d/t lack of wt hx per EMR)        Weight Adjustment For: No Adjustment                 BMI Categories: Normal Weight (BMI 18.5-24.9)    Estimated Daily Nutrient Needs:  Energy Requirements Based On: Formula  Weight Used for Energy Requirements:  Current  Energy (kcal/day): 1698-4314  Weight Used for Protein Requirements: Current  Protein (g/day): 1.3-1.5g/czfPEZ=516-549m  Method Used for Fluid Requirements: 1 ml/kcal  Fluid (ml/day): 7449-6793    Nutrition Diagnosis:   Increased nutrient needs related to increase demand for energy/nutrients as evidenced by wounds    Nutrition Interventions:   Food and/or Nutrient Delivery: Continue Current Diet, Start Oral Nutrition Supplement (Ensure BID)  Nutrition Education/Counseling: Education/Counseling not indicated  Coordination of Nutrition Care: Continue to monitor while inpatient       Goals:  Goals: PO intake 75% or greater  Type of Goal: New goal  Previous Goal Met: New Goal    Nutrition Monitoring and Evaluation:   Behavioral-Environmental Outcomes: None Identified  Food/Nutrient Intake Outcomes: Food and Nutrient Intake, Supplement Intake  Physical Signs/Symptoms Outcomes: Biochemical Data, Nutrition Focused Physical Findings, Skin, Chewing or Swallowing, Weight, GI Status, Fluid Status or Edema    Discharge Planning:    Too soon to determine     Tracy Cruz RD, LD  Contact: [phone]

## 2025-02-25 NOTE — PROGRESS NOTES
[facility] Hospitalist Progress Note    Admitting Date and Time: 2/18/2025  7:30 PM  Admit Dx: Endocarditis [I38]    Synopsis:  45 y.o. year old male  who  has a past medical history of Hx of hepatitis C-resolved, IV drug abuse (HCC), and Nonhealing nonsurgical wound with fat layer exposed.      Wagner Ryan is a 45 y.o. male who presents to the CVICU for evaluation by CTS surgery. Patient was being followed at [facility] for AMS and a STEMI and underwent stent placement of the LAD on 2/17.  He was admitted to the MICU following cath lab and started on broad spectrum antibiotics, ASA and Brilinta. During work up for his AMS, a CTA of the head showed tiny area of intraparenchymal hemorrhage in the right occipital lobe w/ MRI of the brain showed multiple small foci of acute and early subacute infarcts in bilateral frontal and parietal occipital lobe left more than right consistent with micro emboli. Neurology was consulted. They cleared the patient to continue on plavix given his recent stent placement.  He underwent an ECHO which was found to have vegetations and the decision was made to transfer to [facility] for evaluation by CTS Surgery.      2/19: Patient seen in CVICU.  Per ICU team and CTS, patient can be transferred as he has no ICU needs.  Transfer order placed.     2/20: Undergoing CTS workup.  Patient continues to have persistent confusion and delirium.  Seems like he was started on Decadron at previous hospital for concern of meningitis and it has not been stopped or weaned.  No mention for continuation of Decadron per ID here and treating for MRSA bacteremia as of now.  Hold Decadron.     2/21: Neurology stating patient has no brain bleed and that cardiology has to decide on DAPT.  Contacted cardiology and stated patient must be on DAPT given recent cardiac stenting and 2/17.  Advised to start aspirin and continue Plavix.  Orders placed.  CTS to be informed.     2/22: Per general surgery, patient    BELLO stage II        Plan:  CTS following:  Undergoing preop workup  They are recommending holding Plavix prior to procedure but Cardiology stated on 2/23 patient must remain on DAPT due to recent cardiac stent.  CT surgery mild to valve replacement after completion of DAPT.  Conservative therapy for now due to above  Neurology following  ID following:  Continue vancomycin IV  Bactroban for nose this colonization twice daily for 5 days    Continue other medications including Lipitor, Plavix, Cozaar, Lopressor, Bactroban  Continue Depakote and melatonin scheduled for confusion/delirium  Per general surgery, patient does not need femoral central line replaced despite being 4 days old has IR plans to do PICC line on 2/24.  Nephrology following  IR and dental asking to hold all anticoagulants for their planned procedures this week.  As stated in this note patient must stay on DAPT given recent cardiac stenting per Cardiology.  Placed on COWS protocol for Suboxone withdrawal  On IV fluids, holding losartan.  Nephrology on board        DC planning: Pending dental extraction.  Patient will be complex placement as he is self-pay, IV drug abuser, and needs long-term antibiotics.  Case management aware.        NOTE: This report was transcribed using voice recognition software. Every effort was made to ensure accuracy; however, inadvertent computerized transcription errors may be present.  Electronically signed by ROXANE TERESA MD on 2/25/2025 at 3:33 PM

## 2025-02-26 ENCOUNTER — APPOINTMENT (OUTPATIENT)
Dept: GENERAL RADIOLOGY | Age: 45
End: 2025-02-26
Attending: INTERNAL MEDICINE

## 2025-02-26 LAB
ALBUMIN SERPL-MCNC: 2.8 G/DL (ref 3.5–5.2)
ALP SERPL-CCNC: 113 U/L (ref 40–129)
ALT SERPL-CCNC: 42 U/L (ref 0–40)
ANION GAP SERPL CALCULATED.3IONS-SCNC: 11 MMOL/L (ref 7–16)
ANION GAP SERPL CALCULATED.3IONS-SCNC: 15 MMOL/L (ref 7–16)
AST SERPL-CCNC: 46 U/L (ref 0–39)
B.E.: -7.7 MMOL/L (ref -3–3)
BACTERIA URNS QL MICRO: ABNORMAL
BASOPHILS # BLD: 0.02 K/UL (ref 0–0.2)
BASOPHILS NFR BLD: 0 % (ref 0–2)
BILIRUB SERPL-MCNC: 0.5 MG/DL (ref 0–1.2)
BILIRUB UR QL STRIP: NEGATIVE
BNP SERPL-MCNC: ABNORMAL PG/ML (ref 0–125)
BUN SERPL-MCNC: 45 MG/DL (ref 6–20)
BUN SERPL-MCNC: 55 MG/DL (ref 6–20)
CALCIUM SERPL-MCNC: 8.1 MG/DL (ref 8.6–10.2)
CALCIUM SERPL-MCNC: 8.2 MG/DL (ref 8.6–10.2)
CHLORIDE SERPL-SCNC: 100 MMOL/L (ref 98–107)
CHLORIDE SERPL-SCNC: 101 MMOL/L (ref 98–107)
CLARITY UR: CLEAR
CO2 SERPL-SCNC: 18 MMOL/L (ref 22–29)
CO2 SERPL-SCNC: 22 MMOL/L (ref 22–29)
COHB: 0.7 % (ref 0–1.5)
COLOR UR: YELLOW
CREAT SERPL-MCNC: 2.3 MG/DL (ref 0.7–1.2)
CREAT SERPL-MCNC: 2.9 MG/DL (ref 0.7–1.2)
CRITICAL: ABNORMAL
DATE ANALYZED: ABNORMAL
DATE OF COLLECTION: ABNORMAL
EOSINOPHIL # BLD: 0.11 K/UL (ref 0.05–0.5)
EOSINOPHILS RELATIVE PERCENT: 1 % (ref 0–6)
EPI CELLS #/AREA URNS HPF: ABNORMAL /HPF
ERYTHROCYTE [DISTWIDTH] IN BLOOD BY AUTOMATED COUNT: 17 % (ref 11.5–15)
GFR, ESTIMATED: 27 ML/MIN/1.73M2
GFR, ESTIMATED: 36 ML/MIN/1.73M2
GLUCOSE SERPL-MCNC: 105 MG/DL (ref 74–99)
GLUCOSE SERPL-MCNC: 85 MG/DL (ref 74–99)
GLUCOSE UR STRIP-MCNC: NEGATIVE MG/DL
HCO3: 17.3 MMOL/L (ref 22–26)
HCT VFR BLD AUTO: 24.1 % (ref 37–54)
HGB BLD-MCNC: 7.3 G/DL (ref 12.5–16.5)
HGB UR QL STRIP.AUTO: ABNORMAL
HHB: 0.6 % (ref 0–5)
IMM GRANULOCYTES # BLD AUTO: 0.17 K/UL (ref 0–0.58)
IMM GRANULOCYTES NFR BLD: 1 % (ref 0–5)
KETONES UR STRIP-MCNC: NEGATIVE MG/DL
LAB: ABNORMAL
LEUKOCYTE ESTERASE UR QL STRIP: ABNORMAL
LYMPHOCYTES NFR BLD: 1.52 K/UL (ref 1.5–4)
LYMPHOCYTES RELATIVE PERCENT: 9 % (ref 20–42)
Lab: 625
MCH RBC QN AUTO: 23.6 PG (ref 26–35)
MCHC RBC AUTO-ENTMCNC: 30.3 G/DL (ref 32–34.5)
MCV RBC AUTO: 78 FL (ref 80–99.9)
METHB: 0.8 % (ref 0–1.5)
MODE: ABNORMAL
MONOCYTES NFR BLD: 0.88 K/UL (ref 0.1–0.95)
MONOCYTES NFR BLD: 5 % (ref 2–12)
NEUTROPHILS NFR BLD: 84 % (ref 43–80)
NEUTS SEG NFR BLD: 13.64 K/UL (ref 1.8–7.3)
NITRITE UR QL STRIP: NEGATIVE
O2 SATURATION: 99.4 % (ref 92–98.5)
O2HB: 97.9 % (ref 94–97)
OPERATOR ID: 2860
PATIENT TEMP: 37 C
PCO2: 33 MMHG (ref 35–45)
PH BLOOD GAS: 7.34 (ref 7.35–7.45)
PH UR STRIP: 5.5 [PH] (ref 5–8)
PLATELET # BLD AUTO: 353 K/UL (ref 130–450)
PMV BLD AUTO: 9.2 FL (ref 7–12)
PO2: 313.8 MMHG (ref 75–100)
POTASSIUM SERPL-SCNC: 4.9 MMOL/L (ref 3.5–5)
POTASSIUM SERPL-SCNC: 5.2 MMOL/L (ref 3.5–5)
PROT SERPL-MCNC: 6.6 G/DL (ref 6.4–8.3)
PROT UR STRIP-MCNC: 30 MG/DL
RBC # BLD AUTO: 3.09 M/UL (ref 3.8–5.8)
RBC #/AREA URNS HPF: ABNORMAL /HPF
SODIUM SERPL-SCNC: 133 MMOL/L (ref 132–146)
SODIUM SERPL-SCNC: 134 MMOL/L (ref 132–146)
SOURCE, BLOOD GAS: ABNORMAL
SP GR UR STRIP: 1.02 (ref 1–1.03)
THB: 8.4 G/DL (ref 11.5–16.5)
TIME ANALYZED: 630
UROBILINOGEN UR STRIP-ACNC: 0.2 EU/DL (ref 0–1)
WBC #/AREA URNS HPF: ABNORMAL /HPF
WBC OTHER # BLD: 16.3 K/UL (ref 4.5–11.5)

## 2025-02-26 PROCEDURE — 6370000000 HC RX 637 (ALT 250 FOR IP): Performed by: INTERNAL MEDICINE

## 2025-02-26 PROCEDURE — 94660 CPAP INITIATION&MGMT: CPT

## 2025-02-26 PROCEDURE — 80053 COMPREHEN METABOLIC PANEL: CPT

## 2025-02-26 PROCEDURE — 2700000000 HC OXYGEN THERAPY PER DAY

## 2025-02-26 PROCEDURE — 85025 COMPLETE CBC W/AUTO DIFF WBC: CPT

## 2025-02-26 PROCEDURE — 6360000002 HC RX W HCPCS: Performed by: INTERNAL MEDICINE

## 2025-02-26 PROCEDURE — 6360000002 HC RX W HCPCS

## 2025-02-26 PROCEDURE — 80048 BASIC METABOLIC PNL TOTAL CA: CPT

## 2025-02-26 PROCEDURE — 82805 BLOOD GASES W/O2 SATURATION: CPT

## 2025-02-26 PROCEDURE — 6370000000 HC RX 637 (ALT 250 FOR IP)

## 2025-02-26 PROCEDURE — 2580000003 HC RX 258

## 2025-02-26 PROCEDURE — 2060000000 HC ICU INTERMEDIATE R&B

## 2025-02-26 PROCEDURE — 94640 AIRWAY INHALATION TREATMENT: CPT

## 2025-02-26 PROCEDURE — 83880 ASSAY OF NATRIURETIC PEPTIDE: CPT

## 2025-02-26 PROCEDURE — 99232 SBSQ HOSP IP/OBS MODERATE 35: CPT

## 2025-02-26 PROCEDURE — 2500000003 HC RX 250 WO HCPCS: Performed by: INTERNAL MEDICINE

## 2025-02-26 PROCEDURE — 71045 X-RAY EXAM CHEST 1 VIEW: CPT

## 2025-02-26 PROCEDURE — 99231 SBSQ HOSP IP/OBS SF/LOW 25: CPT | Performed by: PHYSICIAN ASSISTANT

## 2025-02-26 PROCEDURE — 81001 URINALYSIS AUTO W/SCOPE: CPT

## 2025-02-26 RX ORDER — FUROSEMIDE 10 MG/ML
80 INJECTION INTRAMUSCULAR; INTRAVENOUS ONCE
Status: COMPLETED | OUTPATIENT
Start: 2025-02-26 | End: 2025-02-26

## 2025-02-26 RX ADMIN — ASPIRIN 81 MG CHEWABLE TABLET 81 MG: 81 TABLET CHEWABLE at 08:55

## 2025-02-26 RX ADMIN — METOPROLOL SUCCINATE 25 MG: 25 TABLET, EXTENDED RELEASE ORAL at 08:55

## 2025-02-26 RX ADMIN — ATORVASTATIN CALCIUM 40 MG: 40 TABLET, FILM COATED ORAL at 21:49

## 2025-02-26 RX ADMIN — HYDROXYZINE PAMOATE 25 MG: 25 CAPSULE ORAL at 08:55

## 2025-02-26 RX ADMIN — SODIUM CHLORIDE, PRESERVATIVE FREE 10 ML: 5 INJECTION INTRAVENOUS at 21:50

## 2025-02-26 RX ADMIN — VANCOMYCIN HYDROCHLORIDE 750 MG: 750 INJECTION, POWDER, LYOPHILIZED, FOR SOLUTION INTRAVENOUS at 21:56

## 2025-02-26 RX ADMIN — Medication 3 MG: at 21:49

## 2025-02-26 RX ADMIN — BUPRENORPHINE HYDROCHLORIDE AND NALOXONE HYDROCHLORIDE DIHYDRATE 2 TABLET: 2; .5 TABLET SUBLINGUAL at 13:56

## 2025-02-26 RX ADMIN — ACETAMINOPHEN 650 MG: 325 TABLET ORAL at 16:11

## 2025-02-26 RX ADMIN — DIVALPROEX SODIUM 125 MG: 125 CAPSULE, COATED PELLETS ORAL at 04:46

## 2025-02-26 RX ADMIN — DIVALPROEX SODIUM 125 MG: 125 CAPSULE, COATED PELLETS ORAL at 21:49

## 2025-02-26 RX ADMIN — IPRATROPIUM BROMIDE AND ALBUTEROL SULFATE 1 DOSE: 2.5; .5 SOLUTION RESPIRATORY (INHALATION) at 05:41

## 2025-02-26 RX ADMIN — METOPROLOL SUCCINATE 25 MG: 25 TABLET, EXTENDED RELEASE ORAL at 21:49

## 2025-02-26 RX ADMIN — BUPRENORPHINE HYDROCHLORIDE AND NALOXONE HYDROCHLORIDE DIHYDRATE 2 TABLET: 2; .5 TABLET SUBLINGUAL at 04:46

## 2025-02-26 RX ADMIN — SODIUM ZIRCONIUM CYCLOSILICATE 10 G: 10 POWDER, FOR SUSPENSION ORAL at 21:49

## 2025-02-26 RX ADMIN — VANCOMYCIN HYDROCHLORIDE 750 MG: 750 INJECTION, POWDER, LYOPHILIZED, FOR SOLUTION INTRAVENOUS at 08:59

## 2025-02-26 RX ADMIN — CLOPIDOGREL 75 MG: 75 TABLET, FILM COATED ORAL at 08:55

## 2025-02-26 RX ADMIN — FUROSEMIDE 80 MG: 10 INJECTION, SOLUTION INTRAMUSCULAR; INTRAVENOUS at 09:58

## 2025-02-26 RX ADMIN — SODIUM CHLORIDE, PRESERVATIVE FREE 10 ML: 5 INJECTION INTRAVENOUS at 08:55

## 2025-02-26 RX ADMIN — LORAZEPAM 0.5 MG: 2 INJECTION INTRAMUSCULAR; INTRAVENOUS at 05:27

## 2025-02-26 RX ADMIN — SODIUM CHLORIDE, PRESERVATIVE FREE 10 ML: 5 INJECTION INTRAVENOUS at 09:59

## 2025-02-26 RX ADMIN — DIVALPROEX SODIUM 125 MG: 125 CAPSULE, COATED PELLETS ORAL at 13:56

## 2025-02-26 ASSESSMENT — PAIN SCALES - GENERAL
PAINLEVEL_OUTOF10: 0

## 2025-02-26 ASSESSMENT — PAIN DESCRIPTION - DESCRIPTORS: DESCRIPTORS: OTHER (COMMENT)

## 2025-02-26 ASSESSMENT — PAIN DESCRIPTION - LOCATION: LOCATION: OTHER (COMMENT)

## 2025-02-26 NOTE — PROGRESS NOTES
and regular. No murmurs appreciated.   Abdomen: Positive bowel sounds to auscultation. Benign to palpation.   Extremities: No clubbing, no cyanosis, no edema.  Neurological: Following commands, no focal neurodeficit  Lines: Right femoral CVC 2/17/25     Laboratory and Tests Review:  Lab Results   Component Value Date    WBC 16.3 (H) 02/26/2025    WBC 14.1 (H) 02/25/2025    WBC 13.6 (H) 02/24/2025    HGB 7.3 (L) 02/26/2025    HCT 24.1 (L) 02/26/2025    MCV 78.0 (L) 02/26/2025     02/26/2025     Lab Results   Component Value Date    NEUTROABS 13.64 (H) 02/26/2025    NEUTROABS 11.52 (H) 02/25/2025    NEUTROABS 10.57 (H) 02/24/2025     No results found for: \"CRPHS\"  Lab Results   Component Value Date    ALT 42 (H) 02/26/2025    AST 46 (H) 02/26/2025    ALKPHOS 113 02/26/2025    BILITOT 0.5 02/26/2025     Lab Results   Component Value Date/Time     02/26/2025 06:00 AM    K 5.2 02/26/2025 06:00 AM    K 4.2 10/04/2021 06:30 AM     02/26/2025 06:00 AM    CO2 18 02/26/2025 06:00 AM    BUN 45 02/26/2025 06:00 AM    CREATININE 2.3 02/26/2025 06:00 AM    CREATININE 1.9 02/25/2025 07:13 AM    CREATININE 1.8 02/24/2025 06:19 AM    GFRAA >60 03/11/2022 04:26 AM    LABGLOM 36 02/26/2025 06:00 AM    GLUCOSE 85 02/26/2025 06:00 AM    CALCIUM 8.2 02/26/2025 06:00 AM    BILITOT 0.5 02/26/2025 06:00 AM    ALKPHOS 113 02/26/2025 06:00 AM    AST 46 02/26/2025 06:00 AM    ALT 42 02/26/2025 06:00 AM     Lab Results   Component Value Date    .0 (H) 02/17/2025    CRP 25.3 (H) 03/04/2022    CRP 19.9 (H) 10/04/2021     Lab Results   Component Value Date    SEDRATE 65 (H) 02/17/2025    SEDRATE 80 (H) 03/04/2022    SEDRATE 70 (H) 10/04/2021     Radiology:      Microbiology:   Lab Results   Component Value Date/Time    BC 5 Days no growth 03/10/2022 11:41 AM    BC 5 Days no growth 10/03/2021 03:02 AM    BC 5 Days no growth 10/07/2020 11:29 AM    ORG Anaerobic gram positive ga 03/06/2022 09:37 AM    ORG Serratia  planning for valve replacement but needs to hold Plavix for 5 to 7 days   I reviewed cardiology note, patient needs to be at least 3 months on Plavix then can be held for valve replacement surgery  Case management working on disposition of the patient  Repeat blood cultures  ID will continue to follow    Db Frazier MD  3:22 PM  2/26/2025

## 2025-02-26 NOTE — PLAN OF CARE
Problem: Discharge Planning  Goal: Discharge to home or other facility with appropriate resources  Outcome: Progressing  Flowsheets (Taken 2/26/2025 0855)  Discharge to home or other facility with appropriate resources: Identify barriers to discharge with patient and caregiver     Problem: Pain  Goal: Verbalizes/displays adequate comfort level or baseline comfort level  Outcome: Progressing     Problem: Safety - Adult  Goal: Free from fall injury  Outcome: Progressing     Problem: ABCDS Injury Assessment  Goal: Absence of physical injury  Outcome: Progressing     Problem: Skin/Tissue Integrity  Goal: Skin integrity remains intact  Description: 1.  Monitor for areas of redness and/or skin breakdown  2.  Assess vascular access sites hourly  3.  Every 4-6 hours minimum:  Change oxygen saturation probe site  4.  Every 4-6 hours:  If on nasal continuous positive airway pressure, respiratory therapy assess nares and determine need for appliance change or resting period  Outcome: Progressing  Flowsheets (Taken 2/26/2025 6365)  Skin Integrity Remains Intact: Monitor for areas of redness and/or skin breakdown     Problem: Neurosensory - Adult  Goal: Achieves stable or improved neurological status  Outcome: Progressing  Flowsheets (Taken 2/26/2025 0855)  Achieves stable or improved neurological status: Assess for and report changes in neurological status  Goal: Absence of seizures  Outcome: Progressing  Flowsheets (Taken 2/26/2025 0855)  Absence of seizures: Monitor for seizure activity.  If seizure occurs, document type and location of movements and any associated apnea  Goal: Remains free of injury related to seizures activity  Outcome: Progressing  Flowsheets (Taken 2/26/2025 0855)  Remains free of injury related to seizure activity: Maintain airway, patient safety  and administer oxygen as ordered  Goal: Achieves maximal functionality and self care  Outcome: Progressing  Flowsheets (Taken 2/26/2025 0855)  Achieves

## 2025-02-26 NOTE — PROGRESS NOTES
[facility] Hospitalist Progress Note    Admitting Date and Time: 2/18/2025  7:30 PM  Admit Dx: Endocarditis [I38]    Synopsis:  45 y.o. year old male  who  has a past medical history of Hx of hepatitis C-resolved, IV drug abuse (HCC), and Nonhealing nonsurgical wound with fat layer exposed.      Wagner Ryan is a 45 y.o. male who presents to the CVICU for evaluation by CTS surgery. Patient was being followed at [facility] for AMS and a STEMI and underwent stent placement of the LAD on 2/17.  He was admitted to the MICU following cath lab and started on broad spectrum antibiotics, ASA and Brilinta. During work up for his AMS, a CTA of the head showed tiny area of intraparenchymal hemorrhage in the right occipital lobe w/ MRI of the brain showed multiple small foci of acute and early subacute infarcts in bilateral frontal and parietal occipital lobe left more than right consistent with micro emboli. Neurology was consulted. They cleared the patient to continue on plavix given his recent stent placement.  He underwent an ECHO which was found to have vegetations and the decision was made to transfer to [facility] for evaluation by CTS Surgery.      2/19: Patient seen in CVICU.  Per ICU team and CTS, patient can be transferred as he has no ICU needs.  Transfer order placed.     2/20: Undergoing CTS workup.  Patient continues to have persistent confusion and delirium.  Seems like he was started on Decadron at previous hospital for concern of meningitis and it has not been stopped or weaned.  No mention for continuation of Decadron per ID here and treating for MRSA bacteremia as of now.  Hold Decadron.     2/21: Neurology stating patient has no brain bleed and that cardiology has to decide on DAPT.  Contacted cardiology and stated patient must be on DAPT given recent cardiac stenting and 2/17.  Advised to start aspirin and continue Plavix.  Orders placed.  CTS to be informed.     2/22: Per general surgery, patient  3.98\")   Wt 78.9 kg (174 lb)   SpO2 97%   BMI 21.19 kg/m²     General Appearance: alert and oriented to person, place and time and in no acute distress  Skin: warm and dry  Head: normocephalic and atraumatic  Eyes: pupils equal, round, and reactive to light, extraocular eye movements intact, conjunctivae normal  Neck: neck supple and non tender without mass   Pulmonary/Chest: clear to auscultation bilaterally- no wheezes, rales or rhonchi, normal air movement, no respiratory distress  Cardiovascular: normal rate, normal S1 and S2 and no carotid bruits  Abdomen: soft, non-tender, non-distended, normal bowel sounds, no masses or organomegaly  Extremities: no cyanosis, no clubbing and no edema  Neurologic: no cranial nerve deficit and speech normal      Recent Labs     02/24/25  0619 02/25/25  0713 02/26/25  0600    141 134   K 3.9 4.5 5.2*    109* 101   CO2 20* 21* 18*   BUN 47* 40* 45*   CREATININE 1.8* 1.9* 2.3*   GLUCOSE 110* 101* 85   CALCIUM 8.1* 8.2* 8.2*       Recent Labs     02/24/25  0619 02/25/25  0713 02/26/25  0600   WBC 13.6* 14.1* 16.3*   RBC 3.08* 3.26* 3.09*   HGB 7.4* 8.0* 7.3*   HCT 22.6* 24.5* 24.1*   MCV 73.4* 75.2* 78.0*   MCH 24.0* 24.5* 23.6*   MCHC 32.7 32.7 30.3*   RDW 16.3* 17.0* 17.0*    313 353   MPV 9.7 9.3 9.2       Radiology: Reviewed    Assessment and plan:    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  Patient was taking Suboxone from the street since last 10 years but not on this admission   BELLO stage II  SOB/volume overload        Plan:  CTS following:  Undergoing preop workup  They are recommending holding Plavix

## 2025-02-26 NOTE — PROGRESS NOTES
Pharmacy Consultation Note  (Antibiotic Dosing and Monitoring)    Initial consult date: 2/18/25  Consulting physician/provider: Jake Bravo DO   Drug: Vancomycin  Indication: Endocarditis/Endovascular     Age/  Gender Height Weight IBW  Allergy Information   45 y.o./male 193 cm (6' 4\") 80.2 kg (176 lb 12.8 oz)     Ideal body weight: 86.8 kg (191 lb 4.5 oz)   Patient has no known allergies.      Renal Function:  Recent Labs     02/24/25  0619 02/25/25  0713 02/26/25  0600   BUN 47* 40* 45*   CREATININE 1.8* 1.9* 2.3*       Intake/Output Summary (Last 24 hours) at 2/26/2025 0749  Last data filed at 2/26/2025 0405  Gross per 24 hour   Intake 773.23 ml   Output 1200 ml   Net -426.77 ml       Vancomycin Monitoring:  Trough:    Recent Labs     02/23/25  1120 02/25/25  1638   VANCOTROUGH 17.1* 14.4     Random:  No results for input(s): \"VANCORANDOM\" in the last 72 hours.    Vancomycin Administration Times:  Recent vancomycin administrations                     vancomycin (VANCOCIN) 750 mg in sodium chloride 0.9 % 250 mL IVPB ([id number]) (mg) 750 mg New Bag 02/25/25 1815     750 mg New Bag  0551     750 mg New Bag 02/24/25 1750     750 mg New Bag  0139    vancomycin (VANCOCIN) 750 mg in sodium chloride 0.9 % 250 mL IVPB ([id number]) (mg) 750 mg New Bag 02/23/25 1350                  Assessment:  Patient is a 45 y.o. male who has been initiated on vancomycin  Estimated Creatinine Clearance: 46 mL/min (A) (based on SCr of 2.3 mg/dL (H)).  To dose vancomycin, pharmacy will be utilizing  trough based dosing.  2/26: Vancomycin trough level last night (drawn ~1 hour early = 14.4 mcg/mL). With increase in SCr to 2.3 today, will continue current dose for now, but may need to switch to q24h dosing if renal function does not improve.     Plan:  Continue vancomycin 750 mg IV every 12 hours    Will check vancomycin levels as needed - check vancomycin level tomorrow morning  Will continue to monitor renal function  Pharmacy to

## 2025-02-26 NOTE — PROGRESS NOTES
Pharmacy Consultation Note  (Antibiotic Dosing and Monitoring)    Initial consult date: 2/18/25  Consulting physician/provider: Jake Bravo DO   Drug: Vancomycin  Indication: Endocarditis/Endovascular     Age/  Gender Height Weight IBW  Allergy Information   45 y.o./male 193 cm (6' 4\") 80.2 kg (176 lb 12.8 oz)     Ideal body weight: 86.8 kg (191 lb 4.5 oz)   Patient has no known allergies.      Renal Function:  Recent Labs     02/23/25  1356 02/24/25  0619 02/25/25  0713   BUN 47* 47* 40*   CREATININE 1.8* 1.8* 1.9*       Intake/Output Summary (Last 24 hours) at 2/25/2025 2010  Last data filed at 2/25/2025 1819  Gross per 24 hour   Intake 1268.7 ml   Output 1400 ml   Net -131.3 ml       Vancomycin Monitoring:  Trough:    Recent Labs     02/23/25  1120 02/25/25  1638   VANCOTROUGH 17.1* 14.4     Random:  No results for input(s): \"VANCORANDOM\" in the last 72 hours.    Vancomycin Administration Times:  Recent vancomycin administrations                     vancomycin (VANCOCIN) 750 mg in sodium chloride 0.9 % 250 mL IVPB ([id number]) (mg) 750 mg New Bag 02/25/25 0551     750 mg New Bag 02/24/25 1750     750 mg New Bag  0139    vancomycin (VANCOCIN) 750 mg in sodium chloride 0.9 % 250 mL IVPB ([id number]) (mg) 750 mg New Bag 02/23/25 1350     750 mg New Bag  0548     750 mg New Bag 02/22/25 2039                  Assessment:  Patient is a 45 y.o. male who has been initiated on vancomycin  Estimated Creatinine Clearance: 55 mL/min (A) (based on SCr of 1.9 mg/dL (H)).  To dose vancomycin, pharmacy will be utilizing  trough based dosing.  2/25: Vancomycin trough level 14.4    Plan:  Continue vancomycin 750 mg IV every 12 hours    Will check vancomycin levels as needed   Will continue to monitor renal function  Pharmacy to follow    Pratik Spears RPH ,PharmD,  2/25/2025 8:11 PM

## 2025-02-26 NOTE — PROGRESS NOTES
Department of Internal Medicine  Nephrology Progress Note      Events reviewed     SUBJECTIVE: We are following Mr. Ryan for BELLO.  Reports some shortness of breath today.    PHYSICAL EXAM:      Vitals:    VITALS:  /69   Pulse (!) 106   Temp 99 °F (37.2 °C) (Temporal)   Resp 20   Ht 1.93 m (6' 3.98\")   Wt 78.9 kg (174 lb)   SpO2 98%   BMI 21.19 kg/m²   24HR PULSE OXIMETRY RANGE:  SpO2  Av.5 %  Min: 92 %  Max: 100 %  24HR INTAKE/OUTPUT:    Intake/Output Summary (Last 24 hours) at 2025 0929  Last data filed at 2025 0405  Gross per 24 hour   Intake 593.23 ml   Output 900 ml   Net -306.77 ml       Constitutional: Patient is awake nonverbal no distress  HEENT: Pupils equal reactive, mucous membranes are dry  Respiratory: Lungs are clear  Cardiovascular/Edema: Heart sounds are regular  Gastrointestinal: Abdomen is soft  Neurologic: Patient is lethargic, nonverbal  Skin: No skin rashes  Other: No edema    Scheduled Meds:   furosemide  80 mg IntraVENous Once    melatonin  3 mg Oral Nightly    vancomycin  750 mg IntraVENous Q12H    aspirin  81 mg Oral Daily    sodium chloride flush  5-40 mL IntraVENous 2 times per day    lidocaine 1 % injection  50 mg IntraDERmal Once    divalproex  125 mg Oral 3 times per day    metoprolol succinate  25 mg Oral BID    nicotine  1 patch TransDERmal Daily    atorvastatin  40 mg Oral Nightly    clopidogrel  75 mg Oral Daily    [Held by provider] losartan  25 mg Oral Daily     Continuous Infusions:   sodium chloride       PRN Meds:.buprenorphine-naloxone, cloNIDine, loperamide, dicyclomine, ipratropium 0.5 mg-albuterol 2.5 mg, sodium chloride flush, sodium chloride, LORazepam, hydrOXYzine pamoate, acetaminophen **OR** acetaminophen, magnesium sulfate, ondansetron **OR** ondansetron, potassium chloride **OR** potassium alternative oral replacement **OR** potassium chloride, Carmex Classic Lip Balm  .    DATA:    CBC:   Lab Results   Component Value Date/Time    WBC  Connor is a 45-year-old man with history of hepatitis C, IV drug abuse, multiple episodes of bacteremia, who was initially admitted to [facility] with shortness of breath, he was found to have ST elevation MI, and therefore he underwent urgent cardiac catheterization with his stenting of the LAD, he is well was found to have MRSA bacteremia, JAVED showed mitral valve and tricuspid valve reason for his transfer to this hospital on 2/18/2025.  Of note MRI of the brain showed multiple small foci of acute or early subacute infarctions in the bilateral frontoparietal and occipital lobes.  On admission his sodium level was 130 but since then his sodium has progressively increase up to 152, reason for this consultation.      Problems resolved:  Hyponatremia, seem to be with water deficit due to poor oral intake, improved to 135 today  Hypokalemia, 2/2 poor intake and probably renal potassium wasting, resolved        IMPRESSION/RECOMMENDATIONS:      BELLO stage II, probably ischemic ATN 2/2 hypotension in the setting of ARB administration.  Renal function continues to worsen, creatinine continues to rise up to 2.3 mg/dL despite IV fluids administration.    Hyperkalemia 2/2 #1, to give Lokelma and to monitor  -----------------------------  Microcytic anemia  MRSA endocarditis, mitral valve and aortic valve endocarditis  STEMI status post LAD stent placement  Septic emboli to the brain  Hepatitis C  Hypoalbuminemia, multifactorial    Plan:    Discontinue IV fluids  Lasix 80 mg IV x 1  Lokelma 10 g p.o. x 1  Continue to hold losartan  Continue monitor sodium level  Continue to monitor renal function  Strict I's and O's    Electronically signed by Deon Case MD on 2/26/2025 at 9:29 AM

## 2025-02-26 NOTE — PROGRESS NOTES
Neurology Progress Note     Wagner Ryan is a 45 y.o. male     Patient presented the ED on 2/17/2025 at [facility] with fatigue and alteration in mentation.  He was found to have NSTEMI and taken to the Cath Lab where he had stent placed.    Because of alteration in mentation MRI of the brain did demonstrate multiple areas of ischemia some in the parieto-occipital area demonstrating some vasogenic edema.  Transesophageal echo was completed which was highly suspicious for vegetation therefore he was transferred to [facility] for cardiothoracic evaluation    CTS notes reviewed    Spoke with CTS this a.m.-MRI did not demonstrate intracranial hemorrhage-CT prior to MRI did.  Okay for heparin for intervention but will also repeat CT on Monday prior to intervention.    2/24/25  Patient is sleeping in bed, sitter is at bedside. No family. He wakes easily and is interactive with exam. No new concerns or events overnight. He reports that he does feel better from yesterday. The patient is scheduled for CVC placement today. Discussed results of repeat CT head. All questions answered and patient verbalized understanding.     2/25/25  Th patient is resting in bed, telesitter at bedside. No family. Yesteday he went for CVC placement. Overnight he has developed SOB and tachycardia. He is also requesting suboxone. Discussed with nursing that he has been taking suboxone for 10 years and he is withdrawing as he has not been available to him during hospitalization. All questions answered and patient verbalized understanding.      2/26/25  Patient is resting in bed, telesitter present. No family at bedside. No new concerns or events overnight. He is more alert and less restless today. All questions answered and patient verbalized understanding.       Allergies as of 02/18/2025    (No Known Allergies)       Objective:     BP (!) 139/90   Pulse (!) 114   Temp 98.7 °F (37.1 °C) (Temporal)   Resp 12   Ht 1.93 m (6'  biparietal and left occipital encephalomalacia.    JAVED  Small vegetations involving the mitral and aortic valves consistent with endocarditis without valvular destruction.     I personally reviewed the patient's lab and imaging studies at this time.    Assessment:     Patient with a history of drug abuse presenting with alteration in mentation status post stent placement found to have multiple areas of acute ischemia on MRI of the brain -transesophageal echo suspected septic embolization   CTS following   Repeat head CT demonstrated decreasing density of the 5 mm left posterior frontal parenchymal hemorrhage  Patient continues to have improving mentation with no focal deficits.     Plan:     Cardiothoracic surgery to follow    Defer antiplatelet recommendations to cardiology due to recent stent placement      Follow with neurology outpatient     Neuro to sign off  Loren Doe PA-C  8:17 AM  2/26/2025

## 2025-02-26 NOTE — PROGRESS NOTES
Patient requiring more oxygen. Notified respiratory for breathing treatment and notified Mari NP pending orders

## 2025-02-26 NOTE — CARE COORDINATION
2/26/25 Update CM Note. Pt admitted 2/18/25 endocarditis. Hx IV drug use. Pt needs AV and MV replacements at some point but unable to be off DAPT, heart cath with OBED placement 2/17/25. Current plan to remain on IV antibiotic therapy. Increased O2 use to 8L/HFNC overnight. Abnormal am labs.  Family attempting to get income documentation for public benefits to re review.  Referral to [facility] [place] and [facility] declined.   Gabriela Lorenzana BSN RN-BC  [phone]

## 2025-02-27 PROBLEM — N17.9 ACUTE KIDNEY INJURY SUPERIMPOSED ON STAGE 2 CHRONIC KIDNEY DISEASE: Status: ACTIVE | Noted: 2025-02-27

## 2025-02-27 PROBLEM — N18.2 ACUTE KIDNEY INJURY SUPERIMPOSED ON STAGE 2 CHRONIC KIDNEY DISEASE: Status: ACTIVE | Noted: 2025-01-01

## 2025-02-27 PROBLEM — J10.1 INFLUENZA A: Status: ACTIVE | Noted: 2025-02-27

## 2025-02-27 LAB
ALBUMIN SERPL-MCNC: 2.6 G/DL (ref 3.5–5.2)
ALP SERPL-CCNC: 94 U/L (ref 40–129)
ALT SERPL-CCNC: 60 U/L (ref 0–40)
ANION GAP SERPL CALCULATED.3IONS-SCNC: 10 MMOL/L (ref 7–16)
ANION GAP SERPL CALCULATED.3IONS-SCNC: 16 MMOL/L (ref 7–16)
AST SERPL-CCNC: 69 U/L (ref 0–39)
B PARAP IS1001 DNA NPH QL NAA+NON-PROBE: NOT DETECTED
B PERT DNA SPEC QL NAA+PROBE: NOT DETECTED
B.E.: -6.1 MMOL/L (ref -3–3)
BASOPHILS # BLD: 0.01 K/UL (ref 0–0.2)
BASOPHILS NFR BLD: 0 % (ref 0–2)
BILIRUB SERPL-MCNC: 0.3 MG/DL (ref 0–1.2)
BNP SERPL-MCNC: ABNORMAL PG/ML (ref 0–125)
BUN SERPL-MCNC: 57 MG/DL (ref 6–20)
BUN SERPL-MCNC: 66 MG/DL (ref 6–20)
C PNEUM DNA NPH QL NAA+NON-PROBE: NOT DETECTED
CALCIUM SERPL-MCNC: 7.9 MG/DL (ref 8.6–10.2)
CALCIUM SERPL-MCNC: 8.4 MG/DL (ref 8.6–10.2)
CHLORIDE SERPL-SCNC: 102 MMOL/L (ref 98–107)
CHLORIDE SERPL-SCNC: 102 MMOL/L (ref 98–107)
CO2 SERPL-SCNC: 17 MMOL/L (ref 22–29)
CO2 SERPL-SCNC: 23 MMOL/L (ref 22–29)
COHB: 0.4 % (ref 0–1.5)
CREAT SERPL-MCNC: 2.9 MG/DL (ref 0.7–1.2)
CREAT SERPL-MCNC: 3.2 MG/DL (ref 0.7–1.2)
CRITICAL: ABNORMAL
DATE ANALYZED: ABNORMAL
DATE LAST DOSE: ABNORMAL
DATE LAST DOSE: ABNORMAL
DATE OF COLLECTION: ABNORMAL
EOSINOPHIL # BLD: 0.12 K/UL (ref 0.05–0.5)
EOSINOPHILS RELATIVE PERCENT: 1 % (ref 0–6)
ERYTHROCYTE [DISTWIDTH] IN BLOOD BY AUTOMATED COUNT: 16.9 % (ref 11.5–15)
FLUAV H3 RNA NPH QL NAA+NON-PROBE: DETECTED
FLUAV RNA NPH QL NAA+NON-PROBE: DETECTED
FLUBV RNA NPH QL NAA+NON-PROBE: NOT DETECTED
GFR, ESTIMATED: 23 ML/MIN/1.73M2
GFR, ESTIMATED: 27 ML/MIN/1.73M2
GLUCOSE SERPL-MCNC: 116 MG/DL (ref 74–99)
GLUCOSE SERPL-MCNC: 98 MG/DL (ref 74–99)
HADV DNA NPH QL NAA+NON-PROBE: NOT DETECTED
HCO3: 18.8 MMOL/L (ref 22–26)
HCOV 229E RNA NPH QL NAA+NON-PROBE: NOT DETECTED
HCOV HKU1 RNA NPH QL NAA+NON-PROBE: NOT DETECTED
HCOV NL63 RNA NPH QL NAA+NON-PROBE: NOT DETECTED
HCOV OC43 RNA NPH QL NAA+NON-PROBE: NOT DETECTED
HCT VFR BLD AUTO: 21 % (ref 37–54)
HCT VFR BLD AUTO: 21.3 % (ref 37–54)
HGB BLD-MCNC: 6.6 G/DL (ref 12.5–16.5)
HGB BLD-MCNC: 6.8 G/DL (ref 12.5–16.5)
HHB: 5.3 % (ref 0–5)
HMPV RNA NPH QL NAA+NON-PROBE: NOT DETECTED
HPIV1 RNA NPH QL NAA+NON-PROBE: NOT DETECTED
HPIV2 RNA NPH QL NAA+NON-PROBE: NOT DETECTED
HPIV3 RNA NPH QL NAA+NON-PROBE: NOT DETECTED
HPIV4 RNA NPH QL NAA+NON-PROBE: NOT DETECTED
IMM GRANULOCYTES # BLD AUTO: 0.09 K/UL (ref 0–0.58)
IMM GRANULOCYTES NFR BLD: 1 % (ref 0–5)
LAB: ABNORMAL
LYMPHOCYTES NFR BLD: 0.93 K/UL (ref 1.5–4)
LYMPHOCYTES RELATIVE PERCENT: 10 % (ref 20–42)
Lab: 614
M PNEUMO DNA NPH QL NAA+NON-PROBE: NOT DETECTED
MCH RBC QN AUTO: 24 PG (ref 26–35)
MCHC RBC AUTO-ENTMCNC: 31 G/DL (ref 32–34.5)
MCV RBC AUTO: 77.5 FL (ref 80–99.9)
METHB: 0.9 % (ref 0–1.5)
MODE: ABNORMAL
MONOCYTES NFR BLD: 0.65 K/UL (ref 0.1–0.95)
MONOCYTES NFR BLD: 7 % (ref 2–12)
NEUTROPHILS NFR BLD: 80 % (ref 43–80)
NEUTS SEG NFR BLD: 7.37 K/UL (ref 1.8–7.3)
O2 SATURATION: 94.6 % (ref 92–98.5)
O2HB: 93.4 % (ref 94–97)
OPERATOR ID: 2860
PATIENT TEMP: 37 C
PCO2: 34.5 MMHG (ref 35–45)
PH BLOOD GAS: 7.36 (ref 7.35–7.45)
PLATELET # BLD AUTO: 298 K/UL (ref 130–450)
PMV BLD AUTO: 9.4 FL (ref 7–12)
PO2: 85.9 MMHG (ref 75–100)
POTASSIUM SERPL-SCNC: 5.1 MMOL/L (ref 3.5–5)
POTASSIUM SERPL-SCNC: 5.2 MMOL/L (ref 3.5–5)
PROCALCITONIN SERPL-MCNC: 21.24 NG/ML (ref 0–0.08)
PROT SERPL-MCNC: 6.5 G/DL (ref 6.4–8.3)
RBC # BLD AUTO: 2.75 M/UL (ref 3.8–5.8)
RSV RNA NPH QL NAA+NON-PROBE: NOT DETECTED
RV+EV RNA NPH QL NAA+NON-PROBE: NOT DETECTED
SARS-COV-2 RNA NPH QL NAA+NON-PROBE: NOT DETECTED
SODIUM SERPL-SCNC: 135 MMOL/L (ref 132–146)
SODIUM SERPL-SCNC: 135 MMOL/L (ref 132–146)
SOURCE, BLOOD GAS: ABNORMAL
SPECIMEN DESCRIPTION: ABNORMAL
THB: 7.3 G/DL (ref 11.5–16.5)
TIME ANALYZED: 633
TME LAST DOSE: ABNORMAL H
TME LAST DOSE: ABNORMAL H
VANCOMYCIN DOSE: ABNORMAL MG
VANCOMYCIN DOSE: ABNORMAL MG
VANCOMYCIN TROUGH SERPL-MCNC: 20.4 UG/ML (ref 5–16)
VANCOMYCIN TROUGH SERPL-MCNC: 22.5 UG/ML (ref 5–16)
WBC OTHER # BLD: 9.2 K/UL (ref 4.5–11.5)

## 2025-02-27 PROCEDURE — 84145 PROCALCITONIN (PCT): CPT

## 2025-02-27 PROCEDURE — 30233N1 TRANSFUSION OF NONAUTOLOGOUS RED BLOOD CELLS INTO PERIPHERAL VEIN, PERCUTANEOUS APPROACH: ICD-10-PCS | Performed by: INTERNAL MEDICINE

## 2025-02-27 PROCEDURE — P9016 RBC LEUKOCYTES REDUCED: HCPCS

## 2025-02-27 PROCEDURE — 86900 BLOOD TYPING SEROLOGIC ABO: CPT

## 2025-02-27 PROCEDURE — 86923 COMPATIBILITY TEST ELECTRIC: CPT

## 2025-02-27 PROCEDURE — 80053 COMPREHEN METABOLIC PANEL: CPT

## 2025-02-27 PROCEDURE — 80202 ASSAY OF VANCOMYCIN: CPT

## 2025-02-27 PROCEDURE — 6370000000 HC RX 637 (ALT 250 FOR IP): Performed by: INTERNAL MEDICINE

## 2025-02-27 PROCEDURE — 87899 AGENT NOS ASSAY W/OPTIC: CPT

## 2025-02-27 PROCEDURE — 85025 COMPLETE CBC W/AUTO DIFF WBC: CPT

## 2025-02-27 PROCEDURE — 92526 ORAL FUNCTION THERAPY: CPT

## 2025-02-27 PROCEDURE — 6360000002 HC RX W HCPCS: Performed by: INTERNAL MEDICINE

## 2025-02-27 PROCEDURE — 82805 BLOOD GASES W/O2 SATURATION: CPT

## 2025-02-27 PROCEDURE — 86901 BLOOD TYPING SEROLOGIC RH(D): CPT

## 2025-02-27 PROCEDURE — 94660 CPAP INITIATION&MGMT: CPT

## 2025-02-27 PROCEDURE — 2500000003 HC RX 250 WO HCPCS: Performed by: INTERNAL MEDICINE

## 2025-02-27 PROCEDURE — 85018 HEMOGLOBIN: CPT

## 2025-02-27 PROCEDURE — 6370000000 HC RX 637 (ALT 250 FOR IP)

## 2025-02-27 PROCEDURE — 87205 SMEAR GRAM STAIN: CPT

## 2025-02-27 PROCEDURE — 6360000002 HC RX W HCPCS

## 2025-02-27 PROCEDURE — 2700000000 HC OXYGEN THERAPY PER DAY

## 2025-02-27 PROCEDURE — 87449 NOS EACH ORGANISM AG IA: CPT

## 2025-02-27 PROCEDURE — 0202U NFCT DS 22 TRGT SARS-COV-2: CPT

## 2025-02-27 PROCEDURE — 87070 CULTURE OTHR SPECIMN AEROBIC: CPT

## 2025-02-27 PROCEDURE — 86850 RBC ANTIBODY SCREEN: CPT

## 2025-02-27 PROCEDURE — 99232 SBSQ HOSP IP/OBS MODERATE 35: CPT

## 2025-02-27 PROCEDURE — 80048 BASIC METABOLIC PNL TOTAL CA: CPT

## 2025-02-27 PROCEDURE — 85014 HEMATOCRIT: CPT

## 2025-02-27 PROCEDURE — 36430 TRANSFUSION BLD/BLD COMPNT: CPT

## 2025-02-27 PROCEDURE — 2580000003 HC RX 258

## 2025-02-27 PROCEDURE — 51798 US URINE CAPACITY MEASURE: CPT

## 2025-02-27 PROCEDURE — 36415 COLL VENOUS BLD VENIPUNCTURE: CPT

## 2025-02-27 PROCEDURE — 2060000000 HC ICU INTERMEDIATE R&B

## 2025-02-27 RX ORDER — SODIUM CHLORIDE 9 MG/ML
INJECTION, SOLUTION INTRAVENOUS PRN
Status: DISCONTINUED | OUTPATIENT
Start: 2025-02-27 | End: 2025-03-07 | Stop reason: HOSPADM

## 2025-02-27 RX ORDER — DIVALPROEX SODIUM 125 MG/1
125 CAPSULE, COATED PELLETS ORAL EVERY 12 HOURS SCHEDULED
Status: COMPLETED | OUTPATIENT
Start: 2025-02-28 | End: 2025-03-02

## 2025-02-27 RX ORDER — FUROSEMIDE 10 MG/ML
60 INJECTION INTRAMUSCULAR; INTRAVENOUS ONCE
Status: COMPLETED | OUTPATIENT
Start: 2025-02-27 | End: 2025-02-27

## 2025-02-27 RX ORDER — OSELTAMIVIR PHOSPHATE 30 MG/1
30 CAPSULE ORAL 2 TIMES DAILY
Status: COMPLETED | OUTPATIENT
Start: 2025-02-27 | End: 2025-03-04

## 2025-02-27 RX ADMIN — SODIUM CHLORIDE, PRESERVATIVE FREE 10 ML: 5 INJECTION INTRAVENOUS at 21:23

## 2025-02-27 RX ADMIN — CLOPIDOGREL 75 MG: 75 TABLET, FILM COATED ORAL at 08:37

## 2025-02-27 RX ADMIN — DIVALPROEX SODIUM 125 MG: 125 CAPSULE, COATED PELLETS ORAL at 05:29

## 2025-02-27 RX ADMIN — BUPRENORPHINE HYDROCHLORIDE AND NALOXONE HYDROCHLORIDE DIHYDRATE 2 TABLET: 2; .5 TABLET SUBLINGUAL at 08:38

## 2025-02-27 RX ADMIN — ATORVASTATIN CALCIUM 40 MG: 40 TABLET, FILM COATED ORAL at 21:21

## 2025-02-27 RX ADMIN — BUPRENORPHINE HYDROCHLORIDE AND NALOXONE HYDROCHLORIDE DIHYDRATE 2 TABLET: 2; .5 TABLET SUBLINGUAL at 00:52

## 2025-02-27 RX ADMIN — SODIUM CHLORIDE, PRESERVATIVE FREE 10 ML: 5 INJECTION INTRAVENOUS at 08:38

## 2025-02-27 RX ADMIN — ACETAMINOPHEN 650 MG: 325 TABLET ORAL at 11:09

## 2025-02-27 RX ADMIN — OSELTAMIVIR PHOSPHATE 30 MG: 30 CAPSULE ORAL at 21:22

## 2025-02-27 RX ADMIN — METOPROLOL SUCCINATE 25 MG: 25 TABLET, EXTENDED RELEASE ORAL at 08:37

## 2025-02-27 RX ADMIN — SALINE NASAL SPRAY 1 SPRAY: 1.5 SOLUTION NASAL at 15:51

## 2025-02-27 RX ADMIN — FUROSEMIDE 60 MG: 10 INJECTION, SOLUTION INTRAMUSCULAR; INTRAVENOUS at 11:08

## 2025-02-27 RX ADMIN — HYDROXYZINE PAMOATE 25 MG: 25 CAPSULE ORAL at 21:22

## 2025-02-27 RX ADMIN — HYDROXYZINE PAMOATE 25 MG: 25 CAPSULE ORAL at 08:38

## 2025-02-27 RX ADMIN — VANCOMYCIN HYDROCHLORIDE 750 MG: 750 INJECTION, POWDER, LYOPHILIZED, FOR SOLUTION INTRAVENOUS at 11:11

## 2025-02-27 RX ADMIN — SODIUM ZIRCONIUM CYCLOSILICATE 10 G: 10 POWDER, FOR SUSPENSION ORAL at 08:37

## 2025-02-27 RX ADMIN — ASPIRIN 81 MG CHEWABLE TABLET 81 MG: 81 TABLET CHEWABLE at 08:37

## 2025-02-27 RX ADMIN — Medication 3 MG: at 21:21

## 2025-02-27 RX ADMIN — DIVALPROEX SODIUM 125 MG: 125 CAPSULE, COATED PELLETS ORAL at 14:47

## 2025-02-27 RX ADMIN — SODIUM ZIRCONIUM CYCLOSILICATE 10 G: 10 POWDER, FOR SUSPENSION ORAL at 14:47

## 2025-02-27 ASSESSMENT — PAIN DESCRIPTION - ONSET: ONSET: ON-GOING

## 2025-02-27 ASSESSMENT — PAIN SCALES - GENERAL
PAINLEVEL_OUTOF10: 0
PAINLEVEL_OUTOF10: 0
PAINLEVEL_OUTOF10: 4
PAINLEVEL_OUTOF10: 0
PAINLEVEL_OUTOF10: 4
PAINLEVEL_OUTOF10: 0

## 2025-02-27 ASSESSMENT — PAIN SCALES - WONG BAKER: WONGBAKER_NUMERICALRESPONSE: NO HURT

## 2025-02-27 ASSESSMENT — PAIN DESCRIPTION - FREQUENCY: FREQUENCY: CONTINUOUS

## 2025-02-27 ASSESSMENT — PAIN DESCRIPTION - LOCATION
LOCATION: MOUTH
LOCATION: MOUTH

## 2025-02-27 ASSESSMENT — PAIN - FUNCTIONAL ASSESSMENT: PAIN_FUNCTIONAL_ASSESSMENT: ACTIVITIES ARE NOT PREVENTED

## 2025-02-27 ASSESSMENT — PAIN DESCRIPTION - PAIN TYPE: TYPE: SURGICAL PAIN

## 2025-02-27 NOTE — PLAN OF CARE
Problem: Discharge Planning  Goal: Discharge to home or other facility with appropriate resources  Outcome: Progressing  Flowsheets (Taken 2/27/2025 0815)  Discharge to home or other facility with appropriate resources: Identify barriers to discharge with patient and caregiver     Problem: Pain  Goal: Verbalizes/displays adequate comfort level or baseline comfort level  Outcome: Progressing     Problem: Safety - Adult  Goal: Free from fall injury  Outcome: Progressing     Problem: ABCDS Injury Assessment  Goal: Absence of physical injury  Outcome: Progressing     Problem: Skin/Tissue Integrity  Goal: Skin integrity remains intact  Description: 1.  Monitor for areas of redness and/or skin breakdown  2.  Assess vascular access sites hourly  3.  Every 4-6 hours minimum:  Change oxygen saturation probe site  4.  Every 4-6 hours:  If on nasal continuous positive airway pressure, respiratory therapy assess nares and determine need for appliance change or resting period  Outcome: Progressing  Flowsheets  Taken 2/27/2025 1708  Skin Integrity Remains Intact: Monitor for areas of redness and/or skin breakdown  Taken 2/27/2025 0815  Skin Integrity Remains Intact: Monitor for areas of redness and/or skin breakdown     Problem: Neurosensory - Adult  Goal: Achieves stable or improved neurological status  Outcome: Progressing  Flowsheets (Taken 2/27/2025 0815)  Achieves stable or improved neurological status:   Initiate measures to prevent increased intracranial pressure   Assess for and report changes in neurological status  Goal: Absence of seizures  Outcome: Progressing  Flowsheets (Taken 2/27/2025 0815)  Absence of seizures: Monitor for seizure activity.  If seizure occurs, document type and location of movements and any associated apnea  Goal: Remains free of injury related to seizures activity  Outcome: Progressing  Flowsheets (Taken 2/27/2025 0815)  Remains free of injury related to seizure activity: Maintain airway,

## 2025-02-27 NOTE — PLAN OF CARE
Problem: Discharge Planning  Goal: Discharge to home or other facility with appropriate resources  2/26/2025 2229 by Phyllis Giraldo RN  Outcome: Progressing  2/26/2025 1748 by Laura Last RN  Outcome: Progressing  Flowsheets (Taken 2/26/2025 0855)  Discharge to home or other facility with appropriate resources: Identify barriers to discharge with patient and caregiver     Problem: Pain  Goal: Verbalizes/displays adequate comfort level or baseline comfort level  2/26/2025 2229 by Phyllis Giraldo RN  Outcome: Progressing  2/26/2025 1748 by Laura Last RN  Outcome: Progressing     Problem: Safety - Adult  Goal: Free from fall injury  2/26/2025 2229 by Phyllis Giraldo RN  Outcome: Progressing  2/26/2025 1748 by Laura Last RN  Outcome: Progressing     Problem: ABCDS Injury Assessment  Goal: Absence of physical injury  2/26/2025 2229 by Phyllis Giraldo RN  Outcome: Progressing  2/26/2025 1748 by Laura Last RN  Outcome: Progressing     Problem: Skin/Tissue Integrity  Goal: Skin integrity remains intact  Description: 1.  Monitor for areas of redness and/or skin breakdown  2.  Assess vascular access sites hourly  3.  Every 4-6 hours minimum:  Change oxygen saturation probe site  4.  Every 4-6 hours:  If on nasal continuous positive airway pressure, respiratory therapy assess nares and determine need for appliance change or resting period  2/26/2025 2229 by Phyllis Giraldo RN  Outcome: Progressing  2/26/2025 1748 by Laura Last RN  Outcome: Progressing  Flowsheets (Taken 2/26/2025 1748)  Skin Integrity Remains Intact: Monitor for areas of redness and/or skin breakdown     Problem: Neurosensory - Adult  Goal: Achieves stable or improved neurological status  2/26/2025 2229 by Phyllis Giraldo RN  Outcome: Progressing  2/26/2025 1748 by Laura Last RN  Outcome: Progressing  Flowsheets (Taken 2/26/2025 0855)  Achieves stable or improved neurological status: Assess for and report

## 2025-02-27 NOTE — PROGRESS NOTES
[facility]  [facility]  Progress Note      No chief complaint on file.  Sob and ams    SUBJECTIVE:    Patient is tolerating medications. No reported adverse drug reactions.  No nausea, vomiting, diarrhea. He has increased oxgyen needs yesterday. Given lasix. Xray ? Pna. BNP was elevated. Back on room air today  Has sob.   Review of systems:  As stated above in the chief complaint, otherwise negative.    Medications:  Scheduled Meds:   sodium zirconium cyclosilicate  10 g Oral TID    melatonin  3 mg Oral Nightly    vancomycin  750 mg IntraVENous Q12H    aspirin  81 mg Oral Daily    sodium chloride flush  5-40 mL IntraVENous 2 times per day    lidocaine 1 % injection  50 mg IntraDERmal Once    divalproex  125 mg Oral 3 times per day    metoprolol succinate  25 mg Oral BID    nicotine  1 patch TransDERmal Daily    atorvastatin  40 mg Oral Nightly    clopidogrel  75 mg Oral Daily    [Held by provider] losartan  25 mg Oral Daily     Continuous Infusions:   sodium chloride      sodium chloride       PRN Meds:sodium chloride, buprenorphine-naloxone, cloNIDine, loperamide, dicyclomine, ipratropium 0.5 mg-albuterol 2.5 mg, sodium chloride flush, sodium chloride, LORazepam, hydrOXYzine pamoate, acetaminophen **OR** acetaminophen, magnesium sulfate, ondansetron **OR** ondansetron, potassium chloride **OR** potassium alternative oral replacement **OR** potassium chloride, Carmex Classic Lip Balm    OBJECTIVE:  /81   Pulse 100   Temp 98.8 °F (37.1 °C)   Resp 20   Ht 1.93 m (6' 3.98\")   Wt 78.9 kg (174 lb)   SpO2 96%   BMI 21.19 kg/m²   Temp  Av.8 °F (37.1 °C)  Min: 97.9 °F (36.6 °C)  Max: 99.7 °F (37.6 °C)  Constitutional: The patient is alert  Skin: Warm and dry. No rashes were noted. No jaundice.  HEENT: Eyes show round, and reactive pupils. Moist mucous membranes, no ulcerations, no thrush.   Neck: Supple to movements. No lymphadenopathy.   Chest: No use of accessory muscles to  None

## 2025-02-27 NOTE — PROGRESS NOTES
SPEECH LANGUAGE PATHOLOGY  DAILY PROGRESS NOTE        PATIENT NAME:  Wagner Ryan      ROOM:  7410/7410-A :  1980         TODAY'S DATE:  2025       Patient seen for dysphagia      Current Diet Order: ADULT DIET; Easy to Chew; Low Fat/Low Chol/High Fiber/SWETHA; No Added Salt (3-4 gm)  ADULT ORAL NUTRITION SUPPLEMENT; Breakfast, Lunch, Dinner; Standard High Calorie/High Protein Oral Supplement  Results and recommendations of swallowing evaluation reviewed.  Compensatory strategies were reviewed and pt was able to recall  them with minimal verbal prompts    Will discharge from therapy at this time.   If difficulties arise, please re-order

## 2025-02-27 NOTE — PROGRESS NOTES
Pharmacy Consultation Note  (Antibiotic Dosing and Monitoring)    Initial consult date: 2/18/25  Consulting physician/provider: Jake Bravo DO   Drug: Vancomycin  Indication: Endocarditis/Endovascular     Age/  Gender Height Weight IBW  Allergy Information   45 y.o./male 193 cm (6' 4\") 80.2 kg (176 lb 12.8 oz)     Ideal body weight: 86.8 kg (191 lb 4.5 oz)   Patient has no known allergies.      Renal Function:  Recent Labs     02/26/25  0600 02/26/25  2200 02/27/25  0530   BUN 45* 55* 57*   CREATININE 2.3* 2.9* 2.9*       Intake/Output Summary (Last 24 hours) at 2/27/2025 1118  Last data filed at 2/27/2025 0815  Gross per 24 hour   Intake 2725.47 ml   Output 2300 ml   Net 425.47 ml       Vancomycin Monitoring:  Trough:    Recent Labs     02/25/25  1638 02/27/25  0530 02/27/25  0814   VANCOTROUGH 14.4 22.5* 20.4*     Random:  No results for input(s): \"VANCORANDOM\" in the last 72 hours.    Recent vancomycin administrations                     vancomycin (VANCOCIN) 750 mg in sodium chloride 0.9 % 250 mL IVPB ([id number]) (mg) 750 mg New Bag 02/27/25 1111     750 mg New Bag 02/26/25 2156     750 mg New Bag  0859     750 mg New Bag 02/25/25 1815     750 mg New Bag  0551     750 mg New Bag 02/24/25 1750                      Assessment:  Patient is a 45 y.o. male who has been initiated on vancomycin  Estimated Creatinine Clearance: 36 mL/min (A) (based on SCr of 2.9 mg/dL (H)).  To dose vancomycin, pharmacy will be utilizing  trough based dosing.  2/26: Vancomycin trough level last night (drawn ~1 hour early = 14.4 mcg/mL). With increase in SCr to 2.3 today, will continue current dose for now, but may need to switch to q24h dosing if renal function does not improve.   2/27: SCr 2.9 today. Vanco trough this AM = 20.4. ~ 10 hrs post dose. This puts patient on target for 18-20 trough goal. Will need to follow this closely if SCr continues to trend up.    Plan:  Continue vancomycin 750 mg IV every 12 hours    Will check

## 2025-02-27 NOTE — PROGRESS NOTES
Physical Therapy    Date: 2025       Patient Name: Wagner Ryan  : 1980      MRN: [id number]    Physical therapy order received and chart reviewed. PT evaluation held this date  low hemoglobin.   Will follow up as appropriate.     Fay Rivera PT, DPT  [id number]

## 2025-02-27 NOTE — PROGRESS NOTES
Occupational Therapy    Date:2025  Patient Name: Wagner Ryan  MRN: [id number]  : 1980  Room: [address] [place] [address]-A     OT orders received and chart reviewed. OT eval on hold at this time 2/2 low HgB (6.6) this am.  OT will follow and re-attempt eval as appropriate at a later time/date.    Jazmine Brambila OTR/L; [id number]

## 2025-02-27 NOTE — CARE COORDINATION
2/27/25Update CM Note. Pt admitted 2/18/25 endocarditis. Hx IV drug use. Pt needs AV and MV replacements at some point but unable to be off DAPT, heart cath with OBED placement 2/17/25.  Current plan to remain on IV antibiotic therapy.O25L/NC  Hgb 6.6 this am Type and screen ordered. Still awaiting info from family re: income documentation for public benefits to re review. Referral to [place].   Gabriela GAMINGN RN-BC  [phone]

## 2025-02-27 NOTE — CONSULTS
Consult Note    Dental Resident    Patient: Wagner Ryan  MRN: [id number]  Date of Service: 2/27/2025    Reason for Consult/CHIEF COMPLAINT:  extractions to be completed for dental clearance.      History Obtained From:  patient  PCP: No primary care provider on file.      HISTORY OF PRESENT ILLNESS:   The patient is a 45 y.o. male who presents with for dental clearance for cardiac surgery.           Diagnosis Date    Hx of hepatitis C-resolved 3/5/2022    IV drug abuse (HCC)     Nonhealing nonsurgical wound with fat layer exposed            Procedure Laterality Date    APPLY DRESSING Left 10/9/2020    DEBRIDEMENT OF SKIN, SOFT TISSUE, MUSCLE, EXPLORATION OF UPPER ARM INTEGRA GRAFT FOR COVERAGE performed [place] [facility] [place]    APPLY DRESSING Left 10/14/2020    LEFT UPPER EXTREMITY WOUND VAC PLACEMENT performed by Eddie Ruff [place] [facility] [place]    CARDIAC PROCEDURE N/A 2/17/2025    Left heart cath / coronary angiography performed by Bg Gaytan MD at [facility] CARDIAC CATH/IR    CARDIAC PROCEDURE Left 2/17/2025    Percutaneous coronary intervention performed by Bg Gaytan MD at [facility] CARDIAC CATH/IR    INCISION AND DRAINAGE Right 4/23/2020    RIGHT UPPER EXTREMITY INCISION AND DRAINAGE REMOVAL FOREIGN BODY WITH C-ARM performed by Kapil Yuan MD at [facility] OR    INCISION AND DRAINAGE Left 10/7/2020    LEFT ELBOW INCISION AND DRAINAGE performed by Kapil Yuan MD at [facility] OR    IR TUNNELED CVC PLACE WO SQ PORT/PUMP > 5 YEARS  2/24/2025    IR TUNNELED CVC PLACE WO SQ PORT/PUMP > 5 YEARS 2/24/2025 Kapil Ponce [place] [facility] [place]    LEG SURGERY Right 3/6/2022    IRRIGATION AND DEBRIDEMENT RIGHT THIGH performed by Wes Branch [place] [facility] [place]    CA EXPLORATION OF ARTERY/VEIN Left 10/7/2020    LEFT ARM BRACHIAL ARTERY EXPLORATION, REPAIR OF MYCOTIC ANUERYSM WITH BYPASS performed by Eddie Ruff [place] [facility] [place]    TRANSESOPHAGEAL ECHOCARDIOGRAM N/A 4/24/2020     TRANSESOPHAGEAL ECHOCARDIOGRAM performed by Natasha Bowie MD at [facility]    TRANSESOPHAGEAL ECHOCARDIOGRAM  10/14/2020       Home Medications:  Prior to Admission medications    Not on File       Allergies:  Patient has no known allergies.    Social History:   TOBACCO:   reports that he has been smoking cigarettes. His smokeless tobacco use includes chew.  ETOH:   reports that he does not currently use alcohol.      Family History:       Problem Relation Age of Onset    Heart Disease Father        Dental:     negative for pain, dysphagia, mandibular ROM normal, swelling, Nocturnal awakening secondary to pain, exostosis, halitosis, history of snoring, gingival or oral bleeding, Past Head and Neck trauma, lesion and/or lacerations, or previous dental care.     Physical Exam:    /72   Pulse 91   Temp 97.9 °F (36.6 °C)   Resp 19   Ht 1.93 m (6' 3.98\")   Wt 78.9 kg (174 lb)   SpO2 100%   BMI 21.19 kg/m²         Dental:   Hygiene: dental hygiene poor  Dentition: poor  dentition  Teeth: caries: multiple  Retained roots 4  Impactions tooth # 0   Gingiva: poor  Tongue: tongue midline, papillated  Floor of mouth: normal  Alveolar Process: normal  Salivary Glands: normal    Pharynx - Clear, pink, no erythema, tonsils normal   Mucosa - Moist, Appropriate Pigmentation. Post-extraction sites well approximated with no obvious signs of infection.   Periodontal - appropriate pigmentation, xerostomia, halitosis, Generalized Plaque Induced Gingivitis, Periodontal Disease, Blunted Papilla   Swelling - negative   Lesions/Lacerations - negative   Teeth - Partial Edentulism, Restorations, crowns, No apparent mobility  Restorations - present and appear intact.            Assessment and Plan       Pt presents for extraction #2, #3, and #9    Upon reviewing pt's chart, pt is currently taking plavix  Contacted floor nurse regarding this. Floor nurse stated that pt is unable to come off of plavix due to planned cardiac

## 2025-02-27 NOTE — PROGRESS NOTES
[facility] Hospitalist Progress Note    Admitting Date and Time: 2/18/2025  7:30 PM  Admit Dx: Endocarditis [I38]    Synopsis:  45 y.o. year old male  who  has a past medical history of Hx of hepatitis C-resolved, IV drug abuse (HCC), and Nonhealing nonsurgical wound with fat layer exposed.      Wagner Ryan is a 45 y.o. male who presents to the CVICU for evaluation by CTS surgery. Patient was being followed at [facility] for AMS and a STEMI and underwent stent placement of the LAD on 2/17.  He was admitted to the MICU following cath lab and started on broad spectrum antibiotics, ASA and Brilinta. During work up for his AMS, a CTA of the head showed tiny area of intraparenchymal hemorrhage in the right occipital lobe w/ MRI of the brain showed multiple small foci of acute and early subacute infarcts in bilateral frontal and parietal occipital lobe left more than right consistent with micro emboli. Neurology was consulted. They cleared the patient to continue on plavix given his recent stent placement.  He underwent an ECHO which was found to have vegetations and the decision was made to transfer to [facility] for evaluation by CTS Surgery.      2/19: Patient seen in CVICU.  Per ICU team and CTS, patient can be transferred as he has no ICU needs.  Transfer order placed.     2/20: Undergoing CTS workup.  Patient continues to have persistent confusion and delirium.  Seems like he was started on Decadron at previous hospital for concern of meningitis and it has not been stopped or weaned.  No mention for continuation of Decadron per ID here and treating for MRSA bacteremia as of now.  Hold Decadron.     2/21: Neurology stating patient has no brain bleed and that cardiology has to decide on DAPT.  Contacted cardiology and stated patient must be on DAPT given recent cardiac stenting and 2/17.  Advised to start aspirin and continue Plavix.  Orders placed.  CTS to be informed.     2/22: Per general surgery, patient

## 2025-02-27 NOTE — PROGRESS NOTES
Department of Internal Medicine  Nephrology Progress Note      Events reviewed     SUBJECTIVE: We are following Mr. Ryan for BELLO.  Reports some shortness of breath today.    PHYSICAL EXAM:      Vitals:    VITALS:  /81   Pulse 100   Temp 98.8 °F (37.1 °C)   Resp 20   Ht 1.93 m (6' 3.98\")   Wt 78.9 kg (174 lb)   SpO2 96%   BMI 21.19 kg/m²   24HR PULSE OXIMETRY RANGE:  SpO2  Av %  Min: 95 %  Max: 99 %  24HR INTAKE/OUTPUT:    Intake/Output Summary (Last 24 hours) at 2025 0905  Last data filed at 2025 0717  Gross per 24 hour   Intake 2524.39 ml   Output 1900 ml   Net 624.39 ml       Constitutional: Patient is awake nonverbal no distress  HEENT: Pupils equal reactive, mucous membranes are dry  Respiratory: Lungs are clear  Cardiovascular/Edema: Heart sounds are regular  Gastrointestinal: Abdomen is soft  Neurologic: Patient is lethargic, nonverbal  Skin: No skin rashes  Other: No edema    Scheduled Meds:   sodium zirconium cyclosilicate  10 g Oral TID    melatonin  3 mg Oral Nightly    vancomycin  750 mg IntraVENous Q12H    aspirin  81 mg Oral Daily    sodium chloride flush  5-40 mL IntraVENous 2 times per day    lidocaine 1 % injection  50 mg IntraDERmal Once    divalproex  125 mg Oral 3 times per day    metoprolol succinate  25 mg Oral BID    nicotine  1 patch TransDERmal Daily    atorvastatin  40 mg Oral Nightly    clopidogrel  75 mg Oral Daily    [Held by provider] losartan  25 mg Oral Daily     Continuous Infusions:   sodium chloride      sodium chloride       PRN Meds:.sodium chloride, buprenorphine-naloxone, cloNIDine, loperamide, dicyclomine, ipratropium 0.5 mg-albuterol 2.5 mg, sodium chloride flush, sodium chloride, LORazepam, hydrOXYzine pamoate, acetaminophen **OR** acetaminophen, magnesium sulfate, ondansetron **OR** ondansetron, potassium chloride **OR** potassium alternative oral replacement **OR** potassium chloride, Carmex Classic Lip Balm  .    DATA:    CBC:   Lab Results

## 2025-02-28 PROBLEM — D50.9 IRON DEFICIENCY ANEMIA: Status: ACTIVE | Noted: 2025-02-28

## 2025-02-28 LAB
ABO/RH: NORMAL
ANION GAP SERPL CALCULATED.3IONS-SCNC: 14 MMOL/L (ref 7–16)
ANTIBODY SCREEN: NEGATIVE
ARM BAND NUMBER: NORMAL
B.E.: -3.4 MMOL/L (ref -3–3)
BASOPHILS # BLD: 0.01 K/UL (ref 0–0.2)
BASOPHILS NFR BLD: 0 % (ref 0–2)
BLOOD BANK BLOOD PRODUCT EXPIRATION DATE: NORMAL
BLOOD BANK BLOOD PRODUCT EXPIRATION DATE: NORMAL
BLOOD BANK DISPENSE STATUS: NORMAL
BLOOD BANK DISPENSE STATUS: NORMAL
BLOOD BANK ISBT PRODUCT BLOOD TYPE: 5100
BLOOD BANK ISBT PRODUCT BLOOD TYPE: 5100
BLOOD BANK PRODUCT CODE: NORMAL
BLOOD BANK PRODUCT CODE: NORMAL
BLOOD BANK SAMPLE EXPIRATION: NORMAL
BLOOD BANK UNIT TYPE AND RH: NORMAL
BLOOD BANK UNIT TYPE AND RH: NORMAL
BPU ID: NORMAL
BPU ID: NORMAL
BUN SERPL-MCNC: 66 MG/DL (ref 6–20)
CALCIUM SERPL-MCNC: 8.1 MG/DL (ref 8.6–10.2)
CHLORIDE SERPL-SCNC: 102 MMOL/L (ref 98–107)
CO2 SERPL-SCNC: 20 MMOL/L (ref 22–29)
COHB: 0.1 % (ref 0–1.5)
COMPONENT: NORMAL
COMPONENT: NORMAL
CREAT SERPL-MCNC: 2.9 MG/DL (ref 0.7–1.2)
CRITICAL: ABNORMAL
CROSSMATCH RESULT: NORMAL
CROSSMATCH RESULT: NORMAL
DATE ANALYZED: ABNORMAL
DATE OF COLLECTION: ABNORMAL
EOSINOPHIL # BLD: 0.09 K/UL (ref 0.05–0.5)
EOSINOPHILS RELATIVE PERCENT: 1 % (ref 0–6)
ERYTHROCYTE [DISTWIDTH] IN BLOOD BY AUTOMATED COUNT: 17.1 % (ref 11.5–15)
GFR, ESTIMATED: 27 ML/MIN/1.73M2
GLUCOSE SERPL-MCNC: 100 MG/DL (ref 74–99)
HCO3: 21.8 MMOL/L (ref 22–26)
HCT VFR BLD AUTO: 22.6 % (ref 37–54)
HCT VFR BLD AUTO: 24.7 % (ref 37–54)
HGB BLD-MCNC: 7.3 G/DL (ref 12.5–16.5)
HGB BLD-MCNC: 7.9 G/DL (ref 12.5–16.5)
HHB: 3.7 % (ref 0–5)
IMM GRANULOCYTES # BLD AUTO: 0.03 K/UL (ref 0–0.58)
IMM GRANULOCYTES NFR BLD: 0 % (ref 0–5)
L PNEUMO1 AG UR QL IA.RAPID: NEGATIVE
LAB: ABNORMAL
LYMPHOCYTES NFR BLD: 1.08 K/UL (ref 1.5–4)
LYMPHOCYTES RELATIVE PERCENT: 14 % (ref 20–42)
Lab: 1345
MCH RBC QN AUTO: 25.1 PG (ref 26–35)
MCHC RBC AUTO-ENTMCNC: 32.3 G/DL (ref 32–34.5)
MCV RBC AUTO: 77.7 FL (ref 80–99.9)
METHB: 0.1 % (ref 0–1.5)
MICROORGANISM SPEC CULT: NORMAL
MICROORGANISM/AGENT SPEC: NORMAL
MODE: ABNORMAL
MONOCYTES NFR BLD: 0.59 K/UL (ref 0.1–0.95)
MONOCYTES NFR BLD: 8 % (ref 2–12)
NEUTROPHILS NFR BLD: 76 % (ref 43–80)
NEUTS SEG NFR BLD: 5.72 K/UL (ref 1.8–7.3)
O2 SATURATION: 96.3 % (ref 92–98.5)
O2HB: 96.1 % (ref 94–97)
OPERATOR ID: 8217
PATIENT TEMP: 37 C
PCO2: 39.4 MMHG (ref 35–45)
PH BLOOD GAS: 7.36 (ref 7.35–7.45)
PLATELET # BLD AUTO: 277 K/UL (ref 130–450)
PMV BLD AUTO: 9.2 FL (ref 7–12)
PO2: 92.2 MMHG (ref 75–100)
POTASSIUM SERPL-SCNC: 5 MMOL/L (ref 3.5–5)
RBC # BLD AUTO: 2.91 M/UL (ref 3.8–5.8)
S PNEUM AG SPEC QL: NEGATIVE
SODIUM SERPL-SCNC: 136 MMOL/L (ref 132–146)
SOURCE, BLOOD GAS: ABNORMAL
SPECIMEN DESCRIPTION: NORMAL
SPECIMEN SOURCE: NORMAL
THB: 8.7 G/DL (ref 11.5–16.5)
TIME ANALYZED: 1352
TRANSFUSION STATUS: NORMAL
TRANSFUSION STATUS: NORMAL
UNIT DIVISION: 0
UNIT DIVISION: 0
UNIT ISSUE DATE/TIME: NORMAL
UNIT ISSUE DATE/TIME: NORMAL
WBC OTHER # BLD: 7.5 K/UL (ref 4.5–11.5)

## 2025-02-28 PROCEDURE — 6360000002 HC RX W HCPCS

## 2025-02-28 PROCEDURE — 2580000003 HC RX 258

## 2025-02-28 PROCEDURE — 99232 SBSQ HOSP IP/OBS MODERATE 35: CPT

## 2025-02-28 PROCEDURE — 97535 SELF CARE MNGMENT TRAINING: CPT

## 2025-02-28 PROCEDURE — 97161 PT EVAL LOW COMPLEX 20 MIN: CPT

## 2025-02-28 PROCEDURE — 85018 HEMOGLOBIN: CPT

## 2025-02-28 PROCEDURE — 85014 HEMATOCRIT: CPT

## 2025-02-28 PROCEDURE — 36600 WITHDRAWAL OF ARTERIAL BLOOD: CPT

## 2025-02-28 PROCEDURE — 85025 COMPLETE CBC W/AUTO DIFF WBC: CPT

## 2025-02-28 PROCEDURE — 6370000000 HC RX 637 (ALT 250 FOR IP)

## 2025-02-28 PROCEDURE — 6370000000 HC RX 637 (ALT 250 FOR IP): Performed by: INTERNAL MEDICINE

## 2025-02-28 PROCEDURE — 94660 CPAP INITIATION&MGMT: CPT

## 2025-02-28 PROCEDURE — 82805 BLOOD GASES W/O2 SATURATION: CPT

## 2025-02-28 PROCEDURE — 2700000000 HC OXYGEN THERAPY PER DAY

## 2025-02-28 PROCEDURE — 80048 BASIC METABOLIC PNL TOTAL CA: CPT

## 2025-02-28 PROCEDURE — 97530 THERAPEUTIC ACTIVITIES: CPT

## 2025-02-28 PROCEDURE — 2500000003 HC RX 250 WO HCPCS: Performed by: INTERNAL MEDICINE

## 2025-02-28 PROCEDURE — 2060000000 HC ICU INTERMEDIATE R&B

## 2025-02-28 PROCEDURE — 97165 OT EVAL LOW COMPLEX 30 MIN: CPT

## 2025-02-28 RX ORDER — GUAIFENESIN 400 MG/1
400 TABLET ORAL 4 TIMES DAILY PRN
Status: DISCONTINUED | OUTPATIENT
Start: 2025-02-28 | End: 2025-03-07 | Stop reason: HOSPADM

## 2025-02-28 RX ORDER — FUROSEMIDE 10 MG/ML
60 INJECTION INTRAMUSCULAR; INTRAVENOUS ONCE
Status: COMPLETED | OUTPATIENT
Start: 2025-02-28 | End: 2025-02-28

## 2025-02-28 RX ADMIN — FUROSEMIDE 60 MG: 10 INJECTION, SOLUTION INTRAVENOUS at 11:06

## 2025-02-28 RX ADMIN — VANCOMYCIN HYDROCHLORIDE 750 MG: 750 INJECTION, POWDER, LYOPHILIZED, FOR SOLUTION INTRAVENOUS at 21:14

## 2025-02-28 RX ADMIN — OSELTAMIVIR PHOSPHATE 30 MG: 30 CAPSULE ORAL at 21:22

## 2025-02-28 RX ADMIN — CLOPIDOGREL 75 MG: 75 TABLET, FILM COATED ORAL at 09:15

## 2025-02-28 RX ADMIN — VANCOMYCIN HYDROCHLORIDE 750 MG: 750 INJECTION, POWDER, LYOPHILIZED, FOR SOLUTION INTRAVENOUS at 09:26

## 2025-02-28 RX ADMIN — METOPROLOL SUCCINATE 25 MG: 25 TABLET, EXTENDED RELEASE ORAL at 21:16

## 2025-02-28 RX ADMIN — BUPRENORPHINE HYDROCHLORIDE AND NALOXONE HYDROCHLORIDE DIHYDRATE 2 TABLET: 2; .5 TABLET SUBLINGUAL at 17:37

## 2025-02-28 RX ADMIN — METOPROLOL SUCCINATE 25 MG: 25 TABLET, EXTENDED RELEASE ORAL at 09:15

## 2025-02-28 RX ADMIN — BUPRENORPHINE HYDROCHLORIDE AND NALOXONE HYDROCHLORIDE DIHYDRATE 2 TABLET: 2; .5 TABLET SUBLINGUAL at 00:09

## 2025-02-28 RX ADMIN — DIVALPROEX SODIUM 125 MG: 125 CAPSULE ORAL at 21:16

## 2025-02-28 RX ADMIN — SODIUM CHLORIDE, PRESERVATIVE FREE 10 ML: 5 INJECTION INTRAVENOUS at 21:16

## 2025-02-28 RX ADMIN — ACETAMINOPHEN 650 MG: 325 TABLET ORAL at 21:19

## 2025-02-28 RX ADMIN — SODIUM CHLORIDE, PRESERVATIVE FREE 10 ML: 5 INJECTION INTRAVENOUS at 09:27

## 2025-02-28 RX ADMIN — DIVALPROEX SODIUM 125 MG: 125 CAPSULE ORAL at 09:15

## 2025-02-28 RX ADMIN — BUPRENORPHINE HYDROCHLORIDE AND NALOXONE HYDROCHLORIDE DIHYDRATE 2 TABLET: 2; .5 TABLET SUBLINGUAL at 09:18

## 2025-02-28 RX ADMIN — ATORVASTATIN CALCIUM 40 MG: 40 TABLET, FILM COATED ORAL at 21:16

## 2025-02-28 RX ADMIN — VANCOMYCIN HYDROCHLORIDE 750 MG: 750 INJECTION, POWDER, LYOPHILIZED, FOR SOLUTION INTRAVENOUS at 00:13

## 2025-02-28 RX ADMIN — OSELTAMIVIR PHOSPHATE 30 MG: 30 CAPSULE ORAL at 09:19

## 2025-02-28 RX ADMIN — ASPIRIN 81 MG CHEWABLE TABLET 81 MG: 81 TABLET CHEWABLE at 09:14

## 2025-02-28 RX ADMIN — Medication 3 MG: at 21:14

## 2025-02-28 RX ADMIN — HYDROXYZINE PAMOATE 25 MG: 25 CAPSULE ORAL at 09:19

## 2025-02-28 ASSESSMENT — PAIN DESCRIPTION - DESCRIPTORS: DESCRIPTORS: ACHING

## 2025-02-28 ASSESSMENT — PAIN SCALES - GENERAL
PAINLEVEL_OUTOF10: 0
PAINLEVEL_OUTOF10: 3
PAINLEVEL_OUTOF10: 0

## 2025-02-28 ASSESSMENT — PAIN DESCRIPTION - LOCATION: LOCATION: HEAD

## 2025-02-28 ASSESSMENT — PAIN - FUNCTIONAL ASSESSMENT: PAIN_FUNCTIONAL_ASSESSMENT: ACTIVITIES ARE NOT PREVENTED

## 2025-02-28 NOTE — PROGRESS NOTES
[facility] Hospitalist Progress Note    Admitting Date and Time: 2/18/2025  7:30 PM  Admit Dx: Endocarditis [I38]    Synopsis:  45 y.o. year old male  who  has a past medical history of Hx of hepatitis C-resolved, IV drug abuse (HCC), and Nonhealing nonsurgical wound with fat layer exposed.      Wagner Ryan is a 45 y.o. male who presents to the CVICU for evaluation by CTS surgery. Patient was being followed at [facility] for AMS and a STEMI and underwent stent placement of the LAD on 2/17.  He was admitted to the MICU following cath lab and started on broad spectrum antibiotics, ASA and Brilinta. During work up for his AMS, a CTA of the head showed tiny area of intraparenchymal hemorrhage in the right occipital lobe w/ MRI of the brain showed multiple small foci of acute and early subacute infarcts in bilateral frontal and parietal occipital lobe left more than right consistent with micro emboli. Neurology was consulted. They cleared the patient to continue on plavix given his recent stent placement.  He underwent an ECHO which was found to have vegetations and the decision was made to transfer to [facility] for evaluation by CTS Surgery.      2/19: Patient seen in CVICU.  Per ICU team and CTS, patient can be transferred as he has no ICU needs.  Transfer order placed.     2/20: Undergoing CTS workup.  Patient continues to have persistent confusion and delirium.  Seems like he was started on Decadron at previous hospital for concern of meningitis and it has not been stopped or weaned.  No mention for continuation of Decadron per ID here and treating for MRSA bacteremia as of now.  Hold Decadron.     2/21: Neurology stating patient has no brain bleed and that cardiology has to decide on DAPT.  Contacted cardiology and stated patient must be on DAPT given recent cardiac stenting and 2/17.  Advised to start aspirin and continue Plavix.  Orders placed.  CTS to be informed.     2/22: Per general surgery, patient

## 2025-02-28 NOTE — PROGRESS NOTES
Occupational Therapy  OCCUPATIONAL THERAPY INITIAL EVALUATION    [facility]   [address] [facility]        Date:2025                                                  Patient Name: Wagner Ryan    MRN: [id number]    : 1980    Room: [address] [place] [address]      Evaluating OT: Luciaregina Ellis OTR/L [id number]       Referring Provider:Doni Palm MD     Specific Provider Orders/Date: OT eval and treat 25      Diagnosis: Aortic Valve Endocarditis     Surgery: Angioplasty with Stent 25     Pertinent Medical History:       Past Medical History:   Diagnosis Date    Hx of hepatitis C-resolved 3/5/2022    IV drug abuse (HCC)     Nonhealing nonsurgical wound with fat layer exposed          Past Surgical History:   Procedure Laterality Date    APPLY DRESSING Left 10/9/2020    DEBRIDEMENT OF SKIN, SOFT TISSUE, MUSCLE, EXPLORATION OF UPPER ARM INTEGRA GRAFT FOR COVERAGE performed by [place] [facility] [place]    APPLY DRESSING Left 10/14/2020    LEFT UPPER EXTREMITY WOUND VAC PLACEMENT performed by Eddie Ruff [place] [facility] [place]    CARDIAC PROCEDURE N/A 2025    Left heart cath / coronary angiography performed by Bg Gaytan MD at [facility] CARDIAC CATH/IR    CARDIAC PROCEDURE Left 2025    Percutaneous coronary intervention performed by Bg Gaytan MD at [facility] CARDIAC CATH/IR    INCISION AND DRAINAGE Right 2020    RIGHT UPPER EXTREMITY INCISION AND DRAINAGE REMOVAL FOREIGN BODY WITH C-ARM performed by Kapil Yuan MD at [facility] OR      Precautions:  Fall Risk, droplet isolation, O2   Assessment of current deficits    [x] Functional mobility  [x]ADLs  [x] Strength               []Cognition    [x] Functional transfers   [x] IADLs         [] Safety Awareness   [x]Endurance    [] Fine Coordination              [x] Balance      [] Vision/perception   []Sensation     []Gross Motor Coordination  [] ROM  [] Delirium             Instruction/training on energy conservation/work simplification for completion of ADLs:. Pt ed on pacing techniques to utilize during ADL's     Proper Positioning/Alignment to prevent skin breakdown         Rehab Potential: Good  for established goals     Patient / Family Goal: return home       Patient and/or family were instructed on functional diagnosis, prognosis/goals and OT plan of care. Demonstrated good  understanding.     Eval Complexity: Low    Time In: 1020  Time Out:1040    Total Treatment Time: 10     Min Units   OT Eval Low 43265  x     OT Eval Medium 39472      OT Eval High 09703      OT Re-Eval 94919       Therapeutic Ex 08062       Therapeutic Activities 64229       ADL/Self Care 99403  10  1   Orthotic Management 48827       Manual 32686     Neuro Re-Ed 57968       Non-Billable Time          Evaluation Time additionally includes thorough review of current medical information, gathering information on past medical history/social history and prior level of function, interpretation of standardized testing/informal observation of tasks, assessment of data and development of plan of care and goals.            Lucia Ellis OTR/L [id number]

## 2025-02-28 NOTE — PROGRESS NOTES
Pharmacy Consultation Note  (Antibiotic Dosing and Monitoring)    Initial consult date: 2/18/25  Consulting physician/provider: Jake Bravo DO   Drug: Vancomycin  Indication: Endocarditis/Endovascular     Age/  Gender Height Weight IBW  Allergy Information   45 y.o./male 193 cm (6' 4\") 80.2 kg (176 lb 12.8 oz)     Ideal body weight: 86.8 kg (191 lb 4.5 oz)   Patient has no known allergies.      Renal Function:  Recent Labs     02/27/25  0530 02/27/25  1845 02/28/25  0600   BUN 57* 66* 66*   CREATININE 2.9* 3.2* 2.9*       Intake/Output Summary (Last 24 hours) at 2/28/2025 1153  Last data filed at 2/28/2025 0800  Gross per 24 hour   Intake 1539.75 ml   Output 772 ml   Net 767.75 ml       Vancomycin Monitoring:  Trough:    Recent Labs     02/25/25  1638 02/27/25  0530 02/27/25  0814   VANCOTROUGH 14.4 22.5* 20.4*     Random:  No results for input(s): \"VANCORANDOM\" in the last 72 hours.    Vancomycin Administration Times:  Recent vancomycin administrations                     vancomycin (VANCOCIN) 750 mg in sodium chloride 0.9 % 250 mL IVPB ([id number]) (mg) 750 mg New Bag 02/28/25 0926     750 mg New Bag  0013     750 mg New Bag 02/27/25 1111     750 mg New Bag 02/26/25 2156     750 mg New Bag  0859     750 mg New Bag 02/25/25 1815               Assessment:  Patient is a 45 y.o. male who has been initiated on vancomycin  Estimated Creatinine Clearance: 36 mL/min (A) (based on SCr of 2.9 mg/dL (H)).  To dose vancomycin, pharmacy will be utilizing  trough based dosing.  2/26: Vancomycin trough level last night (drawn ~1 hour early = 14.4 mcg/mL). With increase in SCr to 2.3 today, will continue current dose for now, but may need to switch to q24h dosing if renal function does not improve.   2/27: SCr 2.9 today. Vanco trough this AM = 20.4. ~ 10 hrs post dose. This puts patient on target for 18-20 trough goal. Will need to follow this closely if SCr continues to trend up.    Plan:  Continue vancomycin 750 mg IV every

## 2025-02-28 NOTE — PROGRESS NOTES
Department of Internal Medicine  Nephrology Progress Note      Events reviewed     SUBJECTIVE: We are following Mr. Ryan for BELLO.  Reports no complaints.    PHYSICAL EXAM:      Vitals:    VITALS:  /74   Pulse 96   Temp 98.5 °F (36.9 °C)   Resp 18   Ht 1.93 m (6' 3.98\")   Wt 78.9 kg (174 lb)   SpO2 100%   BMI 21.19 kg/m²   24HR PULSE OXIMETRY RANGE:  SpO2  Av.9 %  Min: 93 %  Max: 100 %  24HR INTAKE/OUTPUT:    Intake/Output Summary (Last 24 hours) at 2025 0904  Last data filed at 2025 2340  Gross per 24 hour   Intake 1239.75 ml   Output 772 ml   Net 467.75 ml       Constitutional: Patient is awake nonverbal no distress  HEENT: Pupils equal reactive, mucous membranes are dry  Respiratory: Lungs are clear  Cardiovascular/Edema: Heart sounds are regular  Gastrointestinal: Abdomen is soft  Neurologic: Patient is lethargic, nonverbal  Skin: No skin rashes  Other: No edema    Scheduled Meds:   oseltamivir  30 mg Oral BID    divalproex  125 mg Oral 2 times per day    melatonin  3 mg Oral Nightly    vancomycin  750 mg IntraVENous Q12H    aspirin  81 mg Oral Daily    sodium chloride flush  5-40 mL IntraVENous 2 times per day    lidocaine 1 % injection  50 mg IntraDERmal Once    metoprolol succinate  25 mg Oral BID    nicotine  1 patch TransDERmal Daily    atorvastatin  40 mg Oral Nightly    clopidogrel  75 mg Oral Daily    [Held by provider] losartan  25 mg Oral Daily     Continuous Infusions:   sodium chloride      sodium chloride      sodium chloride       PRN Meds:.sodium chloride, sodium chloride, sodium chloride, buprenorphine-naloxone, cloNIDine, loperamide, dicyclomine, ipratropium 0.5 mg-albuterol 2.5 mg, sodium chloride flush, sodium chloride, LORazepam, hydrOXYzine pamoate, acetaminophen **OR** acetaminophen, magnesium sulfate, ondansetron **OR** ondansetron, potassium chloride **OR** potassium alternative oral replacement **OR** potassium chloride, Carmex Classic Lip Balm  .    DATA:

## 2025-02-28 NOTE — PLAN OF CARE
Problem: Discharge Planning  Goal: Discharge to home or other facility with appropriate resources  2/28/2025 0121 by Phyllis Giraldo RN  Outcome: Progressing  2/27/2025 1708 by Laura Last RN  Outcome: Progressing  Flowsheets (Taken 2/27/2025 0815)  Discharge to home or other facility with appropriate resources: Identify barriers to discharge with patient and caregiver     Problem: Pain  Goal: Verbalizes/displays adequate comfort level or baseline comfort level  2/28/2025 0121 by Phyllis Giraldo RN  Outcome: Progressing  2/27/2025 1708 by Laura Last RN  Outcome: Progressing     Problem: Safety - Adult  Goal: Free from fall injury  2/28/2025 0121 by Phyllis Giraldo RN  Outcome: Progressing  2/27/2025 1708 by Laura Last RN  Outcome: Progressing     Problem: ABCDS Injury Assessment  Goal: Absence of physical injury  2/28/2025 0121 by Phyllis Giraldo RN  Outcome: Progressing  2/27/2025 1708 by Laura Last RN  Outcome: Progressing     Problem: Skin/Tissue Integrity  Goal: Skin integrity remains intact  Description: 1.  Monitor for areas of redness and/or skin breakdown  2.  Assess vascular access sites hourly  3.  Every 4-6 hours minimum:  Change oxygen saturation probe site  4.  Every 4-6 hours:  If on nasal continuous positive airway pressure, respiratory therapy assess nares and determine need for appliance change or resting period  2/28/2025 0121 by Phyllis Giraldo RN  Outcome: Progressing  2/27/2025 1708 by Laura Last RN  Outcome: Progressing  Flowsheets  Taken 2/27/2025 1708  Skin Integrity Remains Intact: Monitor for areas of redness and/or skin breakdown  Taken 2/27/2025 0815  Skin Integrity Remains Intact: Monitor for areas of redness and/or skin breakdown     Problem: Neurosensory - Adult  Goal: Achieves stable or improved neurological status  2/28/2025 0121 by Phyllis Giraldo RN  Outcome: Progressing  2/27/2025 1708 by Laura Last RN  Outcome:  RN  Outcome: Progressing  Flowsheets (Taken 2/27/2025 0815)  Absence of cardiac dysrhythmias or at baseline: Monitor cardiac rate and rhythm     Problem: Skin/Tissue Integrity - Adult  Goal: Skin integrity remains intact  Description: 1.  Monitor for areas of redness and/or skin breakdown  2.  Assess vascular access sites hourly  3.  Every 4-6 hours minimum:  Change oxygen saturation probe site  4.  Every 4-6 hours:  If on nasal continuous positive airway pressure, respiratory therapy assess nares and determine need for appliance change or resting period  2/28/2025 0121 by Phyllis Giraldo, RN  Outcome: Progressing  2/27/2025 1708 by Laura Last, RN  Outcome: Progressing  Flowsheets  Taken 2/27/2025 1708  Skin Integrity Remains Intact: Monitor for areas of redness and/or skin breakdown  Taken 2/27/2025 0815  Skin Integrity Remains Intact: Monitor for areas of redness and/or skin breakdown  Goal: Incisions, wounds, or drain sites healing without S/S of infection  Outcome: Progressing     Problem: Musculoskeletal - Adult  Goal: Return mobility to safest level of function  Outcome: Progressing     Problem: Gastrointestinal - Adult  Goal: Minimal or absence of nausea and vomiting  Outcome: Progressing  Goal: Maintains or returns to baseline bowel function  Outcome: Progressing     Problem: Infection - Adult  Goal: Absence of infection at discharge  Outcome: Progressing  Goal: Absence of infection during hospitalization  Outcome: Progressing     Problem: Hematologic - Adult  Goal: Maintains hematologic stability  Outcome: Progressing     Problem: Chronic Conditions and Co-morbidities  Goal: Patient's chronic conditions and co-morbidity symptoms are monitored and maintained or improved  Outcome: Progressing     Problem: Nutrition Deficit:  Goal: Optimize nutritional status  Outcome: Progressing

## 2025-02-28 NOTE — PROGRESS NOTES
[facility]  [facility]  Progress Note      No chief complaint on file.  Sob and ams    SUBJECTIVE:    Patient is tolerating medications. No reported adverse drug reactions.  No nausea, vomiting, diarrhea. Has sob.   Review of systems:  As stated above in the chief complaint, otherwise negative.    Medications:  Scheduled Meds:   oseltamivir  30 mg Oral BID    divalproex  125 mg Oral 2 times per day    melatonin  3 mg Oral Nightly    vancomycin  750 mg IntraVENous Q12H    aspirin  81 mg Oral Daily    sodium chloride flush  5-40 mL IntraVENous 2 times per day    lidocaine 1 % injection  50 mg IntraDERmal Once    metoprolol succinate  25 mg Oral BID    nicotine  1 patch TransDERmal Daily    atorvastatin  40 mg Oral Nightly    clopidogrel  75 mg Oral Daily    [Held by provider] losartan  25 mg Oral Daily     Continuous Infusions:   sodium chloride      sodium chloride      sodium chloride       PRN Meds:guaiFENesin, sodium chloride, sodium chloride, sodium chloride, buprenorphine-naloxone, cloNIDine, loperamide, dicyclomine, ipratropium 0.5 mg-albuterol 2.5 mg, sodium chloride flush, sodium chloride, LORazepam, hydrOXYzine pamoate, acetaminophen **OR** acetaminophen, magnesium sulfate, ondansetron **OR** ondansetron, potassium chloride **OR** potassium alternative oral replacement **OR** potassium chloride, Carmex Classic Lip Balm    OBJECTIVE:  BP (!) 137/95   Pulse 96   Temp 98.8 °F (37.1 °C) (Temporal)   Resp 27   Ht 1.93 m (6' 3.98\")   Wt 78.9 kg (174 lb)   SpO2 98%   BMI 21.19 kg/m²   Temp  Av.2 °F (36.8 °C)  Min: 97.5 °F (36.4 °C)  Max: 98.8 °F (37.1 °C)  Constitutional: The patient is alert  Skin: Warm and dry. No rashes were noted. No jaundice.  HEENT:  Moist mucous membranes, no ulcerations, no thrush.   Neck: Supple to movements. No lymphadenopathy.   Chest: use of accessory muscles to breathe. Symmetrical on 5 liters expansion. Auscultation reveals +  wheezing,no   replacement but needs to hold Plavix for 5 to 7 days   reviewed cardiology note, patient needs to be at least 3 months on Plavix then can be held for valve replacement surgery  Case management working on disposition of the patient  Monitor kidney funciton  ID will continue to follow    CESAR Van - CNP  12:25 PM  2/28/2025  Pt seen and examined. Above discussed agree with advanced practice nurse. Labs, cultures, and radiographs reviewed.  Face to Face encounter occurred. Changes made as necessary.     Tunde Naylor MD

## 2025-02-28 NOTE — PROGRESS NOTES
Date: 2/27/2025    Time: 10:10 PM    Patient Placed On BIPAP/CPAP/ Non-Invasive Ventilation?  Yes    If no must comment.  Facial area red/color change? No           If YES are Blister/Lesion present?No   If yes must notify nursing staff  BIPAP/CPAP skin barrier?  Yes    Skin barrier type:mepilexlite       Comments:        Christoph Gay RCP

## 2025-02-28 NOTE — CARE COORDINATION
2/28/25 Update CM Note. Pt admitted 2/18/25 endocarditis. Hx IV drug use. Pt needs AV and MV replacements at some point but unable to be off DAPT, heart cath with OBED placement 2/17/25. Current plan to remain on IV antibiotic therapy, IV Vanc ordered. Using suboxone PRN for COWS score>5.   Received 2 unit PRBCs yesterday.  Hgb 7.3 this am. Awaiting public benefits reevaluation to see if meets income levels to apply for Medicaid.  [place] referral declined. Referral to Sujatha GAMINGN RN-BC  [phone]

## 2025-02-28 NOTE — CONSENT
Informed Consent for Blood Component Transfusion Note    I have discussed with the patient the rationale for blood component transfusion; its benefits in treating or preventing fatigue, organ damage, or death; and its risk which includes mild transfusion reactions, rare risk of blood borne infection, or more serious but rare reactions. I have discussed the alternatives to transfusion, including the risk and consequences of not receiving transfusion. The patient had an opportunity to ask questions and had agreed to proceed with transfusion of blood components.    Electronically signed by ROXANE TERESA MD on 2/28/25 at 2:31 PM EST

## 2025-02-28 NOTE — PROGRESS NOTES
requires further education in this area   yes yes reinforce     ASSESSMENT:    Conditions Requiring Skilled Therapeutic Intervention:    [x]Decreased strength     []Decreased ROM  [x]Decreased functional mobility  [x]Decreased balance   [x]Decreased endurance   [x]Decreased posture  []Decreased sensation  []Decreased coordination   []Decreased vision  [x]Decreased safety awareness   []Increased pain       Comments:  Pt supine in bed upon entering, pt agreeable to participate. Pt instructed to transfer to EOB, completing transfer with no physical assistance. Pt sitting upright with good sitting balance. Pt with no c/o dizziness with position change. Pt then cued for hand placement and instructed to stand from EOB. Pt standing with fair balance with ww. Pt instructed to ambulate to tolerance. Pt ambulating with decreased judy and flexed trunk, cueing provided for energy conservation, postural correction and ww spacing. Pt demonstrated fair tolerance to ambulation bout. Pt was assisted back to bedside and was transferred to bedside chair. Pt positioned for comfort with all needs met and call bell in reach prior to exiting.     Treatment:  Patient practiced and was instructed in the following treatment:    Bed mobility training - pt given verbal and tactile cues to facilitate proper sequencing and safety during rolling and supine>sit as well as provided with stand by assistance to complete task   STS and pivot transfer training - pt educated on proper hand and foot placement, safety and sequencing, and use of verbal and tactile cues to safely complete sit<>stand and pivot transfers with physical assistance to complete task safely   Gait training- pt was given verbal and tactile cues to facilitate pt safety with ww use during ambulation as well as provided with physical assistance.    Pt's/ family goals   1. Return home    Prognosis is fair for reaching above PT goals.    Patient and or family understand(s) diagnosis,  prognosis, and plan of care.  yes    PHYSICAL THERAPY PLAN OF CARE:    PT POC is established based on physician order and patient diagnosis     Referring provider/PT Order:  Doni Palm MD   Diagnosis:  Endocarditis [I38]  Specific instructions for next treatment:  Progress as tolerated    Current Treatment Recommendations:     [x] Strengthening to improve independence with functional mobility   [] ROM to improve independence with functional mobility   [x] Balance Training to improve static/dynamic balance and to reduce fall risk  [x] Endurance Training to improve activity tolerance during functional mobility   [x] Transfer Training to improve safety and independence with all functional transfers   [x] Gait Training to improve gait mechanics, endurance and assess need for appropriate assistive device  [x] Stair Training in preparation for safe discharge home and/or into the community   [] Positioning to prevent skin breakdown and contractures  [x] Safety and Education Training   [x] Patient/Caregiver Education   [] HEP  [] Other     PT long term treatment goals are located in above grid    Frequency of treatments: 2-5x/week x 1-2 weeks.    Time in  1016  Time out  1036    Total Treatment Time  10 minutes     Evaluation Time includes thorough review of current medical information, gathering information on past medical history/social history and prior level of function, completion of standardized testing/informal observation of tasks, assessment of data and education on plan of care and goals.    CPT codes:  [x] Low Complexity PT evaluation 28028  [] Moderate Complexity PT evaluation 52608  [] High Complexity PT evaluation 86804  [] PT Re-evaluation 00238  [] Gait training 18015 -- minutes  [] Manual therapy 95615 -- minutes  [x] Therapeutic activities 95125 10 minutes  [] Therapeutic exercises 43623 -- minutes  [] Neuromuscular reeducation 12271 -- minutes     Dima Cross, PT, DPT  [id number]

## 2025-03-01 PROBLEM — J96.01 ACUTE HYPOXIC RESPIRATORY FAILURE (HCC): Status: ACTIVE | Noted: 2025-03-01

## 2025-03-01 LAB
ANION GAP SERPL CALCULATED.3IONS-SCNC: 11 MMOL/L (ref 7–16)
B.E.: -4.2 MMOL/L (ref -3–3)
BUN SERPL-MCNC: 73 MG/DL (ref 6–20)
CALCIUM SERPL-MCNC: 8.3 MG/DL (ref 8.6–10.2)
CHLORIDE SERPL-SCNC: 99 MMOL/L (ref 98–107)
CO2 SERPL-SCNC: 22 MMOL/L (ref 22–29)
COHB: 1 % (ref 0–1.5)
COMMENT: ABNORMAL
CREAT SERPL-MCNC: 2.7 MG/DL (ref 0.7–1.2)
CRITICAL: ABNORMAL
DATE ANALYZED: ABNORMAL
DATE OF COLLECTION: ABNORMAL
GFR, ESTIMATED: 30 ML/MIN/1.73M2
GLUCOSE SERPL-MCNC: 104 MG/DL (ref 74–99)
HCO3: 21.6 MMOL/L (ref 22–26)
HHB: 34.8 % (ref 0–5)
LAB: ABNORMAL
Lab: 1210
METHB: 0.4 % (ref 0–1.5)
MICROORGANISM SPEC CULT: NORMAL
MODE: ABNORMAL
O2 SATURATION: 64.7 % (ref 92–98.5)
O2HB: 63.8 % (ref 94–97)
OPERATOR ID: 1210
PATIENT TEMP: 37 C
PCO2: 42.9 MMHG (ref 35–45)
PH BLOOD GAS: 7.32 (ref 7.35–7.45)
PO2: 36.9 MMHG (ref 75–100)
POTASSIUM SERPL-SCNC: 5.4 MMOL/L (ref 3.5–5)
SERVICE CMNT-IMP: NORMAL
SODIUM SERPL-SCNC: 132 MMOL/L (ref 132–146)
SOURCE, BLOOD GAS: ABNORMAL
SPECIMEN DESCRIPTION: NORMAL
THB: 9.1 G/DL (ref 11.5–16.5)
TIME ANALYZED: 1213

## 2025-03-01 PROCEDURE — 6360000002 HC RX W HCPCS

## 2025-03-01 PROCEDURE — 99232 SBSQ HOSP IP/OBS MODERATE 35: CPT

## 2025-03-01 PROCEDURE — 80048 BASIC METABOLIC PNL TOTAL CA: CPT

## 2025-03-01 PROCEDURE — 6370000000 HC RX 637 (ALT 250 FOR IP): Performed by: INTERNAL MEDICINE

## 2025-03-01 PROCEDURE — 2500000003 HC RX 250 WO HCPCS: Performed by: INTERNAL MEDICINE

## 2025-03-01 PROCEDURE — 94660 CPAP INITIATION&MGMT: CPT

## 2025-03-01 PROCEDURE — 2580000003 HC RX 258

## 2025-03-01 PROCEDURE — 36600 WITHDRAWAL OF ARTERIAL BLOOD: CPT

## 2025-03-01 PROCEDURE — 2700000000 HC OXYGEN THERAPY PER DAY

## 2025-03-01 PROCEDURE — 6370000000 HC RX 637 (ALT 250 FOR IP)

## 2025-03-01 PROCEDURE — 2060000000 HC ICU INTERMEDIATE R&B

## 2025-03-01 PROCEDURE — 82805 BLOOD GASES W/O2 SATURATION: CPT

## 2025-03-01 RX ADMIN — BUPRENORPHINE HYDROCHLORIDE AND NALOXONE HYDROCHLORIDE DIHYDRATE 2 TABLET: 2; .5 TABLET SUBLINGUAL at 04:18

## 2025-03-01 RX ADMIN — METOPROLOL SUCCINATE 25 MG: 25 TABLET, EXTENDED RELEASE ORAL at 20:57

## 2025-03-01 RX ADMIN — OSELTAMIVIR PHOSPHATE 30 MG: 30 CAPSULE ORAL at 08:28

## 2025-03-01 RX ADMIN — VANCOMYCIN HYDROCHLORIDE 750 MG: 750 INJECTION, POWDER, LYOPHILIZED, FOR SOLUTION INTRAVENOUS at 08:25

## 2025-03-01 RX ADMIN — SODIUM CHLORIDE, PRESERVATIVE FREE 10 ML: 5 INJECTION INTRAVENOUS at 21:07

## 2025-03-01 RX ADMIN — ACETAMINOPHEN 650 MG: 325 TABLET ORAL at 13:05

## 2025-03-01 RX ADMIN — BUPRENORPHINE HYDROCHLORIDE AND NALOXONE HYDROCHLORIDE DIHYDRATE 2 TABLET: 2; .5 TABLET SUBLINGUAL at 16:39

## 2025-03-01 RX ADMIN — BUPRENORPHINE HYDROCHLORIDE AND NALOXONE HYDROCHLORIDE DIHYDRATE 2 TABLET: 2; .5 TABLET SUBLINGUAL at 21:23

## 2025-03-01 RX ADMIN — DIVALPROEX SODIUM 125 MG: 125 CAPSULE ORAL at 08:10

## 2025-03-01 RX ADMIN — ASPIRIN 81 MG CHEWABLE TABLET 81 MG: 81 TABLET CHEWABLE at 08:10

## 2025-03-01 RX ADMIN — METOPROLOL SUCCINATE 25 MG: 25 TABLET, EXTENDED RELEASE ORAL at 08:10

## 2025-03-01 RX ADMIN — DIVALPROEX SODIUM 125 MG: 125 CAPSULE ORAL at 20:58

## 2025-03-01 RX ADMIN — SODIUM CHLORIDE, PRESERVATIVE FREE 10 ML: 5 INJECTION INTRAVENOUS at 08:26

## 2025-03-01 RX ADMIN — VANCOMYCIN HYDROCHLORIDE 750 MG: 750 INJECTION, POWDER, LYOPHILIZED, FOR SOLUTION INTRAVENOUS at 21:05

## 2025-03-01 RX ADMIN — OSELTAMIVIR PHOSPHATE 30 MG: 30 CAPSULE ORAL at 20:57

## 2025-03-01 RX ADMIN — ATORVASTATIN CALCIUM 40 MG: 40 TABLET, FILM COATED ORAL at 20:57

## 2025-03-01 RX ADMIN — Medication 3 MG: at 20:57

## 2025-03-01 RX ADMIN — CLOPIDOGREL 75 MG: 75 TABLET, FILM COATED ORAL at 08:10

## 2025-03-01 ASSESSMENT — PAIN SCALES - GENERAL
PAINLEVEL_OUTOF10: 0
PAINLEVEL_OUTOF10: 7
PAINLEVEL_OUTOF10: 0

## 2025-03-01 ASSESSMENT — PAIN SCALES - WONG BAKER: WONGBAKER_NUMERICALRESPONSE: NO HURT

## 2025-03-01 ASSESSMENT — PAIN DESCRIPTION - LOCATION: LOCATION: HEAD

## 2025-03-01 NOTE — PROGRESS NOTES
[facility] Hospitalist Progress Note    Admitting Date and Time: 2/18/2025  7:30 PM  Admit Dx: Endocarditis [I38]    Synopsis:  45 y.o. year old male  who  has a past medical history of Hx of hepatitis C-resolved, IV drug abuse (HCC), and Nonhealing nonsurgical wound with fat layer exposed.      Wagner Ryan is a 45 y.o. male who presents to the CVICU for evaluation by CTS surgery. Patient was being followed at [facility] for AMS and a STEMI and underwent stent placement of the LAD on 2/17.  He was admitted to the MICU following cath lab and started on broad spectrum antibiotics, ASA and Brilinta. During work up for his AMS, a CTA of the head showed tiny area of intraparenchymal hemorrhage in the right occipital lobe w/ MRI of the brain showed multiple small foci of acute and early subacute infarcts in bilateral frontal and parietal occipital lobe left more than right consistent with micro emboli. Neurology was consulted. They cleared the patient to continue on plavix given his recent stent placement.  He underwent an ECHO which was found to have vegetations and the decision was made to transfer to [facility] for evaluation by CTS Surgery.      2/19: Patient seen in CVICU.  Per ICU team and CTS, patient can be transferred as he has no ICU needs.  Transfer order placed.     2/20: Undergoing CTS workup.  Patient continues to have persistent confusion and delirium.  Seems like he was started on Decadron at previous hospital for concern of meningitis and it has not been stopped or weaned.  No mention for continuation of Decadron per ID here and treating for MRSA bacteremia as of now.  Hold Decadron.     2/21: Neurology stating patient has no brain bleed and that cardiology has to decide on DAPT.  Contacted cardiology and stated patient must be on DAPT given recent cardiac stenting and 2/17.  Advised to start aspirin and continue Plavix.  Orders placed.  CTS to be informed.     2/22: Per general surgery, patient  computerized transcription errors may be present.  Electronically signed by ROXANE TERESA MD on 3/1/2025 at 1:59 PM

## 2025-03-01 NOTE — PROGRESS NOTES
Pharmacy Consultation Note  (Antibiotic Dosing and Monitoring)    Initial consult date: 2/18/25  Consulting physician/provider: Jake Bravo DO   Drug: Vancomycin  Indication: Endocarditis/Endovascular     Age/  Gender Height Weight IBW  Allergy Information   45 y.o./male 193 cm (6' 4\") 80.2 kg (176 lb 12.8 oz)     Ideal body weight: 86.8 kg (191 lb 4.5 oz)   Patient has no known allergies.      Renal Function:  Recent Labs     02/27/25  0530 02/27/25  1845 02/28/25  0600   BUN 57* 66* 66*   CREATININE 2.9* 3.2* 2.9*       Intake/Output Summary (Last 24 hours) at 3/1/2025 0845  Last data filed at 3/1/2025 0812  Gross per 24 hour   Intake --   Output 1600 ml   Net -1600 ml       Vancomycin Monitoring:  Trough:    Recent Labs     02/27/25  0530 02/27/25  0814   VANCOTROUGH 22.5* 20.4*     Random:  No results for input(s): \"VANCORANDOM\" in the last 72 hours.    Vancomycin Administration Times:  Recent vancomycin administrations                     vancomycin (VANCOCIN) 750 mg in sodium chloride 0.9 % 250 mL IVPB ([id number]) (mg) 750 mg New Bag 03/01/25 0825     750 mg New Bag 02/28/25 2114     750 mg New Bag  0926     750 mg New Bag  0013     750 mg New Bag 02/27/25 1111     750 mg New Bag 02/26/25 2156     750 mg New Bag  0859                      Assessment:  Patient is a 45 y.o. male who has been initiated on vancomycin  Estimated Creatinine Clearance: 36 mL/min (A) (based on SCr of 2.9 mg/dL (H)).  To dose vancomycin, pharmacy will be utilizing  trough based dosing.  2/26: Vancomycin trough level last night (drawn ~1 hour early = 14.4 mcg/mL). With increase in SCr to 2.3 today, will continue current dose for now, but may need to switch to q24h dosing if renal function does not improve.   2/27: SCr 2.9 today. Vanco trough this AM = 20.4. ~ 10 hrs post dose. This puts patient on target for 18-20 trough goal. Will need to follow this closely if SCr continues to trend up.    Plan:  Continue vancomycin 750 mg IV

## 2025-03-01 NOTE — PROGRESS NOTES
[facility]  [facility]  Progress Note      No chief complaint on file.  Sob and ams    SUBJECTIVE:    Patient is tolerating medications. No reported adverse drug reactions.  No nausea, vomiting, diarrhea. Sob and cough improved  Review of systems:  As stated above in the chief complaint, otherwise negative.    Medications:  Scheduled Meds:   oseltamivir  30 mg Oral BID    divalproex  125 mg Oral 2 times per day    melatonin  3 mg Oral Nightly    vancomycin  750 mg IntraVENous Q12H    aspirin  81 mg Oral Daily    sodium chloride flush  5-40 mL IntraVENous 2 times per day    lidocaine 1 % injection  50 mg IntraDERmal Once    metoprolol succinate  25 mg Oral BID    nicotine  1 patch TransDERmal Daily    atorvastatin  40 mg Oral Nightly    clopidogrel  75 mg Oral Daily    [Held by provider] losartan  25 mg Oral Daily     Continuous Infusions:   sodium chloride      sodium chloride      sodium chloride       PRN Meds:guaiFENesin, sodium chloride, sodium chloride, sodium chloride, buprenorphine-naloxone, cloNIDine, loperamide, dicyclomine, ipratropium 0.5 mg-albuterol 2.5 mg, sodium chloride flush, sodium chloride, LORazepam, hydrOXYzine pamoate, acetaminophen **OR** acetaminophen, magnesium sulfate, ondansetron **OR** ondansetron, potassium chloride **OR** potassium alternative oral replacement **OR** potassium chloride, Carmex Classic Lip Balm    OBJECTIVE:  BP (!) 152/85   Pulse 86   Temp 98.4 °F (36.9 °C) (Temporal)   Resp 20   Ht 1.93 m (6' 3.98\")   Wt 78.9 kg (174 lb)   SpO2 99%   BMI 21.19 kg/m²   Temp  Av.1 °F (36.7 °C)  Min: 97.3 °F (36.3 °C)  Max: 99.1 °F (37.3 °C)  Constitutional: The patient is alert  Skin: Warm and dry. No rashes were noted. No jaundice.  HEENT:  Moist mucous membranes, no ulcerations, no thrush.   Neck: Supple to movements. No lymphadenopathy.   Chest: no use of accessory muscles to breathe. Symmetrical on 5 liters 99%expansion. Auscultation reveals  tamiflu  CT surgery is planning for valve replacement but needs to hold Plavix for 5 to 7 days   Per cardiology note, patient needs to be at least 3 months on Plavix then can be held for valve replacement surgery  Case management working on disposition of the patient  Monitor kidney funciton  ID will continue to follow    CESAR Van - CNP  8:58 AM  3/1/2025    Pt seen and examined. Above discussed agree with advanced practice nurse. Labs, cultures, and radiographs reviewed.  Face to Face encounter occurred. Changes made as necessary.     VERONIQUE PALACIOS MD

## 2025-03-01 NOTE — PROGRESS NOTES
Department of Internal Medicine  Nephrology Progress Note      Events reviewed     SUBJECTIVE: We are following Mr. Ryan for BELLO.  Reports no complaints.    PHYSICAL EXAM:      Vitals:    VITALS:  BP (!) 152/85   Pulse 86   Temp 98.4 °F (36.9 °C) (Temporal)   Resp 20   Ht 1.93 m (6' 3.98\")   Wt 78.9 kg (174 lb)   SpO2 99%   BMI 21.19 kg/m²   24HR PULSE OXIMETRY RANGE:  SpO2  Av.9 %  Min: 97 %  Max: 100 %  24HR INTAKE/OUTPUT:    Intake/Output Summary (Last 24 hours) at 3/1/2025 0920  Last data filed at 3/1/2025 0812  Gross per 24 hour   Intake --   Output 1600 ml   Net -1600 ml       Constitutional: Patient is awake nonverbal no distress  HEENT: Pupils equal reactive, mucous membranes are dry  Respiratory: Lungs are clear  Cardiovascular/Edema: Heart sounds are regular  Gastrointestinal: Abdomen is soft  Neurologic: Patient is lethargic, nonverbal  Skin: No skin rashes  Other: No edema    Scheduled Meds:   oseltamivir  30 mg Oral BID    divalproex  125 mg Oral 2 times per day    melatonin  3 mg Oral Nightly    vancomycin  750 mg IntraVENous Q12H    aspirin  81 mg Oral Daily    sodium chloride flush  5-40 mL IntraVENous 2 times per day    lidocaine 1 % injection  50 mg IntraDERmal Once    metoprolol succinate  25 mg Oral BID    nicotine  1 patch TransDERmal Daily    atorvastatin  40 mg Oral Nightly    clopidogrel  75 mg Oral Daily    [Held by provider] losartan  25 mg Oral Daily     Continuous Infusions:   sodium chloride      sodium chloride      sodium chloride       PRN Meds:.guaiFENesin, sodium chloride, sodium chloride, sodium chloride, buprenorphine-naloxone, cloNIDine, loperamide, dicyclomine, ipratropium 0.5 mg-albuterol 2.5 mg, sodium chloride flush, sodium chloride, LORazepam, hydrOXYzine pamoate, acetaminophen **OR** acetaminophen, magnesium sulfate, ondansetron **OR** ondansetron, potassium chloride **OR** potassium alternative oral replacement **OR** potassium chloride, Carmex Classic Lip  Right Hand/Left Foot

## 2025-03-02 LAB
ANION GAP SERPL CALCULATED.3IONS-SCNC: 10 MMOL/L (ref 7–16)
ATYPICAL LYMPHOCYTE ABSOLUTE COUNT: 0.14 K/UL (ref 0–0.46)
ATYPICAL LYMPHOCYTES: 2 % (ref 0–4)
BASOPHILS # BLD: 0 K/UL (ref 0–0.2)
BASOPHILS NFR BLD: 0 % (ref 0–2)
BUN SERPL-MCNC: 71 MG/DL (ref 6–20)
CALCIUM SERPL-MCNC: 8.4 MG/DL (ref 8.6–10.2)
CHLORIDE SERPL-SCNC: 105 MMOL/L (ref 98–107)
CO2 SERPL-SCNC: 21 MMOL/L (ref 22–29)
CREAT SERPL-MCNC: 2.6 MG/DL (ref 0.7–1.2)
DATE LAST DOSE: ABNORMAL
DATE LAST DOSE: NORMAL
EOSINOPHIL # BLD: 0.07 K/UL (ref 0.05–0.5)
EOSINOPHILS RELATIVE PERCENT: 1 % (ref 0–6)
ERYTHROCYTE [DISTWIDTH] IN BLOOD BY AUTOMATED COUNT: 17.5 % (ref 11.5–15)
GFR, ESTIMATED: 31 ML/MIN/1.73M2
GLUCOSE BLD-MCNC: 111 MG/DL (ref 74–99)
GLUCOSE BLD-MCNC: 180 MG/DL (ref 74–99)
GLUCOSE SERPL-MCNC: 98 MG/DL (ref 74–99)
HCT VFR BLD AUTO: 24.2 % (ref 37–54)
HGB BLD-MCNC: 7.8 G/DL (ref 12.5–16.5)
LYMPHOCYTES NFR BLD: 0.49 K/UL (ref 1.5–4)
LYMPHOCYTES RELATIVE PERCENT: 6 % (ref 20–42)
MCH RBC QN AUTO: 25.2 PG (ref 26–35)
MCHC RBC AUTO-ENTMCNC: 32.2 G/DL (ref 32–34.5)
MCV RBC AUTO: 78.3 FL (ref 80–99.9)
MICROORGANISM SPEC CULT: NORMAL
MONOCYTES NFR BLD: 0.28 K/UL (ref 0.1–0.95)
MONOCYTES NFR BLD: 4 % (ref 2–12)
NEUTROPHILS NFR BLD: 88 % (ref 43–80)
NEUTS SEG NFR BLD: 7.02 K/UL (ref 1.8–7.3)
PLATELET # BLD AUTO: 353 K/UL (ref 130–450)
PMV BLD AUTO: 9.1 FL (ref 7–12)
POTASSIUM SERPL-SCNC: 5.4 MMOL/L (ref 3.5–5)
RBC # BLD AUTO: 3.09 M/UL (ref 3.8–5.8)
RBC # BLD: ABNORMAL 10*6/UL
SERVICE CMNT-IMP: NORMAL
SODIUM SERPL-SCNC: 136 MMOL/L (ref 132–146)
SPECIMEN DESCRIPTION: NORMAL
TME LAST DOSE: ABNORMAL H
TME LAST DOSE: NORMAL H
VANCOMYCIN DOSE: ABNORMAL MG
VANCOMYCIN DOSE: NORMAL MG
VANCOMYCIN SERPL-MCNC: 30.6 UG/ML (ref 5–40)
VANCOMYCIN TROUGH SERPL-MCNC: 31 UG/ML (ref 5–16)
WBC OTHER # BLD: 8 K/UL (ref 4.5–11.5)

## 2025-03-02 PROCEDURE — 99232 SBSQ HOSP IP/OBS MODERATE 35: CPT

## 2025-03-02 PROCEDURE — 94660 CPAP INITIATION&MGMT: CPT

## 2025-03-02 PROCEDURE — 6370000000 HC RX 637 (ALT 250 FOR IP): Performed by: INTERNAL MEDICINE

## 2025-03-02 PROCEDURE — 2700000000 HC OXYGEN THERAPY PER DAY

## 2025-03-02 PROCEDURE — 80202 ASSAY OF VANCOMYCIN: CPT

## 2025-03-02 PROCEDURE — 6360000002 HC RX W HCPCS

## 2025-03-02 PROCEDURE — 2500000003 HC RX 250 WO HCPCS: Performed by: INTERNAL MEDICINE

## 2025-03-02 PROCEDURE — 85025 COMPLETE CBC W/AUTO DIFF WBC: CPT

## 2025-03-02 PROCEDURE — 2580000003 HC RX 258

## 2025-03-02 PROCEDURE — 6370000000 HC RX 637 (ALT 250 FOR IP)

## 2025-03-02 PROCEDURE — 2060000000 HC ICU INTERMEDIATE R&B

## 2025-03-02 PROCEDURE — 82962 GLUCOSE BLOOD TEST: CPT

## 2025-03-02 PROCEDURE — 80048 BASIC METABOLIC PNL TOTAL CA: CPT

## 2025-03-02 RX ORDER — CALCIUM GLUCONATE 20 MG/ML
1000 INJECTION, SOLUTION INTRAVENOUS ONCE
Status: COMPLETED | OUTPATIENT
Start: 2025-03-02 | End: 2025-03-02

## 2025-03-02 RX ORDER — DEXTROSE MONOHYDRATE 100 MG/ML
INJECTION, SOLUTION INTRAVENOUS CONTINUOUS PRN
Status: DISCONTINUED | OUTPATIENT
Start: 2025-03-02 | End: 2025-03-07 | Stop reason: HOSPADM

## 2025-03-02 RX ORDER — FUROSEMIDE 10 MG/ML
60 INJECTION INTRAMUSCULAR; INTRAVENOUS ONCE
Status: COMPLETED | OUTPATIENT
Start: 2025-03-02 | End: 2025-03-02

## 2025-03-02 RX ORDER — GLUCAGON 1 MG/ML
1 KIT INJECTION PRN
Status: DISCONTINUED | OUTPATIENT
Start: 2025-03-02 | End: 2025-03-07 | Stop reason: HOSPADM

## 2025-03-02 RX ADMIN — BUPRENORPHINE HYDROCHLORIDE AND NALOXONE HYDROCHLORIDE DIHYDRATE 2 TABLET: 2; .5 TABLET SUBLINGUAL at 11:08

## 2025-03-02 RX ADMIN — OSELTAMIVIR PHOSPHATE 30 MG: 30 CAPSULE ORAL at 20:20

## 2025-03-02 RX ADMIN — SODIUM CHLORIDE, PRESERVATIVE FREE 10 ML: 5 INJECTION INTRAVENOUS at 07:57

## 2025-03-02 RX ADMIN — BUPRENORPHINE HYDROCHLORIDE AND NALOXONE HYDROCHLORIDE DIHYDRATE 2 TABLET: 2; .5 TABLET SUBLINGUAL at 23:27

## 2025-03-02 RX ADMIN — CLOPIDOGREL 75 MG: 75 TABLET, FILM COATED ORAL at 07:50

## 2025-03-02 RX ADMIN — BUPRENORPHINE HYDROCHLORIDE AND NALOXONE HYDROCHLORIDE DIHYDRATE 2 TABLET: 2; .5 TABLET SUBLINGUAL at 18:14

## 2025-03-02 RX ADMIN — DIVALPROEX SODIUM 125 MG: 125 CAPSULE ORAL at 20:20

## 2025-03-02 RX ADMIN — SODIUM ZIRCONIUM CYCLOSILICATE 10 G: 10 POWDER, FOR SUSPENSION ORAL at 08:04

## 2025-03-02 RX ADMIN — METOPROLOL SUCCINATE 25 MG: 25 TABLET, EXTENDED RELEASE ORAL at 07:50

## 2025-03-02 RX ADMIN — OSELTAMIVIR PHOSPHATE 30 MG: 30 CAPSULE ORAL at 07:59

## 2025-03-02 RX ADMIN — Medication 3 MG: at 22:05

## 2025-03-02 RX ADMIN — DEXTROSE MONOHYDRATE 250 ML: 10 INJECTION, SOLUTION INTRAVENOUS at 08:24

## 2025-03-02 RX ADMIN — METOPROLOL SUCCINATE 25 MG: 25 TABLET, EXTENDED RELEASE ORAL at 22:05

## 2025-03-02 RX ADMIN — ASPIRIN 81 MG CHEWABLE TABLET 81 MG: 81 TABLET CHEWABLE at 07:50

## 2025-03-02 RX ADMIN — CALCIUM GLUCONATE 1000 MG: 20 INJECTION, SOLUTION INTRAVENOUS at 08:08

## 2025-03-02 RX ADMIN — ACETAMINOPHEN 650 MG: 325 TABLET ORAL at 13:36

## 2025-03-02 RX ADMIN — DIVALPROEX SODIUM 125 MG: 125 CAPSULE ORAL at 07:50

## 2025-03-02 RX ADMIN — ATORVASTATIN CALCIUM 40 MG: 40 TABLET, FILM COATED ORAL at 22:04

## 2025-03-02 RX ADMIN — SODIUM CHLORIDE, PRESERVATIVE FREE 10 ML: 5 INJECTION INTRAVENOUS at 22:06

## 2025-03-02 RX ADMIN — FUROSEMIDE 60 MG: 10 INJECTION, SOLUTION INTRAMUSCULAR; INTRAVENOUS at 11:09

## 2025-03-02 RX ADMIN — INSULIN HUMAN 10 UNITS: 100 INJECTION, SOLUTION PARENTERAL at 08:18

## 2025-03-02 ASSESSMENT — PAIN SCALES - GENERAL
PAINLEVEL_OUTOF10: 7
PAINLEVEL_OUTOF10: 0
PAINLEVEL_OUTOF10: 8
PAINLEVEL_OUTOF10: 0

## 2025-03-02 ASSESSMENT — PAIN SCALES - WONG BAKER
WONGBAKER_NUMERICALRESPONSE: NO HURT
WONGBAKER_NUMERICALRESPONSE: HURTS WHOLE LOT

## 2025-03-02 ASSESSMENT — PAIN DESCRIPTION - ORIENTATION: ORIENTATION: RIGHT;LEFT

## 2025-03-02 ASSESSMENT — PAIN DESCRIPTION - LOCATION: LOCATION: HEAD

## 2025-03-02 ASSESSMENT — PAIN DESCRIPTION - DESCRIPTORS: DESCRIPTORS: ACHING;DISCOMFORT;DULL

## 2025-03-02 NOTE — PROGRESS NOTES
[facility]  [facility]  Progress Note      No chief complaint on file.  Sob and ams    SUBJECTIVE:    Patient is tolerating medications. No reported adverse drug reactions.  No nausea, vomiting, diarrhea. Looks much better today  Review of systems:  As stated above in the chief complaint, otherwise negative.    Medications:  Scheduled Meds:   calcium gluconate  1,000 mg IntraVENous Once    insulin regular  10 Units IntraVENous Once    And    dextrose bolus  250 mL IntraVENous Once    sodium zirconium cyclosilicate  10 g Oral Once    oseltamivir  30 mg Oral BID    divalproex  125 mg Oral 2 times per day    melatonin  3 mg Oral Nightly    vancomycin  750 mg IntraVENous Q12H    aspirin  81 mg Oral Daily    sodium chloride flush  5-40 mL IntraVENous 2 times per day    lidocaine 1 % injection  50 mg IntraDERmal Once    metoprolol succinate  25 mg Oral BID    nicotine  1 patch TransDERmal Daily    atorvastatin  40 mg Oral Nightly    clopidogrel  75 mg Oral Daily    [Held by provider] losartan  25 mg Oral Daily     Continuous Infusions:   dextrose      sodium chloride      sodium chloride      sodium chloride       PRN Meds:glucose, dextrose bolus **OR** dextrose bolus, glucagon (rDNA), dextrose, guaiFENesin, sodium chloride, sodium chloride, sodium chloride, buprenorphine-naloxone, cloNIDine, loperamide, dicyclomine, ipratropium 0.5 mg-albuterol 2.5 mg, sodium chloride flush, sodium chloride, LORazepam, hydrOXYzine pamoate, acetaminophen **OR** acetaminophen, magnesium sulfate, ondansetron **OR** ondansetron, potassium chloride **OR** potassium alternative oral replacement **OR** potassium chloride, Carmex Classic Lip Balm    OBJECTIVE:  BP (!) 140/88   Pulse 81   Temp 97.9 °F (36.6 °C) (Temporal)   Resp 20   Ht 1.93 m (6' 3.98\")   Wt 96.5 kg (212 lb 11.9 oz)   SpO2 99%   BMI 25.91 kg/m²   Temp  Av °F (36.7 °C)  Min: 97.5 °F (36.4 °C)  Max: 98.4 °F (36.9 °C)  Constitutional: The

## 2025-03-02 NOTE — PROGRESS NOTES
[facility] Hospitalist Progress Note    Admitting Date and Time: 2/18/2025  7:30 PM  Admit Dx: Endocarditis [I38]    Synopsis:  45 y.o. year old male  who  has a past medical history of Hx of hepatitis C-resolved, IV drug abuse (HCC), and Nonhealing nonsurgical wound with fat layer exposed.      Wagner Ryan is a 45 y.o. male who presents to the CVICU for evaluation by CTS surgery. Patient was being followed at [facility] for AMS and a STEMI and underwent stent placement of the LAD on 2/17.  He was admitted to the MICU following cath lab and started on broad spectrum antibiotics, ASA and Brilinta. During work up for his AMS, a CTA of the head showed tiny area of intraparenchymal hemorrhage in the right occipital lobe w/ MRI of the brain showed multiple small foci of acute and early subacute infarcts in bilateral frontal and parietal occipital lobe left more than right consistent with micro emboli. Neurology was consulted. They cleared the patient to continue on plavix given his recent stent placement.  He underwent an ECHO which was found to have vegetations and the decision was made to transfer to [facility] for evaluation by CTS Surgery.      2/19: Patient seen in CVICU.  Per ICU team and CTS, patient can be transferred as he has no ICU needs.  Transfer order placed.     2/20: Undergoing CTS workup.  Patient continues to have persistent confusion and delirium.  Seems like he was started on Decadron at previous hospital for concern of meningitis and it has not been stopped or weaned.  No mention for continuation of Decadron per ID here and treating for MRSA bacteremia as of now.  Hold Decadron.     2/21: Neurology stating patient has no brain bleed and that cardiology has to decide on DAPT.  Contacted cardiology and stated patient must be on DAPT given recent cardiac stenting and 2/17.  Advised to start aspirin and continue Plavix.  Orders placed.  CTS to be informed.     2/22: Per general surgery, patient  does not need femoral central line replaced despite being 4 days old has IR plans to do PICC line on 2/24.     2/23: Stat CT head done given acute headache.  No new findings.     2/24: For IR guided PICC line placement.  Stated they will do procedure while patient is on DAPT.    2/25: Patient was on Suboxone since last 10 years but is not taking this hospitalization.  No patient having withdrawal symptoms and placed on COWS protocol    2/26: Patient oxygen saturation increased to 8 L, repeat chest x-ray and proBNP    2/27: Patient had teeth extraction this morning.  Influenza A positive    03/01: Feels better.   03/02: Hyperkalemia and got hyperkalemic cocktail.  Received IV Lasix 60 Mg    Subjective:  Patient is being followed for Endocarditis [I38]     Patient was seen at bedside.  Feels better.  Denies any complaint    ROS: denies fever, chills, cp, sob, n/v, HA unless stated above.      vancomycin (VANCOCIN) intermittent dosing (placeholder)   Other RX Placeholder    oseltamivir  30 mg Oral BID    divalproex  125 mg Oral 2 times per day    melatonin  3 mg Oral Nightly    aspirin  81 mg Oral Daily    sodium chloride flush  5-40 mL IntraVENous 2 times per day    lidocaine 1 % injection  50 mg IntraDERmal Once    metoprolol succinate  25 mg Oral BID    nicotine  1 patch TransDERmal Daily    atorvastatin  40 mg Oral Nightly    clopidogrel  75 mg Oral Daily    [Held by provider] losartan  25 mg Oral Daily     glucose, 4 tablet, PRN  dextrose bolus, 125 mL, PRN   Or  dextrose bolus, 250 mL, PRN  glucagon (rDNA), 1 mg, PRN  dextrose, , Continuous PRN  guaiFENesin, 400 mg, 4x Daily PRN  sodium chloride, , PRN  sodium chloride, 1 spray, Q2H PRN  sodium chloride, , PRN  buprenorphine-naloxone, 2 tablet, PRN  cloNIDine, 0.1 mg, Q6H PRN  loperamide, 2 mg, 4x Daily PRN  dicyclomine, 20 mg, Q6H PRN  ipratropium 0.5 mg-albuterol 2.5 mg, 1 Dose, Q4H PRN  sodium chloride flush, 5-40 mL, PRN  sodium chloride, , PRN  LORazepam,

## 2025-03-02 NOTE — PROGRESS NOTES
Department of Internal Medicine  Nephrology Progress Note      Events reviewed     SUBJECTIVE: We are following Mr. Ryan for BELLO.  Reports no complaints.    PHYSICAL EXAM:      Vitals:    VITALS:  BP (!) 140/88   Pulse 81   Temp 97.9 °F (36.6 °C) (Temporal)   Resp 20   Ht 1.93 m (6' 3.98\")   Wt 96.5 kg (212 lb 11.9 oz)   SpO2 99%   BMI 25.91 kg/m²   24HR PULSE OXIMETRY RANGE:  SpO2  Av.3 %  Min: 98 %  Max: 100 %  24HR INTAKE/OUTPUT:    Intake/Output Summary (Last 24 hours) at 3/2/2025 0896  Last data filed at 3/2/2025 0525  Gross per 24 hour   Intake --   Output 1600 ml   Net -1600 ml       Constitutional: Patient is awake nonverbal no distress  HEENT: Pupils equal reactive, mucous membranes are dry  Respiratory: Lungs are clear  Cardiovascular/Edema: Heart sounds are regular  Gastrointestinal: Abdomen is soft  Neurologic: Patient is lethargic, nonverbal  Skin: No skin rashes  Other: No edema    Scheduled Meds:   oseltamivir  30 mg Oral BID    divalproex  125 mg Oral 2 times per day    melatonin  3 mg Oral Nightly    vancomycin  750 mg IntraVENous Q12H    aspirin  81 mg Oral Daily    sodium chloride flush  5-40 mL IntraVENous 2 times per day    lidocaine 1 % injection  50 mg IntraDERmal Once    metoprolol succinate  25 mg Oral BID    nicotine  1 patch TransDERmal Daily    atorvastatin  40 mg Oral Nightly    clopidogrel  75 mg Oral Daily    [Held by provider] losartan  25 mg Oral Daily     Continuous Infusions:   dextrose      sodium chloride      sodium chloride      sodium chloride       PRN Meds:.glucose, dextrose bolus **OR** dextrose bolus, glucagon (rDNA), dextrose, guaiFENesin, sodium chloride, sodium chloride, sodium chloride, buprenorphine-naloxone, cloNIDine, loperamide, dicyclomine, ipratropium 0.5 mg-albuterol 2.5 mg, sodium chloride flush, sodium chloride, LORazepam, hydrOXYzine pamoate, acetaminophen **OR** acetaminophen, magnesium sulfate, ondansetron **OR** ondansetron, potassium  is without acute process. The  osseous structures are without acute process.     IMPRESSION:  No acute process.         BRIEF SUMMARY OF INITIAL CONSULT:      Briefly Mr. Ryan is a 45-year-old man with history of hepatitis C, IV drug abuse, multiple episodes of bacteremia, who was initially admitted to [facility] with shortness of breath, he was found to have ST elevation MI, and therefore he underwent urgent cardiac catheterization with his stenting of the LAD, he is well was found to have MRSA bacteremia, JAVED showed mitral valve and tricuspid valve reason for his transfer to this hospital on 2/18/2025.  Of note MRI of the brain showed multiple small foci of acute or early subacute infarctions in the bilateral frontoparietal and occipital lobes.  On admission his sodium level was 130 but since then his sodium has progressively increase up to 152, reason for this consultation.      Problems resolved:  Hyponatremia, seem to be with water deficit due to poor oral intake, improved to 135 today  Hypokalemia, 2/2 poor intake and probably renal potassium wasting, resolved        IMPRESSION/RECOMMENDATIONS:      BELLO stage II, probably ischemic ATN 2/2 hypotension in the setting of ARB administration.  Renal function improving with creatinine 2.6 mg/dL with good urine output, continue Lasix  Hyperkalemia 2/2 #1, to give Lasix was given hyperkalemic protocol  -----------------------------  Microcytic anemia, hemoglobin stable at 7.8  MRSA endocarditis, mitral valve and aortic valve endocarditis  STEMI status post LAD stent placement  Septic emboli to the brain  Hepatitis C  Influenza A  Hypoalbuminemia, multifactorial    Plan:    Lasix 60 mg IV x 1 dose  Low potassium diet  Continue to hold losartan  Continue monitor sodium level  Continue to monitor renal function  Strict I's and O's    Electronically signed by CESAR Yoder CNP on 3/2/2025 at 8:59 AM     I saw and evaluated the patient, performing

## 2025-03-02 NOTE — PROGRESS NOTES
Pharmacy Consultation Note  (Antibiotic Dosing and Monitoring)    Initial consult date: 2/18/25  Consulting physician/provider: Jake Bravo DO   Drug: Vancomycin  Indication: Endocarditis/Endovascular     Age/  Gender Height Weight IBW  Allergy Information   45 y.o./male 193 cm (6' 4\") 80.2 kg (176 lb 12.8 oz)     Ideal body weight: 86.8 kg (191 lb 4.5 oz)  Adjusted ideal body weight: 90.7 kg (199 lb 13.8 oz)   Patient has no known allergies.      Renal Function:  Recent Labs     02/28/25  0600 03/01/25  0930 03/02/25  0510   BUN 66* 73* 71*   CREATININE 2.9* 2.7* 2.6*       Intake/Output Summary (Last 24 hours) at 3/2/2025 1033  Last data filed at 3/2/2025 0525  Gross per 24 hour   Intake --   Output 1300 ml   Net -1300 ml       Vancomycin Monitoring:  Trough:    Recent Labs     03/02/25  0100   VANCOTROUGH 31.0*     Random:    Recent Labs     03/02/25  0510   VANCORANDOM 30.6       Vancomycin Administration Times:  Recent vancomycin administrations                     vancomycin (VANCOCIN) 750 mg in sodium chloride 0.9 % 250 mL IVPB ([id number]) (mg) 750 mg New Bag 03/01/25 0825     750 mg New Bag 02/28/25 2114     750 mg New Bag  0926     750 mg New Bag  0013     750 mg New Bag 02/27/25 1111     750 mg New Bag 02/26/25 2156     750 mg New Bag  0859                      Assessment:  Patient is a 45 y.o. male who has been initiated on vancomycin  Estimated Creatinine Clearance: 45 mL/min (A) (based on SCr of 2.6 mg/dL (H)).  To dose vancomycin, pharmacy will be utilizing  trough based dosing.  2/26: Vancomycin trough level last night (drawn ~1 hour early = 14.4 mcg/mL). With increase in SCr to 2.3 today, will continue current dose for now, but may need to switch to q24h dosing if renal function does not improve.   2/27: SCr 2.9 today. Vanco trough this AM = 20.4. ~ 10 hrs post dose. This puts patient on target for 18-20 trough goal. Will need to follow this closely if SCr continues to trend up.  3/2:  Vancomycin  trough of 31 drawn 4 hours after dose (not trough level).  Random level 30.6 today (~ 8 hours post dose).  Consider reducing dose to 1000 mg q 24h once level decreases.    Plan:  Hold vancomycin today  Will check vancomycin level tomorrow with am labs  Will continue to monitor renal function  Pharmacy to follow      Thank you for this consult,    Tyler Mckeon, PharmD  PGY-1 Pharmacy Practice Resident, [phone]  3/2/2025 10:33 AM

## 2025-03-03 LAB
ANION GAP SERPL CALCULATED.3IONS-SCNC: 11 MMOL/L (ref 7–16)
ANION GAP SERPL CALCULATED.3IONS-SCNC: 11 MMOL/L (ref 7–16)
BASOPHILS # BLD: 0.09 K/UL (ref 0–0.2)
BASOPHILS NFR BLD: 1 % (ref 0–2)
BUN SERPL-MCNC: 72 MG/DL (ref 6–20)
BUN SERPL-MCNC: 72 MG/DL (ref 6–20)
CALCIUM SERPL-MCNC: 8.3 MG/DL (ref 8.6–10.2)
CALCIUM SERPL-MCNC: 8.5 MG/DL (ref 8.6–10.2)
CHLORIDE SERPL-SCNC: 102 MMOL/L (ref 98–107)
CHLORIDE SERPL-SCNC: 99 MMOL/L (ref 98–107)
CO2 SERPL-SCNC: 22 MMOL/L (ref 22–29)
CO2 SERPL-SCNC: 23 MMOL/L (ref 22–29)
CREAT SERPL-MCNC: 2.5 MG/DL (ref 0.7–1.2)
CREAT SERPL-MCNC: 2.7 MG/DL (ref 0.7–1.2)
DATE LAST DOSE: NORMAL
EOSINOPHIL # BLD: 0 K/UL (ref 0.05–0.5)
EOSINOPHILS RELATIVE PERCENT: 0 % (ref 0–6)
ERYTHROCYTE [DISTWIDTH] IN BLOOD BY AUTOMATED COUNT: 17.4 % (ref 11.5–15)
GFR, ESTIMATED: 29 ML/MIN/1.73M2
GFR, ESTIMATED: 31 ML/MIN/1.73M2
GLUCOSE BLD-MCNC: 106 MG/DL (ref 74–99)
GLUCOSE BLD-MCNC: 114 MG/DL (ref 74–99)
GLUCOSE BLD-MCNC: 122 MG/DL (ref 74–99)
GLUCOSE BLD-MCNC: 146 MG/DL (ref 74–99)
GLUCOSE BLD-MCNC: 184 MG/DL (ref 74–99)
GLUCOSE SERPL-MCNC: 103 MG/DL (ref 74–99)
GLUCOSE SERPL-MCNC: 119 MG/DL (ref 74–99)
HCT VFR BLD AUTO: 24.3 % (ref 37–54)
HGB BLD-MCNC: 7.9 G/DL (ref 12.5–16.5)
LYMPHOCYTES NFR BLD: 0.74 K/UL (ref 1.5–4)
LYMPHOCYTES RELATIVE PERCENT: 8 % (ref 20–42)
MCH RBC QN AUTO: 25.1 PG (ref 26–35)
MCHC RBC AUTO-ENTMCNC: 32.5 G/DL (ref 32–34.5)
MCV RBC AUTO: 77.1 FL (ref 80–99.9)
MONOCYTES NFR BLD: 0.47 K/UL (ref 0.1–0.95)
MONOCYTES NFR BLD: 5 % (ref 2–12)
NEUTROPHILS NFR BLD: 86 % (ref 43–80)
NEUTS SEG NFR BLD: 8 K/UL (ref 1.8–7.3)
PLATELET # BLD AUTO: 352 K/UL (ref 130–450)
PMV BLD AUTO: 8.9 FL (ref 7–12)
POTASSIUM SERPL-SCNC: 5.5 MMOL/L (ref 3.5–5)
POTASSIUM SERPL-SCNC: 5.8 MMOL/L (ref 3.5–5)
RBC # BLD AUTO: 3.15 M/UL (ref 3.8–5.8)
RBC # BLD: ABNORMAL 10*6/UL
SODIUM SERPL-SCNC: 133 MMOL/L (ref 132–146)
SODIUM SERPL-SCNC: 135 MMOL/L (ref 132–146)
TME LAST DOSE: NORMAL H
VANCOMYCIN DOSE: NORMAL MG
VANCOMYCIN SERPL-MCNC: 18.8 UG/ML (ref 5–40)
WBC OTHER # BLD: 9.3 K/UL (ref 4.5–11.5)

## 2025-03-03 PROCEDURE — 2580000003 HC RX 258

## 2025-03-03 PROCEDURE — 6370000000 HC RX 637 (ALT 250 FOR IP)

## 2025-03-03 PROCEDURE — 2580000003 HC RX 258: Performed by: INTERNAL MEDICINE

## 2025-03-03 PROCEDURE — 6370000000 HC RX 637 (ALT 250 FOR IP): Performed by: INTERNAL MEDICINE

## 2025-03-03 PROCEDURE — 6360000002 HC RX W HCPCS

## 2025-03-03 PROCEDURE — 94660 CPAP INITIATION&MGMT: CPT

## 2025-03-03 PROCEDURE — 2500000003 HC RX 250 WO HCPCS: Performed by: INTERNAL MEDICINE

## 2025-03-03 PROCEDURE — 6360000002 HC RX W HCPCS: Performed by: INTERNAL MEDICINE

## 2025-03-03 PROCEDURE — 80202 ASSAY OF VANCOMYCIN: CPT

## 2025-03-03 PROCEDURE — 82962 GLUCOSE BLOOD TEST: CPT

## 2025-03-03 PROCEDURE — 85025 COMPLETE CBC W/AUTO DIFF WBC: CPT

## 2025-03-03 PROCEDURE — 80048 BASIC METABOLIC PNL TOTAL CA: CPT

## 2025-03-03 PROCEDURE — 2700000000 HC OXYGEN THERAPY PER DAY

## 2025-03-03 PROCEDURE — 2060000000 HC ICU INTERMEDIATE R&B

## 2025-03-03 PROCEDURE — 99232 SBSQ HOSP IP/OBS MODERATE 35: CPT

## 2025-03-03 RX ORDER — GLUCAGON 1 MG/ML
1 KIT INJECTION PRN
Status: DISCONTINUED | OUTPATIENT
Start: 2025-03-03 | End: 2025-03-07 | Stop reason: HOSPADM

## 2025-03-03 RX ORDER — CALCIUM GLUCONATE 20 MG/ML
1000 INJECTION, SOLUTION INTRAVENOUS ONCE
Status: COMPLETED | OUTPATIENT
Start: 2025-03-03 | End: 2025-03-03

## 2025-03-03 RX ORDER — DEXTROSE MONOHYDRATE 100 MG/ML
INJECTION, SOLUTION INTRAVENOUS CONTINUOUS PRN
Status: DISCONTINUED | OUTPATIENT
Start: 2025-03-03 | End: 2025-03-07 | Stop reason: HOSPADM

## 2025-03-03 RX ADMIN — VANCOMYCIN HYDROCHLORIDE 1250 MG: 1.25 INJECTION, POWDER, LYOPHILIZED, FOR SOLUTION INTRAVENOUS at 13:42

## 2025-03-03 RX ADMIN — BUPRENORPHINE HYDROCHLORIDE AND NALOXONE HYDROCHLORIDE DIHYDRATE 2 TABLET: 2; .5 TABLET SUBLINGUAL at 08:52

## 2025-03-03 RX ADMIN — INSULIN HUMAN 10 UNITS: 100 INJECTION, SOLUTION PARENTERAL at 09:44

## 2025-03-03 RX ADMIN — SODIUM ZIRCONIUM CYCLOSILICATE 10 G: 10 POWDER, FOR SUSPENSION ORAL at 09:09

## 2025-03-03 RX ADMIN — CLOPIDOGREL 75 MG: 75 TABLET, FILM COATED ORAL at 08:53

## 2025-03-03 RX ADMIN — BUMETANIDE 0.2 MG/HR: 0.25 INJECTION INTRAMUSCULAR; INTRAVENOUS at 11:31

## 2025-03-03 RX ADMIN — BUPRENORPHINE HYDROCHLORIDE AND NALOXONE HYDROCHLORIDE DIHYDRATE 2 TABLET: 2; .5 TABLET SUBLINGUAL at 13:57

## 2025-03-03 RX ADMIN — CALCIUM GLUCONATE 1000 MG: 20 INJECTION, SOLUTION INTRAVENOUS at 09:07

## 2025-03-03 RX ADMIN — METOPROLOL SUCCINATE 25 MG: 25 TABLET, EXTENDED RELEASE ORAL at 08:49

## 2025-03-03 RX ADMIN — BUPRENORPHINE HYDROCHLORIDE AND NALOXONE HYDROCHLORIDE DIHYDRATE 2 TABLET: 2; .5 TABLET SUBLINGUAL at 20:31

## 2025-03-03 RX ADMIN — ASPIRIN 81 MG CHEWABLE TABLET 81 MG: 81 TABLET CHEWABLE at 08:49

## 2025-03-03 RX ADMIN — OSELTAMIVIR PHOSPHATE 30 MG: 30 CAPSULE ORAL at 20:31

## 2025-03-03 RX ADMIN — ATORVASTATIN CALCIUM 40 MG: 40 TABLET, FILM COATED ORAL at 20:31

## 2025-03-03 RX ADMIN — METOPROLOL SUCCINATE 25 MG: 25 TABLET, EXTENDED RELEASE ORAL at 20:31

## 2025-03-03 RX ADMIN — SODIUM ZIRCONIUM CYCLOSILICATE 10 G: 10 POWDER, FOR SUSPENSION ORAL at 20:31

## 2025-03-03 RX ADMIN — DEXTROSE MONOHYDRATE 250 ML: 100 INJECTION, SOLUTION INTRAVENOUS at 09:47

## 2025-03-03 RX ADMIN — OSELTAMIVIR PHOSPHATE 30 MG: 30 CAPSULE ORAL at 08:53

## 2025-03-03 RX ADMIN — SODIUM CHLORIDE, PRESERVATIVE FREE 10 ML: 5 INJECTION INTRAVENOUS at 20:37

## 2025-03-03 RX ADMIN — SODIUM CHLORIDE, PRESERVATIVE FREE 10 ML: 5 INJECTION INTRAVENOUS at 08:53

## 2025-03-03 RX ADMIN — Medication 3 MG: at 20:31

## 2025-03-03 ASSESSMENT — PAIN - FUNCTIONAL ASSESSMENT: PAIN_FUNCTIONAL_ASSESSMENT: ACTIVITIES ARE NOT PREVENTED

## 2025-03-03 ASSESSMENT — PAIN DESCRIPTION - FREQUENCY: FREQUENCY: CONTINUOUS

## 2025-03-03 ASSESSMENT — PAIN DESCRIPTION - LOCATION
LOCATION: HEAD
LOCATION: EYE

## 2025-03-03 ASSESSMENT — PAIN SCALES - GENERAL
PAINLEVEL_OUTOF10: 7
PAINLEVEL_OUTOF10: 0
PAINLEVEL_OUTOF10: 7

## 2025-03-03 ASSESSMENT — PAIN DESCRIPTION - DESCRIPTORS
DESCRIPTORS: ACHING;SHOOTING;THROBBING
DESCRIPTORS: OTHER (COMMENT)

## 2025-03-03 ASSESSMENT — PAIN DESCRIPTION - ORIENTATION
ORIENTATION: OTHER (COMMENT)
ORIENTATION: MID

## 2025-03-03 ASSESSMENT — PAIN DESCRIPTION - ONSET: ONSET: ON-GOING

## 2025-03-03 ASSESSMENT — PAIN DESCRIPTION - PAIN TYPE: TYPE: ACUTE PAIN

## 2025-03-03 NOTE — PROGRESS NOTES
Attempted to place tripp , upon insertion patient stated he had pain and tripp tip was unable to advance. Assigned RN notified

## 2025-03-03 NOTE — PROGRESS NOTES
Comprehensive Nutrition Assessment    Type and Reason for Visit:  Reassess    Nutrition Recommendations/Plan:   Continue current diet ; K restriction noted  Start nepro BID and samaria BID to further optimize wound healing  Will monitor ; monitor Lytes.     Malnutrition Assessment:  Malnutrition Status:  Insufficient data (03/03/25 0129)    Context:  Acute Illness     Findings of the 6 clinical characteristics of malnutrition:  Energy Intake:  Mild decrease in energy intake  Weight Loss:  Unable to assess (d/t lack of wt hx per EMR)     Body Fat Loss:  Unable to assess (droplet iso)     Muscle Mass Loss:  Unable to assess (droplet iso)    Fluid Accumulation:  No fluid accumulation     Strength:  Not Performed    Nutrition Assessment:    pt adm to [facility] d/t SOB/STEMI/AMS s/p angiogram/stent placement 2/17, CT showed hemorrhage, transf to [facility] for MRSA AV/ MV endocarditis; MRI did not show hemorrhage; PMhx of hep C, IV Drug abuse; s/p SLP eval 2/22, recommend easy to chew solids/thin liquids; pt s/p tunneled CVC placement 2/24; pt s/p dental extraction 2/27; pt w/ influenza A- in droplet iso; will modify ONS to further optimize wound healing/promote oral intake; will monitor.    Nutrition Related Findings:    hyperkalemia; Na WNL; A&Ox4; poor dentition; active BS; +1 edema; I/O WNL Wound Type: Multiple (wounds noted x 4)       Current Nutrition Intake & Therapies:    Average Meal Intake: 51-75%  Average Supplements Intake: %  ADULT DIET; Easy to Chew; Low Fat/Low Chol/High Fiber/SWETHA; No Added Salt (3-4 gm); Low Potassium (Less than 3000 mg/day)  ADULT ORAL NUTRITION SUPPLEMENT; Lunch, Dinner; Renal Oral Supplement  ADULT ORAL NUTRITION SUPPLEMENT; Breakfast, Lunch; Wound Healing Oral Supplement    Anthropometric Measures:  Height: 193 cm (6' 3.98\")  Ideal Body Weight (IBW): 202 lbs (92 kg)       Current Body Weight: 78.9 kg (173 lb 15.1 oz) (2/25-BS/dry wt), 86.1 % IBW. Weight Source: Bed scale  Current BMI

## 2025-03-03 NOTE — PLAN OF CARE
Problem: Discharge Planning  Goal: Discharge to home or other facility with appropriate resources  Outcome: Progressing     Problem: Pain  Goal: Verbalizes/displays adequate comfort level or baseline comfort level  Outcome: Progressing     Problem: Safety - Adult  Goal: Free from fall injury  Outcome: Progressing     Problem: ABCDS Injury Assessment  Goal: Absence of physical injury  Outcome: Progressing     Problem: Skin/Tissue Integrity  Goal: Skin integrity remains intact  Description: 1.  Monitor for areas of redness and/or skin breakdown  2.  Assess vascular access sites hourly  3.  Every 4-6 hours minimum:  Change oxygen saturation probe site  4.  Every 4-6 hours:  If on nasal continuous positive airway pressure, respiratory therapy assess nares and determine need for appliance change or resting period  Outcome: Progressing     Problem: Neurosensory - Adult  Goal: Achieves stable or improved neurological status  Outcome: Progressing  Goal: Absence of seizures  Outcome: Progressing  Goal: Remains free of injury related to seizures activity  Outcome: Progressing  Goal: Achieves maximal functionality and self care  Outcome: Progressing     Problem: Respiratory - Adult  Goal: Achieves optimal ventilation and oxygenation  Outcome: Progressing     Problem: Cardiovascular - Adult  Goal: Maintains optimal cardiac output and hemodynamic stability  Outcome: Progressing  Goal: Absence of cardiac dysrhythmias or at baseline  Outcome: Progressing     Problem: Skin/Tissue Integrity - Adult  Goal: Skin integrity remains intact  Description: 1.  Monitor for areas of redness and/or skin breakdown  2.  Assess vascular access sites hourly  3.  Every 4-6 hours minimum:  Change oxygen saturation probe site  4.  Every 4-6 hours:  If on nasal continuous positive airway pressure, respiratory therapy assess nares and determine need for appliance change or resting period  Outcome: Progressing  Goal: Incisions, wounds, or drain sites

## 2025-03-03 NOTE — DISCHARGE INSTR - COC
Continuity of Care Form    Patient Name: Wagner Ryan   :  1980  MRN:  [id number]    Admit date:  2025  Discharge date:  3/7/2025    Code Status Order: Full Code   Advance Directives:   Advance Care Flowsheet Documentation             Admitting Physician:  John Mckeon MD  PCP: No primary care provider on file.    Discharging Nurse: Laura Last RN  Discharging Hospital Unit/Room#: 7410/7410-A  Discharging Unit Phone Number: [phone]    Emergency Contact:   Extended Emergency Contact Information  Primary Emergency Contact: samm navas  Mobile Phone: [phone]  Relation: Child  Secondary Emergency Contact: Patti Ryan  Home Phone: [phone]  Mobile Phone: [phone]  Relation: Parent   needed? No    Past Surgical History:  Past Surgical History:   Procedure Laterality Date    APPLY DRESSING Left 10/9/2020    DEBRIDEMENT OF SKIN, SOFT TISSUE, MUSCLE, EXPLORATION OF UPPER ARM INTEGRA GRAFT FOR COVERAGE performed by Camilo Denny MD at [facility] OR    APPLY DRESSING Left 10/14/2020    LEFT UPPER EXTREMITY WOUND VAC PLACEMENT performed by Eddie Ruff MD at [facility] OR    CARDIAC PROCEDURE N/A 2025    Left heart cath / coronary angiography performed by Bg Gaytan MD at [facility] CARDIAC CATH/IR    CARDIAC PROCEDURE Left 2025    Percutaneous coronary intervention performed by Bg Gaytan MD at [facility] CARDIAC CATH/IR    INCISION AND DRAINAGE Right 2020    RIGHT UPPER EXTREMITY INCISION AND DRAINAGE REMOVAL FOREIGN BODY WITH C-ARM performed by Kapil Yuan MD at [facility] OR    INCISION AND DRAINAGE Left 10/7/2020    LEFT ELBOW INCISION AND DRAINAGE performed by Kapil Yuan MD at [facility] OR    IR TUNNELED CVC PLACE WO SQ PORT/PUMP > 5 YEARS  2025    IR TUNNELED CVC PLACE WO SQ PORT/PUMP > 5 YEARS 2025 Kapil Ponce MD [facility] SPECIAL PROCEDURES    LEG SURGERY Right 3/6/2022    IRRIGATION AND DEBRIDEMENT RIGHT THIGH performed by Wes    Drainage Amount None (dry) 03/01/25 0909   Odor None 03/01/25 0909   Virgen-wound Assessment Dry/flaky 03/01/25 0909   Number of days: 14       Wound 02/17/25 Leg Left;Proximal;Anterior Scabs (Active)   Wound Cleansed Cleansed with saline 02/18/25 1941   Dressing/Treatment Open to air 03/01/25 0909   Wound Assessment Dry 03/01/25 0909   Drainage Amount None (dry) 03/01/25 0909   Odor None 03/01/25 0909   Virgen-wound Assessment Dry/flaky 03/01/25 0909   Number of days: 14       Wound 02/18/25 Pretibial Left (Active)   Dressing Status Clean;Dry;Intact 03/01/25 0909   Wound Cleansed Cleansed with saline 02/27/25 1620   Dressing/Treatment Open to air 03/01/25 0909   Wound Assessment Purple/maroon;Dry 02/27/25 1620   Drainage Amount None (dry) 02/27/25 1620   Odor None 02/27/25 1620   Virgen-wound Assessment Dry/flaky;Ecchymosis 02/27/25 1620   Number of days: 12       Wound 02/18/25 Groin Left;Proximal (Active)   Dressing Status Clean;Dry;Intact 03/01/25 0909   Wound Cleansed Cleansed with saline 02/18/25 1941   Dressing/Treatment Open to air 03/01/25 0909   Wound Assessment Dry;Purple/maroon 03/01/25 0909   Drainage Amount None (dry) 03/01/25 0909   Odor None 03/01/25 0909   Virgen-wound Assessment Intact;Fragile 03/01/25 0909   Number of days: 12       Incision 03/06/22 Thigh Anterior;Right (Active)   Number of days: 1093        Elimination:  Continence:   Bowel: Yes  Bladder: Yes  Urinary Catheter: None   Colostomy/Ileostomy/Ileal Conduit: No       Date of Last BM: 3/6/2025    Intake/Output Summary (Last 24 hours) at 3/3/2025 1509  Last data filed at 3/3/2025 1344  Gross per 24 hour   Intake 720 ml   Output 2150 ml   Net -1430 ml     I/O last 3 completed shifts:  In: 3456.8 [P.O.:1320; I.V.:255.5; IV Piggyback:1881.4]  Out: 3900 [Urine:3900]    Safety Concerns:     At Risk for Falls and Aspiration Risk    Impairments/Disabilities:      None    Nutrition Therapy:  Current Nutrition Therapy:   - Oral Diet:  Easy to

## 2025-03-03 NOTE — CARE COORDINATION
3/3/25 Update CM Note. Pt admitted 2/18/25 endocarditis. Hx IV drug use. Pt needs AV and MV replacements at some point but unable to be off DAPT, heart cath with OBED placement 2/17/25. Per liaison from [facility] they can accept pt if he is eligibly for Medicaid pending status.  Their team to evaluate.  Kirstin from [employer] also attempting to determine eligibility.   Gabriela OCHOA RN-BC  [phone] 1500 Addendum: [facility] able to accept pt Medicaid pending per their evaluation. Pt agreeable to DC plan.  Destination/[facility] updated.  Ambulette form/envelope on chart. 7000 cpmpleted.

## 2025-03-03 NOTE — PROGRESS NOTES
Department of Internal Medicine  Nephrology Progress Note      Events reviewed     SUBJECTIVE: We are following Mr. Ryan for BELLO.  Reports no complaints.    PHYSICAL EXAM:      Vitals:    VITALS:  BP (!) 168/110   Pulse 100   Temp 96.9 °F (36.1 °C) (Temporal)   Resp 24   Ht 1.93 m (6' 3.98\")   Wt 96.5 kg (212 lb 11.9 oz)   SpO2 99%   BMI 25.91 kg/m²   24HR PULSE OXIMETRY RANGE:  SpO2  Av %  Min: 96 %  Max: 100 %  24HR INTAKE/OUTPUT:    Intake/Output Summary (Last 24 hours) at 3/3/2025 0937  Last data filed at 3/3/2025 0533  Gross per 24 hour   Intake 3216.83 ml   Output 3400 ml   Net -183.17 ml       Constitutional: Patient is awake nonverbal no distress  HEENT: Pupils equal reactive, mucous membranes are dry  Respiratory: Lungs are clear  Cardiovascular/Edema: Heart sounds are regular  Gastrointestinal: Abdomen is soft  Neurologic: Patient is lethargic, nonverbal  Skin: No skin rashes  Other: No edema    Scheduled Meds:   insulin regular  10 Units IntraVENous Once    And    dextrose bolus  250 mL IntraVENous Once    vancomycin (VANCOCIN) intermittent dosing (placeholder)   Other RX Placeholder    oseltamivir  30 mg Oral BID    melatonin  3 mg Oral Nightly    aspirin  81 mg Oral Daily    sodium chloride flush  5-40 mL IntraVENous 2 times per day    lidocaine 1 % injection  50 mg IntraDERmal Once    metoprolol succinate  25 mg Oral BID    nicotine  1 patch TransDERmal Daily    atorvastatin  40 mg Oral Nightly    clopidogrel  75 mg Oral Daily    [Held by provider] losartan  25 mg Oral Daily     Continuous Infusions:   dextrose      dextrose      sodium chloride      sodium chloride      sodium chloride       PRN Meds:.glucose, dextrose bolus **OR** dextrose bolus, glucagon (rDNA), dextrose, glucose, dextrose bolus **OR** dextrose bolus, glucagon (rDNA), dextrose, guaiFENesin, sodium chloride, sodium chloride, sodium chloride, buprenorphine-naloxone, cloNIDine, loperamide, dicyclomine, ipratropium 0.5  MRI is recommended.    XR CHEST PORTABLE [[id number] 2/17/2025]     FINDINGS:  The lungs are without acute focal process.  There is no effusion or  pneumothorax. The cardiomediastinal silhouette is without acute process. The  osseous structures are without acute process.     IMPRESSION:  No acute process.         BRIEF SUMMARY OF INITIAL CONSULT:      Briefly Mr. Ryan is a 45-year-old man with history of hepatitis C, IV drug abuse, multiple episodes of bacteremia, who was initially admitted to [facility] with shortness of breath, he was found to have ST elevation MI, and therefore he underwent urgent cardiac catheterization with his stenting of the LAD, he is well was found to have MRSA bacteremia, JAVED showed mitral valve and tricuspid valve reason for his transfer to this hospital on 2/18/2025.  Of note MRI of the brain showed multiple small foci of acute or early subacute infarctions in the bilateral frontoparietal and occipital lobes.  On admission his sodium level was 130 but since then his sodium has progressively increase up to 152, reason for this consultation.      Problems resolved:  Hyponatremia, seem to be with water deficit due to poor oral intake, improved to 135 today  Hypokalemia, 2/2 poor intake and probably renal potassium wasting, resolved        IMPRESSION/RECOMMENDATIONS:      BELLO stage II, probably ischemic ATN 2/2 hypotension in the setting of ARB administration.  Renal function improving with creatinine 2.6 mg/dL with good urine output, due to hyperkalemia we will start Bumex drip to enhance potassium renal excretion    Hyperkalemia 2/2 #1, lokelma daily, start bumex gtt to enhance renal potassium excretion   -----------------------------  Microcytic anemia, hemoglobin stable at 7.8  MRSA endocarditis, mitral valve and aortic valve endocarditis  STEMI status post LAD stent placement  Septic emboli to the brain  Hepatitis C  Influenza A  Hypoalbuminemia,

## 2025-03-03 NOTE — PROGRESS NOTES
[facility]  [facility]  Progress Note      No chief complaint on file.  Sob and ams    SUBJECTIVE:    Patient is tolerating medications. No reported adverse drug reactions.  No nausea, vomiting, diarrhea. Up in  chair  Review of systems:  As stated above in the chief complaint, otherwise negative.    Medications:  Scheduled Meds:   dextrose bolus  250 mL IntraVENous Once    vancomycin (VANCOCIN) intermittent dosing (placeholder)   Other RX Placeholder    oseltamivir  30 mg Oral BID    melatonin  3 mg Oral Nightly    aspirin  81 mg Oral Daily    sodium chloride flush  5-40 mL IntraVENous 2 times per day    lidocaine 1 % injection  50 mg IntraDERmal Once    metoprolol succinate  25 mg Oral BID    nicotine  1 patch TransDERmal Daily    atorvastatin  40 mg Oral Nightly    clopidogrel  75 mg Oral Daily    [Held by provider] losartan  25 mg Oral Daily     Continuous Infusions:   dextrose      dextrose      sodium chloride      sodium chloride      sodium chloride       PRN Meds:glucose, dextrose bolus **OR** dextrose bolus, glucagon (rDNA), dextrose, glucose, dextrose bolus **OR** dextrose bolus, glucagon (rDNA), dextrose, guaiFENesin, sodium chloride, sodium chloride, sodium chloride, buprenorphine-naloxone, cloNIDine, loperamide, dicyclomine, ipratropium 0.5 mg-albuterol 2.5 mg, sodium chloride flush, sodium chloride, LORazepam, hydrOXYzine pamoate, acetaminophen **OR** acetaminophen, magnesium sulfate, ondansetron **OR** ondansetron, potassium chloride **OR** potassium alternative oral replacement **OR** potassium chloride, Carmex Classic Lip Balm    OBJECTIVE:  BP (!) 168/110   Pulse 100   Temp 96.9 °F (36.1 °C) (Temporal)   Resp 24   Ht 1.93 m (6' 3.98\")   Wt 96.5 kg (212 lb 11.9 oz)   SpO2 99%   BMI 25.91 kg/m²   Temp  Av.5 °F (36.4 °C)  Min: 96.9 °F (36.1 °C)  Max: 98.6 °F (37 °C)  Constitutional: The patient is alert. Up in chair  Skin: Warm and dry. No rashes were noted.  in MRI   History of IVDU, hep C  History of Serratia bacteremia from right thigh abscess  Status post tunneled central line RIJ placed by IR 02/24  Influenza A  Dental extraction 2/27    PLAN:  Continue vancomycin,   Trough 18.8 today  On tamiflu  CT surgery is planning for valve replacement but needs to hold Plavix for 5 to 7 days   Per cardiology note, patient needs to be at least 3 months on Plavix then can be held for valve replacement surgery  Case management working on disposition of the patient, seeing if he qualifies for medicaid  Monitor kidney funciton  ID will continue to follow    CESAR Van - CNP  9:47 AM  3/3/2025

## 2025-03-03 NOTE — PROGRESS NOTES
Pharmacy Consultation Note  (Antibiotic Dosing and Monitoring)    Initial consult date: 2/18/25  Consulting physician/provider: Jake Bravo DO   Drug: Vancomycin  Indication: Endocarditis/Endovascular     Age/  Gender Height Weight IBW  Allergy Information   45 y.o./male 193 cm (6' 4\") 80.2 kg (176 lb 12.8 oz)     Ideal body weight: 86.8 kg (191 lb 4.5 oz)  Adjusted ideal body weight: 90.7 kg (199 lb 13.8 oz)   Patient has no known allergies.      Renal Function:  Recent Labs     03/01/25  0930 03/02/25  0510 03/03/25  0620   BUN 73* 71* 72*   CREATININE 2.7* 2.6* 2.5*       Intake/Output Summary (Last 24 hours) at 3/3/2025 1005  Last data filed at 3/3/2025 0533  Gross per 24 hour   Intake 3216.83 ml   Output 3400 ml   Net -183.17 ml       Vancomycin Monitoring:  Trough:    Recent Labs     03/02/25  0100   VANCOTROUGH 31.0*     Random:    Recent Labs     03/02/25  0510 03/03/25  0620   VANCORANDOM 30.6 18.8       Vancomycin Administration Times:  Recent vancomycin administrations                     vancomycin (VANCOCIN) 750 mg in sodium chloride 0.9 % 250 mL IVPB (Uroe0Neo) (mg) 750 mg New Bag 03/01/25 2105     750 mg New Bag  0825     750 mg New Bag 02/28/25 2114                      Assessment:  Patient is a 45 y.o. male who has been initiated on vancomycin  Estimated Creatinine Clearance: 46 mL/min (A) (based on SCr of 2.5 mg/dL (H)).  To dose vancomycin, pharmacy will be utilizing  trough based dosing.    3/2:  Vancomycin trough of 31 drawn 4 hours after dose (not trough level).  Random level 30.6 today (~ 8 hours post dose).   3/3: Random dose this AM 18.8; SCr 2.5- Restart vancomycin today.    Plan:  Restart vancomycin at 1250 mg x1 (Predicted trough 18mg/L)  Will check vancomycin level tomorrow with am labs to reassess dosing.  Will continue to monitor renal function  Pharmacy to follow      Thank you for this consult,  Eli Srivastava, PharmD, [facility], BCPS 3/3/2025 10:08 AM

## 2025-03-03 NOTE — PLAN OF CARE
Problem: Discharge Planning  Goal: Discharge to home or other facility with appropriate resources  Outcome: Progressing     Problem: Pain  Goal: Verbalizes/displays adequate comfort level or baseline comfort level  Outcome: Progressing     Problem: Safety - Adult  Goal: Free from fall injury  Outcome: Progressing     Problem: ABCDS Injury Assessment  Goal: Absence of physical injury  Outcome: Progressing     Problem: Skin/Tissue Integrity  Goal: Skin integrity remains intact  Description: 1.  Monitor for areas of redness and/or skin breakdown  2.  Assess vascular access sites hourly  3.  Every 4-6 hours minimum:  Change oxygen saturation probe site  4.  Every 4-6 hours:  If on nasal continuous positive airway pressure, respiratory therapy assess nares and determine need for appliance change or resting period  Outcome: Progressing  Flowsheets (Taken 3/3/2025 0800)  Skin Integrity Remains Intact:   Monitor for areas of redness and/or skin breakdown   Assess vascular access sites hourly     Problem: Neurosensory - Adult  Goal: Achieves stable or improved neurological status  Outcome: Progressing  Goal: Absence of seizures  Outcome: Progressing  Goal: Remains free of injury related to seizures activity  Outcome: Progressing  Goal: Achieves maximal functionality and self care  Outcome: Progressing     Problem: Respiratory - Adult  Goal: Achieves optimal ventilation and oxygenation  Outcome: Progressing     Problem: Cardiovascular - Adult  Goal: Maintains optimal cardiac output and hemodynamic stability  Outcome: Progressing  Goal: Absence of cardiac dysrhythmias or at baseline  Outcome: Progressing     Problem: Skin/Tissue Integrity - Adult  Goal: Skin integrity remains intact  Description: 1.  Monitor for areas of redness and/or skin breakdown  2.  Assess vascular access sites hourly  3.  Every 4-6 hours minimum:  Change oxygen saturation probe site  4.  Every 4-6 hours:  If on nasal continuous positive airway  pressure, respiratory therapy assess nares and determine need for appliance change or resting period  Outcome: Progressing  Flowsheets (Taken 3/3/2025 0800)  Skin Integrity Remains Intact:   Monitor for areas of redness and/or skin breakdown   Assess vascular access sites hourly  Goal: Incisions, wounds, or drain sites healing without S/S of infection  Outcome: Progressing     Problem: Musculoskeletal - Adult  Goal: Return mobility to safest level of function  Outcome: Progressing     Problem: Gastrointestinal - Adult  Goal: Minimal or absence of nausea and vomiting  Outcome: Progressing  Goal: Maintains or returns to baseline bowel function  Outcome: Progressing  Goal: Maintains adequate nutritional intake  Outcome: Progressing     Problem: Genitourinary - Adult  Goal: Absence of urinary retention  Outcome: Progressing     Problem: Infection - Adult  Goal: Absence of infection at discharge  Outcome: Progressing  Goal: Absence of infection during hospitalization  Outcome: Progressing     Problem: Metabolic/Fluid and Electrolytes - Adult  Goal: Electrolytes maintained within normal limits  Outcome: Progressing  Goal: Hemodynamic stability and optimal renal function maintained  Outcome: Progressing  Goal: Glucose maintained within prescribed range  Outcome: Progressing     Problem: Hematologic - Adult  Goal: Maintains hematologic stability  Outcome: Progressing     Problem: Chronic Conditions and Co-morbidities  Goal: Patient's chronic conditions and co-morbidity symptoms are monitored and maintained or improved  Outcome: Progressing     Problem: Nutrition Deficit:  Goal: Optimize nutritional status  Outcome: Progressing  Flowsheets (Taken 3/3/2025 1136 by Tracy Cruz, RD, LD)  Nutrient intake appropriate for improving, restoring, or maintaining nutritional needs: Assess nutritional status and recommend course of action

## 2025-03-03 NOTE — CONSULTS
3/3/2025 4:33 PM  Wagner Ryan  [id number]     Chief Complaint:    Urinary retention    History of Present Illness:      The patient is a 45 y.o. male patient who was initially seen at [facility] for altered mental status and [facility].  He underwent a cardiac stent placement on 2/17/2025 and was admitted to MICU following Cath Lab to start broad-spectrum antibiotics.  The patient had a CT of his head that showed intraparenchymal hemorrhage.  He had an MRI of the brain that showed multiple small foci of subacute infarcts consistent with micro emboli.  He was transferred to [facility] for evaluation of his endocarditis.    Urology is asked to evaluate the patient for urinary retention.  He is bladder scan today for 300 cc, 900 cc, and 43 cc.  A [place] catheter was attempted to be placed today but upon insertion the patient had pain in the [place] was unable unable to advance.      Past Medical History:   Diagnosis Date    Hx of hepatitis C-resolved 3/5/2022    IV drug abuse (HCC)     Nonhealing nonsurgical wound with fat layer exposed          Past Surgical History:   Procedure Laterality Date    APPLY DRESSING Left 10/9/2020    DEBRIDEMENT OF SKIN, SOFT TISSUE, MUSCLE, EXPLORATION OF UPPER ARM INTEGRA GRAFT FOR COVERAGE performed [place] [facility] [place]    APPLY DRESSING Left 10/14/2020    LEFT UPPER EXTREMITY WOUND VAC PLACEMENT performed by Eddie Ruff [place] [facility] [place]    CARDIAC PROCEDURE N/A 2/17/2025    Left heart cath / coronary angiography performed by Bg Gaytan MD at [facility] CARDIAC CATH/IR    CARDIAC PROCEDURE Left 2/17/2025    Percutaneous coronary intervention performed by Bg Gaytan MD at [facility] CARDIAC CATH/IR    INCISION AND DRAINAGE Right 4/23/2020    RIGHT UPPER EXTREMITY INCISION AND DRAINAGE REMOVAL FOREIGN BODY WITH C-ARM performed by Kapil Yuan MD at [facility] OR    INCISION AND DRAINAGE Left 10/7/2020    LEFT ELBOW INCISION AND DRAINAGE performed by Kapil

## 2025-03-03 NOTE — PROGRESS NOTES
[facility] Hospitalist Progress Note    Admitting Date and Time: 2/18/2025  7:30 PM  Admit Dx: Endocarditis [I38]    Synopsis:  45 y.o. year old male  who  has a past medical history of Hx of hepatitis C-resolved, IV drug abuse (HCC), and Nonhealing nonsurgical wound with fat layer exposed.      Wagner Ryan is a 45 y.o. male who presents to the CVICU for evaluation by CTS surgery. Patient was being followed at [facility] for AMS and a STEMI and underwent stent placement of the LAD on 2/17.  He was admitted to the MICU following cath lab and started on broad spectrum antibiotics, ASA and Brilinta. During work up for his AMS, a CTA of the head showed tiny area of intraparenchymal hemorrhage in the right occipital lobe w/ MRI of the brain showed multiple small foci of acute and early subacute infarcts in bilateral frontal and parietal occipital lobe left more than right consistent with micro emboli. Neurology was consulted. They cleared the patient to continue on plavix given his recent stent placement.  He underwent an ECHO which was found to have vegetations and the decision was made to transfer to [facility] for evaluation by CTS Surgery.      2/19: Patient seen in CVICU.  Per ICU team and CTS, patient can be transferred as he has no ICU needs.  Transfer order placed.     2/20: Undergoing CTS workup.  Patient continues to have persistent confusion and delirium.  Seems like he was started on Decadron at previous hospital for concern of meningitis and it has not been stopped or weaned.  No mention for continuation of Decadron per ID here and treating for MRSA bacteremia as of now.  Hold Decadron.     2/21: Neurology stating patient has no brain bleed and that cardiology has to decide on DAPT.  Contacted cardiology and stated patient must be on DAPT given recent cardiac stenting and 2/17.  Advised to start aspirin and continue Plavix.  Orders placed.  CTS to be informed.     2/22: Per general surgery, patient  7.9* 7.8* 7.9*   HCT 24.7* 24.2* 24.3*   MCV  --  78.3* 77.1*   MCH  --  25.2* 25.1*   MCHC  --  32.2 32.5   RDW  --  17.5* 17.4*   PLT  --  353 352   MPV  --  9.1 8.9       Radiology: Reviewed    Assessment and plan:    Infective endocarditis of mitral and aortic valves  Acute hypoxic respiratory failure secondary to STEMI, volume overload: Resolved  MRSA bacteremia  STEMI s/p stent placement 2/17  Confusion likely 2/2 septic emboli versus delirium  Anemia and thrombocytopenia likely 2/2 above  IV drug abuse  Hepatitis C  History of Serratia bacteremia 2022  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  Patient was taking Suboxone from the street since last 10 years but not on this admission   BELLO stage II  SOB/volume overload, better after getting IV Lasix  Influenza day positive on 2/27/2025        Plan:  CTS following:  Undergoing preop workup  They are recommending holding Plavix prior to procedure but Cardiology stated on 2/23 patient must remain on DAPT due to recent cardiac stent.  CT surgery mild to valve replacement after completion of DAPT.  Conservative therapy for now due to above  Neurology following  ID following:  Continue vancomycin IV  Repeat blood culture negative till now  Bactroban for nose this colonization twice daily for 5 days    Continue other medications including Lipitor, Plavix, Lopressor, Bactroban.  Holding losartan due to BELLO  Nephrology following.  His creatinine this morning was 2.5, trending down  Placed on COWS protocol for Suboxone withdrawal  IV fluids stop.  Holding losartan.  Nephrology on board.  Received IV Lasix 80 Mg on 2/26/2025,  IV lasix 60 mg on 02/27/25, IV Lasix 60 Mg on 2/28/2025 , 60 Mg on 3/2/2025  Chest x-ray on 2/26/2025 show suspected pneumonia??  But patient feels better after getting IV Lasix  On Tamiflu 30 Mg twice daily for 5 days  Patient had multiple dental extraction

## 2025-03-04 LAB
ANION GAP SERPL CALCULATED.3IONS-SCNC: 10 MMOL/L (ref 7–16)
ANION GAP SERPL CALCULATED.3IONS-SCNC: 14 MMOL/L (ref 7–16)
BASOPHILS # BLD: 0 K/UL (ref 0–0.2)
BASOPHILS NFR BLD: 0 % (ref 0–2)
BNP SERPL-MCNC: ABNORMAL PG/ML (ref 0–125)
BUN SERPL-MCNC: 72 MG/DL (ref 6–20)
BUN SERPL-MCNC: 78 MG/DL (ref 6–20)
C3 SERPL-MCNC: 91 MG/DL (ref 90–180)
C4 SERPL-MCNC: 11 MG/DL (ref 10–40)
CALCIUM SERPL-MCNC: 8.3 MG/DL (ref 8.6–10.2)
CALCIUM SERPL-MCNC: 8.6 MG/DL (ref 8.6–10.2)
CHLORIDE SERPL-SCNC: 101 MMOL/L (ref 98–107)
CHLORIDE SERPL-SCNC: 103 MMOL/L (ref 98–107)
CO2 SERPL-SCNC: 20 MMOL/L (ref 22–29)
CO2 SERPL-SCNC: 25 MMOL/L (ref 22–29)
CREAT SERPL-MCNC: 2.8 MG/DL (ref 0.7–1.2)
CREAT SERPL-MCNC: 2.9 MG/DL (ref 0.7–1.2)
DATE LAST DOSE: NORMAL
EOSINOPHIL # BLD: 0.07 K/UL (ref 0.05–0.5)
EOSINOPHILS RELATIVE PERCENT: 1 % (ref 0–6)
ERYTHROCYTE [DISTWIDTH] IN BLOOD BY AUTOMATED COUNT: 17.6 % (ref 11.5–15)
GFR, ESTIMATED: 27 ML/MIN/1.73M2
GFR, ESTIMATED: 28 ML/MIN/1.73M2
GLUCOSE SERPL-MCNC: 104 MG/DL (ref 74–99)
GLUCOSE SERPL-MCNC: 97 MG/DL (ref 74–99)
HCT VFR BLD AUTO: 22.7 % (ref 37–54)
HGB BLD-MCNC: 7.2 G/DL (ref 12.5–16.5)
LYMPHOCYTES NFR BLD: 1.4 K/UL (ref 1.5–4)
LYMPHOCYTES RELATIVE PERCENT: 19 % (ref 20–42)
MCH RBC QN AUTO: 24.8 PG (ref 26–35)
MCHC RBC AUTO-ENTMCNC: 31.7 G/DL (ref 32–34.5)
MCV RBC AUTO: 78.3 FL (ref 80–99.9)
MONOCYTES NFR BLD: 0.2 K/UL (ref 0.1–0.95)
MONOCYTES NFR BLD: 3 % (ref 2–12)
NEUTROPHILS NFR BLD: 78 % (ref 43–80)
NEUTS SEG NFR BLD: 5.73 K/UL (ref 1.8–7.3)
PLATELET # BLD AUTO: 313 K/UL (ref 130–450)
PMV BLD AUTO: 8.9 FL (ref 7–12)
POTASSIUM SERPL-SCNC: 5.3 MMOL/L (ref 3.5–5)
POTASSIUM SERPL-SCNC: 5.7 MMOL/L (ref 3.5–5)
RBC # BLD AUTO: 2.9 M/UL (ref 3.8–5.8)
RBC # BLD: ABNORMAL 10*6/UL
SODIUM SERPL-SCNC: 136 MMOL/L (ref 132–146)
SODIUM SERPL-SCNC: 137 MMOL/L (ref 132–146)
TME LAST DOSE: NORMAL H
VANCOMYCIN DOSE: NORMAL MG
VANCOMYCIN SERPL-MCNC: 27 UG/ML (ref 5–40)
WBC OTHER # BLD: 7.4 K/UL (ref 4.5–11.5)

## 2025-03-04 PROCEDURE — 86160 COMPLEMENT ANTIGEN: CPT

## 2025-03-04 PROCEDURE — 85025 COMPLETE CBC W/AUTO DIFF WBC: CPT

## 2025-03-04 PROCEDURE — 6370000000 HC RX 637 (ALT 250 FOR IP): Performed by: INTERNAL MEDICINE

## 2025-03-04 PROCEDURE — 2500000003 HC RX 250 WO HCPCS: Performed by: INTERNAL MEDICINE

## 2025-03-04 PROCEDURE — 6370000000 HC RX 637 (ALT 250 FOR IP)

## 2025-03-04 PROCEDURE — 6360000002 HC RX W HCPCS: Performed by: NURSE PRACTITIONER

## 2025-03-04 PROCEDURE — 2060000000 HC ICU INTERMEDIATE R&B

## 2025-03-04 PROCEDURE — 97535 SELF CARE MNGMENT TRAINING: CPT

## 2025-03-04 PROCEDURE — 94660 CPAP INITIATION&MGMT: CPT

## 2025-03-04 PROCEDURE — 97530 THERAPEUTIC ACTIVITIES: CPT

## 2025-03-04 PROCEDURE — 2580000003 HC RX 258: Performed by: INTERNAL MEDICINE

## 2025-03-04 PROCEDURE — 6360000002 HC RX W HCPCS

## 2025-03-04 PROCEDURE — 80048 BASIC METABOLIC PNL TOTAL CA: CPT

## 2025-03-04 PROCEDURE — 80202 ASSAY OF VANCOMYCIN: CPT

## 2025-03-04 PROCEDURE — 99232 SBSQ HOSP IP/OBS MODERATE 35: CPT | Performed by: INTERNAL MEDICINE

## 2025-03-04 PROCEDURE — 83880 ASSAY OF NATRIURETIC PEPTIDE: CPT

## 2025-03-04 PROCEDURE — 2580000003 HC RX 258: Performed by: NURSE PRACTITIONER

## 2025-03-04 PROCEDURE — 2700000000 HC OXYGEN THERAPY PER DAY

## 2025-03-04 RX ORDER — FLUDROCORTISONE ACETATE 0.1 MG/1
0.1 TABLET ORAL DAILY
Status: COMPLETED | OUTPATIENT
Start: 2025-03-04 | End: 2025-03-06

## 2025-03-04 RX ADMIN — ACETAMINOPHEN 650 MG: 325 TABLET ORAL at 06:47

## 2025-03-04 RX ADMIN — CLOPIDOGREL 75 MG: 75 TABLET, FILM COATED ORAL at 08:52

## 2025-03-04 RX ADMIN — HYDROXYZINE PAMOATE 25 MG: 25 CAPSULE ORAL at 20:18

## 2025-03-04 RX ADMIN — METOPROLOL SUCCINATE 25 MG: 25 TABLET, EXTENDED RELEASE ORAL at 20:18

## 2025-03-04 RX ADMIN — SODIUM CHLORIDE, PRESERVATIVE FREE 10 ML: 5 INJECTION INTRAVENOUS at 08:51

## 2025-03-04 RX ADMIN — Medication 3 MG: at 20:18

## 2025-03-04 RX ADMIN — BUPRENORPHINE HYDROCHLORIDE AND NALOXONE HYDROCHLORIDE DIHYDRATE 2 TABLET: 2; .5 TABLET SUBLINGUAL at 17:09

## 2025-03-04 RX ADMIN — ASPIRIN 81 MG CHEWABLE TABLET 81 MG: 81 TABLET CHEWABLE at 08:52

## 2025-03-04 RX ADMIN — SODIUM CHLORIDE 900 MG: 9 INJECTION INTRAMUSCULAR; INTRAVENOUS; SUBCUTANEOUS at 16:50

## 2025-03-04 RX ADMIN — ATORVASTATIN CALCIUM 40 MG: 40 TABLET, FILM COATED ORAL at 20:18

## 2025-03-04 RX ADMIN — FLUDROCORTISONE ACETATE 0.1 MG: 0.1 TABLET ORAL at 12:16

## 2025-03-04 RX ADMIN — SODIUM BICARBONATE: 84 INJECTION, SOLUTION INTRAVENOUS at 23:01

## 2025-03-04 RX ADMIN — SODIUM CHLORIDE, PRESERVATIVE FREE 10 ML: 5 INJECTION INTRAVENOUS at 20:19

## 2025-03-04 RX ADMIN — BUPRENORPHINE HYDROCHLORIDE AND NALOXONE HYDROCHLORIDE DIHYDRATE 2 TABLET: 2; .5 TABLET SUBLINGUAL at 08:51

## 2025-03-04 RX ADMIN — BUPRENORPHINE HYDROCHLORIDE AND NALOXONE HYDROCHLORIDE DIHYDRATE 2 TABLET: 2; .5 TABLET SUBLINGUAL at 22:10

## 2025-03-04 RX ADMIN — OSELTAMIVIR PHOSPHATE 30 MG: 30 CAPSULE ORAL at 08:56

## 2025-03-04 RX ADMIN — METOPROLOL SUCCINATE 25 MG: 25 TABLET, EXTENDED RELEASE ORAL at 08:52

## 2025-03-04 RX ADMIN — BUPRENORPHINE HYDROCHLORIDE AND NALOXONE HYDROCHLORIDE DIHYDRATE 2 TABLET: 2; .5 TABLET SUBLINGUAL at 12:26

## 2025-03-04 RX ADMIN — SODIUM BICARBONATE: 84 INJECTION, SOLUTION INTRAVENOUS at 12:18

## 2025-03-04 RX ADMIN — SODIUM ZIRCONIUM CYCLOSILICATE 10 G: 10 POWDER, FOR SUSPENSION ORAL at 08:52

## 2025-03-04 ASSESSMENT — PAIN SCALES - GENERAL
PAINLEVEL_OUTOF10: 8
PAINLEVEL_OUTOF10: 8
PAINLEVEL_OUTOF10: 7

## 2025-03-04 ASSESSMENT — PAIN DESCRIPTION - DESCRIPTORS: DESCRIPTORS: POUNDING;PRESSURE

## 2025-03-04 ASSESSMENT — PAIN SCALES - WONG BAKER: WONGBAKER_NUMERICALRESPONSE: HURTS WHOLE LOT

## 2025-03-04 ASSESSMENT — PAIN DESCRIPTION - LOCATION: LOCATION: HEAD

## 2025-03-04 NOTE — PROGRESS NOTES
OCCUPATIONAL THERAPY TREATMENT NOTE  [facility] [address] [place], [place]      Date:3/4/2025  Patient Name: Wagner Ryan  MRN: [id number]  : 1980  Room: [address] [place] [address]A     Per OT Eval:    Evaluating OT: Lucia Ellis OTR/L [id number]        Referring Provider:Doni Palm MD     Specific Provider Orders/Date: OT eval and treat 25       Diagnosis: Aortic Valve Endocarditis     Surgery: Angioplasty with Stent 25     Pertinent Medical History:       Past Medical History        Past Medical History:   Diagnosis Date    Hx of hepatitis C-resolved 3/5/2022    IV drug abuse (HCC)      Nonhealing nonsurgical wound with fat layer exposed                    Past Surgical History:   Procedure Laterality Date    APPLY DRESSING Left 10/9/2020     DEBRIDEMENT OF SKIN, SOFT TISSUE, MUSCLE, EXPLORATION OF UPPER ARM INTEGRA GRAFT FOR COVERAGE performed by Camilo Denny MD at [facility] [place] [place]    APPLY DRESSING Left 10/14/2020     LEFT UPPER EXTREMITY WOUND VAC PLACEMENT performed by Eddie Ruff MD at [facility] [place]    CARDIAC PROCEDURE N/A 2025     Left heart cath / coronary angiography performed by Bg Gaytan MD at [facility] CARDIAC CATH/IR    CARDIAC PROCEDURE Left 2025     Percutaneous coronary intervention performed by Bg Gaytan MD at [facility] CARDIAC CATH/IR    INCISION AND DRAINAGE Right 2020     RIGHT UPPER EXTREMITY INCISION AND DRAINAGE REMOVAL FOREIGN BODY WITH C-ARM performed by Kapil Yuan MD at [facility] OR       Precautions:  Fall Risk, droplet isolation, O2     Assessment of current deficits    [x] Functional mobility            [x]ADLs           [x] Strength                  []Cognition    [x] Functional transfers          [x] IADLs         [] Safety Awareness   [x]Endurance    [] Fine Coordination                         [x] Balance      [] Vision/perception   []Sensation      []Gross Motor Coordination             []

## 2025-03-04 NOTE — PROGRESS NOTES
Physical Therapy  Treatment Note    Name: Wagner Ryan  : 1980  MRN: [id number]      Date of Service: 3/4/2025    Evaluating PT:  Dima Cross PT, DPT    Room #:  7410/7410-A  Diagnosis:  Endocarditis [I38]  PMHx/PSHx:  IV drug abuse, Hep C, s/p LAD stent placement   Procedure/Surgery:  s/p tunneled CVC placement   Precautions:  fall risk, O2, droplet, bed/chair alarm, TSM  Equipment Needs:  TBD    SUBJECTIVE:    Pt lives with mother in a 2 story home with 4 steps to enter and 1 handrail.  Bed/bath is on 2nd floor.  Pt ambulated with no AD PTA.    OBJECTIVE:   Initial Evaluation  Date: 25 Treatment  Date: 3/4/25 Short Term/ Long Term   Goals   AM-PAC 6 Clicks     Was pt agreeable to Eval/treatment? yes yes    Does pt have pain? No pain 4/10 BLE    Bed Mobility  Rolling: NT  Supine to sit: SBA  Sit to supine: NT  Scooting: SBA Rolling: NT  Supine to sit: SBA  Sit to supine: NT  Scooting: SBA Rolling: IND  Supine to sit: IND  Sit to supine: IND  Scooting: IND   Transfers Sit to stand: CGA  Stand to sit: CGA  Stand pivot: CGA with ww Sit to stand: CGA  Stand to sit: CGA  Stand pivot: CGA with no AD Sit to stand: mod I  Stand to sit: mod I  Stand pivot: mod I with AAD   Ambulation    20'x2 with ww CGA 20'x4 with no AD '+ with AAD mod I   Stair negotiation: ascended and descended  NT NT 12 steps with 1 handrail mod I     Strength/ROM:   BLE grossly 4/5  BLE AROM WFL    Balance:   Static Sitting: SBA  Dynamic Sitting: SBA  Static Standing: CGA with no AD  Dynamic Standing: CGA with no AD    Pt is A & O x 3  Sensation:  Pt denies numbness and tingling to extremities  Edema:  unremarkable    Vitals:  SpO2 and HR were stable during session    Therapeutic Exercises:    Bed mobility: supine>sit, cued for EOB positioning  Transfers: STS x2, cued for hand placement and postural correction  Ambulation: 20'x4 with no AD  BLE AROM    Patient education  Pt educated on safety with functional  [facility] [id number] -- minutes    Dima Cross, PT, DPT  [id number]

## 2025-03-04 NOTE — PROGRESS NOTES
[facility] Hospitalist Progress Note    Admitting Date and Time: 2/18/2025  7:30 PM  Admit Dx: Endocarditis [I38]    Synopsis:  45 y.o. year old male  who  has a past medical history of Hx of hepatitis C-resolved, IV drug abuse (HCC), and Nonhealing nonsurgical wound with fat layer exposed.      Wagner Ryan is a 45 y.o. male who presents to the CVICU for evaluation by CTS surgery. Patient was being followed at [facility] for AMS and a STEMI and underwent stent placement of the LAD on 2/17.  He was admitted to the MICU following cath lab and started on broad spectrum antibiotics, ASA and Brilinta. During work up for his AMS, a CTA of the head showed tiny area of intraparenchymal hemorrhage in the right occipital lobe w/ MRI of the brain showed multiple small foci of acute and early subacute infarcts in bilateral frontal and parietal occipital lobe left more than right consistent with micro emboli. Neurology was consulted. They cleared the patient to continue on plavix given his recent stent placement.  He underwent an ECHO which was found to have vegetations and the decision was made to transfer to [facility] for evaluation by CTS Surgery.      2/19: Patient seen in CVICU.  Per ICU team and CTS, patient can be transferred as he has no ICU needs.  Transfer order placed.     2/20: Undergoing CTS workup.  Patient continues to have persistent confusion and delirium.  Seems like he was started on Decadron at previous hospital for concern of meningitis and it has not been stopped or weaned.  No mention for continuation of Decadron per ID here and treating for MRSA bacteremia as of now.  Hold Decadron.     2/21: Neurology stating patient has no brain bleed and that cardiology has to decide on DAPT.  Contacted cardiology and stated patient must be on DAPT given recent cardiac stenting and 2/17.  Advised to start aspirin and continue Plavix.  Orders placed.  CTS to be informed.     2/22: Per general surgery, patient

## 2025-03-04 NOTE — PROGRESS NOTES
[facility]  [facility]  Progress Note      No chief complaint on file.  Sob and ams    SUBJECTIVE:    Patient is tolerating medications. No reported adverse drug reactions.  No nausea, vomiting, diarrhea. Seen by urology for retention. No need for tripp at this time. On bumex gtt.    Review of systems:  As stated above in the chief complaint, otherwise negative.    Medications:  Scheduled Meds:   vancomycin (VANCOCIN) intermittent dosing (placeholder)   Other RX Placeholder    melatonin  3 mg Oral Nightly    aspirin  81 mg Oral Daily    sodium chloride flush  5-40 mL IntraVENous 2 times per day    lidocaine 1 % injection  50 mg IntraDERmal Once    metoprolol succinate  25 mg Oral BID    nicotine  1 patch TransDERmal Daily    atorvastatin  40 mg Oral Nightly    clopidogrel  75 mg Oral Daily    [Held by provider] losartan  25 mg Oral Daily     Continuous Infusions:   dextrose      bumetanide (BUMEX) 12.5 mg in sodium chloride 0.9 % 125 mL infusion 0.2 mg/hr (25 1131)    dextrose      sodium chloride      sodium chloride      sodium chloride       PRN Meds:glucose, dextrose bolus **OR** dextrose bolus, glucagon (rDNA), dextrose, glucose, dextrose bolus **OR** dextrose bolus, glucagon (rDNA), dextrose, guaiFENesin, sodium chloride, sodium chloride, sodium chloride, buprenorphine-naloxone, cloNIDine, loperamide, dicyclomine, ipratropium 0.5 mg-albuterol 2.5 mg, sodium chloride flush, sodium chloride, LORazepam, hydrOXYzine pamoate, acetaminophen **OR** acetaminophen, magnesium sulfate, ondansetron **OR** ondansetron, potassium chloride **OR** potassium alternative oral replacement **OR** potassium chloride, Carmex Classic Lip Balm    OBJECTIVE:  BP (!) 165/93   Pulse 98   Temp 99.8 °F (37.7 °C) (Temporal)   Resp 18   Ht 1.93 m (6' 3.98\")   Wt 96.5 kg (212 lb 11.9 oz)   SpO2 96%   BMI 25.91 kg/m²   Temp  Av.4 °F (36.9 °C)  Min: 97 °F (36.1 °C)  Max: 99.8 °F (37.7  bacteremia  MRSA AV, MV endocarditis  Septic emboli to brain with no intraparenchymal hemorrhage in MRI   History of IVDU, hep C  History of Serratia bacteremia from right thigh abscess  Status post tunneled central line RIJ placed by IR 02/24  Influenza A  Dental extraction 2/27    PLAN:  Stop  vancomycin, switch to dapto q 48  Last Trough 18.8  On tamiflu  CT surgery is planning for valve replacement but needs to hold Plavix for 5 to 7 days   Per cardiology note, patient needs to be at least 3 months on Plavix then can be held for valve replacement surgery  Case management working on disposition of the patient, seeing if he qualifies for medicaid  Monitor kidney function  D/c plan snf  ID will continue to follow    CESAR Van - CNP  10:27 AM  3/4/2025

## 2025-03-04 NOTE — PROGRESS NOTES
SUBJECTIVE:    Pt voiding and is comfortable  Creatinine 2.9    OBJECTIVE:    /81   Pulse 98   Temp 97.8 °F (36.6 °C) (Temporal)   Resp 21   Ht 1.93 m (6' 3.98\")   Wt 96.5 kg (212 lb 11.9 oz)   SpO2 95%   BMI 25.91 kg/m²     PHYSICAL EXAMINATION:  Skin: dry, without rashes  Respirations: non-labored, intact  Abdomen: soft, non-tender, non-distended      Lab Results   Component Value Date    WBC 7.4 03/04/2025    HGB 7.2 (L) 03/04/2025    HCT 22.7 (L) 03/04/2025    MCV 78.3 (L) 03/04/2025     03/04/2025       Lab Results   Component Value Date    CREATININE 2.9 (H) 03/04/2025       No results found for: \"PSA\"    REVIEW OF SYSTEMS:    [unfilled]    PAST FAMILY HISTORY:    Family History   Problem Relation Age of Onset    Heart Disease Father      PAST SOCIAL HISTORY:    Social History     Socioeconomic History    Marital status: Single   Tobacco Use    Smoking status: Every Day     Current packs/day: 1.00     Types: Cigarettes    Smokeless tobacco: Current     Types: Chew   Vaping Use    Vaping status: Never Used   Substance and Sexual Activity    Alcohol use: Not Currently    Drug use: Yes     Types: IV     Comment: Suboxone/IV    Sexual activity: Not Currently     Social Determinants of Health     Food Insecurity: Patient Unable To Answer (2/18/2025)    Hunger Vital Sign     Worried About Running Out of Food in the Last Year: Patient unable to answer     Ran Out of Food in the Last Year: Patient unable to answer   Transportation Needs: Patient Unable To Answer (2/18/2025)    PRAPARE - Transportation     Lack of Transportation (Medical): Patient unable to answer     Lack of Transportation (Non-Medical): Patient unable to answer   Housing Stability: Patient Unable To Answer (2/18/2025)    Housing Stability Vital Sign     Unable to Pay for Housing in the Last Year: Patient unable to answer     Number of Times Moved in the  exposed    Abdominal wall abscess    Tobacco dependence    Abscess of chest wall    Osteomyelitis (HCC)    Hx of hepatitis C-resolved    Sinus tachycardia    STEMI (ST elevation myocardial infarction) (HCC)    Sepsis without acute organ dysfunction (HCC)    Thrombocytopenia    Altered mental status    Acute coronary syndrome (HCC)    Septic shock (HCC)    Acute bacterial endocarditis    Aortic valve endocarditis    Cerebrovascular accident (CVA) due to bilateral embolism of middle cerebral arteries (HCC)    Cardiomyopathy (HCC)    Cerebral septic emboli (HCC)    Altered level of consciousness    Acute kidney injury superimposed on stage 2 chronic kidney disease    Influenza A    Iron deficiency anemia    Acute hypoxic respiratory failure (HCC)       PLAN:  Continue without tripp unless uncomfortable      John Rajput MD, M.D.  3/4/2025  12:53 PM

## 2025-03-04 NOTE — PROGRESS NOTES
Department of Internal Medicine  Nephrology Progress Note      Events reviewed     SUBJECTIVE: We are following Mr. Ryan for BELLO.  Reports no complaints.    PHYSICAL EXAM:      Vitals:    VITALS:  /81   Pulse 98   Temp 97.8 °F (36.6 °C) (Temporal)   Resp 21   Ht 1.93 m (6' 3.98\")   Wt 96.5 kg (212 lb 11.9 oz)   SpO2 95%   BMI 25.91 kg/m²   24HR PULSE OXIMETRY RANGE:  SpO2  Av.8 %  Min: 95 %  Max: 97 %  24HR INTAKE/OUTPUT:    Intake/Output Summary (Last 24 hours) at 3/4/2025 1120  Last data filed at 3/4/2025 1058  Gross per 24 hour   Intake --   Output 3043 ml   Net -3043 ml       Constitutional: Patient is awake nonverbal no distress  HEENT: Pupils equal reactive, mucous membranes are dry  Respiratory: Lungs are clear  Cardiovascular/Edema: Heart sounds are regular  Gastrointestinal: Abdomen is soft  Neurologic: Patient is lethargic, nonverbal  Skin: No skin rashes  Other: No edema    Scheduled Meds:   fludrocortisone  0.1 mg Oral Daily    vancomycin (VANCOCIN) intermittent dosing (placeholder)   Other RX Placeholder    melatonin  3 mg Oral Nightly    aspirin  81 mg Oral Daily    sodium chloride flush  5-40 mL IntraVENous 2 times per day    lidocaine 1 % injection  50 mg IntraDERmal Once    metoprolol succinate  25 mg Oral BID    nicotine  1 patch TransDERmal Daily    atorvastatin  40 mg Oral Nightly    clopidogrel  75 mg Oral Daily    [Held by provider] losartan  25 mg Oral Daily     Continuous Infusions:   sodium bicarbonate 75 mEq in dextrose 5 % and 0.45 % NaCl 1,000 mL infusion      dextrose      bumetanide (BUMEX) 12.5 mg in sodium chloride 0.9 % 125 mL infusion 0.2 mg/hr (25 1131)    dextrose      sodium chloride      sodium chloride      sodium chloride       PRN Meds:.glucose, dextrose bolus **OR** dextrose bolus, glucagon (rDNA), dextrose, glucose, dextrose bolus **OR** dextrose bolus, glucagon (rDNA), dextrose, guaiFENesin, sodium chloride, sodium chloride, sodium chloride,

## 2025-03-04 NOTE — PROGRESS NOTES
Vancomycin has been discontinued by ID.  Clinical Pharmacy to sign-off  Physician to re-consult pharmacy if future dosing is needed    Thank you for the consult,  Eli Srivastava, PharmD, [facility], BCPS 3/4/2025 12:49 PM

## 2025-03-04 NOTE — PLAN OF CARE
Problem: Discharge Planning  Goal: Discharge to home or other facility with appropriate resources  3/4/2025 1120 by Karyna Nunez RN  Outcome: Progressing  3/3/2025 2331 by Johnna Marks RN  Outcome: Progressing     Problem: Pain  Goal: Verbalizes/displays adequate comfort level or baseline comfort level  3/4/2025 1120 by Karyna Nunez RN  Outcome: Progressing  3/3/2025 2331 by Johnna Marks RN  Outcome: Progressing     Problem: Safety - Adult  Goal: Free from fall injury  3/4/2025 1120 by Karyna Nunez RN  Outcome: Progressing  3/3/2025 2331 by Johnna Marks RN  Outcome: Progressing     Problem: ABCDS Injury Assessment  Goal: Absence of physical injury  3/4/2025 1120 by Karyna Nunez RN  Outcome: Progressing  Flowsheets (Taken 3/4/2025 1119)  Absence of Physical Injury: Implement safety measures based on patient assessment  3/3/2025 2331 by Johnna Marks RN  Outcome: Progressing     Problem: Skin/Tissue Integrity  Goal: Skin integrity remains intact  Description: 1.  Monitor for areas of redness and/or skin breakdown  2.  Assess vascular access sites hourly  3.  Every 4-6 hours minimum:  Change oxygen saturation probe site  4.  Every 4-6 hours:  If on nasal continuous positive airway pressure, respiratory therapy assess nares and determine need for appliance change or resting period  3/4/2025 1120 by Karyna Nunez RN  Outcome: Progressing  Flowsheets (Taken 3/4/2025 1119)  Skin Integrity Remains Intact: Monitor for areas of redness and/or skin breakdown  3/3/2025 2331 by Johnna Marks RN  Outcome: Progressing     Problem: Neurosensory - Adult  Goal: Achieves stable or improved neurological status  3/4/2025 1120 by Karyna Nunez RN  Outcome: Progressing  3/3/2025 2331 by Johnna Marks RN  Outcome: Progressing  Goal: Absence of seizures  3/4/2025 1120 by Karyna Nunez RN  Outcome: Progressing  3/3/2025 2331 by Johnna Marks RN  Outcome:

## 2025-03-04 NOTE — CARE COORDINATION
3/4/25  Update CM Note. Pt admitted 2/18/25 endocarditis. Hx IV drug use. Pt needs AV and MV replacements at some point but unable to be off DAPT, heart cath with OBED placement 2/17/25.  Pt currently on Bumex gtt. Dc plan for [facility],  [facility] able to accept pt Medicaid pending per their evaluation. Pt agreeable to DC plan. Destination/P[facility]ELOPE updated. Ambulette form/envelope on chart. 0743 completed.   Gabriela GAMINGN RN-BC  [phone]

## 2025-03-04 NOTE — PLAN OF CARE
Problem: Discharge Planning  Goal: Discharge to home or other facility with appropriate resources  3/3/2025 2331 by Johnna Marks RN  Outcome: Progressing     Problem: Pain  Goal: Verbalizes/displays adequate comfort level or baseline comfort level  3/3/2025 2331 by Johnna Marks RN  Outcome: Progressing     Problem: Safety - Adult  Goal: Free from fall injury  3/3/2025 2331 by Johnna Marks RN  Outcome: Progressing     Problem: ABCDS Injury Assessment  Goal: Absence of physical injury  3/3/2025 2331 by Johnna Marks RN  Outcome: Progressing     Problem: Skin/Tissue Integrity  Goal: Skin integrity remains intact  Description: 1.  Monitor for areas of redness and/or skin breakdown  2.  Assess vascular access sites hourly  3.  Every 4-6 hours minimum:  Change oxygen saturation probe site  4.  Every 4-6 hours:  If on nasal continuous positive airway pressure, respiratory therapy assess nares and determine need for appliance change or resting period  3/3/2025 2331 by Johnna Marks RN  Outcome: Progressing     Problem: Neurosensory - Adult  Goal: Achieves stable or improved neurological status  3/3/2025 2331 by Johnna Marks RN  Outcome: Progressing     Problem: Neurosensory - Adult  Goal: Absence of seizures  3/3/2025 2331 by Johnna Marks RN  Outcome: Progressing     Problem: Neurosensory - Adult  Goal: Remains free of injury related to seizures activity  3/3/2025 2331 by Johnna Marks RN  Outcome: Progressing     Problem: Neurosensory - Adult  Goal: Achieves maximal functionality and self care  3/3/2025 2331 by Johnna Marks RN  Outcome: Progressing     Problem: Respiratory - Adult  Goal: Achieves optimal ventilation and oxygenation  3/3/2025 2331 by Johnna Marks RN  Outcome: Progressing     Problem: Cardiovascular - Adult  Goal: Maintains optimal cardiac output and hemodynamic stability  3/3/2025 2331 by Johnna Marks  RN  Outcome: Progressing     Problem: Cardiovascular - Adult  Goal: Absence of cardiac dysrhythmias or at baseline  3/3/2025 1447 by Kenyatta Baum RN  Outcome: Progressing     Problem: Gastrointestinal - Adult  Goal: Minimal or absence of nausea and vomiting  3/3/2025 2331 by Johnna Marks RN  Outcome: Progressing     Problem: Musculoskeletal - Adult  Goal: Return mobility to safest level of function  3/3/2025 2331 by Johnna Marks RN  Outcome: Progressing     Problem: Genitourinary - Adult  Goal: Absence of urinary retention  3/3/2025 2331 by Johnna Marks RN  Outcome: Progressing     Problem: Metabolic/Fluid and Electrolytes - Adult  Goal: Electrolytes maintained within normal limits  3/3/2025 2331 by Johnna Marks RN  Outcome: Progressing     Problem: Metabolic/Fluid and Electrolytes - Adult  Goal: Hemodynamic stability and optimal renal function maintained  3/3/2025 2331 by Johnna Marks RN  Outcome: Progressing     Problem: Metabolic/Fluid and Electrolytes - Adult  Goal: Glucose maintained within prescribed range  3/3/2025 2331 by Johnna Marks RN  Outcome: Progressing     Problem: Hematologic - Adult  Goal: Maintains hematologic stability  3/3/2025 2331 by Johnna Marks RN  Outcome: Progressing     Problem: Chronic Conditions and Co-morbidities  Goal: Patient's chronic conditions and co-morbidity symptoms are monitored and maintained or improved  3/3/2025 2331 by Johnna Marks RN  Outcome: Progressing

## 2025-03-05 LAB
ANION GAP SERPL CALCULATED.3IONS-SCNC: 10 MMOL/L (ref 7–16)
BASOPHILS # BLD: 0.02 K/UL (ref 0–0.2)
BASOPHILS NFR BLD: 0 % (ref 0–2)
BUN SERPL-MCNC: 81 MG/DL (ref 6–20)
CALCIUM SERPL-MCNC: 8.1 MG/DL (ref 8.6–10.2)
CHLORIDE SERPL-SCNC: 96 MMOL/L (ref 98–107)
CO2 SERPL-SCNC: 28 MMOL/L (ref 22–29)
CREAT SERPL-MCNC: 2.9 MG/DL (ref 0.7–1.2)
EOSINOPHIL # BLD: 0.02 K/UL (ref 0.05–0.5)
EOSINOPHILS RELATIVE PERCENT: 0 % (ref 0–6)
ERYTHROCYTE [DISTWIDTH] IN BLOOD BY AUTOMATED COUNT: 17.2 % (ref 11.5–15)
GFR, ESTIMATED: 27 ML/MIN/1.73M2
GLUCOSE SERPL-MCNC: 127 MG/DL (ref 74–99)
HCT VFR BLD AUTO: 23.1 % (ref 37–54)
HGB BLD-MCNC: 7.4 G/DL (ref 12.5–16.5)
IMM GRANULOCYTES # BLD AUTO: 0.05 K/UL (ref 0–0.58)
IMM GRANULOCYTES NFR BLD: 1 % (ref 0–5)
LYMPHOCYTES NFR BLD: 0.62 K/UL (ref 1.5–4)
LYMPHOCYTES RELATIVE PERCENT: 9 % (ref 20–42)
MCH RBC QN AUTO: 24.7 PG (ref 26–35)
MCHC RBC AUTO-ENTMCNC: 32 G/DL (ref 32–34.5)
MCV RBC AUTO: 77 FL (ref 80–99.9)
MONOCYTES NFR BLD: 0.15 K/UL (ref 0.1–0.95)
MONOCYTES NFR BLD: 2 % (ref 2–12)
NEUTROPHILS NFR BLD: 87 % (ref 43–80)
NEUTS SEG NFR BLD: 5.98 K/UL (ref 1.8–7.3)
PLATELET # BLD AUTO: 300 K/UL (ref 130–450)
PMV BLD AUTO: 9.2 FL (ref 7–12)
POTASSIUM SERPL-SCNC: 5.2 MMOL/L (ref 3.5–5)
RBC # BLD AUTO: 3 M/UL (ref 3.8–5.8)
SODIUM SERPL-SCNC: 134 MMOL/L (ref 132–146)
WBC OTHER # BLD: 6.8 K/UL (ref 4.5–11.5)

## 2025-03-05 PROCEDURE — 2060000000 HC ICU INTERMEDIATE R&B

## 2025-03-05 PROCEDURE — 94660 CPAP INITIATION&MGMT: CPT

## 2025-03-05 PROCEDURE — 6370000000 HC RX 637 (ALT 250 FOR IP)

## 2025-03-05 PROCEDURE — 6370000000 HC RX 637 (ALT 250 FOR IP): Performed by: INTERNAL MEDICINE

## 2025-03-05 PROCEDURE — 2500000003 HC RX 250 WO HCPCS: Performed by: INTERNAL MEDICINE

## 2025-03-05 PROCEDURE — 99232 SBSQ HOSP IP/OBS MODERATE 35: CPT | Performed by: INTERNAL MEDICINE

## 2025-03-05 PROCEDURE — 6360000002 HC RX W HCPCS: Performed by: INTERNAL MEDICINE

## 2025-03-05 PROCEDURE — 80048 BASIC METABOLIC PNL TOTAL CA: CPT

## 2025-03-05 PROCEDURE — 85025 COMPLETE CBC W/AUTO DIFF WBC: CPT

## 2025-03-05 PROCEDURE — 2580000003 HC RX 258: Performed by: INTERNAL MEDICINE

## 2025-03-05 PROCEDURE — 6360000002 HC RX W HCPCS

## 2025-03-05 PROCEDURE — 2700000000 HC OXYGEN THERAPY PER DAY

## 2025-03-05 RX ORDER — METOPROLOL SUCCINATE 50 MG/1
50 TABLET, EXTENDED RELEASE ORAL 2 TIMES DAILY
Status: DISCONTINUED | OUTPATIENT
Start: 2025-03-05 | End: 2025-03-07 | Stop reason: HOSPADM

## 2025-03-05 RX ORDER — DEXTROSE MONOHYDRATE AND SODIUM CHLORIDE 5; .9 G/100ML; G/100ML
INJECTION, SOLUTION INTRAVENOUS CONTINUOUS
Status: DISCONTINUED | OUTPATIENT
Start: 2025-03-05 | End: 2025-03-06

## 2025-03-05 RX ORDER — DIPHENHYDRAMINE HYDROCHLORIDE 50 MG/ML
25 INJECTION INTRAMUSCULAR; INTRAVENOUS EVERY 6 HOURS PRN
Status: DISCONTINUED | OUTPATIENT
Start: 2025-03-05 | End: 2025-03-07 | Stop reason: HOSPADM

## 2025-03-05 RX ADMIN — WATER 40 MG: 1 INJECTION INTRAMUSCULAR; INTRAVENOUS; SUBCUTANEOUS at 12:48

## 2025-03-05 RX ADMIN — BUPRENORPHINE HYDROCHLORIDE AND NALOXONE HYDROCHLORIDE DIHYDRATE 2 TABLET: 2; .5 TABLET SUBLINGUAL at 17:38

## 2025-03-05 RX ADMIN — SODIUM ZIRCONIUM CYCLOSILICATE 10 G: 10 POWDER, FOR SUSPENSION ORAL at 14:47

## 2025-03-05 RX ADMIN — METOPROLOL SUCCINATE ER TABLETS 50 MG: 50 TABLET, FILM COATED, EXTENDED RELEASE ORAL at 21:33

## 2025-03-05 RX ADMIN — ATORVASTATIN CALCIUM 40 MG: 40 TABLET, FILM COATED ORAL at 21:33

## 2025-03-05 RX ADMIN — WATER 40 MG: 1 INJECTION INTRAMUSCULAR; INTRAVENOUS; SUBCUTANEOUS at 21:33

## 2025-03-05 RX ADMIN — Medication 3 MG: at 21:33

## 2025-03-05 RX ADMIN — DEXTROSE AND SODIUM CHLORIDE: 5; .9 INJECTION, SOLUTION INTRAVENOUS at 21:35

## 2025-03-05 RX ADMIN — BUPRENORPHINE HYDROCHLORIDE AND NALOXONE HYDROCHLORIDE DIHYDRATE 2 TABLET: 2; .5 TABLET SUBLINGUAL at 09:07

## 2025-03-05 RX ADMIN — CLOPIDOGREL 75 MG: 75 TABLET, FILM COATED ORAL at 09:03

## 2025-03-05 RX ADMIN — FLUDROCORTISONE ACETATE 0.1 MG: 0.1 TABLET ORAL at 14:47

## 2025-03-05 RX ADMIN — BUPRENORPHINE HYDROCHLORIDE AND NALOXONE HYDROCHLORIDE DIHYDRATE 2 TABLET: 2; .5 TABLET SUBLINGUAL at 21:33

## 2025-03-05 RX ADMIN — SODIUM CHLORIDE, PRESERVATIVE FREE 10 ML: 5 INJECTION INTRAVENOUS at 14:47

## 2025-03-05 RX ADMIN — DIPHENHYDRAMINE HYDROCHLORIDE 25 MG: 50 INJECTION, SOLUTION INTRAMUSCULAR; INTRAVENOUS at 12:49

## 2025-03-05 RX ADMIN — BUMETANIDE 0.5 MG/HR: 0.25 INJECTION INTRAMUSCULAR; INTRAVENOUS at 02:50

## 2025-03-05 RX ADMIN — ASPIRIN 81 MG CHEWABLE TABLET 81 MG: 81 TABLET CHEWABLE at 09:03

## 2025-03-05 RX ADMIN — METOPROLOL SUCCINATE ER TABLETS 50 MG: 50 TABLET, FILM COATED, EXTENDED RELEASE ORAL at 09:03

## 2025-03-05 ASSESSMENT — PAIN SCALES - GENERAL
PAINLEVEL_OUTOF10: 6
PAINLEVEL_OUTOF10: 6

## 2025-03-05 ASSESSMENT — PAIN DESCRIPTION - DESCRIPTORS: DESCRIPTORS: ACHING;DISCOMFORT;POUNDING

## 2025-03-05 ASSESSMENT — PAIN - FUNCTIONAL ASSESSMENT: PAIN_FUNCTIONAL_ASSESSMENT: ACTIVITIES ARE NOT PREVENTED

## 2025-03-05 ASSESSMENT — PAIN DESCRIPTION - LOCATION: LOCATION: HEAD

## 2025-03-05 NOTE — PROGRESS NOTES
Department of Internal Medicine  Nephrology Progress Note      Events reviewed       SUBJECTIVE: We are following Mr. Ryan for BELLO.  Reports skin rash.    PHYSICAL EXAM:      Vitals:    VITALS:  /79   Pulse 100   Temp 97.9 °F (36.6 °C) (Temporal)   Resp 19   Ht 1.93 m (6' 3.98\")   Wt 96.5 kg (212 lb 11.9 oz)   SpO2 100%   BMI 25.91 kg/m²   24HR PULSE OXIMETRY RANGE:  SpO2  Av.3 %  Min: 94 %  Max: 100 %  24HR INTAKE/OUTPUT:    Intake/Output Summary (Last 24 hours) at 3/5/2025 1019  Last data filed at 3/5/2025 0638  Gross per 24 hour   Intake 612.44 ml   Output 3325 ml   Net -2712.56 ml       Constitutional: Patient is awake nonverbal no distress  HEENT: Pupils equal reactive, mucous membranes are dry  Respiratory: Lungs are clear  Cardiovascular/Edema: Heart sounds are regular  Gastrointestinal: Abdomen is soft  Neurologic: Patient is lethargic, nonverbal  Skin: + Skin rash in the upper chest  Other:  trace edema    Scheduled Meds:   metoprolol succinate  50 mg Oral BID    fludrocortisone  0.1 mg Oral Daily    DAPTOmycin (CUBICIN) 900 mg in sodium chloride (PF) 0.9 % 18 mL IV syringe  10 mg/kg (Adjusted) IntraVENous Q48H    melatonin  3 mg Oral Nightly    aspirin  81 mg Oral Daily    sodium chloride flush  5-40 mL IntraVENous 2 times per day    lidocaine 1 % injection  50 mg IntraDERmal Once    nicotine  1 patch TransDERmal Daily    atorvastatin  40 mg Oral Nightly    clopidogrel  75 mg Oral Daily    [Held by provider] losartan  25 mg Oral Daily     Continuous Infusions:   dextrose      bumetanide (BUMEX) 12.5 mg in sodium chloride 0.9 % 125 mL infusion 0.5 mg/hr (25 0638)    dextrose      sodium chloride      sodium chloride      sodium chloride       PRN Meds:.glucose, dextrose bolus **OR** dextrose bolus, glucagon (rDNA), dextrose, glucose, dextrose bolus **OR** dextrose bolus, glucagon (rDNA), dextrose, guaiFENesin, sodium chloride, sodium chloride, sodium chloride,  infarctions.  If there a history  of malignancy, follow-up with contrast enhanced MRI is recommended.    XR CHEST PORTABLE [[id number] 2/17/2025]     FINDINGS:  The lungs are without acute focal process.  There is no effusion or  pneumothorax. The cardiomediastinal silhouette is without acute process. The  osseous structures are without acute process.     IMPRESSION:  No acute process.         BRIEF SUMMARY OF INITIAL CONSULT:      Briefly Mr. Ryan is a 45-year-old man with history of hepatitis C, IV drug abuse, multiple episodes of bacteremia, who was initially admitted to [facility] with shortness of breath, he was found to have ST elevation MI, and therefore he underwent urgent cardiac catheterization with his stenting of the LAD, he is well was found to have MRSA bacteremia, JAVED showed mitral valve and tricuspid valve reason for his transfer to this hospital on 2/18/2025.  Of note MRI of the brain showed multiple small foci of acute or early subacute infarctions in the bilateral frontoparietal and occipital lobes.  On admission his sodium level was 130 but since then his sodium has progressively increase up to 152, reason for this consultation.      Problems resolved:  Hyponatremia, seem to be with water deficit due to poor oral intake, improved to 135 today  Hypokalemia, 2/2 poor intake and probably renal potassium wasting, resolved        IMPRESSION/RECOMMENDATIONS:      BELLO stage II, probably ischemic ATN 2/2 hypotension in the setting of ARB administration versus GN associated endocarditis?.  Renal function is stable, creatinine leveling off, 2.8 mg/dL with large urine output in response to Bumex drip.    Hyperkalemia 2/2 #1, potassium level improved with Bumex drip, Lokelma and IV fluids.  Microcytic anemia, hemoglobin stable   -----------------------------    MRSA endocarditis, mitral valve and aortic valve endocarditis  STEMI status post LAD stent placement  Septic emboli to the

## 2025-03-05 NOTE — PROGRESS NOTES
[facility]  [facility]  Progress Note      No chief complaint on file.  Sob and ams    SUBJECTIVE:    Patient is tolerating medications. No reported adverse drug reactions.  No nausea, vomiting, diarrhea. Seen by urology for retention. No need for tripp at this time. On bumex gtt.    Review of systems:  As stated above in the chief complaint, otherwise negative.    Medications:  Scheduled Meds:   metoprolol succinate  50 mg Oral BID    methylPREDNISolone  40 mg IntraVENous Q8H    sodium zirconium cyclosilicate  10 g Oral Daily    fludrocortisone  0.1 mg Oral Daily    DAPTOmycin (CUBICIN) 900 mg in sodium chloride (PF) 0.9 % 18 mL IV syringe  10 mg/kg (Adjusted) IntraVENous Q48H    melatonin  3 mg Oral Nightly    aspirin  81 mg Oral Daily    sodium chloride flush  5-40 mL IntraVENous 2 times per day    lidocaine 1 % injection  50 mg IntraDERmal Once    nicotine  1 patch TransDERmal Daily    atorvastatin  40 mg Oral Nightly    clopidogrel  75 mg Oral Daily    [Held by provider] losartan  25 mg Oral Daily     Continuous Infusions:   dextrose      bumetanide (BUMEX) 12.5 mg in sodium chloride 0.9 % 125 mL infusion 0.5 mg/hr (03/05/25 0638)    dextrose      sodium chloride      sodium chloride      sodium chloride       PRN Meds:diphenhydrAMINE, glucose, dextrose bolus **OR** dextrose bolus, glucagon (rDNA), dextrose, glucose, dextrose bolus **OR** dextrose bolus, glucagon (rDNA), dextrose, guaiFENesin, sodium chloride, sodium chloride, sodium chloride, buprenorphine-naloxone, cloNIDine, loperamide, dicyclomine, ipratropium 0.5 mg-albuterol 2.5 mg, sodium chloride flush, sodium chloride, LORazepam, hydrOXYzine pamoate, acetaminophen **OR** acetaminophen, magnesium sulfate, ondansetron **OR** ondansetron, potassium chloride **OR** potassium alternative oral replacement **OR** potassium chloride, Carmex Classic Lip Balm    OBJECTIVE:  BP (!) 131/93   Pulse 95   Temp 97.5 °F (36.4 °C)   Resp  Methicillin resistant Staph aureus not isolated  Final   repeat blood cultures 2/25 neg thus far  sputum cx pending   procal 21  urine antigen neg    ASSESSMENT:  MRSA bacteremia  MRSA AV, MV endocarditis  Septic emboli to brain with no intraparenchymal hemorrhage in MRI   History of IVDU, hep C  History of Serratia bacteremia from right thigh abscess  Status post tunneled central line RIJ placed by IR 02/24  Influenza A  Dental extraction 2/27  Rashes on the arm and torso which are improving    PLAN:  Continue daptomycin, if rashes worsen we will consider switching otherwise patient has very limited choice of antibiotics in this situation.  Continue Solu-Medrol and Benadryl as ordered  Last Trough 18.8  On tamiflu  CT surgery is planning for valve replacement but needs to hold Plavix for 5 to 7 days   Per cardiology note, patient needs to be at least 3 months on Plavix then can be held for valve replacement surgery  Case management working on disposition of the patient, seeing if he qualifies for medicaid  Monitor kidney function  D/c plan snf  ID will continue to follow    Db Frazier MD  2:55 PM  3/5/2025

## 2025-03-05 NOTE — CARE COORDINATION
3/5/24 Update CM Note. Pt admitted 2/18/25 endocarditis. Hx IV drug use. Pt needs AV and MV replacements at some point but unable to be off DAPT, heart cath with OBED placement 2/17/25. Still on Bumex gtt. Development of new rash overnight. Dc plan for [facility],  [facility] able to accept pt Medicaid pending.  Pt agreeable to DC plan. Destination/[facility]EL[facility] updated. Ambulette form/envelope on chart. 7739 completed.   Gabriela GAMINGN RN-BC  [phone]

## 2025-03-05 NOTE — PLAN OF CARE
Problem: Discharge Planning  Goal: Discharge to home or other facility with appropriate resources  3/4/2025 2235 by Phyllis Giraldo RN  Outcome: Progressing  3/4/2025 1120 by Karyna Nunez RN  Outcome: Progressing     Problem: Pain  Goal: Verbalizes/displays adequate comfort level or baseline comfort level  3/4/2025 2235 by Phyllis Giraldo RN  Outcome: Progressing  3/4/2025 1120 by Karyna Nunez RN  Outcome: Progressing     Problem: Safety - Adult  Goal: Free from fall injury  3/4/2025 2235 by Phyllis Giraldo RN  Outcome: Progressing  3/4/2025 1120 by Karyna Nunez RN  Outcome: Progressing     Problem: ABCDS Injury Assessment  Goal: Absence of physical injury  3/4/2025 2235 by Phyllis Giraldo RN  Outcome: Progressing  3/4/2025 1120 by Karyna Nunez RN  Outcome: Progressing  Flowsheets (Taken 3/4/2025 1119)  Absence of Physical Injury: Implement safety measures based on patient assessment     Problem: Skin/Tissue Integrity  Goal: Skin integrity remains intact  Description: 1.  Monitor for areas of redness and/or skin breakdown  2.  Assess vascular access sites hourly  3.  Every 4-6 hours minimum:  Change oxygen saturation probe site  4.  Every 4-6 hours:  If on nasal continuous positive airway pressure, respiratory therapy assess nares and determine need for appliance change or resting period  3/4/2025 2235 by Phyllis Giraldo RN  Outcome: Progressing  3/4/2025 1120 by Karyna Nunez RN  Outcome: Progressing  Flowsheets (Taken 3/4/2025 1119)  Skin Integrity Remains Intact: Monitor for areas of redness and/or skin breakdown     Problem: Neurosensory - Adult  Goal: Achieves stable or improved neurological status  3/4/2025 2235 by Phyllis Giraldo RN  Outcome: Progressing  3/4/2025 1120 by Karyna Nunez RN  Outcome: Progressing  Goal: Absence of seizures  3/4/2025 2235 by Phyllis Giraldo RN  Outcome: Progressing  3/4/2025 1120 by Karyna Nunez RN  Outcome:  Progressing     Problem: Hematologic - Adult  Goal: Maintains hematologic stability  3/4/2025 2235 by Phyllis Giraldo RN  Outcome: Progressing  3/4/2025 1120 by Karyna Nunez RN  Outcome: Progressing     Problem: Chronic Conditions and Co-morbidities  Goal: Patient's chronic conditions and co-morbidity symptoms are monitored and maintained or improved  3/4/2025 2235 by Phyllis Giraldo RN  Outcome: Progressing  3/4/2025 1120 by Karyna Nunez RN  Outcome: Progressing     Problem: Nutrition Deficit:  Goal: Optimize nutritional status  3/4/2025 2235 by Phyllis Giraldo RN  Outcome: Progressing  3/4/2025 1120 by Karyna Nunez RN  Outcome: Progressing

## 2025-03-05 NOTE — PROGRESS NOTES
[facility] Hospitalist Progress Note    Admitting Date and Time: 2/18/2025  7:30 PM  Admit Dx: Endocarditis [I38]    Synopsis:  45 y.o. year old male  who  has a past medical history of Hx of hepatitis C-resolved, IV drug abuse (HCC), and Nonhealing nonsurgical wound with fat layer exposed.      Wagner Ryan is a 45 y.o. male who presents to the CVICU for evaluation by CTS surgery. Patient was being followed at [facility] for AMS and a STEMI and underwent stent placement of the LAD on 2/17.  He was admitted to the MICU following cath lab and started on broad spectrum antibiotics, ASA and Brilinta. During work up for his AMS, a CTA of the head showed tiny area of intraparenchymal hemorrhage in the right occipital lobe w/ MRI of the brain showed multiple small foci of acute and early subacute infarcts in bilateral frontal and parietal occipital lobe left more than right consistent with micro emboli. Neurology was consulted. They cleared the patient to continue on plavix given his recent stent placement.  He underwent an ECHO which was found to have vegetations and the decision was made to transfer to [facility] for evaluation by CTS Surgery.      2/19: Patient seen in CVICU.  Per ICU team and CTS, patient can be transferred as he has no ICU needs.  Transfer order placed.     2/20: Undergoing CTS workup.  Patient continues to have persistent confusion and delirium.  Seems like he was started on Decadron at previous hospital for concern of meningitis and it has not been stopped or weaned.  No mention for continuation of Decadron per ID here and treating for MRSA bacteremia as of now.  Hold Decadron.     2/21: Neurology stating patient has no brain bleed and that cardiology has to decide on DAPT.  Contacted cardiology and stated patient must be on DAPT given recent cardiac stenting and 2/17.  Advised to start aspirin and continue Plavix.  Orders placed.  CTS to be informed.     2/22: Per general surgery, patient  PRN   Or  dextrose bolus, 250 mL, PRN  glucagon (rDNA), 1 mg, PRN  dextrose, , Continuous PRN  guaiFENesin, 400 mg, 4x Daily PRN  sodium chloride, , PRN  sodium chloride, 1 spray, Q2H PRN  sodium chloride, , PRN  buprenorphine-naloxone, 2 tablet, PRN  cloNIDine, 0.1 mg, Q6H PRN  loperamide, 2 mg, 4x Daily PRN  dicyclomine, 20 mg, Q6H PRN  ipratropium 0.5 mg-albuterol 2.5 mg, 1 Dose, Q4H PRN  sodium chloride flush, 5-40 mL, PRN  sodium chloride, , PRN  LORazepam, 0.5 mg, Q6H PRN  hydrOXYzine pamoate, 25 mg, TID PRN  acetaminophen, 650 mg, Q6H PRN   Or  acetaminophen, 650 mg, Q6H PRN  magnesium sulfate, 2,000 mg, PRN  ondansetron, 4 mg, Q8H PRN   Or  ondansetron, 4 mg, Q6H PRN  potassium chloride, 40 mEq, PRN   Or  potassium alternative oral replacement, 40 mEq, PRN   Or  potassium chloride, 10 mEq, PRN  Carmex Classic Lip Balm, 1 Application, PRN         Objective:    /79   Pulse 100   Temp 97.9 °F (36.6 °C) (Temporal)   Resp 19   Ht 1.93 m (6' 3.98\")   Wt 96.5 kg (212 lb 11.9 oz)   SpO2 100%   BMI 25.91 kg/m²     General Appearance: alert and oriented to person, place and time and in no acute distress  Skin: warm and dry  Head: normocephalic and atraumatic  Eyes: pupils equal, round, and reactive to light, extraocular eye movements intact, conjunctivae normal  Neck: neck supple and non tender without mass   Pulmonary/Chest: Bilateral bibasilar fine crackles present, normal air movement, no respiratory distress  Cardiovascular: normal rate, normal S1 and S2 and no carotid bruits  Abdomen: soft, non-tender, non-distended, normal bowel sounds, no masses or organomegaly  Extremities: (+) bilateral pitting edema no cyanosis, no clubbing and no edema  Neurologic: no cranial nerve deficit and speech normal      Recent Labs     03/03/25  1610 03/04/25  0600 03/04/25  1619    137 136   K 5.5* 5.7* 5.3*   CL 99 103 101   CO2 23 20* 25   BUN 72* 72* 78*   CREATININE 2.7* 2.9* 2.8*   GLUCOSE 119* 97 104*

## 2025-03-05 NOTE — PLAN OF CARE
Problem: Discharge Planning  Goal: Discharge to home or other facility with appropriate resources  Outcome: Progressing     Problem: Pain  Goal: Verbalizes/displays adequate comfort level or baseline comfort level  Outcome: Progressing     Problem: Safety - Adult  Goal: Free from fall injury  Outcome: Progressing     Problem: ABCDS Injury Assessment  Goal: Absence of physical injury  Outcome: Progressing     Problem: Skin/Tissue Integrity  Goal: Skin integrity remains intact  Description: 1.  Monitor for areas of redness and/or skin breakdown  2.  Assess vascular access sites hourly  3.  Every 4-6 hours minimum:  Change oxygen saturation probe site  4.  Every 4-6 hours:  If on nasal continuous positive airway pressure, respiratory therapy assess nares and determine need for appliance change or resting period  Outcome: Progressing     Problem: Neurosensory - Adult  Goal: Achieves stable or improved neurological status  Outcome: Progressing  Goal: Absence of seizures  Outcome: Progressing  Goal: Remains free of injury related to seizures activity  Outcome: Progressing  Goal: Achieves maximal functionality and self care  Outcome: Progressing     Problem: Respiratory - Adult  Goal: Achieves optimal ventilation and oxygenation  Outcome: Progressing     Problem: Cardiovascular - Adult  Goal: Maintains optimal cardiac output and hemodynamic stability  Outcome: Progressing  Goal: Absence of cardiac dysrhythmias or at baseline  Outcome: Progressing     Problem: Skin/Tissue Integrity - Adult  Goal: Skin integrity remains intact  Description: 1.  Monitor for areas of redness and/or skin breakdown  2.  Assess vascular access sites hourly  3.  Every 4-6 hours minimum:  Change oxygen saturation probe site  4.  Every 4-6 hours:  If on nasal continuous positive airway pressure, respiratory therapy assess nares and determine need for appliance change or resting period  Outcome: Progressing  Goal: Incisions, wounds, or drain sites  healing without S/S of infection  Outcome: Progressing     Problem: Musculoskeletal - Adult  Goal: Return mobility to safest level of function  Outcome: Progressing     Problem: Gastrointestinal - Adult  Goal: Minimal or absence of nausea and vomiting  Outcome: Progressing  Goal: Maintains or returns to baseline bowel function  Outcome: Progressing  Goal: Maintains adequate nutritional intake  Outcome: Progressing     Problem: Genitourinary - Adult  Goal: Absence of urinary retention  Outcome: Progressing     Problem: Infection - Adult  Goal: Absence of infection at discharge  Outcome: Progressing  Goal: Absence of infection during hospitalization  Outcome: Progressing     Problem: Metabolic/Fluid and Electrolytes - Adult  Goal: Electrolytes maintained within normal limits  Outcome: Progressing  Goal: Hemodynamic stability and optimal renal function maintained  Outcome: Progressing  Goal: Glucose maintained within prescribed range  Outcome: Progressing     Problem: Hematologic - Adult  Goal: Maintains hematologic stability  Outcome: Progressing     Problem: Chronic Conditions and Co-morbidities  Goal: Patient's chronic conditions and co-morbidity symptoms are monitored and maintained or improved  Outcome: Progressing     Problem: Nutrition Deficit:  Goal: Optimize nutritional status  Outcome: Progressing

## 2025-03-06 LAB
ANION GAP SERPL CALCULATED.3IONS-SCNC: 14 MMOL/L (ref 7–16)
ANION GAP SERPL CALCULATED.3IONS-SCNC: 16 MMOL/L (ref 7–16)
BASOPHILS # BLD: 0 K/UL (ref 0–0.2)
BASOPHILS NFR BLD: 0 % (ref 0–2)
BUN SERPL-MCNC: 87 MG/DL (ref 6–20)
BUN SERPL-MCNC: 95 MG/DL (ref 6–20)
CALCIUM SERPL-MCNC: 8.2 MG/DL (ref 8.6–10.2)
CALCIUM SERPL-MCNC: 8.3 MG/DL (ref 8.6–10.2)
CHLORIDE SERPL-SCNC: 95 MMOL/L (ref 98–107)
CHLORIDE SERPL-SCNC: 96 MMOL/L (ref 98–107)
CO2 SERPL-SCNC: 24 MMOL/L (ref 22–29)
CO2 SERPL-SCNC: 24 MMOL/L (ref 22–29)
CREAT SERPL-MCNC: 2.8 MG/DL (ref 0.7–1.2)
CREAT SERPL-MCNC: 2.9 MG/DL (ref 0.7–1.2)
EOSINOPHIL # BLD: 0 K/UL (ref 0.05–0.5)
EOSINOPHILS RELATIVE PERCENT: 0 % (ref 0–6)
ERYTHROCYTE [DISTWIDTH] IN BLOOD BY AUTOMATED COUNT: 16.7 % (ref 11.5–15)
GFR, ESTIMATED: 26 ML/MIN/1.73M2
GFR, ESTIMATED: 28 ML/MIN/1.73M2
GLUCOSE SERPL-MCNC: 145 MG/DL (ref 74–99)
GLUCOSE SERPL-MCNC: 157 MG/DL (ref 74–99)
HCT VFR BLD AUTO: 22.5 % (ref 37–54)
HGB BLD-MCNC: 7.3 G/DL (ref 12.5–16.5)
IMM GRANULOCYTES # BLD AUTO: <0.03 K/UL (ref 0–0.58)
IMM GRANULOCYTES NFR BLD: 0 % (ref 0–5)
LYMPHOCYTES NFR BLD: 0.74 K/UL (ref 1.5–4)
LYMPHOCYTES RELATIVE PERCENT: 16 % (ref 20–42)
MCH RBC QN AUTO: 24.7 PG (ref 26–35)
MCHC RBC AUTO-ENTMCNC: 32.4 G/DL (ref 32–34.5)
MCV RBC AUTO: 76.3 FL (ref 80–99.9)
MONOCYTES NFR BLD: 0.1 K/UL (ref 0.1–0.95)
MONOCYTES NFR BLD: 2 % (ref 2–12)
NEUTROPHILS NFR BLD: 81 % (ref 43–80)
NEUTS SEG NFR BLD: 3.76 K/UL (ref 1.8–7.3)
PLATELET # BLD AUTO: 290 K/UL (ref 130–450)
PMV BLD AUTO: 8.9 FL (ref 7–12)
POTASSIUM SERPL-SCNC: 4 MMOL/L (ref 3.5–5)
POTASSIUM SERPL-SCNC: 4.6 MMOL/L (ref 3.5–5)
RBC # BLD AUTO: 2.95 M/UL (ref 3.8–5.8)
SODIUM SERPL-SCNC: 134 MMOL/L (ref 132–146)
SODIUM SERPL-SCNC: 135 MMOL/L (ref 132–146)
WBC OTHER # BLD: 4.6 K/UL (ref 4.5–11.5)

## 2025-03-06 PROCEDURE — 2060000000 HC ICU INTERMEDIATE R&B

## 2025-03-06 PROCEDURE — 6370000000 HC RX 637 (ALT 250 FOR IP)

## 2025-03-06 PROCEDURE — 2580000003 HC RX 258: Performed by: INTERNAL MEDICINE

## 2025-03-06 PROCEDURE — 2500000003 HC RX 250 WO HCPCS: Performed by: INTERNAL MEDICINE

## 2025-03-06 PROCEDURE — 2700000000 HC OXYGEN THERAPY PER DAY

## 2025-03-06 PROCEDURE — 6360000002 HC RX W HCPCS

## 2025-03-06 PROCEDURE — P9047 ALBUMIN (HUMAN), 25%, 50ML: HCPCS | Performed by: INTERNAL MEDICINE

## 2025-03-06 PROCEDURE — 94660 CPAP INITIATION&MGMT: CPT

## 2025-03-06 PROCEDURE — 80048 BASIC METABOLIC PNL TOTAL CA: CPT

## 2025-03-06 PROCEDURE — 97530 THERAPEUTIC ACTIVITIES: CPT

## 2025-03-06 PROCEDURE — 6360000002 HC RX W HCPCS: Performed by: NURSE PRACTITIONER

## 2025-03-06 PROCEDURE — 6370000000 HC RX 637 (ALT 250 FOR IP): Performed by: INTERNAL MEDICINE

## 2025-03-06 PROCEDURE — 6360000002 HC RX W HCPCS: Performed by: INTERNAL MEDICINE

## 2025-03-06 PROCEDURE — 99232 SBSQ HOSP IP/OBS MODERATE 35: CPT | Performed by: INTERNAL MEDICINE

## 2025-03-06 PROCEDURE — 51798 US URINE CAPACITY MEASURE: CPT

## 2025-03-06 PROCEDURE — 2580000003 HC RX 258: Performed by: NURSE PRACTITIONER

## 2025-03-06 PROCEDURE — 85025 COMPLETE CBC W/AUTO DIFF WBC: CPT

## 2025-03-06 RX ORDER — ALBUMIN (HUMAN) 12.5 G/50ML
25 SOLUTION INTRAVENOUS EVERY 12 HOURS
Status: DISCONTINUED | OUTPATIENT
Start: 2025-03-06 | End: 2025-03-07 | Stop reason: HOSPADM

## 2025-03-06 RX ADMIN — BUPRENORPHINE HYDROCHLORIDE AND NALOXONE HYDROCHLORIDE DIHYDRATE 2 TABLET: 2; .5 TABLET SUBLINGUAL at 08:51

## 2025-03-06 RX ADMIN — ATORVASTATIN CALCIUM 40 MG: 40 TABLET, FILM COATED ORAL at 21:13

## 2025-03-06 RX ADMIN — ASPIRIN 81 MG CHEWABLE TABLET 81 MG: 81 TABLET CHEWABLE at 08:51

## 2025-03-06 RX ADMIN — FLUDROCORTISONE ACETATE 0.1 MG: 0.1 TABLET ORAL at 08:52

## 2025-03-06 RX ADMIN — ALBUMIN (HUMAN) 25 G: 0.25 INJECTION, SOLUTION INTRAVENOUS at 12:57

## 2025-03-06 RX ADMIN — SODIUM CHLORIDE, PRESERVATIVE FREE 10 ML: 5 INJECTION INTRAVENOUS at 08:52

## 2025-03-06 RX ADMIN — BUPRENORPHINE HYDROCHLORIDE AND NALOXONE HYDROCHLORIDE DIHYDRATE 2 TABLET: 2; .5 TABLET SUBLINGUAL at 17:19

## 2025-03-06 RX ADMIN — DEXTROSE AND SODIUM CHLORIDE: 5; .9 INJECTION, SOLUTION INTRAVENOUS at 08:55

## 2025-03-06 RX ADMIN — Medication 3 MG: at 21:13

## 2025-03-06 RX ADMIN — DIPHENHYDRAMINE HYDROCHLORIDE 25 MG: 50 INJECTION, SOLUTION INTRAMUSCULAR; INTRAVENOUS at 15:53

## 2025-03-06 RX ADMIN — SODIUM CHLORIDE, PRESERVATIVE FREE 10 ML: 5 INJECTION INTRAVENOUS at 21:14

## 2025-03-06 RX ADMIN — METOPROLOL SUCCINATE ER TABLETS 50 MG: 50 TABLET, FILM COATED, EXTENDED RELEASE ORAL at 08:51

## 2025-03-06 RX ADMIN — HYDROXYZINE PAMOATE 25 MG: 25 CAPSULE ORAL at 08:52

## 2025-03-06 RX ADMIN — WATER 40 MG: 1 INJECTION INTRAMUSCULAR; INTRAVENOUS; SUBCUTANEOUS at 03:29

## 2025-03-06 RX ADMIN — SODIUM CHLORIDE 900 MG: 9 INJECTION INTRAMUSCULAR; INTRAVENOUS; SUBCUTANEOUS at 16:45

## 2025-03-06 RX ADMIN — METOPROLOL SUCCINATE ER TABLETS 50 MG: 50 TABLET, FILM COATED, EXTENDED RELEASE ORAL at 21:13

## 2025-03-06 RX ADMIN — SODIUM CHLORIDE, PRESERVATIVE FREE 10 ML: 5 INJECTION INTRAVENOUS at 16:45

## 2025-03-06 RX ADMIN — BUMETANIDE 0.5 MG/HR: 0.25 INJECTION INTRAMUSCULAR; INTRAVENOUS at 03:37

## 2025-03-06 RX ADMIN — SODIUM ZIRCONIUM CYCLOSILICATE 10 G: 10 POWDER, FOR SUSPENSION ORAL at 08:52

## 2025-03-06 RX ADMIN — CLOPIDOGREL 75 MG: 75 TABLET, FILM COATED ORAL at 08:52

## 2025-03-06 ASSESSMENT — PAIN SCALES - GENERAL
PAINLEVEL_OUTOF10: 0

## 2025-03-06 ASSESSMENT — PAIN SCALES - WONG BAKER
WONGBAKER_NUMERICALRESPONSE: NO HURT
WONGBAKER_NUMERICALRESPONSE: NO HURT

## 2025-03-06 NOTE — PLAN OF CARE
Problem: Discharge Planning  Goal: Discharge to home or other facility with appropriate resources  Outcome: Progressing     Problem: Pain  Goal: Verbalizes/displays adequate comfort level or baseline comfort level  Outcome: Progressing     Problem: Safety - Adult  Goal: Free from fall injury  Outcome: Progressing     Problem: ABCDS Injury Assessment  Goal: Absence of physical injury  Outcome: Progressing     Problem: Skin/Tissue Integrity  Goal: Skin integrity remains intact  Description: 1.  Monitor for areas of redness and/or skin breakdown  2.  Assess vascular access sites hourly  3.  Every 4-6 hours minimum:  Change oxygen saturation probe site  4.  Every 4-6 hours:  If on nasal continuous positive airway pressure, respiratory therapy assess nares and determine need for appliance change or resting period  Outcome: Progressing  Flowsheets (Taken 3/6/2025 1803)  Skin Integrity Remains Intact: Monitor for areas of redness and/or skin breakdown     Problem: Neurosensory - Adult  Goal: Achieves stable or improved neurological status  Outcome: Progressing  Goal: Absence of seizures  Outcome: Progressing  Goal: Remains free of injury related to seizures activity  Outcome: Progressing  Goal: Achieves maximal functionality and self care  Outcome: Progressing     Problem: Respiratory - Adult  Goal: Achieves optimal ventilation and oxygenation  Outcome: Progressing     Problem: Cardiovascular - Adult  Goal: Maintains optimal cardiac output and hemodynamic stability  Outcome: Progressing     Problem: Skin/Tissue Integrity - Adult  Goal: Skin integrity remains intact  Description: 1.  Monitor for areas of redness and/or skin breakdown  2.  Assess vascular access sites hourly  3.  Every 4-6 hours minimum:  Change oxygen saturation probe site  4.  Every 4-6 hours:  If on nasal continuous positive airway pressure, respiratory therapy assess nares and determine need for appliance change or resting period  Outcome:

## 2025-03-06 NOTE — CARE COORDINATION
3/6/25 Update CM Note. Pt admitted 2/18/25 endocarditis. Remains on Bumex gtt. IV Dapto. IVFs.  DC plan for SNF once transitioned to oral diuretics and labs ar stable.  [facility] able to accept pt Medicaid pending. Ambulette form/envelope on chart.   Gabriela GAMINGN RN-BC  [phone]

## 2025-03-06 NOTE — PROGRESS NOTES
PROGRESS NOTE    Chief Complaint:   Situational urinary retention/UTI    HPI:   He is feeling much better.  He is voiding comfortably.  He denies hematuria or dysuria.    Vitals:    03/06/25 0818   BP: 106/77   Pulse: 75   Resp: 19   Temp: 97.7 °F (36.5 °C)   SpO2: 95%       Allergies: Patient has no known allergies.    PAST MEDICAL HISTORY:   Past Medical History:   Diagnosis Date    Hx of hepatitis C-resolved 3/5/2022    IV drug abuse (HCC)     Nonhealing nonsurgical wound with fat layer exposed        PAST SURGICAL HISTORY:   Past Surgical History:   Procedure Laterality Date    APPLY DRESSING Left 10/9/2020    DEBRIDEMENT OF SKIN, SOFT TISSUE, MUSCLE, EXPLORATION OF UPPER ARM INTEGRA GRAFT FOR COVERAGE performed by Camilo Denny MD at [facility] OR    APPLY DRESSING Left 10/14/2020    LEFT UPPER EXTREMITY WOUND VAC PLACEMENT performed by Eddie Ruff MD at [facility] OR    CARDIAC PROCEDURE N/A 2/17/2025    Left heart cath / coronary angiography performed by Bg Gaytan MD at [facility] CARDIAC CATH/IR    CARDIAC PROCEDURE Left 2/17/2025    Percutaneous coronary intervention performed by Bg Gaytan MD at [facility] CARDIAC CATH/IR    INCISION AND DRAINAGE Right 4/23/2020    RIGHT UPPER EXTREMITY INCISION AND DRAINAGE REMOVAL FOREIGN BODY WITH C-ARM performed by Kapil Yuan MD at [facility] OR    INCISION AND DRAINAGE Left 10/7/2020    LEFT ELBOW INCISION AND DRAINAGE performed by Kapil Yuan MD at [facility] OR    IR TUNNELED CVC PLACE WO SQ PORT/PUMP > 5 YEARS  2/24/2025    IR TUNNELED CVC PLACE WO SQ PORT/PUMP > 5 YEARS 2/24/2025 Kapil Ponce MD [facility] SPECIAL PROCEDURES    LEG SURGERY Right 3/6/2022    IRRIGATION AND DEBRIDEMENT RIGHT THIGH performed by Wes Branch MD at [facility] OR    MO EXPLORATION OF ARTERY/VEIN Left 10/7/2020    LEFT ARM BRACHIAL ARTERY EXPLORATION, REPAIR OF MYCOTIC ANUERYSM WITH BYPASS performed by Eddie Ruff MD at [facility] OR    TRANSESOPHAGEAL ECHOCARDIOGRAM N/A  2/17/2025 grew MRSA.  Continue antibiotics per infectious disease recommendations  -CT scan on 10/3/2021 shows a prominent soft tissue thickening and abnormal fluid collection in the right lateral lower chest prominent stool within the proximal colon, mild intrahepatic biliary prominence, and normal kidneys and a normal bladder  -Serum creatinine is stable at 2.9.  Continue to monitor.  He is being followed by Dr. Case from nephrology  -Avoid anticholinergic medication  -Maintain a good bowel regimen  -He does not need a catheter.  -Stable for discharge from urology standpoint.  Follow-up as needed as an outpatient        Leroy Rajput MD  3/6/2025  8:34 AM

## 2025-03-06 NOTE — DISCHARGE INSTRUCTIONS
weekly, maintaining sterile technique. If there is a BioPatch®, SilverSite® or equivalent, change once weekly only or as needed.    FOLLOW-UP VISITS  Nursing staff should call the office during business hours to schedule a follow-up appointment once the patient is admitted to the service or facility. Every effort should be made to have patient follow-up within 2 weeks of discharge. Exception is made for ventilator-dependent patients.  Continue IV antibiotic therapy until patient is seen in the office or unless specific stop date is noted on the original order or unless otherwise ordered by physician.  Call office to ensure stop date is correct before stopping antibiotics.         Db Frazier MD

## 2025-03-06 NOTE — PROGRESS NOTES
[facility] Hospitalist Progress Note    Admitting Date and Time: 2/18/2025  7:30 PM  Admit Dx: Endocarditis [I38]    Synopsis:  45 y.o. year old male  who  has a past medical history of Hx of hepatitis C-resolved, IV drug abuse (HCC), and Nonhealing nonsurgical wound with fat layer exposed.      Wagner Ryan is a 45 y.o. male who presents to the CVICU for evaluation by CTS surgery. Patient was being followed at [facility] for AMS and a STEMI and underwent stent placement of the LAD on 2/17.  He was admitted to the MICU following cath lab and started on broad spectrum antibiotics, ASA and Brilinta. During work up for his AMS, a CTA of the head showed tiny area of intraparenchymal hemorrhage in the right occipital lobe w/ MRI of the brain showed multiple small foci of acute and early subacute infarcts in bilateral frontal and parietal occipital lobe left more than right consistent with micro emboli. Neurology was consulted. They cleared the patient to continue on plavix given his recent stent placement.  He underwent an ECHO which was found to have vegetations and the decision was made to transfer to [facility] for evaluation by CTS Surgery.      2/19: Patient seen in CVICU.  Per ICU team and CTS, patient can be transferred as he has no ICU needs.  Transfer order placed.     2/20: Undergoing CTS workup.  Patient continues to have persistent confusion and delirium.  Seems like he was started on Decadron at previous hospital for concern of meningitis and it has not been stopped or weaned.  No mention for continuation of Decadron per ID here and treating for MRSA bacteremia as of now.  Hold Decadron.     2/21: Neurology stating patient has no brain bleed and that cardiology has to decide on DAPT.  Contacted cardiology and stated patient must be on DAPT given recent cardiac stenting and 2/17.  Advised to start aspirin and continue Plavix.  Orders placed.  CTS to be informed.     2/22: Per general surgery, patient  Received IV Lasix 80 Mg on 2/26/2025,  IV lasix 60 mg on 02/27/25, IV Lasix 60 Mg on 2/28/2025 , 60 Mg on 3/2/2025. Currently on Bumex drip.  Chest x-ray on 2/26/2025 show suspected pneumonia??  But patient feels better after getting IV Lasix  On Tamiflu 30 Mg twice daily for 5 days  Patient had multiple dental extraction on 2/27/2025.  Patient received 2 unit of PRBC.  Monitor H&H and transfuse if hemoglobin less than 7    DC planning: Patient accepted to [facility]. DC pending clinical course.       NOTE: This report was transcribed using voice recognition software. Every effort was made to ensure accuracy; however, inadvertent computerized transcription errors may be present.  Electronically signed by Kenyatta Esparza MD on 3/6/2025 at 10:52 AM

## 2025-03-06 NOTE — PLAN OF CARE
function  3/5/2025 2244 by Phyllis Giraldo RN  Outcome: Progressing  3/5/2025 1809 by Kenyatta Baum RN  Outcome: Progressing  Goal: Maintains adequate nutritional intake  3/5/2025 2244 by Phyllis Giraldo RN  Outcome: Progressing  3/5/2025 1809 by Kenyatta Baum RN  Outcome: Progressing     Problem: Genitourinary - Adult  Goal: Absence of urinary retention  3/5/2025 2244 by Phyllis Giraldo RN  Outcome: Progressing  3/5/2025 1809 by Kenyatta Baum RN  Outcome: Progressing     Problem: Infection - Adult  Goal: Absence of infection at discharge  3/5/2025 2244 by Phyllis Giraldo RN  Outcome: Progressing  3/5/2025 1809 by Kenyatta Baum RN  Outcome: Progressing  Goal: Absence of infection during hospitalization  3/5/2025 2244 by Phyllis Giraldo RN  Outcome: Progressing  3/5/2025 1809 by Kenyatta Baum RN  Outcome: Progressing     Problem: Metabolic/Fluid and Electrolytes - Adult  Goal: Electrolytes maintained within normal limits  3/5/2025 2244 by Phyllis Giraldo RN  Outcome: Progressing  3/5/2025 1809 by Kenyatta Baum RN  Outcome: Progressing  Goal: Hemodynamic stability and optimal renal function maintained  3/5/2025 2244 by Phyllis Giraldo RN  Outcome: Progressing  3/5/2025 1809 by Kenyatta Baum RN  Outcome: Progressing  Goal: Glucose maintained within prescribed range  3/5/2025 2244 by Phyllis Giraldo RN  Outcome: Progressing  3/5/2025 1809 by Kenyatta Baum RN  Outcome: Progressing     Problem: Hematologic - Adult  Goal: Maintains hematologic stability  3/5/2025 2244 by Phyllis Giraldo RN  Outcome: Progressing  3/5/2025 1809 by Kenyatta Baum RN  Outcome: Progressing     Problem: Chronic Conditions and Co-morbidities  Goal: Patient's chronic conditions and co-morbidity symptoms are monitored and maintained or improved  3/5/2025 2244 by Phyllis Giraldo RN  Outcome: Progressing  3/5/2025 1809 by Kenyatta Baum, RN  Outcome: Progressing     Problem: Nutrition  Deficit:  Goal: Optimize nutritional status  3/5/2025 2244 by Phyllis Giraldo, RN  Outcome: Progressing  3/5/2025 1809 by Kenyatta Baum, RN  Outcome: Progressing

## 2025-03-06 NOTE — PROGRESS NOTES
[facility]  [facility]  Progress Note      No chief complaint on file.  Sob and ams    SUBJECTIVE:    Patient is tolerating medications. No reported adverse drug reactions.  No nausea, vomiting, diarrhea. Red rash right chest, neck and arm. It is painful and itchy at times.  Review of systems:  As stated above in the chief complaint, otherwise negative.    Medications:  Scheduled Meds:   metoprolol succinate  50 mg Oral BID    DAPTOmycin (CUBICIN) 900 mg in sodium chloride (PF) 0.9 % 18 mL IV syringe  10 mg/kg (Adjusted) IntraVENous Q48H    melatonin  3 mg Oral Nightly    aspirin  81 mg Oral Daily    sodium chloride flush  5-40 mL IntraVENous 2 times per day    lidocaine 1 % injection  50 mg IntraDERmal Once    nicotine  1 patch TransDERmal Daily    atorvastatin  40 mg Oral Nightly    clopidogrel  75 mg Oral Daily    [Held by provider] losartan  25 mg Oral Daily     Continuous Infusions:   dextrose 5 % and 0.9 % NaCl 75 mL/hr at 03/06/25 0855    dextrose      bumetanide (BUMEX) 12.5 mg in sodium chloride 0.9 % 125 mL infusion 0.5 mg/hr (03/06/25 0337)    dextrose      sodium chloride      sodium chloride      sodium chloride       PRN Meds:diphenhydrAMINE, glucose, dextrose bolus **OR** dextrose bolus, glucagon (rDNA), dextrose, glucose, dextrose bolus **OR** dextrose bolus, glucagon (rDNA), dextrose, guaiFENesin, sodium chloride, sodium chloride, sodium chloride, buprenorphine-naloxone, cloNIDine, loperamide, dicyclomine, ipratropium 0.5 mg-albuterol 2.5 mg, sodium chloride flush, sodium chloride, LORazepam, hydrOXYzine pamoate, acetaminophen **OR** acetaminophen, magnesium sulfate, ondansetron **OR** ondansetron, potassium chloride **OR** potassium alternative oral replacement **OR** potassium chloride, Carmex Classic Lip Balm    OBJECTIVE:  /77   Pulse 75   Temp 97.7 °F (36.5 °C) (Temporal)   Resp 19   Ht 1.93 m (6' 3.98\")   Wt 96.5 kg (212 lb 11.9 oz)   SpO2 95%   BMI

## 2025-03-06 NOTE — PROGRESS NOTES
mobility    Patient response to education:   Pt verbalized understanding Pt demonstrated skill Pt requires further education in this area   yes yes reinforce     ASSESSMENT:      Comments:  Pt supine in bed upon entering, pt agreeable to participate. Pt instructed to transfer to EOB, completing transfer with no physical assistance. Pt sitting upright with good sitting balance. Pt with no c/o dizziness with position change. Pt then cued for hand placement and instructed to stand from EOB. Pt standing with fair balance with no AD. Pt instructed to ambulate to tolerance. Pt ambulating with decreased judy and wide BALDOMERO, cueing provided for energy conservation. Pt demonstrated fair tolerance to ambulation bout, standing rest break provided for SOB. Pt was assisted back to bedside and was transferred to bedside chair. Pt positioned for comfort with all needs met and call bell in reach prior to exiting.    Treatment:  Patient practiced and was instructed in the following treatment:    Bed mobility training - pt given verbal and tactile cues to facilitate proper sequencing and safety during rolling and supine>sit as well as provided with stand by assistance to complete task   STS and pivot transfer training - pt educated on proper hand and foot placement, safety and sequencing, and use of verbal and tactile cues to safely complete sit<>stand and pivot transfers with physical assistance to complete task safely   Gait training- pt was given verbal and tactile cues to facilitate pt safety use during ambulation as well as provided with physical assistance.      PLAN:    Patient is making good progress towards established goals.  Will continue with current POC.      Time in  1025  Time out  1048    Total Treatment Time  23 minutes     CPT codes:  [] Gait training 35344 -- minutes  [] Manual therapy 55241 -- minutes  [x] Therapeutic activities 46925 23 minutes  [] Therapeutic exercises 84788 -- minutes  [] Neuromuscular  [facility] -- minutes    Dima Cross, PT, DPT  [id number]

## 2025-03-06 NOTE — PROGRESS NOTES
Department of Internal Medicine  Nephrology Progress Note      Events reviewed       SUBJECTIVE: We are following Mr. Ryan for BELLO.  Reports skin rash.    PHYSICAL EXAM:      Vitals:    VITALS:  /77   Pulse 75   Temp 97.7 °F (36.5 °C) (Temporal)   Resp 19   Ht 1.93 m (6' 3.98\")   Wt 96.5 kg (212 lb 11.9 oz)   SpO2 95%   BMI 25.91 kg/m²   24HR PULSE OXIMETRY RANGE:  SpO2  Av.4 %  Min: 94 %  Max: 99 %  24HR INTAKE/OUTPUT:    Intake/Output Summary (Last 24 hours) at 3/6/2025 1103  Last data filed at 3/6/2025 0818  Gross per 24 hour   Intake 1230 ml   Output 2100 ml   Net -870 ml       Constitutional: Patient is awake nonverbal no distress  HEENT: Pupils equal reactive, mucous membranes are dry  Respiratory: Lungs are clear  Cardiovascular/Edema: Heart sounds are regular  Gastrointestinal: Abdomen is soft  Neurologic: Patient is lethargic, nonverbal  Skin: + Skin rash in the upper chest  Other:  trace edema    Scheduled Meds:   metoprolol succinate  50 mg Oral BID    DAPTOmycin (CUBICIN) 900 mg in sodium chloride (PF) 0.9 % 18 mL IV syringe  10 mg/kg (Adjusted) IntraVENous Q48H    melatonin  3 mg Oral Nightly    aspirin  81 mg Oral Daily    sodium chloride flush  5-40 mL IntraVENous 2 times per day    lidocaine 1 % injection  50 mg IntraDERmal Once    nicotine  1 patch TransDERmal Daily    atorvastatin  40 mg Oral Nightly    clopidogrel  75 mg Oral Daily    [Held by provider] losartan  25 mg Oral Daily     Continuous Infusions:   dextrose 5 % and 0.9 % NaCl 75 mL/hr at 25 0855    dextrose      bumetanide (BUMEX) 12.5 mg in sodium chloride 0.9 % 125 mL infusion 0.5 mg/hr (25 0337)    dextrose      sodium chloride      sodium chloride      sodium chloride       PRN Meds:.diphenhydrAMINE, glucose, dextrose bolus **OR** dextrose bolus, glucagon (rDNA), dextrose, glucose, dextrose bolus **OR** dextrose bolus, glucagon (rDNA), dextrose, guaiFENesin, sodium chloride, sodium chloride, sodium  the brain  Hepatitis C  Influenza A  Hypoalbuminemia, multifactorial  Skin rash, drug associated ?    Plan:    Continue bumex drip to 0.5 mg/hr  Discontinue NS  Discontinue Lokelma  Continue low potassium diet  Continue to monitor renal function  Continue to monitor potassium level, BMP at 4 PM  Strict I's and O's  Albumin 25 g IV every 12 hours x 4  Repeat proBNP tomorrow    Electronically signed by Deon Case MD on 3/6/2025 at 11:03 AM

## 2025-03-07 VITALS
BODY MASS INDEX: 25.91 KG/M2 | RESPIRATION RATE: 18 BRPM | WEIGHT: 212.74 LBS | DIASTOLIC BLOOD PRESSURE: 72 MMHG | HEIGHT: 76 IN | SYSTOLIC BLOOD PRESSURE: 110 MMHG | TEMPERATURE: 97.3 F | HEART RATE: 93 BPM | OXYGEN SATURATION: 93 %

## 2025-03-07 LAB
ALBUMIN SERPL-MCNC: 3.1 G/DL (ref 3.5–5.2)
ALP SERPL-CCNC: 86 U/L (ref 40–129)
ALT SERPL-CCNC: 65 U/L (ref 0–40)
ANION GAP SERPL CALCULATED.3IONS-SCNC: 15 MMOL/L (ref 7–16)
AST SERPL-CCNC: 47 U/L (ref 0–39)
BASOPHILS # BLD: 0.02 K/UL (ref 0–0.2)
BASOPHILS NFR BLD: 0 % (ref 0–2)
BILIRUB SERPL-MCNC: <0.2 MG/DL (ref 0–1.2)
BNP SERPL-MCNC: ABNORMAL PG/ML (ref 0–125)
BUN SERPL-MCNC: 96 MG/DL (ref 6–20)
CALCIUM SERPL-MCNC: 8.1 MG/DL (ref 8.6–10.2)
CHLORIDE SERPL-SCNC: 96 MMOL/L (ref 98–107)
CK SERPL-CCNC: 12 U/L (ref 20–200)
CO2 SERPL-SCNC: 26 MMOL/L (ref 22–29)
CREAT SERPL-MCNC: 2.6 MG/DL (ref 0.7–1.2)
EOSINOPHIL # BLD: 0.09 K/UL (ref 0.05–0.5)
EOSINOPHILS RELATIVE PERCENT: 1 % (ref 0–6)
ERYTHROCYTE [DISTWIDTH] IN BLOOD BY AUTOMATED COUNT: 16.8 % (ref 11.5–15)
GFR, ESTIMATED: 30 ML/MIN/1.73M2
GLUCOSE SERPL-MCNC: 118 MG/DL (ref 74–99)
HCT VFR BLD AUTO: 21.8 % (ref 37–54)
HGB BLD-MCNC: 7.1 G/DL (ref 12.5–16.5)
IMM GRANULOCYTES # BLD AUTO: 0.04 K/UL (ref 0–0.58)
IMM GRANULOCYTES NFR BLD: 0 % (ref 0–5)
LYMPHOCYTES NFR BLD: 2.16 K/UL (ref 1.5–4)
LYMPHOCYTES RELATIVE PERCENT: 24 % (ref 20–42)
MCH RBC QN AUTO: 25.1 PG (ref 26–35)
MCHC RBC AUTO-ENTMCNC: 32.6 G/DL (ref 32–34.5)
MCV RBC AUTO: 77 FL (ref 80–99.9)
MONOCYTES NFR BLD: 0.89 K/UL (ref 0.1–0.95)
MONOCYTES NFR BLD: 10 % (ref 2–12)
NEUTROPHILS NFR BLD: 64 % (ref 43–80)
NEUTS SEG NFR BLD: 5.76 K/UL (ref 1.8–7.3)
PLATELET # BLD AUTO: 318 K/UL (ref 130–450)
PMV BLD AUTO: 9.1 FL (ref 7–12)
POTASSIUM SERPL-SCNC: 3.5 MMOL/L (ref 3.5–5)
PROT SERPL-MCNC: 6.8 G/DL (ref 6.4–8.3)
RBC # BLD AUTO: 2.83 M/UL (ref 3.8–5.8)
SODIUM SERPL-SCNC: 137 MMOL/L (ref 132–146)
WBC OTHER # BLD: 9 K/UL (ref 4.5–11.5)

## 2025-03-07 PROCEDURE — 97535 SELF CARE MNGMENT TRAINING: CPT

## 2025-03-07 PROCEDURE — 2700000000 HC OXYGEN THERAPY PER DAY

## 2025-03-07 PROCEDURE — 6360000002 HC RX W HCPCS

## 2025-03-07 PROCEDURE — 97530 THERAPEUTIC ACTIVITIES: CPT

## 2025-03-07 PROCEDURE — 85025 COMPLETE CBC W/AUTO DIFF WBC: CPT

## 2025-03-07 PROCEDURE — 6370000000 HC RX 637 (ALT 250 FOR IP)

## 2025-03-07 PROCEDURE — 6370000000 HC RX 637 (ALT 250 FOR IP): Performed by: INTERNAL MEDICINE

## 2025-03-07 PROCEDURE — P9047 ALBUMIN (HUMAN), 25%, 50ML: HCPCS | Performed by: INTERNAL MEDICINE

## 2025-03-07 PROCEDURE — 83880 ASSAY OF NATRIURETIC PEPTIDE: CPT

## 2025-03-07 PROCEDURE — 99239 HOSP IP/OBS DSCHRG MGMT >30: CPT | Performed by: INTERNAL MEDICINE

## 2025-03-07 PROCEDURE — 6360000002 HC RX W HCPCS: Performed by: INTERNAL MEDICINE

## 2025-03-07 PROCEDURE — 2500000003 HC RX 250 WO HCPCS: Performed by: INTERNAL MEDICINE

## 2025-03-07 PROCEDURE — 2580000003 HC RX 258: Performed by: INTERNAL MEDICINE

## 2025-03-07 PROCEDURE — 80053 COMPREHEN METABOLIC PANEL: CPT

## 2025-03-07 PROCEDURE — 82550 ASSAY OF CK (CPK): CPT

## 2025-03-07 PROCEDURE — 94660 CPAP INITIATION&MGMT: CPT

## 2025-03-07 RX ORDER — ASPIRIN 81 MG/1
81 TABLET, CHEWABLE ORAL DAILY
Status: ON HOLD | DISCHARGE
Start: 2025-03-08

## 2025-03-07 RX ORDER — CLONIDINE HYDROCHLORIDE 0.1 MG/1
0.1 TABLET ORAL EVERY 6 HOURS PRN
Status: ON HOLD | DISCHARGE
Start: 2025-03-07

## 2025-03-07 RX ORDER — BUPRENORPHINE HYDROCHLORIDE AND NALOXONE HYDROCHLORIDE DIHYDRATE 2; .5 MG/1; MG/1
2 TABLET SUBLINGUAL PRN
Qty: 60 TABLET | Refills: 0 | Status: SHIPPED | OUTPATIENT
Start: 2025-03-07 | End: 2025-03-12

## 2025-03-07 RX ORDER — METOPROLOL SUCCINATE 50 MG/1
50 TABLET, EXTENDED RELEASE ORAL 2 TIMES DAILY
Status: ON HOLD | DISCHARGE
Start: 2025-03-07

## 2025-03-07 RX ORDER — ATORVASTATIN CALCIUM 40 MG/1
40 TABLET, FILM COATED ORAL NIGHTLY
Status: ON HOLD | DISCHARGE
Start: 2025-03-07

## 2025-03-07 RX ORDER — BUMETANIDE 1 MG/1
2 TABLET ORAL DAILY
Status: DISCONTINUED | OUTPATIENT
Start: 2025-03-08 | End: 2025-03-07 | Stop reason: HOSPADM

## 2025-03-07 RX ORDER — BUMETANIDE 2 MG/1
2 TABLET ORAL DAILY
Status: ON HOLD | DISCHARGE
Start: 2025-03-08

## 2025-03-07 RX ORDER — DIPHENHYDRAMINE HYDROCHLORIDE 50 MG/ML
25 INJECTION INTRAMUSCULAR; INTRAVENOUS EVERY 6 HOURS PRN
Status: ON HOLD | DISCHARGE
Start: 2025-03-07

## 2025-03-07 RX ORDER — CLOPIDOGREL BISULFATE 75 MG/1
75 TABLET ORAL DAILY
Status: ON HOLD | DISCHARGE
Start: 2025-03-08

## 2025-03-07 RX ORDER — NICOTINE 21 MG/24HR
1 PATCH, TRANSDERMAL 24 HOURS TRANSDERMAL DAILY
Status: ON HOLD | DISCHARGE
Start: 2025-03-08

## 2025-03-07 RX ADMIN — CLOPIDOGREL 75 MG: 75 TABLET, FILM COATED ORAL at 08:49

## 2025-03-07 RX ADMIN — ALBUMIN (HUMAN) 25 G: 0.25 INJECTION, SOLUTION INTRAVENOUS at 13:23

## 2025-03-07 RX ADMIN — BUPRENORPHINE HYDROCHLORIDE AND NALOXONE HYDROCHLORIDE DIHYDRATE 2 TABLET: 2; .5 TABLET SUBLINGUAL at 13:19

## 2025-03-07 RX ADMIN — METOPROLOL SUCCINATE ER TABLETS 50 MG: 50 TABLET, FILM COATED, EXTENDED RELEASE ORAL at 20:03

## 2025-03-07 RX ADMIN — BUPRENORPHINE HYDROCHLORIDE AND NALOXONE HYDROCHLORIDE DIHYDRATE 2 TABLET: 2; .5 TABLET SUBLINGUAL at 01:20

## 2025-03-07 RX ADMIN — BUMETANIDE 0.5 MG/HR: 0.25 INJECTION INTRAMUSCULAR; INTRAVENOUS at 03:53

## 2025-03-07 RX ADMIN — SODIUM CHLORIDE, PRESERVATIVE FREE 10 ML: 5 INJECTION INTRAVENOUS at 20:07

## 2025-03-07 RX ADMIN — SODIUM CHLORIDE, PRESERVATIVE FREE 10 ML: 5 INJECTION INTRAVENOUS at 08:49

## 2025-03-07 RX ADMIN — Medication 3 MG: at 20:03

## 2025-03-07 RX ADMIN — BUPRENORPHINE HYDROCHLORIDE AND NALOXONE HYDROCHLORIDE DIHYDRATE 2 TABLET: 2; .5 TABLET SUBLINGUAL at 21:08

## 2025-03-07 RX ADMIN — ALBUMIN (HUMAN) 25 G: 0.25 INJECTION, SOLUTION INTRAVENOUS at 01:15

## 2025-03-07 RX ADMIN — ASPIRIN 81 MG CHEWABLE TABLET 81 MG: 81 TABLET CHEWABLE at 08:49

## 2025-03-07 RX ADMIN — HYDROXYZINE PAMOATE 25 MG: 25 CAPSULE ORAL at 08:49

## 2025-03-07 RX ADMIN — ATORVASTATIN CALCIUM 40 MG: 40 TABLET, FILM COATED ORAL at 20:03

## 2025-03-07 RX ADMIN — METOPROLOL SUCCINATE ER TABLETS 50 MG: 50 TABLET, FILM COATED, EXTENDED RELEASE ORAL at 08:49

## 2025-03-07 ASSESSMENT — PAIN SCALES - WONG BAKER
WONGBAKER_NUMERICALRESPONSE: NO HURT

## 2025-03-07 ASSESSMENT — PAIN SCALES - GENERAL
PAINLEVEL_OUTOF10: 0

## 2025-03-07 NOTE — PROGRESS NOTES
OCCUPATIONAL THERAPY TREATMENT NOTE  [facility] [address], [place]      Date:3/7/2025  Patient Name: Wagner Ryan  MRN: [id number]  : 1980  Room: [address] [place] [address]     Per OT Eval:    Evaluating OT: Lucia Ellis OTR/L [id number]        Referring Provider:Doni Palm MD     Specific Provider Orders/Date: OT eval and treat 25       Diagnosis: Aortic Valve Endocarditis     Surgery: Angioplasty with Stent 25     Pertinent Medical History:       Past Medical History        Past Medical History:   Diagnosis Date    Hx of hepatitis C-resolved 3/5/2022    IV drug abuse (HCC)      Nonhealing nonsurgical wound with fat layer exposed                    Past Surgical History:   Procedure Laterality Date    APPLY DRESSING Left 10/9/2020     DEBRIDEMENT OF SKIN, SOFT TISSUE, MUSCLE, EXPLORATION OF UPPER ARM INTEGRA GRAFT FOR COVERAGE performed [place] [facility] [place]    APPLY DRESSING Left 10/14/2020     LEFT UPPER EXTREMITY WOUND VAC PLACEMENT performed [place] Ruff [place] [facility] [place]    CARDIAC PROCEDURE N/A 2025     Left heart cath / coronary angiography performed by Bg Gaytan MD at [facility] CARDIAC CATH/IR    CARDIAC PROCEDURE Left 2025     Percutaneous coronary intervention performed by Bg Gaytan MD at [facility] CARDIAC CATH/IR    INCISION AND DRAINAGE Right 2020     RIGHT UPPER EXTREMITY INCISION AND DRAINAGE REMOVAL FOREIGN BODY WITH C-ARM performed by Kapil Yuan MD at [facility] OR       Precautions:  Fall Risk, droplet isolation, O2     Assessment of current deficits    [x] Functional mobility            [x]ADLs           [x] Strength                  []Cognition    [x] Functional transfers          [x] IADLs         [] Safety Awareness   [x]Endurance    [] Fine Coordination                         [x] Balance      [] Vision/perception   []Sensation      []Gross Motor Coordination             []  Status  Date:3/7/25 STGs = LTGs  Time frame: 10-14 days   Feeding Independent   Independent     Grooming CGA-standing at sink to wash face and hands   CGA/SBA  Standing at sink pt brushed teeth and wash face/hands with assist required for balance/safety during tasks.  Independent    UB Dressing Minimal Assist -assist to don and tie gown   Min A  To don hospital gown while seated in chair with assist required to manage gown around trunk in back. Independent    LB Dressing Minimal Assist -assist to don slipper socks seated edge of bed   Min A  Pt demo'd ability to don/doff socks seated in chair with increased time to complete.   Independent    Bathing Moderate Assist(simulated)  Per Eval  Will continue to assess.   Supervision    Toileting Minimal Assist   Min A  For balance/safety during personal hygiene and clothing management. Independent    Bed Mobility  Supine to sit: Stand by Assist   Sit to supine: Stand by Assist   Supine>Sit:SBA  For safety Supine to sit: Independent   Sit to supine: Independent    Functional Transfers Sit to stand: CGA  Stand Pivot:  CGA with ww      Sit<>Stand:SBA  SPT: SBA  Min cues for hand placement/safety Modified [place] with AAD    Functional Mobility Contact Guard Assist with ww back and forth to the bathroom   CGA  Completed household distances without device with assist required for balance/safety d/t unsteadiness. Standing rest break x1 required to complete.  Modified [place] with AAD    Balance Sitting:     Static:  SBA     Dynamic:CGA  Standing: CGA   Sitting:     Static: Supervision    Dynamic:SBA  Standing: CGA/SBA     Activity Tolerance Fair - pt fatigues easily needing frequent rest breaks   Fair  For light activity     Visual/  Perceptual Glasses: none            Comments: Cleared by RN to see pt. Upon arrival patient in supine with HOB elevated and agreeable to OT session. At end of session, patient left sitting in bedside chair with call light within reach, all

## 2025-03-07 NOTE — PROGRESS NOTES
[facility]  [facility]  Progress Note      No chief complaint on file.  Sob and ams    SUBJECTIVE:    Patient is tolerating medications. No reported adverse drug reactions.  No nausea, vomiting, diarrhea. Red rash right chest, neck and arm improving  Review of systems:  As stated above in the chief complaint, otherwise negative.    Medications:  Scheduled Meds:   [START ON 3/8/2025] bumetanide  2 mg Oral Daily    albumin human 25%  25 g IntraVENous Q12H    metoprolol succinate  50 mg Oral BID    DAPTOmycin (CUBICIN) 900 mg in sodium chloride (PF) 0.9 % 18 mL IV syringe  10 mg/kg (Adjusted) IntraVENous Q48H    melatonin  3 mg Oral Nightly    aspirin  81 mg Oral Daily    sodium chloride flush  5-40 mL IntraVENous 2 times per day    lidocaine 1 % injection  50 mg IntraDERmal Once    nicotine  1 patch TransDERmal Daily    atorvastatin  40 mg Oral Nightly    clopidogrel  75 mg Oral Daily    [Held by provider] losartan  25 mg Oral Daily     Continuous Infusions:   dextrose      dextrose      sodium chloride      sodium chloride      sodium chloride       PRN Meds:diphenhydrAMINE, glucose, dextrose bolus **OR** dextrose bolus, glucagon (rDNA), dextrose, glucose, dextrose bolus **OR** dextrose bolus, glucagon (rDNA), dextrose, guaiFENesin, sodium chloride, sodium chloride, sodium chloride, buprenorphine-naloxone, cloNIDine, loperamide, dicyclomine, ipratropium 0.5 mg-albuterol 2.5 mg, sodium chloride flush, sodium chloride, LORazepam, hydrOXYzine pamoate, acetaminophen **OR** acetaminophen, magnesium sulfate, ondansetron **OR** ondansetron, potassium chloride **OR** potassium alternative oral replacement **OR** potassium chloride, Carmex Classic Lip Balm    OBJECTIVE:  BP (!) 113/41   Pulse 88   Temp 98.6 °F (37 °C) (Temporal)   Resp 18   Ht 1.93 m (6' 3.98\")   Wt 96.5 kg (212 lb 11.9 oz)   SpO2 94%   BMI 25.91 kg/m²   Temp  Av.1 °F (36.7 °C)  Min: 97.2 °F (36.2 °C)  Max: 98.8 °F  (37.1 °C)  Constitutional: The patient is alert. Resting in bed  Skin: Warm and dry. Macular red rash right chest, arm and neck improving. No jaundice.  HEENT:  Moist mucous membranes, no ulcerations, no thrush.   Neck: Supple to movements. No lymphadenopathy.   Chest: no use of accessory muscles to breathe. Symmetrical expansion. Auscultation reveals no wheezing,no  crackles, or rhonchi. On room air  Cardiovascular: S1 and S2 are rhythmic and regular. No murmurs appreciated.   Abdomen: Positive bowel sounds to auscultation. Benign to palpation.   Extremities: No clubbing, no cyanosis, + edema.  Neurological: Following commands, no focal neurodeficit  Lines: Right femoral CVC 2/17/25     Laboratory and Tests Review:  Lab Results   Component Value Date    WBC 9.0 03/07/2025    WBC 4.6 03/06/2025    WBC 6.8 03/05/2025    HGB 7.1 (L) 03/07/2025    HCT 21.8 (L) 03/07/2025    MCV 77.0 (L) 03/07/2025     03/07/2025     Lab Results   Component Value Date    NEUTROABS 5.76 03/07/2025    NEUTROABS 3.76 03/06/2025    NEUTROABS 5.98 03/05/2025     No results found for: \"CRPHS\"  Lab Results   Component Value Date    ALT 65 (H) 03/07/2025    AST 47 (H) 03/07/2025    ALKPHOS 86 03/07/2025    BILITOT <0.2 03/07/2025     Lab Results   Component Value Date/Time     03/07/2025 04:00 AM    K 3.5 03/07/2025 04:00 AM    K 4.2 10/04/2021 06:30 AM    CL 96 03/07/2025 04:00 AM    CO2 26 03/07/2025 04:00 AM    BUN 96 03/07/2025 04:00 AM    CREATININE 2.6 03/07/2025 04:00 AM    CREATININE 2.8 03/06/2025 05:54 PM    CREATININE 2.9 03/06/2025 06:00 AM    GFRAA >60 03/11/2022 04:26 AM    LABGLOM 30 03/07/2025 04:00 AM    GLUCOSE 118 03/07/2025 04:00 AM    CALCIUM 8.1 03/07/2025 04:00 AM    BILITOT <0.2 03/07/2025 04:00 AM    ALKPHOS 86 03/07/2025 04:00 AM    AST 47 03/07/2025 04:00 AM    ALT 65 03/07/2025 04:00 AM     Lab Results   Component Value Date    .0 (H) 02/17/2025    CRP 25.3 (H) 03/04/2022    CRP 19.9 (H)

## 2025-03-07 NOTE — PROGRESS NOTES
chloride, buprenorphine-naloxone, cloNIDine, loperamide, dicyclomine, ipratropium 0.5 mg-albuterol 2.5 mg, sodium chloride flush, sodium chloride, LORazepam, hydrOXYzine pamoate, acetaminophen **OR** acetaminophen, magnesium sulfate, ondansetron **OR** ondansetron, potassium chloride **OR** potassium alternative oral replacement **OR** potassium chloride, Carmex Classic Lip Balm  .    DATA:    CBC:   Lab Results   Component Value Date/Time    WBC 9.0 03/07/2025 04:00 AM    RBC 2.83 03/07/2025 04:00 AM    HGB 7.1 03/07/2025 04:00 AM    HCT 21.8 03/07/2025 04:00 AM    MCV 77.0 03/07/2025 04:00 AM    MCH 25.1 03/07/2025 04:00 AM    MCHC 32.6 03/07/2025 04:00 AM    RDW 16.8 03/07/2025 04:00 AM     03/07/2025 04:00 AM    MPV 9.1 03/07/2025 04:00 AM     CMP:    Lab Results   Component Value Date/Time     03/07/2025 04:00 AM    K 3.5 03/07/2025 04:00 AM    K 4.2 10/04/2021 06:30 AM    CL 96 03/07/2025 04:00 AM    CO2 26 03/07/2025 04:00 AM    BUN 96 03/07/2025 04:00 AM    CREATININE 2.6 03/07/2025 04:00 AM    GFRAA >60 03/11/2022 04:26 AM    LABGLOM 30 03/07/2025 04:00 AM    GLUCOSE 118 03/07/2025 04:00 AM    CALCIUM 8.1 03/07/2025 04:00 AM    BILITOT <0.2 03/07/2025 04:00 AM    ALKPHOS 86 03/07/2025 04:00 AM    AST 47 03/07/2025 04:00 AM    ALT 65 03/07/2025 04:00 AM     Magnesium:    Lab Results   Component Value Date/Time    MG 2.4 02/18/2025 04:30 AM     Phosphorus:    Lab Results   Component Value Date/Time    PHOS 3.6 02/18/2025 04:30 AM     Radiology Review:      MRI BRAIN W WO CONTRAST [[id number] 2/18/2025]     IMPRESSION:  1. Multiple small foci of acute or early subacute infarctions in the  bilateral frontal, parietal and occipital lobes, left more than right.  Many  of the infarctions are in a watershed territory distribution.  Findings may  be related to hypoperfusion or micro emboli.  2. Subtle foci of enhancement in the right frontal, parietal and occipital  lobes. These are likely related to  C  Influenza A  Hypoalbuminemia, multifactorial  Skin rash, drug associated ?    Plan:    Discontinue Bumex drip and start Bumex 2 mg PO daily  Continue low potassium diet  Continue to monitor renal function  Continue to monitor potassium level  Strict I's and O's  Okay to discharge from renal point of view    Electronically signed by CESAR Yoder - CNP on 3/7/2025 at 9:02 AM

## 2025-03-07 NOTE — PLAN OF CARE
Problem: Discharge Planning  Goal: Discharge to home or other facility with appropriate resources  3/6/2025 2316 by Elizabeth Medina RN  Outcome: Progressing  3/6/2025 1804 by Laura Last RN  Outcome: Progressing     Problem: Pain  Goal: Verbalizes/displays adequate comfort level or baseline comfort level  3/6/2025 2316 by Elizabeth Medina RN  Outcome: Progressing  3/6/2025 1804 by Laura Last RN  Outcome: Progressing     Problem: Safety - Adult  Goal: Free from fall injury  3/6/2025 2316 by Elizabeth Medina RN  Outcome: Progressing  3/6/2025 1804 by Laura Last RN  Outcome: Progressing     Problem: ABCDS Injury Assessment  Goal: Absence of physical injury  3/6/2025 2316 by Elizabeth Medina RN  Outcome: Progressing  3/6/2025 1804 by Laura Last RN  Outcome: Progressing     Problem: Skin/Tissue Integrity  Goal: Skin integrity remains intact  Description: 1.  Monitor for areas of redness and/or skin breakdown  2.  Assess vascular access sites hourly  3.  Every 4-6 hours minimum:  Change oxygen saturation probe site  4.  Every 4-6 hours:  If on nasal continuous positive airway pressure, respiratory therapy assess nares and determine need for appliance change or resting period  3/6/2025 2316 by Elizabeth Medina RN  Outcome: Progressing  3/6/2025 1804 by Laura Last RN  Outcome: Progressing  Flowsheets (Taken 3/6/2025 1803)  Skin Integrity Remains Intact: Monitor for areas of redness and/or skin breakdown     Problem: Neurosensory - Adult  Goal: Achieves stable or improved neurological status  3/6/2025 2316 by Elizabeth Medina RN  Outcome: Progressing  3/6/2025 1804 by Laura Last RN  Outcome: Progressing  Goal: Absence of seizures  3/6/2025 2316 by Elizabeth Medina RN  Outcome: Progressing  3/6/2025 1804 by Laura Last RN  Outcome: Progressing  Goal: Remains free of injury related to seizures activity  3/6/2025 2316 by Elizabeth Medina RN  Outcome: Progressing  3/6/2025 1804 by Laura Last  CARIDAD ANAYA  Outcome: Progressing  Goal: Achieves maximal functionality and self care  3/6/2025 2316 by Elizabeth Medina RN  Outcome: Progressing  3/6/2025 1804 by Laura Last RN  Outcome: Progressing     Problem: Respiratory - Adult  Goal: Achieves optimal ventilation and oxygenation  3/6/2025 2316 by Elizabeth Medina RN  Outcome: Progressing  3/6/2025 1804 by Laura Last RN  Outcome: Progressing     Problem: Cardiovascular - Adult  Goal: Maintains optimal cardiac output and hemodynamic stability  3/6/2025 2316 by Elizabeth Medina RN  Outcome: Progressing  3/6/2025 1804 by Laura Last RN  Outcome: Progressing     Problem: Skin/Tissue Integrity - Adult  Goal: Skin integrity remains intact  Description: 1.  Monitor for areas of redness and/or skin breakdown  2.  Assess vascular access sites hourly  3.  Every 4-6 hours minimum:  Change oxygen saturation probe site  4.  Every 4-6 hours:  If on nasal continuous positive airway pressure, respiratory therapy assess nares and determine need for appliance change or resting period  3/6/2025 2316 by Elizbaeth Medina RN  Outcome: Progressing  3/6/2025 1804 by Laura Last RN  Outcome: Progressing  Flowsheets (Taken 3/6/2025 1803)  Skin Integrity Remains Intact: Monitor for areas of redness and/or skin breakdown  Goal: Incisions, wounds, or drain sites healing without S/S of infection  Outcome: Progressing     Problem: Musculoskeletal - Adult  Goal: Return mobility to safest level of function  Outcome: Progressing     Problem: Gastrointestinal - Adult  Goal: Minimal or absence of nausea and vomiting  Outcome: Progressing  Goal: Maintains or returns to baseline bowel function  Outcome: Progressing  Goal: Maintains adequate nutritional intake  Outcome: Progressing     Problem: Genitourinary - Adult  Goal: Absence of urinary retention  Outcome: Progressing     Problem: Infection - Adult  Goal: Absence of infection at discharge  Outcome: Progressing  Goal: Absence of

## 2025-03-07 NOTE — DISCHARGE INSTR - DIET

## 2025-03-07 NOTE — DISCHARGE SUMMARY
[facility] Hospitalist Physician Discharge Summary       [facility]  [address] [id number]  [phone]          Activity level: As tolerated    Dispo: SNF      Condition on discharge: Stable     Patient ID:  Wagner Ryan  [id number]  45 y.o.  1980    Admit date: 2/18/2025    Discharge date and time:  3/7/2025  10:01 AM    Admission Diagnoses: Principal Problem:    Aortic valve endocarditis  Active Problems:    MRSA bacteremia    Cerebrovascular accident (CVA) due to bilateral embolism of middle cerebral arteries (HCC)    Cardiomyopathy (HCC)    Cerebral septic emboli (HCC)    Altered level of consciousness    Acute kidney injury superimposed on stage 2 chronic kidney disease    Influenza A    Iron deficiency anemia    Acute hypoxic respiratory failure (HCC)  Resolved Problems:    * No resolved hospital problems. *      Discharge Diagnoses: Principal Problem:    Aortic valve endocarditis  Active Problems:    MRSA bacteremia    Cerebrovascular accident (CVA) due to bilateral embolism of middle cerebral arteries (HCC)    Cardiomyopathy (HCC)    Cerebral septic emboli (HCC)    Altered level of consciousness    Acute kidney injury superimposed on stage 2 chronic kidney disease    Influenza A    Iron deficiency anemia    Acute hypoxic respiratory failure (HCC)  Resolved Problems:    * No resolved hospital problems. *      Consults:  IP CONSULT TO CRITICAL CARE  IP CONSULT TO PHARMACY  IP CONSULT TO CARDIOLOGY  IP CONSULT TO INFECTIOUS DISEASES  IP CONSULT TO CARDIOTHORACIC SURGERY  IP CONSULT TO NEPHROLOGY  IP CONSULT TO DENTIST  IP CONSULT TO VASCULAR ACCESS TEAM  IP CONSULT TO GENERAL SURGERY  IP CONSULT TO UROLOGY  IP CONSULT TO UROLOGY    Hospital Course:   45 y.o. year old male  who  has a past medical history of Hx of hepatitis C-resolved, IV drug abuse (HCC), and Nonhealing nonsurgical wound with fat layer exposed.      Wagner Ryan is a 45 y.o. male who presents

## 2025-03-07 NOTE — CARE COORDINATION
3/7/25 Update CM Note. Pt admitted 2/18/25 endocarditis.  Discharge order noted.  Change is discharge plans.  Pt will be going to [place].  Destination/[facility] , 7000 and transport envelope all updated.   [facility] scheduled for 5pm pickup Bedside and charge RNs, and daughter notified. Pt in agreement with DC plan.  Gabriela GAMINGN RN-BC  [phone]

## 2025-03-07 NOTE — PROGRESS NOTES
Physical Therapy  Treatment Note    Name: Wagner Ryan  : 1980  MRN: [id number]      Date of Service: 3/7/2025    Evaluating PT:  Dima Cross PT, DPT    Room #:  7410/7410-A  Diagnosis:  Endocarditis [I38]  PMHx/PSHx:  IV drug abuse, Hep C, s/p LAD stent placement   Procedure/Surgery:  s/p tunneled CVC placement   Precautions:  fall risk, O2, droplet, bed/chair alarm, Equipment Needs:  TBD    SUBJECTIVE:    Pt lives with mother in a 2 story home with 4 steps to enter and 1 handrail.  Bed/bath is on 2nd floor.  Pt ambulated with no AD PTA.    OBJECTIVE:   Initial Evaluation  Date: 25 Treatment  Date: 3/7/25 Short Term/ Long Term   Goals   AM-PAC 6 Clicks     Was pt agreeable to Eval/treatment? yes yes    Does pt have pain? No pain No pain    Bed Mobility  Rolling: NT  Supine to sit: SBA  Sit to supine: NT  Scooting: SBA Rolling: NT  Supine to sit: SBA  Sit to supine: NT  Scooting: SBA Rolling: IND  Supine to sit: IND  Sit to supine: IND  Scooting: IND   Transfers Sit to stand: CGA  Stand to sit: CGA  Stand pivot: CGA with ww Sit to stand: CGA  Stand to sit: CGA  Stand pivot: CGA with no AD Sit to stand: mod I  Stand to sit: mod I  Stand pivot: mod I with AAD   Ambulation    20'x2 with ww CGA 50'x2 with no AD '+ with AAD mod I   Stair negotiation: ascended and descended  NT NT 12 steps with 1 handrail mod I     Strength/ROM:   BLE grossly 4/5  BLE AROM WFL    Balance:   Static Sitting: SBA  Dynamic Sitting: SBA  Static Standing: CGA with no AD  Dynamic Standing: CGA with no AD    Pt is A & O x 3  Sensation:  Pt denies numbness and tingling to extremities  Edema:  unremarkable    Vitals:  SpO2 and HR were stable during session while on 4L    Therapeutic Exercises:    Bed mobility: supine>sit, cued for EOB positioning  Transfers: STS x1, cued for hand placement and postural correction  Ambulation: 50'x2 with no AD  BLE AROM    Patient education  Pt educated on safety with

## 2025-03-13 ENCOUNTER — TELEPHONE (OUTPATIENT)
Dept: CARDIOTHORACIC SURGERY | Age: 45
End: 2025-03-13

## 2025-03-13 NOTE — TELEPHONE ENCOUNTER
No, not at this time. Cardiology should order a repeat echo in May, and they can contact us if we are needed at that time.

## 2025-03-16 ENCOUNTER — APPOINTMENT (OUTPATIENT)
Dept: GENERAL RADIOLOGY | Age: 45
DRG: 721 | End: 2025-03-16
Payer: MEDICAID

## 2025-03-16 ENCOUNTER — HOSPITAL ENCOUNTER (INPATIENT)
Age: 45
LOS: 2 days | Discharge: ANOTHER HOSPITAL | DRG: 721 | End: 2025-03-18
Attending: EMERGENCY MEDICINE | Admitting: HOSPITALIST
Payer: MEDICAID

## 2025-03-16 ENCOUNTER — APPOINTMENT (OUTPATIENT)
Dept: ULTRASOUND IMAGING | Age: 45
DRG: 721 | End: 2025-03-16
Payer: MEDICAID

## 2025-03-16 DIAGNOSIS — M79.89 PAIN AND SWELLING OF FOREARM, RIGHT: ICD-10-CM

## 2025-03-16 DIAGNOSIS — J96.01 ACUTE HYPOXIC RESPIRATORY FAILURE (HCC): ICD-10-CM

## 2025-03-16 DIAGNOSIS — Z86.79 HISTORY OF ENDOCARDITIS: ICD-10-CM

## 2025-03-16 DIAGNOSIS — I25.2 HISTORY OF ST ELEVATION MYOCARDIAL INFARCTION (STEMI): ICD-10-CM

## 2025-03-16 DIAGNOSIS — I35.8 AORTIC VALVE ENDOCARDITIS: ICD-10-CM

## 2025-03-16 DIAGNOSIS — M79.631 PAIN AND SWELLING OF FOREARM, RIGHT: ICD-10-CM

## 2025-03-16 DIAGNOSIS — N17.9 AKI (ACUTE KIDNEY INJURY): ICD-10-CM

## 2025-03-16 DIAGNOSIS — R65.21 SEPTIC SHOCK (HCC): ICD-10-CM

## 2025-03-16 DIAGNOSIS — I33.0 ACUTE BACTERIAL ENDOCARDITIS: ICD-10-CM

## 2025-03-16 DIAGNOSIS — D64.9 SYMPTOMATIC ANEMIA: Primary | ICD-10-CM

## 2025-03-16 DIAGNOSIS — I95.9 HYPOTENSION, UNSPECIFIED HYPOTENSION TYPE: ICD-10-CM

## 2025-03-16 DIAGNOSIS — A41.9 SEPTIC SHOCK (HCC): ICD-10-CM

## 2025-03-16 LAB
ALBUMIN SERPL-MCNC: 2.6 G/DL (ref 3.5–5.2)
ALP SERPL-CCNC: 108 U/L (ref 40–129)
ALT SERPL-CCNC: 21 U/L (ref 0–40)
ANION GAP SERPL CALCULATED.3IONS-SCNC: 16 MMOL/L (ref 7–16)
AST SERPL-CCNC: 20 U/L (ref 0–39)
B.E.: -2.8 MMOL/L (ref -3–3)
BASOPHILS # BLD: 0.03 K/UL (ref 0–0.2)
BASOPHILS NFR BLD: 0 % (ref 0–2)
BILIRUB SERPL-MCNC: 0.6 MG/DL (ref 0–1.2)
BNP SERPL-MCNC: ABNORMAL PG/ML (ref 0–125)
BUN SERPL-MCNC: 82 MG/DL (ref 6–20)
CALCIUM SERPL-MCNC: 8.3 MG/DL (ref 8.6–10.2)
CHLORIDE SERPL-SCNC: 89 MMOL/L (ref 98–107)
CK SERPL-CCNC: 41 U/L (ref 20–200)
CO2 SERPL-SCNC: 23 MMOL/L (ref 22–29)
COHB: 0.7 % (ref 0–1.5)
CREAT SERPL-MCNC: 5.9 MG/DL (ref 0.7–1.2)
CRITICAL: ABNORMAL
DATE ANALYZED: ABNORMAL
DATE OF COLLECTION: ABNORMAL
EOSINOPHIL # BLD: 0.02 K/UL (ref 0.05–0.5)
EOSINOPHILS RELATIVE PERCENT: 0 % (ref 0–6)
ERYTHROCYTE [DISTWIDTH] IN BLOOD BY AUTOMATED COUNT: 17.7 % (ref 11.5–15)
FERRITIN SERPL-MCNC: 454 NG/ML
GFR, ESTIMATED: 11 ML/MIN/1.73M2
GLUCOSE SERPL-MCNC: 118 MG/DL (ref 74–99)
HCO3: 20.4 MMOL/L (ref 22–26)
HCT VFR BLD AUTO: 17.6 % (ref 37–54)
HCT VFR BLD AUTO: 20.2 % (ref 37–54)
HCT VFR BLD AUTO: 21.4 % (ref 37–54)
HGB BLD-MCNC: 5.7 G/DL (ref 12.5–16.5)
HGB BLD-MCNC: 6.4 G/DL (ref 12.5–16.5)
HGB BLD-MCNC: 7.1 G/DL (ref 12.5–16.5)
HHB: 5.6 % (ref 0–5)
IMM GRANULOCYTES # BLD AUTO: 0.6 K/UL (ref 0–0.58)
IMM GRANULOCYTES NFR BLD: 5 % (ref 0–5)
INFLUENZA A BY PCR: NOT DETECTED
INFLUENZA B BY PCR: NOT DETECTED
IRON SATN MFR SERPL: 6 % (ref 20–55)
IRON SERPL-MCNC: 11 UG/DL (ref 59–158)
LAB: ABNORMAL
LACTATE BLDV-SCNC: 1.5 MMOL/L (ref 0.5–2.2)
LACTATE BLDV-SCNC: 2 MMOL/L (ref 0.5–2.2)
LYMPHOCYTES NFR BLD: 1.06 K/UL (ref 1.5–4)
LYMPHOCYTES RELATIVE PERCENT: 8 % (ref 20–42)
Lab: 1623
MCH RBC QN AUTO: 24.9 PG (ref 26–35)
MCHC RBC AUTO-ENTMCNC: 32.4 G/DL (ref 32–34.5)
MCV RBC AUTO: 76.9 FL (ref 80–99.9)
METHB: 0.3 % (ref 0–1.5)
MODE: ABNORMAL
MONOCYTES NFR BLD: 0.93 K/UL (ref 0.1–0.95)
MONOCYTES NFR BLD: 7 % (ref 2–12)
NEUTROPHILS NFR BLD: 80 % (ref 43–80)
NEUTS SEG NFR BLD: 10.26 K/UL (ref 1.8–7.3)
O2 CONTENT: 10.2 ML/DL
O2 SATURATION: 94.3 % (ref 92–98.5)
O2HB: 93.4 % (ref 94–97)
OPERATOR ID: ABNORMAL
PATIENT TEMP: 37 C
PCO2: 29 MMHG (ref 35–45)
PH BLOOD GAS: 7.46 (ref 7.35–7.45)
PLATELET # BLD AUTO: 209 K/UL (ref 130–450)
PMV BLD AUTO: 10.2 FL (ref 7–12)
PO2: 71.8 MMHG (ref 75–100)
POTASSIUM SERPL-SCNC: 3.9 MMOL/L (ref 3.5–5)
PROT SERPL-MCNC: 6.4 G/DL (ref 6.4–8.3)
RBC # BLD AUTO: 2.29 M/UL (ref 3.8–5.8)
RBC # BLD: ABNORMAL 10*6/UL
SARS-COV-2 RDRP RESP QL NAA+PROBE: NOT DETECTED
SODIUM SERPL-SCNC: 128 MMOL/L (ref 132–146)
SOURCE, BLOOD GAS: ABNORMAL
SPECIMEN DESCRIPTION: NORMAL
THB: 7.7 G/DL (ref 11.5–16.5)
TIBC SERPL-MCNC: 170 UG/DL (ref 250–450)
TIME ANALYZED: 1630
TROPONIN I SERPL HS-MCNC: 282 NG/L (ref 0–11)
TROPONIN I SERPL HS-MCNC: 298 NG/L (ref 0–11)
WBC OTHER # BLD: 12.9 K/UL (ref 4.5–11.5)

## 2025-03-16 PROCEDURE — 83550 IRON BINDING TEST: CPT

## 2025-03-16 PROCEDURE — 3E033XZ INTRODUCTION OF VASOPRESSOR INTO PERIPHERAL VEIN, PERCUTANEOUS APPROACH: ICD-10-PCS | Performed by: EMERGENCY MEDICINE

## 2025-03-16 PROCEDURE — 86900 BLOOD TYPING SEROLOGIC ABO: CPT

## 2025-03-16 PROCEDURE — 85018 HEMOGLOBIN: CPT

## 2025-03-16 PROCEDURE — 2580000003 HC RX 258

## 2025-03-16 PROCEDURE — P9016 RBC LEUKOCYTES REDUCED: HCPCS

## 2025-03-16 PROCEDURE — 82728 ASSAY OF FERRITIN: CPT

## 2025-03-16 PROCEDURE — 2000000000 HC ICU R&B

## 2025-03-16 PROCEDURE — 87635 SARS-COV-2 COVID-19 AMP PRB: CPT

## 2025-03-16 PROCEDURE — 71045 X-RAY EXAM CHEST 1 VIEW: CPT

## 2025-03-16 PROCEDURE — 73080 X-RAY EXAM OF ELBOW: CPT

## 2025-03-16 PROCEDURE — 36592 COLLECT BLOOD FROM PICC: CPT

## 2025-03-16 PROCEDURE — 2580000003 HC RX 258: Performed by: HOSPITALIST

## 2025-03-16 PROCEDURE — 73060 X-RAY EXAM OF HUMERUS: CPT

## 2025-03-16 PROCEDURE — 84484 ASSAY OF TROPONIN QUANT: CPT

## 2025-03-16 PROCEDURE — 82805 BLOOD GASES W/O2 SATURATION: CPT

## 2025-03-16 PROCEDURE — 87150 DNA/RNA AMPLIFIED PROBE: CPT

## 2025-03-16 PROCEDURE — 36430 TRANSFUSION BLD/BLD COMPNT: CPT

## 2025-03-16 PROCEDURE — 96365 THER/PROPH/DIAG IV INF INIT: CPT

## 2025-03-16 PROCEDURE — 99223 1ST HOSP IP/OBS HIGH 75: CPT | Performed by: HOSPITALIST

## 2025-03-16 PROCEDURE — 2580000003 HC RX 258: Performed by: EMERGENCY MEDICINE

## 2025-03-16 PROCEDURE — 73090 X-RAY EXAM OF FOREARM: CPT

## 2025-03-16 PROCEDURE — 99285 EMERGENCY DEPT VISIT HI MDM: CPT

## 2025-03-16 PROCEDURE — 82550 ASSAY OF CK (CPK): CPT

## 2025-03-16 PROCEDURE — 86923 COMPATIBILITY TEST ELECTRIC: CPT

## 2025-03-16 PROCEDURE — 85014 HEMATOCRIT: CPT

## 2025-03-16 PROCEDURE — 96361 HYDRATE IV INFUSION ADD-ON: CPT

## 2025-03-16 PROCEDURE — 84145 PROCALCITONIN (PCT): CPT

## 2025-03-16 PROCEDURE — 93971 EXTREMITY STUDY: CPT

## 2025-03-16 PROCEDURE — 87040 BLOOD CULTURE FOR BACTERIA: CPT

## 2025-03-16 PROCEDURE — 6360000002 HC RX W HCPCS

## 2025-03-16 PROCEDURE — 2500000003 HC RX 250 WO HCPCS: Performed by: HOSPITALIST

## 2025-03-16 PROCEDURE — 86140 C-REACTIVE PROTEIN: CPT

## 2025-03-16 PROCEDURE — 83880 ASSAY OF NATRIURETIC PEPTIDE: CPT

## 2025-03-16 PROCEDURE — 96375 TX/PRO/DX INJ NEW DRUG ADDON: CPT

## 2025-03-16 PROCEDURE — 6360000002 HC RX W HCPCS: Performed by: EMERGENCY MEDICINE

## 2025-03-16 PROCEDURE — 86850 RBC ANTIBODY SCREEN: CPT

## 2025-03-16 PROCEDURE — 80053 COMPREHEN METABOLIC PANEL: CPT

## 2025-03-16 PROCEDURE — 87502 INFLUENZA DNA AMP PROBE: CPT

## 2025-03-16 PROCEDURE — 85025 COMPLETE CBC W/AUTO DIFF WBC: CPT

## 2025-03-16 PROCEDURE — 86403 PARTICLE AGGLUT ANTBDY SCRN: CPT

## 2025-03-16 PROCEDURE — 83605 ASSAY OF LACTIC ACID: CPT

## 2025-03-16 PROCEDURE — 93005 ELECTROCARDIOGRAM TRACING: CPT | Performed by: EMERGENCY MEDICINE

## 2025-03-16 PROCEDURE — 6360000002 HC RX W HCPCS: Performed by: HOSPITALIST

## 2025-03-16 PROCEDURE — 87081 CULTURE SCREEN ONLY: CPT

## 2025-03-16 PROCEDURE — 86901 BLOOD TYPING SEROLOGIC RH(D): CPT

## 2025-03-16 PROCEDURE — 6370000000 HC RX 637 (ALT 250 FOR IP): Performed by: HOSPITALIST

## 2025-03-16 PROCEDURE — 83540 ASSAY OF IRON: CPT

## 2025-03-16 RX ORDER — ATORVASTATIN CALCIUM 40 MG/1
40 TABLET, FILM COATED ORAL NIGHTLY
Status: DISCONTINUED | OUTPATIENT
Start: 2025-03-16 | End: 2025-03-18 | Stop reason: HOSPADM

## 2025-03-16 RX ORDER — SODIUM CHLORIDE 9 MG/ML
INJECTION, SOLUTION INTRAVENOUS PRN
Status: DISCONTINUED | OUTPATIENT
Start: 2025-03-16 | End: 2025-03-17

## 2025-03-16 RX ORDER — ACETAMINOPHEN 325 MG/1
650 TABLET ORAL EVERY 6 HOURS PRN
Status: DISCONTINUED | OUTPATIENT
Start: 2025-03-16 | End: 2025-03-18 | Stop reason: HOSPADM

## 2025-03-16 RX ORDER — SODIUM CHLORIDE 9 MG/ML
INJECTION, SOLUTION INTRAVENOUS PRN
Status: COMPLETED | OUTPATIENT
Start: 2025-03-16 | End: 2025-03-16

## 2025-03-16 RX ORDER — ACETAMINOPHEN 650 MG/1
650 SUPPOSITORY RECTAL EVERY 6 HOURS PRN
Status: DISCONTINUED | OUTPATIENT
Start: 2025-03-16 | End: 2025-03-18 | Stop reason: HOSPADM

## 2025-03-16 RX ORDER — ONDANSETRON 4 MG/1
4 TABLET, ORALLY DISINTEGRATING ORAL EVERY 8 HOURS PRN
Status: DISCONTINUED | OUTPATIENT
Start: 2025-03-16 | End: 2025-03-18 | Stop reason: HOSPADM

## 2025-03-16 RX ORDER — SODIUM CHLORIDE 9 MG/ML
INJECTION, SOLUTION INTRAVENOUS PRN
Status: DISCONTINUED | OUTPATIENT
Start: 2025-03-16 | End: 2025-03-18 | Stop reason: HOSPADM

## 2025-03-16 RX ORDER — WATER 10 ML/10ML
20 INJECTION INTRAMUSCULAR; INTRAVENOUS; SUBCUTANEOUS EVERY 24 HOURS
Status: DISCONTINUED | OUTPATIENT
Start: 2025-03-17 | End: 2025-03-18 | Stop reason: HOSPADM

## 2025-03-16 RX ORDER — CEFEPIME HYDROCHLORIDE 2 G/1
2 INJECTION, POWDER, FOR SOLUTION INTRAVENOUS EVERY 24 HOURS
Status: DISCONTINUED | OUTPATIENT
Start: 2025-03-17 | End: 2025-03-18

## 2025-03-16 RX ORDER — 0.9 % SODIUM CHLORIDE 0.9 %
500 INTRAVENOUS SOLUTION INTRAVENOUS ONCE
Status: COMPLETED | OUTPATIENT
Start: 2025-03-16 | End: 2025-03-16

## 2025-03-16 RX ORDER — ONDANSETRON 2 MG/ML
4 INJECTION INTRAMUSCULAR; INTRAVENOUS EVERY 6 HOURS PRN
Status: DISCONTINUED | OUTPATIENT
Start: 2025-03-16 | End: 2025-03-18 | Stop reason: HOSPADM

## 2025-03-16 RX ORDER — PANTOPRAZOLE SODIUM 40 MG/10ML
40 INJECTION, POWDER, LYOPHILIZED, FOR SOLUTION INTRAVENOUS ONCE
Status: COMPLETED | OUTPATIENT
Start: 2025-03-16 | End: 2025-03-16

## 2025-03-16 RX ORDER — 0.9 % SODIUM CHLORIDE 0.9 %
2000 INTRAVENOUS SOLUTION INTRAVENOUS ONCE
Status: COMPLETED | OUTPATIENT
Start: 2025-03-16 | End: 2025-03-16

## 2025-03-16 RX ORDER — SODIUM CHLORIDE 0.9 % (FLUSH) 0.9 %
5-40 SYRINGE (ML) INJECTION PRN
Status: DISCONTINUED | OUTPATIENT
Start: 2025-03-16 | End: 2025-03-18 | Stop reason: HOSPADM

## 2025-03-16 RX ORDER — ASPIRIN 81 MG/1
81 TABLET, CHEWABLE ORAL DAILY
Status: DISCONTINUED | OUTPATIENT
Start: 2025-03-17 | End: 2025-03-18 | Stop reason: HOSPADM

## 2025-03-16 RX ORDER — SODIUM CHLORIDE 0.9 % (FLUSH) 0.9 %
5-40 SYRINGE (ML) INJECTION EVERY 12 HOURS SCHEDULED
Status: DISCONTINUED | OUTPATIENT
Start: 2025-03-16 | End: 2025-03-18 | Stop reason: HOSPADM

## 2025-03-16 RX ORDER — POLYETHYLENE GLYCOL 3350 17 G/17G
17 POWDER, FOR SOLUTION ORAL DAILY PRN
Status: DISCONTINUED | OUTPATIENT
Start: 2025-03-16 | End: 2025-03-18 | Stop reason: HOSPADM

## 2025-03-16 RX ORDER — CLOPIDOGREL BISULFATE 75 MG/1
75 TABLET ORAL DAILY
Status: DISCONTINUED | OUTPATIENT
Start: 2025-03-17 | End: 2025-03-18 | Stop reason: HOSPADM

## 2025-03-16 RX ORDER — ALBUTEROL SULFATE 0.83 MG/ML
2.5 SOLUTION RESPIRATORY (INHALATION)
Status: DISCONTINUED | OUTPATIENT
Start: 2025-03-16 | End: 2025-03-18 | Stop reason: HOSPADM

## 2025-03-16 RX ORDER — SODIUM CHLORIDE 9 MG/ML
INJECTION, SOLUTION INTRAVENOUS CONTINUOUS
Status: DISCONTINUED | OUTPATIENT
Start: 2025-03-16 | End: 2025-03-17

## 2025-03-16 RX ADMIN — ATORVASTATIN CALCIUM 40 MG: 40 TABLET, FILM COATED ORAL at 22:06

## 2025-03-16 RX ADMIN — SODIUM CHLORIDE: 0.9 INJECTION, SOLUTION INTRAVENOUS at 13:01

## 2025-03-16 RX ADMIN — SODIUM CHLORIDE 40 MG: 9 INJECTION, SOLUTION INTRAMUSCULAR; INTRAVENOUS; SUBCUTANEOUS at 22:06

## 2025-03-16 RX ADMIN — ACETAMINOPHEN 650 MG: 325 TABLET ORAL at 22:39

## 2025-03-16 RX ADMIN — VANCOMYCIN HYDROCHLORIDE 2000 MG: 10 INJECTION, POWDER, LYOPHILIZED, FOR SOLUTION INTRAVENOUS at 15:27

## 2025-03-16 RX ADMIN — SODIUM CHLORIDE, PRESERVATIVE FREE 10 ML: 5 INJECTION INTRAVENOUS at 22:24

## 2025-03-16 RX ADMIN — SODIUM CHLORIDE 2000 ML: 9 INJECTION, SOLUTION INTRAVENOUS at 19:34

## 2025-03-16 RX ADMIN — SODIUM CHLORIDE 500 ML: 0.9 INJECTION, SOLUTION INTRAVENOUS at 15:19

## 2025-03-16 RX ADMIN — SODIUM CHLORIDE: 0.9 INJECTION, SOLUTION INTRAVENOUS at 20:26

## 2025-03-16 RX ADMIN — CEFEPIME 2000 MG: 2 INJECTION, POWDER, FOR SOLUTION INTRAVENOUS at 14:53

## 2025-03-16 RX ADMIN — SODIUM CHLORIDE 500 ML: 0.9 INJECTION, SOLUTION INTRAVENOUS at 13:01

## 2025-03-16 RX ADMIN — PANTOPRAZOLE SODIUM 40 MG: 40 INJECTION, POWDER, FOR SOLUTION INTRAVENOUS at 13:06

## 2025-03-16 ASSESSMENT — PAIN SCALES - WONG BAKER: WONGBAKER_NUMERICALRESPONSE: HURTS A LITTLE BIT

## 2025-03-16 ASSESSMENT — PAIN DESCRIPTION - LOCATION: LOCATION: HEAD

## 2025-03-16 ASSESSMENT — PAIN DESCRIPTION - DESCRIPTORS: DESCRIPTORS: ACHING

## 2025-03-16 NOTE — ED NOTES
Report given to Talya MCLEAN from Lori MCLEAN  Report method in person   The following was reviewed with receiving RN:   Current vital signs:  /78   Pulse (!) 115   Temp 99 °F (37.2 °C)   Resp 16   Ht 1.905 m (6' 3\")   Wt 81.6 kg (180 lb)   SpO2 (!) 69%   BMI 22.50 kg/m²                MEWS Score: 1     Any medication or safety alerts were reviewed. Any pending diagnostics and notifications were also reviewed, as well as any safety concerns or issues, abnormal labs, abnormal imaging, and abnormal assessment findings. Questions were answered.

## 2025-03-16 NOTE — H&P
extremities.     I have personally reviewed and interpreted the Initial workups as summarized below:     WBC 12.9 K, H/H 5.7/17.6 baseline was above 7/21, platelet stable normal range, lactic acid 2.0  Renal function sodium 128, glucose 118, creatinine 5.9 with potassium 3.9, consistent with BELLO on CKD stage IV.  Hepatic function   albumin 2.6 otherwise normal LFT    CXR  reviewed  1. Mild cardiomegaly with prominent pulmonary vascularity and increased  interstitial markings with small bilateral pleural effusions.  2. Right-sided MediPort catheter in position.    Troponin 282,  EKG reviewed sinus rhythm  with no acute ST/T wave ischemic change.     Left heart cath on 2/17/2025 for non-STEMI  subtotal occlusion of mid LAD with soft plaque and small thrombus and OSORIO II flow,   50-60% focal mid ramus,   patent small circumflex,   30% mid RCA, and  hypokinesis of distal half of anterior wall, apex and distal inferior wall with LVEF of more than 45%  Successful angioplasty of mid LAD lesion     JAVED on 2/18/2025 showed Small vegetations involving the mitral and aortic valves consistent with endocarditis without valvular destruction.  Near normal LV systolic function, no pericardial effusion.    Past Medical History:        Diagnosis Date    Hx of hepatitis C-resolved 3/5/2022    IV drug abuse (HCC)     Nonhealing nonsurgical wound with fat layer exposed        Past Surgical History:        Procedure Laterality Date    APPLY DRESSING Left 10/9/2020    DEBRIDEMENT OF SKIN, SOFT TISSUE, MUSCLE, EXPLORATION OF UPPER ARM INTEGRA GRAFT FOR COVERAGE performed by Camilo Denny [place] at [facility] [place]    APPLY DRESSING Left 10/14/2020    LEFT UPPER EXTREMITY WOUND VAC PLACEMENT performed by Eddie Ruff MD at [facility] [place]    CARDIAC PROCEDURE N/A 2/17/2025    Left heart cath / coronary angiography performed by Bg Gaytan MD at [facility] CARDIAC CATH/IR    CARDIAC PROCEDURE Left 2/17/2025    Percutaneous coronary intervention  barrier technique were used including washing hands with conventional soap and water or with alcohol-based hand rubs (ABHR), skin preparation, cap, mask, sterile gown, sterile gloves, and sterile full body drape. Local anesthesia was achieved with lidocaine. A micropuncture needle was used to access the right internal jugular vein using ultrasound guidance.  An ultrasound image demonstrating patency of the vein with needle tip located within it was obtained and stored in PACS.  A 0 0.018 guidewire was used to place a peel-away sheath.  A subcutaneous tunnel was created to the infraclavicular region and a tunneled, cuffed small bore central venous catheter was pulled through the subcutaneous tunnel to the venotomy site and advanced through the peel-away sheath under fluoroscopic guidance to the right atrium.  The catheter flushed easily and there was a good blood return. The catheter was sutured to the skin.  The catheter was locked with heparinized saline. The patient tolerated the procedure well and there were no immediate complications. FINDINGS: Fluoroscopic image demonstrates the tip of the catheter in the superior vena cava.     Status post ultrasound and fluoroscopically guided placement of a tunneled small bore central venous catheter, as above.     CT HEAD WO CONTRAST  Result Date: 2/24/2025  EXAMINATION: CT OF THE HEAD WITHOUT CONTRAST  2/23/2025 11:59 pm TECHNIQUE: CT of the head was performed without the administration of intravenous contrast. Automated exposure control, iterative reconstruction, and/or weight based adjustment of the mA/kV was utilized to reduce the radiation dose to as low as reasonably achievable. COMPARISON: February 18, 2025 HISTORY: ORDERING SYSTEM PROVIDED HISTORY: new headache, known septic emobli to brain TECHNOLOGIST PROVIDED HISTORY: Has a \"code stroke\" or \"stroke alert\" been called?->No Reason for exam:->new headache, known septic emobli to brain What reading provider will be

## 2025-03-16 NOTE — CONSENT
Informed Consent for Blood Component Transfusion Note    I have discussed with the patient the rationale for blood component transfusion; its benefits in treating or preventing fatigue, organ damage, or death; and its risk which includes mild transfusion reactions, rare risk of blood borne infection, or more serious but rare reactions. I have discussed the alternatives to transfusion, including the risk and consequences of not receiving transfusion. The patient had an opportunity to ask questions and had agreed to proceed with transfusion of blood components.    Electronically signed by Martell Gustafson DO on 3/16/25 at 11:12 AM EDT

## 2025-03-16 NOTE — ED NOTES
-Patient reports last dose of daptomycin was either yesterday or this morning.  -With creatinine clearance less than 30 mL/min, dosing frequency is q48h.

## 2025-03-16 NOTE — PROGRESS NOTES
Pharmacy Consultation Note  (Antibiotic Dosing and Monitoring)    Initial consult date: 3/16  Consulting physician/provider: Jamison  Drug: Vancomycin  Indication: Sepsis x 7 days    Age/  Gender Height Weight IBW  Allergy Information   45 y.o./male 190.5 cm (6' 3\") 81.6 kg (180 lb)     Ideal body weight: 84.5 kg (186 lb 4.6 oz)   Patient has no known allergies.      Renal Function:  Recent Labs     03/16/25  1046   BUN 82*   CREATININE 5.9*       Intake/Output Summary (Last 24 hours) at 3/16/2025 1739  Last data filed at 3/16/2025 1632  Gross per 24 hour   Intake 700 ml   Output 230 ml   Net 470 ml       Vancomycin Monitoring:  Trough:  No results for input(s): \"VANCOTROUGH\" in the last 72 hours.  Random:  No results for input(s): \"VANCORANDOM\" in the last 72 hours.        Assessment:  Patient is a 45 y.o. male who has been initiated on vancomycin  Estimated Creatinine Clearance: 18 mL/min (A) (based on SCr of 5.9 mg/dL (H)).  Patient does have a history of vancomycin levels, however, creatinine clearance is currently less than 30 mL/min, thus will dose vancomycin by level.  Patient received vancomycin 2000 mg IV x 1 on 3/16 (1527)  Patient had been receiving daptomycin prior to arrival.   Patient reports last dose of daptomycin was either yesterday or this morning; With creatinine clearance less than 30 mL/min, dosing frequency is q48h    Plan:  Will dose vancomycin by level  No more vancomycin doses today.  Vancomycin level ordered with morning labs on 3/17  Will follow-up vancomycin level and ID notes to determine future vancomycin dosing plans.   Will continue to monitor renal function   Pharmacy to follow      Chapin Marion PharmD 3/16/2025 5:39 PM   Ext: [phone]    NAHOMI: [phone]  SEY: [phone]  SJW: [phone]

## 2025-03-16 NOTE — CONSULTS
Critical Care Admit/Consult Note         Patient - Wagner Ryan   MRN -  [id number]   [id number]   - 1980      Date of Admission -  3/16/2025 10:03 AM  Date of evaluation -  3/16/2025  14/14   Hospital Day - 0            ADMIT/CONSULT DETAILS     Reason for Admit/Consult   Hypotension  Sepsis    Consulting Service/Physician   Consulting - Martell Gustafson DO  Primary Care Physician - Martha Maynard MD HPI   The patient is a 45 y.o. male with a complicated past medical history .    He had a prolonged hospitalizations from  through .  Initially had presented to [facility] on  with a chief complaint of fatigue falls confusion.  A workup in the emergency room department that time showed that patient was having an acute STEMI and he was taken to the Cath Lab and he had PTCA with a drug-eluting stent placed in the LAD by Dr. Gaytan.  Due to his altered mental status he had an MRI of his brain which showed Multiple small foci of acute or early subacute infarctions in the bilateral frontal, parietal and occipital lobes, left more than right.  Many  of the infarctions are in a watershed territory distribution.  Findings may be related to hypoperfusion or micro emboli.2. Subtle foci of enhancement in the right frontal, parietal and occipital lobes. These are likely related to subacute infarctions.  Patient also was found to have MRSA bacteremia following that he had a JAVED with put on mitral patient were transferred to [facility] for further workup and evaluation by CTS surgery.  Patient was evaluated by CTS surgery.  Due to the fact that he had recently got a drug-eluting stent and had to be on dual antiplatelet therapy he was deemed not to be able to be a surgical candidate and with discussion with cardiology it was decided we will best with conservative therapy therefore patient was followed by infectious disease had a PICC

## 2025-03-17 ENCOUNTER — APPOINTMENT (OUTPATIENT)
Dept: CT IMAGING | Age: 45
DRG: 721 | End: 2025-03-17
Payer: MEDICAID

## 2025-03-17 ENCOUNTER — APPOINTMENT (OUTPATIENT)
Dept: GENERAL RADIOLOGY | Age: 45
DRG: 721 | End: 2025-03-17
Payer: MEDICAID

## 2025-03-17 ENCOUNTER — APPOINTMENT (OUTPATIENT)
Age: 45
DRG: 721 | End: 2025-03-17
Payer: MEDICAID

## 2025-03-17 PROBLEM — D64.9 SYMPTOMATIC ANEMIA: Status: ACTIVE | Noted: 2025-01-01

## 2025-03-17 LAB
ABO/RH: NORMAL
ALBUMIN SERPL-MCNC: 2.3 G/DL (ref 3.5–5.2)
ALBUMIN SERPL-MCNC: 2.6 G/DL (ref 3.5–5.2)
ALP SERPL-CCNC: 250 U/L (ref 40–129)
ALP SERPL-CCNC: 276 U/L (ref 40–129)
ALT SERPL-CCNC: 51 U/L (ref 0–40)
ALT SERPL-CCNC: 56 U/L (ref 0–40)
ANION GAP SERPL CALCULATED.3IONS-SCNC: 15 MMOL/L (ref 7–16)
ANION GAP SERPL CALCULATED.3IONS-SCNC: 17 MMOL/L (ref 7–16)
ANTIBODY SCREEN: NEGATIVE
ARM BAND NUMBER: NORMAL
AST SERPL-CCNC: 49 U/L (ref 0–39)
AST SERPL-CCNC: 79 U/L (ref 0–39)
ATYPICAL LYMPHOCYTE ABSOLUTE COUNT: 0.2 K/UL (ref 0–0.46)
ATYPICAL LYMPHOCYTE ABSOLUTE COUNT: 0.25 K/UL (ref 0–0.46)
ATYPICAL LYMPHOCYTES: 1 % (ref 0–4)
ATYPICAL LYMPHOCYTES: 2 % (ref 0–4)
B.E.: -9.3 MMOL/L (ref -3–3)
B.E.: -9.6 MMOL/L (ref -3–3)
BACTERIA URNS QL MICRO: ABNORMAL
BASOPHILS # BLD: 0 K/UL (ref 0–0.2)
BASOPHILS # BLD: 0 K/UL (ref 0–0.2)
BASOPHILS NFR BLD: 0 % (ref 0–2)
BASOPHILS NFR BLD: 0 % (ref 0–2)
BILIRUB SERPL-MCNC: 0.5 MG/DL (ref 0–1.2)
BILIRUB SERPL-MCNC: 0.9 MG/DL (ref 0–1.2)
BILIRUB UR QL STRIP: NEGATIVE
BLOOD BANK BLOOD PRODUCT EXPIRATION DATE: NORMAL
BLOOD BANK DISPENSE STATUS: NORMAL
BLOOD BANK ISBT PRODUCT BLOOD TYPE: 5100
BLOOD BANK PRODUCT CODE: NORMAL
BLOOD BANK SAMPLE EXPIRATION: NORMAL
BLOOD BANK UNIT TYPE AND RH: NORMAL
BPU ID: NORMAL
BUN SERPL-MCNC: 81 MG/DL (ref 6–20)
BUN SERPL-MCNC: 89 MG/DL (ref 6–20)
CA-I BLD-SCNC: 1.11 MMOL/L (ref 1.15–1.33)
CALCIUM SERPL-MCNC: 7.9 MG/DL (ref 8.6–10.2)
CALCIUM SERPL-MCNC: 8 MG/DL (ref 8.6–10.2)
CHLORIDE SERPL-SCNC: 96 MMOL/L (ref 98–107)
CHLORIDE SERPL-SCNC: 96 MMOL/L (ref 98–107)
CK SERPL-CCNC: 48 U/L (ref 20–200)
CLARITY UR: CLEAR
CO2 SERPL-SCNC: 18 MMOL/L (ref 22–29)
CO2 SERPL-SCNC: 19 MMOL/L (ref 22–29)
COHB: 0 % (ref 0–1.5)
COHB: 0.1 % (ref 0–1.5)
COLOR UR: ABNORMAL
COMPONENT: NORMAL
CREAT SERPL-MCNC: 6.1 MG/DL (ref 0.7–1.2)
CREAT SERPL-MCNC: 6.3 MG/DL (ref 0.7–1.2)
CRITICAL: ABNORMAL
CRITICAL: ABNORMAL
CROSSMATCH RESULT: NORMAL
CRP SERPL HS-MCNC: 266 MG/L (ref 0–5)
DATE ANALYZED: ABNORMAL
DATE ANALYZED: ABNORMAL
DATE OF COLLECTION: ABNORMAL
DATE OF COLLECTION: ABNORMAL
ECHO BSA: 2.21 M2
ECHO EST RA PRESSURE: 8 MMHG
ECHO LA DIAMETER INDEX: 2.3 CM/M2
ECHO LA DIAMETER: 5.1 CM
ECHO LV EJECTION FRACTION 3D: 45 %
ECHO LV FRACTIONAL SHORTENING: 29 % (ref 28–44)
ECHO LV INTERNAL DIMENSION DIASTOLE INDEX: 2.84 CM/M2
ECHO LV INTERNAL DIMENSION DIASTOLIC: 6.3 CM (ref 4.2–5.9)
ECHO LV INTERNAL DIMENSION SYSTOLIC INDEX: 2.03 CM/M2
ECHO LV INTERNAL DIMENSION SYSTOLIC: 4.5 CM
ECHO LV IVSD: 1.1 CM (ref 0.6–1)
ECHO LV IVSS: 1.4 CM
ECHO LV MASS 2D: 303.5 G (ref 88–224)
ECHO LV MASS INDEX 2D: 136.7 G/M2 (ref 49–115)
ECHO LV POSTERIOR WALL DIASTOLIC: 1.1 CM (ref 0.6–1)
ECHO LV POSTERIOR WALL SYSTOLIC: 1.3 CM
ECHO LV RELATIVE WALL THICKNESS RATIO: 0.35
ECHO MV EROA PISA: 0.9 CM2
ECHO MV REGURGITANT ALIASING (NYQUIST) VELOCITY: 41 CM/S
ECHO MV REGURGITANT RADIUS PISA: 1.22 CM
ECHO MV REGURGITANT VELOCITY PISA: 4.2 M/S
ECHO MV REGURGITANT VOLUME PISA: 92.16 ML
ECHO MV REGURGITANT VTIA: 101 CM
ECHO RIGHT VENTRICULAR SYSTOLIC PRESSURE (RVSP): 58 MMHG
ECHO RV INTERNAL DIMENSION: 3.9 CM
ECHO RV TAPSE: 1.9 CM (ref 1.7–?)
ECHO TV REGURGITANT MAX VELOCITY: 3.53 M/S
ECHO TV REGURGITANT PEAK GRADIENT: 50 MMHG
EKG ATRIAL RATE: 95 BPM
EKG P AXIS: 52 DEGREES
EKG P-R INTERVAL: 186 MS
EKG Q-T INTERVAL: 388 MS
EKG QRS DURATION: 112 MS
EKG QTC CALCULATION (BAZETT): 487 MS
EKG R AXIS: -2 DEGREES
EKG T AXIS: 47 DEGREES
EKG VENTRICULAR RATE: 95 BPM
EOSINOPHIL # BLD: 0 K/UL (ref 0.05–0.5)
EOSINOPHIL # BLD: 0 K/UL (ref 0.05–0.5)
EOSINOPHILS RELATIVE PERCENT: 0 % (ref 0–6)
EOSINOPHILS RELATIVE PERCENT: 0 % (ref 0–6)
ERYTHROCYTE [DISTWIDTH] IN BLOOD BY AUTOMATED COUNT: 17.5 % (ref 11.5–15)
ERYTHROCYTE [DISTWIDTH] IN BLOOD BY AUTOMATED COUNT: 17.7 % (ref 11.5–15)
FIBRINOGEN PPP-MCNC: 277 MG/DL (ref 200–400)
FIO2: 100 %
GFR, ESTIMATED: 10 ML/MIN/1.73M2
GFR, ESTIMATED: 11 ML/MIN/1.73M2
GLUCOSE SERPL-MCNC: 125 MG/DL (ref 74–99)
GLUCOSE SERPL-MCNC: 130 MG/DL (ref 74–99)
GLUCOSE UR STRIP-MCNC: NEGATIVE MG/DL
HCO3: 13.7 MMOL/L (ref 22–26)
HCO3: 17 MMOL/L (ref 22–26)
HCT VFR BLD AUTO: 23.4 % (ref 37–54)
HCT VFR BLD AUTO: 25.1 % (ref 37–54)
HCT VFR BLD AUTO: 27 % (ref 37–54)
HCT VFR BLD AUTO: 28.6 % (ref 37–54)
HGB BLD-MCNC: 7.7 G/DL (ref 12.5–16.5)
HGB BLD-MCNC: 8 G/DL (ref 12.5–16.5)
HGB BLD-MCNC: 8.8 G/DL (ref 12.5–16.5)
HGB BLD-MCNC: 9.4 G/DL (ref 12.5–16.5)
HGB UR QL STRIP.AUTO: ABNORMAL
HHB: 0.8 % (ref 0–5)
HHB: 2.6 % (ref 0–5)
INR PPP: 1.2
KETONES UR STRIP-MCNC: NEGATIVE MG/DL
LAB: ABNORMAL
LAB: ABNORMAL
LACTATE BLDV-SCNC: 1.4 MMOL/L (ref 0.5–2.2)
LACTATE BLDV-SCNC: 1.6 MMOL/L (ref 0.5–2.2)
LEUKOCYTE ESTERASE UR QL STRIP: ABNORMAL
LYMPHOCYTES NFR BLD: 0.2 K/UL (ref 1.5–4)
LYMPHOCYTES NFR BLD: 0.61 K/UL (ref 1.5–4)
LYMPHOCYTES RELATIVE PERCENT: 1 % (ref 20–42)
LYMPHOCYTES RELATIVE PERCENT: 4 % (ref 20–42)
Lab: 2044
Lab: 2352
MAGNESIUM SERPL-MCNC: 1.7 MG/DL (ref 1.6–2.6)
MAGNESIUM SERPL-MCNC: 2 MG/DL (ref 1.6–2.6)
MCH RBC QN AUTO: 25.5 PG (ref 26–35)
MCH RBC QN AUTO: 26 PG (ref 26–35)
MCHC RBC AUTO-ENTMCNC: 31.9 G/DL (ref 32–34.5)
MCHC RBC AUTO-ENTMCNC: 32.9 G/DL (ref 32–34.5)
MCV RBC AUTO: 79 FL (ref 80–99.9)
MCV RBC AUTO: 79.9 FL (ref 80–99.9)
METHB: 0.3 % (ref 0–1.5)
METHB: 0.3 % (ref 0–1.5)
MODE: ABNORMAL
MODE: AC
MONOCYTES NFR BLD: 0.12 K/UL (ref 0.1–0.95)
MONOCYTES NFR BLD: 0.8 K/UL (ref 0.1–0.95)
MONOCYTES NFR BLD: 1 % (ref 2–12)
MONOCYTES NFR BLD: 3 % (ref 2–12)
NEUTROPHILS NFR BLD: 93 % (ref 43–80)
NEUTROPHILS NFR BLD: 95 % (ref 43–80)
NEUTS SEG NFR BLD: 13.12 K/UL (ref 1.8–7.3)
NEUTS SEG NFR BLD: 22.11 K/UL (ref 1.8–7.3)
NITRITE UR QL STRIP: NEGATIVE
O2 CONTENT: 14.1 ML/DL
O2 CONTENT: 15.3 ML/DL
O2 SATURATION: 97.4 % (ref 92–98.5)
O2 SATURATION: 99.2 % (ref 92–98.5)
O2HB: 97.1 % (ref 94–97)
O2HB: 98.8 % (ref 94–97)
OPERATOR ID: 3342
OPERATOR ID: 3342
PARTIAL THROMBOPLASTIN TIME: 27.3 SEC (ref 24.5–35.1)
PATIENT TEMP: 37 C
PATIENT TEMP: 37 C
PCO2: 22.7 MMHG (ref 35–45)
PCO2: 38.7 MMHG (ref 35–45)
PEEP/CPAP: 8 CMH2O
PFO2: 1.94 MMHG/%
PH BLOOD GAS: 7.26 (ref 7.35–7.45)
PH BLOOD GAS: 7.4 (ref 7.35–7.45)
PH UR STRIP: 5.5 [PH] (ref 5–8)
PHOSPHATE SERPL-MCNC: 7.5 MG/DL (ref 2.5–4.5)
PLATELET # BLD AUTO: 201 K/UL (ref 130–450)
PLATELET # BLD AUTO: 307 K/UL (ref 130–450)
PMV BLD AUTO: 10.3 FL (ref 7–12)
PMV BLD AUTO: 10.6 FL (ref 7–12)
PO2: 109.1 MMHG (ref 75–100)
PO2: 193.9 MMHG (ref 75–100)
POTASSIUM SERPL-SCNC: 4 MMOL/L (ref 3.5–5)
POTASSIUM SERPL-SCNC: 4.2 MMOL/L (ref 3.5–5)
PROCALCITONIN SERPL-MCNC: 48.45 NG/ML (ref 0–0.08)
PROT SERPL-MCNC: 5.7 G/DL (ref 6.4–8.3)
PROT SERPL-MCNC: 6.2 G/DL (ref 6.4–8.3)
PROT UR STRIP-MCNC: 100 MG/DL
PROTHROMBIN TIME: 13 SEC (ref 9.3–12.4)
RBC # BLD AUTO: 3.14 M/UL (ref 3.8–5.8)
RBC # BLD AUTO: 3.62 M/UL (ref 3.8–5.8)
RBC # BLD: ABNORMAL 10*6/UL
RBC #/AREA URNS HPF: ABNORMAL /HPF
RI(T): 2.48
RR MECHANICAL: 18 B/MIN
SODIUM SERPL-SCNC: 130 MMOL/L (ref 132–146)
SODIUM SERPL-SCNC: 131 MMOL/L (ref 132–146)
SOURCE, BLOOD GAS: ABNORMAL
SOURCE, BLOOD GAS: ABNORMAL
SP GR UR STRIP: 1.02 (ref 1–1.03)
THB: 10.2 G/DL (ref 11.5–16.5)
THB: 10.7 G/DL (ref 11.5–16.5)
TIME ANALYZED: 2049
TIME ANALYZED: 2356
TRANSFUSION STATUS: NORMAL
TROPONIN I SERPL HS-MCNC: 261 NG/L (ref 0–11)
UNIT DIVISION: 0
UNIT ISSUE DATE/TIME: NORMAL
UROBILINOGEN UR STRIP-ACNC: 0.2 EU/DL (ref 0–1)
VANCOMYCIN SERPL-MCNC: 18.9 UG/ML (ref 5–40)
VT MECHANICAL: 500 ML
WBC # BLD: ABNORMAL 10*3/UL
WBC #/AREA URNS HPF: ABNORMAL /HPF
WBC OTHER # BLD: 14.1 K/UL (ref 4.5–11.5)
WBC OTHER # BLD: 23.3 K/UL (ref 4.5–11.5)

## 2025-03-17 PROCEDURE — 2709999900 FL REMOVAL CENTRAL VENOUS CATH

## 2025-03-17 PROCEDURE — 93321 DOPPLER ECHO F-UP/LMTD STD: CPT | Performed by: INTERNAL MEDICINE

## 2025-03-17 PROCEDURE — 71045 X-RAY EXAM CHEST 1 VIEW: CPT

## 2025-03-17 PROCEDURE — 80202 ASSAY OF VANCOMYCIN: CPT

## 2025-03-17 PROCEDURE — 31500 INSERT EMERGENCY AIRWAY: CPT | Performed by: NURSE PRACTITIONER

## 2025-03-17 PROCEDURE — 2580000003 HC RX 258

## 2025-03-17 PROCEDURE — 93010 ELECTROCARDIOGRAM REPORT: CPT | Performed by: INTERNAL MEDICINE

## 2025-03-17 PROCEDURE — 2500000003 HC RX 250 WO HCPCS: Performed by: NURSE PRACTITIONER

## 2025-03-17 PROCEDURE — 82330 ASSAY OF CALCIUM: CPT

## 2025-03-17 PROCEDURE — 51798 US URINE CAPACITY MEASURE: CPT

## 2025-03-17 PROCEDURE — 80053 COMPREHEN METABOLIC PANEL: CPT

## 2025-03-17 PROCEDURE — 6370000000 HC RX 637 (ALT 250 FOR IP): Performed by: INTERNAL MEDICINE

## 2025-03-17 PROCEDURE — 6360000002 HC RX W HCPCS: Performed by: HOSPITALIST

## 2025-03-17 PROCEDURE — 85014 HEMATOCRIT: CPT

## 2025-03-17 PROCEDURE — 83605 ASSAY OF LACTIC ACID: CPT

## 2025-03-17 PROCEDURE — 93308 TTE F-UP OR LMTD: CPT | Performed by: INTERNAL MEDICINE

## 2025-03-17 PROCEDURE — 2580000003 HC RX 258: Performed by: NURSE PRACTITIONER

## 2025-03-17 PROCEDURE — 84100 ASSAY OF PHOSPHORUS: CPT

## 2025-03-17 PROCEDURE — 5A1935Z RESPIRATORY VENTILATION, LESS THAN 24 CONSECUTIVE HOURS: ICD-10-PCS | Performed by: INTERNAL MEDICINE

## 2025-03-17 PROCEDURE — 82533 TOTAL CORTISOL: CPT

## 2025-03-17 PROCEDURE — 6360000002 HC RX W HCPCS: Performed by: NURSE PRACTITIONER

## 2025-03-17 PROCEDURE — 87086 URINE CULTURE/COLONY COUNT: CPT

## 2025-03-17 PROCEDURE — 02HV33Z INSERTION OF INFUSION DEVICE INTO SUPERIOR VENA CAVA, PERCUTANEOUS APPROACH: ICD-10-PCS | Performed by: STUDENT IN AN ORGANIZED HEALTH CARE EDUCATION/TRAINING PROGRAM

## 2025-03-17 PROCEDURE — 82800 BLOOD PH: CPT

## 2025-03-17 PROCEDURE — 6370000000 HC RX 637 (ALT 250 FOR IP): Performed by: HOSPITALIST

## 2025-03-17 PROCEDURE — 82550 ASSAY OF CK (CPK): CPT

## 2025-03-17 PROCEDURE — 94660 CPAP INITIATION&MGMT: CPT

## 2025-03-17 PROCEDURE — 85025 COMPLETE CBC W/AUTO DIFF WBC: CPT

## 2025-03-17 PROCEDURE — 81001 URINALYSIS AUTO W/SCOPE: CPT

## 2025-03-17 PROCEDURE — 74018 RADEX ABDOMEN 1 VIEW: CPT

## 2025-03-17 PROCEDURE — 2000000000 HC ICU R&B

## 2025-03-17 PROCEDURE — 85610 PROTHROMBIN TIME: CPT

## 2025-03-17 PROCEDURE — 94002 VENT MGMT INPAT INIT DAY: CPT

## 2025-03-17 PROCEDURE — 93325 DOPPLER ECHO COLOR FLOW MAPG: CPT | Performed by: INTERNAL MEDICINE

## 2025-03-17 PROCEDURE — 99291 CRITICAL CARE FIRST HOUR: CPT | Performed by: STUDENT IN AN ORGANIZED HEALTH CARE EDUCATION/TRAINING PROGRAM

## 2025-03-17 PROCEDURE — 85384 FIBRINOGEN ACTIVITY: CPT

## 2025-03-17 PROCEDURE — 83735 ASSAY OF MAGNESIUM: CPT

## 2025-03-17 PROCEDURE — 71250 CT THORAX DX C-: CPT

## 2025-03-17 PROCEDURE — 87340 HEPATITIS B SURFACE AG IA: CPT

## 2025-03-17 PROCEDURE — 0BH17EZ INSERTION OF ENDOTRACHEAL AIRWAY INTO TRACHEA, VIA NATURAL OR ARTIFICIAL OPENING: ICD-10-PCS | Performed by: INTERNAL MEDICINE

## 2025-03-17 PROCEDURE — 2580000003 HC RX 258: Performed by: INTERNAL MEDICINE

## 2025-03-17 PROCEDURE — 84484 ASSAY OF TROPONIN QUANT: CPT

## 2025-03-17 PROCEDURE — 2580000003 HC RX 258: Performed by: HOSPITALIST

## 2025-03-17 PROCEDURE — 85018 HEMOGLOBIN: CPT

## 2025-03-17 PROCEDURE — 2580000003 HC RX 258: Performed by: SPECIALIST

## 2025-03-17 PROCEDURE — 36592 COLLECT BLOOD FROM PICC: CPT

## 2025-03-17 PROCEDURE — 82805 BLOOD GASES W/O2 SATURATION: CPT

## 2025-03-17 PROCEDURE — 2500000003 HC RX 250 WO HCPCS: Performed by: HOSPITALIST

## 2025-03-17 PROCEDURE — 85730 THROMBOPLASTIN TIME PARTIAL: CPT

## 2025-03-17 PROCEDURE — 87071 CULTURE AEROBIC QUANT OTHER: CPT

## 2025-03-17 PROCEDURE — 93005 ELECTROCARDIOGRAM TRACING: CPT | Performed by: NURSE PRACTITIONER

## 2025-03-17 PROCEDURE — 87077 CULTURE AEROBIC IDENTIFY: CPT

## 2025-03-17 PROCEDURE — 6360000002 HC RX W HCPCS: Performed by: SPECIALIST

## 2025-03-17 PROCEDURE — 70450 CT HEAD/BRAIN W/O DYE: CPT

## 2025-03-17 PROCEDURE — 5A1D70Z PERFORMANCE OF URINARY FILTRATION, INTERMITTENT, LESS THAN 6 HOURS PER DAY: ICD-10-PCS | Performed by: INTERNAL MEDICINE

## 2025-03-17 PROCEDURE — 03HY32Z INSERTION OF MONITORING DEVICE INTO UPPER ARTERY, PERCUTANEOUS APPROACH: ICD-10-PCS | Performed by: STUDENT IN AN ORGANIZED HEALTH CARE EDUCATION/TRAINING PROGRAM

## 2025-03-17 PROCEDURE — 6360000002 HC RX W HCPCS

## 2025-03-17 PROCEDURE — 99223 1ST HOSP IP/OBS HIGH 75: CPT | Performed by: INTERNAL MEDICINE

## 2025-03-17 PROCEDURE — 2500000003 HC RX 250 WO HCPCS: Performed by: INTERNAL MEDICINE

## 2025-03-17 PROCEDURE — 93308 TTE F-UP OR LMTD: CPT

## 2025-03-17 PROCEDURE — 30233N1 TRANSFUSION OF NONAUTOLOGOUS RED BLOOD CELLS INTO PERIPHERAL VEIN, PERCUTANEOUS APPROACH: ICD-10-PCS | Performed by: STUDENT IN AN ORGANIZED HEALTH CARE EDUCATION/TRAINING PROGRAM

## 2025-03-17 PROCEDURE — 36556 INSERT NON-TUNNEL CV CATH: CPT | Performed by: NURSE PRACTITIONER

## 2025-03-17 PROCEDURE — 51701 INSERT BLADDER CATHETER: CPT

## 2025-03-17 PROCEDURE — 36620 INSERTION CATHETER ARTERY: CPT | Performed by: NURSE PRACTITIONER

## 2025-03-17 PROCEDURE — 74176 CT ABD & PELVIS W/O CONTRAST: CPT

## 2025-03-17 RX ORDER — CALCIUM CARBONATE 500 MG/1
500 TABLET, CHEWABLE ORAL 3 TIMES DAILY PRN
Status: DISCONTINUED | OUTPATIENT
Start: 2025-03-17 | End: 2025-03-18 | Stop reason: HOSPADM

## 2025-03-17 RX ORDER — ROCURONIUM BROMIDE 10 MG/ML
50 INJECTION, SOLUTION INTRAVENOUS ONCE
Status: COMPLETED | OUTPATIENT
Start: 2025-03-17 | End: 2025-03-17

## 2025-03-17 RX ORDER — INDOMETHACIN 25 MG/1
50 CAPSULE ORAL ONCE
Status: COMPLETED | OUTPATIENT
Start: 2025-03-17 | End: 2025-03-17

## 2025-03-17 RX ORDER — MAGNESIUM SULFATE 1 G/100ML
1000 INJECTION INTRAVENOUS ONCE
Status: COMPLETED | OUTPATIENT
Start: 2025-03-17 | End: 2025-03-17

## 2025-03-17 RX ORDER — FENTANYL CITRATE-0.9 % NACL/PF 10 MCG/ML
25-200 PLASTIC BAG, INJECTION (ML) INTRAVENOUS CONTINUOUS
Refills: 0 | Status: DISCONTINUED | OUTPATIENT
Start: 2025-03-17 | End: 2025-03-18 | Stop reason: HOSPADM

## 2025-03-17 RX ORDER — MIDAZOLAM HYDROCHLORIDE 1 MG/ML
1-10 INJECTION, SOLUTION INTRAVENOUS CONTINUOUS
Status: DISCONTINUED | OUTPATIENT
Start: 2025-03-17 | End: 2025-03-18 | Stop reason: HOSPADM

## 2025-03-17 RX ORDER — 0.9 % SODIUM CHLORIDE 0.9 %
1000 INTRAVENOUS SOLUTION INTRAVENOUS ONCE
Status: COMPLETED | OUTPATIENT
Start: 2025-03-17 | End: 2025-03-17

## 2025-03-17 RX ORDER — HYDROCORTISONE SODIUM SUCCINATE 100 MG/2ML
100 INJECTION INTRAMUSCULAR; INTRAVENOUS EVERY 8 HOURS
Status: DISCONTINUED | OUTPATIENT
Start: 2025-03-17 | End: 2025-03-18 | Stop reason: HOSPADM

## 2025-03-17 RX ORDER — ETOMIDATE 2 MG/ML
20 INJECTION INTRAVENOUS ONCE
Status: COMPLETED | OUTPATIENT
Start: 2025-03-17 | End: 2025-03-17

## 2025-03-17 RX ORDER — DOBUTAMINE HYDROCHLORIDE 400 MG/100ML
2.5-1 INJECTION INTRAVENOUS CONTINUOUS
Status: DISCONTINUED | OUTPATIENT
Start: 2025-03-17 | End: 2025-03-18 | Stop reason: HOSPADM

## 2025-03-17 RX ADMIN — SODIUM BICARBONATE 50 MEQ: 84 INJECTION, SOLUTION INTRAVENOUS at 21:46

## 2025-03-17 RX ADMIN — SODIUM CHLORIDE 5 MCG/MIN: 9 INJECTION, SOLUTION INTRAVENOUS at 18:45

## 2025-03-17 RX ADMIN — CLOPIDOGREL BISULFATE 75 MG: 75 TABLET ORAL at 08:20

## 2025-03-17 RX ADMIN — VASOPRESSIN 0.03 UNITS/MIN: 20 INJECTION INTRAVENOUS at 22:28

## 2025-03-17 RX ADMIN — ASPIRIN 81 MG CHEWABLE TABLET 81 MG: 81 TABLET CHEWABLE at 08:20

## 2025-03-17 RX ADMIN — DAPTOMYCIN 800 MG: 500 INJECTION, POWDER, LYOPHILIZED, FOR SOLUTION INTRAVENOUS at 12:23

## 2025-03-17 RX ADMIN — ACETAMINOPHEN 650 MG: 325 TABLET ORAL at 11:02

## 2025-03-17 RX ADMIN — CALCIUM CARBONATE 500 MG: 500 TABLET, CHEWABLE ORAL at 16:55

## 2025-03-17 RX ADMIN — MAGNESIUM SULFATE HEPTAHYDRATE 1000 MG: 1 INJECTION, SOLUTION INTRAVENOUS at 11:01

## 2025-03-17 RX ADMIN — SODIUM CHLORIDE: 0.9 INJECTION, SOLUTION INTRAVENOUS at 04:54

## 2025-03-17 RX ADMIN — SODIUM CHLORIDE: 0.9 INJECTION, SOLUTION INTRAVENOUS at 14:34

## 2025-03-17 RX ADMIN — CEFEPIME 2 G: 2 INJECTION, POWDER, FOR SOLUTION INTRAVENOUS at 08:21

## 2025-03-17 RX ADMIN — Medication 2 MG/HR: at 23:23

## 2025-03-17 RX ADMIN — SODIUM BICARBONATE: 84 INJECTION, SOLUTION INTRAVENOUS at 21:49

## 2025-03-17 RX ADMIN — ROCURONIUM BROMIDE 50 MG: 10 INJECTION, SOLUTION INTRAVENOUS at 23:12

## 2025-03-17 RX ADMIN — SODIUM CHLORIDE 40 MG: 9 INJECTION, SOLUTION INTRAMUSCULAR; INTRAVENOUS; SUBCUTANEOUS at 21:47

## 2025-03-17 RX ADMIN — Medication 50 MCG/HR: at 23:22

## 2025-03-17 RX ADMIN — WATER 20 ML: 1 INJECTION INTRAMUSCULAR; INTRAVENOUS; SUBCUTANEOUS at 08:21

## 2025-03-17 RX ADMIN — ONDANSETRON 4 MG: 2 INJECTION, SOLUTION INTRAMUSCULAR; INTRAVENOUS at 08:21

## 2025-03-17 RX ADMIN — SODIUM CHLORIDE 1000 ML: 0.9 INJECTION, SOLUTION INTRAVENOUS at 01:27

## 2025-03-17 RX ADMIN — HYDROCORTISONE SODIUM SUCCINATE 100 MG: 100 INJECTION INTRAMUSCULAR; INTRAVENOUS at 22:36

## 2025-03-17 RX ADMIN — SODIUM CHLORIDE, PRESERVATIVE FREE 10 ML: 5 INJECTION INTRAVENOUS at 08:22

## 2025-03-17 RX ADMIN — ETOMIDATE 20 MG: 2 INJECTION, SOLUTION INTRAVENOUS at 23:12

## 2025-03-17 RX ADMIN — SODIUM CHLORIDE 40 MG: 9 INJECTION, SOLUTION INTRAMUSCULAR; INTRAVENOUS; SUBCUTANEOUS at 08:21

## 2025-03-17 ASSESSMENT — PAIN SCALES - GENERAL
PAINLEVEL_OUTOF10: 0
PAINLEVEL_OUTOF10: 4
PAINLEVEL_OUTOF10: 0

## 2025-03-17 ASSESSMENT — PAIN DESCRIPTION - ORIENTATION: ORIENTATION: LEFT;RIGHT

## 2025-03-17 ASSESSMENT — PAIN DESCRIPTION - LOCATION: LOCATION: LEG

## 2025-03-17 NOTE — PROGRESS NOTES
Patient admitted from er to 205, with the following belongings; tshirt, jason pants, socks and a cell phone, placed on monitor, patient oriented to room and unit visiting hours.  Patient guide at bedside, reviewed patient rights and responsibilities. MRSA nasal swab obtained.  Bed alarm on.  Call light within reach.

## 2025-03-17 NOTE — PROGRESS NOTES
Spiritual Health History and Assessment/Progress Note  [facility]    Attempted Encounter,  ,  ,      Name: Wagner Ryan MRN: [id number]    Age: 45 y.o.     Sex: male   Language: English   Jainism: Oriental orthodox   Septic shock (HCC)     Date: 3/17/2025                           Spiritual Assessment began in [address] ICU        Referral/Consult From: Rounding   Encounter Overview/Reason: Attempted Encounter  Service Provided For: Patient    Love, Belief, Meaning:   Patient unable to assess at this time  Family/Friends No family/friends present      Importance and Influence:  Patient unable to assess at this time  Family/Friends No family/friends present    Community:  Patient Unable to assess. Patient was sleeping.   prayed a silent prayer for the patient and left devotional material and information about  services.  Family/Friends No family/friends present    Assessment and Plan of Care:     Patient Interventions include: Unable to assess. Patient was sleeping.   prayed a silent prayer for the patient and left devotional material and information about  services.  Family/Friends Interventions include: No family/friends present    Patient Plan of Care: Spiritual Care available upon further referral  Family/Friends Plan of Care: No family/friends present    Electronically signed by Chaplain Gladys on 3/17/2025 at 7:33 PM

## 2025-03-17 NOTE — PROGRESS NOTES
4 Eyes Skin Assessment     NAME:  Wagner Ryan  YOB: 1980  MEDICAL RECORD NUMBER:  [id number]    The patient is being assessed for  Admission    I agree that at least one RN has performed a thorough Head to Toe Skin Assessment on the patient. ALL assessment sites listed below have been assessed.      Areas assessed by both nurses:    Head, Face, Ears, Shoulders, Back, Chest, Arms, Elbows, Hands, Sacrum. Buttock, Coccyx, Ischium, and Legs. Feet and Heels        Does the Patient have a Wound? Yes wound(s) were present on assessment. LDA wound assessment was Initiated and completed by RN       [place] Prevention initiated by RN: Yes  Wound Care Orders initiated by RN: No    Pressure Injury (Stage 3,4, Unstageable, DTI, NWPT, and Complex wounds) if present, place Wound referral order by RN under : Yes    New Ostomies, if present place, Ostomy referral order under : No     Nurse 1 eSignature: Electronically signed by Talya Akhtar RN on 3/16/25 at 8:42 PM EDT    **SHARE this note so that the co-signing nurse can place an eSignature**    Nurse 2 eSignature: {Esignature:[id number]}

## 2025-03-17 NOTE — PROGRESS NOTES
The Nicom was complete with a 18.8%change and was reported to NP. Order received for bolus of NS which was hung. Pt states that his left leg is quite painful and tingling. States this and his headache are what happens when suboxone is messed up. Tylenol given for the headache and pt was assisted up to the chair and states that standing helped his leg pain. Pt had become very diaphoretic and complete bath and linen change was done. Then patient insisted on getting up to the bathroomm and/or the bedside commode for bm. Explained that with lower blood count and blood pressure, this would not be a good bola so he ultimately agreed to return to bed and use the bedpan.

## 2025-03-17 NOTE — ACP (ADVANCE CARE PLANNING)
Advance Care Planning   Healthcare Decision Maker:    Primary Decision Maker: owenorcholosamm gomez - Child - [phone]    Secondary Decision Maker: Do,not contact - Parent    Click here to complete Healthcare Decision Makers including selection of the Healthcare Decision Maker Relationship (ie \"Primary\").

## 2025-03-17 NOTE — PROGRESS NOTES
Patient repositioned in bed at this time. Tolerated poorly due to dyspnea and diaphoresis upon lowering head of bed and attempting to turn. At time of turn O2 saturation at 88-90% on 2L O2, once returned to semi-fowlers position and assisted with breathing techniques, breathing slowed and saturation returned to 96%. Resting in bed at this time, call light in reach.

## 2025-03-17 NOTE — CARE COORDINATION
Case Management... Patient is admitted to ICU from [facility]. PENELOPE, demos, transport forms completed and in soft chart. Spoke to Annette from [employer], patient is pending Medicaid and does not need precert to return. Patient admitted with hypotension. ID following, tunneled PICC removed. Currently on IV Dapto.   Electronically signed by Kayleigh Leiva RN on 3/17/2025 at 11:45 AM

## 2025-03-17 NOTE — PROGRESS NOTES
Intensive Care Daily Quality Rounding Checklist      ICU Team Members: Dr. Candelaria, residents, charge nurse, bedside nurse, respiratory therapy and clinical pharmacist     ICU Day #: NUMBER: 2    SOFA Score: 9    Intubation Date:      Ventilator Day #:     Central Line Insertion Date: February 24, tunneled TLC        Day #:         Indication:      Arterial Line Insertion Date:  NA      Day #:     Temporary Hemodialysis Catheter Insertion Date:        Day #     DVT Prophylaxis: PCD's    GI Prophylaxis: Protonix    [place] Catheter Insertion Date:         Day #:       Indications:       Continued need (if yes, reason documented and discussed with physician):     Skin Issues/ Wounds and ordered treatment discussed on rounds:Y    Goals/ Plans for the Day: Monitor labs and vitals. Consult ID. + blood cultures. CT head, chest and abdomen. Patient c/o nausea, on IV fluids decrease rate to 75 cc/hr. Order mag replacement     Reviewed plan and goals for day with patient and/or representative: Yes    **SHARE this note so that the rounding nurse may edit**

## 2025-03-17 NOTE — PLAN OF CARE
Problem: Chronic Conditions and Co-morbidities  Goal: Patient's chronic conditions and co-morbidity symptoms are monitored and maintained or improved  Outcome: Progressing     Problem: Discharge Planning  Goal: Discharge to home or other facility with appropriate resources  Outcome: Progressing     Problem: Skin/Tissue Integrity  Goal: Skin integrity remains intact  Description: 1.  Monitor for areas of redness and/or skin breakdown  2.  Assess vascular access sites hourly  3.  Every 4-6 hours minimum:  Change oxygen saturation probe site  4.  Every 4-6 hours:  If on nasal continuous positive airway pressure, respiratory therapy assess nares and determine need for appliance change or resting period  Outcome: Progressing  Flowsheets (Taken 3/16/2025 1850 by Talya Akhtar RN)  Skin Integrity Remains Intact: Monitor for areas of redness and/or skin breakdown     Problem: Safety - Adult  Goal: Free from fall injury  Outcome: Progressing     Problem: Pain  Goal: Verbalizes/displays adequate comfort level or baseline comfort level  Outcome: Progressing

## 2025-03-17 NOTE — CONSULTS
DRAINAGE Left 10/7/2020    LEFT ELBOW INCISION AND DRAINAGE performed by Kapil Yuan MD at [facility] OR    IR TUNNELED CVC PLACE WO SQ PORT/PUMP > 5 YEARS  2/24/2025    IR TUNNELED CVC PLACE WO SQ PORT/PUMP > 5 YEARS 2/24/2025 Kapil Ponce MD [facility] PROCEDURES    LEG SURGERY Right 3/6/2022    IRRIGATION AND DEBRIDEMENT RIGHT THIGH performed by Wes Branch MD at [facility] [place]    KS EXPLORATION OF ARTERY/VEIN Left 10/7/2020    LEFT ARM BRACHIAL ARTERY EXPLORATION, REPAIR OF MYCOTIC ANUERYSM WITH BYPASS performed by Eddie Ruff MD at [facility] [place]    TRANSESOPHAGEAL ECHOCARDIOGRAM N/A 4/24/2020    TRANSESOPHAGEAL ECHOCARDIOGRAM performed by Natasha Bowie MD at [facility] ENDOSCOPY    TRANSESOPHAGEAL ECHOCARDIOGRAM  10/14/2020       Medications Prior to admit:  Prior to Admission medications    Medication Sig Start Date End Date Taking? Authorizing Provider   DAPTOmycin (CUBICIN) infusion Infuse 907 mg intravenously every 48 hours Compound per protocol. Tentative stop date 4/8/25 3/7/25 4/8/25  Moiz Eldridge, APRN - CNP   aspirin 81 MG chewable tablet Take 1 tablet by mouth daily 3/8/25   Kenyatta Harvey MD   atorvastatin (LIPITOR) 40 MG tablet Take 1 tablet by mouth nightly 3/7/25   Kenyatta Harvey MD   bumetanide (BUMEX) 2 MG tablet Take 1 tablet by mouth daily 3/8/25   Kenyatta Harvey MD   Skin Protectants, Misc. (CARMEX CLASSIC LIP BALM) OINT Apply 1 Application topically as needed (lips) 3/7/25   Kenyatta Harvey MD   cloNIDine (CATAPRES) 0.1 MG tablet Take 1 tablet by mouth every 6 hours as needed (withdrawal associated agitation) 3/7/25   Kenyatta Harvey MD   clopidogrel (PLAVIX) 75 MG tablet Take 1 tablet by mouth daily 3/8/25   Kenyatta Harvey MD   diphenhydrAMINE (BENADRYL) 50 MG/ML injection Infuse 0.5 mLs intravenously every 6 hours as needed for Itching 3/7/25   Kenyatta Harvey  03/16/25  1515 03/17/25  0600 03/17/25  1217   WBC 12.9*  --  14.1*  --    HGB 5.7*   < > 8.0* 8.8*   HCT 17.6*   < > 25.1* 27.0*     --  201  --     < > = values in this interval not displayed.     BMP:   Recent Labs     03/16/25  1046 03/17/25  0600   * 131*   K 3.9 4.0   CO2 23 18*   BUN 82* 81*   CREATININE 5.9* 6.1*   LABGLOM 11* 11*   CALCIUM 8.3* 7.9*     Mag:   Recent Labs     03/17/25  0600   MG 1.7     Phos: No results for input(s): \"PHOS\" in the last 72 hours.  TSH:   Lab Results   Component Value Date    TSH 0.54 02/17/2025       HgA1c:   Lab Results   Component Value Date    LABA1C 5.6 02/21/2025       PRO-BNP:   Lab Results   Component Value Date    PROBNP >70,000 (H) 03/16/2025    PROBNP 16,336 (H) 03/07/2025    PROBNP 16,562 (H) 03/04/2025    PROBNP 36,006 (H) 02/26/2025    PROBNP 39,901 (H) 02/26/2025    PROBNP 18,068 (H) 02/20/2025    PROBNP 103 04/21/2020     PT/INR: No results for input(s): \"PROTIME\", \"INR\" in the last 72 hours.  APTT:No results for input(s): \"APTT\" in the last 72 hours.  HS TROP:  Lab Results   Component Value Date/Time    TROPHS 298 03/16/2025 09:00 PM    TROPHS 282 03/16/2025 11:24 AM    TROPHS 765 02/17/2025 03:06 PM    TROPHS 749 02/17/2025 03:03 PM    TROPHS 336 02/17/2025 12:09 PM    TROPHS 7 10/02/2021 11:50 PM    TROPHS 7 10/02/2021 10:14 PM     LIPID PANEL:No results for input(s): \"CHOL\", \"HDL\", \"TRIG\", \"VLDL\" in the last 72 hours.    Invalid input(s): \"LDLCHOLESTEROL\"  LIVER PROFILE:  Recent Labs     03/16/25  1046 03/17/25  0600   AST 20 79*   ALT 21 56*         Assessment:  Septic shock - off pressor   HFmEF with EF 45-50%, pro BNP > 70,000  Elevated pro-BNP  Recent STEMI s/p EDS to LAD, on DAPT   Endocarditis with small vegetations involving mitral and aortic valve   Hx of septic emboli - currently AO x3   Acute on chronic anemia - s/p   Hyponatremia   BELLO on CKD, Cr baseline 1.2  Hx of MRSA bacteremia   IV drug abuse  Hx of hep C   Hypoalbuminemia,

## 2025-03-17 NOTE — CONSULTS
Department of Internal Medicine  Nephrology Attending Consult Note      Reason for Consult:  Acute kidney injury on CKD stage 3  Requesting Physician:  DR Gonzáles    CHIEF COMPLAINT:  Septic shock    History Obtained From:  patient, family member - mother, electronic medical record    HISTORY OF PRESENT ILLNESS:  Mr. Ryan is a 45-year-old man with history of hepatitis C, IV drug abuse, multiple episodes of bacteremia, recently admitted to [facility] with ST elevation MI for which he underwent stenting of the LAD, he has well was found to have MRSA bacteremia with endocarditis of both mitral and aortic valve, at the same time he was found to have multiple septic emboli to the brain, patient was deemed not a surgical candidate, during his hospital stay patient developed BELLO stage II, likely ischemic ATN, his creatinine level peaked at 3.2 mg/dL, at the time of discharge his creatinine level was 2.6 mg/dL, he was discharged on IV daptomycin, and who was readmitted on 3/16/2025 after he presented to the ER with complaints of weakness and hypotension, his SBP in the ER was 76 mmHg, he was found to be anemic with a hemoglobin of 5.7.  Patient was fluid resuscitated and was admitted to the ICU with a diagnosis of septic shock.  His proBNP was 70,000.  Chest x-ray showed mild cardiomegaly with prominent pulmonary vascularity and increased interstitial markings with small bilateral pleural effusions. His creatinine level on admission was 5.9 mg/dL, reason for this consultation.     Past Medical History:        Diagnosis Date    Hx of hepatitis C-resolved 3/5/2022    IV drug abuse (HCC)     Nonhealing nonsurgical wound with fat layer exposed      Past Surgical History:        Procedure Laterality Date    APPLY DRESSING Left 10/9/2020    DEBRIDEMENT OF SKIN, SOFT TISSUE, MUSCLE, EXPLORATION OF UPPER ARM INTEGRA GRAFT FOR COVERAGE performed by Camilo Denny MD at [facility] OR    APPLY DRESSING Left  10/14/2020    LEFT UPPER EXTREMITY WOUND VAC PLACEMENT performed by Eddie Ruff MD at [facility] [place]    CARDIAC PROCEDURE N/A 2/17/2025    Left heart cath / coronary angiography performed by Bg Gaytan MD at [facility] CARDIAC CATH/IR    CARDIAC PROCEDURE Left 2/17/2025    Percutaneous coronary intervention performed by Bg Gaytan MD at [facility] CARDIAC CATH/IR    INCISION AND DRAINAGE Right 4/23/2020    RIGHT UPPER EXTREMITY INCISION AND DRAINAGE REMOVAL FOREIGN BODY WITH C-ARM performed by Kapil Yuan MD at [facility] OR    INCISION AND DRAINAGE Left 10/7/2020    LEFT ELBOW INCISION AND DRAINAGE performed by Kapil Yuan MD at [facility] OR    IR TUNNELED CVC PLACE WO SQ PORT/PUMP > 5 YEARS  2/24/2025    IR TUNNELED CVC PLACE WO SQ PORT/PUMP > 5 YEARS 2/24/2025 Kapil Ponce MD [facility] SPECIAL PROCEDURES    LEG SURGERY Right 3/6/2022    IRRIGATION AND DEBRIDEMENT RIGHT THIGH performed by Wes Branch MD at [facility] OR    DE EXPLORATION OF ARTERY/VEIN Left 10/7/2020    LEFT ARM BRACHIAL ARTERY EXPLORATION, REPAIR OF MYCOTIC ANUERYSM WITH BYPASS performed by Eddie Ruff MD at [facility] OR    TRANSESOPHAGEAL ECHOCARDIOGRAM N/A 4/24/2020    TRANSESOPHAGEAL ECHOCARDIOGRAM performed by Natasha Bowie MD at [facility] ENDOSCOPY    TRANSESOPHAGEAL ECHOCARDIOGRAM  10/14/2020     Current Medications:    Current Facility-Administered Medications: DAPTOmycin (CUBICIN) 800 mg in sodium chloride (PF) 0.9 % 16 mL IV syringe, 800 mg, IntraVENous, Q48H  sulfur hexafluoride microspheres (LUMASON) 60.7-25 MG injection 2 mL, 2 mL, IntraVENous, ONCE PRN  aspirin chewable tablet 81 mg, 81 mg, Oral, Daily  [Held by provider] atorvastatin (LIPITOR) tablet 40 mg, 40 mg, Oral, Nightly  sodium chloride flush 0.9 % injection 5-40 mL, 5-40 mL, IntraVENous, 2 times per day  sodium chloride flush 0.9 % injection 5-40 mL, 5-40 mL, IntraVENous, PRN  0.9 % sodium chloride infusion, , IntraVENous, PRN  ondansetron (ZOFRAN-ODT) disintegrating tablet 4

## 2025-03-17 NOTE — DISCHARGE INSTR - COC
Continuity of Care Form    Patient Name: Wagner Ryan   :  1980  MRN:  [id number]    Admit date:  3/16/2025  Discharge date:  ***    Code Status Order: Full Code   Advance Directives:     Admitting Physician:  Kalen Swift MD  PCP: Martha Maynard MD    Discharging Nurse: ***  Discharging Hospital Unit/Room#: 0205/0205-A  Discharging Unit Phone Number: ***    Emergency Contact:   Extended Emergency Contact Information  Primary Emergency Contact: samm navas  Home Phone: [phone]  Mobile Phone: [phone]  Relation: Child  Secondary Emergency Contact: Estrellita Hendricks  Mobile Phone: [phone]  Relation: Parent  Preferred language: English   needed? No    Past Surgical History:  Past Surgical History:   Procedure Laterality Date    APPLY DRESSING Left 10/9/2020    DEBRIDEMENT OF SKIN, SOFT TISSUE, MUSCLE, EXPLORATION OF UPPER ARM INTEGRA GRAFT FOR COVERAGE performed by Camilo Denny MD at [facility] OR    APPLY DRESSING Left 10/14/2020    LEFT UPPER EXTREMITY WOUND VAC PLACEMENT performed by Eddie Ruff MD at [facility] OR    CARDIAC PROCEDURE N/A 2025    Left heart cath / coronary angiography performed by gB Gaytan MD at [facility] CARDIAC CATH/IR    CARDIAC PROCEDURE Left 2025    Percutaneous coronary intervention performed by Bg Gaytan MD at [facility] CARDIAC CATH/IR    INCISION AND DRAINAGE Right 2020    RIGHT UPPER EXTREMITY INCISION AND DRAINAGE REMOVAL FOREIGN BODY WITH C-ARM performed by Kapil Yuan MD at [facility] OR    INCISION AND DRAINAGE Left 10/7/2020    LEFT ELBOW INCISION AND DRAINAGE performed by Kapil Yuan MD at [facility] OR    IR TUNNELED CVC PLACE WO SQ PORT/PUMP > 5 YEARS  2025    IR TUNNELED CVC PLACE WO SQ PORT/PUMP > 5 YEARS 2025 Kapil Ponce MD [facility] SPECIAL PROCEDURES    LEG SURGERY Right 3/6/2022    IRRIGATION AND DEBRIDEMENT RIGHT THIGH performed by Wes Branch MD at [facility] OR    MD EXPLORATION OF ARTERY/VEIN

## 2025-03-17 NOTE — PROGRESS NOTES
Chart reviewed, full consultation to follow.    Briefly Mr. Ryan is a 45-year-old man with history of hepatitis C, IV drug abuse, multiple episodes of bacteremia, recently admitted to [facility] with ST elevation MI for which he underwent stenting of the LAD, he has well was found to have MRSA bacteremia with endocarditis of both mitral and aortic valve, at the same time he was found to have multiple septic emboli to the brain, patient was deemed not a surgical candidate, during his hospital stay patient developed BELLO stage II, likely ischemic ATN, his creatinine level peaked at 3.2 mg/dL, at the time of discharge his creatinine level was 2.6 mg/dL, he was discharged on IV daptomycin, and who was readmitted on 3/16/2025 after he presented to the ER with complaints of weakness and hypotension, his SBP in the ER was 76 mmHg, he was found to be anemic with a hemoglobin of 5.7.  Patient was fluid resuscitated and was admitted to the ICU with a diagnosis of septic shock.  His proBNP was 70,000.  Chest x-ray showed mild cardiomegaly with prominent pulmonary vascularity and increased interstitial markings with small bilateral pleural effusions. His creatinine level on admission was 5.9 mg/dL, reason for this consultation.            Recurrent BELLO stage III on recent acute kidney disease (ischemic ATN), probably triggering ischemic ATN versus sepsis associated BELLO.    Hyponatremia, 2/2 lack of free water excretion due to severe BELLO    Hemodynamic shock, septic, on antibiotics and vasopressors    HFmEF 45-50%, proBNP >70,000  Microcytic anemia, hemoglobin 5.7 on admission, status post transfusion  -----------------------------     MRSA endocarditis, mitral valve and aortic valve endocarditis  Recent STEMI status post LAD stent placement  Recent septic emboli to the brain  Hepatitis C  Hypoalbuminemia, multifactorial       Plan:     Continue volume cessation for now, NS at 125 cc/hour  Monitor renal

## 2025-03-17 NOTE — PROGRESS NOTES
[facility] Hospitalist Progress Note    Admitting Date and Time: 3/16/2025 10:03 AM  Admit Dx: BELLO (acute kidney injury) [N17.9]  History of endocarditis [Z86.79]  History of ST elevation myocardial infarction (STEMI) [I25.2]  Pain and swelling of forearm, right [M79.631, M79.89]  Septic shock (HCC) [A41.9, R65.21]  Symptomatic anemia [D64.9]  Hypotension, unspecified hypotension type [I95.9]    Subjective:  Patient is being followed for BELLO (acute kidney injury) [N17.9]  History of endocarditis [Z86.79]  History of ST elevation myocardial infarction (STEMI) [I25.2]  Pain and swelling of forearm, right [M79.631, M79.89]  Septic shock (HCC) [A41.9, R65.21]  Symptomatic anemia [D64.9]  Hypotension, unspecified hypotension type [I95.9]       Patient w/PMH of IV drug abuse and Hepatitis C with recent admission at [facility] in [place] from 02/18/25- 03/07/2025.  Patient had STEMI and underwent cardiac cath with stent placement. He was found to have MRSA bacteremia and JAVED revealed mitral and aortic valve endocarditis. MRI brain also showed septic emboli at that time. Due to STEMI/cath he was not a candidate for surgery. He had PICC line placed for Daptomycin and discharged to SNF.  Patient presented from SNF for evaluation of hypotension and weakness.     Patient seen and evaluated while in ICU. He reports dizziness and pain  in legs. Levophed has been titrated off.     ROS: Pertinent findings stated above. Denies fever, chills, chest pain, palpitations, cough, shortness of breath, abdominal pain, vomiting, dysuria, hematuria, melena, hematochezia, diarrhea, or constipation.      DAPTOmycin (CUBICIN) 800 mg in sodium chloride (PF) 0.9 % 16 mL IV syringe  800 mg IntraVENous Q48H    aspirin  81 mg Oral Daily    [Held by provider] atorvastatin  40 mg Oral Nightly    sodium chloride flush  5-40 mL IntraVENous 2 times per day    pantoprazole (PROTONIX) 40 mg in sodium chloride (PF) 0.9 % 10 mL injection  40 mg  3/17/2025 at 2:47 PM

## 2025-03-17 NOTE — FLOWSHEET NOTE
Inpatient Wound Care (initial consult) 205    Admit Date: 3/16/2025 10:03 AM    Reason for consult:  right buttock    Patient laying down in bed, awake, alert and oriented. Patient having difficulty catching breathe, post pone rolling to assess buttocks.     Significant history:  per H&P    Date of Service: Pt seen/examined on 3/16/2025 and is  admitted to Inpatient with expected LOS greater than two midnights due to medical therapy.       Chief Complaint:  had concerns including Arm Pain (right arm swelling and sob, picc line in place.).     History Of Present Illness:    Mr. Wagner Ryan, a 45 y.o. year old male  with PMH of hepatitis C-resolved, IV drug abuse  who had a recent long hospitalization at [facility] (2/18--3/7/2025)  where pt had JAVED shows aortic valve endocarditis, cerebral septic embolism, NSTEMI,  BELLO and culture positive for MRSA bacteremia and also tested positive for influenza A.     Patient presented today to our emergency room department [facility] with a chief complaint of weakness hypotension.     Past Medical History:   Diagnosis Date    Hx of hepatitis C-resolved 3/5/2022    IV drug abuse (HCC)     Nonhealing nonsurgical wound with fat layer exposed      Findings:     03/17/25 0929   Skin Integumentary    Skin Integrity Vascular discoloration or hemochromatosis/staining   Location BLE     Both heels intact blanching, dry flaky    **Informed Consent**    The patient has given verbal consent to have photos taken of wounds and inserted into their chart as part of their permanent medical record for purposes of documentation, treatment management and/or medical review.   All Images taken on 3/17/25 of patient name: Wagner Ryan were transmitted and stored on secured Epic  Site located within Media Folder Tab by a registered Epic-Haiku Mobile Application Device.     Plan:   Comfort glide  Wedges  Heel protectors  Low air loss bed (ICU

## 2025-03-17 NOTE — CONSULTS
[facility]  [facility]  Consultation Note     Admit Date: 3/16/2025 10:03 AM    Reason for Consult:   Bacteremia    Attending Physician:  Chiki Gonzáles MD    HISTORY OF PRESENT ILLNESS:             The history is obtained from extensive review of available past medical records. The patient is a 45 y.o. male who is previously known to the ID service.    The patient has a history of IV drug injection, chronic hepatitis C infection (undetectable viral load back in October 2022).    He was admitted to [facility] on 2/17/2025 with bacteremia associated to endocarditis of the mitral valve and aortic valve as well as septic emboli to the brain.  Because of cardiac stenting cardiology recommended completion of DAPT before cardiac surgery.  He was discharged to [facility] on 3/7/2025.    The patient presented to [facility] on 3/16/2025 with hypotension and weakness.  Presentation blood pressure was 87/61.  He was afebrile.  White count was 12.9 with a hemoglobin of 5.7.  Platelets were normal.  CMP was remarkable for a low sodium.  Creatinine was 5.9 with a BUN of 82.  Lactic acid was normal.  proBNP was >70,000.  CRP was 266.  Troponin was 282.  He was admitted to the ICU with a diagnosis of septic shock.  He received a dose of Cefepime and vancomycin in the ED. the patient claims that he was eating well and did not have any nausea or vomiting.  Mid to having chest pain.  Slightly worse when he takes a deep breath.  No diarrhea.    Past Medical History:    2/17/2025.  Admitted to [facility] with altered mentation.  Treated with Cefepime and Vancomycin.  Seen by Dr. Shaw for MRSA bacteremia associated to aortic and mitral valve endocarditis and septic emboli to brain.  Tested positive for influenza A.  Treated with Oseltamivir.  He had an acute STEMI and had a stent placed in the LAD.  He developed a rash.  Antibiotics were consolidated to Daptomycin.

## 2025-03-17 NOTE — PROGRESS NOTES
by Dr. Gaytan.  Due to his altered mental status he had an MRI of his brain which showed Multiple small foci of acute or early subacute infarctions in the bilateral frontal, parietal and occipital lobes, left more than right.   Patient also was found to have MRSA bacteremia following that he had a JAVED with put on mitral patient were transferred to [facility] for further workup and evaluation by CTS surgery.  Patient was evaluated by CTS surgery.  Due to the fact that he had recently got a drug-eluting stent and had to be on dual antiplatelet therapy he was deemed not to be able to be a surgical candidate and with discussion with cardiology it was decided we will best with conservative therapy therefore patient was followed by infectious disease had a PICC line placed and was started on prolonged daptomycin and discharged to a SNF on March 8th.    3/16  Patient presented today to our emergency room department [facility] with a chief complaint of weakness, hypotension.  Initial lab lab work showed a sodium 128, potassium 3.9, chloride of 89 bicarb of 23 BUN of 82 with a creatinine of 5.9. Patient also had a CBC which showed a white count of 12.9 and H&H of 5.7/17.6 with a platelet count of 209.  Patient received 2L NS and 2 U pRBCs. Patient admitted to the ICU for further care.     3/17  Patient resting comfortably in bed.  He notes some shortness of breath and heartburn.  4L NC in place.  Patient has received a total of 3U PRBCs.  Received call from lab stating blood culture positive for gram-positive cocci in clusters.  Patient has already received cefepime and vancomycin. ID consulted; they agree with removal of tunneled catheter to send tip for culture.  IV team consulted for alternate line establishment.  LFTs are elevated this morning.  Creatinine is 6.1.  Troponins 282  > 298.  Cardiology consulted.  Will plan for repeat echo.  Plan to order CT head, CT abdomen/pelvis,  infection      Hematology/Oncology   Acute on chronic microcytic anemia  Continue to monitor H&H and check iron studies     Endocrine   Monitor blood glucose per protocol     Social/Spiritual/DNR/Other  Code status: Full  Diet: Regular  Stress ulcer prophylaxis: PPI  DVT prophylaxis: On ASA and Plavix  Consultations needed: Infectious disease, cardiology, nephrology  Transfer out of ICU today? No     Lines/catheters  PICC line to right chest wall     Patient remains critically ill and requiring ICU level care.       Rossi Cohen MD, PGY-1  Attending physician: Dr. Candelaria      I personally saw, examined and provided care for the patient. I performed the substantive portion of the visit. Radiographs, labs and medication list were reviewed by me independently. Review of Residents documentation was conducted and revisions were made as appropriate. I agree with the above documented exam, problem list and plan of care.    Patient with STEMI and stent placed to LAD February at [facility].  May have developed bacterial endocarditis with septic emboli to brain.  Not surgical candidate given recent stent and need to be on dual antiplatelet therapy.  Discharged to nursing facility.  Presents with fatigue, hypotension.  Patient with persistent MRSA bacteremia.  Unclear source.    Remove tunneled line  Check 2D echo, consult cardiology  Check CT imaging to look for other potential nidus of infection  Reviewed with ID    CCT excluding procedures 44 minutes    Cr Candelaria, DO

## 2025-03-17 NOTE — OR NURSING
Patient arrived for removal of tunneled central venous catheter. Vitals obtained pre-procedure, procedure explained to patient and procedural consent obtained. Using sterile technique, tunneled central venous catheter removed without difficulty and pressure held for 5 minutes. Tip sent for culture.  Post chest xray obtained and post procedure vitals obtained. ICU RN updated and patient transported to CT without difficulty.

## 2025-03-17 NOTE — PROGRESS NOTES
Patient has not voided for 6 hours. Bladder scan performed, scanned for 72mL. Straight cath amount 100mL obtained.

## 2025-03-17 NOTE — PROGRESS NOTES
Pharmacy Consultation Note  (Antibiotic Dosing and Monitoring)    Initial consult date: 3/16  Consulting physician/provider: Jamison  Drug: Vancomycin  Indication: Sepsis x 7 days    Note vancomycin discontinued. Clinical pharmacy will sign-off. Please re-consult if we can be of further assistance.    Andres Miller RPH, PharmD, BCCCP  3/17/2025  11:59 AM    SEB: [phone]  SEY: [phone]  SJW: [phone]

## 2025-03-18 ENCOUNTER — HOSPITAL ENCOUNTER (INPATIENT)
Age: 45
LOS: 1 days | Discharge: HOSPICE | DRG: 871 | End: 2025-03-19
Attending: INTERNAL MEDICINE | Admitting: STUDENT IN AN ORGANIZED HEALTH CARE EDUCATION/TRAINING PROGRAM

## 2025-03-18 ENCOUNTER — APPOINTMENT (OUTPATIENT)
Dept: GENERAL RADIOLOGY | Age: 45
DRG: 721 | End: 2025-03-18
Payer: MEDICAID

## 2025-03-18 VITALS
TEMPERATURE: 98 F | HEIGHT: 76 IN | BODY MASS INDEX: 26.09 KG/M2 | DIASTOLIC BLOOD PRESSURE: 78 MMHG | RESPIRATION RATE: 22 BRPM | WEIGHT: 214.29 LBS | OXYGEN SATURATION: 95 % | HEART RATE: 97 BPM | SYSTOLIC BLOOD PRESSURE: 96 MMHG

## 2025-03-18 PROBLEM — Z51.5 PALLIATIVE CARE ENCOUNTER: Status: ACTIVE | Noted: 2025-03-18

## 2025-03-18 LAB
ALBUMIN SERPL-MCNC: 2.6 G/DL (ref 3.5–5.2)
ALP SERPL-CCNC: 273 U/L (ref 40–129)
ALT SERPL-CCNC: 57 U/L (ref 0–40)
ANION GAP SERPL CALCULATED.3IONS-SCNC: 23 MMOL/L (ref 7–16)
AST SERPL-CCNC: 44 U/L (ref 0–39)
B.E.: -6.5 MMOL/L (ref -3–3)
B.E.: -8.5 MMOL/L (ref -3–3)
BASOPHILS # BLD: 0.05 K/UL (ref 0–0.2)
BASOPHILS NFR BLD: 0 % (ref 0–2)
BILIRUB SERPL-MCNC: 0.5 MG/DL (ref 0–1.2)
BUN SERPL-MCNC: 93 MG/DL (ref 6–20)
CA-I BLD-SCNC: 1.09 MMOL/L (ref 1.15–1.33)
CALCIUM SERPL-MCNC: 7.9 MG/DL (ref 8.6–10.2)
CHLORIDE SERPL-SCNC: 96 MMOL/L (ref 98–107)
CO2 SERPL-SCNC: 17 MMOL/L (ref 22–29)
COHB: 0.3 % (ref 0–1.5)
COHB: 0.5 % (ref 0–1.5)
COMMENT: ABNORMAL
CORTIS SERPL-MCNC: 17 UG/DL (ref 2.7–18.4)
CORTIS SERPL-MCNC: 26.9 UG/DL (ref 2.7–18.4)
CORTISOL COLLECTION INFO: ABNORMAL
CORTISOL COLLECTION INFO: NORMAL
CREAT SERPL-MCNC: 6.3 MG/DL (ref 0.7–1.2)
CRITICAL: ABNORMAL
CRITICAL: ABNORMAL
DATE ANALYZED: ABNORMAL
DATE ANALYZED: ABNORMAL
DATE OF COLLECTION: ABNORMAL
DATE OF COLLECTION: ABNORMAL
EOSINOPHIL # BLD: 0 K/UL (ref 0.05–0.5)
EOSINOPHILS RELATIVE PERCENT: 0 % (ref 0–6)
ERYTHROCYTE [DISTWIDTH] IN BLOOD BY AUTOMATED COUNT: 17.9 % (ref 11.5–15)
FIO2: 90 %
FIO2: 95 %
GFR, ESTIMATED: 10 ML/MIN/1.73M2
GLUCOSE BLD-MCNC: 213 MG/DL (ref 74–99)
GLUCOSE SERPL-MCNC: 202 MG/DL (ref 74–99)
HBV SURFACE AG SERPL QL IA: NONREACTIVE
HCO3: 18 MMOL/L (ref 22–26)
HCO3: 18.6 MMOL/L (ref 22–26)
HCT VFR BLD AUTO: 29 % (ref 37–54)
HGB BLD-MCNC: 9.6 G/DL (ref 12.5–16.5)
HHB: 0.8 % (ref 0–5)
HHB: 17.1 % (ref 0–5)
IMM GRANULOCYTES # BLD AUTO: 0.32 K/UL (ref 0–0.58)
IMM GRANULOCYTES NFR BLD: 2 % (ref 0–5)
LAB: ABNORMAL
LAB: ABNORMAL
LACTATE BLDV-SCNC: 2 MMOL/L (ref 0.5–2.2)
LYMPHOCYTES NFR BLD: 1 K/UL (ref 1.5–4)
LYMPHOCYTES RELATIVE PERCENT: 5 % (ref 20–42)
Lab: 30
Lab: 521
MAGNESIUM SERPL-MCNC: 2 MG/DL (ref 1.6–2.6)
MCH RBC QN AUTO: 25.7 PG (ref 26–35)
MCHC RBC AUTO-ENTMCNC: 33.1 G/DL (ref 32–34.5)
MCV RBC AUTO: 77.5 FL (ref 80–99.9)
METHB: 0.3 % (ref 0–1.5)
METHB: 0.3 % (ref 0–1.5)
MICROORGANISM SPEC CULT: NO GROWTH
MICROORGANISM SPEC CULT: NORMAL
MODE: AC
MODE: AC
MONOCYTES NFR BLD: 0.88 K/UL (ref 0.1–0.95)
MONOCYTES NFR BLD: 4 % (ref 2–12)
NEUTROPHILS NFR BLD: 90 % (ref 43–80)
NEUTS SEG NFR BLD: 19.04 K/UL (ref 1.8–7.3)
O2 CONTENT: 12.6 ML/DL
O2 CONTENT: 15 ML/DL
O2 SATURATION: 82.8 % (ref 92–98.5)
O2 SATURATION: 99.2 % (ref 92–98.5)
O2HB: 82.1 % (ref 94–97)
O2HB: 98.6 % (ref 94–97)
OPERATOR ID: 3342
OPERATOR ID: 3342
PATIENT TEMP: 37 C
PATIENT TEMP: 37 C
PCO2: 32.1 MMHG (ref 35–45)
PCO2: 44.6 MMHG (ref 35–45)
PEEP/CPAP: 8 CMH2O
PEEP/CPAP: 8 CMH2O
PFO2: 0.61 MMHG/%
PFO2: 2 MMHG/%
PH BLOOD GAS: 7.24 (ref 7.35–7.45)
PH BLOOD GAS: 7.37 (ref 7.35–7.45)
PH VENOUS: 7.27 (ref 7.35–7.45)
PHOSPHATE SERPL-MCNC: 8.4 MG/DL (ref 2.5–4.5)
PLATELET # BLD AUTO: 264 K/UL (ref 130–450)
PMV BLD AUTO: 10 FL (ref 7–12)
PO2: 180.1 MMHG (ref 75–100)
PO2: 57.9 MMHG (ref 75–100)
POTASSIUM SERPL-SCNC: 4.2 MMOL/L (ref 3.5–5)
PROT SERPL-MCNC: 6.8 G/DL (ref 6.4–8.3)
RBC # BLD AUTO: 3.74 M/UL (ref 3.8–5.8)
RI(T): 2.38
RI(T): 9.92
RR MECHANICAL: 18 B/MIN
RR MECHANICAL: 18 B/MIN
SERVICE CMNT-IMP: NORMAL
SERVICE CMNT-IMP: NORMAL
SODIUM SERPL-SCNC: 136 MMOL/L (ref 132–146)
SOURCE, BLOOD GAS: ABNORMAL
SOURCE, BLOOD GAS: ABNORMAL
SPECIMEN DESCRIPTION: NORMAL
SPECIMEN DESCRIPTION: NORMAL
THB: 10.5 G/DL (ref 11.5–16.5)
THB: 10.9 G/DL (ref 11.5–16.5)
TIME ANALYZED: 32
TIME ANALYZED: 529
VT MECHANICAL: 500 ML
VT MECHANICAL: 500 ML
WBC OTHER # BLD: 21.3 K/UL (ref 4.5–11.5)

## 2025-03-18 PROCEDURE — 3E033XZ INTRODUCTION OF VASOPRESSOR INTO PERIPHERAL VEIN, PERCUTANEOUS APPROACH: ICD-10-PCS | Performed by: STUDENT IN AN ORGANIZED HEALTH CARE EDUCATION/TRAINING PROGRAM

## 2025-03-18 PROCEDURE — 2580000003 HC RX 258: Performed by: HOSPITALIST

## 2025-03-18 PROCEDURE — 30233N1 TRANSFUSION OF NONAUTOLOGOUS RED BLOOD CELLS INTO PERIPHERAL VEIN, PERCUTANEOUS APPROACH: ICD-10-PCS | Performed by: STUDENT IN AN ORGANIZED HEALTH CARE EDUCATION/TRAINING PROGRAM

## 2025-03-18 PROCEDURE — 2500000003 HC RX 250 WO HCPCS: Performed by: NURSE PRACTITIONER

## 2025-03-18 PROCEDURE — 85025 COMPLETE CBC W/AUTO DIFF WBC: CPT

## 2025-03-18 PROCEDURE — 71045 X-RAY EXAM CHEST 1 VIEW: CPT

## 2025-03-18 PROCEDURE — 36620 INSERTION CATHETER ARTERY: CPT

## 2025-03-18 PROCEDURE — 6360000002 HC RX W HCPCS: Performed by: NURSE PRACTITIONER

## 2025-03-18 PROCEDURE — 6360000002 HC RX W HCPCS: Performed by: INTERNAL MEDICINE

## 2025-03-18 PROCEDURE — 6370000000 HC RX 637 (ALT 250 FOR IP): Performed by: STUDENT IN AN ORGANIZED HEALTH CARE EDUCATION/TRAINING PROGRAM

## 2025-03-18 PROCEDURE — 82805 BLOOD GASES W/O2 SATURATION: CPT

## 2025-03-18 PROCEDURE — 51702 INSERT TEMP BLADDER CATH: CPT

## 2025-03-18 PROCEDURE — 6370000000 HC RX 637 (ALT 250 FOR IP): Performed by: HOSPITALIST

## 2025-03-18 PROCEDURE — 80053 COMPREHEN METABOLIC PANEL: CPT

## 2025-03-18 PROCEDURE — 31500 INSERT EMERGENCY AIRWAY: CPT

## 2025-03-18 PROCEDURE — 02PYX3Z REMOVAL OF INFUSION DEVICE FROM GREAT VESSEL, EXTERNAL APPROACH: ICD-10-PCS | Performed by: INTERNAL MEDICINE

## 2025-03-18 PROCEDURE — 5A1D90Z PERFORMANCE OF URINARY FILTRATION, CONTINUOUS, GREATER THAN 18 HOURS PER DAY: ICD-10-PCS | Performed by: STUDENT IN AN ORGANIZED HEALTH CARE EDUCATION/TRAINING PROGRAM

## 2025-03-18 PROCEDURE — 36556 INSERT NON-TUNNEL CV CATH: CPT

## 2025-03-18 PROCEDURE — 99223 1ST HOSP IP/OBS HIGH 75: CPT | Performed by: NURSE PRACTITIONER

## 2025-03-18 PROCEDURE — 87070 CULTURE OTHR SPECIMN AEROBIC: CPT

## 2025-03-18 PROCEDURE — C1751 CATH, INF, PER/CENT/MIDLINE: HCPCS

## 2025-03-18 PROCEDURE — 2500000003 HC RX 250 WO HCPCS

## 2025-03-18 PROCEDURE — 94003 VENT MGMT INPAT SUBQ DAY: CPT

## 2025-03-18 PROCEDURE — 87205 SMEAR GRAM STAIN: CPT

## 2025-03-18 PROCEDURE — 2000000000 HC ICU R&B

## 2025-03-18 PROCEDURE — 2500000003 HC RX 250 WO HCPCS: Performed by: STUDENT IN AN ORGANIZED HEALTH CARE EDUCATION/TRAINING PROGRAM

## 2025-03-18 PROCEDURE — 6360000002 HC RX W HCPCS: Performed by: HOSPITALIST

## 2025-03-18 PROCEDURE — 82330 ASSAY OF CALCIUM: CPT

## 2025-03-18 PROCEDURE — 2580000003 HC RX 258: Performed by: NURSE PRACTITIONER

## 2025-03-18 PROCEDURE — 2580000003 HC RX 258: Performed by: INTERNAL MEDICINE

## 2025-03-18 PROCEDURE — 2500000003 HC RX 250 WO HCPCS: Performed by: INTERNAL MEDICINE

## 2025-03-18 PROCEDURE — 83735 ASSAY OF MAGNESIUM: CPT

## 2025-03-18 PROCEDURE — 82962 GLUCOSE BLOOD TEST: CPT

## 2025-03-18 PROCEDURE — 2500000003 HC RX 250 WO HCPCS: Performed by: HOSPITALIST

## 2025-03-18 PROCEDURE — 6370000000 HC RX 637 (ALT 250 FOR IP): Performed by: INTERNAL MEDICINE

## 2025-03-18 PROCEDURE — 0JPT3XZ REMOVAL OF TUNNELED VASCULAR ACCESS DEVICE FROM TRUNK SUBCUTANEOUS TISSUE AND FASCIA, PERCUTANEOUS APPROACH: ICD-10-PCS | Performed by: INTERNAL MEDICINE

## 2025-03-18 PROCEDURE — 84100 ASSAY OF PHOSPHORUS: CPT

## 2025-03-18 PROCEDURE — 99221 1ST HOSP IP/OBS SF/LOW 40: CPT | Performed by: STUDENT IN AN ORGANIZED HEALTH CARE EDUCATION/TRAINING PROGRAM

## 2025-03-18 PROCEDURE — 99233 SBSQ HOSP IP/OBS HIGH 50: CPT | Performed by: INTERNAL MEDICINE

## 2025-03-18 PROCEDURE — 99291 CRITICAL CARE FIRST HOUR: CPT | Performed by: INTERNAL MEDICINE

## 2025-03-18 PROCEDURE — 83605 ASSAY OF LACTIC ACID: CPT

## 2025-03-18 RX ORDER — ACETAMINOPHEN 325 MG/1
650 TABLET ORAL EVERY 6 HOURS PRN
Status: CANCELLED | OUTPATIENT
Start: 2025-03-18

## 2025-03-18 RX ORDER — FENTANYL CITRATE-0.9 % NACL/PF 20 MCG/2ML
50 SYRINGE (ML) INTRAVENOUS EVERY 30 MIN PRN
Refills: 0 | Status: DISCONTINUED | OUTPATIENT
Start: 2025-03-18 | End: 2025-03-18 | Stop reason: HOSPADM

## 2025-03-18 RX ORDER — CLOPIDOGREL BISULFATE 75 MG/1
75 TABLET ORAL DAILY
Status: CANCELLED | OUTPATIENT
Start: 2025-03-19

## 2025-03-18 RX ORDER — CHLORHEXIDINE GLUCONATE ORAL RINSE 1.2 MG/ML
15 SOLUTION DENTAL 2 TIMES DAILY
Status: DISCONTINUED | OUTPATIENT
Start: 2025-03-18 | End: 2025-03-18

## 2025-03-18 RX ORDER — ALBUTEROL SULFATE 0.83 MG/ML
2.5 SOLUTION RESPIRATORY (INHALATION)
Status: DISCONTINUED | OUTPATIENT
Start: 2025-03-18 | End: 2025-03-19 | Stop reason: HOSPADM

## 2025-03-18 RX ORDER — FENTANYL CITRATE-0.9 % NACL/PF 20 MCG/2ML
50 SYRINGE (ML) INTRAVENOUS EVERY 30 MIN PRN
Refills: 0 | Status: DISCONTINUED | OUTPATIENT
Start: 2025-03-18 | End: 2025-03-18

## 2025-03-18 RX ORDER — MIDAZOLAM HYDROCHLORIDE 2 MG/2ML
1 INJECTION, SOLUTION INTRAMUSCULAR; INTRAVENOUS EVERY 30 MIN PRN
Status: DISCONTINUED | OUTPATIENT
Start: 2025-03-18 | End: 2025-03-19 | Stop reason: HOSPADM

## 2025-03-18 RX ORDER — DEXTROSE MONOHYDRATE 100 MG/ML
INJECTION, SOLUTION INTRAVENOUS CONTINUOUS PRN
Status: DISCONTINUED | OUTPATIENT
Start: 2025-03-18 | End: 2025-03-18 | Stop reason: HOSPADM

## 2025-03-18 RX ORDER — SODIUM CHLORIDE 0.9 % (FLUSH) 0.9 %
5-40 SYRINGE (ML) INJECTION EVERY 12 HOURS SCHEDULED
Status: DISCONTINUED | OUTPATIENT
Start: 2025-03-18 | End: 2025-03-19 | Stop reason: HOSPADM

## 2025-03-18 RX ORDER — GLYCOPYRROLATE 0.2 MG/ML
0.4 INJECTION INTRAMUSCULAR; INTRAVENOUS EVERY 4 HOURS PRN
Status: DISCONTINUED | OUTPATIENT
Start: 2025-03-18 | End: 2025-03-19 | Stop reason: HOSPADM

## 2025-03-18 RX ORDER — HYDROMORPHONE HYDROCHLORIDE 1 MG/ML
1 INJECTION, SOLUTION INTRAMUSCULAR; INTRAVENOUS; SUBCUTANEOUS
Status: DISCONTINUED | OUTPATIENT
Start: 2025-03-18 | End: 2025-03-19 | Stop reason: HOSPADM

## 2025-03-18 RX ORDER — ONDANSETRON 2 MG/ML
4 INJECTION INTRAMUSCULAR; INTRAVENOUS EVERY 6 HOURS PRN
Status: CANCELLED | OUTPATIENT
Start: 2025-03-18

## 2025-03-18 RX ORDER — CALCIUM CARBONATE 500 MG/1
500 TABLET, CHEWABLE ORAL 3 TIMES DAILY PRN
Status: CANCELLED | OUTPATIENT
Start: 2025-03-18

## 2025-03-18 RX ORDER — CHLORHEXIDINE GLUCONATE ORAL RINSE 1.2 MG/ML
15 SOLUTION DENTAL 2 TIMES DAILY
Status: CANCELLED | OUTPATIENT
Start: 2025-03-18

## 2025-03-18 RX ORDER — ANTICOAGULANT CITRATE DEXTROSE SOLUTION FORMULA A 12.25; 11; 3.65 G/500ML; G/500ML; G/500ML
2 SOLUTION INTRAVENOUS PRN
Status: DISCONTINUED | OUTPATIENT
Start: 2025-03-18 | End: 2025-03-18

## 2025-03-18 RX ORDER — LORAZEPAM 2 MG/ML
2 INJECTION INTRAMUSCULAR
Status: DISCONTINUED | OUTPATIENT
Start: 2025-03-18 | End: 2025-03-19 | Stop reason: HOSPADM

## 2025-03-18 RX ORDER — DOBUTAMINE HYDROCHLORIDE 200 MG/100ML
2.5-1 INJECTION INTRAVENOUS CONTINUOUS
Status: DISCONTINUED | OUTPATIENT
Start: 2025-03-18 | End: 2025-03-18

## 2025-03-18 RX ORDER — ONDANSETRON 4 MG/1
4 TABLET, ORALLY DISINTEGRATING ORAL EVERY 8 HOURS PRN
Status: CANCELLED | OUTPATIENT
Start: 2025-03-18

## 2025-03-18 RX ORDER — ONDANSETRON 2 MG/ML
4 INJECTION INTRAMUSCULAR; INTRAVENOUS EVERY 6 HOURS PRN
Status: DISCONTINUED | OUTPATIENT
Start: 2025-03-18 | End: 2025-03-19 | Stop reason: HOSPADM

## 2025-03-18 RX ORDER — SODIUM CHLORIDE 9 MG/ML
INJECTION, SOLUTION INTRAVENOUS PRN
Status: DISCONTINUED | OUTPATIENT
Start: 2025-03-18 | End: 2025-03-19 | Stop reason: HOSPADM

## 2025-03-18 RX ORDER — FENTANYL CITRATE-0.9 % NACL/PF 10 MCG/ML
PLASTIC BAG, INJECTION (ML) INTRAVENOUS
Status: COMPLETED
Start: 2025-03-18 | End: 2025-03-18

## 2025-03-18 RX ORDER — ATORVASTATIN CALCIUM 40 MG/1
40 TABLET, FILM COATED ORAL NIGHTLY
Status: CANCELLED | OUTPATIENT
Start: 2025-03-18

## 2025-03-18 RX ORDER — ALBUTEROL SULFATE 0.83 MG/ML
2.5 SOLUTION RESPIRATORY (INHALATION)
Status: CANCELLED | OUTPATIENT
Start: 2025-03-18

## 2025-03-18 RX ORDER — DOBUTAMINE HYDROCHLORIDE 400 MG/100ML
2.5-1 INJECTION INTRAVENOUS CONTINUOUS
Status: CANCELLED | OUTPATIENT
Start: 2025-03-18

## 2025-03-18 RX ORDER — DEXTROSE MONOHYDRATE 100 MG/ML
INJECTION, SOLUTION INTRAVENOUS CONTINUOUS PRN
Status: CANCELLED | OUTPATIENT
Start: 2025-03-18

## 2025-03-18 RX ORDER — FENTANYL CITRATE-0.9 % NACL/PF 10 MCG/ML
25-200 PLASTIC BAG, INJECTION (ML) INTRAVENOUS CONTINUOUS
Refills: 0 | Status: CANCELLED | OUTPATIENT
Start: 2025-03-18

## 2025-03-18 RX ORDER — HYDROCORTISONE SODIUM SUCCINATE 100 MG/2ML
100 INJECTION INTRAMUSCULAR; INTRAVENOUS EVERY 8 HOURS
Status: CANCELLED | OUTPATIENT
Start: 2025-03-18

## 2025-03-18 RX ORDER — SODIUM CHLORIDE 0.9 % (FLUSH) 0.9 %
5-40 SYRINGE (ML) INJECTION PRN
Status: DISCONTINUED | OUTPATIENT
Start: 2025-03-18 | End: 2025-03-19 | Stop reason: HOSPADM

## 2025-03-18 RX ORDER — SODIUM CHLORIDE 9 MG/ML
INJECTION, SOLUTION INTRAVENOUS PRN
Status: CANCELLED | OUTPATIENT
Start: 2025-03-18

## 2025-03-18 RX ORDER — POLYETHYLENE GLYCOL 3350 17 G/17G
17 POWDER, FOR SOLUTION ORAL DAILY PRN
Status: DISCONTINUED | OUTPATIENT
Start: 2025-03-18 | End: 2025-03-18

## 2025-03-18 RX ORDER — FENTANYL CITRATE-0.9 % NACL/PF 20 MCG/2ML
50 SYRINGE (ML) INTRAVENOUS EVERY 30 MIN PRN
Refills: 0 | Status: CANCELLED | OUTPATIENT
Start: 2025-03-18

## 2025-03-18 RX ORDER — MIDAZOLAM HYDROCHLORIDE 2 MG/2ML
1 INJECTION, SOLUTION INTRAMUSCULAR; INTRAVENOUS EVERY 30 MIN PRN
Status: DISCONTINUED | OUTPATIENT
Start: 2025-03-18 | End: 2025-03-18 | Stop reason: HOSPADM

## 2025-03-18 RX ORDER — NOREPINEPHRINE BITARTRATE 0.06 MG/ML
1-100 INJECTION, SOLUTION INTRAVENOUS CONTINUOUS
Status: DISCONTINUED | OUTPATIENT
Start: 2025-03-18 | End: 2025-03-18

## 2025-03-18 RX ORDER — POTASSIUM CHLORIDE 29.8 MG/ML
20 INJECTION INTRAVENOUS PRN
Status: DISCONTINUED | OUTPATIENT
Start: 2025-03-18 | End: 2025-03-18

## 2025-03-18 RX ORDER — MIDAZOLAM HYDROCHLORIDE 1 MG/ML
1-10 INJECTION, SOLUTION INTRAVENOUS CONTINUOUS
Status: DISCONTINUED | OUTPATIENT
Start: 2025-03-18 | End: 2025-03-18

## 2025-03-18 RX ORDER — CLOPIDOGREL BISULFATE 75 MG/1
75 TABLET ORAL DAILY
Status: DISCONTINUED | OUTPATIENT
Start: 2025-03-19 | End: 2025-03-18

## 2025-03-18 RX ORDER — POLYETHYLENE GLYCOL 3350 17 G/17G
17 POWDER, FOR SOLUTION ORAL DAILY PRN
Status: CANCELLED | OUTPATIENT
Start: 2025-03-18

## 2025-03-18 RX ORDER — ASPIRIN 81 MG/1
81 TABLET, CHEWABLE ORAL DAILY
Status: CANCELLED | OUTPATIENT
Start: 2025-03-19

## 2025-03-18 RX ORDER — ACETAMINOPHEN 650 MG/1
650 SUPPOSITORY RECTAL EVERY 6 HOURS PRN
Status: CANCELLED | OUTPATIENT
Start: 2025-03-18

## 2025-03-18 RX ORDER — SODIUM CHLORIDE 0.9 % (FLUSH) 0.9 %
5-40 SYRINGE (ML) INJECTION EVERY 12 HOURS SCHEDULED
Status: CANCELLED | OUTPATIENT
Start: 2025-03-18

## 2025-03-18 RX ORDER — CALCIUM CARBONATE 500 MG/1
500 TABLET, CHEWABLE ORAL 3 TIMES DAILY PRN
Status: DISCONTINUED | OUTPATIENT
Start: 2025-03-18 | End: 2025-03-19 | Stop reason: HOSPADM

## 2025-03-18 RX ORDER — CHLORHEXIDINE GLUCONATE ORAL RINSE 1.2 MG/ML
15 SOLUTION DENTAL 2 TIMES DAILY
Status: DISCONTINUED | OUTPATIENT
Start: 2025-03-18 | End: 2025-03-18 | Stop reason: HOSPADM

## 2025-03-18 RX ORDER — DEXTROSE MONOHYDRATE 100 MG/ML
INJECTION, SOLUTION INTRAVENOUS CONTINUOUS PRN
Status: DISCONTINUED | OUTPATIENT
Start: 2025-03-18 | End: 2025-03-18

## 2025-03-18 RX ORDER — MIDAZOLAM HYDROCHLORIDE 1 MG/ML
1-10 INJECTION, SOLUTION INTRAVENOUS CONTINUOUS
Status: CANCELLED | OUTPATIENT
Start: 2025-03-18

## 2025-03-18 RX ORDER — ASPIRIN 81 MG/1
81 TABLET, CHEWABLE ORAL DAILY
Status: DISCONTINUED | OUTPATIENT
Start: 2025-03-19 | End: 2025-03-18

## 2025-03-18 RX ORDER — CALCIUM GLUCONATE 20 MG/ML
1000 INJECTION, SOLUTION INTRAVENOUS PRN
Status: DISCONTINUED | OUTPATIENT
Start: 2025-03-18 | End: 2025-03-18

## 2025-03-18 RX ORDER — 0.9 % SODIUM CHLORIDE 0.9 %
1000 INTRAVENOUS SOLUTION INTRAVENOUS PRN
Status: DISCONTINUED | OUTPATIENT
Start: 2025-03-18 | End: 2025-03-18

## 2025-03-18 RX ORDER — SODIUM CHLORIDE 0.9 % (FLUSH) 0.9 %
5-40 SYRINGE (ML) INJECTION PRN
Status: CANCELLED | OUTPATIENT
Start: 2025-03-18

## 2025-03-18 RX ORDER — HYDROCORTISONE SODIUM SUCCINATE 100 MG/2ML
100 INJECTION INTRAMUSCULAR; INTRAVENOUS EVERY 8 HOURS
Status: DISCONTINUED | OUTPATIENT
Start: 2025-03-18 | End: 2025-03-18

## 2025-03-18 RX ORDER — CALCIUM GLUCONATE 20 MG/ML
2000 INJECTION, SOLUTION INTRAVENOUS PRN
Status: DISCONTINUED | OUTPATIENT
Start: 2025-03-18 | End: 2025-03-18

## 2025-03-18 RX ORDER — WATER 10 ML/10ML
20 INJECTION INTRAMUSCULAR; INTRAVENOUS; SUBCUTANEOUS EVERY 24 HOURS
Status: DISCONTINUED | OUTPATIENT
Start: 2025-03-19 | End: 2025-03-18 | Stop reason: CLARIF

## 2025-03-18 RX ORDER — ACETAMINOPHEN 325 MG/1
650 TABLET ORAL EVERY 6 HOURS PRN
Status: DISCONTINUED | OUTPATIENT
Start: 2025-03-18 | End: 2025-03-19 | Stop reason: HOSPADM

## 2025-03-18 RX ORDER — ACETAMINOPHEN 650 MG/1
650 SUPPOSITORY RECTAL EVERY 6 HOURS PRN
Status: DISCONTINUED | OUTPATIENT
Start: 2025-03-18 | End: 2025-03-19 | Stop reason: HOSPADM

## 2025-03-18 RX ORDER — MIDAZOLAM HYDROCHLORIDE 2 MG/2ML
1 INJECTION, SOLUTION INTRAMUSCULAR; INTRAVENOUS EVERY 30 MIN PRN
Status: CANCELLED | OUTPATIENT
Start: 2025-03-18

## 2025-03-18 RX ORDER — ONDANSETRON 4 MG/1
4 TABLET, ORALLY DISINTEGRATING ORAL EVERY 8 HOURS PRN
Status: DISCONTINUED | OUTPATIENT
Start: 2025-03-18 | End: 2025-03-19 | Stop reason: HOSPADM

## 2025-03-18 RX ORDER — GLUCAGON 1 MG/ML
1 KIT INJECTION PRN
Status: DISCONTINUED | OUTPATIENT
Start: 2025-03-18 | End: 2025-03-18 | Stop reason: HOSPADM

## 2025-03-18 RX ORDER — INSULIN LISPRO 100 [IU]/ML
0-4 INJECTION, SOLUTION INTRAVENOUS; SUBCUTANEOUS EVERY 6 HOURS
Status: DISCONTINUED | OUTPATIENT
Start: 2025-03-18 | End: 2025-03-18 | Stop reason: HOSPADM

## 2025-03-18 RX ORDER — MAGNESIUM SULFATE 1 G/100ML
1000 INJECTION INTRAVENOUS PRN
Status: DISCONTINUED | OUTPATIENT
Start: 2025-03-18 | End: 2025-03-18

## 2025-03-18 RX ORDER — WATER 10 ML/10ML
20 INJECTION INTRAMUSCULAR; INTRAVENOUS; SUBCUTANEOUS EVERY 24 HOURS
Status: CANCELLED | OUTPATIENT
Start: 2025-03-19 | End: 2025-03-23

## 2025-03-18 RX ORDER — INSULIN LISPRO 100 [IU]/ML
0-4 INJECTION, SOLUTION INTRAVENOUS; SUBCUTANEOUS EVERY 6 HOURS
Status: DISCONTINUED | OUTPATIENT
Start: 2025-03-18 | End: 2025-03-18

## 2025-03-18 RX ORDER — SODIUM CHLORIDE 9 MG/ML
INJECTION, SOLUTION INTRAVENOUS CONTINUOUS
Status: DISCONTINUED | OUTPATIENT
Start: 2025-03-18 | End: 2025-03-18

## 2025-03-18 RX ORDER — INSULIN LISPRO 100 [IU]/ML
0-4 INJECTION, SOLUTION INTRAVENOUS; SUBCUTANEOUS EVERY 6 HOURS
Status: CANCELLED | OUTPATIENT
Start: 2025-03-18

## 2025-03-18 RX ORDER — HEPARIN SODIUM 5000 [USP'U]/ML
5000 INJECTION, SOLUTION INTRAVENOUS; SUBCUTANEOUS EVERY 8 HOURS SCHEDULED
Status: DISCONTINUED | OUTPATIENT
Start: 2025-03-18 | End: 2025-03-18

## 2025-03-18 RX ORDER — GLUCAGON 1 MG/ML
1 KIT INJECTION PRN
Status: DISCONTINUED | OUTPATIENT
Start: 2025-03-18 | End: 2025-03-18

## 2025-03-18 RX ORDER — GLUCAGON 1 MG/ML
1 KIT INJECTION PRN
Status: CANCELLED | OUTPATIENT
Start: 2025-03-18

## 2025-03-18 RX ORDER — ATORVASTATIN CALCIUM 40 MG/1
40 TABLET, FILM COATED ORAL NIGHTLY
Status: DISCONTINUED | OUTPATIENT
Start: 2025-03-18 | End: 2025-03-18

## 2025-03-18 RX ORDER — FENTANYL CITRATE-0.9 % NACL/PF 10 MCG/ML
25-200 PLASTIC BAG, INJECTION (ML) INTRAVENOUS CONTINUOUS
Refills: 0 | Status: DISCONTINUED | OUTPATIENT
Start: 2025-03-18 | End: 2025-03-18

## 2025-03-18 RX ADMIN — HYDROMORPHONE HYDROCHLORIDE 0.5 MG: 1 INJECTION, SOLUTION INTRAMUSCULAR; INTRAVENOUS; SUBCUTANEOUS at 20:02

## 2025-03-18 RX ADMIN — CEFEPIME 2 G: 2 INJECTION, POWDER, FOR SOLUTION INTRAVENOUS at 08:41

## 2025-03-18 RX ADMIN — CLOPIDOGREL BISULFATE 75 MG: 75 TABLET ORAL at 08:41

## 2025-03-18 RX ADMIN — PETROLATUM: 420 OINTMENT TOPICAL at 10:27

## 2025-03-18 RX ADMIN — LORAZEPAM 2 MG: 2 INJECTION INTRAMUSCULAR; INTRAVENOUS at 17:01

## 2025-03-18 RX ADMIN — SODIUM CHLORIDE 55 MCG/MIN: 9 INJECTION, SOLUTION INTRAVENOUS at 11:30

## 2025-03-18 RX ADMIN — GLYCOPYRROLATE 0.4 MG: 0.2 INJECTION INTRAMUSCULAR; INTRAVENOUS at 17:02

## 2025-03-18 RX ADMIN — SODIUM CHLORIDE 40 MG: 9 INJECTION, SOLUTION INTRAMUSCULAR; INTRAVENOUS; SUBCUTANEOUS at 08:42

## 2025-03-18 RX ADMIN — SODIUM CHLORIDE, PRESERVATIVE FREE 10 ML: 5 INJECTION INTRAVENOUS at 20:04

## 2025-03-18 RX ADMIN — INSULIN LISPRO 1 UNITS: 100 INJECTION, SOLUTION INTRAVENOUS; SUBCUTANEOUS at 11:32

## 2025-03-18 RX ADMIN — SODIUM BICARBONATE: 84 INJECTION, SOLUTION INTRAVENOUS at 11:31

## 2025-03-18 RX ADMIN — LORAZEPAM 2 MG: 2 INJECTION INTRAMUSCULAR; INTRAVENOUS at 21:41

## 2025-03-18 RX ADMIN — Medication 200 MCG/HR: at 02:37

## 2025-03-18 RX ADMIN — Medication 200 MCG/HR: at 13:55

## 2025-03-18 RX ADMIN — HYDROMORPHONE HYDROCHLORIDE 0.5 MG: 1 INJECTION, SOLUTION INTRAMUSCULAR; INTRAVENOUS; SUBCUTANEOUS at 18:02

## 2025-03-18 RX ADMIN — Medication 6 MG/HR: at 11:45

## 2025-03-18 RX ADMIN — CHLORHEXIDINE GLUCONATE 15 ML: 1.2 RINSE ORAL at 10:28

## 2025-03-18 RX ADMIN — DOBUTAMINE HYDROCHLORIDE 5 MCG/KG/MIN: 400 INJECTION INTRAVENOUS at 02:04

## 2025-03-18 RX ADMIN — SODIUM CHLORIDE 80 MCG/MIN: 9 INJECTION, SOLUTION INTRAVENOUS at 03:52

## 2025-03-18 RX ADMIN — HYDROCORTISONE SODIUM SUCCINATE 100 MG: 100 INJECTION INTRAMUSCULAR; INTRAVENOUS at 06:35

## 2025-03-18 RX ADMIN — WATER 20 ML: 1 INJECTION INTRAMUSCULAR; INTRAVENOUS; SUBCUTANEOUS at 08:43

## 2025-03-18 RX ADMIN — Medication 200 MCG/HR: at 07:24

## 2025-03-18 RX ADMIN — ASPIRIN 81 MG CHEWABLE TABLET 81 MG: 81 TABLET CHEWABLE at 08:41

## 2025-03-18 RX ADMIN — SODIUM CHLORIDE 80 MCG/MIN: 9 INJECTION, SOLUTION INTRAVENOUS at 07:21

## 2025-03-18 ASSESSMENT — PULMONARY FUNCTION TESTS
PIF_VALUE: 26
PIF_VALUE: 24
PIF_VALUE: 24
PIF_VALUE: 26
PIF_VALUE: 21
PIF_VALUE: 24
PIF_VALUE: 18
PIF_VALUE: 21
PIF_VALUE: 19
PIF_VALUE: 22
PIF_VALUE: 20
PIF_VALUE: 35
PIF_VALUE: 23
PIF_VALUE: 30
PIF_VALUE: 24
PIF_VALUE: 26
PIF_VALUE: 20
PIF_VALUE: 19
PIF_VALUE: 20
PIF_VALUE: 25
PIF_VALUE: 22
PIF_VALUE: 27
PIF_VALUE: 23
PIF_VALUE: 30
PIF_VALUE: 21
PIF_VALUE: 25
PIF_VALUE: 23
PIF_VALUE: 33

## 2025-03-18 ASSESSMENT — PAIN SCALES - GENERAL
PAINLEVEL_OUTOF10: 0

## 2025-03-18 NOTE — PROGRESS NOTES
Coordination of care discussion and chart review with PM team. LSW present to provide psychosocial support for pts daughter, mother and  family friend as they met with icu physician and Palliative NP. Pt now limited code, no to all. They plan to compassionately extubate but have asked to spend time with pt before doing so. Spiritual care contacted for anointing per family request.

## 2025-03-18 NOTE — PROGRESS NOTES
Comprehensive Nutrition Assessment    Type and Reason for Visit:  Initial, LOS (ICU/vent)    Nutrition Recommendations/Plan:   If pt remains intubated and EN needed for nutrition support, recommend:    TF RECOMMENDATION: Renal TF (Nepro) to goal rate 45 ml/hr and Protein Modular once daily with 30 ml water flushes Q 4 hr  This will provide: 1080 ml/d, 2044 total alo, 113 g total protein, 963 ml total free water  This regimen will meet 100% energy and protein needs    Continue inpatient monitoring POC/GOC     Malnutrition Assessment:  Malnutrition Status:  At risk for malnutrition (03/18/25 0941)    Context:  Acute Illness     Findings of the 6 clinical characteristics of malnutrition:  Energy Intake:  50% or less of estimated energy requirements for 5 or more days  Weight Loss:  No weight loss     Body Fat Loss:  Unable to assess (Edema may mask losses)     Muscle Mass Loss:  Unable to assess (Edema may mask losses)    Fluid Accumulation:  Unable to assess Extremities (multifactorial)   Strength:  Not Performed    Nutrition Assessment:    Pt admit from [place] 2/2 septic shock. Pt at SNF for IV abx via PICC d/t endocarditis and septic emboli to brain. Pt had STEMI wtih stent to LAD placed 2/17 during that admit. Currently, intubated/sedated, on pressor support in ICU with BELLO on CKD. PICC removed d/t poss CLABSI. May need CVVHD if no improvement in renal fxn. Will provide TF recommendation for use if pt remains intubated and EN needed for nutrition support.    Nutrition Related Findings:    vent/sedated, OGT clamped, BUN/creat 93/6.3, phos 8.4, soft abd w/hypo BS, +1-+2 edema, missing teeth, +I/O 6.7, pressors(MAP 89) Wound Type: Venous Stasis (Per wound care=BLE Vascular discoloration or hemochromatosis/staining)       Current Nutrition Intake & Therapies:    Average Meal Intake: NPO  Average Supplements Intake: NPO  Diet NPO    Anthropometric Measures:  Height: 193 cm (6' 4\")  Ideal Body Weight (IBW): 202  lbs (92 kg)    Admission Body Weight: 91.5 kg (201 lb 11.5 oz) (3/16 bedscale)  Current Body Weight: 97.2 kg (214 lb 4.6 oz), 106.1 % IBW. Weight Source: Bed scale (3/18)  Current BMI (kg/m2): 26.1  Usual Body Weight: 81.5 kg (179 lb 10.8 oz) (2/17/25 bedscale)     % Weight Change (Calculated): 19.3                    BMI Categories: Normal Weight (BMI 18.5-24.9)    Estimated Daily Nutrient Needs:  Energy Requirements Based On: Formula ([facility] 2003b)  Weight Used for Energy Requirements: Admission  Energy (kcal/day):  (% PSE)  Weight Used for Protein Requirements: Ideal  Protein (g/day): 105-115 (1.1-1.3 g/kg)  Method Used for Fluid Requirements: Defer to provider      Nutrition Diagnosis:   Inadequate oral intake related to impaired respiratory function as evidenced by intubation, NPO or clear liquid status due to medical condition    Nutrition Interventions:   Food and/or Nutrient Delivery: Continue NPO  Nutrition Education/Counseling: No recommendation at this time  Coordination of Nutrition Care: Continue to monitor while inpatient       Goals:  Goals: Initiation of nutrition  Type of Goal: New goal       Nutrition Monitoring and Evaluation:   Behavioral-Environmental Outcomes: None Identified  Food/Nutrient Intake Outcomes: Progression of Nutrition  Physical Signs/Symptoms Outcomes: Biochemical Data, GI Status, Fluid Status or Edema, Hemodynamic Status, Nutrition Focused Physical Findings, Skin, Weight    Discharge Planning:    Too soon to determine     Shelley Perez RD, CNSC, LD  Contact: x [phone]

## 2025-03-18 NOTE — PROGRESS NOTES
[facility]  [facility]  Progress Note    SUBJECTIVE:  Chief Complaint   Patient presents with    Arm Pain     right arm swelling and sob, picc line in place.     Events noted.  Patient is on a ventilator.  Seem to be tolerating antibiotics.  No fever.  He is on 2 vasopressors, dobutamine and bicarbonate drip    Review of systems:  As stated above in the chief complaint, otherwise negative.    Medications:  Scheduled Meds:   white petrolatum   Topical BID    insulin lispro  0-4 Units SubCUTAneous Q6H    chlorhexidine  15 mL Mouth/Throat BID    ceftaroline fosamil (TEFLARO) 600 mg in sodium chloride 0.9 % 50 mL IVPB  600 mg IntraVENous Q12H    DAPTOmycin (CUBICIN) 800 mg in sodium chloride (PF) 0.9 % 16 mL IV syringe  800 mg IntraVENous Q48H    hydrocortisone sodium succinate PF  100 mg IntraVENous Q8H    aspirin  81 mg Oral Daily    [Held by provider] atorvastatin  40 mg Oral Nightly    sodium chloride flush  5-40 mL IntraVENous 2 times per day    pantoprazole (PROTONIX) 40 mg in sodium chloride (PF) 0.9 % 10 mL injection  40 mg IntraVENous Q12H    clopidogrel  75 mg Oral Daily    sterile water  20 mL Injection Q24H     Continuous Infusions:   dextrose      norepinephrine 55 mcg/min (03/18/25 1114)    VASOpressin 20 Units in sodium chloride 0.9 % 100 mL infusion 0.03 Units/min (03/18/25 0654)    sodium bicarbonate 150 mEq in dextrose 5 % 1,000 mL infusion 75 mL/hr at 03/18/25 0654    DOBUTamine 5 mcg/kg/min (03/18/25 0654)    fentaNYL 200 mcg/hr (03/18/25 0724)    midazolam 6 mg/hr (03/18/25 0654)    sodium chloride       PRN Meds:white petrolatum **AND** white petrolatum, glucose, dextrose bolus **OR** dextrose bolus, glucagon (rDNA), dextrose, fentaNYL, midazolam, calcium carbonate, sodium chloride flush, sodium chloride, ondansetron **OR** ondansetron, polyethylene glycol, acetaminophen **OR** acetaminophen, albuterol    OBJECTIVE:  BP 96/77   Pulse (!) 107   Temp 97.9 °F (36.6  Ceftaroline  We will repeat blood cultures x 2 today  Transfer to tertiary care referral center for cardiothoracic surgery evaluation     Case discussed with ICU team.    Aidan Olsen MD  11:27 AM  3/18/2025

## 2025-03-18 NOTE — PROGRESS NOTES
4 Eyes Skin Assessment     NAME:  Wagner Ryan  YOB: 1980  MEDICAL RECORD NUMBER:  [id number]    The patient is being assessed for  Admission- transfer from [place]    I agree that at least one RN has performed a thorough Head to Toe Skin Assessment on the patient. ALL assessment sites listed below have been assessed.      Areas assessed by both nurses:    Head, Face, Ears, Shoulders, Back, Chest, Arms, Elbows, Hands, Sacrum. Buttock, Coccyx, Ischium, Legs. Feet and Heels, and Under Medical Devices         Does the Patient have a Wound? Yes wound(s) were present on assessment. LDA wound assessment was Initiated and completed by RN    R buttocks wound nonblanchable redness/slough 1 X 1   Dry/Flaky B/L heels       [place] Prevention initiated by RN: Yes  Wound Care Orders initiated by RN: No    Pressure Injury (Stage 3,4, Unstageable, DTI, NWPT, and Complex wounds) if present, place Wound referral order by RN under : No    New Ostomies, if present place, Ostomy referral order under : No     Nurse 1 eSignature: Electronically signed by Zonia Diego RN on 3/18/25 at 1:42 PM EDT    **SHARE this note so that the co-signing nurse can place an eSignature**    Nurse 2 eSignature: Electronically signed by Yoana Hill RN on 3/18/25 at 1:29 PM EDT

## 2025-03-18 NOTE — H&P
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 At this point I decided to use the femoral artery approach. The patient had deep scarring and deep dimpling of the skin over both common femoral veins bilaterally.  This was a very likely secondary to chronic repetitive IV drug injection/abuse. A 6 Malawian sheath was placed in the right common femoral artery lateral to the kin scar above the right femoral vein. The catheterization was achieved with the use of 6 Malawian catheters without difficulty. Findings: Left Main  coronary artery: Patent throughout with no obstructive disease. Left anterior descending coronary artery: Severe focal 99% with soft plaque and small thrombus and OSORIO II flow (culprit lesion for acute ST elevation MI). Ramus:  Moderate size vessel.  60% focal eccentric mid stenosis. Left circumflex coronary artery: Patent throughout with no obstructive disease.  Relatively small. Right coronary artery: Large artery.  Dominant.  30% mid stenosis. Left ventriculogram:   Kidney disease of distal anterior wall, apex, and distal inferior wall with LVEF of more than 45%.  No significant mitral regurgitation. Hemodynamics:  LVEDP mmHg.  No gradient is present across aortic valve during catheter pullback. Coronary intervention Patient was given a bolus of heparin to achieve therapeutic ACT. The guiding catheter was used to engage the left main artery selectively. The guidewire was used to cross the lesion in the mid LAD without difficulty. PTCA was done at the site of the severe focal mid stenosis with the use of a 2.0 x 12 mm Euophora balloon inflated to 10 lisa followed by placement of a 3.0 x 18 mm Colton Tucker drug-eluting stent deployed at 12 lisa. Follow-up views revealed very good angiographic results, 0% residual stenosis, and OSORIO-3 flow. Selective angiography of right femoral artery was done prior

## 2025-03-18 NOTE — PLAN OF CARE
Problem: Chronic Conditions and Co-morbidities  Goal: Patient's chronic conditions and co-morbidity symptoms are monitored and maintained or improved  Outcome: Progressing     Problem: Discharge Planning  Goal: Discharge to home or other facility with appropriate resources  Outcome: Progressing     Problem: Skin/Tissue Integrity  Goal: Skin integrity remains intact  Description: 1.  Monitor for areas of redness and/or skin breakdown  2.  Assess vascular access sites hourly  3.  Every 4-6 hours minimum:  Change oxygen saturation probe site  4.  Every 4-6 hours:  If on nasal continuous positive airway pressure, respiratory therapy assess nares and determine need for appliance change or resting period  Outcome: Progressing  Flowsheets (Taken 3/17/2025 1300 by Maria Del Rosario Han RN)  Skin Integrity Remains Intact: Monitor for areas of redness and/or skin breakdown     Problem: Safety - Adult  Goal: Free from fall injury  Outcome: Progressing  Flowsheets  Taken 3/18/2025 0143 by Isela Causey RN  Free From Fall Injury: Instruct family/caregiver on patient safety  Taken 3/17/2025 1300 by Maria Del Rosario Han RN  Free From Fall Injury: Instruct family/caregiver on patient safety     Problem: Pain  Goal: Verbalizes/displays adequate comfort level or baseline comfort level  Outcome: Progressing  Flowsheets  Taken 3/17/2025 1800 by Maria Del Rosario Han RN  Verbalizes/displays adequate comfort level or baseline comfort level: Assess pain using appropriate pain scale  Taken 3/17/2025 1700 by Maria Del Rosario Han RN  Verbalizes/displays adequate comfort level or baseline comfort level: Assess pain using appropriate pain scale  Taken 3/17/2025 1600 by Maria Del Rosario Han RN  Verbalizes/displays adequate comfort level or baseline comfort level: Assess pain using appropriate pain scale  Taken 3/17/2025 1500 by Maria Del Rosario Han RN  Verbalizes/displays adequate comfort level or baseline comfort level: Assess pain using appropriate pain scale  Taken  3/17/2025 1400 by Maria Del Rosario Han, RN  Verbalizes/displays adequate comfort level or baseline comfort level: Assess pain using appropriate pain scale  Taken 3/17/2025 1300 by Maria Del Rosario Han RN  Verbalizes/displays adequate comfort level or baseline comfort level: Assess pain using appropriate pain scale  Taken 3/17/2025 1200 by Maria Del Rosario Han, RN  Verbalizes/displays adequate comfort level or baseline comfort level: Assess pain using appropriate pain scale     Problem: ABCDS Injury Assessment  Goal: Absence of physical injury  Outcome: Progressing  Flowsheets (Taken 3/17/2025 1300 by Maria Del Rosario Han, RN)  Absence of Physical Injury: Implement safety measures based on patient assessment

## 2025-03-18 NOTE — PROCEDURES
PROCEDURE NOTE  Date: 3/17/2025   Name: Wagner Ryan  YOB: 1980    Intubation    Date/Time: 3/17/2025 11:16 PM    Performed by: Lorenzo Best APRN - CNP  Authorized by: Lorenzo Best APRN - CNP  Consent: Verbal consent obtained.  Risks and benefits: risks, benefits and alternatives were discussed  Consent given by: patient  Patient understanding: patient states understanding of the procedure being performed  Patient consent: the patient's understanding of the procedure matches consent given  Procedure consent: procedure consent matches procedure scheduled  Relevant documents: relevant documents present and verified  Test results: test results available and properly labeled  Site marked: the operative site was marked  Imaging studies: imaging studies available  Required items: required blood products, implants, devices, and special equipment available  Patient identity confirmed: verbally with patient, arm band, provided demographic data and hospital-assigned identification number  Time out: Immediately prior to procedure a \"time out\" was called to verify the correct patient, procedure, equipment, support staff and site/side marked as required.  Indications: respiratory distress (Refusing BIPAP)  Intubation method: video-assisted  Patient status: paralyzed (RSI)  Preoxygenation: nonrebreather mask  Sedatives: etomidate  Paralytic: rocuronium  Laryngoscope size: Mac 4  Tube size: 8.0 mm  Tube type: cuffed  Number of attempts: 1  Cricoid pressure: no  Cords visualized: yes  Post-procedure assessment: chest rise and ETCO2 monitor  Breath sounds: equal and absent over the epigastrium  Cuff inflated: yes  ETT to lip: 25 cm  Tube secured with: ETT katz  Chest x-ray findings: endotracheal tube too high  Tube repositioned: tube repositioned successfully (Advance from 25 cm to 27 cm)  Patient tolerance: patient tolerated the procedure well with no immediate complications

## 2025-03-18 NOTE — PROCEDURES
PROCEDURE NOTE  Date: 3/18/2025   Name: Wagner Ryan  YOB: 1980    Procedures    PROCEDURE  3/18/25       Time: 1155    TRIPLE LUMEN DIALYSIS CATHETER INSERTION  Risks, benefits and alternatives (for applicable procedures below) described.   Performed By: Rossi Cohen MD and ICU Attending Physician.    Indication: urgent hemodialysis.  Informed consent: Verbal consent obtained.  The family members were counseled regarding the procedure in person, it's indications, risks, potential complications and alternatives and any questions were answered. Verbal consent was obtained.  Procedure: After routine sterile preparation, local anesthesia obtained by infiltration using 1% Lidocaine without epinephrine. A right 3-Lumen 7F Central Venous Catheter was placed by femoral vein approach and secured by standard fashion.   Ultrasound Guidance:   used.  Number of Attempts: 1  Post-procedure Findings: A post procedural chest x-ray  was not indicated.  Patient tolerated the procedure well.       Rossi Cohen MD Ryan G Toolson, DO

## 2025-03-18 NOTE — PROGRESS NOTES
Department of Internal Medicine  Nephrology Attending Progress Note    Events reviewed.    SUBJECTIVE:  We are following this patient for BELLO.  Patient intubated.    Physical Exam:    VITALS:  /79   Pulse (!) 111   Temp 97.9 °F (36.6 °C) (Bladder)   Resp 22   Ht 1.93 m (6' 4\")   Wt 97.2 kg (214 lb 4.6 oz)   SpO2 98%   BMI 26.08 kg/m²   24HR INTAKE/OUTPUT:    Intake/Output Summary (Last 24 hours) at 3/18/2025 0958  Last data filed at 3/18/2025 0654  Gross per 24 hour   Intake 3375.62 ml   Output 525 ml   Net 2850.62 ml       Constitutional: Presently intubated, sedated  HEENT: Endotracheal tube in place  Cardiovascular/Edema: Heart sounds are regular rate  Respiratory: Decreased breath sounds at bases  Gastrointestinal: Abdomen soft  Other: + Edema    Scheduled Meds:   white petrolatum   Topical BID    insulin lispro  0-4 Units SubCUTAneous Q6H    DAPTOmycin (CUBICIN) 800 mg in sodium chloride (PF) 0.9 % 16 mL IV syringe  800 mg IntraVENous Q48H    hydrocortisone sodium succinate PF  100 mg IntraVENous Q8H    aspirin  81 mg Oral Daily    [Held by provider] atorvastatin  40 mg Oral Nightly    sodium chloride flush  5-40 mL IntraVENous 2 times per day    pantoprazole (PROTONIX) 40 mg in sodium chloride (PF) 0.9 % 10 mL injection  40 mg IntraVENous Q12H    clopidogrel  75 mg Oral Daily    ceFEPIme  2 g IntraVENous Q24H    And    sterile water  20 mL Injection Q24H     Continuous Infusions:   dextrose      norepinephrine 65 mcg/min (03/18/25 0941)    VASOpressin 20 Units in sodium chloride 0.9 % 100 mL infusion 0.03 Units/min (03/18/25 0654)    sodium bicarbonate 150 mEq in dextrose 5 % 1,000 mL infusion 75 mL/hr at 03/18/25 0654    DOBUTamine 5 mcg/kg/min (03/18/25 0654)    fentaNYL 200 mcg/hr (03/18/25 0724)    midazolam 6 mg/hr (03/18/25 0654)    sodium chloride       PRN Meds:.white petrolatum **AND** white petrolatum, glucose, dextrose bolus **OR** dextrose bolus, glucagon (rDNA), dextrose, sulfur

## 2025-03-18 NOTE — PROGRESS NOTES
Date: 3/17/2025    Time: 10:19 PM    Patient Placed On BIPAP/CPAP/ Non-Invasive Ventilation?  Yes    If no must comment.  Facial area red/color change? No           If YES are Blister/Lesion present?No   If yes must notify nursing staff  BIPAP/CPAP skin barrier?  Yes    Skin barrier type:mepilex       Comments:     03/17/25 1336   NIV Type   $NIV $Daily Charge   NIV Started/Stopped On   Equipment Type V60   Mode Biphasic   Mask Type Full face mask   Mask Size Medium   Assessment   Pulse 85   Respirations 22   SpO2 98 %   Level of Consciousness 0   Comfort Level Fair   Using Accessory Muscles Yes   Mask Compliance Fair   Skin Assessment Clean, dry, & intact   Skin Protection for O2 Device Yes   Orientation Middle   Location Nose   Intervention(s) Skin Barrier   Settings/Measurements   PIP Observed 13 cm H20   IPAP 12 cmH20   CPAP/EPAP 6 cmH2O   Vt (Measured) 689 mL   Rate Ordered 16   Insp Rise Time (%) 1 %   FiO2  40 %   I Time/ I Time % 0.8 s   Minute Volume (L/min) 16.8 Liters   Mask Leak (lpm) 30 lpm           DAIJA MOORE RCP

## 2025-03-18 NOTE — PROGRESS NOTES
DAILY VENTILATOR WEANING ASSESSMENT PERFORMED    P/FIO2 Ratio =  200        (<100= do not Wean)                  Cs =  20                        (<32= Instability)  Plat. Pressure = 22  MV =12.1  RSBI =    Instabilities:       Cardiovascular =       CNS =       Respiratory =       Metabolic =2    Parameters    no    Ask Physician for a weaning plan no    Was SAT completed no    Was SBT completed no    Additional Comments:     Performed by Leann Moreno RCP RRT      Reference Table:    Cardiovascular     CNS      1. Mean BP less than or equal to 75   1. Neuromuscular blockade  2. Heart Rate greater than 130   2. RASS of -3, -4, -5  3. Myocardial Ischemia    3. RASS of +3, +4  4. Mechanical Assist Device    4. ICP greater than 15 or             Intracranial Hypertension         Respiratory      Metabolic  1. PEEP equal to or greater than 10cm/H20  1.Temp. (8hrs) less than 95 or > 103  2. Respiratory Rate greater than 35   2. WBC < 5000 or > 71311  3. Minute Volume greater than 15L  4. pH less than 7.30  5. Deteriorating chest X-ray

## 2025-03-18 NOTE — PROGRESS NOTES
[facility]  Department of Internal Medicine   Internal Medicine Residency   MICU Progress Note    Patient:  Wagner Ryan 45 y.o. male  MRN: [id number]     Date of Service: 3/18/2025    Allergy: Patient has no known allergies.    Overnight Events: Patient became increasingly hypotensive last night.  Systolic dropped from 70s to 50s/30s.  Central line to right IJ was placed, and patient was started on levo.  CXR showed worsening pulmonary edema and patient had increased work of breathing and tachypnea.  Decision was made to intubate patient.  Patient requiring increased pressors; vasopressin was also initiated.  200 cc output of tea colored urine was noted overnight.  Subjective     Patient decompensated last night requiring intubation and pressors.  Patient remains intubated and sedated.  He does not appear to be in any distress. He is currently on 200 fentanyl, 6 of Versed, 88 of levo, 0.03 of vasopressin, and 5 of dobutamine.    ROS: Unable to assess due to patient's condition    Objective     VS: BP 96/78   Pulse 97   Temp 98 °F (36.7 °C) (Bladder)   Resp 22   Ht 1.93 m (6' 4\")   Wt 97.2 kg (214 lb 4.6 oz)   SpO2 95%   BMI 26.08 kg/m²   ABP (Arterial line BP): 96/78  ABP Mean (Arterial Line Mean): 87 mmHg    I & O - 24hr:   Intake/Output Summary (Last 24 hours) at 3/18/2025 1247  Last data filed at 3/18/2025 0654  Gross per 24 hour   Intake 3375.62 ml   Output 425 ml   Net 2950.62 ml       Sedatives: None    Pressors: None    Oxygen:  Intubated, on vent    ABG:     Lab Results   Component Value Date/Time    PH 7.366 03/18/2025 05:21 AM    PCO2 32.1 03/18/2025 05:21 AM    PO2 180.1 03/18/2025 05:21 AM    HCO3 18.0 03/18/2025 05:21 AM    BE -6.5 03/18/2025 05:21 AM    THB 10.5 03/18/2025 05:21 AM    O2SAT 99.2 03/18/2025 05:21 AM       Physical Exam:  General Appearance: Intubated and sedated, in no distress  Neuro: Round symmetric pupil that react to light bilaterally    Cardiovascular:  Regular  Stress ulcer prophylaxis: PPI  Consultations needed: Infectious disease, cardiology, nephrology  Transfer to [facility] for CVVHD     Lines/catheters  Right IJ central line  Left radial arterial line  Peripherals  ETT  Right femoral temporary HD trialysis catheter     Patient remains critically ill and requiring ICU level care.       Rossi Cohen MD, PGY-1  Attending physician: Dr. Candelaria      I personally saw, examined and provided care for the patient. I performed the substantive portion of the visit. Radiographs, labs and medication list were reviewed by me independently. Review of Residents documentation was conducted and revisions were made as appropriate. I agree with the above documented exam, problem list and plan of care.      Patient with STEMI and stent placed to LAD February at [facility].  Developed bacterial endocarditis with septic emboli to brain.  Not surgical candidate given recent stent and need to be on dual antiplatelet therapy.  Discharged to nursing facility.  Presents with fatigue, hypotension.  Patient with persistent MRSA bacteremia.  Still had tunneled line.  Tunneled line removed 3/17.     Patient decompensated overnight.  Patient now on 3 pressors and intubated.  2D echo shows worsening mitral regurg now severe.  Appears to be bileaflet prolapse cannot rule out flail mitral valve.        Continue pressors  Vent support  Abx per ID  Would need CRRT, HD cath to be placed at bedside  Reviewed with nephrology, Dr. Wen input appreciated.     Patient will need transfer to [facility] for CRT.  Discussed with Intensivist, Dr. Cohen.  Appreciate acceptance     Daughter updated at bedside.  Multiple comorbidities and remains critically ill.  High risk for sudden death and decline.          CCT excluding procedures 110 minutes    Cr Candelaria DO

## 2025-03-18 NOTE — PROGRESS NOTES
SVI CI HR TFC MAP TPRI   Baseline 51 4 78  63 1254   Test 56 4.4 79  67    % Change         7.7%- not fluid responsive

## 2025-03-18 NOTE — INTERDISCIPLINARY ROUNDS
Intensive Care Daily Quality Rounding Checklist        ICU Team Members: Dr. Candelaria, residents, charge nurse, bedside nurse, clinical pharmacist and respiratory therapy      ICU Day #: NUMBER: 3     SOFA Score: 13     Intubation Date:  3/17/2025     Ventilator Day #: 2     Central Line Insertion Date: 3/17/2025                                                    Day #: 2                                                    Indication: vasopressors      Arterial Line Insertion Date:  3/17/2025                             Day #: 2     Temporary Hemodialysis Catheter Insertion Date:                               Day #      DVT Prophylaxis: PCD's    GI Prophylaxis: Protonix     [place] Catheter Insertion Date:  3/17/2025                                        Day #: 2                             Indications: multiple vasopressors, possible dialysis patient                             Continued need (if yes, reason documented and discussed with physician):      Skin Issues/ Wounds and ordered treatment discussed on rounds:Y     Goals/ Plans for the Day: Monitor labs and vitals. [place]. Renal to follow labs. Plan to start CRRT. Wean pressors as able. ID following      Reviewed plan and goals for day with patient and/or representative: Yes     **SHARE this note so that the rounding nurse may edit**

## 2025-03-18 NOTE — ED PROVIDER NOTES
upper pole of the spleen may represent an   infarction. A mass lesion cannot be excluded. Further evaluation with   contrast-enhanced CT or MRI of the abdomen is recommended.   3. Small pelvic ascites.         FL REMOVAL CENTRAL VENOUS CATH   Final Result   1. Successful removal of right internal jugular tunneled central venous   catheter.   2. Cardiomegaly with CHF and pulmonary edema again noted.         XR CHEST PORTABLE   Final Result   Interval worsening of interstitial and airspace opacities, which may be seen   with edema versus infection in the appropriate clinical setting.         XR HUMERUS RIGHT (MIN 2 VIEWS)   Final Result      No acute findings in the right humerus.         XR ELBOW RIGHT (MIN 3 VIEWS)   Final Result      No acute findings in the right elbow.         XR RADIUS ULNA RIGHT (2 VIEWS)   Final Result      No acute findings in the right forearm.         XR CHEST PORTABLE   Final Result   1. Mild cardiomegaly with prominent pulmonary vascularity and increased   interstitial markings with small bilateral pleural effusions.   2. Right-sided MediPort catheter in position.         Vascular duplex upper extremity venous right   Final Result   1. No evidence of DVT in the right upper extremity.   2. Signs of chronic thrombosis of the cephalic vein near the antecubital   fossa.         XR CHEST PORTABLE    (Results Pending)   XR CHEST PORTABLE    (Results Pending)     XR CHEST PORTABLE  Result Date: 3/18/2025  EXAMINATION: ONE XRAY VIEW OF THE CHEST 3/18/2025 6:46 am COMPARISON: March 17, 2025 HISTORY: ORDERING SYSTEM PROVIDED HISTORY: eval lung fields, medical devices TECHNOLOGIST PROVIDED HISTORY: Reason for exam:->eval lung fields, medical devices FINDINGS: Endotracheal tube, gastric tube and right jugular central venous catheter remain with good positioning.  Unchanged bilateral basilar opacity suggesting airspace disease/edema and small bilateral effusions.  No pneumothorax.  No acute osseous or  exam, bedside rectal exam negative for guaiac.   Differential diagnose include limited to septic shock, considered pneumonia, considered ACS, consider dehydration, considered anemia, considered viral URI.    Workup showing leukocytosis, anemia requiring transfusion, PRBCs ordered.  COVID and flu are negative.  Lactic acid within normal limits, chest x-ray showing evidence of bilateral pneumonia.    Patient medicated with 2 units of PRBCs, 1500 of IV fluids.  Started on broad-spectrum he is already on dapsone for MRSA bacteremia.  Iron studies sent as patient's stool guaiac test was negative.  Consulted with ICU attending who accepted patient to ICU.  Consulted with [facility] hospitalist excepted patient for admission.        CONSULTS:   IP CONSULT TO CRITICAL CARE  IP CONSULT TO NEPHROLOGY  IP CONSULT TO INFECTIOUS DISEASES  IP CONSULT TO HOSPITALIST  IP CONSULT TO PHARMACY  IP CONSULT TO CRITICAL CARE  IP CONSULT TO INFECTIOUS DISEASES  IP CONSULT TO CASE MANAGEMENT  IP CONSULT TO CARDIOLOGY  IP CONSULT TO VASCULAR ACCESS TEAM      PROCEDURES   Unless otherwise noted below, none       CRITICAL CARE TIME (.cct)         I am the Primary Clinician of Record.    FINAL IMPRESSION      1. Symptomatic anemia    2. BELLO (acute kidney injury)    3. Hypotension, unspecified hypotension type    4. History of ST elevation myocardial infarction (STEMI)    5. History of endocarditis    6. Pain and swelling of forearm, right    7. Acute bacterial endocarditis    8. Aortic valve endocarditis    9. Septic shock (HCC)    10. Acute hypoxic respiratory failure (HCC)          DISPOSITION/PLAN     DISPOSITION Admitted 03/16/2025 05:37:00 PM      PATIENT REFERRED TO:  [facility]  [address] [place] [id number]  [phone]          DISCHARGE MEDICATIONS:  Current Discharge Medication List          DISCONTINUED MEDICATIONS:  Current Discharge Medication List                 (Please note that portions of this note were

## 2025-03-18 NOTE — CONSULTS
Palliative Care Department  [phone]  Palliative Care Initial Consult  Provider Daphne Mcdonnell, APRN - CNP    Wagner Ryan  [id number]  Hospital Day: 1  Date of Initial Consult: 3/18/2025  Referring Provider: Db Frazier MD  Palliative Medicine was consulted for assistance with: \"Endocarditis with flail Mitral Valve, not a surgical candiate, septic shock, Poor prognosis, GO discussion\"    HPI:   Wagner Ryan is a 45 y.o. with a medical history of IV drug abuse, aortic valve endocarditis, cerebral septic embolism, NSTEMI, BELLO and MRSA who was admitted on 3/18/2025 from SNF with a CHIEF COMPLAINT of right arm pain.  Patient with recent long hospitalization at [facility] from 2/18 - 3/7 where patient had JAVED showing aortic valve endocarditis, cerebral septic embolism, NSTEMI, BELLO and culture positive for MSRA bacteremia and also tested positive for influenza A.  Patient had stent placed for LAD lesion on 2/17.  He was admitted to MICU following Cath Lab and started on broad-spectrum antibiotics, aspirin and Brilinta.  He was not an operative candidate for endocarditis at that time due to recent OBED placement.  CTA head showed tiny area of intraparenchymal hemorrhage in the right occipital lobe with MRI of the brain showed multiple small foci of acute and early subacute infarcts in bilateral frontal and parietal occipital lobe left more than right consistent with micro emboli.  Repeat MRI confirmed no hemorrhage.  Patient discharged on 3/7 to SNF on IV antibiotics.    He presented [facility] 3/16/2025 with chief complaint of weakness and hypotension.  ER was significant for hyponatremia 128, chloride 89, BUN 82, creatinine 5.9, troponin 282, WBC 12.9, hemoglobin 5.7, hematocrit 17.6 procalcitonin 48.45, proBNP greater than 70,000.  Echocardiogram revealed EF 45 to 50%, aortic valve not well-visualized, severe mitral valve regurgitation with mobile echodensity seen suspicious for endocarditis, mild 
[facility] cardiology was consulted for elevated troponins, hx of endocarditis, and repeat ECHO.  When I went to room, family was in the room, palliative care was present, decision was made to hold on CRRT and CODE STATUS changed to DNR CC.  Patient was compassionately extubated.  No need for cardiac consult.    Keshav Morales, APRN - CNP    
No need for a full consultation.  Patient was seen earlier today at [facility] ICU.  He is presently transferred for CRRT.  Orders written.  
Pt is known to ID . Seen by Dr Olsen  ( 3/18) at [place]   This is a 45 yrs old male with ESRD  on HD, recurrent MRSA bacteremia , mitral and aortic valve endocarditis - now with flail mitral valve .  Septic shock, cardiogenic shock     Continue Teflaro and  Dapto  Full consult to follow  Discussed with Dr Frazier     
performed by Kapil Yuan MD at [facility] OR    IR TUNNELED CVC PLACE WO SQ PORT/PUMP > 5 YEARS  2/24/2025    IR TUNNELED CVC PLACE WO SQ PORT/PUMP > 5 YEARS 2/24/2025 Kapil Ponce [place] [facility] [place]    LEG SURGERY Right 3/6/2022    IRRIGATION AND DEBRIDEMENT RIGHT THIGH performed by Wes Branch [place] [facility] [place]    [place] EXPLORATION OF ARTERY/VEIN Left 10/7/2020    LEFT ARM BRACHIAL ARTERY EXPLORATION, REPAIR OF MYCOTIC ANUERYSM WITH BYPASS performed by Eddie Ruff [place] [facility] [place]    TRANSESOPHAGEAL ECHOCARDIOGRAM N/A 4/24/2020    TRANSESOPHAGEAL ECHOCARDIOGRAM performed by Natasha Bowie MD at [facility] ENDOSCOPY    TRANSESOPHAGEAL ECHOCARDIOGRAM  10/14/2020       Current Medications   Current Medications    [START ON 3/19/2025] aspirin  81 mg Oral Daily    atorvastatin  40 mg Oral Nightly    ceftaroline fosamil (TEFLARO) 300 mg in sodium chloride 0.9 % 50 mL IVPB  300 mg IntraVENous Q12H    chlorhexidine  15 mL Mouth/Throat BID    [START ON 3/19/2025] clopidogrel  75 mg Oral Daily    [START ON 3/19/2025] DAPTOmycin (CUBICIN) 800 mg in sodium chloride (PF) 0.9 % 16 mL IV syringe  800 mg IntraVENous Q48H    hydrocortisone sodium succinate PF  100 mg IntraVENous Q8H    insulin lispro  0-4 Units SubCUTAneous Q6H    pantoprazole (PROTONIX) 40 mg in sodium chloride (PF) 0.9 % 10 mL injection  40 mg IntraVENous Q12H    white petrolatum   Topical BID    sodium chloride flush  5-40 mL IntraVENous 2 times per day     calcium carbonate, dextrose, dextrose bolus **OR** dextrose bolus, fentaNYL, glucagon (rDNA), glucose, midazolam, white petrolatum **AND** white petrolatum, sodium chloride, acetaminophen **OR** acetaminophen, albuterol, ondansetron **OR** ondansetron, polyethylene glycol, sodium chloride flush, sodium chloride, potassium chloride, magnesium sulfate, calcium gluconate **OR** calcium gluconate **OR** calcium gluconate **OR** calcium gluconate, sodium phosphate 6 mmol in sodium chloride 0.9 % 250

## 2025-03-18 NOTE — FLOWSHEET NOTE
Inpatient Wound Care (follow up) 205    Admit Date: 3/16/2025 10:03 AM    Reason for consult:  right buttock    Patient laying down in bed, eyes closed. Patient is on vent via ET tube. Assist of two people to roll, dependent.     Findings:     03/18/25 0943   Wound 03/16/25 Buttocks Right 1x1 open area   Date First Assessed/Time First Assessed: 03/16/25 1850   Present on Original Admission: Yes  Location: Buttocks  Wound Location Orientation: Right  Wound Description (Comments): 1x1 open area   Wound Image    Wound Etiology   (fullthickness)   Dressing/Treatment Open to air   Wound Length (cm) 0.5 cm   Wound Width (cm) 0.5 cm   Wound Depth (cm)   (obscured)   Wound Surface Area (cm^2) 0.25 cm^2   Change in Wound Size % (l*w) 75   Wound Assessment Eschar dry   Drainage Amount None (dry)   Virgen-wound Assessment   (healing scar)       **Informed Consent**    Photos taken of wound and inserted into their chart as part of their permanent medical record for purposes of documentation, treatment management and/or medical review.   All Images taken on 3/18/25 of patient name: Wagner Ryan were transmitted and stored on secured Epic  Site located within Media Folder Tab by a registered Epic-Ha[employer] Mobile Application Device.     Plan: Aquaphor to bilateral buttocks and feet  Comfort glide  Wedges  Heel protectors  Low air loss bed (ICU BED)  Patient will need continued preventative care      Sharon Ortega RN 3/18/2025 12:59 PM

## 2025-03-18 NOTE — PROGRESS NOTES
Patient belongings sent home with daughter.    Belongings include pajama pants, underwear, and shirt.

## 2025-03-18 NOTE — PROGRESS NOTES
Patient with STEMI and stent placed to LAD February at [facility].  Developed bacterial endocarditis with septic emboli to brain.  Not surgical candidate given recent stent and need to be on dual antiplatelet therapy.  Discharged to nursing facility.  Presents with fatigue, hypotension.  Patient with persistent MRSA bacteremia.  Still had tunneled line.  Tunneled line removed 3/17.    Patient decompensated overnight.  Patient now on 3 pressors and intubated.  2D echo shows worsening mitral regurg now severe.  Appears to be bileaflet prolapse cannot rule out flail mitral valve.      Continue pressors  Vent support  Abx per ID  Would need CRRT, HD cath to be placed at bedside  Reviewed with nephrology, Dr. Wen input appreciated.    Patient will need transfer to [facility] for CRT.  Discussed with Intensivist, Dr. Cohen.  Appreciate acceptance    Daughter updated at bedside.  Multiple comorbidities and remains critically ill.  High risk for sudden death and decline.

## 2025-03-19 VITALS
RESPIRATION RATE: 8 BRPM | TEMPERATURE: 97.3 F | WEIGHT: 214.51 LBS | HEART RATE: 96 BPM | SYSTOLIC BLOOD PRESSURE: 45 MMHG | DIASTOLIC BLOOD PRESSURE: 34 MMHG | OXYGEN SATURATION: 98 % | HEIGHT: 76 IN | BODY MASS INDEX: 26.12 KG/M2

## 2025-03-19 PROBLEM — R57.0 CARDIOGENIC SHOCK (HCC): Status: ACTIVE | Noted: 2025-01-01

## 2025-03-19 LAB
ACB COMPLEX DNA BLD POS QL NAA+NON-PROBE: NOT DETECTED
B FRAGILIS DNA BLD POS QL NAA+NON-PROBE: NOT DETECTED
C ALBICANS DNA BLD POS QL NAA+NON-PROBE: NOT DETECTED
C AURIS DNA BLD POS QL NAA+NON-PROBE: NOT DETECTED
C GATTII+NEOFOR DNA BLD POS QL NAA+N-PRB: NOT DETECTED
C GLABRATA DNA BLD POS QL NAA+NON-PROBE: NOT DETECTED
C KRUSEI DNA BLD POS QL NAA+NON-PROBE: NOT DETECTED
C PARAP DNA BLD POS QL NAA+NON-PROBE: NOT DETECTED
C TROPICLS DNA BLD POS QL NAA+NON-PROBE: NOT DETECTED
E CLOAC COMP DNA BLD POS NAA+NON-PROBE: NOT DETECTED
E COLI DNA BLD POS QL NAA+NON-PROBE: NOT DETECTED
E FAECALIS DNA BLD POS QL NAA+NON-PROBE: NOT DETECTED
E FAECIUM DNA BLD POS QL NAA+NON-PROBE: NOT DETECTED
EKG ATRIAL RATE: 78 BPM
EKG P AXIS: 46 DEGREES
EKG P-R INTERVAL: 150 MS
EKG Q-T INTERVAL: 418 MS
EKG QRS DURATION: 106 MS
EKG QTC CALCULATION (BAZETT): 476 MS
EKG R AXIS: -29 DEGREES
EKG T AXIS: 38 DEGREES
EKG VENTRICULAR RATE: 78 BPM
ENTEROBACTERALES DNA BLD POS NAA+N-PRB: NOT DETECTED
GP B STREP DNA BLD POS QL NAA+NON-PROBE: NOT DETECTED
HAEM INFLU DNA BLD POS QL NAA+NON-PROBE: NOT DETECTED
K OXYTOCA DNA BLD POS QL NAA+NON-PROBE: NOT DETECTED
KLEBSIELLA SP DNA BLD POS QL NAA+NON-PRB: NOT DETECTED
KLEBSIELLA SP DNA BLD POS QL NAA+NON-PRB: NOT DETECTED
L MONOCYTOG DNA BLD POS QL NAA+NON-PROBE: NOT DETECTED
MECA+MECC+MREJ ISLT/SPM QL: DETECTED
MICROORGANISM SPEC CULT: ABNORMAL
MICROORGANISM SPEC CULT: ABNORMAL
MICROORGANISM SPEC CULT: NORMAL
MICROORGANISM/AGENT SPEC: ABNORMAL
MICROORGANISM/AGENT SPEC: ABNORMAL
MICROORGANISM/AGENT SPEC: NORMAL
N MEN DNA BLD POS QL NAA+NON-PROBE: NOT DETECTED
P AERUGINOSA DNA BLD POS NAA+NON-PROBE: NOT DETECTED
PROTEUS SP DNA BLD POS QL NAA+NON-PROBE: NOT DETECTED
S AUREUS DNA BLD POS QL NAA+NON-PROBE: DETECTED
S AUREUS+CONS DNA BLD POS NAA+NON-PROBE: DETECTED
S EPIDERMIDIS DNA BLD POS QL NAA+NON-PRB: NOT DETECTED
S LUGDUNENSIS DNA BLD POS QL NAA+NON-PRB: NOT DETECTED
S MALTOPHILIA DNA BLD POS QL NAA+NON-PRB: NOT DETECTED
S MARCESCENS DNA BLD POS NAA+NON-PROBE: NOT DETECTED
S PNEUM DNA BLD POS QL NAA+NON-PROBE: NOT DETECTED
S PYO DNA BLD POS QL NAA+NON-PROBE: NOT DETECTED
SALMONELLA DNA BLD POS QL NAA+NON-PROBE: NOT DETECTED
SERVICE CMNT-IMP: ABNORMAL
SERVICE CMNT-IMP: ABNORMAL
SERVICE CMNT-IMP: NORMAL
SPECIMEN DESCRIPTION: ABNORMAL
SPECIMEN DESCRIPTION: ABNORMAL
SPECIMEN DESCRIPTION: NORMAL
STREPTOCOCCUS DNA BLD POS NAA+NON-PROBE: NOT DETECTED

## 2025-03-19 PROCEDURE — 6360000002 HC RX W HCPCS: Performed by: NURSE PRACTITIONER

## 2025-03-19 PROCEDURE — 93010 ELECTROCARDIOGRAM REPORT: CPT | Performed by: INTERNAL MEDICINE

## 2025-03-19 PROCEDURE — 2700000000 HC OXYGEN THERAPY PER DAY

## 2025-03-19 PROCEDURE — 99231 SBSQ HOSP IP/OBS SF/LOW 25: CPT

## 2025-03-19 PROCEDURE — 99239 HOSP IP/OBS DSCHRG MGMT >30: CPT | Performed by: STUDENT IN AN ORGANIZED HEALTH CARE EDUCATION/TRAINING PROGRAM

## 2025-03-19 RX ADMIN — HYDROMORPHONE HYDROCHLORIDE 0.5 MG: 1 INJECTION, SOLUTION INTRAMUSCULAR; INTRAVENOUS; SUBCUTANEOUS at 06:31

## 2025-03-19 RX ADMIN — HYDROMORPHONE HYDROCHLORIDE 0.5 MG: 1 INJECTION, SOLUTION INTRAMUSCULAR; INTRAVENOUS; SUBCUTANEOUS at 03:40

## 2025-03-19 RX ADMIN — LORAZEPAM 2 MG: 2 INJECTION INTRAMUSCULAR; INTRAVENOUS at 05:32

## 2025-03-19 RX ADMIN — GLYCOPYRROLATE 0.4 MG: 0.2 INJECTION INTRAMUSCULAR; INTRAVENOUS at 03:31

## 2025-03-19 RX ADMIN — HYDROMORPHONE HYDROCHLORIDE 1 MG: 1 INJECTION, SOLUTION INTRAMUSCULAR; INTRAVENOUS; SUBCUTANEOUS at 15:10

## 2025-03-19 ASSESSMENT — PAIN SCALES - PAIN ASSESSMENT IN ADVANCED DEMENTIA (PAINAD)
BODYLANGUAGE: RELAXED
FACIALEXPRESSION: SMILING OR INEXPRESSIVE
TOTALSCORE: 1
BREATHING: OCCASIONAL LABORED BREATHING, SHORT PERIOD OF HYPERVENTILATION
CONSOLABILITY: NO NEED TO CONSOLE

## 2025-03-19 NOTE — PROGRESS NOTES
Inpatient Cardiology Consultation      Reason for Consult:  mitral and aortic valves consistent with endocarditis     Consulting Physician: Nole Richey    Requesting Physician:  Noel Richey    Date of Consultation: 3/18/2025    HISTORY OF PRESENT ILLNESS:   Wagner Ryan  is a 45 y.o.  male with past medical history of hep C resolved, IV drug abuse, presented to ED for weakness, hypotension.  In ED, sodium 128, creatinine 5.9, baseline creatinine 1.1.  EGFR 11, troponin 282, WBC 12.9, hemoglobin 5.7, MCV 76.9, UA showed dark with moderate leukocyte esterases, large hemoglobin, 3+ bacteria, ABG 7.460 5/29/1970 1.8/28.4. DVT US showed  No evidence of DVT in the right upper extremity.  2. Signs of chronic thrombosis of the cephalic vein near the antecubital  fossa. CXR showed Mild cardiomegaly with prominent pulmonary vascularity and increased   interstitial markings with small bilateral pleural effusions. 2. Right-sided MediPort catheter in position. X-ray of right humerus and radius ulna showed no acute findings.  He was given IV fluids, PRBCs, started on Levophed, broad-spectrum antibiotics for septic shock and admitted to ICU for further management. ID and Nephro were consulted. Cardio was consulted for mitral and aortic valves consistent with endocarditis,     Interim  Patient being transferred to [facility] for CRRT      PMH: see below    Please note: past medical records were reviewed per electronic medical record (EMR) - see detailed reports under Past Medical/ Surgical History.   Past Medical History:    Coronary artery disease.  Acute anterior STEMI 2/17/2025 with 3.0 x 18 mm drug-eluting stent to the LAD.  Mitral and aortic valve endocarditis  MRSA bacteremia  IV drug abuse    Past Medical History:   Diagnosis Date    Hx of hepatitis C-resolved 3/5/2022    IV drug abuse (HCC)     Nonhealing nonsurgical wound with fat layer exposed        Past Surgical History:    Past Surgical History:

## 2025-03-19 NOTE — DISCHARGE SUMMARY
Hospitalist Discharge Summary    Patient ID: Wagner Ryan   Patient : 1980  Patient's PCP: Martha Maynard MD    Admit Date: 3/18/2025   Admitting Physician: Maria Milanes Marino, MD    Discharge Date:  3/19/2025   Discharge Physician: Josette Segura MD   Discharge Condition: Guarded  Discharge Disposition: Hospice Facility      Hospital course in brief:  (Please refer to daily progress notes for a comprehensive review of the hospitalization by requesting medical records)  45 y.o. year old male  with PMH of hepatitis C-resolved, IV drug abuse  who had a recent long hospitalization at [facility] (--3/7/2025)  where pt had JAVED shows aortic valve endocarditis, cerebral septic embolism, NSTEMI,  BELLO and culture positive for MRSA bacteremia and also tested positive for influenza A.  Pt had stent placed for LAD lesion on 2025.  He was admitted to the MICU following cath lab and started on broad spectrum antibiotics, ASA and Brilinta. During work up for his AMS, a CTA of the head showed tiny area of intraparenchymal hemorrhage in the right occipital lobe w/ MRI of the brain showed multiple small foci of acute and early subacute infarcts in bilateral frontal and parietal occipital lobe left more than right consistent with micro emboli. Neurology was consulted. Pt was cleared to continue on plavix given his recent stent placement.  Repeat MRI confirmed no hemorrhage.  Pt can not get CT surgery due to being on DAPT.  Per discharge summary on 3/7/2025, ID recommended pt stay on Daptomycin.  Pt was discharged to SNF. Patient presented  emergency room department [facility] on 3/16 with a chief complaint of weakness hypotension.  Patient is hypOtensive in the ED despite of IV fluids,  Hb 5.7, getting PRBC , and started on Levophed, and broad-spectrum antibiotics coverage for sepsis with septic shock. \"   Patient received PRBC transfusion, concern for GIB and 
Unchanged bilateral basilar opacity suggesting airspace disease/edema and small bilateral effusions.  No pneumothorax.  No acute osseous or body wall soft tissue abnormalities.     Unchanged bilateral basilar opacity suggesting airspace disease/edema and small bilateral effusions.       Patient Instructions:      Medication List        ASK your doctor about these medications      aspirin 81 MG chewable tablet  Take 1 tablet by mouth daily     atorvastatin 40 MG tablet  Commonly known as: LIPITOR  Take 1 tablet by mouth nightly     bumetanide 2 MG tablet  Commonly known as: BUMEX  Take 1 tablet by mouth daily     Carmex Classic Lip Balm Oint  Apply 1 Application topically as needed (lips)     cloNIDine 0.1 MG tablet  Commonly known as: CATAPRES  Take 1 tablet by mouth every 6 hours as needed (withdrawal associated agitation)     clopidogrel 75 MG tablet  Commonly known as: PLAVIX  Take 1 tablet by mouth daily     DAPTOmycin infusion  Commonly known as: CUBICIN  Infuse 907 mg intravenously every 48 hours Compound per protocol. Tentative stop date 4/8/25     diphenhydrAMINE 50 MG/ML injection  Commonly known as: BENADRYL  Infuse 0.5 mLs intravenously every 6 hours as needed for Itching     metoprolol succinate 50 MG extended release tablet  Commonly known as: TOPROL XL  Take 1 tablet by mouth in the morning and at bedtime     nicotine 21 MG/24HR  Commonly known as: NICODERM CQ  Place 1 patch onto the skin daily                Note that more than 30 minutes was spent in preparing discharge papers, discussing discharge with patient, medication review, etc.    Signed:  Electronically signed by Chiki Gonzáles MD on 3/18/2025 at 7:01 PM

## 2025-03-19 NOTE — PROGRESS NOTES
Physical Therapy    PT orders received for evaluation/treatment.  Pt chart reviewed and discussed with nursing staff.  Pt is not currently appropriate for PT services d/t not medically appropriate.  PT will sign off at this time.  Please re-consult if needed.  Thank you.     Kedar Mckeon, PT, DPT  [id number]

## 2025-03-19 NOTE — PROGRESS NOTES
Patient received the Sacrament of the Anointing of the Sick by Father Boy Jewell on Tuesday, March 18, 2025.    If additional support is requested or needed please reach out to Spiritual Health ([phone]).    Chap. Roddy Olvera MDIV, BCC

## 2025-03-19 NOTE — CARE COORDINATION
Care Coordination: LOS 1 day. Tx from [facility] for need of CRRT,   ID consulted p/ note, recurrent MRSA bacteremia, mitral and aortic valve endocarditis, now with flail mitral valve, septic shock, cardiogenic shock- Telflaro and dapto. ICU team met with daughter, poor prognosis,Palliative care consulted. Pt was compassionately extubated yesterday afternoon. Hospice consult noted, spoke to daughter at bedside. She does not want him moved, she is agreeable to speak to hospice but feels she was never informed about having to move him. She is okay with referral to [facility]- order placed and spoke to Syeda at Hospice and updated her on family wishes    Electronically signed by Allie Dias RN on 3/19/2025 at 11:29 AM   
Care Coordination: LOS Accepted to hospice house. Ambulance form and DNR printed.  Spoke to [facility] and hospital can be billed. Emergency medicaid form was submitted for incapacitated pt with PBO    Electronically signed by Allie Dias RN on 3/19/2025 at 1:29 PM   
Care Coordination: Transport set for 3 pm  for hospice house    Electronically signed by Allie Dias RN on 3/19/2025 at 2:40 PM   
members/significant others, and if so, who? Yes (family)  Plans to Return to Present Housing: No  Other Identified Issues/Barriers to RETURNING to current housing: unknown  Potential Assistance needed at discharge: Skilled Nursing Facility            Potential DME:    Patient expects to discharge to: Skilled nursing facility  Plan for transportation at discharge:      Financial    Payor: /     Does insurance require precert for SNF: No    Potential assistance Purchasing Medications:    Meds-to-Beds request:        [id number] - [place], [place] - [address] [phone] - F [phone]  [address] [address]  Phone: [phone] Fax: [phone]      Notes:    Factors facilitating achievement of predicted outcomes: Family support    Barriers to discharge: Medical complications    Additional Case Management Notes:     The Plan for Transition of Care is related to the following treatment goals of Septic shock (HCC) [A41.9, R65.21]  STEMI (ST elevation myocardial infarction) ([facility]) [I21.3]    IF APPLICABLE: The Patient and/or patient representative Wagner and his family were provided with a choice of provider and agrees with the discharge plan. Freedom of choice list with basic dialogue that supports the patient's individualized plan of care/goals and shares the quality data associated with the providers was provided to:     Patient Representative Name:       The Patient and/or Patient Representative Agree with the Discharge Plan?      Allie Dias RN  Case Management Department  Ph: [phone] Fax:

## 2025-03-19 NOTE — PROGRESS NOTES
Coordination of care discussion and chart review with PM team and icu.  LSW met at bedside with pts daughter to provide ongoing support.  We discussed hospice care which she would want to talk with her grandmother about before considering. She is also concerned that he would not survive transport.  Supportive listening provided.

## 2025-03-19 NOTE — PLAN OF CARE
Problem: Safety - Adult  Goal: Free from fall injury  Outcome: Progressing  Flowsheets (Taken 3/18/2025 2000)  Free From Fall Injury: Instruct family/caregiver on patient safety     Problem: Skin/Tissue Integrity  Goal: Skin integrity remains intact  Description: 1.  Monitor for areas of redness and/or skin breakdown  2.  Assess vascular access sites hourly  3.  Every 4-6 hours minimum:  Change oxygen saturation probe site  4.  Every 4-6 hours:  If on nasal continuous positive airway pressure, respiratory therapy assess nares and determine need for appliance change or resting period  Outcome: Progressing  Flowsheets (Taken 3/18/2025 2000)  Skin Integrity Remains Intact:   Monitor for areas of redness and/or skin breakdown   Assess vascular access sites hourly   Every 4-6 hours minimum: Change oxygen saturation probe site     Problem: ABCDS Injury Assessment  Goal: Absence of physical injury  Outcome: Progressing  Flowsheets (Taken 3/18/2025 2000)  Absence of Physical Injury: Implement safety measures based on patient assessment     Problem: Chronic Conditions and Co-morbidities  Goal: Patient's chronic conditions and co-morbidity symptoms are monitored and maintained or improved  Outcome: Not Progressing  Flowsheets (Taken 3/18/2025 2000)  Care Plan - Patient's Chronic Conditions and Co-Morbidity Symptoms are Monitored and Maintained or Improved: Monitor and assess patient's chronic conditions and comorbid symptoms for stability, deterioration, or improvement     Problem: Discharge Planning  Goal: Discharge to home or other facility with appropriate resources  Outcome: Not Progressing  Flowsheets (Taken 3/18/2025 2000)  Discharge to home or other facility with appropriate resources: Identify barriers to discharge with patient and caregiver

## 2025-03-19 NOTE — PROGRESS NOTES
Palliative Care Department  [phone]  Palliative Care Progress Note  Provider Niesha Demarco, APRN - CNP      PATIENT: Wagner Ryan  : 1980  MRN: [id number]  ADMISSION DATE: 3/18/2025  1:07 PM  Referring Provider: Db Frazier MD      Palliative Medicine was consulted for assistance with: \"Endocarditis with flail Mitral Valve, not a surgical candiate, septic shock, Poor prognosis, GOC discussion\"  HPI:     Clinical Summary: Wagner Ryan is a 45 y.o. with a medical history of IV drug abuse, aortic valve endocarditis, cerebral septic embolism, NSTEMI, BELLO and MRSA who was admitted on 3/18/2025 from SNF with a CHIEF COMPLAINT of right arm pain.  Patient with recent long hospitalization at [facility] from  - 3/7 where patient had JAVED showing aortic valve endocarditis, cerebral septic embolism, NSTEMI, BELLO and culture positive for MSRA bacteremia and also tested positive for influenza A.  Patient had stent placed for LAD lesion on .  He was admitted to MICU following Cath Lab and started on broad-spectrum antibiotics, aspirin and Brilinta.  He was not an operative candidate for endocarditis at that time due to recent OBED placement.  CTA head showed tiny area of intraparenchymal hemorrhage in the right occipital lobe with MRI of the brain showed multiple small foci of acute and early subacute infarcts in bilateral frontal and parietal occipital lobe left more than right consistent with micro emboli.  Repeat MRI confirmed no hemorrhage.  Patient discharged on 3/7 to SNF on IV antibiotics.     He presented to [facility] emergency room 3/16/2025 with chief complaint of weakness and hypotension.  ER was significant for hyponatremia 128, chloride 89, BUN 82, creatinine 5.9, troponin 282, WBC 12.9, hemoglobin 5.7, hematocrit 17.6 procalcitonin 48.45, proBNP greater than 70,000.  Echocardiogram revealed EF 45 to 50%, aortic valve not well-visualized, severe mitral valve regurgitation with mobile echodensity

## 2025-03-20 LAB
MICROORGANISM SPEC CULT: ABNORMAL
SPECIMEN DESCRIPTION: ABNORMAL

## 2025-04-05 NOTE — PROGRESS NOTES
Physician Progress Note      PATIENT:               SANGEETA SORTO  CSN #:                  [id number]  :                       1980  ADMIT DATE:       3/16/2025 10:03 AM  DISCH DATE:        3/18/2025 12:32 PM  RESPONDING  PROVIDER #:        Kalen Swift MD          QUERY TEXT:    The patient admitted with sepsis with septic shock. Possible CLABSI secondary   to infected tunneled PICC stated in PN by IM on . Please clarify in   documentation the relationship, if any, between sepsis and CLABSI Are the   conditions:    The clinical indicators include:  The medical record reflects the following:  Risk factors: Sepsis ATN, Age:M45Y  Clinical indicators: PN by IM on  stated as Possible CLABSI secondary to   infected tunneled PICC.  Consult notes by IM on  stated as Possible CLABSI secondary to infected   tunneled PICC  Treatment: IV vancomycin, IV cefepime, serial labs monitoring.    Thank You,  Paramjit Mckeon [facility] CDS  Options provided:  -- Sepsis Related to CLABSI associated with each other  -- Sepsis not due to CLABSI  -- Other - I will add my own diagnosis  -- Disagree - Not applicable / Not valid  -- Disagree - Clinically unable to determine / Unknown  -- Refer to Clinical Documentation Reviewer    PROVIDER RESPONSE TEXT:    Provider is clinically unable to determine a response to this query.    Query created by: Paramjit Mckeon on 2025 12:12 AM      Electronically signed by:  Kalen Swift MD 2025 2:25 PM

## 2025-04-13 NOTE — PROGRESS NOTES
Physician Progress Note      PATIENT:               SANGEETA SORTO  CSN #:                  [id number]  :                       1980  ADMIT DATE:       3/16/2025 10:03 AM  DISCH DATE:        3/18/2025 12:32 PM  RESPONDING  PROVIDER #:        Kalen Swift MD          QUERY TEXT:    The patient admitted with sepsis with septic shock. Possible CLABSI secondary   to infected tunneled PICC stated in PN by IM on . Please clarify in   documentation the relationship, if any, between sepsis and CLABSI Are the   conditions:    The clinical indicators include:  The medical record reflects the following:  Risk factors: Sepsis ATN, Age:M45Y  Clinical indicators: PN by IM on  stated as Possible CLABSI secondary to   infected tunneled PICC.  Consult notes by IM on  stated as Possible CLABSI secondary to infected   tunneled PICC  Treatment: IV vancomycin, IV cefepime, serial labs monitoring.    Thank You,  Paramjit Mckeon [facility] CDS  Options provided:  -- Sepsis related to CLABSI  -- Sepsis unrelated to CLABSI  -- Other - I will add my own diagnosis  -- Disagree - Not applicable / Not valid  -- Disagree - Clinically unable to determine / Unknown  -- Refer to Clinical Documentation Reviewer    PROVIDER RESPONSE TEXT:    Patient Sepsis Related to CLABSI    Query created by: Paramjit Mckeon on 2025 6:26 AM      Electronically signed by:  Kalen Swift MD 2025 11:03 AM

## 2025-04-23 LAB
ALBUMIN SERPL-MCNC: 2.8 G/DL (ref 3.5–5.2)
ALP SERPL-CCNC: 93 U/L (ref 40–129)
ALT SERPL-CCNC: 20 U/L (ref 0–40)
ANION GAP SERPL CALCULATED.3IONS-SCNC: 10 MMOL/L (ref 7–16)
AST SERPL-CCNC: 28 U/L (ref 0–39)
BASOPHILS # BLD: 0 K/UL (ref 0–0.2)
BASOPHILS NFR BLD: 0 % (ref 0–2)
BILIRUB SERPL-MCNC: 0.5 MG/DL (ref 0–1.2)
BUN SERPL-MCNC: 38 MG/DL (ref 6–20)
CA-I BLD-SCNC: 1.23 MMOL/L (ref 1.15–1.33)
CALCIUM SERPL-MCNC: 8.7 MG/DL (ref 8.6–10.2)
CHLORIDE SERPL-SCNC: 110 MMOL/L (ref 98–107)
CO2 SERPL-SCNC: 26 MMOL/L (ref 22–29)
CREAT SERPL-MCNC: 1 MG/DL (ref 0.7–1.2)
EOSINOPHIL # BLD: 0 K/UL (ref 0.05–0.5)
EOSINOPHILS RELATIVE PERCENT: 0 % (ref 0–6)
ERYTHROCYTE [DISTWIDTH] IN BLOOD BY AUTOMATED COUNT: 15.9 % (ref 11.5–15)
GFR, ESTIMATED: >90 ML/MIN/1.73M2
GLUCOSE SERPL-MCNC: 120 MG/DL (ref 74–99)
HCT VFR BLD AUTO: 28.9 % (ref 37–54)
HGB BLD-MCNC: 9.2 G/DL (ref 12.5–16.5)
LYMPHOCYTES NFR BLD: 0 K/UL (ref 1.5–4)
LYMPHOCYTES RELATIVE PERCENT: 0 % (ref 20–42)
MCH RBC QN AUTO: 23.4 PG (ref 26–35)
MCHC RBC AUTO-ENTMCNC: 31.8 G/DL (ref 32–34.5)
MCV RBC AUTO: 73.5 FL (ref 80–99.9)
MONOCYTES NFR BLD: 0.44 K/UL (ref 0.1–0.95)
MONOCYTES NFR BLD: 4 % (ref 2–12)
NEUTROPHILS NFR BLD: 97 % (ref 43–80)
NEUTS SEG NFR BLD: 11.96 K/UL (ref 1.8–7.3)
PLATELET # BLD AUTO: 153 K/UL (ref 130–450)
PMV BLD AUTO: 10.9 FL (ref 7–12)
POTASSIUM SERPL-SCNC: 3.7 MMOL/L (ref 3.5–5)
PROT SERPL-MCNC: 6.2 G/DL (ref 6.4–8.3)
RBC # BLD AUTO: 3.93 M/UL (ref 3.8–5.8)
RBC # BLD: ABNORMAL 10*6/UL
SODIUM SERPL-SCNC: 146 MMOL/L (ref 132–146)
WBC # BLD: ABNORMAL 10*3/UL
WBC # BLD: ABNORMAL 10*3/UL
WBC OTHER # BLD: 12.4 K/UL (ref 4.5–11.5)

## (undated) DEVICE — DOUBLE BASIN SET: Brand: MEDLINE INDUSTRIES, INC.

## (undated) DEVICE — GUIDEWIRE WITH ICE™ HYDROPHILIC COATING: Brand: CHOICE™ PT

## (undated) DEVICE — GLOVE ORTHO 8   MSG9480

## (undated) DEVICE — PACK,UNIVERSAL,NO GOWNS: Brand: MEDLINE

## (undated) DEVICE — RADIFOCUS GLIDEWIRE: Brand: GLIDEWIRE

## (undated) DEVICE — KIT DSG LRG NEG PRSS WND THER VAC GRANUFOAM

## (undated) DEVICE — SPECIMEN CUP W/LID: Brand: DEROYAL

## (undated) DEVICE — Z INACTIVE USE 2641837 CLIP LIG M BLU TI HRT SHP WIRE HORZ 600 PER BX

## (undated) DEVICE — KIT ANGIO W/ AT P65 PREM HND CTRL FOR CNTRST DEL ANGIOTOUCH

## (undated) DEVICE — READY WET SKIN SCRUB TRAY-LF: Brand: MEDLINE INDUSTRIES, INC.

## (undated) DEVICE — NEEDLE ANGIO 18GA L7CM 0038IN 1 WALL SELD SHLD UNIQUE HUB

## (undated) DEVICE — MARKER,SKIN,WI/RULER AND LABELS: Brand: MEDLINE

## (undated) DEVICE — INTENDED FOR TISSUE SEPARATION, AND OTHER PROCEDURES THAT REQUIRE A SHARP SURGICAL BLADE TO PUNCTURE OR CUT.: Brand: BARD-PARKER ® STAINLESS STEEL BLADES

## (undated) DEVICE — TR BAND RADIAL ARTERY COMPRESSION DEVICE: Brand: TR BAND

## (undated) DEVICE — GLOVE ORANGE PI 7 1/2   MSG9075

## (undated) DEVICE — PAD, DEFIB, ADULT, RADIOTRAN, PHYSIO, LO: Brand: MEDLINE

## (undated) DEVICE — SURGICAL PROCEDURE PACK VASC MAJ CUST

## (undated) DEVICE — SET SURG INSTR DISSECT

## (undated) DEVICE — PENCIL ES L3M BTTN SWCH HOLSTER W/ BLDE ELECTRD EDGE

## (undated) DEVICE — GUIDEWIRE VASC L260CM DIA0.035IN TIP L3MM STD EXCHG PTFE J

## (undated) DEVICE — EXTRAS AMPUTATION I

## (undated) DEVICE — BANDAGE COMPR W4INXL10YD WHITE/BEIGE E MTRX HK LOOP CLSR

## (undated) DEVICE — CATH BLLN ANGIO 2X12MM SC EUPHORA RX

## (undated) DEVICE — GAUZE,SPONGE,4"X4",16PLY,STRL,LF,10/TRAY: Brand: MEDLINE

## (undated) DEVICE — GLOVE SURG SZ 8 L12IN FNGR THK94MIL TRNSLUC YEL LTX HYDRGEL

## (undated) DEVICE — SCANLAN® VASCU-STATT® SINGLE-USE BULLDOG CLAMP - MIDI ANGLED 45° (WHITE), CLAMPING PRESSURE 25-30 G (2/STERILE PKG): Brand: SCANLAN® VASCU-STATT® SINGLE-USE BULLDOG CLAMP

## (undated) DEVICE — GAUZE,SPONGE,4"X4",16PLY,XRAY,STRL,LF: Brand: MEDLINE

## (undated) DEVICE — DRESSING PETRO W3XL8IN OIL EMUL N ADH GZ KNIT IMPREG CELOS

## (undated) DEVICE — Z DISCONTINUED SHEATH INTRO 6FR L11CM 0.038IN SIL TRICSP VLV W/ GWIRE

## (undated) DEVICE — NDL CNTR 40CT FM MAG: Brand: MEDLINE INDUSTRIES, INC.

## (undated) DEVICE — BANDAGE,GAUZE,4.5"X4.1YD,STERILE,LF: Brand: MEDLINE

## (undated) DEVICE — LABEL MED 4 IN SURG PANEL W/ PEN STRL

## (undated) DEVICE — CATHETER ETER IV 20GA L1IN POLYUR STR RADPQ INTROCAN SFTY

## (undated) DEVICE — TOWEL,OR,DSP,ST,BLUE,DLX,10/PK,8PK/CS: Brand: MEDLINE

## (undated) DEVICE — SOLUTION IRRIG 3000ML 0.9% SOD CHL USP UROMATIC PLAS CONT

## (undated) DEVICE — TOWEL,OR,DSP,ST,BLUE,STD,6/PK,12PK/CS: Brand: MEDLINE

## (undated) DEVICE — TUBING PRSS MON L12IN PVC RIG NONEXPANDING M TO FEM CONN

## (undated) DEVICE — TUBING, SUCTION, 1/4" X 10', STRAIGHT: Brand: MEDLINE

## (undated) DEVICE — VALVE ANGIO Y VLV HEMSTAT W INSRT TOOL MTL ST L BOR

## (undated) DEVICE — GOWN,SIRUS,NONRNF,SETINSLV,XL,20/CS: Brand: MEDLINE

## (undated) DEVICE — HANDPIECE SET WITH BONE CLEANING TIP AND SUCTION TUBE: Brand: INTERPULSE

## (undated) DEVICE — GLOVE ORANGE PI 8   MSG9080

## (undated) DEVICE — SOLUTION IV IRRIG POUR BRL 0.9% SODIUM CHL 2F7124

## (undated) DEVICE — PAD,NON-ADHERENT,3X8,STERILE,LF,1/PK: Brand: MEDLINE

## (undated) DEVICE — EXTRAS AMPUTATION II

## (undated) DEVICE — PATIENT RETURN ELECTRODE, SINGLE-USE, CONTACT QUALITY MONITORING, ADULT, WITH 9FT CORD, FOR PATIENTS WEIGING OVER 33LBS. (15KG): Brand: MEGADYNE

## (undated) DEVICE — LOWER EXT KNEE DRAPE: Brand: MEDLINE INDUSTRIES, INC.

## (undated) DEVICE — CANNULA INJ L2.5IN BLNT TIP 3MM CLR BODY W/ 1 W VLV DLP

## (undated) DEVICE — SURGICAL PROCEDURE PACK BASIC

## (undated) DEVICE — Z DISCONTINUED PER MEDLINE USE 2741942 DRESSING AQUACEL 6 IN ALG W9XL15CM SIL CVR WTRPRF VIR BACT BARR ANTIMIC

## (undated) DEVICE — APPLICATOR MEDICATED 26 CC SOLUTION HI LT ORNG CHLORAPREP

## (undated) DEVICE — SOLUTION IV IRRIG WATER 1000ML POUR BRL 2F7114

## (undated) DEVICE — SPONGE LAP W18XL18IN WHT COT 4 PLY FLD STRUNG RADPQ DISP ST

## (undated) DEVICE — ELECTRODE PT RET AD L9FT HI MOIST COND ADH HYDRGEL CORDED

## (undated) DEVICE — SOLUTION SURG PREP ANTIMICROBIAL 4 OZ SKIN WND EXIDINE

## (undated) DEVICE — CATHETER GUID 5.5FR L150CM RAP EXCHG L25CM TIP 4.8FR PUSH

## (undated) DEVICE — 3M™ IOBAN™ 2 ANTIMICROBIAL INCISE DRAPE 6640EZ: Brand: IOBAN™ 2

## (undated) DEVICE — PAD,ABDOMINAL,5"X9",ST,LF,25/BX: Brand: MEDLINE INDUSTRIES, INC.

## (undated) DEVICE — Device

## (undated) DEVICE — GOWN,SIRUS,FABRNF,XL,20/CS: Brand: MEDLINE

## (undated) DEVICE — ANGIO-SEAL VIP VASCULAR CLOSURE DEVICE: Brand: ANGIO-SEAL

## (undated) DEVICE — CLOTH SURG PREP PREOPERATIVE CHLORHEXIDINE GLUC 2% READYPREP

## (undated) DEVICE — DEVICE INFL 20ML 30ATM POLYCARB BRL ABS PLUNG DGT DISPLAY

## (undated) DEVICE — CHLORAPREP 26ML ORANGE

## (undated) DEVICE — DRESSING NEG PRSS L W15XL10CM D1CM POLYVI ALC WHT FOAM VAC

## (undated) DEVICE — SYRINGE MED 10ML POLYPR LUERSLIP TIP FLAT TOP W/O SFTY DISP

## (undated) DEVICE — WIPE,PROTECTANT,SKIN,SUREPREP: Brand: MEDLINE

## (undated) DEVICE — COVER,LIGHT HANDLE,FLX,1/PK: Brand: MEDLINE INDUSTRIES, INC.

## (undated) DEVICE — SET SURG INSTR MINI VASC

## (undated) DEVICE — PACK,LAPAROTOMY,NO GOWNS: Brand: MEDLINE

## (undated) DEVICE — LOOP VES W25MM THK1MM MAXI RED SIL FLD REPELLENT 100 PER

## (undated) DEVICE — GEL US 20GM NONIRRITATING OVERWRAPPED FILE PCH TRNSMIT

## (undated) DEVICE — PACK,UNIV, II AURORA: Brand: MEDLINE

## (undated) DEVICE — YANKAUER,BULB TIP,W/O VENT,RIGID,STERILE: Brand: MEDLINE

## (undated) DEVICE — HEARTRAIL III GUIDING CATHETER: Brand: HEARTRAIL

## (undated) DEVICE — DRAPE,U/ SHT,SPLIT,PLAS,STERIL: Brand: MEDLINE

## (undated) DEVICE — GLOVE SURG 7.5 PF POLYMER WHT STRL SIGN LTX ESSENTIAL LTX

## (undated) DEVICE — NEEDLE HYPO 25GA L1.5IN BLU POLYPR HUB S STL REG BVL STR

## (undated) DEVICE — SET CLAMP NEONATAL VASCUALR

## (undated) DEVICE — SHEET, T, LAPAROTOMY, STERILE: Brand: MEDLINE

## (undated) DEVICE — CANNULA NSL CANN NSL L25FT TBNG AD O2 SUP SFT UC

## (undated) DEVICE — SCANLAN® SURG-I-LOOP® SILICONE LOOPS - RED MINI, 1.5X.88 MM, 16"/40 CM LONG (2/PKG): Brand: SCANLAN® SURG-I-LOOP® SILICONE LOOPS

## (undated) DEVICE — GLOVE SURG L12IN SZ 65FNGR THK94MIL TRNSLUC YEL LTX

## (undated) DEVICE — SYRINGE MED 10ML TRNSLUC BRL PLUNG BLK MRK POLYPR CTRL

## (undated) DEVICE — PENCIL,CAUTERY,ROCKER,PTFE,15'CORD: Brand: MEDLINE INDUSTRIES, INC.

## (undated) DEVICE — DRAPE SURGICAL HAND PROX AURORA

## (undated) DEVICE — GAUZE,PACKING STRIP,IODOFORM,1/2"X5YD,ST: Brand: CURAD

## (undated) DEVICE — TRAP,MUCUS SPECIMEN,40CC: Brand: MEDLINE

## (undated) DEVICE — GLIDESHEATH SLENDER NITINOL HYDROPHILIC COATED INTRODUCER SHEATH: Brand: GLIDESHEATH SLENDER

## (undated) DEVICE — Z INACTIVE USE 2660664 SOLUTION IRRIG 3000ML 0.9% SOD CHL USP UROMATIC PLAS CONT

## (undated) DEVICE — SET ORTHO STD STORTSTD2

## (undated) DEVICE — CLIP SURG 6 MM BULLDOG

## (undated) DEVICE — STANDARD HYPODERMIC NEEDLE,POLYPROPYLENE HUB: Brand: MONOJECT

## (undated) DEVICE — CANISTER NEG PRSS 1000ML W/ GEL INFOVAC

## (undated) DEVICE — GLOVE SURG SZ 75 CRM LTX FREE POLYISOPRENE POLYMER BEAD ANTI

## (undated) DEVICE — GOWN,AURORA,NONREINF,RAGLAN,L,STERILE: Brand: MEDLINE

## (undated) DEVICE — BLADE CLIPPER GEN PURP NS

## (undated) DEVICE — 1.5L THIN WALL CAN: Brand: CRD

## (undated) DEVICE — SET ORTHO STD STORTSTD1

## (undated) DEVICE — PACK,HEAVY DRAINAGE,STERILE: Brand: MEDLINE INDUSTRIES, INC.

## (undated) DEVICE — DRAPE,EXTREMITY,89X128,STERILE: Brand: MEDLINE

## (undated) DEVICE — DILATOR ART